# Patient Record
Sex: FEMALE | Race: WHITE | Employment: OTHER
[De-identification: names, ages, dates, MRNs, and addresses within clinical notes are randomized per-mention and may not be internally consistent; named-entity substitution may affect disease eponyms.]

---

## 2017-01-12 ENCOUNTER — OFFICE VISIT (OUTPATIENT)
Dept: FAMILY MEDICINE CLINIC | Facility: CLINIC | Age: 62
End: 2017-01-12

## 2017-01-12 VITALS
SYSTOLIC BLOOD PRESSURE: 121 MMHG | HEIGHT: 67 IN | BODY MASS INDEX: 22.29 KG/M2 | TEMPERATURE: 99.1 F | WEIGHT: 142 LBS | DIASTOLIC BLOOD PRESSURE: 73 MMHG | HEART RATE: 80 BPM | OXYGEN SATURATION: 98 %

## 2017-01-12 DIAGNOSIS — R07.89 RIGHT-SIDED CHEST WALL PAIN: Primary | ICD-10-CM

## 2017-01-12 DIAGNOSIS — Z23 NEED FOR 23-POLYVALENT PNEUMOCOCCAL POLYSACCHARIDE VACCINE: ICD-10-CM

## 2017-01-12 DIAGNOSIS — D75.89 MACROCYTOSIS: ICD-10-CM

## 2017-01-12 DIAGNOSIS — Z00.00 WELL ADULT EXAM: ICD-10-CM

## 2017-01-12 PROCEDURE — 99213 OFFICE O/P EST LOW 20 MIN: CPT | Performed by: FAMILY MEDICINE

## 2017-01-12 PROCEDURE — 90732 PPSV23 VACC 2 YRS+ SUBQ/IM: CPT | Performed by: FAMILY MEDICINE

## 2017-01-12 PROCEDURE — 90471 IMMUNIZATION ADMIN: CPT | Performed by: FAMILY MEDICINE

## 2017-01-12 ASSESSMENT — ENCOUNTER SYMPTOMS
SHORTNESS OF BREATH: 0
SORE THROAT: 0
VOMITING: 0
TROUBLE SWALLOWING: 0
ABDOMINAL PAIN: 0
WHEEZING: 0
COUGH: 1
DIARRHEA: 0

## 2017-01-13 LAB
CHOLESTEROL, TOTAL: 170 MG/DL
CHOLESTEROL/HDL RATIO: 3.2
HBA1C MFR BLD: 4.9 %
HDLC SERPL-MCNC: 53 MG/DL (ref 35–70)
LDL CHOLESTEROL CALCULATED: 100 MG/DL (ref 0–160)
TRIGL SERPL-MCNC: 84 MG/DL
VLDLC SERPL CALC-MCNC: 17 MG/DL

## 2017-01-17 DIAGNOSIS — Z00.00 WELL ADULT EXAM: ICD-10-CM

## 2017-06-23 ENCOUNTER — OFFICE VISIT (OUTPATIENT)
Dept: FAMILY MEDICINE CLINIC | Age: 62
End: 2017-06-23
Payer: COMMERCIAL

## 2017-06-23 VITALS
HEIGHT: 66 IN | DIASTOLIC BLOOD PRESSURE: 80 MMHG | OXYGEN SATURATION: 99 % | SYSTOLIC BLOOD PRESSURE: 132 MMHG | WEIGHT: 146.4 LBS | HEART RATE: 77 BPM | TEMPERATURE: 99 F | BODY MASS INDEX: 23.53 KG/M2

## 2017-06-23 DIAGNOSIS — R03.0 ELEVATED BLOOD PRESSURE (NOT HYPERTENSION): Primary | ICD-10-CM

## 2017-06-23 DIAGNOSIS — Z72.0 TOBACCO ABUSE: ICD-10-CM

## 2017-06-23 DIAGNOSIS — D75.89 MACROCYTOSIS WITHOUT ANEMIA: ICD-10-CM

## 2017-06-23 PROCEDURE — 99213 OFFICE O/P EST LOW 20 MIN: CPT | Performed by: INTERNAL MEDICINE

## 2017-06-23 RX ORDER — FOLIC ACID 1 MG/1
1 TABLET ORAL DAILY
COMMUNITY
End: 2017-06-23 | Stop reason: SDUPTHER

## 2017-06-23 RX ORDER — TRAMADOL HYDROCHLORIDE 200 MG/1
1 TABLET, EXTENDED RELEASE ORAL DAILY
Status: ON HOLD | COMMUNITY
Start: 2016-08-26 | End: 2020-10-14 | Stop reason: HOSPADM

## 2017-06-23 RX ORDER — FOLIC ACID 1 MG/1
1 TABLET ORAL DAILY
Qty: 30 TABLET | Refills: 5 | Status: SHIPPED | OUTPATIENT
Start: 2017-06-23 | End: 2018-02-16 | Stop reason: SDUPTHER

## 2017-06-23 ASSESSMENT — PATIENT HEALTH QUESTIONNAIRE - PHQ9
2. FEELING DOWN, DEPRESSED OR HOPELESS: 0
SUM OF ALL RESPONSES TO PHQ9 QUESTIONS 1 & 2: 0
1. LITTLE INTEREST OR PLEASURE IN DOING THINGS: 0
SUM OF ALL RESPONSES TO PHQ QUESTIONS 1-9: 0

## 2018-01-03 ENCOUNTER — OFFICE VISIT (OUTPATIENT)
Dept: FAMILY MEDICINE CLINIC | Age: 63
End: 2018-01-03
Payer: COMMERCIAL

## 2018-01-03 VITALS
TEMPERATURE: 98.4 F | BODY MASS INDEX: 24.4 KG/M2 | RESPIRATION RATE: 16 BRPM | SYSTOLIC BLOOD PRESSURE: 122 MMHG | DIASTOLIC BLOOD PRESSURE: 76 MMHG | OXYGEN SATURATION: 96 % | HEIGHT: 66 IN | WEIGHT: 151.8 LBS | HEART RATE: 81 BPM

## 2018-01-03 DIAGNOSIS — Z23 NEED FOR INFLUENZA VACCINATION: ICD-10-CM

## 2018-01-03 DIAGNOSIS — Z72.0 TOBACCO ABUSE: ICD-10-CM

## 2018-01-03 DIAGNOSIS — G89.29 CHRONIC BILATERAL LOW BACK PAIN WITHOUT SCIATICA: ICD-10-CM

## 2018-01-03 DIAGNOSIS — E53.8 FOLIC ACID DEFICIENCY: ICD-10-CM

## 2018-01-03 DIAGNOSIS — M54.50 CHRONIC BILATERAL LOW BACK PAIN WITHOUT SCIATICA: ICD-10-CM

## 2018-01-03 DIAGNOSIS — D75.89 MACROCYTOSIS: ICD-10-CM

## 2018-01-03 DIAGNOSIS — R03.0 ELEVATED BLOOD PRESSURE READING: Primary | ICD-10-CM

## 2018-01-03 DIAGNOSIS — Z13.220 SCREENING FOR HYPERLIPIDEMIA: ICD-10-CM

## 2018-01-03 PROCEDURE — 90688 IIV4 VACCINE SPLT 0.5 ML IM: CPT | Performed by: INTERNAL MEDICINE

## 2018-01-03 PROCEDURE — 99214 OFFICE O/P EST MOD 30 MIN: CPT | Performed by: INTERNAL MEDICINE

## 2018-01-03 PROCEDURE — 90471 IMMUNIZATION ADMIN: CPT | Performed by: INTERNAL MEDICINE

## 2018-01-03 NOTE — PROGRESS NOTES
Pt is here for a check up today. She states she feels perfectly fine this morning. No concerns at this time. Visit Information    Have you changed or started any medications since your last visit including any over-the-counter medicines, vitamins, or herbal medicines? no   Have you stopped taking any of your medications? Is so, why? -  no  Are you having any side effects from any of your medications? - no    Have you seen any other physician or provider since your last visit?  no   Have you had any other diagnostic tests since your last visit? Xray on rt hip   Have you been seen in the emergency room and/or had an admission in a hospital since we last saw you?  no   Have you had your routine dental cleaning in the past 6 months?  yes - June     Do you have an active [employer] account? If no, what is the barrier?   Yes    Patient Care Team:  Alfonso Thornton MD as PCP - General (Internal Medicine)    Medical History Review  Past Medical, Family, and Social History reviewed and does not contribute to the patient presenting condition    Health Maintenance   Topic Date Due    Flu vaccine (1) 09/01/2017    Breast cancer screen  06/23/2018 (Originally 4/17/2017)    Zostavax vaccine  06/23/2019 (Originally 12/30/2016)    DTaP/Tdap/Td vaccine (1 - Tdap) 06/23/2019 (Originally 12/30/1975)    Hepatitis C screen  06/23/2019 (Originally 1955)    HIV screen  06/23/2019 (Originally 12/30/1971)    Cervical cancer screen  04/01/2018    Lipid screen  01/13/2022    Colon cancer screen colonoscopy  06/26/2025    Pneumococcal med risk  Completed

## 2018-01-25 ENCOUNTER — HOSPITAL ENCOUNTER (OUTPATIENT)
Age: 63
Setting detail: SPECIMEN
Discharge: HOME | End: 2018-01-25
Payer: COMMERCIAL

## 2018-01-25 DIAGNOSIS — Z13.220 SCREENING FOR HYPERLIPIDEMIA: ICD-10-CM

## 2018-01-25 DIAGNOSIS — R03.0 ELEVATED BLOOD PRESSURE READING: ICD-10-CM

## 2018-01-25 DIAGNOSIS — D75.89 MACROCYTOSIS: ICD-10-CM

## 2018-01-25 DIAGNOSIS — E53.8 FOLIC ACID DEFICIENCY: ICD-10-CM

## 2018-01-25 LAB
ABSOLUTE EOS #: 0.18 K/UL (ref 0–0.44)
ABSOLUTE IMMATURE GRANULOCYTE: 0.03 K/UL (ref 0–0.3)
ABSOLUTE LYMPH #: 2.09 K/UL (ref 1.1–3.7)
ABSOLUTE MONO #: 0.6 K/UL (ref 0.1–1.2)
ALBUMIN SERPL-MCNC: 4 G/DL (ref 3.5–5.2)
ALBUMIN/GLOBULIN RATIO: 1.7 (ref 1–2.5)
ALP BLD-CCNC: 88 U/L (ref 35–104)
ALT SERPL-CCNC: 10 U/L (ref 5–33)
ANION GAP SERPL CALCULATED.3IONS-SCNC: 13 MMOL/L (ref 9–17)
AST SERPL-CCNC: 11 U/L
BASOPHILS # BLD: 1 % (ref 0–2)
BASOPHILS ABSOLUTE: 0.04 K/UL (ref 0–0.2)
BILIRUB SERPL-MCNC: 0.29 MG/DL (ref 0.3–1.2)
BUN BLDV-MCNC: 9 MG/DL (ref 8–23)
BUN/CREAT BLD: ABNORMAL (ref 9–20)
CALCIUM SERPL-MCNC: 9.1 MG/DL (ref 8.6–10.4)
CHLORIDE BLD-SCNC: 102 MMOL/L (ref 98–107)
CHOLESTEROL, FASTING: 158 MG/DL
CHOLESTEROL/HDL RATIO: 3
CO2: 26 MMOL/L (ref 20–31)
CREAT SERPL-MCNC: 0.46 MG/DL (ref 0.5–0.9)
DIFFERENTIAL TYPE: ABNORMAL
EOSINOPHILS RELATIVE PERCENT: 2 % (ref 1–4)
FOLATE: >20 NG/ML
GFR AFRICAN AMERICAN: >60 ML/MIN
GFR NON-AFRICAN AMERICAN: >60 ML/MIN
GFR SERPL CREATININE-BSD FRML MDRD: ABNORMAL ML/MIN/{1.73_M2}
GFR SERPL CREATININE-BSD FRML MDRD: ABNORMAL ML/MIN/{1.73_M2}
GLUCOSE BLD-MCNC: 88 MG/DL (ref 70–99)
HCT VFR BLD CALC: 38.2 % (ref 36.3–47.1)
HDLC SERPL-MCNC: 53 MG/DL
HEMOGLOBIN: 12.1 G/DL (ref 11.9–15.1)
IMMATURE GRANULOCYTES: 0 %
LDL CHOLESTEROL: 75 MG/DL (ref 0–130)
LYMPHOCYTES # BLD: 28 % (ref 24–43)
MCH RBC QN AUTO: 32.9 PG (ref 25.2–33.5)
MCHC RBC AUTO-ENTMCNC: 31.7 G/DL (ref 28.4–34.8)
MCV RBC AUTO: 103.8 FL (ref 82.6–102.9)
MONOCYTES # BLD: 8 % (ref 3–12)
NRBC AUTOMATED: 0 PER 100 WBC
PDW BLD-RTO: 12.4 % (ref 11.8–14.4)
PLATELET # BLD: 284 K/UL (ref 138–453)
PLATELET ESTIMATE: ABNORMAL
PMV BLD AUTO: 10.3 FL (ref 8.1–13.5)
POTASSIUM SERPL-SCNC: 4.5 MMOL/L (ref 3.7–5.3)
RBC # BLD: 3.68 M/UL (ref 3.95–5.11)
RBC # BLD: ABNORMAL 10*6/UL
SEG NEUTROPHILS: 61 % (ref 36–65)
SEGMENTED NEUTROPHILS ABSOLUTE COUNT: 4.52 K/UL (ref 1.5–8.1)
SODIUM BLD-SCNC: 141 MMOL/L (ref 135–144)
TOTAL PROTEIN: 6.3 G/DL (ref 6.4–8.3)
TRIGLYCERIDE, FASTING: 148 MG/DL
VITAMIN B-12: 401 PG/ML (ref 232–1245)
VLDLC SERPL CALC-MCNC: NORMAL MG/DL (ref 1–30)
WBC # BLD: 7.5 K/UL (ref 3.5–11.3)
WBC # BLD: ABNORMAL 10*3/UL

## 2018-02-16 RX ORDER — FOLIC ACID 1 MG/1
1 TABLET ORAL DAILY
Qty: 90 TABLET | Refills: 1 | Status: SHIPPED | OUTPATIENT
Start: 2018-02-16 | End: 2018-08-14 | Stop reason: SDUPTHER

## 2018-06-04 ENCOUNTER — OFFICE VISIT (OUTPATIENT)
Dept: FAMILY MEDICINE CLINIC | Age: 63
End: 2018-06-04
Payer: COMMERCIAL

## 2018-06-04 VITALS
HEART RATE: 75 BPM | TEMPERATURE: 98.4 F | HEIGHT: 66 IN | SYSTOLIC BLOOD PRESSURE: 124 MMHG | DIASTOLIC BLOOD PRESSURE: 72 MMHG | WEIGHT: 149 LBS | RESPIRATION RATE: 20 BRPM | BODY MASS INDEX: 23.95 KG/M2 | OXYGEN SATURATION: 99 %

## 2018-06-04 DIAGNOSIS — I49.9 IRREGULAR HEART RATE: Primary | ICD-10-CM

## 2018-06-04 DIAGNOSIS — M79.644 PAIN IN FINGER OF RIGHT HAND: ICD-10-CM

## 2018-06-04 PROCEDURE — 99214 OFFICE O/P EST MOD 30 MIN: CPT | Performed by: NURSE PRACTITIONER

## 2018-06-04 RX ORDER — ALENDRONATE SODIUM 70 MG/1
70 TABLET ORAL
COMMUNITY
Start: 2018-06-04

## 2018-06-04 ASSESSMENT — ENCOUNTER SYMPTOMS
SHORTNESS OF BREATH: 0
COUGH: 0

## 2018-06-12 ENCOUNTER — HOSPITAL ENCOUNTER (OUTPATIENT)
Dept: NON INVASIVE DIAGNOSTICS | Age: 63
Discharge: HOME | End: 2018-06-12
Payer: COMMERCIAL

## 2018-06-12 DIAGNOSIS — I49.9 IRREGULAR HEART RATE: ICD-10-CM

## 2018-06-12 PROCEDURE — 93226 XTRNL ECG REC<48 HR SCAN A/R: CPT

## 2018-06-12 PROCEDURE — 93225 XTRNL ECG REC<48 HRS REC: CPT

## 2018-06-13 LAB
ACQUISITION DURATION: NORMAL S
AVERAGE HEART RATE: 77 BPM
HOOKUP DATE: NORMAL
HOOKUP TIME: NORMAL
MAX HEART RATE TIME/DATE: NORMAL
MAX HEART RATE: 108 BPM
MIN HEART RATE TIME/DATE: NORMAL
MIN HEART RATE: 54 BPM
NUMBER OF QRS COMPLEXES: NORMAL
NUMBER OF SUPRAVENTRICULAR BEATS IN RUNS: 0
NUMBER OF SUPRAVENTRICULAR COUPLETS: 0
NUMBER OF SUPRAVENTRICULAR ECTOPICS: 44
NUMBER OF SUPRAVENTRICULAR ISOLATED BEATS: 44
NUMBER OF SUPRAVENTRICULAR RUNS: 0
NUMBER OF VENTRICULAR BEATS IN RUNS: 0
NUMBER OF VENTRICULAR BIGEMINAL CYCLES: 0
NUMBER OF VENTRICULAR COUPLETS: 0
NUMBER OF VENTRICULAR ECTOPICS: 7
NUMBER OF VENTRICULAR ISOLATED BEATS: 7
NUMBER OF VENTRICULAR RUNS: 0

## 2018-06-25 ENCOUNTER — TELEPHONE (OUTPATIENT)
Dept: FAMILY MEDICINE CLINIC | Age: 63
End: 2018-06-25

## 2018-06-26 ENCOUNTER — HOSPITAL ENCOUNTER (OUTPATIENT)
Age: 63
Setting detail: SPECIMEN
Discharge: HOME | End: 2018-06-26
Payer: COMMERCIAL

## 2018-06-26 ENCOUNTER — OFFICE VISIT (OUTPATIENT)
Dept: FAMILY MEDICINE CLINIC | Age: 63
End: 2018-06-26
Payer: COMMERCIAL

## 2018-06-26 VITALS
TEMPERATURE: 98.8 F | DIASTOLIC BLOOD PRESSURE: 82 MMHG | RESPIRATION RATE: 16 BRPM | SYSTOLIC BLOOD PRESSURE: 110 MMHG | BODY MASS INDEX: 24.13 KG/M2 | WEIGHT: 149.4 LBS | HEART RATE: 87 BPM | OXYGEN SATURATION: 98 %

## 2018-06-26 DIAGNOSIS — S69.91XD FINGER INJURY, RIGHT, SUBSEQUENT ENCOUNTER: ICD-10-CM

## 2018-06-26 DIAGNOSIS — L84 SKIN CALLUS: ICD-10-CM

## 2018-06-26 DIAGNOSIS — R00.2 INTERMITTENT PALPITATIONS: Primary | ICD-10-CM

## 2018-06-26 LAB
ALBUMIN SERPL-MCNC: 4.4 G/DL (ref 3.5–5.2)
ALBUMIN/GLOBULIN RATIO: 2 (ref 1–2.5)
ALP BLD-CCNC: 111 U/L (ref 35–104)
ALT SERPL-CCNC: 16 U/L (ref 5–33)
ANION GAP SERPL CALCULATED.3IONS-SCNC: 11 MMOL/L (ref 9–17)
AST SERPL-CCNC: 15 U/L
BILIRUB SERPL-MCNC: 0.4 MG/DL (ref 0.3–1.2)
BUN BLDV-MCNC: 11 MG/DL (ref 8–23)
BUN/CREAT BLD: ABNORMAL (ref 9–20)
CALCIUM SERPL-MCNC: 9.7 MG/DL (ref 8.6–10.4)
CHLORIDE BLD-SCNC: 106 MMOL/L (ref 98–107)
CO2: 24 MMOL/L (ref 20–31)
CREAT SERPL-MCNC: 0.45 MG/DL (ref 0.5–0.9)
GFR AFRICAN AMERICAN: >60 ML/MIN
GFR NON-AFRICAN AMERICAN: >60 ML/MIN
GFR SERPL CREATININE-BSD FRML MDRD: ABNORMAL ML/MIN/{1.73_M2}
GFR SERPL CREATININE-BSD FRML MDRD: ABNORMAL ML/MIN/{1.73_M2}
GLUCOSE BLD-MCNC: 91 MG/DL (ref 70–99)
HCT VFR BLD CALC: 39.4 % (ref 36.3–47.1)
HEMOGLOBIN: 12.9 G/DL (ref 11.9–15.1)
MCH RBC QN AUTO: 33.3 PG (ref 25.2–33.5)
MCHC RBC AUTO-ENTMCNC: 32.7 G/DL (ref 28.4–34.8)
MCV RBC AUTO: 101.8 FL (ref 82.6–102.9)
NRBC AUTOMATED: 0 PER 100 WBC
PDW BLD-RTO: 12.7 % (ref 11.8–14.4)
PLATELET # BLD: 268 K/UL (ref 138–453)
PMV BLD AUTO: 10.4 FL (ref 8.1–13.5)
POTASSIUM SERPL-SCNC: 4.6 MMOL/L (ref 3.7–5.3)
RBC # BLD: 3.87 M/UL (ref 3.95–5.11)
SODIUM BLD-SCNC: 141 MMOL/L (ref 135–144)
TOTAL PROTEIN: 6.6 G/DL (ref 6.4–8.3)
TSH SERPL DL<=0.05 MIU/L-ACNC: 1.6 MIU/L (ref 0.3–5)
WBC # BLD: 8.7 K/UL (ref 3.5–11.3)

## 2018-06-26 PROCEDURE — 99214 OFFICE O/P EST MOD 30 MIN: CPT | Performed by: INTERNAL MEDICINE

## 2018-06-26 RX ORDER — METHYLPREDNISOLONE 4 MG/1
TABLET ORAL
Qty: 1 KIT | Refills: 0 | Status: SHIPPED | OUTPATIENT
Start: 2018-06-26 | End: 2018-07-02

## 2018-06-26 ASSESSMENT — PATIENT HEALTH QUESTIONNAIRE - PHQ9
SUM OF ALL RESPONSES TO PHQ QUESTIONS 1-9: 0
SUM OF ALL RESPONSES TO PHQ9 QUESTIONS 1 & 2: 0
2. FEELING DOWN, DEPRESSED OR HOPELESS: 0
1. LITTLE INTEREST OR PLEASURE IN DOING THINGS: 0

## 2018-06-27 DIAGNOSIS — R00.2 INTERMITTENT PALPITATIONS: ICD-10-CM

## 2018-06-27 PROCEDURE — 93000 ELECTROCARDIOGRAM COMPLETE: CPT | Performed by: INTERNAL MEDICINE

## 2018-08-02 DIAGNOSIS — L84 SKIN CALLUS: ICD-10-CM

## 2018-08-02 DIAGNOSIS — S69.91XD FINGER INJURY, RIGHT, SUBSEQUENT ENCOUNTER: Primary | ICD-10-CM

## 2018-08-15 RX ORDER — FOLIC ACID 1 MG/1
TABLET ORAL
Qty: 90 TABLET | Refills: 1 | Status: SHIPPED | OUTPATIENT
Start: 2018-08-15 | End: 2019-01-30 | Stop reason: SDUPTHER

## 2018-08-28 ENCOUNTER — OFFICE VISIT (OUTPATIENT)
Dept: FAMILY MEDICINE CLINIC | Age: 63
End: 2018-08-28
Payer: COMMERCIAL

## 2018-08-28 VITALS
OXYGEN SATURATION: 99 % | TEMPERATURE: 98.1 F | RESPIRATION RATE: 16 BRPM | BODY MASS INDEX: 23.87 KG/M2 | WEIGHT: 147.8 LBS | SYSTOLIC BLOOD PRESSURE: 124 MMHG | DIASTOLIC BLOOD PRESSURE: 82 MMHG | HEART RATE: 70 BPM

## 2018-08-28 DIAGNOSIS — M19.041 PRIMARY OSTEOARTHRITIS OF BOTH HANDS: ICD-10-CM

## 2018-08-28 DIAGNOSIS — M79.641 RIGHT HAND PAIN: Primary | ICD-10-CM

## 2018-08-28 DIAGNOSIS — Z72.0 TOBACCO ABUSE: ICD-10-CM

## 2018-08-28 DIAGNOSIS — R00.2 INTERMITTENT PALPITATIONS: Primary | ICD-10-CM

## 2018-08-28 DIAGNOSIS — M19.042 PRIMARY OSTEOARTHRITIS OF BOTH HANDS: ICD-10-CM

## 2018-08-28 PROCEDURE — 99213 OFFICE O/P EST LOW 20 MIN: CPT | Performed by: INTERNAL MEDICINE

## 2018-08-28 NOTE — PROGRESS NOTES
patient is present for a follow up appointment. She was given lopressor. She had not had any side effects. She still feels pressure in her chest. No other concerns at this time. Pt sees Zabrinavero Stable tomorrow   Visit Information    Have you changed or started any medications since your last visit including any over-the-counter medicines, vitamins, or herbal medicines? no   Have you stopped taking any of your medications? Is so, why? -  no  Are you having any side effects from any of your medications? - no    Have you seen any other physician or provider since your last visit? Yes Kera   Have you had any other diagnostic tests since your last visit?  no   Have you been seen in the emergency room and/or had an admission in a hospital since we last saw you?  no   Have you had your routine dental cleaning in the past 6 months?  yes    Do you have an active [employer] account? If no, what is the barrier?   Yes    Patient Care Team:  Tiffany Landeros MD as PCP - General (Internal Medicine)  Luis Dueñas MD as Consulting Physician (Pain Management)  Candance Mess, MD as Consulting Physician (Internal Medicine)    Medical History Review  Past Medical, Family, and Social History reviewed and does not contribute to the patient presenting condition    Health Maintenance   Topic Date Due    Cervical cancer screen  04/01/2018    DTaP/Tdap/Td vaccine (1 - Tdap) 06/23/2019 (Originally 12/30/1975)    Hepatitis C screen  06/23/2019 (Originally 1955)    HIV screen  06/23/2019 (Originally 12/30/1971)    Breast cancer screen  06/26/2019 (Originally 4/17/2017)    Shingles Vaccine (1 of 2 - 2 Dose Series) 06/26/2019 (Originally 12/30/2006)    Flu vaccine (1) 09/01/2018    Lipid screen  01/25/2023    Colon cancer screen colonoscopy  06/26/2025    Pneumococcal med risk  Completed
CHOLHDLRATIO 3.2 01/13/2017    CHOLHDLRATIO 2.9 03/20/2015           Assessment/Plan:     1. Intermittent palpitations  - metoprolol tartrate (LOPRESSOR) 25 MG tablet; Take 0.5 tablets by mouth 2 times daily  Dispense: 60 tablet; Refill: 3  - call in two weeks with frequency of symptoms, will adjust based on that   - symptom diary advised     2. Primary osteoarthritis of both hands  See ortho as scheduled later this week     3.  Tobacco abuse   Precontemplative, not ready to quit, declines counseling                Electronically signed by Roxanne Marroquin MD on 8/28/2018 at 9:43 AM

## 2018-08-29 ENCOUNTER — OFFICE VISIT (OUTPATIENT)
Dept: ORTHOPEDIC SURGERY | Age: 63
End: 2018-08-29
Payer: COMMERCIAL

## 2018-08-29 VITALS — WEIGHT: 150 LBS | HEIGHT: 66 IN | BODY MASS INDEX: 24.11 KG/M2

## 2018-08-29 DIAGNOSIS — S63.694A SPRAIN OF OTHER SITE OF RIGHT RING FINGER, INITIAL ENCOUNTER: ICD-10-CM

## 2018-08-29 DIAGNOSIS — R22.31 FINGER MASS, RIGHT: Primary | ICD-10-CM

## 2018-08-29 PROCEDURE — 99243 OFF/OP CNSLTJ NEW/EST LOW 30: CPT | Performed by: ORTHOPAEDIC SURGERY

## 2018-08-29 ASSESSMENT — ENCOUNTER SYMPTOMS
CONSTIPATION: 0
NAUSEA: 0
COUGH: 0
DIARRHEA: 0

## 2018-08-29 NOTE — LETTER
Dr. Conrado Gil  [facility]  [address]  Dept: [phone]  Dept Fax: [phone]        8/29/18    Patient: Lashell Dawn  YOB: 1955    Dear Susan Art MD,    I had the pleasure of seeing one of your patients, Musa Nazario today in the office. Below are the relevant portions of my assessment and plan of care. IMPRESSION:     1. Finger mass, right    2. Sprain of other site of right ring finger, initial encounter      PLAN:     Discussed etiology and natural history of right thumb mass and right ring finger sprain. The treatment options may include oral anti-inflammatories, bracing, injections, advanced imaging, activity modification, physical therapy and/or surgical intervention. The patient would like to proceed with removal of mass from the right thumb. The patient will follow up two weeks post op. We discussed that the patient should call us with any concerns or questions. Thank you for allowing me to participate in the care of this patient. I will keep you updated on this patient's follow up and I look forward to serving you and your patients again in the future. Please don't hesitate to contact me at my mobile number [phone].         Jorden Richter

## 2018-08-29 NOTE — PROGRESS NOTES
Social History    Marital status:      Spouse name: N/A    Number of children: N/A    Years of education: N/A     Occupational History    Not on file. Social History Main Topics    Smoking status: Current Every Day Smoker     Packs/day: 0.50     Years: 30.00     Types: Cigarettes    Smokeless tobacco: Never Used    Alcohol use 3.6 oz/week     6 Cans of beer per week    Drug use: No    Sexual activity: Yes     Other Topics Concern    Not on file     Social History Narrative    No narrative on file       Family History:  No family history on file. REVIEW OF SYSTEMS:  Review of Systems   Constitutional: Negative for chills and fever. Respiratory: Negative for cough. Gastrointestinal: Negative for constipation, diarrhea and nausea. Musculoskeletal: Positive for arthralgias (right thumb, right ring finger). Negative for gait problem, joint swelling and myalgias. Neurological: Negative for dizziness, weakness and numbness. I have reviewed the CC, HPI, ROS, PMH, FHX, Social History. I agree with the documentation provided by other staff, residents, and/or medical students and have reviewed their documentation prior to providing my signature indicating agreement. PHYSICAL EXAM:  Ht 5' 6\" (1.676 m)   Wt 150 lb (68 kg)   BMI 24.21 kg/m²  Body mass index is 24.21 kg/m². Physical Exam  Gen: alert and oriented  Psych:  Appropriate affect; Appropriate knowledge base; Appropriate mood; No hallucinations; Head: normocephalic atraumatic   Chest: symmetric chest excursion  Pelvis: stable  Ortho Exam  Extremity:  Right ring finger mild diffuse swelling at PIPJ. No instability of the PIP is noted. Mild pain RCL and moderate pain UCL right ring PIPJ. Patient able to make a loose composite fist with right hand with mild decrease flexion at PIPJ right ring finger. External stigmata of DJD right  Hand is noted diffusely.     Right thumb mass volar distal size of pencil eraser Tonya Rodriguez Rn. The documentation accurately reflects work and decisions made by me. I have also reviewed documentation completed by clinical staff. Jewell Marshall DO, [facility]  8/29/2018 2:23 PM    This note is created with the assistance of a speech recognition program.  While intending to generate a document that actually reflects the content of the visit, the document can still have some errors including those of syntax and sound a like substitutions which may escape proof reading.  In such instances, actual meaning can be extrapolated by contextual diversion      No orders of the defined types were placed in this encounter.       Orders Placed This Encounter   Procedures    XR HAND RIGHT (MIN 3 VIEWS)     Standing Status:   Future     Number of Occurrences:   1     Standing Expiration Date:   8/29/2019     Order Specific Question:   Reason for exam:     Answer:   pain       Electronically signed by Myles Mckeon DO, Ephriam Miners on 8/29/2018 at 2:22 PM

## 2018-09-05 ENCOUNTER — HOSPITAL ENCOUNTER (OUTPATIENT)
Dept: PREADMISSION TESTING | Age: 63
Discharge: HOME | End: 2018-09-09
Payer: COMMERCIAL

## 2018-09-05 VITALS
RESPIRATION RATE: 16 BRPM | SYSTOLIC BLOOD PRESSURE: 137 MMHG | DIASTOLIC BLOOD PRESSURE: 78 MMHG | BODY MASS INDEX: 23.46 KG/M2 | OXYGEN SATURATION: 100 % | HEART RATE: 65 BPM | WEIGHT: 146 LBS | TEMPERATURE: 99.1 F | HEIGHT: 66 IN

## 2018-09-05 RX ORDER — SODIUM CHLORIDE, SODIUM LACTATE, POTASSIUM CHLORIDE, CALCIUM CHLORIDE 600; 310; 30; 20 MG/100ML; MG/100ML; MG/100ML; MG/100ML
1000 INJECTION, SOLUTION INTRAVENOUS CONTINUOUS
Status: CANCELLED | OUTPATIENT
Start: 2018-09-05

## 2018-09-05 RX ORDER — CALCIUM CARBONATE 500(1250)
1000 TABLET ORAL DAILY
COMMUNITY

## 2018-09-05 ASSESSMENT — PAIN DESCRIPTION - ORIENTATION: ORIENTATION: LOWER;MID

## 2018-09-05 ASSESSMENT — PAIN SCALES - GENERAL: PAINLEVEL_OUTOF10: 6

## 2018-09-05 ASSESSMENT — PAIN DESCRIPTION - LOCATION: LOCATION: BACK

## 2018-09-05 ASSESSMENT — PAIN DESCRIPTION - PAIN TYPE: TYPE: CHRONIC PAIN

## 2018-09-05 NOTE — PROGRESS NOTES
Anesthesia Focused Assessment    STOP-BANG Sleep Apnea Questionnaire    SNORE loudly (heard through closed doors)? No  TIRED, fatigued, sleepy during daytime? No  OBSERVED stopping breathing during sleep? No  High blood PRESSURE being treated? No    BMI over 35? No  AGE over 48? Yes  NECK circumference over 16\"? No  GENDER (male)? No             Total 1  High risk 5-8  Intermediate risk 3-4  Low risk 0-2    Obstructive Sleep Apnea: no  If YES, machine used: no     Type 1 DM:   no  T2DM:  no    Coronary Artery Disease:  no  Hypertension:  no    Active smoker:  3/4 PPD for 48 years. Drinks Alcohol:  reguarly    Dentition: upper partial denture    Defib / AICD / Pacemaker: no      Renal Failure/dialysis:  no    Patient was evaluated in PAT & anesthesia guidelines were applied. NPO guidelines, medication instructions and scheduled arrival time were reviewed with patient. Hx of anesthesia complications:  PONV-needs medication for this. Family hx of anesthesia complications:  no                                                                                                                     Anesthesia contacted:   no  Medical or cardiac clearance ordered: EKG unchanged, labs from June 2018 in chart. Office notes will be requested from PCP.     JEANNIE DE PAZ PA-C  9/5/18  10:49 AM

## 2018-09-11 ENCOUNTER — ANESTHESIA EVENT (OUTPATIENT)
Dept: OPERATING ROOM | Age: 63
End: 2018-09-11
Payer: COMMERCIAL

## 2018-09-11 ENCOUNTER — HOSPITAL ENCOUNTER (OUTPATIENT)
Age: 63
Setting detail: OUTPATIENT SURGERY
Discharge: HOME | End: 2018-09-11
Attending: ORTHOPAEDIC SURGERY | Admitting: ORTHOPAEDIC SURGERY
Payer: COMMERCIAL

## 2018-09-11 ENCOUNTER — ANESTHESIA (OUTPATIENT)
Dept: OPERATING ROOM | Age: 63
End: 2018-09-11
Payer: COMMERCIAL

## 2018-09-11 VITALS — SYSTOLIC BLOOD PRESSURE: 140 MMHG | DIASTOLIC BLOOD PRESSURE: 78 MMHG | OXYGEN SATURATION: 99 %

## 2018-09-11 VITALS
TEMPERATURE: 97.7 F | HEART RATE: 61 BPM | RESPIRATION RATE: 16 BRPM | OXYGEN SATURATION: 99 % | WEIGHT: 146 LBS | DIASTOLIC BLOOD PRESSURE: 88 MMHG | SYSTOLIC BLOOD PRESSURE: 171 MMHG | BODY MASS INDEX: 23.46 KG/M2 | HEIGHT: 66 IN

## 2018-09-11 DIAGNOSIS — G89.18 POST-OP PAIN: Primary | ICD-10-CM

## 2018-09-11 PROCEDURE — 3600000014 HC SURGERY LEVEL 4 ADDTL 15MIN: Performed by: ORTHOPAEDIC SURGERY

## 2018-09-11 PROCEDURE — 3700000000 HC ANESTHESIA ATTENDED CARE: Performed by: ORTHOPAEDIC SURGERY

## 2018-09-11 PROCEDURE — 26116 EXC HAND TUM DEEP < 1.5 CM: CPT | Performed by: ORTHOPAEDIC SURGERY

## 2018-09-11 PROCEDURE — 3700000001 HC ADD 15 MINUTES (ANESTHESIA): Performed by: ORTHOPAEDIC SURGERY

## 2018-09-11 PROCEDURE — 7100000011 HC PHASE II RECOVERY - ADDTL 15 MIN: Performed by: ORTHOPAEDIC SURGERY

## 2018-09-11 PROCEDURE — 88304 TISSUE EXAM BY PATHOLOGIST: CPT

## 2018-09-11 PROCEDURE — 2500000003 HC RX 250 WO HCPCS: Performed by: ORTHOPAEDIC SURGERY

## 2018-09-11 PROCEDURE — 6360000002 HC RX W HCPCS: Performed by: SPECIALIST

## 2018-09-11 PROCEDURE — 2500000003 HC RX 250 WO HCPCS: Performed by: SPECIALIST

## 2018-09-11 PROCEDURE — 2580000003 HC RX 258: Performed by: ORTHOPAEDIC SURGERY

## 2018-09-11 PROCEDURE — 6360000002 HC RX W HCPCS: Performed by: STUDENT IN AN ORGANIZED HEALTH CARE EDUCATION/TRAINING PROGRAM

## 2018-09-11 PROCEDURE — 2709999900 HC NON-CHARGEABLE SUPPLY: Performed by: ORTHOPAEDIC SURGERY

## 2018-09-11 PROCEDURE — 3600000004 HC SURGERY LEVEL 4 BASE: Performed by: ORTHOPAEDIC SURGERY

## 2018-09-11 PROCEDURE — 2580000003 HC RX 258: Performed by: ANESTHESIOLOGY

## 2018-09-11 PROCEDURE — 7100000010 HC PHASE II RECOVERY - FIRST 15 MIN: Performed by: ORTHOPAEDIC SURGERY

## 2018-09-11 RX ORDER — OXYCODONE HYDROCHLORIDE AND ACETAMINOPHEN 5; 325 MG/1; MG/1
1 TABLET ORAL EVERY 8 HOURS PRN
Qty: 15 TABLET | Refills: 0 | Status: SHIPPED | OUTPATIENT
Start: 2018-09-11 | End: 2019-02-13

## 2018-09-11 RX ORDER — MAGNESIUM HYDROXIDE 1200 MG/15ML
LIQUID ORAL CONTINUOUS PRN
Status: DISCONTINUED | OUTPATIENT
Start: 2018-09-11 | End: 2018-09-11 | Stop reason: HOSPADM

## 2018-09-11 RX ORDER — LIDOCAINE HYDROCHLORIDE 10 MG/ML
INJECTION, SOLUTION EPIDURAL; INFILTRATION; INTRACAUDAL; PERINEURAL PRN
Status: DISCONTINUED | OUTPATIENT
Start: 2018-09-11 | End: 2018-09-11 | Stop reason: SDUPTHER

## 2018-09-11 RX ORDER — SODIUM CHLORIDE 0.9 % (FLUSH) 0.9 %
10 SYRINGE (ML) INJECTION EVERY 12 HOURS SCHEDULED
Status: CANCELLED | OUTPATIENT
Start: 2018-09-11

## 2018-09-11 RX ORDER — BUPIVACAINE HYDROCHLORIDE 5 MG/ML
INJECTION, SOLUTION EPIDURAL; INTRACAUDAL PRN
Status: DISCONTINUED | OUTPATIENT
Start: 2018-09-11 | End: 2018-09-11 | Stop reason: HOSPADM

## 2018-09-11 RX ORDER — FENTANYL CITRATE 50 UG/ML
25 INJECTION, SOLUTION INTRAMUSCULAR; INTRAVENOUS EVERY 5 MIN PRN
Status: DISCONTINUED | OUTPATIENT
Start: 2018-09-11 | End: 2018-09-11 | Stop reason: HOSPADM

## 2018-09-11 RX ORDER — SODIUM CHLORIDE, SODIUM LACTATE, POTASSIUM CHLORIDE, CALCIUM CHLORIDE 600; 310; 30; 20 MG/100ML; MG/100ML; MG/100ML; MG/100ML
1000 INJECTION, SOLUTION INTRAVENOUS CONTINUOUS
Status: DISCONTINUED | OUTPATIENT
Start: 2018-09-11 | End: 2018-09-11 | Stop reason: HOSPADM

## 2018-09-11 RX ORDER — SODIUM CHLORIDE 0.9 % (FLUSH) 0.9 %
10 SYRINGE (ML) INJECTION PRN
Status: CANCELLED | OUTPATIENT
Start: 2018-09-11

## 2018-09-11 RX ORDER — ONDANSETRON 2 MG/ML
4 INJECTION INTRAMUSCULAR; INTRAVENOUS ONCE
Status: CANCELLED | OUTPATIENT
Start: 2018-09-11 | End: 2018-09-11

## 2018-09-11 RX ORDER — MIDAZOLAM HYDROCHLORIDE 1 MG/ML
2 INJECTION INTRAMUSCULAR; INTRAVENOUS
Status: CANCELLED | OUTPATIENT
Start: 2018-09-11 | End: 2018-09-11

## 2018-09-11 RX ORDER — PROPOFOL 10 MG/ML
INJECTION, EMULSION INTRAVENOUS PRN
Status: DISCONTINUED | OUTPATIENT
Start: 2018-09-11 | End: 2018-09-11 | Stop reason: SDUPTHER

## 2018-09-11 RX ORDER — FENTANYL CITRATE 50 UG/ML
INJECTION, SOLUTION INTRAMUSCULAR; INTRAVENOUS PRN
Status: DISCONTINUED | OUTPATIENT
Start: 2018-09-11 | End: 2018-09-11 | Stop reason: SDUPTHER

## 2018-09-11 RX ADMIN — FENTANYL CITRATE 100 MCG: 50 INJECTION INTRAMUSCULAR; INTRAVENOUS at 14:40

## 2018-09-11 RX ADMIN — SODIUM CHLORIDE, POTASSIUM CHLORIDE, SODIUM LACTATE AND CALCIUM CHLORIDE 1000 ML: 600; 310; 30; 20 INJECTION, SOLUTION INTRAVENOUS at 13:30

## 2018-09-11 RX ADMIN — PROPOFOL 100 MG: 10 INJECTION, EMULSION INTRAVENOUS at 14:50

## 2018-09-11 RX ADMIN — Medication 2 G: at 14:45

## 2018-09-11 RX ADMIN — LIDOCAINE HYDROCHLORIDE 40 MG: 10 INJECTION, SOLUTION EPIDURAL; INFILTRATION; INTRACAUDAL; PERINEURAL at 14:50

## 2018-09-11 ASSESSMENT — PULMONARY FUNCTION TESTS
PIF_VALUE: 0

## 2018-09-11 ASSESSMENT — PAIN SCALES - GENERAL
PAINLEVEL_OUTOF10: 0

## 2018-09-11 ASSESSMENT — PAIN DESCRIPTION - DESCRIPTORS: DESCRIPTORS: DULL;ACHING

## 2018-09-11 ASSESSMENT — PAIN - FUNCTIONAL ASSESSMENT: PAIN_FUNCTIONAL_ASSESSMENT: 0-10

## 2018-09-11 NOTE — ANESTHESIA PRE PROCEDURE
Department of Anesthesiology  Preprocedure Note       Name:  Quan Torrez   Age:  65 y.o.  :  1955                                          MRN:  [id number]         Date:  2018      Surgeon: Rodney Reynolds):  Valerie Roy DO    Procedure: Procedure(s):  EXCISION MASS THUMB, 3080 TABLE    Medications prior to admission:   Prior to Admission medications    Medication Sig Start Date End Date Taking? Authorizing Provider   Cholecalciferol (VITAMIN D3) 5000 units TABS Take 1 tablet by mouth daily   Yes Historical Provider, MD   calcium carbonate (OSCAL) 500 MG TABS tablet Take 1,000 mg by mouth daily   Yes Historical Provider, MD   metoprolol tartrate (LOPRESSOR) 25 MG tablet Take 0.5 tablets by mouth 2 times daily  Patient taking differently: Take 12.5 mg by mouth 2 times daily Uses for irregular heart beat 18  Yes Louisa Nicole MD   folic acid (FOLVITE) 1 MG tablet TAKE ONE TABLET BY MOUTH DAILY 8/15/18  Yes Louisa Nicole MD   traMADol Lugene Viel[facility]) 200 MG extended release tablet Take 1 tablet by mouth daily 16  Yes Berkley Garland MD   gabapentin (NEURONTIN) 300 MG capsule Take 300 mg by mouth 2 times daily. . 2/27/15  Yes Historical Provider, MD   oxyCODONE-acetaminophen (PERCOCET) 5-325 MG per tablet Take 1 tablet by mouth every 6 hours as needed. . 10/20/14  Yes Historical Provider, MD   alendronate (FOSAMAX) 70 MG tablet Take 70 mg by mouth every 7 days  18   Historical Provider, MD       Current medications:    Current Facility-Administered Medications   Medication Dose Route Frequency Provider Last Rate Last Dose    lactated ringers infusion 1,000 mL  1,000 mL Intravenous Continuous Lucas Lopez MD 50 mL/hr at 18 1330 1,000 mL at 18 1330    ceFAZolin (ANCEF) 2 g in dextrose 5 % 50 mL IVPB  2 g Intravenous On Call to Ludin Razo 38, DO           Allergies:     Allergies   Allergen Reactions    Celecoxib Other (See Comments)    Ibuprofen Other (See Comments)    Meloxicam Other (See Comments)    Naproxen Other (See Comments)    Voltaren  [Diclofenac Sodium] Other (See Comments)    Nsaids Other (See Comments)     Mood changes       Problem List:    Patient Active Problem List   Diagnosis Code    Neoplasm of uncertain behavior of skin D48.5    Other plastic surgery for unacceptable cosmetic appearance Z41.1    Chronic bilateral low back pain without sciatica M54.5, G89.29       Past Medical History:        Diagnosis Date    Arthritis     Chronic back pain     Irregular heart beat     LAFB (left anterior fascicular block)     Osteoporosis     PAC (premature atrial contraction) 06/2018    PACs and PVCs on holter monitor    PONV (postoperative nausea and vomiting)     Wears dentures     upper partial       Past Surgical History:        Procedure Laterality Date    ABDOMINOPLASTY  1997    BLEPHAROPLASTY  1997    BREAST ENHANCEMENT SURGERY  1999    breast augmentation    COLONOSCOPY      FINGER SURGERY      thumb lesion excised    FIXATION KYPHOPLASTY  2013    KNEE ARTHROSCOPY Left 2006    LAMINECTOMY  1994    LUMBAR LAMINECTOMY  2011    LUMBAR LAMINECTOMY  2014    SPINE SURGERY  2005    diskectomy and spinal fusion    TONSILLECTOMY         Social History:    Social History   Substance Use Topics    Smoking status: Current Every Day Smoker     Packs/day: 0.75     Years: 48.00     Types: Cigarettes    Smokeless tobacco: Never Used    Alcohol use Yes      Comment: 2-3 shot liquor daily                                Ready to quit: Not Answered  Counseling given: Not Answered      Vital Signs (Current):   Vitals:    09/11/18 1309   BP: (!) 151/72   Pulse: 70   Resp: 18   Temp: 97.5 °F (36.4 °C)   TempSrc: Temporal   SpO2: 97%   Weight: 146 lb (66.2 kg)   Height: 5' 6\" (1.676 m)                                              BP Readings from Last 3 Encounters:   09/11/18 (!) 151/72   09/05/18 137/78   08/28/18 124/82       NPO Status: Time of last liquid consumption: 2300                        Time of last solid consumption: 2100                        Date of last liquid consumption: 09/10/18                        Date of last solid food consumption: 09/10/18    BMI:   Wt Readings from Last 3 Encounters:   09/11/18 146 lb (66.2 kg)   09/05/18 146 lb (66.2 kg)   08/29/18 150 lb (68 kg)     Body mass index is 23.57 kg/m². CBC:   Lab Results   Component Value Date    WBC 8.7 06/26/2018    RBC 3.87 06/26/2018    HGB 12.9 06/26/2018    HCT 39.4 06/26/2018    .8 06/26/2018    RDW 12.7 06/26/2018     06/26/2018       CMP:   Lab Results   Component Value Date     06/26/2018    K 4.6 06/26/2018     06/26/2018    CO2 24 06/26/2018    BUN 11 06/26/2018    CREATININE 0.45 06/26/2018    GFRAA >60 06/26/2018    LABGLOM >60 06/26/2018    GLUCOSE 91 06/26/2018    PROT 6.6 06/26/2018    CALCIUM 9.7 06/26/2018    BILITOT 0.40 06/26/2018    ALKPHOS 111 06/26/2018    AST 15 06/26/2018    ALT 16 06/26/2018       POC Tests: No results for input(s): POCGLU, POCNA, POCK, POCCL, POCBUN, POCHEMO, POCHCT in the last 72 hours. Coags: No results found for: PROTIME, INR, APTT    HCG (If Applicable): No results found for: PREGTESTUR, PREGSERUM, HCG, HCGQUANT     ABGs: No results found for: PHART, PO2ART, BGC5RQD, ZOL3VFT, BEART, N5DOQEJT     Type & Screen (If Applicable):  No results found for: LABABO, [address]    Anesthesia Evaluation  Patient summary reviewed and Nursing notes reviewed   history of anesthetic complications: PONV. Airway: Mallampati: I  TM distance: >3 FB   Neck ROM: full  Mouth opening: > = 3 FB Dental: normal exam         Pulmonary: breath sounds clear to auscultation            Patient smoked on day of surgery.                  Cardiovascular:  Exercise tolerance: good (>4 METS),       (-) past MI, CAD and dysrhythmias    ECG reviewed  Rhythm: regular  Rate: normal                    Neuro/Psych:   (+) psychiatric history: GI/Hepatic/Renal: Neg GI/Hepatic/Renal ROS            Endo/Other: Negative Endo/Other ROS                    Abdominal:       Abdomen: soft. Vascular:                                        Anesthesia Plan      MAC     ASA 2       Induction: intravenous. MIPS: Postoperative opioids intended and Prophylactic antiemetics administered. Anesthetic plan and risks discussed with patient. Plan discussed with CRNA.     Attending anesthesiologist reviewed and agrees with 2300 Ronna Meza MD   9/11/2018

## 2018-09-12 NOTE — ANESTHESIA POSTPROCEDURE EVALUATION
Department of Anesthesiology  Postprocedure Note    Patient: Surendra Abad  MRN: [id number]  YOB: 1955  Date of evaluation: 9/12/2018  Time:  9:11 AM     Procedure Summary     Date:  09/11/18 Room / Location:  [facility] OR  / [facility] OR    Anesthesia Start:  4941 Anesthesia Stop:  6450    Procedure:  EXCISION MASS THUMB, 3080 TABLE (Right ) Diagnosis:  (MASS RIGHT THUMB)    Surgeon:  Ayaka Castaneda DO Responsible Provider:  Petty Blair MD    Anesthesia Type:  MAC ASA Status:  2          Anesthesia Type: MAC    Aki Phase I: Aki Score: 10    Aki Phase II: Aki Score: 10    Last vitals: Reviewed and per EMR flowsheets.        Anesthesia Post Evaluation    Patient location during evaluation: PACU  Patient participation: complete - patient participated  Level of consciousness: awake  Pain score: 2  Airway patency: patent  Nausea & Vomiting: no vomiting and no nausea  Complications: no  Cardiovascular status: blood pressure returned to baseline  Respiratory status: acceptable  Hydration status: euvolemic

## 2018-09-13 ENCOUNTER — TELEPHONE (OUTPATIENT)
Dept: FAMILY MEDICINE CLINIC | Age: 63
End: 2018-09-13

## 2018-09-13 LAB — DERMATOLOGY PATHOLOGY REPORT: NORMAL

## 2018-09-19 DIAGNOSIS — R00.2 INTERMITTENT PALPITATIONS: ICD-10-CM

## 2018-09-26 ENCOUNTER — OFFICE VISIT (OUTPATIENT)
Dept: ORTHOPEDIC SURGERY | Age: 63
End: 2018-09-26

## 2018-09-26 VITALS — WEIGHT: 150 LBS | HEIGHT: 66 IN | BODY MASS INDEX: 24.11 KG/M2

## 2018-09-26 DIAGNOSIS — R22.31 FINGER MASS, RIGHT: Primary | ICD-10-CM

## 2018-09-26 PROCEDURE — 99024 POSTOP FOLLOW-UP VISIT: CPT | Performed by: ORTHOPAEDIC SURGERY

## 2018-09-26 ASSESSMENT — ENCOUNTER SYMPTOMS
COUGH: 0
VOMITING: 0
APNEA: 0
ABDOMINAL PAIN: 0
CHEST TIGHTNESS: 0

## 2018-09-26 NOTE — PROGRESS NOTES
Exam  MS:  Incision healing well to right thumb with no signs and symptoms of infection noted. Triggering right ring finger that is non-tender at the A1 pully. Motor, sensory, vascular examination to right upper extremity is grossly intact without focal deficits. No gross instability to the right ring finger is appreciated. Triggering appears to be occurring at the proximal interphalangeal joint of the right ring finger. Neuro: alert. oriented  Eyes: Extra-ocular muscles intact  Mouth: Oral mucosa moist. No perioral lesions  Pulm: Respirations unlabored and regular. Skin: warm, well perfused  Psych:   Patient has good fund of knowledge and displays understanging of exam, diagnosis, and plan. Assessment:      1. Finger mass, right         Plan:     sutures removed from right thumb today without complication. Discussed with the patient that she should not soak her right thumb for a week or 2 until her thumb wounds have closed to help avoid infection. Also discussed with the patient that she should avoid bumping her thumb on anything. As for the triggering of her right ring finger the patient is not looking for treatment at this time. Discussed with the patient that she can follow up if she decides she wants treatment for her trigger finger but I would most likely give her a referral to a hand specialist. Patient voices understanding. The patient should follow up in the office as needed. Follow up:Return if symptoms worsen or fail to improve. No orders of the defined types were placed in this encounter. No orders of the defined types were placed in this encounter. Bernarda Morin LPN am scribing for and in the presence of Dr. Tequila Fish  9/27/2018 8:23 AM    I have reviewed and made changes accordingly to the work scribed by Jamar Mclaughlin LPN. The documentation accurately reflects work and decisions made by me.   I have also reviewed documentation completed by clinical staff.     Sumaya Rodriguez DO, [facility]  9/27/2018 10:35 AM     This note is created with the assistance of a speech recognition program.  While intending to generate a document that actually reflects the content of the visit, the document can still have some errors including those of syntax and sound a like substitutions which may escape proof reading.  In such instances, actual meaning can be extrapolated by contextual diversion        Electronically signed by Valerie Roy DO, FAOAO on 9/27/2018 at 10:34 AM

## 2018-10-30 ENCOUNTER — OFFICE VISIT (OUTPATIENT)
Dept: FAMILY MEDICINE CLINIC | Age: 63
End: 2018-10-30
Payer: COMMERCIAL

## 2018-10-30 VITALS
BODY MASS INDEX: 24.44 KG/M2 | OXYGEN SATURATION: 98 % | RESPIRATION RATE: 16 BRPM | WEIGHT: 151.4 LBS | SYSTOLIC BLOOD PRESSURE: 118 MMHG | DIASTOLIC BLOOD PRESSURE: 70 MMHG | HEART RATE: 70 BPM | TEMPERATURE: 99 F

## 2018-10-30 DIAGNOSIS — R00.2 HEART PALPITATIONS: Primary | ICD-10-CM

## 2018-10-30 DIAGNOSIS — L84 FOOT CALLUS: ICD-10-CM

## 2018-10-30 DIAGNOSIS — G89.29 CHRONIC BILATERAL LOW BACK PAIN WITHOUT SCIATICA: ICD-10-CM

## 2018-10-30 DIAGNOSIS — Z23 NEEDS FLU SHOT: ICD-10-CM

## 2018-10-30 DIAGNOSIS — M54.50 CHRONIC BILATERAL LOW BACK PAIN WITHOUT SCIATICA: ICD-10-CM

## 2018-10-30 DIAGNOSIS — M67.921 TENDINOPATHY OF RIGHT BICEPS TENDON: ICD-10-CM

## 2018-10-30 PROCEDURE — 99214 OFFICE O/P EST MOD 30 MIN: CPT | Performed by: INTERNAL MEDICINE

## 2018-10-30 PROCEDURE — 90686 IIV4 VACC NO PRSV 0.5 ML IM: CPT | Performed by: INTERNAL MEDICINE

## 2018-10-30 PROCEDURE — 90471 IMMUNIZATION ADMIN: CPT | Performed by: INTERNAL MEDICINE

## 2018-10-30 RX ORDER — METHYLPREDNISOLONE 4 MG/1
TABLET ORAL
Qty: 1 KIT | Refills: 0 | Status: SHIPPED | OUTPATIENT
Start: 2018-10-30 | End: 2019-01-30 | Stop reason: ALTCHOICE

## 2019-01-30 ENCOUNTER — OFFICE VISIT (OUTPATIENT)
Dept: FAMILY MEDICINE CLINIC | Age: 64
End: 2019-01-30
Payer: COMMERCIAL

## 2019-01-30 VITALS
BODY MASS INDEX: 25.47 KG/M2 | HEART RATE: 75 BPM | DIASTOLIC BLOOD PRESSURE: 80 MMHG | SYSTOLIC BLOOD PRESSURE: 136 MMHG | TEMPERATURE: 98.5 F | OXYGEN SATURATION: 98 % | WEIGHT: 157.8 LBS

## 2019-01-30 DIAGNOSIS — H81.10 BENIGN PAROXYSMAL POSITIONAL VERTIGO, UNSPECIFIED LATERALITY: Primary | ICD-10-CM

## 2019-01-30 DIAGNOSIS — M54.50 CHRONIC BILATERAL LOW BACK PAIN WITHOUT SCIATICA: ICD-10-CM

## 2019-01-30 DIAGNOSIS — G89.29 CHRONIC BILATERAL LOW BACK PAIN WITHOUT SCIATICA: ICD-10-CM

## 2019-01-30 PROCEDURE — 99213 OFFICE O/P EST LOW 20 MIN: CPT | Performed by: NURSE PRACTITIONER

## 2019-01-30 RX ORDER — FOLIC ACID 1 MG/1
TABLET ORAL
Qty: 90 TABLET | Refills: 1 | Status: SHIPPED | OUTPATIENT
Start: 2019-01-30 | End: 2019-08-30 | Stop reason: SDUPTHER

## 2019-01-30 RX ORDER — MECLIZINE HYDROCHLORIDE 25 MG/1
25 TABLET ORAL 3 TIMES DAILY PRN
Qty: 21 TABLET | Refills: 0 | Status: SHIPPED | OUTPATIENT
Start: 2019-01-30 | End: 2019-02-06

## 2019-01-30 ASSESSMENT — ENCOUNTER SYMPTOMS
COUGH: 0
VOMITING: 0
CHANGE IN BOWEL HABIT: 0
NAUSEA: 1

## 2019-02-13 ENCOUNTER — PROCEDURE VISIT (OUTPATIENT)
Dept: FAMILY MEDICINE CLINIC | Age: 64
End: 2019-02-13
Payer: COMMERCIAL

## 2019-02-13 VITALS
OXYGEN SATURATION: 98 % | TEMPERATURE: 98.8 F | DIASTOLIC BLOOD PRESSURE: 60 MMHG | HEART RATE: 84 BPM | WEIGHT: 155.8 LBS | BODY MASS INDEX: 25.15 KG/M2 | SYSTOLIC BLOOD PRESSURE: 112 MMHG

## 2019-02-13 DIAGNOSIS — B07.0 PLANTAR WART: Primary | ICD-10-CM

## 2019-02-13 PROCEDURE — 99212 OFFICE O/P EST SF 10 MIN: CPT | Performed by: NURSE PRACTITIONER

## 2019-02-13 ASSESSMENT — PATIENT HEALTH QUESTIONNAIRE - PHQ9
SUM OF ALL RESPONSES TO PHQ QUESTIONS 1-9: 0
SUM OF ALL RESPONSES TO PHQ QUESTIONS 1-9: 0
SUM OF ALL RESPONSES TO PHQ9 QUESTIONS 1 & 2: 0
1. LITTLE INTEREST OR PLEASURE IN DOING THINGS: 0
2. FEELING DOWN, DEPRESSED OR HOPELESS: 0

## 2019-03-13 DIAGNOSIS — R00.2 INTERMITTENT PALPITATIONS: ICD-10-CM

## 2019-05-30 ENCOUNTER — TELEPHONE (OUTPATIENT)
Dept: FAMILY MEDICINE CLINIC | Age: 64
End: 2019-05-30

## 2019-06-12 ENCOUNTER — OFFICE VISIT (OUTPATIENT)
Dept: FAMILY MEDICINE CLINIC | Age: 64
End: 2019-06-12
Payer: COMMERCIAL

## 2019-06-12 ENCOUNTER — TELEPHONE (OUTPATIENT)
Dept: FAMILY MEDICINE CLINIC | Age: 64
End: 2019-06-12

## 2019-06-12 VITALS
WEIGHT: 145 LBS | SYSTOLIC BLOOD PRESSURE: 122 MMHG | TEMPERATURE: 98.8 F | HEART RATE: 92 BPM | DIASTOLIC BLOOD PRESSURE: 68 MMHG | BODY MASS INDEX: 23.4 KG/M2 | OXYGEN SATURATION: 99 %

## 2019-06-12 DIAGNOSIS — M25.562 CHRONIC PAIN OF BOTH KNEES: Primary | ICD-10-CM

## 2019-06-12 DIAGNOSIS — M25.561 CHRONIC PAIN OF BOTH KNEES: Primary | ICD-10-CM

## 2019-06-12 DIAGNOSIS — G89.29 CHRONIC PAIN OF BOTH KNEES: Primary | ICD-10-CM

## 2019-06-12 DIAGNOSIS — R00.2 INTERMITTENT PALPITATIONS: ICD-10-CM

## 2019-06-12 PROCEDURE — 4004F PT TOBACCO SCREEN RCVD TLK: CPT | Performed by: NURSE PRACTITIONER

## 2019-06-12 PROCEDURE — G8427 DOCREV CUR MEDS BY ELIG CLIN: HCPCS | Performed by: NURSE PRACTITIONER

## 2019-06-12 PROCEDURE — 3017F COLORECTAL CA SCREEN DOC REV: CPT | Performed by: NURSE PRACTITIONER

## 2019-06-12 PROCEDURE — G8420 CALC BMI NORM PARAMETERS: HCPCS | Performed by: NURSE PRACTITIONER

## 2019-06-12 PROCEDURE — 99214 OFFICE O/P EST MOD 30 MIN: CPT | Performed by: NURSE PRACTITIONER

## 2019-06-12 RX ORDER — OXYCODONE HYDROCHLORIDE AND ACETAMINOPHEN 5; 325 MG/1; MG/1
1 TABLET ORAL EVERY 6 HOURS PRN
Status: ON HOLD | COMMUNITY
End: 2019-12-03 | Stop reason: HOSPADM

## 2019-06-12 RX ORDER — OMEPRAZOLE 40 MG/1
1 CAPSULE, DELAYED RELEASE ORAL DAILY
COMMUNITY
End: 2021-01-01 | Stop reason: SDUPTHER

## 2019-06-12 RX ORDER — METOPROLOL TARTRATE 37.5 MG/1
37.5 TABLET, FILM COATED ORAL 2 TIMES DAILY
Qty: 60 TABLET | Refills: 5 | Status: SHIPPED | OUTPATIENT
Start: 2019-06-12 | End: 2019-12-30 | Stop reason: SDUPTHER

## 2019-06-12 ASSESSMENT — ENCOUNTER SYMPTOMS
SHORTNESS OF BREATH: 0
COUGH: 0

## 2019-06-12 NOTE — PROGRESS NOTES
mouth every 7 days       traMADol (ULTRAM ER) 200 MG extended release tablet Take 1 tablet by mouth daily      gabapentin (NEURONTIN) 300 MG capsule Take 300 mg by mouth 2 times daily. .       No current facility-administered medications for this visit.       Past Medical History:   Diagnosis Date    Arthritis     Chronic back pain     Irregular heart beat     LAFB (left anterior fascicular block)     Osteoporosis     PAC (premature atrial contraction) 06/2018    PACs and PVCs on holter monitor    PONV (postoperative nausea and vomiting)     Wears dentures     upper partial      Past Surgical History:   Procedure Laterality Date    ABDOMINOPLASTY  1997    BLEPHAROPLASTY  1997    BREAST ENHANCEMENT SURGERY  1999    breast augmentation    COLONOSCOPY      FINGER SURGERY      thumb lesion excised    FIXATION KYPHOPLASTY  2013    KNEE ARTHROSCOPY Left 2006    LAMINECTOMY  1994    LUMBAR LAMINECTOMY  2011    LUMBAR LAMINECTOMY  2014    OTHER SURGICAL HISTORY Right 09/11/2018     EXCISION MASS THUMB (Right )    IN OFFICE/OUTPT VISIT,PROCEDURE ONLY Right 9/11/2018    EXCISION MASS THUMB, 3080 TABLE performed by Jo Hall DO at [address]  2005    diskectomy and spinal fusion    TONSILLECTOMY       Family History   Problem Relation Age of Onset    Coronary Art Dis Mother     Arthritis Mother     Coronary Art Dis Father     Heart Disease Father     Coronary Art Dis Sister      Social History     Tobacco Use    Smoking status: Current Every Day Smoker     Packs/day: 0.75     Years: 48.00     Pack years: 36.00     Types: Cigarettes    Smokeless tobacco: Never Used   Substance Use Topics    Alcohol use: Yes     Comment: 2-3 shot liquor daily      Allergies   Allergen Reactions    Celecoxib Other (See Comments)    Ibuprofen Other (See Comments)    Meloxicam Other (See Comments)    Naproxen Other (See Comments)    Voltaren  [Diclofenac Sodium] Other (See Comments)    Nsaids Other (See Comments)     Mood changes-patient states she is taking nsaids now       Subjective:      Review of Systems   Constitutional: Negative for chills and fever. Respiratory: Negative for cough and shortness of breath. Cardiovascular: Positive for palpitations. Negative for chest pain and leg swelling. Musculoskeletal: Positive for arthralgias. Other pertinent ROS in HPI  Objective:     /68 (Site: Left Upper Arm, Position: Sitting, Cuff Size: Medium Adult)   Pulse 92   Temp 98.8 °F (37.1 °C) (Oral)   Wt 145 lb (65.8 kg)   SpO2 99%   BMI 23.40 kg/m²    Physical Exam   Constitutional: She is oriented to person, place, and time. She appears well-developed and well-nourished. She is active. HENT:   Right Ear: External ear normal.   Left Ear: External ear normal.   Nose: Nose normal.   Eyes: EOM are normal.   Neck: Normal range of motion and full passive range of motion without pain. Cardiovascular: Normal rate, regular rhythm, S1 normal, S2 normal and normal heart sounds. Pulmonary/Chest: Effort normal and breath sounds normal. No respiratory distress. Musculoskeletal: Normal range of motion. She exhibits no deformity. Right knee: She exhibits normal range of motion, no swelling, no effusion and no ecchymosis. Tenderness found. Left knee: She exhibits normal range of motion, no swelling, no effusion and no ecchymosis. Tenderness found. Neurological: She is alert and oriented to person, place, and time. Skin: Skin is warm and dry. Psychiatric: She has a normal mood and affect. Assessment/PLAN     1. Intermittent palpitations  Increase dose  rto in 1 month for f/u. - metoprolol tartrate 37.5 MG TABS; Take 37.5 mg by mouth 2 times daily  Dispense: 60 tablet; Refill: 5    2. Chronic pain of both knees  Will call with name of doctor she wishes to see.        RTO if symptoms worsen or fail to improve  Pt agreeable with plan      Patient given educational materials - see patientinstructions. Discussed use, benefit, and side effects of prescribed medications. All patient questions answered. Pt voiced understanding. Reviewed health maintenance. Instructed to continue current medications, diet andexercise. 1.  [facility] received counseling on the following healthy behaviors: nutrition, exercise and medication adherence  2. Patient given educationalmaterials when available - see patient instructions when applicable  3. Discussed use, benefit, and side effects of prescribed medications. Barriersto medication compliance addressed. All patient questions answered. Pt voiced understanding. 4.  Reviewed prior labs and health maintenance  5. Continue current medications, diet and exercise.     CompletedRefills   Requested Prescriptions     Signed Prescriptions Disp Refills    metoprolol tartrate 37.5 MG TABS 60 tablet 5     Sig: Take 37.5 mg by mouth 2 times daily         Electronically signed by Donna Johansen CNP on 6/12/2019 at 9:07 AM

## 2019-06-12 NOTE — PROGRESS NOTES
Patient is present to discuss bp meds-lopressor  Patient states an hour before taking med she can feel her heart skipping beats    Patient would like referral to ortho for her right knee  Patient states she goes to pain management for injections and they recommended she see orthi    Pharmacy and medication reviewed with patient    Visit Information    Have you changed or started any medications since your last visit including any over-the-counter medicines, vitamins, or herbal medicines? no   Have you stopped taking any of your medications? Is so, why? -  no  Are you having any side effects from any of your medications? - no    Have you seen any other physician or provider since your last visit? yes - pain management, rheumatology    Have you had any other diagnostic tests since your last visit? yes - xrays, labs  Have you been seen in the emergency room and/or had an admission in a hospital since we last saw you?  no   Have you had your routine dental cleaning in the past 6 months?  yes -      Do you have an active MyChart account? If no, what is the barrier?   Yes    Patient Care Team:  NUPUR Pereira CNP as PCP - General (Family Nurse Practitioner)  NUPUR Pereira CNP as PCP [facility]  Tiffanie Dent MD as Consulting Physician (Pain Management)  Bruno Finley MD as Consulting Physician (Internal Medicine)    Medical History Review  Past Medical, Family, and Social History reviewed and does contribute to the patient presenting condition    Health Maintenance   Topic Date Due    Low dose CT lung screening  12/30/2011    Cervical cancer screen  04/01/2018    DTaP/Tdap/Td vaccine (1 - Tdap) 06/23/2019 (Originally [birth year]30/1974)    Hepatitis C screen  06/23/2019 (Originally 1955)    HIV screen  06/23/2019 (Originally [birth year]/30/1971)    Breast cancer screen  06/26/2019 (Originally 4/17/2017)    Shingles Vaccine (1 of 2) 06/26/2019 (Originally 12/30/2006)   Amy Saxena screen  01/25/2023    Colon cancer screen colonoscopy  06/26/2025    Flu vaccine  Completed    Pneumococcal 0-64 years Vaccine  Completed

## 2019-06-20 ENCOUNTER — HOSPITAL ENCOUNTER (OUTPATIENT)
Age: 64
Setting detail: SPECIMEN
Discharge: HOME | End: 2019-06-20
Payer: COMMERCIAL

## 2019-06-20 ENCOUNTER — OFFICE VISIT (OUTPATIENT)
Dept: ORTHOPEDIC SURGERY | Age: 64
End: 2019-06-20
Payer: COMMERCIAL

## 2019-06-20 VITALS
DIASTOLIC BLOOD PRESSURE: 79 MMHG | HEART RATE: 62 BPM | BODY MASS INDEX: 24.11 KG/M2 | SYSTOLIC BLOOD PRESSURE: 134 MMHG | WEIGHT: 150 LBS | HEIGHT: 66 IN

## 2019-06-20 DIAGNOSIS — M25.461 EFFUSION OF RIGHT KNEE: ICD-10-CM

## 2019-06-20 DIAGNOSIS — M17.0 PRIMARY OSTEOARTHRITIS OF BOTH KNEES: Primary | ICD-10-CM

## 2019-06-20 LAB
CULTURE: NORMAL
DIRECT EXAM: NORMAL
DIRECT EXAM: NORMAL
Lab: NORMAL
Lab: NORMAL
SPECIMEN DESCRIPTION: NORMAL
SPECIMEN DESCRIPTION: NORMAL

## 2019-06-20 PROCEDURE — G8420 CALC BMI NORM PARAMETERS: HCPCS | Performed by: ORTHOPAEDIC SURGERY

## 2019-06-20 PROCEDURE — 20611 DRAIN/INJ JOINT/BURSA W/US: CPT | Performed by: ORTHOPAEDIC SURGERY

## 2019-06-20 PROCEDURE — 3017F COLORECTAL CA SCREEN DOC REV: CPT | Performed by: ORTHOPAEDIC SURGERY

## 2019-06-20 PROCEDURE — G8427 DOCREV CUR MEDS BY ELIG CLIN: HCPCS | Performed by: ORTHOPAEDIC SURGERY

## 2019-06-20 PROCEDURE — 99203 OFFICE O/P NEW LOW 30 MIN: CPT | Performed by: ORTHOPAEDIC SURGERY

## 2019-06-20 PROCEDURE — 4004F PT TOBACCO SCREEN RCVD TLK: CPT | Performed by: ORTHOPAEDIC SURGERY

## 2019-06-20 PROCEDURE — 73564 X-RAY EXAM KNEE 4 OR MORE: CPT | Performed by: ORTHOPAEDIC SURGERY

## 2019-06-20 RX ORDER — LIDOCAINE HYDROCHLORIDE 10 MG/ML
4 INJECTION, SOLUTION INFILTRATION; PERINEURAL ONCE
Status: COMPLETED | OUTPATIENT
Start: 2019-06-20 | End: 2019-06-20

## 2019-06-20 RX ORDER — METHYLPREDNISOLONE ACETATE 40 MG/ML
40 INJECTION, SUSPENSION INTRA-ARTICULAR; INTRALESIONAL; INTRAMUSCULAR; SOFT TISSUE ONCE
Status: COMPLETED | OUTPATIENT
Start: 2019-06-20 | End: 2019-06-20

## 2019-06-20 RX ADMIN — LIDOCAINE HYDROCHLORIDE 4 ML: 10 INJECTION, SOLUTION INFILTRATION; PERINEURAL at 12:36

## 2019-06-20 RX ADMIN — METHYLPREDNISOLONE ACETATE 40 MG: 40 INJECTION, SUSPENSION INTRA-ARTICULAR; INTRALESIONAL; INTRAMUSCULAR; SOFT TISSUE at 12:38

## 2019-06-20 RX ADMIN — METHYLPREDNISOLONE ACETATE 40 MG: 40 INJECTION, SUSPENSION INTRA-ARTICULAR; INTRALESIONAL; INTRAMUSCULAR; SOFT TISSUE at 12:37

## 2019-06-20 RX ADMIN — LIDOCAINE HYDROCHLORIDE 4 ML: 10 INJECTION, SOLUTION INFILTRATION; PERINEURAL at 12:37

## 2019-06-20 ASSESSMENT — ENCOUNTER SYMPTOMS
ABDOMINAL PAIN: 0
COLOR CHANGE: 0
SHORTNESS OF BREATH: 0
VOMITING: 0
CHEST TIGHTNESS: 0
CONSTIPATION: 0
DIARRHEA: 0
ABDOMINAL DISTENTION: 0
NAUSEA: 0
COUGH: 0
APNEA: 0

## 2019-06-20 NOTE — PROGRESS NOTES
[facility]  [address] [address]  Dept: [phone]  Dept Fax: [phone]          Bilateral Knee - New Patient     Subjective:     Chief Complaint   Patient presents with    Knee Pain     B/Lm R>L       HPI:     Erik Belcher presents today for Bilateral knee pain. The pain has been present since October 2018. The patient does not recall any specific injury or trauma to either of the knees. She reports that she woke up one morning in October and was in excruciating pain. At the time the patient was on a camping trip, and reports that the rest of her trip was ruined due to her increasing knee pain. The patient has tried Naproxen with mild improvement. The patient is also on Tramadol and Percocet from pain management for her back, but reports little improvement with both of her knees. The pain is now described as [place] Deric when she stands and with any weight bearing activities. The patient reports that both her knees are achy all the time. There is  pain on weight bearing. The knee has swelled. There is not painful popping and clicking. The knee has not caught or locked up. The knee has given out. It is  stiff upon arising from sitting. It is painful to go up and down stairs and sit for a prolonged time. The patient has had a cortisone injection. The patient has had two cortisone injections in the right knee from pain management. One injection was in the knee joint, and the other was in the bursa sac. The bursa sac cortisone injection was in May of 2019, and it did not provide any type of relief. The patient has not tried a lubrication injection. The patient has tried physical therapy. The patient has had surgery in the right knee in 2006. The opposite knee is not okay. The right knee is worse than the left.  The patient has also had a previous bone density scan done in which she was put on calcium and vitamin D to help with her low bone density. Review of Systems   Constitutional: Positive for activity change. Negative for appetite change, fatigue and fever. Respiratory: Negative for apnea, cough, chest tightness and shortness of breath. Cardiovascular: Negative for chest pain, palpitations and leg swelling. Gastrointestinal: Negative for abdominal distention, abdominal pain, constipation, diarrhea, nausea and vomiting. Genitourinary: Negative for difficulty urinating, dysuria and hematuria. Musculoskeletal: Positive for arthralgias, gait problem and joint swelling. Negative for myalgias. Skin: Negative for color change and rash. Neurological: Negative for dizziness, weakness, numbness and headaches. Psychiatric/Behavioral: Positive for sleep disturbance.        Past Medical History:    Past Medical History:   Diagnosis Date    Arthritis     Chronic back pain     Irregular heart beat     LAFB (left anterior fascicular block)     Osteoporosis     PAC (premature atrial contraction) 06/2018    PACs and PVCs on holter monitor    PONV (postoperative nausea and vomiting)     Wears dentures     upper partial       Past Surgical History:    Past Surgical History:   Procedure Laterality Date    ABDOMINOPLASTY  1997    BLEPHAROPLASTY  1997    BREAST ENHANCEMENT SURGERY  1999    breast augmentation    COLONOSCOPY      FINGER SURGERY      thumb lesion excised    FIXATION KYPHOPLASTY  2013    KNEE ARTHROSCOPY Left 2006    LAMINECTOMY  1994    LUMBAR LAMINECTOMY  2011    LUMBAR LAMINECTOMY  2014    OTHER SURGICAL HISTORY Right 09/11/2018     EXCISION MASS THUMB (Right )    GA OFFICE/OUTPT VISIT,PROCEDURE ONLY Right 9/11/2018    EXCISION MASS THUMB, 3080 TABLE performed by Kori Phoenix DO at [facility]  2005    diskectomy and spinal fusion    TONSILLECTOMY         CurrentMedications:   Current Outpatient Medications   Medication Sig Dispense Refill    NAPROXEN PO Take by mouth Indications: patient takes 1-2 times a day      oxyCODONE-acetaminophen (PERCOCET) 5-325 MG per tablet Take 1 tablet by mouth.  OMEPRAZOLE PO Take by mouth daily      metoprolol tartrate 37.5 MG TABS Take 37.5 mg by mouth 2 times daily 60 tablet 5    folic acid (FOLVITE) 1 MG tablet TAKE ONE TABLET BY MOUTH DAILY 90 tablet 1    Cholecalciferol (VITAMIN D3) 5000 units TABS Take 1 tablet by mouth daily      calcium carbonate (OSCAL) 500 MG TABS tablet Take 1,000 mg by mouth daily      alendronate (FOSAMAX) 70 MG tablet Take 70 mg by mouth every 7 days       traMADol (ULTRAM ER) 200 MG extended release tablet Take 1 tablet by mouth daily      gabapentin (NEURONTIN) 300 MG capsule Take 300 mg by mouth 2 times daily. .       No current facility-administered medications for this visit. Allergies:    Celecoxib;  Ibuprofen; Meloxicam; Naproxen; Voltaren  [diclofenac sodium]; and Nsaids    Social History:   Social History     Socioeconomic History    Marital status:      Spouse name: None    Number of children: None    Years of education: None    Highest education level: None   Occupational History    None   Social Needs    Financial resource strain: None    Food insecurity:     Worry: None     Inability: None    Transportation needs:     Medical: None     Non-medical: None   Tobacco Use    Smoking status: Current Every Day Smoker     Packs/day: 0.75     Years: 48.00     Pack years: 36.00     Types: Cigarettes    Smokeless tobacco: Never Used   Substance and Sexual Activity    Alcohol use: Yes     Comment: 2-3 shot liquor daily    Drug use: No    Sexual activity: Yes   Lifestyle    Physical activity:     Days per week: None     Minutes per session: None    Stress: None   Relationships    Social connections:     Talks on phone: None     Gets together: None     Attends Christianity service: None     Active member of club or organization: None     Attends meetings of clubs or organizations: None     Relationship status: None    Intimate partner violence:     Fear of current or ex partner: None     Emotionally abused: None     Physically abused: None     Forced sexual activity: None   Other Topics Concern    None   Social History Narrative    None       Family History:  Family History   Problem Relation Age of Onset    Coronary Art Dis Mother     Arthritis Mother     Coronary Art Dis Father     Heart Disease Father     Coronary Art Dis Sister        I have reviewed the CC, HPI, ROS, PMH, FHX, Social History, and if not present in this note, I have reviewed in the patient's chart. I agree with the documentation provided by other staff and have reviewed their documentation prior to providing my signature indicating agreement. Vitals:   /79   Pulse 62   Ht 5' 6\" (1.676 m)   Wt 150 lb (68 kg)   BMI 24.21 kg/m²  Body mass index is 24.21 kg/m². Physical Examination:     Orthopedics:    GENERAL: Alert and oriented X3 in no acute distress. SKIN: Intact without lesions or ulcerations. NEURO: Musculoskeletal and axillary nerves intact to sensory and motor testing. VASC: Capillary refill is less than 3 seconds. Bilateral Knee     GEN:  Alert and oriented X 3, in no acute distress. GAIT:  The patient's gait was observed while entering the exam room and was noted to be antalgic. The extremity is in anatomic alignment. SKIN:  Intact without rashes, lesions, or ulcerations. No obvious deformity or swelling. NEURO:  The patient responds to light touch throughout bilateral LE. Patellar and Achilles reflexes are 2/4. VASC:  The bilateral LE is neurovascularly intact with  2/4 DP and  2/4 PT pulses. Brisk capillary refill. ROM: 0/135 degrees on the right knee 0/137 degrees on the left knee. Severe knee effusion on the right knee. Mild on the left knee. Baker's cyst present with both knees.    MUSC:   decreased quad tone  LIGAMENT:  Lachman's test is Negative with Good endpoint. Anterior drawer Negative. Posterior drawer Negative. There is  No varus instability at 0 degrees and No varus instability at 30 degrees. There is No valgus instability at 0 degrees and No valgus instability at 30 degrees. SPECIAL:  Pain with Kd's, but no clicking or clunking. PALP: There is Medial joint line pain    Assessment:     1. Primary osteoarthritis of both knees    2. Effusion of right knee        Procedures:    Procedure yes    Joint aspiration of the right knee. The patient was placed in the supine position on the exam table. The superior lateral portal was identified and marked. The skin was prepped with betadine in a sterile fashion. Utilizing ultrasound for precise placement and clean technique with sterile gloves a 5cc solution containing 5cc of 1.0% Lidocaine was injected. There was no resistance to the injection. Thereafter the skin was prepped with betadine in a sterile fashion once again and the joint was aspirated with a 60cc syringe. After aspirating the joint, 36 cc's of yellow tinged synovial fluid was removed from the knee and was sent to the lab to check for gout. The wound was then cleansed and a band-aid was placed over the superior lateral portal site. The patient tolerated the procedure without difficulty. Adverse reactions to the aspiration were discussed with the patient including signs of infection (increasing pain, redness, swelling) and the patient was instructed to call immediately if experiencing any of these symptoms. Regular Knee Injection    Location: Right Knee  Procedure: Corticosteroid injection into the knee. the patient was placed in the supine position on the exam table. The superior lateral portal was identified and marked. the skin was prepped with betadine in a sterile fashion.  Utilizing ultrasound for precise placement and clean technique with sterile gloves a 5cc solution containing 4cc of 1.0% Lidocaine with 1cc containing 40mg of Depo-medrol was injected. There was no resistance to the injection. The wound was cleansed and a band-aid was placed. the patient tolerated the procedure without difficulty. Adverse reactions to the injection were discussed with the patient including signs of infection (increasing pain, redness, swelling) and the patient was instructed to call immediately if experiencing any of these symptoms. Regular Knee Injection    Location: Left Knee  Procedure: Corticosteroid injection into the knee. the patient was placed in the supine position on the exam table. The superior lateral portal was identified and marked. the skin was prepped with betadine in a sterile fashion. Utilizing ultrasound for precise placement and clean technique with sterile gloves a 5cc solution containing 4cc of 1.0% Lidocaine with 1cc containing 40mg of Depo-medrol was injected. There was no resistance to the injection. The wound was cleansed and a band-aid was placed. the patient tolerated the procedure without difficulty. Adverse reactions to the injection were discussed with the patient including signs of infection (increasing pain, redness, swelling) and the patient was instructed to call immediately if experiencing any of these symptoms. Radiology:   X-rays taken today were reviewed with the patient. KNEE X-RAY    4 views of the bilateral knee including AP, bilateral tunnel, and lateral in the upright position, and skyline views reveal alignment with no fracture or dislocation. Kellgren grade 3 changes of osteoarthritis (joint space narrowing, osteophyte, subchondral sclerosis, bony deformity/cyst) of the medial compartment(s). Possible osseous loose bodies of the right knee. No bony erosion or periosteal reaction. No soft tissue masses. Osteopenia is present. Chondrocalcinosis, right is greater than the left. Vascular calcification. Impression: Moderate osteoarthritis of the bilateral knees.     Plan:   Treatment : I reviewed the X-ray with the patient. We discussed the etiologies and natural histories of primary osteoarthritis in both knees. We discussed the various treatment alternatives including anti-inflammatory medications, physical therapy, injections, further imaging studies and as a last result surgery. During today's visit, I explained that the fastest pain relief to get her through the summer would be to aspirate at least the right knee and then inject both knees with cortisone. I explained that she can use creams or wraps to make her knees feel better. However, I explained to the patient that the only way to fix arthritis is eventually a knee replacement, but if we inject today then we would be looking at scheduling a knee replacement in 3 months. I discussed with the patient to take anti-inflammatories to help with inflammation and pain with both of the knees. I also discussed with the patient to start formal physical therapy to keep the muscles strong and for insurance purposes. The patient has opted and elected for a right knee aspiration and a cortisone injection in both her knees, and to think about a knee replacement at the end of September. The injection site should never get red, hot, or swollen and if it does the patient will contact our office right away. The patient may experience a increase in soreness the first 24-48 hours due to a cortisone flair and can take anti-inflammatories for a short period of time to reduce that soreness. The patient should not submerge the injection site in water for a minimum of 24 hours to avoid infection. This means no lakes, pools, ponds, or hot tubs for 24 hours. If the patient is diabetic the injection may increase their blood sugar for up to one week. The patient can do this cortisone injection once every 3 months as needed. We are also sending her aspirated fluid off to the lab. A physical therapy prescription was given.  Patient should return to the clinic in 6 weeks to follow up with Bang Irving PA-C, ATC. The patient will call the office immediately with any problems. Orders Placed This Encounter   Medications    lidocaine 1 % injection 4 mL    methylPREDNISolone acetate (DEPO-MEDROL) injection 40 mg    lidocaine 1 % injection 4 mL    methylPREDNISolone acetate (DEPO-MEDROL) injection 40 mg       Orders Placed This Encounter   Procedures    Anaerobic and Aerobic Culture     MUST HOLD CULTURES FOR 14 DAYS (even if no growth)     Standing Status:   Future     Number of Occurrences:   1     Standing Expiration Date:   6/20/2020     Scheduling Instructions:      Right knee asp    Gram Stain     Standing Status:   Future     Number of Occurrences:   1     Standing Expiration Date:   6/20/2020     Order Specific Question:   Specify Specimen Type     Answer:   Fluid    Body Fluid Cell Count with Differential     Standing Status:   Future     Number of Occurrences:   1     Standing Expiration Date:   6/20/2020     Order Specific Question:   Specify Specimen Type     Answer:   Fluid    Body Fluid Crystal     Standing Status:   Future     Number of Occurrences:   1     Standing Expiration Date:   7/20/2019    External Referral To Physical Therapy     Referral Priority:   Routine     Referral Type:   Eval and Treat     Referral Reason:   Specialty Services Required     Requested Specialty:   Physical Therapy     Number of Visits Requested:   1    UT ARTHROCENTESIS ASPIR&/INJ MAJOR JT/BURSA W/US       Ignacio ELY MS, AT, ATC, am scribing for and in the presence of Alvarado Valencia D.O.. 6/23/2019  2:18 PM      Alvarado ELY DO, have personally seen this patient, reviewed the chart including history, and imaging if done. I personally  performed the physical exam and obtained any needed additional history. I placed orders, performed or supervised procedures and developed the treatment plan.     Electronically signed by Johanny Jc DO, on 6/23/2019 at 2:18 PM

## 2019-06-21 LAB
APPEARANCE FLUID: NORMAL
BASO FLUID: NORMAL %
COLOR FLUID: NORMAL
CRYSTALS, FLUID: NEGATIVE
EOSINOPHIL FLUID: NORMAL %
FLUID DIFF COMMENT: NORMAL
LYMPHOCYTES, BODY FLUID: 36 %
MONOCYTE, FLUID: NORMAL %
NEUTROPHIL, FLUID: 27 %
OTHER CELLS FLUID: NORMAL %
RBC FLUID: <3000 /MM3
SPECIMEN TYPE: NORMAL
SPECIMEN TYPE: NORMAL
WBC FLUID: 581 /MM3

## 2019-06-26 ENCOUNTER — TELEPHONE (OUTPATIENT)
Dept: FAMILY MEDICINE CLINIC | Age: 64
End: 2019-06-26

## 2019-06-26 DIAGNOSIS — Z01.818 PRE-OPERATIVE CLEARANCE: Primary | ICD-10-CM

## 2019-07-06 LAB
CULTURE: ABNORMAL
DIRECT EXAM: ABNORMAL
Lab: ABNORMAL
SPECIMEN DESCRIPTION: ABNORMAL

## 2019-07-19 ENCOUNTER — OFFICE VISIT (OUTPATIENT)
Dept: ORTHOPEDIC SURGERY | Age: 64
End: 2019-07-19
Payer: COMMERCIAL

## 2019-07-19 VITALS — WEIGHT: 150 LBS | HEIGHT: 67 IN | BODY MASS INDEX: 23.54 KG/M2

## 2019-07-19 DIAGNOSIS — M25.461 EFFUSION OF RIGHT KNEE: Primary | ICD-10-CM

## 2019-07-19 DIAGNOSIS — M17.0 PRIMARY OSTEOARTHRITIS OF BOTH KNEES: ICD-10-CM

## 2019-07-19 PROCEDURE — 3017F COLORECTAL CA SCREEN DOC REV: CPT | Performed by: FAMILY MEDICINE

## 2019-07-19 PROCEDURE — 99203 OFFICE O/P NEW LOW 30 MIN: CPT | Performed by: FAMILY MEDICINE

## 2019-07-19 PROCEDURE — G8420 CALC BMI NORM PARAMETERS: HCPCS | Performed by: FAMILY MEDICINE

## 2019-07-19 PROCEDURE — G8427 DOCREV CUR MEDS BY ELIG CLIN: HCPCS | Performed by: FAMILY MEDICINE

## 2019-07-19 PROCEDURE — 4004F PT TOBACCO SCREEN RCVD TLK: CPT | Performed by: FAMILY MEDICINE

## 2019-08-01 ENCOUNTER — OFFICE VISIT (OUTPATIENT)
Dept: ORTHOPEDIC SURGERY | Age: 64
End: 2019-08-01
Payer: COMMERCIAL

## 2019-08-01 VITALS
WEIGHT: 139.4 LBS | HEART RATE: 64 BPM | BODY MASS INDEX: 22.4 KG/M2 | HEIGHT: 66 IN | SYSTOLIC BLOOD PRESSURE: 126 MMHG | DIASTOLIC BLOOD PRESSURE: 74 MMHG

## 2019-08-01 DIAGNOSIS — M17.0 PRIMARY OSTEOARTHRITIS OF BOTH KNEES: Primary | ICD-10-CM

## 2019-08-01 PROCEDURE — 99213 OFFICE O/P EST LOW 20 MIN: CPT | Performed by: PHYSICIAN ASSISTANT

## 2019-08-02 ASSESSMENT — ENCOUNTER SYMPTOMS
DIARRHEA: 0
SHORTNESS OF BREATH: 0
RESPIRATORY NEGATIVE: 1
CHEST TIGHTNESS: 0
APNEA: 0
COUGH: 0
COLOR CHANGE: 0
ABDOMINAL PAIN: 0
NAUSEA: 0
CONSTIPATION: 0
VOMITING: 0
ABDOMINAL DISTENTION: 0

## 2019-08-02 NOTE — PROGRESS NOTES
dizziness, weakness, numbness and headaches. Psychiatric/Behavioral: Negative for sleep disturbance. Past Medical History:    Past Medical History:   Diagnosis Date    Arthritis     Chronic back pain     Irregular heart beat     LAFB (left anterior fascicular block)     Osteoporosis     PAC (premature atrial contraction) 06/2018    PACs and PVCs on holter monitor    PONV (postoperative nausea and vomiting)     Wears dentures     upper partial     Past Surgical History:    Past Surgical History:   Procedure Laterality Date    ABDOMINOPLASTY  1997    BLEPHAROPLASTY  1997    BREAST ENHANCEMENT SURGERY  1999    breast augmentation    COLONOSCOPY      FINGER SURGERY      thumb lesion excised    FIXATION KYPHOPLASTY  2013    KNEE ARTHROSCOPY Left 2006    LAMINECTOMY  1994    LUMBAR LAMINECTOMY  2011    LUMBAR LAMINECTOMY  2014    OTHER SURGICAL HISTORY Right 09/11/2018     EXCISION MASS THUMB (Right )    VT OFFICE/OUTPT VISIT,PROCEDURE ONLY Right 9/11/2018    EXCISION MASS THUMB, 3080 TABLE performed by Son Thao DO at [address]  2005    diskectomy and spinal fusion    TONSILLECTOMY       Current Medications:   Current Outpatient Medications   Medication Sig Dispense Refill    NAPROXEN PO Take by mouth Indications: patient takes 1-2 times a day      oxyCODONE-acetaminophen (PERCOCET) 5-325 MG per tablet Take 1 tablet by mouth.       OMEPRAZOLE PO Take by mouth daily      metoprolol tartrate 37.5 MG TABS Take 37.5 mg by mouth 2 times daily 60 tablet 5    folic acid (FOLVITE) 1 MG tablet TAKE ONE TABLET BY MOUTH DAILY 90 tablet 1    Cholecalciferol (VITAMIN D3) 5000 units TABS Take 1 tablet by mouth daily      calcium carbonate (OSCAL) 500 MG TABS tablet Take 1,000 mg by mouth daily      alendronate (FOSAMAX) 70 MG tablet Take 70 mg by mouth every 7 days       traMADol (ULTRAM ER) 200 MG extended release tablet Take 1 tablet by mouth daily      gabapentin (NEURONTIN) 300 MG capsule Take 300 mg by mouth 2 times daily. .       No current facility-administered medications for this visit. Allergies:    Celecoxib; Ibuprofen; Meloxicam; Naproxen; Voltaren  [diclofenac sodium]; and Nsaids    Social History:   Social History     Socioeconomic History    Marital status:      Spouse name: None    Number of children: None    Years of education: None    Highest education level: None   Occupational History    None   Social Needs    Financial resource strain: None    Food insecurity:     Worry: None     Inability: None    Transportation needs:     Medical: None     Non-medical: None   Tobacco Use    Smoking status: Current Every Day Smoker     Packs/day: 0.75     Years: 48.00     Pack years: 36.00     Types: Cigarettes    Smokeless tobacco: Never Used   Substance and Sexual Activity    Alcohol use: Yes     Comment: 2-3 shot liquor daily    Drug use: No    Sexual activity: Yes   Lifestyle    Physical activity:     Days per week: None     Minutes per session: None    Stress: None   Relationships    Social connections:     Talks on phone: None     Gets together: None     Attends [employer] service: None     Active member of club or organization: None     Attends meetings of clubs or organizations: None     Relationship status: None    Intimate partner violence:     Fear of current or ex partner: None     Emotionally abused: None     Physically abused: None     Forced sexual activity: None   Other Topics Concern    None   Social History Narrative    None       Family History:  Family History   Problem Relation Age of Onset    Coronary Art Dis Mother     Arthritis Mother     Coronary Art Dis Father     Heart Disease Father     Coronary Art Dis Sister        I have reviewed the CC, HPI, ROS, PMH, FHX, Social History, and if not present in this note, I have reviewed in the patient's chart.    I agree with the documentation provided by other staff and from her PCP, Vascular, Cardio, Rheumatology and Dentist so she will be at a reduced risk of developing complications. The patient states that she understands the plan of care and she has opted for and has elected to proceed with a total knee replacement surgery. The risks/benefits/alternatives and potential complications have been discussed with the patient. We also had a long discussion regarding the procedure itself and expectation of the recovery. All questions and concerns with addressed. Patient was instructed to call our office with any question or concerns prior to the procedure occurs. Patient should return to the clinic for a preop discussion to follow up with Andrews MCINTOSH for a pre-operative discussion. The patient will call the office immediately with any problems. No orders of the defined types were placed in this encounter. No orders of the defined types were placed in this encounter. Jose Angel Nguyễn PA-C, SHAYNA, am scribing for and in the presence of  Charly Licona ATC. 8/2/2019  12:54 PM      I, Gamal Nair PA-C, SHAYNA,  have personally seen this patient, reviewed the chart including history, and imaging if done. I personally  performed the physical exam and obtained any needed additional history. I placed orders, performed or supervised procedures and developed the treatment plan.     Electronically signed by Milton Rosas PA-C, on 8/2/2019 at 2:51 PM        Electronically signed by Milton Rosas PA-C, on 8/2/2019 at 12:54 PM

## 2019-08-22 ENCOUNTER — TELEPHONE (OUTPATIENT)
Dept: FAMILY MEDICINE CLINIC | Age: 64
End: 2019-08-22

## 2019-08-22 ENCOUNTER — OFFICE VISIT (OUTPATIENT)
Dept: FAMILY MEDICINE CLINIC | Age: 64
End: 2019-08-22
Payer: COMMERCIAL

## 2019-08-22 VITALS
SYSTOLIC BLOOD PRESSURE: 120 MMHG | OXYGEN SATURATION: 99 % | TEMPERATURE: 98.4 F | BODY MASS INDEX: 22.14 KG/M2 | WEIGHT: 137.2 LBS | HEART RATE: 60 BPM | DIASTOLIC BLOOD PRESSURE: 64 MMHG

## 2019-08-22 DIAGNOSIS — S91.302A OPEN WOUND OF LEFT FOOT, INITIAL ENCOUNTER: Primary | ICD-10-CM

## 2019-08-22 DIAGNOSIS — Z12.39 SCREENING FOR MALIGNANT NEOPLASM OF BREAST: ICD-10-CM

## 2019-08-22 PROCEDURE — 3017F COLORECTAL CA SCREEN DOC REV: CPT | Performed by: NURSE PRACTITIONER

## 2019-08-22 PROCEDURE — 99213 OFFICE O/P EST LOW 20 MIN: CPT | Performed by: NURSE PRACTITIONER

## 2019-08-22 PROCEDURE — G8427 DOCREV CUR MEDS BY ELIG CLIN: HCPCS | Performed by: NURSE PRACTITIONER

## 2019-08-22 PROCEDURE — G8420 CALC BMI NORM PARAMETERS: HCPCS | Performed by: NURSE PRACTITIONER

## 2019-08-22 PROCEDURE — 4004F PT TOBACCO SCREEN RCVD TLK: CPT | Performed by: NURSE PRACTITIONER

## 2019-08-22 ASSESSMENT — ENCOUNTER SYMPTOMS
SHORTNESS OF BREATH: 0
COUGH: 0

## 2019-08-22 NOTE — PROGRESS NOTES
PACs and PVCs on holter monitor    PONV (postoperative nausea and vomiting)     Wears dentures     upper partial      Past Surgical History:   Procedure Laterality Date    ABDOMINOPLASTY  1997    BLEPHAROPLASTY  1997    BREAST ENHANCEMENT SURGERY  1999    breast augmentation    COLONOSCOPY      FINGER SURGERY      thumb lesion excised    FIXATION KYPHOPLASTY  2013    KNEE ARTHROSCOPY Left 2006    LAMINECTOMY  1994    LUMBAR LAMINECTOMY  2011    LUMBAR LAMINECTOMY  2014    OTHER SURGICAL HISTORY Right 09/11/2018     EXCISION MASS THUMB (Right )    DE OFFICE/OUTPT VISIT,PROCEDURE ONLY Right 9/11/2018    EXCISION MASS THUMB, 3080 TABLE performed by Long Burgess DO at [address]  2005    diskectomy and spinal fusion    TONSILLECTOMY       Family History   Problem Relation Age of Onset    Coronary Art Dis Mother     Arthritis Mother     Coronary Art Dis Father     Heart Disease Father     Coronary Art Dis Sister      Social History     Tobacco Use    Smoking status: Current Every Day Smoker     Packs/day: 0.75     Years: 48.00     Pack years: 36.00     Types: Cigarettes    Smokeless tobacco: Never Used   Substance Use Topics    Alcohol use: Yes     Comment: 2-3 shot liquor daily      Allergies   Allergen Reactions    Celecoxib Other (See Comments)    Ibuprofen Other (See Comments)    Meloxicam Other (See Comments)    Naproxen Other (See Comments)    Voltaren  [Diclofenac Sodium] Other (See Comments)    Nsaids Other (See Comments)     Mood changes-patient states she is taking nsaids now       Subjective:      Review of Systems   Constitutional: Negative for chills and fever. Respiratory: Negative for cough and shortness of breath. Cardiovascular: Negative for chest pain, palpitations and leg swelling. Skin: Positive for wound.      Other pertinent ROS in HPI  Objective:     /64 (Site: Left Upper Arm, Position: Sitting, Cuff Size: Small Adult)   Pulse 60
Art Dis Father     Heart Disease Father     Coronary Art Dis Sister      Social History     Socioeconomic History    Marital status:      Spouse name: Not on file    Number of children: Not on file    Years of education: Not on file    Highest education level: Not on file   Occupational History    Not on file   Social Needs    Financial resource strain: Not on file    Food insecurity:     Worry: Not on file     Inability: Not on file    Transportation needs:     Medical: Not on file     Non-medical: Not on file   Tobacco Use    Smoking status: Current Every Day Smoker     Packs/day: 0.75     Years: 48.00     Pack years: 36.00     Types: Cigarettes    Smokeless tobacco: Never Used   Substance and Sexual Activity    Alcohol use: Yes     Comment: 2-3 shot liquor daily    Drug use: No    Sexual activity: Yes   Lifestyle    Physical activity:     Days per week: Not on file     Minutes per session: Not on file    Stress: Not on file   Relationships    Social connections:     Talks on phone: Not on file     Gets together: Not on file     Attends [employer] service: Not on file     Active member of club or organization: Not on file     Attends meetings of clubs or organizations: Not on file     Relationship status: Not on file    Intimate partner violence:     Fear of current or ex partner: Not on file     Emotionally abused: Not on file     Physically abused: Not on file     Forced sexual activity: Not on file   Other Topics Concern    Not on file   Social History Narrative    Not on file       Review of Systems  {ROS Comprehensive:32898::\"A comprehensive review of systems was negative except for what was noted in the HPI. \"}     Physical Exam   Constitutional: She is oriented to person, place, and time. She appears well-developed and well-nourished. No distress. HENT:   Head: Normocephalic and atraumatic.    Mouth/Throat: Uvula is midline, oropharynx is clear and moist and mucous membranes are

## 2019-08-30 RX ORDER — FOLIC ACID 1 MG/1
TABLET ORAL
Qty: 90 TABLET | Refills: 1 | Status: SHIPPED | OUTPATIENT
Start: 2019-08-30 | End: 2020-02-03

## 2019-09-04 ENCOUNTER — OFFICE VISIT (OUTPATIENT)
Dept: ORTHOPEDIC SURGERY | Age: 64
End: 2019-09-04
Payer: COMMERCIAL

## 2019-09-04 VITALS — HEIGHT: 66 IN | BODY MASS INDEX: 21.86 KG/M2 | WEIGHT: 136 LBS

## 2019-09-04 DIAGNOSIS — M17.0 PRIMARY OSTEOARTHRITIS OF BOTH KNEES: Primary | ICD-10-CM

## 2019-09-04 DIAGNOSIS — M25.461 EFFUSION OF RIGHT KNEE: ICD-10-CM

## 2019-09-04 PROCEDURE — 4004F PT TOBACCO SCREEN RCVD TLK: CPT | Performed by: ORTHOPAEDIC SURGERY

## 2019-09-04 PROCEDURE — G8427 DOCREV CUR MEDS BY ELIG CLIN: HCPCS | Performed by: ORTHOPAEDIC SURGERY

## 2019-09-04 PROCEDURE — 3017F COLORECTAL CA SCREEN DOC REV: CPT | Performed by: ORTHOPAEDIC SURGERY

## 2019-09-04 PROCEDURE — 99213 OFFICE O/P EST LOW 20 MIN: CPT | Performed by: ORTHOPAEDIC SURGERY

## 2019-09-04 PROCEDURE — G8420 CALC BMI NORM PARAMETERS: HCPCS | Performed by: ORTHOPAEDIC SURGERY

## 2019-09-04 ASSESSMENT — ENCOUNTER SYMPTOMS
BACK PAIN: 0
SHORTNESS OF BREATH: 0
WHEEZING: 0

## 2019-09-05 ENCOUNTER — OFFICE VISIT (OUTPATIENT)
Dept: PODIATRY | Age: 64
End: 2019-09-05
Payer: COMMERCIAL

## 2019-09-05 VITALS — RESPIRATION RATE: 16 BRPM | WEIGHT: 138 LBS | BODY MASS INDEX: 22.18 KG/M2 | HEIGHT: 66 IN

## 2019-09-05 DIAGNOSIS — M79.671 PAIN IN BOTH FEET: ICD-10-CM

## 2019-09-05 DIAGNOSIS — L97.521 ULCER OF LEFT FOOT, LIMITED TO BREAKDOWN OF SKIN (HCC): ICD-10-CM

## 2019-09-05 DIAGNOSIS — R26.2 TROUBLE WALKING: Primary | ICD-10-CM

## 2019-09-05 DIAGNOSIS — M79.672 PAIN IN BOTH FEET: ICD-10-CM

## 2019-09-05 PROCEDURE — 3017F COLORECTAL CA SCREEN DOC REV: CPT | Performed by: PODIATRIST

## 2019-09-05 PROCEDURE — G8427 DOCREV CUR MEDS BY ELIG CLIN: HCPCS | Performed by: PODIATRIST

## 2019-09-05 PROCEDURE — G8420 CALC BMI NORM PARAMETERS: HCPCS | Performed by: PODIATRIST

## 2019-09-05 PROCEDURE — 4004F PT TOBACCO SCREEN RCVD TLK: CPT | Performed by: PODIATRIST

## 2019-09-05 PROCEDURE — 99203 OFFICE O/P NEW LOW 30 MIN: CPT | Performed by: PODIATRIST

## 2019-09-05 ASSESSMENT — ENCOUNTER SYMPTOMS
BACK PAIN: 0
WHEEZING: 0
SHORTNESS OF BREATH: 0

## 2019-09-05 NOTE — PROGRESS NOTES
other staff and have reviewed their documentation prior to providing my signature indicating agreement. Objective :   Ht 5' 6\" (1.676 m)   Wt 136 lb (61.7 kg)   BMI 21.95 kg/m²  Body mass index is 21.95 kg/m². General: Irene Lindsey is a 59 y.o. female who is alert and oriented and sitting comfortably in our office. Ortho Exam  MS:  Evaluation of the Right knee reveals no significant outward deformity. There is no erythema, warmth, skin lesions, signs of infection. There is tenderness over the medial joint line. There is a mildknee effusion. Range of motion of the Right knee is  7-full. No instability of the knee is appreciated at 0 and 30° of flexion. There is a negative anterior drawer Lachman's test.  There is increased pain with varus Kd's testing. No calf tenderness is noted. There is a negative hip log roll and Stinchfield test.  Motor, sensory, vascular examination to the Right lower extremity is intact. Patient has full range of motion of the ankle. Neuro: alert and oriented to person and place. Eyes: Extra-ocular muscles intact  Mouth: Oral mucosa moist. No perioral lesions  Pulm: Respirations unlabored and regular. Symmetric chest excursion without outward deformity is noted. Skin: warm, well perfused  Psych:   Patient has good fund of knowledge and displays understanging of exam, diagnosis, and plan. Radiology:     No results found. Assessment:      1. Primary osteoarthritis of both knees    2. Effusion of right knee       Plan:      Discussed etiology and natural history of right knee arthritis. The treatment options may include oral anti-inflammatories, bracing, injections, advanced imaging, activity modification, physical therapy and/or surgical intervention. The patient would like to proceed with right knee replacement. The patient will follow up 2 weeks post op. We discussed that the patient should call us with any concerns or questions.         All details of the

## 2019-09-05 NOTE — PROGRESS NOTES
acid (FOLVITE) 1 MG tablet take 1 tablet by mouth once daily 8/30/19  Yes NUPUR Stanley CNP   NAPROXEN PO Take by mouth Indications: patient takes 1-2 times a day   Yes Historical Provider, MD   oxyCODONE-acetaminophen (PERCOCET) 5-325 MG per tablet Take 1 tablet by mouth. Yes Historical Provider, MD   OMEPRAZOLE PO Take by mouth daily   Yes Historical Provider, MD   metoprolol tartrate 37.5 MG TABS Take 37.5 mg by mouth 2 times daily 6/12/19  Yes NUPUR Stanley CNP   Cholecalciferol (VITAMIN D3) 5000 units TABS Take 1 tablet by mouth daily   Yes Historical Provider, MD   calcium carbonate (OSCAL) 500 MG TABS tablet Take 1,000 mg by mouth daily   Yes Historical Provider, MD   alendronate (FOSAMAX) 70 MG tablet Take 70 mg by mouth every 7 days  6/4/18  Yes Historical Provider, MD   traMADol Jesus Morales [facility]) 200 MG extended release tablet Take 1 tablet by mouth daily 8/26/16  Yes Isaías Ramos MD   gabapentin (NEURONTIN) 300 MG capsule Take 300 mg by mouth 2 times daily.  . 2/27/15  Yes Historical Provider, MD       Past Surgical History:   Procedure Laterality Date    ABDOMINOPLASTY  1997    BLEPHAROPLASTY  1997    BREAST ENHANCEMENT SURGERY  1999    breast augmentation    COLONOSCOPY      FINGER SURGERY      thumb lesion excised    FIXATION KYPHOPLASTY  2013    KNEE ARTHROSCOPY Left 2006    LAMINECTOMY  1994    LUMBAR LAMINECTOMY  2011    LUMBAR LAMINECTOMY  2014    OTHER SURGICAL HISTORY Right 09/11/2018     EXCISION MASS THUMB (Right )    [place] OFFICE/OUTPT VISIT,PROCEDURE ONLY Right 9/11/2018    EXCISION MASS THUMB, 3080 TABLE performed by Neris Becker DO at [address] [facility]  2005    diskectomy and spinal fusion    TONSILLECTOMY         Family History   Problem Relation Age of Onset    Coronary Art Dis Mother     Arthritis Mother     Coronary Art Dis Father     Heart Disease Father     Coronary Art Dis Sister        Social History     Tobacco Use    Smoking status: Current Every Day Smoker     Packs/day: 0.75     Years: 48.00     Pack years: 36.00     Types: Cigarettes    Smokeless tobacco: Never Used   Substance Use Topics    Alcohol use: Yes     Comment: 2-3 shot liquor daily       Review of Systems    Review of Systems:   History obtained from chart review and the patient  General ROS: negative for - chills, fatigue, fever, night sweats or weight gain  Constitutional: Negative for chills, diaphoresis, fatigue, fever and unexpected weight change. Musculoskeletal: Positive for arthralgias, gait problem and joint swelling. Neurological ROS: negative for - behavioral changes, confusion, headaches or seizures. Negative for weakness and numbness. Dermatological ROS: negative for - mole changes, rash  Cardiovascular: Negative for leg swelling. Gastrointestinal: Negative for constipation, diarrhea, nausea and vomiting. Lower Extremity Physical Examination:   Vitals:   Vitals:    09/05/19 0808   Resp: 16     General: AAO x 3 in NAD. Dermatologic Exam:  Superficial wound present on the dorsal aspect of the left foot at the mid arch. No erythema or edema. No depth or undermining. No fluctucance  Musculoskeletal:     1st MPJ ROM decreased, Bilateral.  Muscle strength 5/5, Bilateral.  Pain present upon palpation of left dorsal foot wound. Medial longitudinal arch, Bilateral WNL.   Ankle ROM WNL,Bilateral.    Dorsally contracted digits absent digits 1-5 Bilateral.     Vascular: DP and PT pulses palpable 2/4, Bilateral.  CFT <3 seconds, Bilateral.  Hair growth present to the level of the digits, Bilateral.  Edema absent, Bilateral.  Varicosities absent, Bilateral. Erythema absent, Bilateral    Neurological: Sensation intact to light touch to level of digits, Bilateral.  Protective sensation intact 10/10 sites via 5.07/10g [place] Monofilament, Bilateral.  negative Tinel's, Bilateral.  negative Valleix sign, Bilateral.      Integument:

## 2019-09-06 ENCOUNTER — HOSPITAL ENCOUNTER (OUTPATIENT)
Age: 64
Discharge: HOME | End: 2019-09-06
Payer: COMMERCIAL

## 2019-09-06 LAB
ABSOLUTE EOS #: 0.5 K/UL (ref 0–0.44)
ABSOLUTE IMMATURE GRANULOCYTE: 0.04 K/UL (ref 0–0.3)
ABSOLUTE LYMPH #: 2.71 K/UL (ref 1.1–3.7)
ABSOLUTE MONO #: 0.78 K/UL (ref 0.1–1.2)
ANION GAP SERPL CALCULATED.3IONS-SCNC: 12 MMOL/L (ref 9–17)
BASOPHILS # BLD: 0 % (ref 0–2)
BASOPHILS ABSOLUTE: 0.05 K/UL (ref 0–0.2)
BUN BLDV-MCNC: 12 MG/DL (ref 8–23)
BUN/CREAT BLD: ABNORMAL (ref 9–20)
CALCIUM SERPL-MCNC: 9.7 MG/DL (ref 8.6–10.4)
CHLORIDE BLD-SCNC: 103 MMOL/L (ref 98–107)
CO2: 25 MMOL/L (ref 20–31)
CREAT SERPL-MCNC: 0.47 MG/DL (ref 0.5–0.9)
DIFFERENTIAL TYPE: ABNORMAL
EOSINOPHILS RELATIVE PERCENT: 4 % (ref 1–4)
GFR AFRICAN AMERICAN: >60 ML/MIN
GFR NON-AFRICAN AMERICAN: >60 ML/MIN
GFR SERPL CREATININE-BSD FRML MDRD: ABNORMAL ML/MIN/{1.73_M2}
GFR SERPL CREATININE-BSD FRML MDRD: ABNORMAL ML/MIN/{1.73_M2}
GLUCOSE BLD-MCNC: 90 MG/DL (ref 70–99)
HCT VFR BLD CALC: 37.6 % (ref 36.3–47.1)
HEMOGLOBIN: 11.9 G/DL (ref 11.9–15.1)
IMMATURE GRANULOCYTES: 0 %
INR BLD: 1
LYMPHOCYTES # BLD: 23 % (ref 24–43)
MCH RBC QN AUTO: 32.2 PG (ref 25.2–33.5)
MCHC RBC AUTO-ENTMCNC: 31.6 G/DL (ref 28.4–34.8)
MCV RBC AUTO: 101.9 FL (ref 82.6–102.9)
MONOCYTES # BLD: 7 % (ref 3–12)
NRBC AUTOMATED: 0 PER 100 WBC
PDW BLD-RTO: 13.2 % (ref 11.8–14.4)
PLATELET # BLD: 340 K/UL (ref 138–453)
PLATELET ESTIMATE: ABNORMAL
PMV BLD AUTO: 9.8 FL (ref 8.1–13.5)
POTASSIUM SERPL-SCNC: 4.5 MMOL/L (ref 3.7–5.3)
PROTHROMBIN TIME: 10.4 SEC (ref 9.7–11.6)
RBC # BLD: 3.69 M/UL (ref 3.95–5.11)
RBC # BLD: ABNORMAL 10*6/UL
SEG NEUTROPHILS: 66 % (ref 36–65)
SEGMENTED NEUTROPHILS ABSOLUTE COUNT: 7.74 K/UL (ref 1.5–8.1)
SODIUM BLD-SCNC: 140 MMOL/L (ref 135–144)
WBC # BLD: 11.8 K/UL (ref 3.5–11.3)
WBC # BLD: ABNORMAL 10*3/UL

## 2019-09-06 PROCEDURE — 80048 BASIC METABOLIC PNL TOTAL CA: CPT

## 2019-09-06 PROCEDURE — 36415 COLL VENOUS BLD VENIPUNCTURE: CPT

## 2019-09-06 PROCEDURE — 85025 COMPLETE CBC W/AUTO DIFF WBC: CPT

## 2019-09-06 PROCEDURE — 85610 PROTHROMBIN TIME: CPT

## 2019-09-09 ENCOUNTER — TELEPHONE (OUTPATIENT)
Dept: ORTHOPEDIC SURGERY | Age: 64
End: 2019-09-09

## 2019-09-09 DIAGNOSIS — Z01.818 PRE-OP TESTING: Primary | ICD-10-CM

## 2019-09-09 DIAGNOSIS — M17.0 PRIMARY OSTEOARTHRITIS OF BOTH KNEES: ICD-10-CM

## 2019-09-11 ENCOUNTER — HOSPITAL ENCOUNTER (OUTPATIENT)
Dept: CARDIAC CATH/INVASIVE PROCEDURES | Age: 64
Setting detail: OBSERVATION
Discharge: HOME | End: 2019-09-11
Attending: INTERNAL MEDICINE | Admitting: INTERNAL MEDICINE
Payer: COMMERCIAL

## 2019-09-11 VITALS
HEIGHT: 66 IN | HEART RATE: 59 BPM | RESPIRATION RATE: 18 BRPM | BODY MASS INDEX: 22.27 KG/M2 | WEIGHT: 138.56 LBS | SYSTOLIC BLOOD PRESSURE: 122 MMHG | DIASTOLIC BLOOD PRESSURE: 61 MMHG | OXYGEN SATURATION: 97 % | TEMPERATURE: 97.4 F

## 2019-09-11 PROCEDURE — C1760 CLOSURE DEV, VASC: HCPCS

## 2019-09-11 PROCEDURE — 6360000002 HC RX W HCPCS

## 2019-09-11 PROCEDURE — 2580000003 HC RX 258: Performed by: INTERNAL MEDICINE

## 2019-09-11 PROCEDURE — 93458 L HRT ARTERY/VENTRICLE ANGIO: CPT | Performed by: INTERNAL MEDICINE

## 2019-09-11 PROCEDURE — 2709999900 HC NON-CHARGEABLE SUPPLY

## 2019-09-11 PROCEDURE — 6370000000 HC RX 637 (ALT 250 FOR IP): Performed by: INTERNAL MEDICINE

## 2019-09-11 PROCEDURE — G0378 HOSPITAL OBSERVATION PER HR: HCPCS

## 2019-09-11 PROCEDURE — C1894 INTRO/SHEATH, NON-LASER: HCPCS

## 2019-09-11 PROCEDURE — 2500000003 HC RX 250 WO HCPCS

## 2019-09-11 PROCEDURE — C1725 CATH, TRANSLUMIN NON-LASER: HCPCS

## 2019-09-11 PROCEDURE — 6360000004 HC RX CONTRAST MEDICATION

## 2019-09-11 RX ORDER — SODIUM CHLORIDE 9 MG/ML
INJECTION, SOLUTION INTRAVENOUS CONTINUOUS
Status: DISCONTINUED | OUTPATIENT
Start: 2019-09-11 | End: 2019-09-11 | Stop reason: HOSPADM

## 2019-09-11 RX ORDER — ONDANSETRON 2 MG/ML
4 INJECTION INTRAMUSCULAR; INTRAVENOUS EVERY 6 HOURS PRN
Status: DISCONTINUED | OUTPATIENT
Start: 2019-09-11 | End: 2019-09-11 | Stop reason: HOSPADM

## 2019-09-11 RX ORDER — ATORVASTATIN CALCIUM 40 MG/1
40 TABLET, FILM COATED ORAL NIGHTLY
Status: DISCONTINUED | OUTPATIENT
Start: 2019-09-11 | End: 2019-09-11 | Stop reason: HOSPADM

## 2019-09-11 RX ORDER — OXYCODONE HYDROCHLORIDE AND ACETAMINOPHEN 5; 325 MG/1; MG/1
1 TABLET ORAL EVERY 4 HOURS PRN
Status: DISCONTINUED | OUTPATIENT
Start: 2019-09-11 | End: 2019-09-11 | Stop reason: HOSPADM

## 2019-09-11 RX ORDER — ONDANSETRON 2 MG/ML
4 INJECTION INTRAMUSCULAR; INTRAVENOUS EVERY 6 HOURS PRN
Status: DISCONTINUED | OUTPATIENT
Start: 2019-09-11 | End: 2019-09-11

## 2019-09-11 RX ORDER — SODIUM CHLORIDE 0.9 % (FLUSH) 0.9 %
10 SYRINGE (ML) INJECTION EVERY 12 HOURS SCHEDULED
Status: DISCONTINUED | OUTPATIENT
Start: 2019-09-11 | End: 2019-09-11 | Stop reason: HOSPADM

## 2019-09-11 RX ORDER — ATORVASTATIN CALCIUM 40 MG/1
40 TABLET, FILM COATED ORAL NIGHTLY
Qty: 30 TABLET | Refills: 3 | Status: ON HOLD | OUTPATIENT
Start: 2019-09-11 | End: 2021-01-01 | Stop reason: HOSPADM

## 2019-09-11 RX ORDER — SODIUM CHLORIDE 0.9 % (FLUSH) 0.9 %
10 SYRINGE (ML) INJECTION PRN
Status: DISCONTINUED | OUTPATIENT
Start: 2019-09-11 | End: 2019-09-11 | Stop reason: HOSPADM

## 2019-09-11 RX ORDER — ACETAMINOPHEN 325 MG/1
650 TABLET ORAL EVERY 4 HOURS PRN
Status: DISCONTINUED | OUTPATIENT
Start: 2019-09-11 | End: 2019-09-11 | Stop reason: HOSPADM

## 2019-09-11 RX ORDER — ASPIRIN 81 MG/1
81 TABLET, CHEWABLE ORAL ONCE
Status: COMPLETED | OUTPATIENT
Start: 2019-09-11 | End: 2019-09-11

## 2019-09-11 RX ORDER — ONDANSETRON 4 MG/1
4 TABLET, ORALLY DISINTEGRATING ORAL EVERY 6 HOURS PRN
Status: DISCONTINUED | OUTPATIENT
Start: 2019-09-11 | End: 2019-09-11 | Stop reason: HOSPADM

## 2019-09-11 RX ADMIN — SODIUM CHLORIDE: 9 INJECTION, SOLUTION INTRAVENOUS at 07:45

## 2019-09-11 RX ADMIN — ASPIRIN 81 MG 81 MG: 81 TABLET ORAL at 06:32

## 2019-09-11 RX ADMIN — SODIUM CHLORIDE: 9 INJECTION, SOLUTION INTRAVENOUS at 06:25

## 2019-09-11 RX ADMIN — OXYCODONE AND ACETAMINOPHEN 1 TABLET: 5; 325 TABLET ORAL at 10:02

## 2019-09-11 ASSESSMENT — PAIN DESCRIPTION - FREQUENCY: FREQUENCY: CONTINUOUS

## 2019-09-11 ASSESSMENT — PAIN DESCRIPTION - PAIN TYPE: TYPE: CHRONIC PAIN

## 2019-09-11 ASSESSMENT — PAIN DESCRIPTION - ONSET: ONSET: PROGRESSIVE

## 2019-09-11 ASSESSMENT — PAIN DESCRIPTION - LOCATION: LOCATION: BACK

## 2019-09-11 ASSESSMENT — PAIN SCALES - GENERAL
PAINLEVEL_OUTOF10: 6

## 2019-09-11 ASSESSMENT — PAIN DESCRIPTION - DESCRIPTORS: DESCRIPTORS: DISCOMFORT

## 2019-09-11 ASSESSMENT — PAIN - FUNCTIONAL ASSESSMENT: PAIN_FUNCTIONAL_ASSESSMENT: 0-10

## 2019-09-11 NOTE — PROGRESS NOTES
Patient admitted to room 2032 for recovery from post cardiac catheterization. Right femoral puncture site with a Mynx closure device placed in Cath Lab and site with no drainage, swelling, redness, or hematoma present. Patient alert and oriented and denies any pain, including specifically any chest pain. Patient oriented to room and reviewed recovery instructions/restrictions (to right leg) with patient and she verbalized understanding. Call light within reach.

## 2019-09-12 ENCOUNTER — OFFICE VISIT (OUTPATIENT)
Dept: PODIATRY | Age: 64
End: 2019-09-12
Payer: COMMERCIAL

## 2019-09-12 VITALS — HEIGHT: 66 IN | WEIGHT: 138 LBS | BODY MASS INDEX: 22.18 KG/M2 | RESPIRATION RATE: 16 BRPM

## 2019-09-12 DIAGNOSIS — R26.2 TROUBLE WALKING: Primary | ICD-10-CM

## 2019-09-12 DIAGNOSIS — M79.672 LEFT FOOT PAIN: ICD-10-CM

## 2019-09-12 DIAGNOSIS — M79.671 PAIN IN BOTH FEET: ICD-10-CM

## 2019-09-12 DIAGNOSIS — M79.672 PAIN IN BOTH FEET: ICD-10-CM

## 2019-09-12 DIAGNOSIS — L97.522 ULCER OF LEFT FOOT, WITH FAT LAYER EXPOSED (HCC): ICD-10-CM

## 2019-09-12 DIAGNOSIS — I73.9 PVD (PERIPHERAL VASCULAR DISEASE) (HCC): ICD-10-CM

## 2019-09-12 PROCEDURE — 3017F COLORECTAL CA SCREEN DOC REV: CPT | Performed by: PODIATRIST

## 2019-09-12 PROCEDURE — 4004F PT TOBACCO SCREEN RCVD TLK: CPT | Performed by: PODIATRIST

## 2019-09-12 PROCEDURE — 99213 OFFICE O/P EST LOW 20 MIN: CPT | Performed by: PODIATRIST

## 2019-09-12 PROCEDURE — G8420 CALC BMI NORM PARAMETERS: HCPCS | Performed by: PODIATRIST

## 2019-09-12 PROCEDURE — 11042 DBRDMT SUBQ TIS 1ST 20SQCM/<: CPT | Performed by: PODIATRIST

## 2019-09-12 PROCEDURE — G8427 DOCREV CUR MEDS BY ELIG CLIN: HCPCS | Performed by: PODIATRIST

## 2019-09-13 ENCOUNTER — PROCEDURE VISIT (OUTPATIENT)
Dept: PODIATRY | Age: 64
End: 2019-09-13

## 2019-09-13 DIAGNOSIS — L97.521 ULCER OF LEFT FOOT, LIMITED TO BREAKDOWN OF SKIN (HCC): ICD-10-CM

## 2019-09-13 DIAGNOSIS — I73.9 PVD (PERIPHERAL VASCULAR DISEASE) (HCC): Primary | ICD-10-CM

## 2019-09-13 PROCEDURE — 99999 PR OFFICE/OUTPT VISIT,PROCEDURE ONLY: CPT | Performed by: PODIATRIST

## 2019-09-18 DIAGNOSIS — L97.521 ULCER OF LEFT FOOT, LIMITED TO BREAKDOWN OF SKIN (HCC): ICD-10-CM

## 2019-09-18 DIAGNOSIS — I73.9 PVD (PERIPHERAL VASCULAR DISEASE) (HCC): Primary | ICD-10-CM

## 2019-09-24 ENCOUNTER — OFFICE VISIT (OUTPATIENT)
Dept: FAMILY MEDICINE CLINIC | Age: 64
End: 2019-09-24
Payer: COMMERCIAL

## 2019-09-24 VITALS
OXYGEN SATURATION: 100 % | HEART RATE: 71 BPM | TEMPERATURE: 98.7 F | BODY MASS INDEX: 22.27 KG/M2 | DIASTOLIC BLOOD PRESSURE: 62 MMHG | WEIGHT: 138 LBS | SYSTOLIC BLOOD PRESSURE: 112 MMHG

## 2019-09-24 DIAGNOSIS — Z01.818 PRE-OP EVALUATION: Primary | ICD-10-CM

## 2019-09-24 PROCEDURE — 99396 PREV VISIT EST AGE 40-64: CPT | Performed by: NURSE PRACTITIONER

## 2019-09-24 NOTE — PROGRESS NOTES
Preoperative Consultation      Kwasi Adorno  YOB: 1955    Date of Service:  9/24/2019    Vitals:    09/24/19 0936   BP: 112/62   Site: Left Upper Arm   Position: Sitting   Cuff Size: Medium Adult   Pulse: 71   Temp: 98.7 °F (37.1 °C)   TempSrc: Oral   SpO2: 100%   Weight: 138 lb (62.6 kg)      Wt Readings from Last 2 Encounters:   09/24/19 138 lb (62.6 kg)   09/12/19 138 lb (62.6 kg)     BP Readings from Last 3 Encounters:   09/24/19 112/62   09/11/19 122/61   08/22/19 120/64        Chief Complaint   Patient presents with    Pre-op Exam     No Known Allergies  Outpatient Medications Marked as Taking for the 9/24/19 encounter (Office Visit) with NUPUR Fry CNP   Medication Sig Dispense Refill    aspirin 81 MG tablet Take 81 mg by mouth daily      atorvastatin (LIPITOR) 40 MG tablet Take 1 tablet by mouth nightly 30 tablet 3    Misc. Devices MISC Bedside Commode 1 Device 0    Misc. Devices MISC Rolling  Walker 1 Device 0    Misc. Devices MISC Shower Bench 1 Device 0    folic acid (FOLVITE) 1 MG tablet take 1 tablet by mouth once daily 90 tablet 1    NAPROXEN PO Take by mouth Indications: patient takes 1-2 times a day      oxyCODONE-acetaminophen (PERCOCET) 5-325 MG per tablet Take 1 tablet by mouth.  OMEPRAZOLE PO Take by mouth daily      metoprolol tartrate 37.5 MG TABS Take 37.5 mg by mouth 2 times daily 60 tablet 5    Cholecalciferol (VITAMIN D3) 5000 units TABS Take 1 tablet by mouth daily      calcium carbonate (OSCAL) 500 MG TABS tablet Take 1,000 mg by mouth daily      alendronate (FOSAMAX) 70 MG tablet Take 70 mg by mouth every 7 days       traMADol (ULTRAM ER) 200 MG extended release tablet Take 1 tablet by mouth daily      gabapentin (NEURONTIN) 300 MG capsule Take 300 mg by mouth 2 times daily.  .         This patient presents to the office today for a preoperative consultation at the request of surgeon, Dr. Aster Poon, who plans on performing right knee higher-dose, long-acting metoprolol in beta-blocker-naïve patients on the day of surgery, and in the absence of dose titration is associated with an overall increase in mortality. Beta-blockers should be started days to weeks prior to surgery and titrated to pulse < 70.  4. Deep vein thrombosis prophylaxis: regimen to be chosen by surgical team  5. No contraindications to planned surgery  Pending lab work.

## 2019-09-25 ENCOUNTER — HOSPITAL ENCOUNTER (OUTPATIENT)
Dept: PREADMISSION TESTING | Age: 64
Discharge: HOME | End: 2019-09-29
Payer: COMMERCIAL

## 2019-09-25 ENCOUNTER — HOSPITAL ENCOUNTER (OUTPATIENT)
Dept: GENERAL RADIOLOGY | Age: 64
Discharge: HOME | End: 2019-09-27
Payer: COMMERCIAL

## 2019-09-25 VITALS
WEIGHT: 137 LBS | RESPIRATION RATE: 16 BRPM | HEIGHT: 66 IN | OXYGEN SATURATION: 99 % | BODY MASS INDEX: 22.02 KG/M2 | DIASTOLIC BLOOD PRESSURE: 67 MMHG | TEMPERATURE: 98.8 F | HEART RATE: 55 BPM | SYSTOLIC BLOOD PRESSURE: 126 MMHG

## 2019-09-25 DIAGNOSIS — Z01.818 PRE-OP TESTING: ICD-10-CM

## 2019-09-25 LAB
ALBUMIN SERPL-MCNC: 4.2 G/DL (ref 3.5–5.2)
ALBUMIN/GLOBULIN RATIO: 1.6 (ref 1–2.5)
ALP BLD-CCNC: 96 U/L (ref 35–104)
ALT SERPL-CCNC: 18 U/L (ref 5–33)
ANION GAP SERPL CALCULATED.3IONS-SCNC: 13 MMOL/L (ref 9–17)
AST SERPL-CCNC: 17 U/L
BILIRUB SERPL-MCNC: 0.27 MG/DL (ref 0.3–1.2)
BILIRUBIN URINE: NEGATIVE
BUN BLDV-MCNC: 11 MG/DL (ref 8–23)
BUN/CREAT BLD: ABNORMAL (ref 9–20)
CALCIUM SERPL-MCNC: 9.9 MG/DL (ref 8.6–10.4)
CHLORIDE BLD-SCNC: 105 MMOL/L (ref 98–107)
CO2: 25 MMOL/L (ref 20–31)
COLOR: YELLOW
COMMENT UA: NORMAL
CREAT SERPL-MCNC: 0.49 MG/DL (ref 0.5–0.9)
ESTIMATED AVERAGE GLUCOSE: 103 MG/DL
GFR AFRICAN AMERICAN: >60 ML/MIN
GFR NON-AFRICAN AMERICAN: >60 ML/MIN
GFR SERPL CREATININE-BSD FRML MDRD: ABNORMAL ML/MIN/{1.73_M2}
GFR SERPL CREATININE-BSD FRML MDRD: ABNORMAL ML/MIN/{1.73_M2}
GLUCOSE BLD-MCNC: 95 MG/DL (ref 70–99)
GLUCOSE URINE: NEGATIVE
HBA1C MFR BLD: 5.2 % (ref 4–6)
HCT VFR BLD CALC: 37.8 % (ref 36.3–47.1)
HEMOGLOBIN: 12.3 G/DL (ref 11.9–15.1)
KETONES, URINE: NEGATIVE
LEUKOCYTE ESTERASE, URINE: NEGATIVE
MCH RBC QN AUTO: 33.2 PG (ref 25.2–33.5)
MCHC RBC AUTO-ENTMCNC: 32.5 G/DL (ref 28.4–34.8)
MCV RBC AUTO: 102.2 FL (ref 82.6–102.9)
MRSA, DNA, NASAL: NORMAL
NITRITE, URINE: NEGATIVE
NRBC AUTOMATED: 0 PER 100 WBC
PDW BLD-RTO: 13.1 % (ref 11.8–14.4)
PH UA: 6.5 (ref 5–8)
PLATELET # BLD: 347 K/UL (ref 138–453)
PMV BLD AUTO: 9.3 FL (ref 8.1–13.5)
POTASSIUM SERPL-SCNC: 4.4 MMOL/L (ref 3.7–5.3)
PROTEIN UA: NEGATIVE
RBC # BLD: 3.7 M/UL (ref 3.95–5.11)
SODIUM BLD-SCNC: 143 MMOL/L (ref 135–144)
SPECIFIC GRAVITY UA: 1.02 (ref 1–1.03)
SPECIMEN DESCRIPTION: NORMAL
TOTAL PROTEIN: 6.8 G/DL (ref 6.4–8.3)
TURBIDITY: CLEAR
URINE HGB: NEGATIVE
UROBILINOGEN, URINE: NORMAL
WBC # BLD: 12.2 K/UL (ref 3.5–11.3)

## 2019-09-25 PROCEDURE — 83036 HEMOGLOBIN GLYCOSYLATED A1C: CPT

## 2019-09-25 PROCEDURE — 36415 COLL VENOUS BLD VENIPUNCTURE: CPT

## 2019-09-25 PROCEDURE — 71046 X-RAY EXAM CHEST 2 VIEWS: CPT

## 2019-09-25 PROCEDURE — 81003 URINALYSIS AUTO W/O SCOPE: CPT

## 2019-09-25 PROCEDURE — 80053 COMPREHEN METABOLIC PANEL: CPT

## 2019-09-25 PROCEDURE — 85027 COMPLETE CBC AUTOMATED: CPT

## 2019-09-25 PROCEDURE — 93005 ELECTROCARDIOGRAM TRACING: CPT

## 2019-09-25 PROCEDURE — 87641 MR-STAPH DNA AMP PROBE: CPT

## 2019-09-25 RX ORDER — SODIUM CHLORIDE, SODIUM LACTATE, POTASSIUM CHLORIDE, CALCIUM CHLORIDE 600; 310; 30; 20 MG/100ML; MG/100ML; MG/100ML; MG/100ML
1000 INJECTION, SOLUTION INTRAVENOUS CONTINUOUS
Status: CANCELLED | OUTPATIENT
Start: 2019-09-25

## 2019-09-25 SDOH — HEALTH STABILITY: MENTAL HEALTH: HOW OFTEN DO YOU HAVE A DRINK CONTAINING ALCOHOL?: 2-4 TIMES A MONTH

## 2019-09-25 ASSESSMENT — PAIN DESCRIPTION - LOCATION: LOCATION: KNEE

## 2019-09-25 ASSESSMENT — PAIN DESCRIPTION - PROGRESSION: CLINICAL_PROGRESSION: GRADUALLY WORSENING

## 2019-09-25 ASSESSMENT — PAIN DESCRIPTION - DESCRIPTORS: DESCRIPTORS: ACHING;CONSTANT

## 2019-09-25 ASSESSMENT — PAIN SCALES - GENERAL: PAINLEVEL_OUTOF10: 7

## 2019-09-25 ASSESSMENT — PAIN DESCRIPTION - ORIENTATION: ORIENTATION: RIGHT

## 2019-09-25 ASSESSMENT — PAIN DESCRIPTION - PAIN TYPE: TYPE: CHRONIC PAIN

## 2019-09-25 ASSESSMENT — PAIN DESCRIPTION - FREQUENCY: FREQUENCY: CONTINUOUS

## 2019-09-25 ASSESSMENT — PAIN DESCRIPTION - ONSET: ONSET: ON-GOING

## 2019-09-25 NOTE — H&P
History and Physical    Pt Name: Alice Florence  MRN: [id number]  YOB: 1955  Date of evaluation: 9/25/2019    SUBJECTIVE:   History of Chief Complaint:    Patient complains of pain in the right knee. She has pain with ambulation, soreness. She says that she has had progressive symptoms since at least October of last year. She has failed conservative treatment, scheduled for knee total arthroplasty. Past Medical History    has a past medical history of Arthritis, CAD (coronary artery disease), Chronic back pain, Irregular heart beat, LAFB (left anterior fascicular block), Lumbar disc disease, Osteoporosis, PAC (premature atrial contraction), PONV (postoperative nausea and vomiting), and Wears dentures. Past Surgical History   has a past surgical history that includes Knee arthroscopy (Left, 2006); Abdominoplasty (1997); blepharoplasty (Bilateral, 1997); Breast enhancement surgery (Bilateral, 1999); laminectomy (1994); lumbar laminectomy (2011); lumbar laminectomy (2014); Fixation Kyphoplasty (2013); Finger surgery (Right); Tonsillectomy; Colonoscopy (2015); other surgical history (Right, 09/11/2018); pr office/outpt visit,procedure only (Right, 9/11/2018); Lumbar spine surgery (2005); and Cataract removal with implant (Bilateral, 2016). Medications  Prior to Admission medications    Medication Sig Start Date End Date Taking?  Authorizing Provider   aspirin 81 MG tablet Take 81 mg by mouth daily   Yes Historical Provider, MD   atorvastatin (LIPITOR) 40 MG tablet Take 1 tablet by mouth nightly  Patient taking differently: Take 40 mg by mouth every morning  9/11/19  Yes Halima Campbell MD   folic acid (FOLVITE) 1 MG tablet take 1 tablet by mouth once daily  Patient taking differently: Take 1 mg by mouth daily take 1 tablet by mouth once daily 8/30/19  Yes NUPUR Flores - CNP   NAPROXEN PO Take 250 mg by mouth daily as needed Indications: patient takes 1-2 times a day    Yes Historical Provider,

## 2019-09-26 LAB
EKG ATRIAL RATE: 56 BPM
EKG P AXIS: 77 DEGREES
EKG P-R INTERVAL: 148 MS
EKG Q-T INTERVAL: 424 MS
EKG QRS DURATION: 100 MS
EKG QTC CALCULATION (BAZETT): 409 MS
EKG R AXIS: -43 DEGREES
EKG T AXIS: 33 DEGREES
EKG VENTRICULAR RATE: 56 BPM

## 2019-09-27 NOTE — CARE COORDINATION
Pre-op assessment call  Spoke with patient over the phone, scheduled for right total Knee replacement on 10/8/19 at [facility]. Discussed pre and post op expectations. Patient lives in single family home two-story, bed is upstairs bath on first floor. Patient has a good social support network, will have support of  when she returns home.     Discharge plan:    home with [facility], patient provided choice regarding home PT options   DME needs: walker  o Current home DME: has cane NEEDS WALKER    Pre-Op Class Attendance   Patient has not attended class   Class date will attend on 9/30 at [address] [facility] [place] Patient does not need follow up with PCP  o Clearances needed no already received clearances       Electronically signed by: Dasia Oviedo RN, Ortho Coordinator on 9/27/2019 at 3:20 PM

## 2019-09-30 ENCOUNTER — INITIAL CONSULT (OUTPATIENT)
Dept: VASCULAR SURGERY | Age: 64
End: 2019-09-30
Payer: COMMERCIAL

## 2019-09-30 VITALS
TEMPERATURE: 97.8 F | SYSTOLIC BLOOD PRESSURE: 129 MMHG | OXYGEN SATURATION: 100 % | WEIGHT: 141 LBS | BODY MASS INDEX: 22.66 KG/M2 | HEART RATE: 60 BPM | DIASTOLIC BLOOD PRESSURE: 71 MMHG | HEIGHT: 66 IN

## 2019-09-30 DIAGNOSIS — L97.522 CHRONIC ULCER OF LEFT FOOT WITH FAT LAYER EXPOSED (HCC): ICD-10-CM

## 2019-09-30 DIAGNOSIS — I73.9 PAD (PERIPHERAL ARTERY DISEASE) (HCC): Primary | ICD-10-CM

## 2019-09-30 PROCEDURE — 99243 OFF/OP CNSLTJ NEW/EST LOW 30: CPT | Performed by: SURGERY

## 2019-09-30 ASSESSMENT — ENCOUNTER SYMPTOMS
COLOR CHANGE: 1
CHEST TIGHTNESS: 0
SHORTNESS OF BREATH: 0
ALLERGIC/IMMUNOLOGIC NEGATIVE: 1
ABDOMINAL PAIN: 0

## 2019-09-30 NOTE — PROGRESS NOTES
LUMBAR SPINE SURGERY  2005    diskectomy and spinal fusion    OTHER SURGICAL HISTORY Right 09/11/2018     EXCISION MASS THUMB (Right )    FL OFFICE/OUTPT VISIT,PROCEDURE ONLY Right 9/11/2018    EXCISION MASS THUMB, 3080 TABLE performed by Natalie Han DO at [place] [address]         Family History:     Family History   Problem Relation Age of Onset    Coronary Art Dis Mother     Arthritis Mother     Heart Surgery Mother         CABG    Coronary Art Dis Father     Heart Disease Father     Heart Surgery Father         CABG    Coronary Art Dis Sister     Heart Surgery Sister         CABG       Allergies:       Patient has no known allergies. Medications:      Current Outpatient Medications   Medication Sig Dispense Refill    aspirin 81 MG tablet Take 81 mg by mouth daily      atorvastatin (LIPITOR) 40 MG tablet Take 1 tablet by mouth nightly (Patient taking differently: Take 40 mg by mouth every morning ) 30 tablet 3    folic acid (FOLVITE) 1 MG tablet take 1 tablet by mouth once daily (Patient taking differently: Take 1 mg by mouth daily take 1 tablet by mouth once daily) 90 tablet 1    NAPROXEN PO Take 250 mg by mouth daily as needed Indications: patient takes 1-2 times a day       oxyCODONE-acetaminophen (PERCOCET) 5-325 MG per tablet Take 1 tablet by mouth every 6 hours as needed.  OMEPRAZOLE PO Take 1 tablet by mouth daily       metoprolol tartrate 37.5 MG TABS Take 37.5 mg by mouth 2 times daily 60 tablet 5    Cholecalciferol (VITAMIN D3) 5000 units TABS Take 5,000 Units by mouth daily       calcium carbonate (OSCAL) 500 MG TABS tablet Take 1,000 mg by mouth daily      alendronate (FOSAMAX) 70 MG tablet Take 70 mg by mouth every 7 days Indications: Mondays       traMADol (ULTRAM ER) 200 MG extended release tablet Take 1 tablet by mouth daily      gabapentin (NEURONTIN) 300 MG capsule Take 450 mg by mouth 2 times daily.         No current facility-administered medications for this visit. Social History:     Tobacco:    reports that she has been smoking cigarettes. She started smoking about 51 years ago. She has been smoking about 0.50 packs per day. She has never used smokeless tobacco.  Alcohol:      reports that she drinks alcohol. Drug Use:  reports that she has current or past drug history. Review of Systems:     Review of Systems   Constitutional: Negative for chills and fever. HENT: Negative. Eyes: Negative for visual disturbance. Respiratory: Negative for chest tightness and shortness of breath. Cardiovascular: Negative for chest pain and leg swelling. Gastrointestinal: Negative for abdominal pain. Endocrine: Negative. Genitourinary: Negative. Musculoskeletal: Positive for arthralgias. Skin: Positive for color change and wound. Allergic/Immunologic: Negative. Neurological: Negative for weakness and numbness. Hematological: Negative. Psychiatric/Behavioral: Negative. Physical Exam:     Vitals:  /71 (Site: Left Upper Arm, Position: Sitting, Cuff Size: Medium Adult)   Pulse 60   Temp 97.8 °F (36.6 °C) (Oral)   Ht 5' 6\" (1.676 m)   Wt 141 lb (64 kg)   SpO2 100%   BMI 22.76 kg/m²     Physical Exam   Constitutional: She is oriented to person, place, and time. She appears well-developed and well-nourished. Eyes: Conjunctivae and EOM are normal.   Neck: Carotid bruit is not present. Cardiovascular: Normal rate and regular rhythm. Pulses:       Radial pulses are 2+ on the right side, and 2+ on the left side. Femoral pulses are 2+ on the right side, and 2+ on the left side. Popliteal pulses are 1+ on the right side, and 0 on the left side. Dorsalis pedis pulses are Detected w/ doppler on the right side, and Detected w/ doppler on the left side. Posterior tibial pulses are Detected w/ doppler on the right side, and Detected w/ doppler on the left side.    Pulmonary/Chest: Effort

## 2019-10-03 ENCOUNTER — HOSPITAL ENCOUNTER (OUTPATIENT)
Dept: CARDIAC CATH/INVASIVE PROCEDURES | Age: 64
Discharge: HOME | End: 2019-10-03
Payer: COMMERCIAL

## 2019-10-03 VITALS
SYSTOLIC BLOOD PRESSURE: 146 MMHG | WEIGHT: 140 LBS | HEART RATE: 62 BPM | HEIGHT: 66 IN | OXYGEN SATURATION: 98 % | TEMPERATURE: 97.4 F | RESPIRATION RATE: 16 BRPM | DIASTOLIC BLOOD PRESSURE: 73 MMHG | BODY MASS INDEX: 22.5 KG/M2

## 2019-10-03 PROCEDURE — C1769 GUIDE WIRE: HCPCS

## 2019-10-03 PROCEDURE — 6360000002 HC RX W HCPCS

## 2019-10-03 PROCEDURE — 76937 US GUIDE VASCULAR ACCESS: CPT | Performed by: SURGERY

## 2019-10-03 PROCEDURE — 36247 INS CATH ABD/L-EXT ART 3RD: CPT | Performed by: SURGERY

## 2019-10-03 PROCEDURE — C1887 CATHETER, GUIDING: HCPCS

## 2019-10-03 PROCEDURE — 7100000010 HC PHASE II RECOVERY - FIRST 15 MIN

## 2019-10-03 PROCEDURE — C1894 INTRO/SHEATH, NON-LASER: HCPCS

## 2019-10-03 PROCEDURE — 2709999900 HC NON-CHARGEABLE SUPPLY

## 2019-10-03 PROCEDURE — 2500000003 HC RX 250 WO HCPCS

## 2019-10-03 PROCEDURE — 75625 CONTRAST EXAM ABDOMINL AORTA: CPT | Performed by: SURGERY

## 2019-10-03 PROCEDURE — 75710 ARTERY X-RAYS ARM/LEG: CPT | Performed by: SURGERY

## 2019-10-03 PROCEDURE — 7100000011 HC PHASE II RECOVERY - ADDTL 15 MIN

## 2019-10-03 PROCEDURE — 6360000004 HC RX CONTRAST MEDICATION

## 2019-10-03 PROCEDURE — C1760 CLOSURE DEV, VASC: HCPCS

## 2019-10-03 PROCEDURE — 75774 ARTERY X-RAY EACH VESSEL: CPT | Performed by: SURGERY

## 2019-10-03 RX ORDER — MECLIZINE HYDROCHLORIDE 25 MG/1
25 TABLET ORAL 3 TIMES DAILY PRN
COMMUNITY
End: 2021-01-01

## 2019-10-03 RX ORDER — SODIUM CHLORIDE 9 MG/ML
INJECTION, SOLUTION INTRAVENOUS CONTINUOUS
Status: DISCONTINUED | OUTPATIENT
Start: 2019-10-03 | End: 2019-10-04 | Stop reason: HOSPADM

## 2019-10-03 RX ADMIN — SODIUM CHLORIDE: 9 INJECTION, SOLUTION INTRAVENOUS at 14:26

## 2019-10-07 ENCOUNTER — OFFICE VISIT (OUTPATIENT)
Dept: ORTHOPEDIC SURGERY | Age: 64
End: 2019-10-07
Payer: COMMERCIAL

## 2019-10-07 VITALS — WEIGHT: 139.99 LBS | HEIGHT: 66 IN | BODY MASS INDEX: 22.5 KG/M2

## 2019-10-07 DIAGNOSIS — L97.529 ULCER OF LEFT FOOT, UNSPECIFIED ULCER STAGE (HCC): ICD-10-CM

## 2019-10-07 DIAGNOSIS — M17.0 PRIMARY OSTEOARTHRITIS OF BOTH KNEES: Primary | ICD-10-CM

## 2019-10-07 DIAGNOSIS — M25.461 EFFUSION OF RIGHT KNEE: ICD-10-CM

## 2019-10-07 PROCEDURE — 99213 OFFICE O/P EST LOW 20 MIN: CPT | Performed by: ORTHOPAEDIC SURGERY

## 2019-10-08 ENCOUNTER — OFFICE VISIT (OUTPATIENT)
Dept: PODIATRY | Age: 64
End: 2019-10-08
Payer: COMMERCIAL

## 2019-10-08 VITALS — HEIGHT: 66 IN | WEIGHT: 138 LBS | BODY MASS INDEX: 22.18 KG/M2 | RESPIRATION RATE: 16 BRPM

## 2019-10-08 DIAGNOSIS — I73.9 PVD (PERIPHERAL VASCULAR DISEASE) (HCC): Primary | ICD-10-CM

## 2019-10-08 DIAGNOSIS — L97.422 DIABETIC ULCER OF LEFT MIDFOOT ASSOCIATED WITH TYPE 2 DIABETES MELLITUS, WITH FAT LAYER EXPOSED (HCC): ICD-10-CM

## 2019-10-08 DIAGNOSIS — L97.521 ULCER OF LEFT FOOT, LIMITED TO BREAKDOWN OF SKIN (HCC): ICD-10-CM

## 2019-10-08 DIAGNOSIS — E11.621 DIABETIC ULCER OF LEFT MIDFOOT ASSOCIATED WITH TYPE 2 DIABETES MELLITUS, WITH FAT LAYER EXPOSED (HCC): ICD-10-CM

## 2019-10-08 DIAGNOSIS — M79.671 PAIN IN BOTH FEET: ICD-10-CM

## 2019-10-08 DIAGNOSIS — R26.2 TROUBLE WALKING: ICD-10-CM

## 2019-10-08 DIAGNOSIS — M79.672 PAIN IN BOTH FEET: ICD-10-CM

## 2019-10-08 PROCEDURE — 99213 OFFICE O/P EST LOW 20 MIN: CPT | Performed by: PODIATRIST

## 2019-10-08 ASSESSMENT — ENCOUNTER SYMPTOMS
COUGH: 0
APNEA: 0
ABDOMINAL PAIN: 0
VOMITING: 0
CHEST TIGHTNESS: 0

## 2019-10-10 ENCOUNTER — ANESTHESIA EVENT (OUTPATIENT)
Dept: OPERATING ROOM | Age: 64
End: 2019-10-10
Payer: COMMERCIAL

## 2019-10-11 ENCOUNTER — ANESTHESIA (OUTPATIENT)
Dept: OPERATING ROOM | Age: 64
End: 2019-10-11
Payer: COMMERCIAL

## 2019-10-11 ENCOUNTER — HOSPITAL ENCOUNTER (OUTPATIENT)
Age: 64
Setting detail: OUTPATIENT SURGERY
Discharge: HOME | End: 2019-10-11
Attending: PODIATRIST | Admitting: PODIATRIST
Payer: COMMERCIAL

## 2019-10-11 VITALS
DIASTOLIC BLOOD PRESSURE: 65 MMHG | RESPIRATION RATE: 11 BRPM | HEART RATE: 63 BPM | OXYGEN SATURATION: 98 % | WEIGHT: 140 LBS | HEIGHT: 66 IN | BODY MASS INDEX: 22.5 KG/M2 | SYSTOLIC BLOOD PRESSURE: 139 MMHG | TEMPERATURE: 97.7 F

## 2019-10-11 VITALS — SYSTOLIC BLOOD PRESSURE: 126 MMHG | OXYGEN SATURATION: 100 % | DIASTOLIC BLOOD PRESSURE: 65 MMHG

## 2019-10-11 DIAGNOSIS — L97.522 CHRONIC ULCER OF LEFT FOOT WITH FAT LAYER EXPOSED (HCC): Primary | ICD-10-CM

## 2019-10-11 PROCEDURE — 3700000000 HC ANESTHESIA ATTENDED CARE: Performed by: PODIATRIST

## 2019-10-11 PROCEDURE — 3600000012 HC SURGERY LEVEL 2 ADDTL 15MIN: Performed by: PODIATRIST

## 2019-10-11 PROCEDURE — 2580000003 HC RX 258: Performed by: ANESTHESIOLOGY

## 2019-10-11 PROCEDURE — 2500000003 HC RX 250 WO HCPCS: Performed by: NURSE ANESTHETIST, CERTIFIED REGISTERED

## 2019-10-11 PROCEDURE — 3600000002 HC SURGERY LEVEL 2 BASE: Performed by: PODIATRIST

## 2019-10-11 PROCEDURE — 2709999900 HC NON-CHARGEABLE SUPPLY: Performed by: PODIATRIST

## 2019-10-11 PROCEDURE — 2500000003 HC RX 250 WO HCPCS: Performed by: PODIATRIST

## 2019-10-11 PROCEDURE — 2580000003 HC RX 258: Performed by: NURSE ANESTHETIST, CERTIFIED REGISTERED

## 2019-10-11 PROCEDURE — 15275 SKIN SUB GRAFT FACE/NK/HF/G: CPT | Performed by: PODIATRIST

## 2019-10-11 PROCEDURE — 15004 WOUND PREP F/N/HF/G: CPT | Performed by: PODIATRIST

## 2019-10-11 PROCEDURE — 3700000001 HC ADD 15 MINUTES (ANESTHESIA): Performed by: PODIATRIST

## 2019-10-11 PROCEDURE — 6360000002 HC RX W HCPCS: Performed by: NURSE ANESTHETIST, CERTIFIED REGISTERED

## 2019-10-11 PROCEDURE — 7100000001 HC PACU RECOVERY - ADDTL 15 MIN: Performed by: PODIATRIST

## 2019-10-11 PROCEDURE — 7100000000 HC PACU RECOVERY - FIRST 15 MIN: Performed by: PODIATRIST

## 2019-10-11 DEVICE — GRAFT HUM TISS L12SQCM DEHYDR AMNION CHORION MEM MESH: Type: IMPLANTABLE DEVICE | Site: FOOT | Status: FUNCTIONAL

## 2019-10-11 RX ORDER — FENTANYL CITRATE 50 UG/ML
25 INJECTION, SOLUTION INTRAMUSCULAR; INTRAVENOUS EVERY 5 MIN PRN
Status: DISCONTINUED | OUTPATIENT
Start: 2019-10-11 | End: 2019-10-11 | Stop reason: HOSPADM

## 2019-10-11 RX ORDER — MIDAZOLAM HYDROCHLORIDE 1 MG/ML
INJECTION INTRAMUSCULAR; INTRAVENOUS PRN
Status: DISCONTINUED | OUTPATIENT
Start: 2019-10-11 | End: 2019-10-11 | Stop reason: SDUPTHER

## 2019-10-11 RX ORDER — SODIUM CHLORIDE 0.9 % (FLUSH) 0.9 %
10 SYRINGE (ML) INJECTION EVERY 12 HOURS SCHEDULED
Status: DISCONTINUED | OUTPATIENT
Start: 2019-10-11 | End: 2019-10-11 | Stop reason: HOSPADM

## 2019-10-11 RX ORDER — SODIUM CHLORIDE, SODIUM LACTATE, POTASSIUM CHLORIDE, CALCIUM CHLORIDE 600; 310; 30; 20 MG/100ML; MG/100ML; MG/100ML; MG/100ML
INJECTION, SOLUTION INTRAVENOUS CONTINUOUS PRN
Status: DISCONTINUED | OUTPATIENT
Start: 2019-10-11 | End: 2019-10-11 | Stop reason: SDUPTHER

## 2019-10-11 RX ORDER — SODIUM CHLORIDE 0.9 % (FLUSH) 0.9 %
10 SYRINGE (ML) INJECTION PRN
Status: DISCONTINUED | OUTPATIENT
Start: 2019-10-11 | End: 2019-10-11 | Stop reason: HOSPADM

## 2019-10-11 RX ORDER — HYDROMORPHONE HCL 110MG/55ML
0.5 PATIENT CONTROLLED ANALGESIA SYRINGE INTRAVENOUS EVERY 5 MIN PRN
Status: DISCONTINUED | OUTPATIENT
Start: 2019-10-11 | End: 2019-10-11 | Stop reason: HOSPADM

## 2019-10-11 RX ORDER — LIDOCAINE HYDROCHLORIDE 10 MG/ML
1 INJECTION, SOLUTION EPIDURAL; INFILTRATION; INTRACAUDAL; PERINEURAL
Status: DISCONTINUED | OUTPATIENT
Start: 2019-10-11 | End: 2019-10-11 | Stop reason: HOSPADM

## 2019-10-11 RX ORDER — OXYCODONE HYDROCHLORIDE AND ACETAMINOPHEN 5; 325 MG/1; MG/1
1 TABLET ORAL PRN
Status: DISCONTINUED | OUTPATIENT
Start: 2019-10-11 | End: 2019-10-11 | Stop reason: HOSPADM

## 2019-10-11 RX ORDER — PROMETHAZINE HYDROCHLORIDE 25 MG/ML
6.25 INJECTION, SOLUTION INTRAMUSCULAR; INTRAVENOUS
Status: DISCONTINUED | OUTPATIENT
Start: 2019-10-11 | End: 2019-10-11 | Stop reason: HOSPADM

## 2019-10-11 RX ORDER — HYDRALAZINE HYDROCHLORIDE 20 MG/ML
5 INJECTION INTRAMUSCULAR; INTRAVENOUS EVERY 10 MIN PRN
Status: DISCONTINUED | OUTPATIENT
Start: 2019-10-11 | End: 2019-10-11 | Stop reason: HOSPADM

## 2019-10-11 RX ORDER — OXYCODONE HYDROCHLORIDE AND ACETAMINOPHEN 5; 325 MG/1; MG/1
2 TABLET ORAL PRN
Status: DISCONTINUED | OUTPATIENT
Start: 2019-10-11 | End: 2019-10-11 | Stop reason: HOSPADM

## 2019-10-11 RX ORDER — LABETALOL HYDROCHLORIDE 5 MG/ML
5 INJECTION, SOLUTION INTRAVENOUS EVERY 10 MIN PRN
Status: DISCONTINUED | OUTPATIENT
Start: 2019-10-11 | End: 2019-10-11 | Stop reason: HOSPADM

## 2019-10-11 RX ORDER — LIDOCAINE HYDROCHLORIDE 10 MG/ML
INJECTION, SOLUTION EPIDURAL; INFILTRATION; INTRACAUDAL; PERINEURAL PRN
Status: DISCONTINUED | OUTPATIENT
Start: 2019-10-11 | End: 2019-10-11 | Stop reason: ALTCHOICE

## 2019-10-11 RX ORDER — SODIUM CHLORIDE, SODIUM LACTATE, POTASSIUM CHLORIDE, CALCIUM CHLORIDE 600; 310; 30; 20 MG/100ML; MG/100ML; MG/100ML; MG/100ML
INJECTION, SOLUTION INTRAVENOUS CONTINUOUS
Status: DISCONTINUED | OUTPATIENT
Start: 2019-10-12 | End: 2019-10-11 | Stop reason: HOSPADM

## 2019-10-11 RX ORDER — FENTANYL CITRATE 50 UG/ML
INJECTION, SOLUTION INTRAMUSCULAR; INTRAVENOUS PRN
Status: DISCONTINUED | OUTPATIENT
Start: 2019-10-11 | End: 2019-10-11 | Stop reason: SDUPTHER

## 2019-10-11 RX ORDER — PROPOFOL 10 MG/ML
INJECTION, EMULSION INTRAVENOUS CONTINUOUS PRN
Status: DISCONTINUED | OUTPATIENT
Start: 2019-10-11 | End: 2019-10-11 | Stop reason: SDUPTHER

## 2019-10-11 RX ORDER — SODIUM CHLORIDE 9 MG/ML
INJECTION, SOLUTION INTRAVENOUS CONTINUOUS
Status: DISCONTINUED | OUTPATIENT
Start: 2019-10-12 | End: 2019-10-11

## 2019-10-11 RX ORDER — ONDANSETRON 2 MG/ML
4 INJECTION INTRAMUSCULAR; INTRAVENOUS
Status: DISCONTINUED | OUTPATIENT
Start: 2019-10-11 | End: 2019-10-11 | Stop reason: HOSPADM

## 2019-10-11 RX ORDER — LIDOCAINE HYDROCHLORIDE 20 MG/ML
INJECTION, SOLUTION EPIDURAL; INFILTRATION; INTRACAUDAL; PERINEURAL PRN
Status: DISCONTINUED | OUTPATIENT
Start: 2019-10-11 | End: 2019-10-11 | Stop reason: SDUPTHER

## 2019-10-11 RX ORDER — BUPIVACAINE HYDROCHLORIDE 5 MG/ML
INJECTION, SOLUTION EPIDURAL; INTRACAUDAL PRN
Status: DISCONTINUED | OUTPATIENT
Start: 2019-10-11 | End: 2019-10-11 | Stop reason: ALTCHOICE

## 2019-10-11 RX ADMIN — MIDAZOLAM 2 MG: 1 INJECTION INTRAMUSCULAR; INTRAVENOUS at 09:50

## 2019-10-11 RX ADMIN — SODIUM CHLORIDE, POTASSIUM CHLORIDE, SODIUM LACTATE AND CALCIUM CHLORIDE: 600; 310; 30; 20 INJECTION, SOLUTION INTRAVENOUS at 08:00

## 2019-10-11 RX ADMIN — PROPOFOL 25 MCG/KG/MIN: 10 INJECTION, EMULSION INTRAVENOUS at 09:57

## 2019-10-11 RX ADMIN — LIDOCAINE HYDROCHLORIDE 40 MG: 20 INJECTION, SOLUTION EPIDURAL; INFILTRATION; INTRACAUDAL; PERINEURAL at 09:57

## 2019-10-11 RX ADMIN — SODIUM CHLORIDE, POTASSIUM CHLORIDE, SODIUM LACTATE AND CALCIUM CHLORIDE: 600; 310; 30; 20 INJECTION, SOLUTION INTRAVENOUS at 09:50

## 2019-10-11 RX ADMIN — Medication 50 MCG: at 09:57

## 2019-10-11 RX ADMIN — Medication 50 MCG: at 10:09

## 2019-10-11 ASSESSMENT — LIFESTYLE VARIABLES: SMOKING_STATUS: 1

## 2019-10-11 ASSESSMENT — PULMONARY FUNCTION TESTS
PIF_VALUE: 1
PIF_VALUE: 0
PIF_VALUE: 1
PIF_VALUE: 0
PIF_VALUE: 1
PIF_VALUE: 0
PIF_VALUE: 1
PIF_VALUE: 0
PIF_VALUE: 1

## 2019-10-11 ASSESSMENT — PAIN SCALES - GENERAL
PAINLEVEL_OUTOF10: 0

## 2019-10-11 ASSESSMENT — PAIN - FUNCTIONAL ASSESSMENT: PAIN_FUNCTIONAL_ASSESSMENT: 0-10

## 2019-10-11 ASSESSMENT — PAIN DESCRIPTION - DESCRIPTORS: DESCRIPTORS: ACHING;CONSTANT

## 2019-10-17 ENCOUNTER — OFFICE VISIT (OUTPATIENT)
Dept: PODIATRY | Age: 64
End: 2019-10-17
Payer: COMMERCIAL

## 2019-10-17 VITALS — BODY MASS INDEX: 22.18 KG/M2 | HEIGHT: 66 IN | WEIGHT: 138 LBS | RESPIRATION RATE: 16 BRPM

## 2019-10-17 DIAGNOSIS — L97.521 ULCER OF LEFT FOOT, LIMITED TO BREAKDOWN OF SKIN (HCC): ICD-10-CM

## 2019-10-17 DIAGNOSIS — I73.9 PVD (PERIPHERAL VASCULAR DISEASE) (HCC): Primary | ICD-10-CM

## 2019-10-17 DIAGNOSIS — L97.422 DIABETIC ULCER OF LEFT MIDFOOT ASSOCIATED WITH TYPE 2 DIABETES MELLITUS, WITH FAT LAYER EXPOSED (HCC): ICD-10-CM

## 2019-10-17 DIAGNOSIS — E11.621 DIABETIC ULCER OF LEFT MIDFOOT ASSOCIATED WITH TYPE 2 DIABETES MELLITUS, WITH FAT LAYER EXPOSED (HCC): ICD-10-CM

## 2019-10-17 PROCEDURE — 99024 POSTOP FOLLOW-UP VISIT: CPT | Performed by: PODIATRIST

## 2019-10-17 PROCEDURE — 15275 SKIN SUB GRAFT FACE/NK/HF/G: CPT | Performed by: PODIATRIST

## 2019-10-21 ENCOUNTER — OFFICE VISIT (OUTPATIENT)
Dept: PODIATRY | Age: 64
End: 2019-10-21

## 2019-10-21 DIAGNOSIS — L97.422 DIABETIC ULCER OF LEFT MIDFOOT ASSOCIATED WITH TYPE 2 DIABETES MELLITUS, WITH FAT LAYER EXPOSED (HCC): Primary | ICD-10-CM

## 2019-10-21 DIAGNOSIS — L97.522 ULCER OF LEFT FOOT, WITH FAT LAYER EXPOSED (HCC): ICD-10-CM

## 2019-10-21 DIAGNOSIS — E11.621 DIABETIC ULCER OF LEFT MIDFOOT ASSOCIATED WITH TYPE 2 DIABETES MELLITUS, WITH FAT LAYER EXPOSED (HCC): Primary | ICD-10-CM

## 2019-10-21 PROCEDURE — 99999 PR OFFICE/OUTPT VISIT,PROCEDURE ONLY: CPT | Performed by: PODIATRIST

## 2019-10-22 RX ORDER — SALICYLIC ACID 0.5 G/100ML
1 SWAB TOPICAL WEEKLY
Qty: 1 EACH | Refills: 0
Start: 2019-10-22 | End: 2019-12-30

## 2019-10-24 ENCOUNTER — OFFICE VISIT (OUTPATIENT)
Dept: PODIATRY | Age: 64
End: 2019-10-24
Payer: COMMERCIAL

## 2019-10-24 VITALS — RESPIRATION RATE: 16 BRPM | HEIGHT: 66 IN | BODY MASS INDEX: 22.18 KG/M2 | WEIGHT: 138 LBS

## 2019-10-24 DIAGNOSIS — L97.522 ULCER OF LEFT FOOT, WITH FAT LAYER EXPOSED (HCC): Primary | ICD-10-CM

## 2019-10-24 DIAGNOSIS — R26.2 TROUBLE WALKING: ICD-10-CM

## 2019-10-24 DIAGNOSIS — I73.9 PVD (PERIPHERAL VASCULAR DISEASE) (HCC): ICD-10-CM

## 2019-10-24 PROCEDURE — 99999 PR OFFICE/OUTPT VISIT,PROCEDURE ONLY: CPT | Performed by: PODIATRIST

## 2019-10-24 PROCEDURE — 15275 SKIN SUB GRAFT FACE/NK/HF/G: CPT | Performed by: PODIATRIST

## 2019-10-24 RX ORDER — SALICYLIC ACID 0.5 G/100ML
1 SWAB TOPICAL WEEKLY
Qty: 1 EACH | Refills: 0 | Status: CANCELLED
Start: 2019-10-24

## 2019-10-29 RX ORDER — SALICYLIC ACID 0.5 G/100ML
1 SWAB TOPICAL WEEKLY
Qty: 1 EACH | Refills: 0
Start: 2019-10-29 | End: 2019-11-07

## 2019-10-31 ENCOUNTER — HOSPITAL ENCOUNTER (OUTPATIENT)
Age: 64
Setting detail: SPECIMEN
Discharge: HOME | End: 2019-10-31
Payer: COMMERCIAL

## 2019-10-31 ENCOUNTER — OFFICE VISIT (OUTPATIENT)
Dept: PODIATRY | Age: 64
End: 2019-10-31
Payer: COMMERCIAL

## 2019-10-31 VITALS — BODY MASS INDEX: 22.18 KG/M2 | WEIGHT: 138 LBS | RESPIRATION RATE: 16 BRPM | HEIGHT: 66 IN

## 2019-10-31 DIAGNOSIS — I73.9 PVD (PERIPHERAL VASCULAR DISEASE) (HCC): ICD-10-CM

## 2019-10-31 DIAGNOSIS — M79.672 LEFT FOOT PAIN: ICD-10-CM

## 2019-10-31 DIAGNOSIS — L97.522 ULCER OF LEFT FOOT, WITH FAT LAYER EXPOSED (HCC): Primary | ICD-10-CM

## 2019-10-31 DIAGNOSIS — R26.2 TROUBLE WALKING: ICD-10-CM

## 2019-10-31 PROCEDURE — 15275 SKIN SUB GRAFT FACE/NK/HF/G: CPT | Performed by: PODIATRIST

## 2019-11-01 RX ORDER — SALICYLIC ACID 0.5 G/100ML
1 SWAB TOPICAL WEEKLY
Qty: 1 EACH | Refills: 0
Start: 2019-11-01 | End: 2019-11-14

## 2019-11-04 LAB
CULTURE: ABNORMAL
DIRECT EXAM: ABNORMAL
DIRECT EXAM: ABNORMAL
Lab: ABNORMAL
SPECIMEN DESCRIPTION: ABNORMAL

## 2019-11-04 RX ORDER — DOXYCYCLINE HYCLATE 100 MG
100 TABLET ORAL 2 TIMES DAILY
Qty: 56 TABLET | Refills: 0 | Status: SHIPPED | OUTPATIENT
Start: 2019-11-04 | End: 2019-12-04 | Stop reason: SDUPTHER

## 2019-11-07 ENCOUNTER — OFFICE VISIT (OUTPATIENT)
Dept: PODIATRY | Age: 64
End: 2019-11-07
Payer: COMMERCIAL

## 2019-11-07 VITALS — RESPIRATION RATE: 16 BRPM | WEIGHT: 138 LBS | BODY MASS INDEX: 22.18 KG/M2 | HEIGHT: 66 IN

## 2019-11-07 DIAGNOSIS — I73.9 PVD (PERIPHERAL VASCULAR DISEASE) (HCC): ICD-10-CM

## 2019-11-07 DIAGNOSIS — L97.522 ULCER OF LEFT FOOT, WITH FAT LAYER EXPOSED (HCC): Primary | ICD-10-CM

## 2019-11-07 DIAGNOSIS — R26.2 TROUBLE WALKING: ICD-10-CM

## 2019-11-07 PROCEDURE — 99213 OFFICE O/P EST LOW 20 MIN: CPT | Performed by: PODIATRIST

## 2019-11-11 ENCOUNTER — APPOINTMENT (OUTPATIENT)
Dept: GENERAL RADIOLOGY | Age: 64
End: 2019-11-11
Payer: COMMERCIAL

## 2019-11-11 ENCOUNTER — HOSPITAL ENCOUNTER (EMERGENCY)
Age: 64
Discharge: HOME | End: 2019-11-11
Attending: EMERGENCY MEDICINE
Payer: COMMERCIAL

## 2019-11-11 VITALS
SYSTOLIC BLOOD PRESSURE: 182 MMHG | BODY MASS INDEX: 22.18 KG/M2 | OXYGEN SATURATION: 100 % | HEART RATE: 64 BPM | DIASTOLIC BLOOD PRESSURE: 82 MMHG | RESPIRATION RATE: 18 BRPM | WEIGHT: 138 LBS | HEIGHT: 66 IN | TEMPERATURE: 98.4 F

## 2019-11-11 DIAGNOSIS — L97.522 SKIN ULCER OF LEFT FOOT WITH FAT LAYER EXPOSED (HCC): Primary | ICD-10-CM

## 2019-11-11 LAB
ABSOLUTE EOS #: 0.45 K/UL (ref 0–0.44)
ABSOLUTE IMMATURE GRANULOCYTE: <0.03 K/UL (ref 0–0.3)
ABSOLUTE LYMPH #: 2.25 K/UL (ref 1.1–3.7)
ABSOLUTE MONO #: 0.73 K/UL (ref 0.1–1.2)
ANION GAP SERPL CALCULATED.3IONS-SCNC: 11 MMOL/L (ref 9–17)
BASOPHILS # BLD: 1 % (ref 0–2)
BASOPHILS ABSOLUTE: 0.04 K/UL (ref 0–0.2)
BUN BLDV-MCNC: 11 MG/DL (ref 8–23)
BUN/CREAT BLD: ABNORMAL (ref 9–20)
C-REACTIVE PROTEIN: <0.3 MG/L (ref 0–5)
CALCIUM SERPL-MCNC: 10 MG/DL (ref 8.6–10.4)
CHLORIDE BLD-SCNC: 105 MMOL/L (ref 98–107)
CO2: 26 MMOL/L (ref 20–31)
CREAT SERPL-MCNC: 0.41 MG/DL (ref 0.5–0.9)
DIFFERENTIAL TYPE: ABNORMAL
EOSINOPHILS RELATIVE PERCENT: 5 % (ref 1–4)
GFR AFRICAN AMERICAN: >60 ML/MIN
GFR NON-AFRICAN AMERICAN: >60 ML/MIN
GFR SERPL CREATININE-BSD FRML MDRD: ABNORMAL ML/MIN/{1.73_M2}
GFR SERPL CREATININE-BSD FRML MDRD: ABNORMAL ML/MIN/{1.73_M2}
GLUCOSE BLD-MCNC: 83 MG/DL (ref 70–99)
HCT VFR BLD CALC: 33.6 % (ref 36.3–47.1)
HEMOGLOBIN: 11 G/DL (ref 11.9–15.1)
IMMATURE GRANULOCYTES: 0 %
LYMPHOCYTES # BLD: 27 % (ref 24–43)
MCH RBC QN AUTO: 33.4 PG (ref 25.2–33.5)
MCHC RBC AUTO-ENTMCNC: 32.7 G/DL (ref 28.4–34.8)
MCV RBC AUTO: 102.1 FL (ref 82.6–102.9)
MONOCYTES # BLD: 9 % (ref 3–12)
NRBC AUTOMATED: 0 PER 100 WBC
PDW BLD-RTO: 13.2 % (ref 11.8–14.4)
PLATELET # BLD: 342 K/UL (ref 138–453)
PLATELET ESTIMATE: ABNORMAL
PMV BLD AUTO: 9.6 FL (ref 8.1–13.5)
POTASSIUM SERPL-SCNC: 4.1 MMOL/L (ref 3.7–5.3)
RBC # BLD: 3.29 M/UL (ref 3.95–5.11)
RBC # BLD: ABNORMAL 10*6/UL
SEDIMENTATION RATE, ERYTHROCYTE: 5 MM (ref 0–20)
SEG NEUTROPHILS: 58 % (ref 36–65)
SEGMENTED NEUTROPHILS ABSOLUTE COUNT: 4.89 K/UL (ref 1.5–8.1)
SODIUM BLD-SCNC: 142 MMOL/L (ref 135–144)
WBC # BLD: 8.4 K/UL (ref 3.5–11.3)
WBC # BLD: ABNORMAL 10*3/UL

## 2019-11-11 PROCEDURE — 80048 BASIC METABOLIC PNL TOTAL CA: CPT

## 2019-11-11 PROCEDURE — 73620 X-RAY EXAM OF FOOT: CPT

## 2019-11-11 PROCEDURE — 99283 EMERGENCY DEPT VISIT LOW MDM: CPT

## 2019-11-11 PROCEDURE — 85651 RBC SED RATE NONAUTOMATED: CPT

## 2019-11-11 PROCEDURE — 85025 COMPLETE CBC W/AUTO DIFF WBC: CPT

## 2019-11-11 PROCEDURE — 86140 C-REACTIVE PROTEIN: CPT

## 2019-11-11 ASSESSMENT — ENCOUNTER SYMPTOMS
EYE PAIN: 0
DIARRHEA: 0
WHEEZING: 0
SORE THROAT: 0
SINUS PAIN: 0
SHORTNESS OF BREATH: 0
VOMITING: 0
CHEST TIGHTNESS: 0
COUGH: 0
ABDOMINAL DISTENTION: 0
NAUSEA: 0
ABDOMINAL PAIN: 0

## 2019-11-11 ASSESSMENT — PAIN DESCRIPTION - ORIENTATION: ORIENTATION: LEFT

## 2019-11-11 ASSESSMENT — PAIN SCALES - GENERAL: PAINLEVEL_OUTOF10: 7

## 2019-11-11 ASSESSMENT — PAIN DESCRIPTION - FREQUENCY: FREQUENCY: CONTINUOUS

## 2019-11-11 ASSESSMENT — PAIN DESCRIPTION - PROGRESSION: CLINICAL_PROGRESSION: NOT CHANGED

## 2019-11-11 ASSESSMENT — PAIN DESCRIPTION - PAIN TYPE: TYPE: ACUTE PAIN

## 2019-11-11 ASSESSMENT — PAIN DESCRIPTION - DESCRIPTORS: DESCRIPTORS: SHOOTING;SHARP

## 2019-11-11 ASSESSMENT — PAIN DESCRIPTION - LOCATION: LOCATION: FOOT

## 2019-11-14 ENCOUNTER — OFFICE VISIT (OUTPATIENT)
Dept: PODIATRY | Age: 64
End: 2019-11-14
Payer: COMMERCIAL

## 2019-11-14 VITALS — HEIGHT: 66 IN | RESPIRATION RATE: 18 BRPM | WEIGHT: 138 LBS | BODY MASS INDEX: 22.18 KG/M2

## 2019-11-14 DIAGNOSIS — I73.9 PVD (PERIPHERAL VASCULAR DISEASE) (HCC): ICD-10-CM

## 2019-11-14 DIAGNOSIS — R26.2 TROUBLE WALKING: ICD-10-CM

## 2019-11-14 DIAGNOSIS — L97.522 ULCER OF LEFT FOOT, WITH FAT LAYER EXPOSED (HCC): Primary | ICD-10-CM

## 2019-11-14 PROCEDURE — 11042 DBRDMT SUBQ TIS 1ST 20SQCM/<: CPT | Performed by: PODIATRIST

## 2019-11-21 ENCOUNTER — OFFICE VISIT (OUTPATIENT)
Dept: PODIATRY | Age: 64
End: 2019-11-21
Payer: COMMERCIAL

## 2019-11-21 VITALS — RESPIRATION RATE: 16 BRPM | HEIGHT: 66 IN | BODY MASS INDEX: 22.18 KG/M2 | WEIGHT: 138 LBS

## 2019-11-21 DIAGNOSIS — L97.522 ULCER OF LEFT FOOT, WITH FAT LAYER EXPOSED (HCC): Primary | ICD-10-CM

## 2019-11-21 DIAGNOSIS — I73.9 PVD (PERIPHERAL VASCULAR DISEASE) (HCC): ICD-10-CM

## 2019-11-21 DIAGNOSIS — R26.2 TROUBLE WALKING: ICD-10-CM

## 2019-11-21 PROCEDURE — 11042 DBRDMT SUBQ TIS 1ST 20SQCM/<: CPT | Performed by: PODIATRIST

## 2019-11-21 RX ORDER — SOFT LENS RINSE,STORE SOLUTION
360 SOLUTION, NON-ORAL MISCELLANEOUS 2 TIMES DAILY
Qty: 1 BOTTLE | Refills: 1 | Status: ON HOLD | OUTPATIENT
Start: 2019-11-21 | End: 2020-01-02 | Stop reason: ALTCHOICE

## 2019-11-22 ENCOUNTER — OFFICE VISIT (OUTPATIENT)
Dept: ORTHOPEDIC SURGERY | Age: 64
End: 2019-11-22
Payer: COMMERCIAL

## 2019-11-22 VITALS — BODY MASS INDEX: 22.18 KG/M2 | WEIGHT: 138.01 LBS | HEIGHT: 66 IN

## 2019-11-22 DIAGNOSIS — M17.0 PRIMARY OSTEOARTHRITIS OF BOTH KNEES: Primary | ICD-10-CM

## 2019-11-22 DIAGNOSIS — M25.461 EFFUSION OF RIGHT KNEE: ICD-10-CM

## 2019-11-22 PROCEDURE — 99213 OFFICE O/P EST LOW 20 MIN: CPT | Performed by: ORTHOPAEDIC SURGERY

## 2019-11-22 PROCEDURE — 20610 DRAIN/INJ JOINT/BURSA W/O US: CPT | Performed by: ORTHOPAEDIC SURGERY

## 2019-11-22 RX ORDER — METHYLPREDNISOLONE ACETATE 80 MG/ML
80 INJECTION, SUSPENSION INTRA-ARTICULAR; INTRALESIONAL; INTRAMUSCULAR; SOFT TISSUE ONCE
Status: COMPLETED | OUTPATIENT
Start: 2019-11-22 | End: 2019-11-22

## 2019-11-22 RX ORDER — BUPIVACAINE HYDROCHLORIDE 2.5 MG/ML
2 INJECTION, SOLUTION INFILTRATION; PERINEURAL ONCE
Status: COMPLETED | OUTPATIENT
Start: 2019-11-22 | End: 2019-11-22

## 2019-11-22 RX ADMIN — BUPIVACAINE HYDROCHLORIDE 5 MG: 2.5 INJECTION, SOLUTION INFILTRATION; PERINEURAL at 14:55

## 2019-11-22 RX ADMIN — METHYLPREDNISOLONE ACETATE 80 MG: 80 INJECTION, SUSPENSION INTRA-ARTICULAR; INTRALESIONAL; INTRAMUSCULAR; SOFT TISSUE at 14:55

## 2019-11-22 ASSESSMENT — ENCOUNTER SYMPTOMS
APNEA: 0
ABDOMINAL PAIN: 0
COUGH: 0
CHEST TIGHTNESS: 0
VOMITING: 0

## 2019-11-26 ENCOUNTER — TELEPHONE (OUTPATIENT)
Dept: FAMILY MEDICINE CLINIC | Age: 64
End: 2019-11-26

## 2019-11-26 NOTE — TELEPHONE ENCOUNTER
Pt would like to know if you can get her in to see provider sooner then what is scheduled. Pt has large ulcer on right foot that will not heal unless she stops smoking. Pt was advised if she does not stop smoking she will loose foot. Pt would like to start medication ASAP to help her stop smoking. Please call pt & advise. Next ov: 12/30/19    Thank you!

## 2019-12-02 ENCOUNTER — OFFICE VISIT (OUTPATIENT)
Dept: VASCULAR SURGERY | Age: 64
End: 2019-12-02
Payer: COMMERCIAL

## 2019-12-02 VITALS
HEART RATE: 65 BPM | TEMPERATURE: 98.2 F | OXYGEN SATURATION: 99 % | BODY MASS INDEX: 23.21 KG/M2 | WEIGHT: 144.4 LBS | HEIGHT: 66 IN | DIASTOLIC BLOOD PRESSURE: 73 MMHG | SYSTOLIC BLOOD PRESSURE: 132 MMHG

## 2019-12-02 DIAGNOSIS — L97.522 CHRONIC ULCER OF LEFT FOOT WITH FAT LAYER EXPOSED (HCC): Primary | ICD-10-CM

## 2019-12-02 PROCEDURE — 99213 OFFICE O/P EST LOW 20 MIN: CPT | Performed by: SURGERY

## 2019-12-02 RX ORDER — HEATING PAD
EACH MISCELLANEOUS
Refills: 0 | Status: ON HOLD | COMMUNITY
Start: 2019-11-21 | End: 2020-01-02 | Stop reason: ALTCHOICE

## 2019-12-02 ASSESSMENT — ENCOUNTER SYMPTOMS
CHEST TIGHTNESS: 0
GASTROINTESTINAL NEGATIVE: 1
COLOR CHANGE: 1
SHORTNESS OF BREATH: 0

## 2019-12-03 ENCOUNTER — ANESTHESIA (OUTPATIENT)
Dept: OPERATING ROOM | Age: 64
End: 2019-12-03
Payer: COMMERCIAL

## 2019-12-03 ENCOUNTER — ANESTHESIA EVENT (OUTPATIENT)
Dept: OPERATING ROOM | Age: 64
End: 2019-12-03
Payer: COMMERCIAL

## 2019-12-03 ENCOUNTER — HOSPITAL ENCOUNTER (OUTPATIENT)
Age: 64
Discharge: HOME | End: 2019-12-03
Attending: SURGERY | Admitting: SURGERY
Payer: COMMERCIAL

## 2019-12-03 VITALS
HEART RATE: 63 BPM | SYSTOLIC BLOOD PRESSURE: 139 MMHG | DIASTOLIC BLOOD PRESSURE: 66 MMHG | WEIGHT: 140 LBS | BODY MASS INDEX: 22.5 KG/M2 | HEIGHT: 66 IN | OXYGEN SATURATION: 98 % | TEMPERATURE: 96.8 F | RESPIRATION RATE: 18 BRPM

## 2019-12-03 VITALS — DIASTOLIC BLOOD PRESSURE: 63 MMHG | OXYGEN SATURATION: 100 % | SYSTOLIC BLOOD PRESSURE: 116 MMHG

## 2019-12-03 DIAGNOSIS — M79.609 POSTOPERATIVE PAIN OF EXTREMITY: Primary | ICD-10-CM

## 2019-12-03 DIAGNOSIS — M79.672 PAIN IN LEFT FOOT: ICD-10-CM

## 2019-12-03 DIAGNOSIS — G89.18 POSTOPERATIVE PAIN OF EXTREMITY: Primary | ICD-10-CM

## 2019-12-03 DIAGNOSIS — L97.522 CHRONIC ULCER OF LEFT FOOT WITH FAT LAYER EXPOSED (HCC): ICD-10-CM

## 2019-12-03 LAB — GLUCOSE BLD-MCNC: 82 MG/DL (ref 65–105)

## 2019-12-03 PROCEDURE — 6360000002 HC RX W HCPCS

## 2019-12-03 PROCEDURE — 2709999900 HC NON-CHARGEABLE SUPPLY: Performed by: SURGERY

## 2019-12-03 PROCEDURE — 3600000012 HC SURGERY LEVEL 2 ADDTL 15MIN: Performed by: SURGERY

## 2019-12-03 PROCEDURE — 15002 WOUND PREP TRK/ARM/LEG: CPT | Performed by: SURGERY

## 2019-12-03 PROCEDURE — 3700000000 HC ANESTHESIA ATTENDED CARE: Performed by: SURGERY

## 2019-12-03 PROCEDURE — 2500000003 HC RX 250 WO HCPCS: Performed by: SURGERY

## 2019-12-03 PROCEDURE — 6360000002 HC RX W HCPCS: Performed by: ANESTHESIOLOGY

## 2019-12-03 PROCEDURE — 15100 SPLT AGRFT T/A/L 1ST 100SQCM: CPT | Performed by: SURGERY

## 2019-12-03 PROCEDURE — 3600000002 HC SURGERY LEVEL 2 BASE: Performed by: SURGERY

## 2019-12-03 PROCEDURE — 2500000003 HC RX 250 WO HCPCS

## 2019-12-03 PROCEDURE — 94761 N-INVAS EAR/PLS OXIMETRY MLT: CPT

## 2019-12-03 PROCEDURE — 2580000003 HC RX 258: Performed by: SURGERY

## 2019-12-03 PROCEDURE — 3700000001 HC ADD 15 MINUTES (ANESTHESIA): Performed by: SURGERY

## 2019-12-03 PROCEDURE — 6360000002 HC RX W HCPCS: Performed by: NURSE ANESTHETIST, CERTIFIED REGISTERED

## 2019-12-03 PROCEDURE — 15040 HARVEST CULTURED SKIN GRAFT: CPT | Performed by: SURGERY

## 2019-12-03 PROCEDURE — 82947 ASSAY GLUCOSE BLOOD QUANT: CPT

## 2019-12-03 RX ORDER — FENTANYL CITRATE 50 UG/ML
25 INJECTION, SOLUTION INTRAMUSCULAR; INTRAVENOUS EVERY 5 MIN PRN
Status: DISCONTINUED | OUTPATIENT
Start: 2019-12-03 | End: 2019-12-03 | Stop reason: HOSPADM

## 2019-12-03 RX ORDER — FENTANYL CITRATE 50 UG/ML
50 INJECTION, SOLUTION INTRAMUSCULAR; INTRAVENOUS EVERY 5 MIN PRN
Status: DISCONTINUED | OUTPATIENT
Start: 2019-12-03 | End: 2019-12-03 | Stop reason: HOSPADM

## 2019-12-03 RX ORDER — SODIUM CHLORIDE 9 MG/ML
INJECTION, SOLUTION INTRAVENOUS CONTINUOUS
Status: DISCONTINUED | OUTPATIENT
Start: 2019-12-03 | End: 2019-12-03 | Stop reason: HOSPADM

## 2019-12-03 RX ORDER — OXYCODONE HYDROCHLORIDE AND ACETAMINOPHEN 5; 325 MG/1; MG/1
1 TABLET ORAL EVERY 6 HOURS PRN
Qty: 28 TABLET | Refills: 0 | Status: SHIPPED | OUTPATIENT
Start: 2019-12-03 | End: 2019-12-30

## 2019-12-03 RX ORDER — FENTANYL CITRATE 50 UG/ML
INJECTION, SOLUTION INTRAMUSCULAR; INTRAVENOUS PRN
Status: DISCONTINUED | OUTPATIENT
Start: 2019-12-03 | End: 2019-12-03 | Stop reason: SDUPTHER

## 2019-12-03 RX ORDER — MAGNESIUM HYDROXIDE 1200 MG/15ML
LIQUID ORAL CONTINUOUS PRN
Status: COMPLETED | OUTPATIENT
Start: 2019-12-03 | End: 2019-12-03

## 2019-12-03 RX ORDER — PHENYLEPHRINE HYDROCHLORIDE 10 MG/ML
INJECTION INTRAVENOUS PRN
Status: DISCONTINUED | OUTPATIENT
Start: 2019-12-03 | End: 2019-12-03 | Stop reason: SDUPTHER

## 2019-12-03 RX ORDER — LIDOCAINE HYDROCHLORIDE 10 MG/ML
INJECTION, SOLUTION EPIDURAL; INFILTRATION; INTRACAUDAL; PERINEURAL PRN
Status: DISCONTINUED | OUTPATIENT
Start: 2019-12-03 | End: 2019-12-03 | Stop reason: ALTCHOICE

## 2019-12-03 RX ORDER — PROPOFOL 10 MG/ML
INJECTION, EMULSION INTRAVENOUS CONTINUOUS PRN
Status: DISCONTINUED | OUTPATIENT
Start: 2019-12-03 | End: 2019-12-03 | Stop reason: SDUPTHER

## 2019-12-03 RX ORDER — FENTANYL CITRATE 50 UG/ML
INJECTION, SOLUTION INTRAMUSCULAR; INTRAVENOUS
Status: COMPLETED
Start: 2019-12-03 | End: 2019-12-03

## 2019-12-03 RX ORDER — CHLORHEXIDINE GLUCONATE 0.12 MG/ML
RINSE ORAL
Status: DISCONTINUED
Start: 2019-12-03 | End: 2019-12-03 | Stop reason: HOSPADM

## 2019-12-03 RX ORDER — PROPOFOL 10 MG/ML
INJECTION, EMULSION INTRAVENOUS PRN
Status: DISCONTINUED | OUTPATIENT
Start: 2019-12-03 | End: 2019-12-03 | Stop reason: SDUPTHER

## 2019-12-03 RX ORDER — CEFAZOLIN SODIUM 2 G/50ML
SOLUTION INTRAVENOUS PRN
Status: DISCONTINUED | OUTPATIENT
Start: 2019-12-03 | End: 2019-12-03 | Stop reason: SDUPTHER

## 2019-12-03 RX ADMIN — PROPOFOL 50 MG: 10 INJECTION, EMULSION INTRAVENOUS at 13:13

## 2019-12-03 RX ADMIN — FENTANYL CITRATE 25 MCG: 50 INJECTION, SOLUTION INTRAMUSCULAR; INTRAVENOUS at 13:40

## 2019-12-03 RX ADMIN — PROPOFOL 50 MG: 10 INJECTION, EMULSION INTRAVENOUS at 13:22

## 2019-12-03 RX ADMIN — PHENYLEPHRINE HYDROCHLORIDE 100 MCG: 10 INJECTION INTRAVENOUS at 14:00

## 2019-12-03 RX ADMIN — PHENYLEPHRINE HYDROCHLORIDE 100 MCG: 10 INJECTION INTRAVENOUS at 14:05

## 2019-12-03 RX ADMIN — FENTANYL CITRATE 25 MCG: 50 INJECTION, SOLUTION INTRAMUSCULAR; INTRAVENOUS at 13:49

## 2019-12-03 RX ADMIN — FENTANYL CITRATE 25 MCG: 50 INJECTION, SOLUTION INTRAMUSCULAR; INTRAVENOUS at 15:06

## 2019-12-03 RX ADMIN — FENTANYL CITRATE 50 MCG: 50 INJECTION, SOLUTION INTRAMUSCULAR; INTRAVENOUS at 14:43

## 2019-12-03 RX ADMIN — FENTANYL CITRATE 25 MCG: 50 INJECTION, SOLUTION INTRAMUSCULAR; INTRAVENOUS at 13:35

## 2019-12-03 RX ADMIN — SODIUM CHLORIDE: 9 INJECTION, SOLUTION INTRAVENOUS at 12:49

## 2019-12-03 RX ADMIN — CEFAZOLIN SODIUM 2 G: 2 SOLUTION INTRAVENOUS at 13:19

## 2019-12-03 RX ADMIN — PROPOFOL 200 MCG/KG/MIN: 10 INJECTION, EMULSION INTRAVENOUS at 13:13

## 2019-12-03 RX ADMIN — FENTANYL CITRATE 25 MCG: 50 INJECTION, SOLUTION INTRAMUSCULAR; INTRAVENOUS at 13:55

## 2019-12-03 ASSESSMENT — PAIN DESCRIPTION - PAIN TYPE
TYPE: ACUTE PAIN
TYPE: SURGICAL PAIN

## 2019-12-03 ASSESSMENT — PULMONARY FUNCTION TESTS
PIF_VALUE: 0

## 2019-12-03 ASSESSMENT — PAIN DESCRIPTION - LOCATION: LOCATION: FOOT

## 2019-12-03 ASSESSMENT — PAIN DESCRIPTION - DESCRIPTORS
DESCRIPTORS: THROBBING
DESCRIPTORS: THROBBING

## 2019-12-03 ASSESSMENT — PAIN SCALES - GENERAL
PAINLEVEL_OUTOF10: 9
PAINLEVEL_OUTOF10: 7

## 2019-12-03 ASSESSMENT — LIFESTYLE VARIABLES: SMOKING_STATUS: 1

## 2019-12-03 ASSESSMENT — PAIN DESCRIPTION - ORIENTATION: ORIENTATION: LEFT

## 2019-12-04 RX ORDER — DOXYCYCLINE HYCLATE 100 MG
TABLET ORAL
Qty: 56 TABLET | Refills: 0 | Status: SHIPPED | OUTPATIENT
Start: 2019-12-04 | End: 2019-12-30 | Stop reason: ALTCHOICE

## 2019-12-09 ENCOUNTER — OFFICE VISIT (OUTPATIENT)
Dept: VASCULAR SURGERY | Age: 64
End: 2019-12-09

## 2019-12-09 VITALS
RESPIRATION RATE: 16 BRPM | WEIGHT: 146 LBS | HEART RATE: 71 BPM | DIASTOLIC BLOOD PRESSURE: 77 MMHG | TEMPERATURE: 98 F | BODY MASS INDEX: 23.46 KG/M2 | SYSTOLIC BLOOD PRESSURE: 135 MMHG | OXYGEN SATURATION: 97 % | HEIGHT: 66 IN

## 2019-12-09 DIAGNOSIS — L97.522 CHRONIC ULCER OF LEFT FOOT WITH FAT LAYER EXPOSED (HCC): Primary | ICD-10-CM

## 2019-12-09 PROCEDURE — 99024 POSTOP FOLLOW-UP VISIT: CPT | Performed by: SURGERY

## 2019-12-13 ASSESSMENT — ENCOUNTER SYMPTOMS
CHEST TIGHTNESS: 0
COLOR CHANGE: 0
SHORTNESS OF BREATH: 0

## 2019-12-23 ENCOUNTER — OFFICE VISIT (OUTPATIENT)
Dept: VASCULAR SURGERY | Age: 64
End: 2019-12-23

## 2019-12-23 VITALS
OXYGEN SATURATION: 98 % | TEMPERATURE: 98.2 F | WEIGHT: 143 LBS | HEART RATE: 66 BPM | DIASTOLIC BLOOD PRESSURE: 78 MMHG | BODY MASS INDEX: 22.98 KG/M2 | HEIGHT: 66 IN | SYSTOLIC BLOOD PRESSURE: 135 MMHG

## 2019-12-23 DIAGNOSIS — L97.522 CHRONIC ULCER OF LEFT FOOT WITH FAT LAYER EXPOSED (HCC): Primary | ICD-10-CM

## 2019-12-23 PROCEDURE — 99024 POSTOP FOLLOW-UP VISIT: CPT | Performed by: SURGERY

## 2019-12-26 ASSESSMENT — ENCOUNTER SYMPTOMS
SHORTNESS OF BREATH: 0
ALLERGIC/IMMUNOLOGIC NEGATIVE: 1
CHEST TIGHTNESS: 0
ABDOMINAL PAIN: 0
COLOR CHANGE: 1

## 2019-12-30 ENCOUNTER — OFFICE VISIT (OUTPATIENT)
Dept: FAMILY MEDICINE CLINIC | Age: 64
End: 2019-12-30
Payer: COMMERCIAL

## 2019-12-30 ENCOUNTER — TELEPHONE (OUTPATIENT)
Dept: VASCULAR SURGERY | Age: 64
End: 2019-12-30

## 2019-12-30 VITALS
TEMPERATURE: 99.2 F | WEIGHT: 147 LBS | HEART RATE: 93 BPM | SYSTOLIC BLOOD PRESSURE: 130 MMHG | OXYGEN SATURATION: 98 % | DIASTOLIC BLOOD PRESSURE: 70 MMHG | BODY MASS INDEX: 23.73 KG/M2

## 2019-12-30 DIAGNOSIS — Z23 NEED FOR INFLUENZA VACCINATION: Primary | ICD-10-CM

## 2019-12-30 DIAGNOSIS — R00.2 INTERMITTENT PALPITATIONS: ICD-10-CM

## 2019-12-30 DIAGNOSIS — Z71.6 ENCOUNTER FOR SMOKING CESSATION COUNSELING: ICD-10-CM

## 2019-12-30 PROBLEM — I10 HYPERTENSION: Status: ACTIVE | Noted: 2019-12-30

## 2019-12-30 PROCEDURE — 90471 IMMUNIZATION ADMIN: CPT | Performed by: NURSE PRACTITIONER

## 2019-12-30 PROCEDURE — 99213 OFFICE O/P EST LOW 20 MIN: CPT | Performed by: NURSE PRACTITIONER

## 2019-12-30 PROCEDURE — 90688 IIV4 VACCINE SPLT 0.5 ML IM: CPT | Performed by: NURSE PRACTITIONER

## 2019-12-30 RX ORDER — METOPROLOL TARTRATE 37.5 MG/1
37.5 TABLET, FILM COATED ORAL 2 TIMES DAILY
Qty: 60 TABLET | Refills: 5 | Status: SHIPPED | OUTPATIENT
Start: 2019-12-30 | End: 2020-06-22 | Stop reason: SDUPTHER

## 2019-12-30 RX ORDER — VARENICLINE TARTRATE 1 MG/1
1 TABLET, FILM COATED ORAL 2 TIMES DAILY
Qty: 60 TABLET | Refills: 2 | Status: SHIPPED | OUTPATIENT
Start: 2019-12-30 | End: 2020-06-16 | Stop reason: ALTCHOICE

## 2019-12-30 RX ORDER — VARENICLINE TARTRATE 25 MG
KIT ORAL
Qty: 1 BOX | Refills: 0 | Status: SHIPPED | OUTPATIENT
Start: 2019-12-30 | End: 2020-06-16 | Stop reason: ALTCHOICE

## 2019-12-30 ASSESSMENT — ENCOUNTER SYMPTOMS
SHORTNESS OF BREATH: 0
COUGH: 0

## 2020-01-01 ENCOUNTER — HOSPITAL ENCOUNTER (OUTPATIENT)
Age: 65
Discharge: HOME | End: 2020-12-15
Payer: MEDICARE

## 2020-01-01 ENCOUNTER — HOSPITAL ENCOUNTER (OUTPATIENT)
Dept: MRI IMAGING | Age: 65
Discharge: HOME | End: 2020-12-28
Payer: MEDICARE

## 2020-01-01 ENCOUNTER — TELEPHONE (OUTPATIENT)
Dept: WOUND CARE | Age: 65
End: 2020-01-01

## 2020-01-01 ENCOUNTER — HOSPITAL ENCOUNTER (OUTPATIENT)
Dept: WOUND CARE | Age: 65
Discharge: HOME | End: 2020-12-29
Payer: MEDICARE

## 2020-01-01 ENCOUNTER — HOSPITAL ENCOUNTER (OUTPATIENT)
Age: 65
Discharge: HOME | End: 2020-12-29
Payer: MEDICARE

## 2020-01-01 ENCOUNTER — HOSPITAL ENCOUNTER (OUTPATIENT)
Dept: WOUND CARE | Age: 65
Discharge: HOME | End: 2020-12-15
Payer: MEDICARE

## 2020-01-01 VITALS
SYSTOLIC BLOOD PRESSURE: 135 MMHG | RESPIRATION RATE: 18 BRPM | BODY MASS INDEX: 27.32 KG/M2 | TEMPERATURE: 98.4 F | DIASTOLIC BLOOD PRESSURE: 75 MMHG | HEART RATE: 67 BPM | WEIGHT: 170 LBS | HEIGHT: 66 IN

## 2020-01-01 VITALS
TEMPERATURE: 97.4 F | WEIGHT: 170 LBS | SYSTOLIC BLOOD PRESSURE: 141 MMHG | BODY MASS INDEX: 27.32 KG/M2 | RESPIRATION RATE: 18 BRPM | HEIGHT: 66 IN | HEART RATE: 82 BPM | DIASTOLIC BLOOD PRESSURE: 91 MMHG

## 2020-01-01 LAB
ANTI-CENTROMERE: 163 U/ML
ANTICARDIOLIPIN IGA ANTIBODY: 1.3 APU
ANTICARDIOLIPIN IGG ANTIBODY: 2.3 GPU
CARDIOLIPIN AB IGM: 13.3 MPU

## 2020-01-01 PROCEDURE — 6360000004 HC RX CONTRAST MEDICATION: Performed by: PODIATRIST

## 2020-01-01 PROCEDURE — 86235 NUCLEAR ANTIGEN ANTIBODY: CPT

## 2020-01-01 PROCEDURE — 99213 OFFICE O/P EST LOW 20 MIN: CPT

## 2020-01-01 PROCEDURE — 86147 CARDIOLIPIN ANTIBODY EA IG: CPT

## 2020-01-01 PROCEDURE — A9576 INJ PROHANCE MULTIPACK: HCPCS | Performed by: PODIATRIST

## 2020-01-01 PROCEDURE — 99213 OFFICE O/P EST LOW 20 MIN: CPT | Performed by: PODIATRIST

## 2020-01-01 PROCEDURE — 73720 MRI LWR EXTREMITY W/O&W/DYE: CPT

## 2020-01-01 PROCEDURE — 36415 COLL VENOUS BLD VENIPUNCTURE: CPT

## 2020-01-01 RX ORDER — LIDOCAINE HYDROCHLORIDE 40 MG/ML
SOLUTION TOPICAL ONCE
Status: CANCELLED | OUTPATIENT
Start: 2020-01-01 | End: 2020-01-01

## 2020-01-01 RX ORDER — LIDOCAINE 50 MG/G
OINTMENT TOPICAL ONCE
Status: CANCELLED | OUTPATIENT
Start: 2020-01-01 | End: 2020-01-01

## 2020-01-01 RX ORDER — CIPROFLOXACIN 500 MG/1
500 TABLET, FILM COATED ORAL 2 TIMES DAILY
Qty: 28 TABLET | Refills: 0 | Status: SHIPPED | OUTPATIENT
Start: 2020-01-01 | End: 2021-01-01 | Stop reason: ALTCHOICE

## 2020-01-01 RX ORDER — BACITRACIN, NEOMYCIN, POLYMYXIN B 400; 3.5; 5 [USP'U]/G; MG/G; [USP'U]/G
OINTMENT TOPICAL ONCE
Status: CANCELLED | OUTPATIENT
Start: 2020-01-01 | End: 2020-01-01

## 2020-01-01 RX ORDER — LIDOCAINE HYDROCHLORIDE 20 MG/ML
JELLY TOPICAL ONCE
Status: CANCELLED | OUTPATIENT
Start: 2020-01-01 | End: 2020-01-01

## 2020-01-01 RX ORDER — CLOBETASOL PROPIONATE 0.5 MG/G
OINTMENT TOPICAL ONCE
Status: CANCELLED | OUTPATIENT
Start: 2020-01-01 | End: 2020-01-01

## 2020-01-01 RX ORDER — LIDOCAINE HYDROCHLORIDE 40 MG/ML
SOLUTION TOPICAL ONCE
Status: COMPLETED | OUTPATIENT
Start: 2020-01-01 | End: 2020-01-01

## 2020-01-01 RX ORDER — GENTAMICIN SULFATE 1 MG/G
OINTMENT TOPICAL ONCE
Status: CANCELLED | OUTPATIENT
Start: 2020-01-01 | End: 2020-01-01

## 2020-01-01 RX ORDER — M-VIT,TX,IRON,MINS/CALC/FOLIC 27MG-0.4MG
1 TABLET ORAL DAILY
COMMUNITY

## 2020-01-01 RX ORDER — BETAMETHASONE DIPROPIONATE 0.05 %
OINTMENT (GRAM) TOPICAL ONCE
Status: CANCELLED | OUTPATIENT
Start: 2020-01-01 | End: 2020-01-01

## 2020-01-01 RX ORDER — BACITRACIN ZINC AND POLYMYXIN B SULFATE 500; 1000 [USP'U]/G; [USP'U]/G
OINTMENT TOPICAL ONCE
Status: CANCELLED | OUTPATIENT
Start: 2020-01-01 | End: 2020-01-01

## 2020-01-01 RX ORDER — LIDOCAINE 40 MG/G
CREAM TOPICAL ONCE
Status: CANCELLED | OUTPATIENT
Start: 2020-01-01 | End: 2020-01-01

## 2020-01-01 RX ORDER — METHYLPREDNISOLONE 4 MG/1
TABLET ORAL
Qty: 1 KIT | Refills: 0 | Status: SHIPPED | OUTPATIENT
Start: 2020-01-01 | End: 2020-01-01

## 2020-01-01 RX ORDER — GINSENG 100 MG
CAPSULE ORAL ONCE
Status: CANCELLED | OUTPATIENT
Start: 2020-01-01 | End: 2020-01-01

## 2020-01-01 RX ORDER — METOPROLOL TARTRATE 37.5 MG/1
TABLET, FILM COATED ORAL
Qty: 60 TABLET | Refills: 5 | Status: SHIPPED | OUTPATIENT
Start: 2020-01-01 | End: 2021-01-01

## 2020-01-01 RX ADMIN — LIDOCAINE HYDROCHLORIDE: 40 SOLUTION TOPICAL at 10:01

## 2020-01-01 RX ADMIN — LIDOCAINE HYDROCHLORIDE 5 ML: 40 SOLUTION TOPICAL at 10:11

## 2020-01-01 RX ADMIN — GADOTERIDOL 15 ML: 279.3 INJECTION, SOLUTION INTRAVENOUS at 09:23

## 2020-01-01 ASSESSMENT — PAIN DESCRIPTION - ORIENTATION
ORIENTATION: LEFT
ORIENTATION: LEFT

## 2020-01-01 ASSESSMENT — PAIN DESCRIPTION - PAIN TYPE
TYPE: CHRONIC PAIN
TYPE: CHRONIC PAIN

## 2020-01-01 ASSESSMENT — PAIN DESCRIPTION - LOCATION
LOCATION: FOOT
LOCATION: FOOT

## 2020-01-01 ASSESSMENT — PAIN SCALES - GENERAL
PAINLEVEL_OUTOF10: 6
PAINLEVEL_OUTOF10: 6

## 2020-01-01 ASSESSMENT — ENCOUNTER SYMPTOMS
NAUSEA: 0
DIARRHEA: 0
VOMITING: 0
ROS SKIN COMMENTS: DORSAL LEFT FOOT

## 2020-01-01 ASSESSMENT — PAIN - FUNCTIONAL ASSESSMENT: PAIN_FUNCTIONAL_ASSESSMENT: ACTIVITIES ARE NOT PREVENTED

## 2020-01-01 ASSESSMENT — PAIN DESCRIPTION - DESCRIPTORS
DESCRIPTORS: BURNING
DESCRIPTORS: BURNING

## 2020-01-01 ASSESSMENT — PAIN DESCRIPTION - ONSET
ONSET: ON-GOING
ONSET: ON-GOING

## 2020-01-01 ASSESSMENT — PAIN DESCRIPTION - PROGRESSION
CLINICAL_PROGRESSION: NOT CHANGED
CLINICAL_PROGRESSION: NOT CHANGED

## 2020-01-01 ASSESSMENT — PAIN DESCRIPTION - FREQUENCY: FREQUENCY: CONTINUOUS

## 2020-01-02 ENCOUNTER — HOSPITAL ENCOUNTER (OUTPATIENT)
Age: 65
Setting detail: OUTPATIENT SURGERY
Discharge: HOME | End: 2020-01-02
Attending: SURGERY | Admitting: SURGERY
Payer: COMMERCIAL

## 2020-01-02 ENCOUNTER — ANESTHESIA (OUTPATIENT)
Dept: OPERATING ROOM | Age: 65
End: 2020-01-02
Payer: COMMERCIAL

## 2020-01-02 ENCOUNTER — ANESTHESIA EVENT (OUTPATIENT)
Dept: OPERATING ROOM | Age: 65
End: 2020-01-02
Payer: COMMERCIAL

## 2020-01-02 VITALS
DIASTOLIC BLOOD PRESSURE: 70 MMHG | SYSTOLIC BLOOD PRESSURE: 144 MMHG | HEART RATE: 70 BPM | OXYGEN SATURATION: 94 % | RESPIRATION RATE: 19 BRPM | TEMPERATURE: 97.8 F

## 2020-01-02 VITALS — DIASTOLIC BLOOD PRESSURE: 78 MMHG | SYSTOLIC BLOOD PRESSURE: 150 MMHG | TEMPERATURE: 93.9 F | OXYGEN SATURATION: 96 %

## 2020-01-02 LAB
EKG ATRIAL RATE: 68 BPM
EKG P AXIS: 55 DEGREES
EKG P-R INTERVAL: 182 MS
EKG Q-T INTERVAL: 406 MS
EKG QRS DURATION: 104 MS
EKG QTC CALCULATION (BAZETT): 431 MS
EKG R AXIS: -50 DEGREES
EKG T AXIS: 24 DEGREES
EKG VENTRICULAR RATE: 68 BPM
GFR NON-AFRICAN AMERICAN: >60 ML/MIN
GFR SERPL CREATININE-BSD FRML MDRD: >60 ML/MIN
GFR SERPL CREATININE-BSD FRML MDRD: ABNORMAL ML/MIN/{1.73_M2}
GLUCOSE BLD-MCNC: 92 MG/DL (ref 74–100)
POC CHLORIDE: 106 MMOL/L (ref 98–107)
POC CREATININE: 0.45 MG/DL (ref 0.51–1.19)
POC HEMATOCRIT: 35 % (ref 36–46)
POC HEMOGLOBIN: 11.8 G/DL (ref 12–16)
POC POTASSIUM: 4.1 MMOL/L (ref 3.5–4.5)
POC SODIUM: 141 MMOL/L (ref 138–146)

## 2020-01-02 PROCEDURE — 6370000000 HC RX 637 (ALT 250 FOR IP)

## 2020-01-02 PROCEDURE — 7100000000 HC PACU RECOVERY - FIRST 15 MIN

## 2020-01-02 PROCEDURE — 7100000010 HC PHASE II RECOVERY - FIRST 15 MIN: Performed by: SURGERY

## 2020-01-02 PROCEDURE — 11043 DBRDMT MUSC&/FSCA 1ST 20/<: CPT | Performed by: SURGERY

## 2020-01-02 PROCEDURE — 7100000000 HC PACU RECOVERY - FIRST 15 MIN: Performed by: SURGERY

## 2020-01-02 PROCEDURE — 3700000000 HC ANESTHESIA ATTENDED CARE: Performed by: SURGERY

## 2020-01-02 PROCEDURE — 6370000000 HC RX 637 (ALT 250 FOR IP): Performed by: ANESTHESIOLOGY

## 2020-01-02 PROCEDURE — 97607 NEG PRS WND THR NDME<=50SQCM: CPT | Performed by: SURGERY

## 2020-01-02 PROCEDURE — 6360000002 HC RX W HCPCS

## 2020-01-02 PROCEDURE — 84132 ASSAY OF SERUM POTASSIUM: CPT

## 2020-01-02 PROCEDURE — 3600000002 HC SURGERY LEVEL 2 BASE: Performed by: SURGERY

## 2020-01-02 PROCEDURE — 6360000002 HC RX W HCPCS: Performed by: NURSE ANESTHETIST, CERTIFIED REGISTERED

## 2020-01-02 PROCEDURE — 82435 ASSAY OF BLOOD CHLORIDE: CPT

## 2020-01-02 PROCEDURE — 7100000001 HC PACU RECOVERY - ADDTL 15 MIN: Performed by: SURGERY

## 2020-01-02 PROCEDURE — 84295 ASSAY OF SERUM SODIUM: CPT

## 2020-01-02 PROCEDURE — 2709999900 HC NON-CHARGEABLE SUPPLY: Performed by: SURGERY

## 2020-01-02 PROCEDURE — 2580000003 HC RX 258: Performed by: SURGERY

## 2020-01-02 PROCEDURE — 7100000011 HC PHASE II RECOVERY - ADDTL 15 MIN: Performed by: SURGERY

## 2020-01-02 PROCEDURE — 85014 HEMATOCRIT: CPT

## 2020-01-02 PROCEDURE — 93005 ELECTROCARDIOGRAM TRACING: CPT

## 2020-01-02 PROCEDURE — 6360000002 HC RX W HCPCS: Performed by: ANESTHESIOLOGY

## 2020-01-02 PROCEDURE — 82947 ASSAY GLUCOSE BLOOD QUANT: CPT

## 2020-01-02 PROCEDURE — 2500000003 HC RX 250 WO HCPCS: Performed by: NURSE ANESTHETIST, CERTIFIED REGISTERED

## 2020-01-02 PROCEDURE — 3700000001 HC ADD 15 MINUTES (ANESTHESIA): Performed by: SURGERY

## 2020-01-02 PROCEDURE — 3600000012 HC SURGERY LEVEL 2 ADDTL 15MIN: Performed by: SURGERY

## 2020-01-02 PROCEDURE — 7100000001 HC PACU RECOVERY - ADDTL 15 MIN

## 2020-01-02 PROCEDURE — 2500000003 HC RX 250 WO HCPCS: Performed by: SURGERY

## 2020-01-02 PROCEDURE — 82565 ASSAY OF CREATININE: CPT

## 2020-01-02 PROCEDURE — 11046 DBRDMT MUSC&/FSCA EA ADDL: CPT | Performed by: SURGERY

## 2020-01-02 RX ORDER — FENTANYL CITRATE 50 UG/ML
INJECTION, SOLUTION INTRAMUSCULAR; INTRAVENOUS PRN
Status: DISCONTINUED | OUTPATIENT
Start: 2020-01-02 | End: 2020-01-02 | Stop reason: SDUPTHER

## 2020-01-02 RX ORDER — SCOLOPAMINE TRANSDERMAL SYSTEM 1 MG/1
1 PATCH, EXTENDED RELEASE TRANSDERMAL
Status: DISCONTINUED | OUTPATIENT
Start: 2020-01-02 | End: 2020-01-02 | Stop reason: HOSPADM

## 2020-01-02 RX ORDER — CEFAZOLIN SODIUM 2 G/50ML
SOLUTION INTRAVENOUS PRN
Status: DISCONTINUED | OUTPATIENT
Start: 2020-01-02 | End: 2020-01-02 | Stop reason: SDUPTHER

## 2020-01-02 RX ORDER — FENTANYL CITRATE 50 UG/ML
25 INJECTION, SOLUTION INTRAMUSCULAR; INTRAVENOUS EVERY 5 MIN PRN
Status: DISCONTINUED | OUTPATIENT
Start: 2020-01-02 | End: 2020-01-02 | Stop reason: HOSPADM

## 2020-01-02 RX ORDER — MAGNESIUM HYDROXIDE 1200 MG/15ML
LIQUID ORAL CONTINUOUS PRN
Status: COMPLETED | OUTPATIENT
Start: 2020-01-02 | End: 2020-01-02

## 2020-01-02 RX ORDER — PROPOFOL 10 MG/ML
INJECTION, EMULSION INTRAVENOUS PRN
Status: DISCONTINUED | OUTPATIENT
Start: 2020-01-02 | End: 2020-01-02 | Stop reason: SDUPTHER

## 2020-01-02 RX ORDER — LIDOCAINE HYDROCHLORIDE 10 MG/ML
INJECTION, SOLUTION EPIDURAL; INFILTRATION; INTRACAUDAL; PERINEURAL PRN
Status: DISCONTINUED | OUTPATIENT
Start: 2020-01-02 | End: 2020-01-02 | Stop reason: SDUPTHER

## 2020-01-02 RX ORDER — ONDANSETRON 2 MG/ML
INJECTION INTRAMUSCULAR; INTRAVENOUS PRN
Status: DISCONTINUED | OUTPATIENT
Start: 2020-01-02 | End: 2020-01-02 | Stop reason: SDUPTHER

## 2020-01-02 RX ORDER — LIDOCAINE HYDROCHLORIDE 10 MG/ML
INJECTION, SOLUTION EPIDURAL; INFILTRATION; INTRACAUDAL; PERINEURAL PRN
Status: DISCONTINUED | OUTPATIENT
Start: 2020-01-02 | End: 2020-01-02 | Stop reason: ALTCHOICE

## 2020-01-02 RX ORDER — SODIUM CHLORIDE 0.9 % (FLUSH) 0.9 %
10 SYRINGE (ML) INJECTION EVERY 12 HOURS SCHEDULED
Status: DISCONTINUED | OUTPATIENT
Start: 2020-01-02 | End: 2020-01-02 | Stop reason: HOSPADM

## 2020-01-02 RX ORDER — SODIUM CHLORIDE 0.9 % (FLUSH) 0.9 %
10 SYRINGE (ML) INJECTION PRN
Status: DISCONTINUED | OUTPATIENT
Start: 2020-01-02 | End: 2020-01-02 | Stop reason: HOSPADM

## 2020-01-02 RX ORDER — OXYCODONE HYDROCHLORIDE AND ACETAMINOPHEN 5; 325 MG/1; MG/1
1 TABLET ORAL ONCE
Status: COMPLETED | OUTPATIENT
Start: 2020-01-02 | End: 2020-01-02

## 2020-01-02 RX ORDER — FENTANYL CITRATE 50 UG/ML
50 INJECTION, SOLUTION INTRAMUSCULAR; INTRAVENOUS EVERY 5 MIN PRN
Status: DISCONTINUED | OUTPATIENT
Start: 2020-01-02 | End: 2020-01-02 | Stop reason: HOSPADM

## 2020-01-02 RX ORDER — SODIUM CHLORIDE 9 MG/ML
INJECTION, SOLUTION INTRAVENOUS CONTINUOUS
Status: DISCONTINUED | OUTPATIENT
Start: 2020-01-02 | End: 2020-01-02 | Stop reason: HOSPADM

## 2020-01-02 RX ADMIN — FENTANYL CITRATE 25 MCG: 50 INJECTION, SOLUTION INTRAMUSCULAR; INTRAVENOUS at 11:09

## 2020-01-02 RX ADMIN — PHENYLEPHRINE HYDROCHLORIDE 100 MCG: 10 INJECTION INTRAVENOUS at 10:58

## 2020-01-02 RX ADMIN — FENTANYL CITRATE 50 MCG: 50 INJECTION, SOLUTION INTRAMUSCULAR; INTRAVENOUS at 10:25

## 2020-01-02 RX ADMIN — LIDOCAINE HYDROCHLORIDE 50 MG: 10 INJECTION, SOLUTION EPIDURAL; INFILTRATION; INTRACAUDAL at 10:28

## 2020-01-02 RX ADMIN — OXYCODONE HYDROCHLORIDE AND ACETAMINOPHEN 1 TABLET: 5; 325 TABLET ORAL at 12:42

## 2020-01-02 RX ADMIN — FENTANYL CITRATE 25 MCG: 50 INJECTION, SOLUTION INTRAMUSCULAR; INTRAVENOUS at 11:14

## 2020-01-02 RX ADMIN — SODIUM CHLORIDE: 900 INJECTION, SOLUTION INTRAVENOUS at 10:49

## 2020-01-02 RX ADMIN — FENTANYL CITRATE 50 MCG: 50 INJECTION INTRAMUSCULAR; INTRAVENOUS at 11:59

## 2020-01-02 RX ADMIN — CEFAZOLIN SODIUM 2 G: 2 SOLUTION INTRAVENOUS at 10:41

## 2020-01-02 RX ADMIN — ONDANSETRON 4 MG: 2 INJECTION, SOLUTION INTRAMUSCULAR; INTRAVENOUS at 11:05

## 2020-01-02 RX ADMIN — PROPOFOL 150 MG: 10 INJECTION, EMULSION INTRAVENOUS at 10:28

## 2020-01-02 RX ADMIN — SODIUM CHLORIDE: 900 INJECTION, SOLUTION INTRAVENOUS at 08:53

## 2020-01-02 RX ADMIN — FENTANYL CITRATE 50 MCG: 50 INJECTION INTRAMUSCULAR; INTRAVENOUS at 11:30

## 2020-01-02 ASSESSMENT — PULMONARY FUNCTION TESTS
PIF_VALUE: 16
PIF_VALUE: 22
PIF_VALUE: 19
PIF_VALUE: 16
PIF_VALUE: 0
PIF_VALUE: 0
PIF_VALUE: 17
PIF_VALUE: 16
PIF_VALUE: 16
PIF_VALUE: 15
PIF_VALUE: 28
PIF_VALUE: 13
PIF_VALUE: 16
PIF_VALUE: 20
PIF_VALUE: 0
PIF_VALUE: 13
PIF_VALUE: 0
PIF_VALUE: 16
PIF_VALUE: 31
PIF_VALUE: 16
PIF_VALUE: 15
PIF_VALUE: 0
PIF_VALUE: 13
PIF_VALUE: 15
PIF_VALUE: 0
PIF_VALUE: 16
PIF_VALUE: 1
PIF_VALUE: 16
PIF_VALUE: 0
PIF_VALUE: 13
PIF_VALUE: 0
PIF_VALUE: 15
PIF_VALUE: 15
PIF_VALUE: 17
PIF_VALUE: 22
PIF_VALUE: 5
PIF_VALUE: 0
PIF_VALUE: 16
PIF_VALUE: 16
PIF_VALUE: 0
PIF_VALUE: 0
PIF_VALUE: 13
PIF_VALUE: 16
PIF_VALUE: 16
PIF_VALUE: 12
PIF_VALUE: 16
PIF_VALUE: 17
PIF_VALUE: 16
PIF_VALUE: 16

## 2020-01-02 ASSESSMENT — PAIN SCALES - GENERAL
PAINLEVEL_OUTOF10: 9
PAINLEVEL_OUTOF10: 8
PAINLEVEL_OUTOF10: 7
PAINLEVEL_OUTOF10: 9

## 2020-01-02 ASSESSMENT — PAIN DESCRIPTION - PROGRESSION
CLINICAL_PROGRESSION: NOT CHANGED

## 2020-01-02 ASSESSMENT — LIFESTYLE VARIABLES: SMOKING_STATUS: 1

## 2020-01-02 NOTE — ANESTHESIA PRE PROCEDURE
Department of Anesthesiology  Preprocedure Note       Name:  Derek Chandler   Age:  62 y.o.  :  1955                                          MRN:  [id number]         Date:  2020      Surgeon: Helene Juan): Cherrie Rojas MD    Procedure: FOOT  DEBRIDEMENT (Left )    Medications prior to admission:   Prior to Admission medications    Medication Sig Start Date End Date Taking?  Authorizing Provider   Metoprolol Tartrate 37.5 MG TABS Take 37.5 mg by mouth 2 times daily 19   Shyrl Killings, APRN - CNP   varenicline (CHANTIX STARTING MONTH ) 0.5 MG X 11 & 1 MG X 42 tablet Take by mouth. 19   Shyrl Killings, APRN - CNP   varenicline (CHANTIX CONTINUING MONTH SALVATORE) 1 MG tablet Take 1 tablet by mouth 2 times daily 19   Shyrl Killings, APRN - CNP   meclizine (ANTIVERT) 25 MG tablet Take 25 mg by mouth 3 times daily as needed    Historical Provider, MD   aspirin 81 MG tablet Take 81 mg by mouth daily    Historical Provider, MD   atorvastatin (LIPITOR) 40 MG tablet Take 1 tablet by mouth nightly  Patient taking differently: Take 40 mg by mouth every morning  19   Richard Freeman MD   folic acid (FOLVITE) 1 MG tablet take 1 tablet by mouth once daily  Patient taking differently: Take 1 mg by mouth daily take 1 tablet by mouth once daily 19   Shyrl Killings, APRN - CNP   NAPROXEN PO Take 250 mg by mouth daily as needed Indications: patient takes 1-2 times a day     Historical Provider, MD   OMEPRAZOLE PO Take 1 tablet by mouth daily     Historical Provider, MD   Cholecalciferol (VITAMIN D3) 5000 units TABS Take 5,000 Units by mouth daily     Historical Provider, MD   calcium carbonate (OSCAL) 500 MG TABS tablet Take 1,000 mg by mouth daily    Historical Provider, MD   alendronate (FOSAMAX) 70 MG tablet Take 70 mg by mouth every 7 days Indications: 18   Historical Provider, MD   traMADol Makoti Rideau ER) 200 MG extended release tablet Take 1 tablet by mouth daily 8/26/16   Bisi Rich MD   gabapentin (NEURONTIN) 300 MG capsule Take 450 mg by mouth 2 times daily. 2/27/15   Historical Provider, MD       Current medications:    No current facility-administered medications for this visit. No current outpatient medications on file. Facility-Administered Medications Ordered in Other Visits   Medication Dose Route Frequency Provider Last Rate Last Dose    0.9 % sodium chloride infusion   Intravenous Continuous Melissa Story MD 75 mL/hr at 01/02/20 0853      sodium chloride flush 0.9 % injection 10 mL  10 mL Intravenous 2 times per day Melissa Story MD        sodium chloride flush 0.9 % injection 10 mL  10 mL Intravenous PRN Melissa Story MD        scopolamine (TRANSDERM-SCOP) transdermal patch 1 patch  1 patch Transdermal Q72H Jeimy Brito MD           Allergies:  No Known Allergies    Problem List:    Patient Active Problem List   Diagnosis Code    Neoplasm of uncertain behavior of skin D48.5    Other plastic surgery for unacceptable cosmetic appearance Z41.1    Chronic bilateral low back pain without sciatica M54.5, G89.29    Mass of finger of right hand R22.31    Chronic ulcer of left foot with fat layer exposed ([facility] [address] L97.522    Pain in left foot M79.672    Hypertension I10       Past Medical History:        Diagnosis Date    Arthritis     KENNETH España CNP, seen 9/24/2019    CAD (coronary artery disease) 2019    blockage on cath 9/2019, to have stenting after surgery (arthroplasty) Dr. Siri Hope Chronic back pain     Irregular heart beat     LAFB (left anterior fascicular block)     Dr. Chely Albrecht, cardiologist, seen 9/2019 prior to surgery on 10/8/2019    Lumbar disc disease     Osteoporosis     PAC (premature atrial contraction) 06/2018    PACs and PVCs on holter monitor,     PONV (postoperative nausea and vomiting)     Wears dentures     upper partial       Past Surgical History:        Procedure Laterality Date    ABDOMINOPLASTY  1997    BLEPHAROPLASTY Bilateral 1997    BREAST ENHANCEMENT SURGERY Bilateral 1999    breast augmentation    CATARACT REMOVAL WITH IMPLANT Bilateral 2016    COLONOSCOPY  2015    No Polyps    FINGER SURGERY Right     thumb lesion excised    FIXATION KYPHOPLASTY  2013    Back    FOOT DEBRIDEMENT Left 10/11/2019    SURGICAL PREP WOUND BED LEFT FOOT WITH APPLICATION OF EPIFIX LEFT FOOT 3X4 performed by Inder James DPM at [facility] Left 01/02/2020    FOOT SURGERY Left 10/11/2019    Surgical Prep Wound bed left foot with application of Epifix to top of left foot    KNEE ARTHROSCOPY Left 2006   [address]    LUMBAR LAMINECTOMY  2011    LUMBAR LAMINECTOMY  2014    LUMBAR SPINE SURGERY  2005    diskectomy and spinal fusion    OTHER SURGICAL HISTORY Right 09/11/2018     EXCISION MASS THUMB (Right )    OTHER SURGICAL HISTORY  10/03/2019    Left leg angiogram    [place] OFFICE/OUTPT VISIT,PROCEDURE ONLY Right 9/11/2018    EXCISION MASS THUMB, 3080 TABLE performed by Christian Rutledge DO at [facility] Left 12/3/2019    LEFT FOOT DEBRIDEMENT WITH SKIN GRAFT SPLIT THICKNESS; SKIN GRAFT TAKEN FROM RIGHT ANTERIOR THIGH performed by Pau Lopes MD at [place] [address] History:    Social History     Tobacco Use    Smoking status: Current Every Day Smoker     Packs/day: 0.25     Years: 50.00     Pack years: 12.50     Types: Cigarettes     Start date: 1968    Smokeless tobacco: Never Used   Substance Use Topics    Alcohol use: Yes     Frequency: 2-4 times a month     Comment: 2-3 shot liquor for weekends                                Ready to quit: Not Answered  Counseling given: Not Answered      Vital Signs (Current): There were no vitals filed for this visit.                                            BP Readings from Last 3 Encounters:   12/30/19 130/70   12/23/19 135/78   12/09/19 135/77

## 2020-01-02 NOTE — H&P
Division of Vascular Surgery        H&P Update    Patient's Office Note from 12/23/19 was reviewed. There are no changes today. Plan to proceed with left foot debridement and placement of wound vac. Electronically signed by Kwabena Lopez MD on 1/2/20 at 10:15 AM      [address] [place] [address],[address]: [phone]  C: [phone]  Email: [email or web address]    LLE angiogram, diagnostic (10/3/19)  Left foot debridement and skin grafting (12/3/19)     Chief Complaint:       Non healing wound     History of Present Illness:      Kelley Harvey is a 59 y.o. woman who presents for follow up after undergoing split thickness skin grafting to a chronic non-healing left foot wound. There has been increased drainage from her wound along with pain and discomfort. Denies any fevers, chills or other systemic signs of infection. Her skin donor site has healed up and there is a small scab over part of it. The skin graft site on her foot has completely broken down, before there was much more take, but seems as none of the skin graft is holding on to the wound.      Medical History:      Past Medical History        Past Medical History:   Diagnosis Date    Arthritis       F.P.  Jessenia Ashley CNP, seen 9/24/2019    CAD (coronary artery disease) 2019     blockage on cath 9/2019, to have stenting after surgery (arthroplasty) Dr. Dee Salvador Chronic back pain      Irregular heart beat      LAFB (left anterior fascicular block)       Dr. Abdon Aggarwal, cardiologist, seen 9/2019 prior to surgery on 10/8/2019    Lumbar disc disease      Osteoporosis      PAC (premature atrial contraction) 06/2018     PACs and PVCs on holter monitor,     PONV (postoperative nausea and vomiting)      Wears dentures       upper partial            Surgical History:      Past Surgical History         Past Surgical History:   Procedure Laterality Date    ABDOMINOPLASTY   1997    BLEPHAROPLASTY Bilateral 1997    BREAST ENHANCEMENT SURGERY Bilateral 1999     breast augmentation    CATARACT REMOVAL WITH IMPLANT Bilateral 2016    COLONOSCOPY   2015     No Polyps    FINGER SURGERY Right       thumb lesion excised    FIXATION KYPHOPLASTY   2013     Back    FOOT DEBRIDEMENT Left 10/11/2019     SURGICAL PREP WOUND BED LEFT FOOT WITH APPLICATION OF EPIFIX LEFT FOOT 3X4 performed by Opal Chavez DPM at [address] [place] [address] Left 10/11/2019     Surgical Prep Wound bed left foot with application of Epifix to top of left foot    KNEE ARTHROSCOPY Left 2006    LAMINECTOMY   1994    LUMBAR LAMINECTOMY   2011    LUMBAR LAMINECTOMY   2014    LUMBAR SPINE SURGERY   2005     diskectomy and spinal fusion    OTHER SURGICAL HISTORY Right 09/11/2018      EXCISION MASS THUMB (Right )    OTHER SURGICAL HISTORY   10/03/2019     Left leg angiogram    SD OFFICE/OUTPT VISIT,PROCEDURE ONLY Right 9/11/2018     EXCISION MASS THUMB, 3080 TABLE performed by Alondra Jensen DO at [facility] [place] [facility] Left 12/3/2019     LEFT FOOT DEBRIDEMENT WITH SKIN GRAFT SPLIT THICKNESS; SKIN GRAFT TAKEN FROM RIGHT ANTERIOR THIGH performed by Starr Rodriguez MD at [facility]                Family History:      Family History         Family History   Problem Relation Age of Onset    Coronary Art Dis Mother      Arthritis Mother      Heart Surgery Mother           CABG    Coronary Art Dis Father      Heart Disease Father      Heart Surgery Father           CABG    Coronary Art Dis Sister      Heart Surgery Sister           CABG            Allergies:       Patient has no known allergies.     Medications:      Current Facility-Administered Medications          Current Outpatient Medications   Medication Sig Dispense Refill    doxycycline hyclate (VIBRA-TABS) 100 MG tablet take 1 tablet by mouth twice a day 56 tablet 0    RA SALINE WOUND WASH 0.9 % SOLN apply 360 milliliters topically twice a day CAN ALSO USE TO CLEANSE 0.25 packs per day. She has never used smokeless tobacco.  Alcohol:      reports current alcohol use. Drug Use:  reports previous drug use.     Review of Systems:      Review of Systems   Constitutional: Negative for chills and fever. HENT: Negative. Eyes: Negative for visual disturbance. Respiratory: Negative for chest tightness and shortness of breath. Cardiovascular: Positive for leg swelling (left foot and ankle). Negative for chest pain. Gastrointestinal: Negative for abdominal pain. Endocrine: Negative. Genitourinary: Negative. Musculoskeletal: Positive for arthralgias (severe arthritis). Skin: Positive for color change and wound. Allergic/Immunologic: Negative. Neurological: Negative for weakness and numbness. Hematological: Negative. Psychiatric/Behavioral: Negative.          Physical Exam:      Vitals:  /78 (Site: Left Upper Arm, Position: Sitting, Cuff Size: Small Adult)   Pulse 66   Temp 98.2 °F (36.8 °C) (Oral)   Ht 5' 6\" (1.676 m)   Wt 143 lb (64.9 kg)   SpO2 98%   BMI 23.08 kg/m²      Physical Exam  Constitutional:       Appearance: She is well-developed and well-groomed. Cardiovascular:      Rate and Rhythm: Normal rate and regular rhythm. Pulses:           Dorsalis pedis pulses are 1+ on the left side. Posterior tibial pulses are 1+ on the left side. Pulmonary:      Effort: Pulmonary effort is normal. No respiratory distress. Musculoskeletal:      Left foot: Normal capillary refill. Tenderness and swelling present. Feet:      Left foot:      Skin integrity: Ulcer and skin breakdown present. Neurological:      Mental Status: She is alert and oriented to person, place, and time. GCS: GCS eye subscore is 4. GCS verbal subscore is 5. GCS motor subscore is 6. Sensory: Sensation is intact. Motor: Motor function is intact.      4 x 4 cm circumferential ulceration with fibrinous exudate and failed adherence of skin graft             Imaging/Labs:      None performed     Assessment and Plan:      Non-healing left foot wound, s/p skin grafting  ? Will need to undergo sharp excisional debridement to get it back down to good granulating tissue  ? Will replace with wound vac to help stimulate the granulation tissue as well as try to shrink the wound  ? Depending on how much of foot is involved will try to reach out to plastic surgery to see if there is a flap or just try another skin grafting as best options  ? Will try to arrange and get home wound vac setup before the surgery so it can be done as outpatient  ?  Risks and benefits discussed and she consented to proceed for sharp excisional debridement of left foot and placement of wound vac

## 2020-01-02 NOTE — PROGRESS NOTES
Dr Aneesh Luis at bedside. Order for one Percocet to be given before discharge.  See [facility]

## 2020-01-02 NOTE — PROGRESS NOTES
Patient returned to room. Post anesthesia recovery initiated. Patient  Awake and alert. Cardiac monitor shows NSR without  Ectopy. Phase 1 recovery initiated. Dressing to left foot is dry  And intact. No hematoma or drainage noted. Patient complains of pain to foot. Patient medicated as ordered.

## 2020-01-03 NOTE — OP NOTE
[address]                  [facility] [address]                                OPERATIVE REPORT    PATIENT NAME: Olivia Hernandes                       :        1955  MED REC NO:   [id number]                             ROOM:  ACCOUNT NO:   [de-identified]                           ADMIT DATE: 2020  PROVIDER:     Rachel Rizvi MD    DATE OF PROCEDURE:  2020    PREOPERATIVE DIAGNOSIS:  Chronic non-healing left foot ulcer. POSTOPERATIVE DIAGNOSIS:  Chronic non-healing left foot ulcer. PROCEDURES:  1. Left foot sharp excisional debridement of skin, fat, and fascia,  measuring 5 x 5 cm. 2.  Placement of wound VAC measuring 5 x 5 cm. ANESTHESIA:  General.    SURGEON:  Rachel Rizvi MD    ESTIMATED BLOOD LOSS:  7 mL. COMPLICATIONS:  None. FINDINGS:  Healthy granulating tissue after removal of fibrinous exudate  and necrotic skin graft. Total measurement of the wound is 5 x 5 cm  circumferential.  Please see brief operative note for images of pre- and  post-debridement. INDICATION FOR PROCEDURE:  The patient is a 81-year-old woman who  developed chronic left foot wound after trauma, and she has failed local  wound care with grafting by Podiatry as well as split-thickness skin  grafting by myself. Comes in today for debridement and placement of  wound VAC in order to get it granulating again. Risks and benefits of the  procedure were explained. She consented to proceed. DESCRIPTION OF PROCEDURE:  The patient was brought to the operating  room. After appropriate general anesthesia was given, the left foot was  prepped and draped in a standard sterile fashion. Time-out was  performed and agreed upon. Antibiotics were given during this period. I began by sharply debriding the fibrinous exudate and failed skin graft  using a combination of curettes and a scalpel.   Wound measured 5 x 5 cm  circumferential.  Thick

## 2020-01-06 ENCOUNTER — OFFICE VISIT (OUTPATIENT)
Dept: VASCULAR SURGERY | Age: 65
End: 2020-01-06

## 2020-01-06 VITALS
HEIGHT: 66 IN | WEIGHT: 145 LBS | OXYGEN SATURATION: 99 % | SYSTOLIC BLOOD PRESSURE: 140 MMHG | BODY MASS INDEX: 23.3 KG/M2 | DIASTOLIC BLOOD PRESSURE: 77 MMHG | HEART RATE: 67 BPM | TEMPERATURE: 98.8 F

## 2020-01-06 PROCEDURE — 99024 POSTOP FOLLOW-UP VISIT: CPT | Performed by: SURGERY

## 2020-01-06 RX ORDER — OXYCODONE HYDROCHLORIDE AND ACETAMINOPHEN 5; 325 MG/1; MG/1
1 TABLET ORAL EVERY 6 HOURS PRN
Status: ON HOLD | COMMUNITY
End: 2020-10-14 | Stop reason: HOSPADM

## 2020-01-10 NOTE — PROGRESS NOTES
Mrs. Tom Dennis comes in for postoperative follow up with debridement of her left foot wound after failed skin grafting. She had a wound vac placed and is coming into the office fir her first dressing change. Wound is granulating well and has decreased in size slightly. Swelling in her foot is gone down as well. Continue wound vac changes 3 times a week. Visiting nurse set up thru the office to come out this week. She will follow up in 2 weeks to evaluate her progress.     Preop      Office visit

## 2020-01-15 ENCOUNTER — TELEPHONE (OUTPATIENT)
Dept: VASCULAR SURGERY | Age: 65
End: 2020-01-15

## 2020-01-15 NOTE — TELEPHONE ENCOUNTER
Spoke to Frieda Ibarra and gave her Dr. Edelmira Nageotte response. She stated that she chose to do a wet to dry barrier already and left the patient. She said she will reapply it tomorrow how Dr. Albert Jaimes requested.

## 2020-01-15 NOTE — TELEPHONE ENCOUNTER
MD Jacque Floyd, [place] Cc: [facility] Clinical Staff             No I am ok with black foam over everything

## 2020-01-20 ENCOUNTER — OFFICE VISIT (OUTPATIENT)
Dept: VASCULAR SURGERY | Age: 65
End: 2020-01-20
Payer: COMMERCIAL

## 2020-01-20 ENCOUNTER — TELEPHONE (OUTPATIENT)
Dept: BURN CARE | Age: 65
End: 2020-01-20

## 2020-01-20 VITALS
OXYGEN SATURATION: 93 % | HEART RATE: 54 BPM | SYSTOLIC BLOOD PRESSURE: 123 MMHG | DIASTOLIC BLOOD PRESSURE: 74 MMHG | HEIGHT: 66 IN | BODY MASS INDEX: 24.11 KG/M2 | WEIGHT: 150 LBS

## 2020-01-20 PROCEDURE — 99213 OFFICE O/P EST LOW 20 MIN: CPT | Performed by: SURGERY

## 2020-01-20 PROCEDURE — G8482 FLU IMMUNIZE ORDER/ADMIN: HCPCS | Performed by: SURGERY

## 2020-01-20 PROCEDURE — 3017F COLORECTAL CA SCREEN DOC REV: CPT | Performed by: SURGERY

## 2020-01-20 PROCEDURE — 4004F PT TOBACCO SCREEN RCVD TLK: CPT | Performed by: SURGERY

## 2020-01-20 PROCEDURE — G8427 DOCREV CUR MEDS BY ELIG CLIN: HCPCS | Performed by: SURGERY

## 2020-01-20 PROCEDURE — G8420 CALC BMI NORM PARAMETERS: HCPCS | Performed by: SURGERY

## 2020-01-20 ASSESSMENT — ENCOUNTER SYMPTOMS
COLOR CHANGE: 1
SHORTNESS OF BREATH: 0

## 2020-01-20 NOTE — PROGRESS NOTES
Division of Vascular Surgery        Follow Up    Left foot wound    History of Present Illness:      Ladarius Mcnulty is a 62 y.o. woman presents for follow up after undergoing debridement and wound vac placement to her chronic left foot wound. She had undergone epifix grafting by podiatry which failed and then debridement and skin grafting by me which also failed. Diagnostic angiogram revealed small vessels but no significant stenosis. Her wound has been slowly decreasing in size and has granulated quite well except a small area where there was some exposed tendon. She has noticed weakness in her 3rd toe and unable to move it very well. She continues to have some discomfort and pain over her wound but it is tolerable. Her main issue is her knee and she has been waiting for the wound to heal so she can get her knee replaced. Review of Systems:     Review of Systems   Constitutional: Negative for chills and fever. Respiratory: Negative for shortness of breath. Cardiovascular: Negative for chest pain and leg swelling. Musculoskeletal: Positive for arthralgias. Skin: Positive for color change and wound. Neurological: Negative for weakness and numbness. Physical Exam:     Vitals:  /74 (Site: Right Upper Arm, Position: Sitting, Cuff Size: Medium Adult)   Pulse 54   Ht 5' 6\" (1.676 m)   Wt 150 lb (68 kg)   SpO2 93%   BMI 24.21 kg/m²     Physical Exam  Constitutional:       Appearance: She is well-developed and well-groomed. Cardiovascular:      Rate and Rhythm: Normal rate and regular rhythm. Pulses:           Posterior tibial pulses are 1+ on the left side. Pulmonary:      Effort: Pulmonary effort is normal. No respiratory distress. Musculoskeletal:      Left foot: Normal capillary refill. Tenderness and swelling present. Feet:      Left foot:      Skin integrity: Ulcer present.       Comments: Decreased movement of 3rd toe  Good granulating tissue over wound bed  Skin:

## 2020-01-20 NOTE — TELEPHONE ENCOUNTER
Left voice mail for patient concerning appointment tomorrow in our burn clinic, burn clinic does not see wound care, may refer to [address], [facility] here or wound care clinic. Left voice mail at Dr Jay Zepeda office as well.

## 2020-02-03 RX ORDER — FOLIC ACID 1 MG/1
TABLET ORAL
Qty: 90 TABLET | Refills: 1 | Status: SHIPPED | OUTPATIENT
Start: 2020-02-03 | End: 2020-07-28

## 2020-02-03 NOTE — TELEPHONE ENCOUNTER
Last visit: 12/30/19  Last Med refill: 8/30/19  Does patient have enough medication for 72 hours: Yes    Next Visit Date:  Future Appointments   Date Time Provider Keshawn De Dios   2/11/2020  9:30 AM [facility] PAT RM 2 [facility] PAT [facility]   3/9/2020  1:15 PM BUTCH Faulkner MD [facility] None   3/30/2020  8:15 AM NUPUR Ohara - CNP [facility] [address] Maintenance   Topic Date Due    Hepatitis C screen  1955    Diabetic retinal exam  12/30/1966    HIV screen  12/30/1971    Diabetic microalbuminuria test  12/30/1974    Breast cancer screen  04/17/2017    Cervical cancer screen  04/01/2018    Lipid screen  01/25/2019    DTaP/Tdap/Td vaccine (1 - Tdap) 08/22/2020 (Originally 12/30/1967)    Shingles Vaccine (1 of 2) 08/22/2020 (Originally 12/30/2006)    A1C test (Diabetic or Prediabetic)  09/25/2020    Diabetic foot exam  11/11/2020    Colon cancer screen colonoscopy  06/26/2025    Flu vaccine  Completed    Pneumococcal 0-64 years Vaccine  Completed       Hemoglobin A1C (%)   Date Value   09/25/2019 5.2   01/13/2017 4.9             ( goal A1C is < 7)   No results found for: LABMICR  LDL Cholesterol (mg/dL)   Date Value   01/25/2018 75     LDL Calculated (mg/dL)   Date Value   01/13/2017 100   03/20/2015 104       (goal LDL is <100)   AST (U/L)   Date Value   09/25/2019 17     ALT (U/L)   Date Value   09/25/2019 18     BUN (mg/dL)   Date Value   11/11/2019 11     BP Readings from Last 3 Encounters:   01/20/20 123/74   01/06/20 (!) 140/77   01/02/20 (!) 144/70          (goal 120/80)    All Future Testing planned in [facility]  Lab Frequency Next Occurrence   DELPHINE DIGITAL SCREEN W CAD BILATERAL Once 01/11/2020               Patient Active Problem List:     Neoplasm of uncertain behavior of skin     Other plastic surgery for unacceptable cosmetic appearance     Chronic bilateral low back pain without sciatica     Mass of finger of right hand     Chronic ulcer of left foot with fat layer exposed ([address]     Pain in left foot     Hypertension     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 ([address]

## 2020-02-11 ENCOUNTER — HOSPITAL ENCOUNTER (OUTPATIENT)
Dept: PREADMISSION TESTING | Age: 65
Discharge: HOME | End: 2020-02-15
Payer: COMMERCIAL

## 2020-02-11 VITALS
OXYGEN SATURATION: 96 % | SYSTOLIC BLOOD PRESSURE: 178 MMHG | RESPIRATION RATE: 20 BRPM | HEART RATE: 61 BPM | HEIGHT: 66 IN | DIASTOLIC BLOOD PRESSURE: 65 MMHG | WEIGHT: 153.88 LBS | TEMPERATURE: 97.9 F | BODY MASS INDEX: 24.73 KG/M2

## 2020-02-11 LAB
ANION GAP SERPL CALCULATED.3IONS-SCNC: 13 MMOL/L (ref 9–17)
BUN BLDV-MCNC: 17 MG/DL (ref 8–23)
CHLORIDE BLD-SCNC: 103 MMOL/L (ref 98–107)
CO2: 24 MMOL/L (ref 20–31)
CREAT SERPL-MCNC: 0.39 MG/DL (ref 0.5–0.9)
GFR AFRICAN AMERICAN: >60 ML/MIN
GFR NON-AFRICAN AMERICAN: >60 ML/MIN
GFR SERPL CREATININE-BSD FRML MDRD: ABNORMAL ML/MIN/{1.73_M2}
GFR SERPL CREATININE-BSD FRML MDRD: ABNORMAL ML/MIN/{1.73_M2}
GLUCOSE BLD-MCNC: 93 MG/DL (ref 70–99)
HCT VFR BLD CALC: 35.1 % (ref 36.3–47.1)
HEMOGLOBIN: 11.3 G/DL (ref 11.9–15.1)
POTASSIUM SERPL-SCNC: 3.9 MMOL/L (ref 3.7–5.3)
SODIUM BLD-SCNC: 140 MMOL/L (ref 135–144)

## 2020-02-11 PROCEDURE — 85014 HEMATOCRIT: CPT

## 2020-02-11 PROCEDURE — 36415 COLL VENOUS BLD VENIPUNCTURE: CPT

## 2020-02-11 PROCEDURE — 85018 HEMOGLOBIN: CPT

## 2020-02-11 PROCEDURE — 80051 ELECTROLYTE PANEL: CPT

## 2020-02-11 PROCEDURE — 82565 ASSAY OF CREATININE: CPT

## 2020-02-11 PROCEDURE — 82947 ASSAY GLUCOSE BLOOD QUANT: CPT

## 2020-02-11 PROCEDURE — 84520 ASSAY OF UREA NITROGEN: CPT

## 2020-02-11 RX ORDER — SODIUM CHLORIDE, SODIUM LACTATE, POTASSIUM CHLORIDE, CALCIUM CHLORIDE 600; 310; 30; 20 MG/100ML; MG/100ML; MG/100ML; MG/100ML
1000 INJECTION, SOLUTION INTRAVENOUS CONTINUOUS
Status: CANCELLED | OUTPATIENT
Start: 2020-02-11

## 2020-02-11 ASSESSMENT — PAIN SCALES - GENERAL: PAINLEVEL_OUTOF10: 6

## 2020-02-11 ASSESSMENT — PAIN DESCRIPTION - LOCATION: LOCATION: FOOT

## 2020-02-11 ASSESSMENT — PAIN DESCRIPTION - ONSET: ONSET: AWAKENED FROM SLEEP

## 2020-02-11 ASSESSMENT — PAIN DESCRIPTION - FREQUENCY: FREQUENCY: CONTINUOUS

## 2020-02-11 NOTE — H&P (VIEW-ONLY)
Meds:.  Allergies  has No Known Allergies. Family History    family history includes Arthritis in her mother; Coronary Art Dis in her father, mother, and sister; Heart Disease in her father; Heart Surgery in her father, mother, and sister.     Family Status   Relation Name Status    Mother age 80    Jeanie Vance Father age 80    Jeanie Vance Sister X2 Alive   Jeanie Vance Sister age 62    Jeanie Vance MGM      MGF      [address]      PGF      Sister X2 Alive         Social History  Social History     Socioeconomic History    Marital status:      Spouse name: Alecia Peterson Number of children: 2    Years of education: Not on file    Highest education level: Not on file   Occupational History    Occupation: Retired RN   Social Needs    Financial resource strain: Not on file    Food insecurity:     Worry: Not on file     Inability: Not on file   rapt.fm needs:     Medical: Not on file     Non-medical: Not on file   Tobacco Use    Smoking status: Current Every Day Smoker     Packs/day: 0.25     Years: 50.00     Pack years: 12.50     Types: Cigarettes     Start date:     Smokeless tobacco: Never Used   Substance and Sexual Activity    Alcohol use: Yes     Frequency: 2-4 times a month     Comment: 2-3 shot liquor for weekends    Drug use: Not Currently    Sexual activity: Yes     Partners: Male   Lifestyle    Physical activity:     Days per week: Not on file     Minutes per session: Not on file    Stress: Not on file   Relationships    Social connections:     Talks on phone: Not on file     Gets together: Not on file     Attends Nondenominational service: Not on file     Active member of club or organization: Not on file     Attends meetings of clubs or organizations: Not on file     Relationship status: Not on file    Intimate partner violence:     Fear of current or ex partner: Not on file     Emotionally abused: Not on file     Physically abused: Not on file     Forced sexual activity: Not on file   Other Topics Concern    Not on file   Social History Narrative    Not on file             Hobbies:   Garden , boat , walk     OBJECTIVE:   VITALS:  height is 5' 6\" (1.676 m) and weight is 153 lb 14.1 oz (69.8 kg). Her temporal temperature is 97.9 °F (36.6 °C). Her blood pressure is 178/65 (abnormal) and her pulse is 61. Her respiration is 20 and oxygen saturation is 96%. CONSTITUTIONAL: Alert and oriented times 3, no acute distress and cooperative to examination. friendly and pleasant     SKIN: rash No    HEENT: Head is normocephalic, atraumatic. EOMI, PERRLA    Oral air way :slightly narrow Yes    NECK: neck supple, no lymphadenopathy noted, trachea midline and straight       2+ carotid, no bruit    LUNGS: Chest expands equally bilaterally upon respiration, no accessory muscle used. Ausculation reveals no adventitious breath sounds. CARDIOVASCULAR: \"Heart sounds are normal.  Regular rate and rhythm without murmur,    ABDOMEN: Bowel sounds are present in all four quadrants      GENATALIA:Deferred. NEUROLOGIC: \"CN II-XII are grossly intact. EXTREMITIES: Pitting edema:  No,   Dressing on left foot   Varicose veins: No     Dorsal pedal/posterior tibial pulses palpable: Yes         Strength:  Normal       Patient Active Problem List   Diagnosis    Neoplasm of uncertain behavior of skin    Other plastic surgery for unacceptable cosmetic appearance    Chronic bilateral low back pain without sciatica    Mass of finger of right hand    Chronic ulcer of left foot with fat layer exposed ([address]    Pain in left foot    Hypertension    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 ([address]               IMPRESSIONS:   1.  chronic ulcer left foot   2. does not have any pertinent problems on file.     Maxx TYLER  Electronically signed 2/11/2020 at 9:59 AM       Scheduled for:debridement , STSG with wound vac placement left foot

## 2020-02-11 NOTE — H&P
History and Physical    Pt Name: Nelly Merritt  MRN: [id number]  YOB: 1955  Date of evaluation: 2/11/2020  Primary Care Physician: NUPUR Coburn CNP  Patient evaluated at the request of  Dr. Mando Parker    Reason for evaluation:   chronic ulcer left foot   SUBJECTIVE:   History of Chief Complaint:   According to  Chart note in Jan/2020    Aretha Todd is a 62 y.o. female ,      Nelly Merritt is a 62 y.o. woman presents for follow up after undergoing debridement and wound vac placement to her chronic left foot wound. She had undergone epifix grafting by podiatry which failed and then debridement and skin grafting by me which also failed. Diagnostic angiogram revealed small vessels but no significant stenosis. Her wound has been slowly decreasing in size and has granulated quite well except a small area where there was some exposed tendon. She has noticed weakness in her 3rd toe and unable to move it very well. She continues to have some discomfort and pain over her wound but it is tolerable. Her main issue is her knee and she has been waiting for the wound to heal so she can get her knee replaced. She reports she has quit smoking after 40 yrs . Past Medical History      has a past medical history of Arthritis, CAD (coronary artery disease), Chronic back pain, Irregular heart beat, LAFB (left anterior fascicular block), Lumbar disc disease, Osteoporosis, PAC (premature atrial contraction), PONV (postoperative nausea and vomiting), and Wears dentures. Past Surgical History   has a past surgical history that includes Knee arthroscopy (Left, 2006); Abdominoplasty (1997); blepharoplasty (Bilateral, 1997); Breast enhancement surgery (Bilateral, 1999); laminectomy (1994); lumbar laminectomy (2011); lumbar laminectomy (2014); Fixation Kyphoplasty (2013); Finger surgery (Right);  Tonsillectomy; Colonoscopy (2015); other surgical history (Right, 09/11/2018); pr office/outpt Meds:.  Allergies  has No Known Allergies. Family History    family history includes Arthritis in her mother; Coronary Art Dis in her father, mother, and sister; Heart Disease in her father; Heart Surgery in her father, mother, and sister.     Family Status   Relation Name Status    Mother age 80    Sherren Kehr Father age 80    Sherren Kehr Sister X2 Alive   Sherren Kehr Sister age 62    Sherren Kehr MGM      MGF      [address]      PGF      Sister X2 Alive         Social History  Social History     Socioeconomic History    Marital status:      Spouse name: Isa Mckeon Number of children: 2    Years of education: Not on file    Highest education level: Not on file   Occupational History    Occupation: Retired RN   Social Needs    Financial resource strain: Not on file    Food insecurity:     Worry: Not on file     Inability: Not on file   allyve needs:     Medical: Not on file     Non-medical: Not on file   Tobacco Use    Smoking status: Current Every Day Smoker     Packs/day: 0.25     Years: 50.00     Pack years: 12.50     Types: Cigarettes     Start date:     Smokeless tobacco: Never Used   Substance and Sexual Activity    Alcohol use: Yes     Frequency: 2-4 times a month     Comment: 2-3 shot liquor for weekends    Drug use: Not Currently    Sexual activity: Yes     Partners: Male   Lifestyle    Physical activity:     Days per week: Not on file     Minutes per session: Not on file    Stress: Not on file   Relationships    Social connections:     Talks on phone: Not on file     Gets together: Not on file     Attends Rastafari service: Not on file     Active member of club or organization: Not on file     Attends meetings of clubs or organizations: Not on file     Relationship status: Not on file    Intimate partner violence:     Fear of current or ex partner: Not on file     Emotionally abused: Not on file     Physically abused: Not on file     Forced sexual

## 2020-02-25 ENCOUNTER — ANESTHESIA (OUTPATIENT)
Dept: OPERATING ROOM | Age: 65
End: 2020-02-25
Payer: COMMERCIAL

## 2020-02-25 ENCOUNTER — ANESTHESIA EVENT (OUTPATIENT)
Dept: OPERATING ROOM | Age: 65
End: 2020-02-25
Payer: COMMERCIAL

## 2020-02-25 ENCOUNTER — HOSPITAL ENCOUNTER (OUTPATIENT)
Age: 65
Setting detail: OUTPATIENT SURGERY
Discharge: HOME | End: 2020-02-25
Attending: PLASTIC SURGERY | Admitting: PLASTIC SURGERY
Payer: COMMERCIAL

## 2020-02-25 VITALS
BODY MASS INDEX: 24.59 KG/M2 | SYSTOLIC BLOOD PRESSURE: 161 MMHG | WEIGHT: 153 LBS | HEART RATE: 68 BPM | RESPIRATION RATE: 18 BRPM | TEMPERATURE: 97.5 F | OXYGEN SATURATION: 98 % | HEIGHT: 66 IN | DIASTOLIC BLOOD PRESSURE: 61 MMHG

## 2020-02-25 VITALS — OXYGEN SATURATION: 98 % | DIASTOLIC BLOOD PRESSURE: 76 MMHG | SYSTOLIC BLOOD PRESSURE: 121 MMHG | TEMPERATURE: 96.7 F

## 2020-02-25 PROCEDURE — 6360000002 HC RX W HCPCS: Performed by: PLASTIC SURGERY

## 2020-02-25 PROCEDURE — 2500000003 HC RX 250 WO HCPCS: Performed by: NURSE ANESTHETIST, CERTIFIED REGISTERED

## 2020-02-25 PROCEDURE — 7100000010 HC PHASE II RECOVERY - FIRST 15 MIN: Performed by: PLASTIC SURGERY

## 2020-02-25 PROCEDURE — 6360000002 HC RX W HCPCS: Performed by: NURSE ANESTHETIST, CERTIFIED REGISTERED

## 2020-02-25 PROCEDURE — 7100000000 HC PACU RECOVERY - FIRST 15 MIN: Performed by: PLASTIC SURGERY

## 2020-02-25 PROCEDURE — 6360000002 HC RX W HCPCS: Performed by: ANESTHESIOLOGY

## 2020-02-25 PROCEDURE — 7100000001 HC PACU RECOVERY - ADDTL 15 MIN: Performed by: PLASTIC SURGERY

## 2020-02-25 PROCEDURE — 3700000001 HC ADD 15 MINUTES (ANESTHESIA): Performed by: PLASTIC SURGERY

## 2020-02-25 PROCEDURE — 6370000000 HC RX 637 (ALT 250 FOR IP): Performed by: ANESTHESIOLOGY

## 2020-02-25 PROCEDURE — 3600000004 HC SURGERY LEVEL 4 BASE: Performed by: PLASTIC SURGERY

## 2020-02-25 PROCEDURE — 3600000014 HC SURGERY LEVEL 4 ADDTL 15MIN: Performed by: PLASTIC SURGERY

## 2020-02-25 PROCEDURE — 2709999900 HC NON-CHARGEABLE SUPPLY: Performed by: PLASTIC SURGERY

## 2020-02-25 PROCEDURE — 2580000003 HC RX 258: Performed by: ANESTHESIOLOGY

## 2020-02-25 PROCEDURE — 3700000000 HC ANESTHESIA ATTENDED CARE: Performed by: PLASTIC SURGERY

## 2020-02-25 PROCEDURE — 2580000003 HC RX 258: Performed by: PLASTIC SURGERY

## 2020-02-25 PROCEDURE — 7100000011 HC PHASE II RECOVERY - ADDTL 15 MIN: Performed by: PLASTIC SURGERY

## 2020-02-25 RX ORDER — LIDOCAINE HYDROCHLORIDE 10 MG/ML
INJECTION, SOLUTION EPIDURAL; INFILTRATION; INTRACAUDAL; PERINEURAL PRN
Status: DISCONTINUED | OUTPATIENT
Start: 2020-02-25 | End: 2020-02-25 | Stop reason: SDUPTHER

## 2020-02-25 RX ORDER — FENTANYL CITRATE 50 UG/ML
INJECTION, SOLUTION INTRAMUSCULAR; INTRAVENOUS PRN
Status: DISCONTINUED | OUTPATIENT
Start: 2020-02-25 | End: 2020-02-25 | Stop reason: SDUPTHER

## 2020-02-25 RX ORDER — OXYCODONE HYDROCHLORIDE AND ACETAMINOPHEN 5; 325 MG/1; MG/1
2 TABLET ORAL ONCE
Status: COMPLETED | OUTPATIENT
Start: 2020-02-25 | End: 2020-02-25

## 2020-02-25 RX ORDER — DEXAMETHASONE SODIUM PHOSPHATE 10 MG/ML
INJECTION INTRAMUSCULAR; INTRAVENOUS PRN
Status: DISCONTINUED | OUTPATIENT
Start: 2020-02-25 | End: 2020-02-25 | Stop reason: SDUPTHER

## 2020-02-25 RX ORDER — SCOLOPAMINE TRANSDERMAL SYSTEM 1 MG/1
1 PATCH, EXTENDED RELEASE TRANSDERMAL
Status: DISCONTINUED | OUTPATIENT
Start: 2020-02-25 | End: 2020-02-25 | Stop reason: HOSPADM

## 2020-02-25 RX ORDER — FENTANYL CITRATE 50 UG/ML
50 INJECTION, SOLUTION INTRAMUSCULAR; INTRAVENOUS EVERY 5 MIN PRN
Status: COMPLETED | OUTPATIENT
Start: 2020-02-25 | End: 2020-02-25

## 2020-02-25 RX ORDER — MAGNESIUM HYDROXIDE 1200 MG/15ML
LIQUID ORAL CONTINUOUS PRN
Status: COMPLETED | OUTPATIENT
Start: 2020-02-25 | End: 2020-02-25

## 2020-02-25 RX ORDER — EPHEDRINE SULFATE/0.9% NACL/PF 50 MG/5 ML
SYRINGE (ML) INTRAVENOUS PRN
Status: DISCONTINUED | OUTPATIENT
Start: 2020-02-25 | End: 2020-02-25 | Stop reason: SDUPTHER

## 2020-02-25 RX ORDER — GLYCOPYRROLATE 1 MG/5 ML
SYRINGE (ML) INTRAVENOUS PRN
Status: DISCONTINUED | OUTPATIENT
Start: 2020-02-25 | End: 2020-02-25 | Stop reason: SDUPTHER

## 2020-02-25 RX ORDER — ONDANSETRON 2 MG/ML
INJECTION INTRAMUSCULAR; INTRAVENOUS PRN
Status: DISCONTINUED | OUTPATIENT
Start: 2020-02-25 | End: 2020-02-25 | Stop reason: SDUPTHER

## 2020-02-25 RX ORDER — PROPOFOL 10 MG/ML
INJECTION, EMULSION INTRAVENOUS PRN
Status: DISCONTINUED | OUTPATIENT
Start: 2020-02-25 | End: 2020-02-25 | Stop reason: SDUPTHER

## 2020-02-25 RX ORDER — FENTANYL CITRATE 50 UG/ML
25 INJECTION, SOLUTION INTRAMUSCULAR; INTRAVENOUS EVERY 5 MIN PRN
Status: COMPLETED | OUTPATIENT
Start: 2020-02-25 | End: 2020-02-25

## 2020-02-25 RX ORDER — SODIUM CHLORIDE, SODIUM LACTATE, POTASSIUM CHLORIDE, CALCIUM CHLORIDE 600; 310; 30; 20 MG/100ML; MG/100ML; MG/100ML; MG/100ML
1000 INJECTION, SOLUTION INTRAVENOUS CONTINUOUS
Status: DISCONTINUED | OUTPATIENT
Start: 2020-02-25 | End: 2020-02-25 | Stop reason: HOSPADM

## 2020-02-25 RX ORDER — CEPHALEXIN 500 MG/1
500 CAPSULE ORAL 3 TIMES DAILY
Qty: 21 CAPSULE | Refills: 0 | Status: SHIPPED | OUTPATIENT
Start: 2020-02-25 | End: 2020-03-03

## 2020-02-25 RX ORDER — PHENYLEPHRINE HCL IN 0.9% NACL 0.5 MG/5ML
SYRINGE (ML) INTRAVENOUS PRN
Status: DISCONTINUED | OUTPATIENT
Start: 2020-02-25 | End: 2020-02-25 | Stop reason: SDUPTHER

## 2020-02-25 RX ADMIN — Medication 5 MG: at 12:14

## 2020-02-25 RX ADMIN — DEXAMETHASONE SODIUM PHOSPHATE 4 MG: 10 INJECTION, SOLUTION INTRAMUSCULAR; INTRAVENOUS at 12:15

## 2020-02-25 RX ADMIN — FENTANYL CITRATE 25 MCG: 50 INJECTION, SOLUTION INTRAMUSCULAR; INTRAVENOUS at 13:52

## 2020-02-25 RX ADMIN — FENTANYL CITRATE 25 MCG: 50 INJECTION, SOLUTION INTRAMUSCULAR; INTRAVENOUS at 13:57

## 2020-02-25 RX ADMIN — ONDANSETRON 4 MG: 2 INJECTION INTRAMUSCULAR; INTRAVENOUS at 12:45

## 2020-02-25 RX ADMIN — FENTANYL CITRATE 50 MCG: 50 INJECTION, SOLUTION INTRAMUSCULAR; INTRAVENOUS at 13:22

## 2020-02-25 RX ADMIN — LIDOCAINE HYDROCHLORIDE 50 MG: 10 INJECTION, SOLUTION EPIDURAL; INFILTRATION; INTRACAUDAL; PERINEURAL at 12:04

## 2020-02-25 RX ADMIN — FENTANYL CITRATE 25 MCG: 50 INJECTION INTRAMUSCULAR; INTRAVENOUS at 13:07

## 2020-02-25 RX ADMIN — FENTANYL CITRATE 100 MCG: 50 INJECTION INTRAMUSCULAR; INTRAVENOUS at 12:04

## 2020-02-25 RX ADMIN — FENTANYL CITRATE 50 MCG: 50 INJECTION, SOLUTION INTRAMUSCULAR; INTRAVENOUS at 13:36

## 2020-02-25 RX ADMIN — CEFAZOLIN 2 G: 10 INJECTION, POWDER, FOR SOLUTION INTRAVENOUS at 12:15

## 2020-02-25 RX ADMIN — FENTANYL CITRATE 50 MCG: 50 INJECTION, SOLUTION INTRAMUSCULAR; INTRAVENOUS at 13:47

## 2020-02-25 RX ADMIN — Medication 100 MCG: at 12:34

## 2020-02-25 RX ADMIN — OXYCODONE HYDROCHLORIDE AND ACETAMINOPHEN 2 TABLET: 5; 325 TABLET ORAL at 14:24

## 2020-02-25 RX ADMIN — PROPOFOL 140 MG: 10 INJECTION, EMULSION INTRAVENOUS at 12:04

## 2020-02-25 RX ADMIN — FENTANYL CITRATE 25 MCG: 50 INJECTION INTRAMUSCULAR; INTRAVENOUS at 13:13

## 2020-02-25 RX ADMIN — Medication 100 MCG: at 12:22

## 2020-02-25 RX ADMIN — Medication 0.2 MG: at 12:13

## 2020-02-25 RX ADMIN — FENTANYL CITRATE 50 MCG: 50 INJECTION, SOLUTION INTRAMUSCULAR; INTRAVENOUS at 13:29

## 2020-02-25 RX ADMIN — FENTANYL CITRATE 25 MCG: 50 INJECTION, SOLUTION INTRAMUSCULAR; INTRAVENOUS at 14:08

## 2020-02-25 RX ADMIN — SODIUM CHLORIDE, POTASSIUM CHLORIDE, SODIUM LACTATE AND CALCIUM CHLORIDE 1000 ML: 600; 310; 30; 20 INJECTION, SOLUTION INTRAVENOUS at 10:31

## 2020-02-25 RX ADMIN — Medication 5 MG: at 12:47

## 2020-02-25 RX ADMIN — Medication 5 MG: at 12:18

## 2020-02-25 RX ADMIN — FENTANYL CITRATE 25 MCG: 50 INJECTION, SOLUTION INTRAMUSCULAR; INTRAVENOUS at 14:03

## 2020-02-25 RX ADMIN — FENTANYL CITRATE 25 MCG: 50 INJECTION INTRAMUSCULAR; INTRAVENOUS at 13:15

## 2020-02-25 RX ADMIN — FENTANYL CITRATE 25 MCG: 50 INJECTION INTRAMUSCULAR; INTRAVENOUS at 13:03

## 2020-02-25 ASSESSMENT — PAIN DESCRIPTION - PAIN TYPE: TYPE: SURGICAL PAIN

## 2020-02-25 ASSESSMENT — PULMONARY FUNCTION TESTS
PIF_VALUE: 3
PIF_VALUE: 2
PIF_VALUE: 1
PIF_VALUE: 2
PIF_VALUE: 2
PIF_VALUE: 4
PIF_VALUE: 2
PIF_VALUE: 1
PIF_VALUE: 2
PIF_VALUE: 3
PIF_VALUE: 2
PIF_VALUE: 2
PIF_VALUE: 0
PIF_VALUE: 2
PIF_VALUE: 1
PIF_VALUE: 2
PIF_VALUE: 4
PIF_VALUE: 2
PIF_VALUE: 1
PIF_VALUE: 1
PIF_VALUE: 4
PIF_VALUE: 2
PIF_VALUE: 4
PIF_VALUE: 2
PIF_VALUE: 2
PIF_VALUE: 1
PIF_VALUE: 2
PIF_VALUE: 2

## 2020-02-25 ASSESSMENT — PAIN DESCRIPTION - DESCRIPTORS
DESCRIPTORS: BURNING

## 2020-02-25 ASSESSMENT — PAIN SCALES - GENERAL
PAINLEVEL_OUTOF10: 7
PAINLEVEL_OUTOF10: 6
PAINLEVEL_OUTOF10: 7
PAINLEVEL_OUTOF10: 6
PAINLEVEL_OUTOF10: 7
PAINLEVEL_OUTOF10: 6
PAINLEVEL_OUTOF10: 7
PAINLEVEL_OUTOF10: 8
PAINLEVEL_OUTOF10: 7
PAINLEVEL_OUTOF10: 8
PAINLEVEL_OUTOF10: 6
PAINLEVEL_OUTOF10: 7
PAINLEVEL_OUTOF10: 8
PAINLEVEL_OUTOF10: 8
PAINLEVEL_OUTOF10: 7
PAINLEVEL_OUTOF10: 7

## 2020-02-25 ASSESSMENT — PAIN DESCRIPTION - LOCATION
LOCATION: FOOT

## 2020-02-25 ASSESSMENT — PAIN - FUNCTIONAL ASSESSMENT: PAIN_FUNCTIONAL_ASSESSMENT: 0-10

## 2020-02-25 ASSESSMENT — PAIN DESCRIPTION - ORIENTATION
ORIENTATION: LEFT

## 2020-02-25 ASSESSMENT — LIFESTYLE VARIABLES: SMOKING_STATUS: 1

## 2020-02-25 NOTE — ANESTHESIA PRE PROCEDURE
Topics    Alcohol use: Yes     Frequency: 2-4 times a month     Comment: 2-3 shot liquor for weekends                                Counseling given: Not Answered      Vital Signs (Current): There were no vitals filed for this visit. BP Readings from Last 3 Encounters:   02/25/20 121/62   02/11/20 (!) 178/65   01/20/20 123/74       NPO Status:                                                                                 BMI:   Wt Readings from Last 3 Encounters:   02/25/20 153 lb (69.4 kg)   02/11/20 153 lb 14.1 oz (69.8 kg)   01/29/20 145 lb (65.8 kg)     There is no height or weight on file to calculate BMI.    CBC:   Lab Results   Component Value Date    WBC 8.4 11/11/2019    RBC 3.29 11/11/2019    HGB 11.3 02/11/2020    HCT 35.1 02/11/2020    .1 11/11/2019    RDW 13.2 11/11/2019     11/11/2019       CMP:   Lab Results   Component Value Date     02/11/2020    K 3.9 02/11/2020     02/11/2020    CO2 24 02/11/2020    BUN 17 02/11/2020    CREATININE 0.39 02/11/2020    GFRAA >60 02/11/2020    LABGLOM >60 02/11/2020    GLUCOSE 93 02/11/2020    PROT 6.8 09/25/2019    CALCIUM 10.0 11/11/2019    BILITOT 0.27 09/25/2019    ALKPHOS 96 09/25/2019    AST 17 09/25/2019    ALT 18 09/25/2019       POC Tests:   No results for input(s): POCGLU, POCNA, POCK, POCCL, POCBUN, POCHEMO, POCHCT in the last 72 hours. Coags:   Lab Results   Component Value Date    PROTIME 10.4 09/06/2019    INR 1.0 09/06/2019       HCG (If Applicable): No results found for: PREGTESTUR, PREGSERUM, HCG, HCGQUANT     ABGs: No results found for: PHART, PO2ART, MZA4AKP, FJM2HCB, BEART, H5AADDVE     Type & Screen (If Applicable):  No results found for: LABABO, [address]    Anesthesia Evaluation  Patient summary reviewed and Nursing notes reviewed   history of anesthetic complications: PONV.   Airway: Mallampati: II        Dental:    (+) edentulous      Pulmonary:normal exam  breath sounds clear to auscultation  (+) current smoker          Patient smoked on day of surgery. Cardiovascular:    (+) hypertension:, CAD: no interval change, hyperlipidemia      ECG reviewed  Rhythm: regular  Rate: normal           Beta Blocker:  Dose within 24 Hrs         Neuro/Psych:   Negative Neuro/Psych ROS              GI/Hepatic/Renal: Neg GI/Hepatic/Renal ROS            Endo/Other: Negative Endo/Other ROS                    Abdominal:           Vascular:                                   EKG 1/2/2020  Normal sinus rhythm  Incomplete right bundle branch block  Left anterior fascicular block  Abnormal ECG  When compared with ECG of 25-SEP-2019 11:35,  No significant change was found    Cath 9/11/2019   Angiographic Findings      Cardiac Arteries and Lesion Findings     LMCA: Normal 0% stenosis.     LAD: Mild irregularities 10-20%. 60% bifurcation stenosis at the first  diagonal branch involving the ostium of the first diagonal branch.     LCx: Ostial 40% stenosis.     RCA: MId vessel 80% stenosis.      Coronary Tree      Dominance: Right       Anesthesia Plan      general     ASA 3     (Patient with known 2-vessel CAD for which elective revascularization was recommended. Patient has not yet undergone revascularization due to knee pain and desire to undergo knee TKA prior to cardiac intervention. MAC vs GA LMA. Scopolamine patch in pre-op. Will administer 2 IV agents during case.)  Induction: intravenous. MIPS: Postoperative opioids intended and Prophylactic antiemetics administered. Anesthetic plan and risks discussed with patient.       Plan discussed with CRNA and surgical team.                  Kiera Alfaro MD   2/25/2020

## 2020-02-25 NOTE — PROGRESS NOTES
Notified Dr. Malik Lopes pt received 300 mcg of fentanyl, with no improvement in pain level, vitals stable. Orders to give 2 percocet.

## 2020-02-25 NOTE — ANESTHESIA POSTPROCEDURE EVALUATION
Department of Anesthesiology  Postprocedure Note    Patient: Ashley Pantoja  MRN: [id number]  [place]: 1955  Date of evaluation: 2/25/2020  Time:  1:52 PM     Procedure Summary     Date:  02/25/20 Room / Location:  [facility] [address] [place] [address]    Anesthesia Start:  9542 Anesthesia Stop:  7210    Procedure:  DEBRIDEMENT, SPLIT THICKNESS SKIN GRAFT WITH WOUND VAC PLACEMENT FOOT (Left ) Diagnosis:  (LEFT FOOT WOUND)    Surgeon:  Imani Mishra MD Responsible Provider:  Mariluz Benitez MD    Anesthesia Type:  general ASA Status:  3          Anesthesia Type: general    Aki Phase I: Aki Score: 10    Aki Phase II:      Last vitals: Reviewed and per EMR flowsheets.        Anesthesia Post Evaluation    Patient location during evaluation: PACU  Patient participation: complete - patient participated  Level of consciousness: awake and alert  Pain score: 2  Airway patency: patent  Nausea & Vomiting: no nausea and no vomiting  Complications: no  Cardiovascular status: hemodynamically stable  Respiratory status: acceptable  Hydration status: euvolemic

## 2020-02-25 NOTE — INTERVAL H&P NOTE
Pt Name: Clau Hong  MRN: [id number]  YOB: 1955  Date of evaluation: 2/25/2020    I have reviewed the patient's history and physical examination completed in pre-admission testing on 2/11/20    No changes to history or on examination today, unless noted below. None. Cardiac clearance from Dr. Violette Sandhu on file from 2/14/20.     MARNI PATTON APRN-CNP  2/25/20  10:13 AM

## 2020-02-25 NOTE — BRIEF OP NOTE
Brief Postoperative Note  ______________________________________________________________    Patient: Mariaa Antony  YOB: 1955  MRN: [id number]  Date of Procedure: 2/25/2020    Pre-Op Diagnosis: LEFT FOOT WOUND    Post-Op Diagnosis: Same       Procedure(s):  SHARP EXCISIONAL DEBRIDEMENT, SPLIT THICKNESS SKIN GRAFT WITH WOUND VAC PLACEMENT FOOT    Anesthesia: General    Surgeon(s): Megha Hope MD    Assistant: Arnold Sanchez PGY1     Estimated Blood Loss (mL): 6cc    Complications: None    Specimens:   * No specimens in log *    Implants:  * No implants in log *      Drains:   Negative Pressure Wound Therapy Foot Anterior; Left (Active)       Findings: 6x6cm STSG taken from left thigh to left foot wound. Wound vac applied to left foot. Mepilex to left thigh graft site.      Shaw Lutz DO  Date: 2/25/2020  Time: 1:11 PM

## 2020-03-16 PROBLEM — S91.302A WOUND, OPEN, FOOT WITH COMPLICATION, LEFT, INITIAL ENCOUNTER: Status: ACTIVE | Noted: 2020-03-16

## 2020-03-17 ENCOUNTER — TELEPHONE (OUTPATIENT)
Dept: VASCULAR SURGERY | Age: 65
End: 2020-03-17

## 2020-03-17 NOTE — TELEPHONE ENCOUNTER
Received Rx from [facility] for wound vac for Dr. Nathalie Trammell to sign. Placed on his desk for signature.

## 2020-03-19 NOTE — TELEPHONE ENCOUNTER
Spoke to Dr. Sonia Smith who wanted me to check with the patient on if she is still using the wound vac. I spoke to the patient who said that she is not still using it and already mailed it back. Dr. Cody Gonzalez did a graft the beginning of the month and is planning on doing another one but cannot right now because of the COVID-19 precautions. She said that her wound is not improving at all. I spoke to Dr. Sonia Smith, who said he would prefer to not reorder the wound vac because she is following with plastic surgery and if they didn't reorder it, he wants to hold off and let them decide. I spoke to Juliocesar Schultz at [facility] and explained everything. She verbalized understanding.

## 2020-05-04 RX ORDER — FOLIC ACID 1 MG/1
TABLET ORAL
Qty: 90 TABLET | Refills: 1 | OUTPATIENT
Start: 2020-05-04

## 2020-06-12 ENCOUNTER — OFFICE VISIT (OUTPATIENT)
Dept: ORTHOPEDIC SURGERY | Age: 65
End: 2020-06-12
Payer: COMMERCIAL

## 2020-06-12 VITALS — WEIGHT: 160.05 LBS | BODY MASS INDEX: 25.72 KG/M2 | HEIGHT: 66 IN

## 2020-06-12 PROCEDURE — 99213 OFFICE O/P EST LOW 20 MIN: CPT | Performed by: PHYSICIAN ASSISTANT

## 2020-06-12 PROCEDURE — 20610 DRAIN/INJ JOINT/BURSA W/O US: CPT | Performed by: PHYSICIAN ASSISTANT

## 2020-06-12 RX ORDER — BUPIVACAINE HYDROCHLORIDE 2.5 MG/ML
2 INJECTION, SOLUTION INFILTRATION; PERINEURAL ONCE
Status: DISCONTINUED | OUTPATIENT
Start: 2020-06-12 | End: 2020-10-14 | Stop reason: HOSPADM

## 2020-06-12 RX ORDER — METHYLPREDNISOLONE ACETATE 80 MG/ML
80 INJECTION, SUSPENSION INTRA-ARTICULAR; INTRALESIONAL; INTRAMUSCULAR; SOFT TISSUE ONCE
Status: DISCONTINUED | OUTPATIENT
Start: 2020-06-12 | End: 2020-10-14 | Stop reason: HOSPADM

## 2020-06-14 ASSESSMENT — ENCOUNTER SYMPTOMS
COUGH: 0
VOMITING: 0
SHORTNESS OF BREATH: 0
COLOR CHANGE: 0

## 2020-06-16 ENCOUNTER — HOSPITAL ENCOUNTER (OUTPATIENT)
Dept: PREADMISSION TESTING | Age: 65
Discharge: HOME | End: 2020-06-20
Payer: COMMERCIAL

## 2020-06-16 VITALS
OXYGEN SATURATION: 100 % | RESPIRATION RATE: 20 BRPM | HEART RATE: 68 BPM | DIASTOLIC BLOOD PRESSURE: 77 MMHG | SYSTOLIC BLOOD PRESSURE: 176 MMHG | BODY MASS INDEX: 26.52 KG/M2 | HEIGHT: 66 IN | WEIGHT: 165 LBS | TEMPERATURE: 97.7 F

## 2020-06-16 LAB
BUN BLDV-MCNC: 13 MG/DL (ref 8–23)
CREAT SERPL-MCNC: 0.47 MG/DL (ref 0.5–0.9)
GFR AFRICAN AMERICAN: >60 ML/MIN
GFR NON-AFRICAN AMERICAN: >60 ML/MIN
GFR SERPL CREATININE-BSD FRML MDRD: ABNORMAL ML/MIN/{1.73_M2}
GFR SERPL CREATININE-BSD FRML MDRD: ABNORMAL ML/MIN/{1.73_M2}
GLUCOSE BLD-MCNC: 101 MG/DL (ref 70–99)
HCT VFR BLD CALC: 35.8 % (ref 36.3–47.1)
HEMOGLOBIN: 11.7 G/DL (ref 11.9–15.1)

## 2020-06-16 PROCEDURE — 85018 HEMOGLOBIN: CPT

## 2020-06-16 PROCEDURE — 93005 ELECTROCARDIOGRAM TRACING: CPT | Performed by: PLASTIC SURGERY

## 2020-06-16 PROCEDURE — 36415 COLL VENOUS BLD VENIPUNCTURE: CPT

## 2020-06-16 PROCEDURE — 82565 ASSAY OF CREATININE: CPT

## 2020-06-16 PROCEDURE — 85014 HEMATOCRIT: CPT

## 2020-06-16 PROCEDURE — 82947 ASSAY GLUCOSE BLOOD QUANT: CPT

## 2020-06-16 PROCEDURE — 84520 ASSAY OF UREA NITROGEN: CPT

## 2020-06-16 RX ORDER — SODIUM CHLORIDE, SODIUM LACTATE, POTASSIUM CHLORIDE, CALCIUM CHLORIDE 600; 310; 30; 20 MG/100ML; MG/100ML; MG/100ML; MG/100ML
1000 INJECTION, SOLUTION INTRAVENOUS CONTINUOUS
Status: CANCELLED | OUTPATIENT
Start: 2020-06-16

## 2020-06-16 ASSESSMENT — PAIN SCALES - GENERAL: PAINLEVEL_OUTOF10: 6

## 2020-06-16 ASSESSMENT — PAIN DESCRIPTION - FREQUENCY: FREQUENCY: CONTINUOUS

## 2020-06-16 ASSESSMENT — PAIN - FUNCTIONAL ASSESSMENT: PAIN_FUNCTIONAL_ASSESSMENT: PREVENTS OR INTERFERES SOME ACTIVE ACTIVITIES AND ADLS

## 2020-06-16 ASSESSMENT — PAIN DESCRIPTION - ONSET: ONSET: AWAKENED FROM SLEEP

## 2020-06-16 ASSESSMENT — PAIN DESCRIPTION - LOCATION: LOCATION: FOOT

## 2020-06-16 ASSESSMENT — PAIN DESCRIPTION - DESCRIPTORS: DESCRIPTORS: SHARP;CONSTANT

## 2020-06-16 ASSESSMENT — PAIN DESCRIPTION - ORIENTATION: ORIENTATION: LEFT

## 2020-06-16 ASSESSMENT — PAIN DESCRIPTION - PROGRESSION: CLINICAL_PROGRESSION: GRADUALLY WORSENING

## 2020-06-16 NOTE — H&P
(Left, 02/25/2020); and Skin graft (Left, 2/25/2020). Medications   Scheduled Meds:   bupivacaine  2 mL Intra-articular Once    methylPREDNISolone acetate  80 mg Intra-articular Once     Current Outpatient Rx   Medication Sig Dispense Refill    folic acid (FOLVITE) 1 MG tablet take 1 tablet by mouth once daily 90 tablet 1    Handicap Placard MISC by Does not apply route 1 each 0    oxyCODONE-acetaminophen (PERCOCET) 5-325 MG per tablet Take 1 tablet by mouth every 6 hours as needed for Pain.  Metoprolol Tartrate 37.5 MG TABS Take 37.5 mg by mouth 2 times daily 60 tablet 5    varenicline (CHANTIX STARTING MONTH SALVATORE) 0.5 MG X 11 & 1 MG X 42 tablet Take by mouth. 1 box 0    varenicline (CHANTIX CONTINUING MONTH SALVATORE) 1 MG tablet Take 1 tablet by mouth 2 times daily 60 tablet 2    meclizine (ANTIVERT) 25 MG tablet Take 25 mg by mouth 3 times daily as needed      aspirin 81 MG tablet Take 81 mg by mouth daily      atorvastatin (LIPITOR) 40 MG tablet Take 1 tablet by mouth nightly (Patient taking differently: Take 40 mg by mouth every morning ) 30 tablet 3    NAPROXEN PO Take 250 mg by mouth daily as needed Indications: patient takes 1-2 times a day       OMEPRAZOLE PO Take 1 tablet by mouth daily       Cholecalciferol (VITAMIN D3) 5000 units TABS Take 5,000 Units by mouth daily       calcium carbonate (OSCAL) 500 MG TABS tablet Take 1,000 mg by mouth daily      alendronate (FOSAMAX) 70 MG tablet Take 70 mg by mouth every 7 days Indications: Mondays       traMADol (ULTRAM ER) 200 MG extended release tablet Take 1 tablet by mouth daily      gabapentin (NEURONTIN) 300 MG capsule Take 300 mg by mouth 2 times daily. Continuous Infusions:  PRN Meds:. Allergies  has No Known Allergies. Family History    family history includes Arthritis in her mother; Coronary Art Dis in her father, mother, and sister; Heart Disease in her father; Heart Surgery in her father, mother, and sister.     Family taken for this visit. CONSTITUTIONAL: Alert and oriented times 3, no acute distress and cooperative to examination. friendly and pleasant     SKIN: rash No, sun tan     HEENT: Head is normocephalic, atraumatic. EOMI, PERRLA    Oral air way :slightly narrow Yes    NECK: neck supple, no lymphadenopathy noted, trachea midline and straight       2+ carotid, no bruit    LUNGS: Chest expands equally bilaterally upon respiration, no accessory muscle used. Ausculation reveals no adventitious breath sounds. CARDIOVASCULAR: \"Heart sounds are normal.  Regular rate and rhythm without murmur,    ABDOMEN: Bowel sounds are present in all four quadrants      GENATALIA:Deferred. NEUROLOGIC: \"CN II-XII are grossly intact. EXTREMITIES: Pitting edema:  No,    Left foot ace wrapped   Varicose veins: No     Dorsal pedal/posterior tibial pulses palpable: Yes         Strength:  Not tested      Patient Active Problem List   Diagnosis    Neoplasm of uncertain behavior of skin    Other plastic surgery for unacceptable cosmetic appearance    Chronic bilateral low back pain without sciatica    Mass of finger of right hand    Chronic ulcer of left foot with fat layer exposed ([address]    Pain in left foot    Hypertension    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 ([address]    Wound, open, foot with complication, left, initial encounter               IMPRESSIONS:   1. Chronic  Wound  of left  foot   2. does not have any pertinent problems on file.     Wei Seals [facility]  Electronically signed 6/16/2020 at 2:02 PM       Scheduled for:  JOSIE Cano)

## 2020-06-16 NOTE — ANESTHESIA PRE-OP
Anesthesia Focused Assessment    STOP-BANG Sleep Apnea Questionnaire    Obstructive Sleep Apnea:                                                                 No     Machine used:                                                                                  No            SNORE loudly (heard through closed doors)? No      TIRED, fatigued, sleepy during daytime? No      OBSERVED stopping breathing during sleep? No      High blood PRESSURE being treated? Yes      BMI over 35? No  AGE over 48? Yes  NECK circumference over 16\"? No  GENDER (male)? No                High risk 5-8  Intermediate risk 3-4  Low risk 0-2    Total 3  High risk 5-8  Intermediate risk 3-4  Low risk 0-2      Type 1 DM:                                                                                        No    TYPE 2:                                                                                             No    If yes, insulin dependent? No    Coronary Artery Disease : Yes        Active smoker                                                                                   Yes, cutting down     Drinks Alcohol                                                                                   No    Dentition: Dentures                                                                             no           Partial                                                                                              partial    Any loose or missing teeth                                                                 No    Defib / AICD / Pacemaker:                                                               No    Renal Failure: No    If Yes, On dialysis?       Hx of anesthesia complications with Patient                               N+V     Hx of anesthesia complications with family

## 2020-06-16 NOTE — H&P (VIEW-ONLY)
History and Physical    Pt Name: Antonette Gaston  MRN: [id number]  YOB: 1955  Date of evaluation: 6/16/2020  Primary Care Physician: NUPUR Langston CNP  Patient evaluated at the request of  Dr. Adrienne Kim    Reason for evaluation:  Chronic wound  of left  foot   SUBJECTIVE:   History of Chief Complaint:    According to plastic surgery note in Jun/2020    Camden Merlos is a 62 y.o. female , Pt comes in today following Multiple previous debridements and grafting to a chronic wound on the dorsum of the left foot. She has been followed by wound care with Dr. Damaris Shah and now appears to be ready for a another debridement and skin grafting. The plan is to do the graft and then proceed with a hyperbaric treatment the next day. We will get this coordinated with Dr. Damaris Shah. Past Medical History      has a past medical history of Arthritis, CAD (coronary artery disease), Chronic back pain, Irregular heart beat, LAFB (left anterior fascicular block), Lumbar disc disease, Osteoporosis, PAC (premature atrial contraction), PONV (postoperative nausea and vomiting), Wears dentures, Wears eyeglasses, and Wellness examination. Past Surgical History   has a past surgical history that includes Knee arthroscopy (Left, 2006); Abdominoplasty (1997); blepharoplasty (Bilateral, 1997); laminectomy (1994); lumbar laminectomy (2011); lumbar laminectomy (2014); Fixation Kyphoplasty (2013); Finger surgery (Right); Tonsillectomy; Colonoscopy (2015); other surgical history (Right, 09/11/2018); pr office/outpt visit,procedure only (Right, 9/11/2018); Lumbar spine surgery (2005); Cataract removal with implant (Bilateral, 2016); other surgical history (10/03/2019); Foot surgery (Left, 10/11/2019); Foot Debridement (Left, 10/11/2019); Skin graft (Left, 12/3/2019); Foot Debridement (Left, 01/02/2020); Leg Surgery (Left, 1/2/2020); eye surgery; Breast enhancement surgery (Bilateral, 1999);  Foot Debridement (Left, 02/25/2020); and Skin graft (Left, 2/25/2020). Medications   Scheduled Meds:   bupivacaine  2 mL Intra-articular Once    methylPREDNISolone acetate  80 mg Intra-articular Once     Current Outpatient Rx   Medication Sig Dispense Refill    folic acid (FOLVITE) 1 MG tablet take 1 tablet by mouth once daily 90 tablet 1    Handicap Placard MISC by Does not apply route 1 each 0    oxyCODONE-acetaminophen (PERCOCET) 5-325 MG per tablet Take 1 tablet by mouth every 6 hours as needed for Pain.  Metoprolol Tartrate 37.5 MG TABS Take 37.5 mg by mouth 2 times daily 60 tablet 5    varenicline (CHANTIX STARTING MONTH SALVATORE) 0.5 MG X 11 & 1 MG X 42 tablet Take by mouth. 1 box 0    varenicline (CHANTIX CONTINUING MONTH SALVATORE) 1 MG tablet Take 1 tablet by mouth 2 times daily 60 tablet 2    meclizine (ANTIVERT) 25 MG tablet Take 25 mg by mouth 3 times daily as needed      aspirin 81 MG tablet Take 81 mg by mouth daily      atorvastatin (LIPITOR) 40 MG tablet Take 1 tablet by mouth nightly (Patient taking differently: Take 40 mg by mouth every morning ) 30 tablet 3    NAPROXEN PO Take 250 mg by mouth daily as needed Indications: patient takes 1-2 times a day       OMEPRAZOLE PO Take 1 tablet by mouth daily       Cholecalciferol (VITAMIN D3) 5000 units TABS Take 5,000 Units by mouth daily       calcium carbonate (OSCAL) 500 MG TABS tablet Take 1,000 mg by mouth daily      alendronate (FOSAMAX) 70 MG tablet Take 70 mg by mouth every 7 days Indications: Mondays       traMADol (ULTRAM ER) 200 MG extended release tablet Take 1 tablet by mouth daily      gabapentin (NEURONTIN) 300 MG capsule Take 300 mg by mouth 2 times daily. Continuous Infusions:  PRN Meds:. Allergies  has No Known Allergies. Family History    family history includes Arthritis in her mother; Coronary Art Dis in her father, mother, and sister; Heart Disease in her father; Heart Surgery in her father, mother, and sister.     Family Status   Relation Name Status    Mother age 80    [employer] Father age 80    [employer] Sister X2 Alive   [employer] Sister age 62    [employer] MGM      MGF      [address]      PGF      Sister X2 Alive         Social History  Social History     Socioeconomic History    Marital status:      Spouse name: Rosalinda Gonzalez Number of children: 2    Years of education: Not on file    Highest education level: Not on file   Occupational History    Occupation: Retired RN   Social Needs    Financial resource strain: Not on file    Food insecurity     Worry: Not on file     Inability: Not on file   [employer] needs     Medical: Not on file     Non-medical: Not on file   Tobacco Use    Smoking status: Former Smoker     Packs/day: 0.25     Years: 50.00     Pack years: 12.50     Types: Cigarettes     Start date:      Last attempt to quit: 2020     Years since quittin.3    Smokeless tobacco: Never Used   Substance and Sexual Activity    Alcohol use: Yes     Frequency: 2-4 times a month     Comment: 2-3 shot liquor for weekends    Drug use: Not Currently    Sexual activity: Yes     Partners: Male   Lifestyle    Physical activity     Days per week: Not on file     Minutes per session: Not on file    Stress: Not on file   Relationships    Social connections     Talks on phone: Not on file     Gets together: Not on file     Attends [employer] service: Not on file     Active member of club or organization: Not on file     Attends meetings of clubs or organizations: Not on file     Relationship status: Not on file    Intimate partner violence     Fear of current or ex partner: Not on file     Emotionally abused: Not on file     Physically abused: Not on file     Forced sexual activity: Not on file   Other Topics Concern    Not on file   Social History Narrative    Not on file            Occupation: retired    Hobbies:  [place] , boat , walk     OBJECTIVE:   VITALS:  vitals were not taken for this visit. CONSTITUTIONAL: Alert and oriented times 3, no acute distress and cooperative to examination. friendly and pleasant     SKIN: rash No, sun tan     HEENT: Head is normocephalic, atraumatic. EOMI, PERRLA    Oral air way :slightly narrow Yes    NECK: neck supple, no lymphadenopathy noted, trachea midline and straight       2+ carotid, no bruit    LUNGS: Chest expands equally bilaterally upon respiration, no accessory muscle used. Ausculation reveals no adventitious breath sounds. CARDIOVASCULAR: \"Heart sounds are normal.  Regular rate and rhythm without murmur,    ABDOMEN: Bowel sounds are present in all four quadrants      GENATALIA:Deferred. NEUROLOGIC: \"CN II-XII are grossly intact. EXTREMITIES: Pitting edema:  No,    Left foot ace wrapped   Varicose veins: No     Dorsal pedal/posterior tibial pulses palpable: Yes         Strength:  Not tested      Patient Active Problem List   Diagnosis    Neoplasm of uncertain behavior of skin    Other plastic surgery for unacceptable cosmetic appearance    Chronic bilateral low back pain without sciatica    Mass of finger of right hand    Chronic ulcer of left foot with fat layer exposed ([address]    Pain in left foot    Hypertension    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 ([address]    Wound, open, foot with complication, left, initial encounter               IMPRESSIONS:   1. Chronic  Wound  of left  foot   2. does not have any pertinent problems on file.     Flor [facility]  Electronically signed 6/16/2020 at 2:02 PM       Scheduled for:  [address] Philip Rendon  [facility])

## 2020-06-17 LAB
EKG ATRIAL RATE: 72 BPM
EKG P AXIS: 76 DEGREES
EKG P-R INTERVAL: 162 MS
EKG Q-T INTERVAL: 408 MS
EKG QRS DURATION: 102 MS
EKG QTC CALCULATION (BAZETT): 446 MS
EKG R AXIS: -53 DEGREES
EKG T AXIS: 49 DEGREES
EKG VENTRICULAR RATE: 72 BPM

## 2020-06-17 PROCEDURE — 93010 ELECTROCARDIOGRAM REPORT: CPT | Performed by: INTERNAL MEDICINE

## 2020-06-22 ENCOUNTER — OFFICE VISIT (OUTPATIENT)
Dept: FAMILY MEDICINE CLINIC | Age: 65
End: 2020-06-22
Payer: COMMERCIAL

## 2020-06-22 VITALS
HEART RATE: 65 BPM | OXYGEN SATURATION: 98 % | TEMPERATURE: 98.1 F | DIASTOLIC BLOOD PRESSURE: 68 MMHG | WEIGHT: 165 LBS | SYSTOLIC BLOOD PRESSURE: 132 MMHG | BODY MASS INDEX: 26.63 KG/M2

## 2020-06-22 PROCEDURE — 3017F COLORECTAL CA SCREEN DOC REV: CPT | Performed by: NURSE PRACTITIONER

## 2020-06-22 PROCEDURE — G8427 DOCREV CUR MEDS BY ELIG CLIN: HCPCS | Performed by: NURSE PRACTITIONER

## 2020-06-22 PROCEDURE — 99213 OFFICE O/P EST LOW 20 MIN: CPT | Performed by: NURSE PRACTITIONER

## 2020-06-22 PROCEDURE — G8419 CALC BMI OUT NRM PARAM NOF/U: HCPCS | Performed by: NURSE PRACTITIONER

## 2020-06-22 PROCEDURE — 1036F TOBACCO NON-USER: CPT | Performed by: NURSE PRACTITIONER

## 2020-06-22 RX ORDER — CLINDAMYCIN HYDROCHLORIDE 300 MG/1
CAPSULE ORAL
COMMUNITY
Start: 2020-06-20 | End: 2020-09-30

## 2020-06-22 RX ORDER — METOPROLOL TARTRATE 37.5 MG/1
37.5 TABLET, FILM COATED ORAL 2 TIMES DAILY
Qty: 60 TABLET | Refills: 5 | Status: SHIPPED | OUTPATIENT
Start: 2020-06-22 | End: 2020-01-01

## 2020-06-22 RX ORDER — CIPROFLOXACIN 500 MG/1
TABLET, FILM COATED ORAL
Status: ON HOLD | COMMUNITY
Start: 2020-06-16 | End: 2020-06-30

## 2020-06-22 RX ORDER — ASCORBIC ACID 500 MG
500 TABLET ORAL 2 TIMES DAILY
COMMUNITY

## 2020-06-22 ASSESSMENT — PATIENT HEALTH QUESTIONNAIRE - PHQ9
2. FEELING DOWN, DEPRESSED OR HOPELESS: 0
1. LITTLE INTEREST OR PLEASURE IN DOING THINGS: 0
SUM OF ALL RESPONSES TO PHQ9 QUESTIONS 1 & 2: 0
SUM OF ALL RESPONSES TO PHQ QUESTIONS 1-9: 0
SUM OF ALL RESPONSES TO PHQ QUESTIONS 1-9: 0

## 2020-06-22 ASSESSMENT — ENCOUNTER SYMPTOMS
COUGH: 0
SHORTNESS OF BREATH: 0

## 2020-06-22 NOTE — PROGRESS NOTES
 meclizine (ANTIVERT) 25 MG tablet Take 25 mg by mouth 3 times daily as needed      aspirin 81 MG tablet Take 81 mg by mouth daily Stopping 10-14 days prior to surgery as instructed by surgeon      atorvastatin (LIPITOR) 40 MG tablet Take 1 tablet by mouth nightly (Patient taking differently: Take 40 mg by mouth every morning ) 30 tablet 3    NAPROXEN PO Take 250 mg by mouth daily as needed Indications: patient takes 1-2 times a day Stopping 10 days prior to surgery as instructed by surgeon      omeprazole (PRILOSEC) 40 MG delayed release capsule Take 1 tablet by mouth daily       Cholecalciferol (VITAMIN D3) 5000 units TABS Take 5,000 Units by mouth daily       calcium carbonate (OSCAL) 500 MG TABS tablet Take 1,000 mg by mouth daily      alendronate (FOSAMAX) 70 MG tablet Take 70 mg by mouth every 7 days Indications: Mondays       traMADol (ULTRAM ER) 200 MG extended release tablet Take 1 tablet by mouth daily      gabapentin (NEURONTIN) 300 MG capsule Take 300 mg by mouth 2 times daily.         Current Facility-Administered Medications   Medication Dose Route Frequency Provider Last Rate Last Dose    bupivacaine (MARCAINE) 0.25 % injection 5 mg  2 mL Intra-articular Once Maya Cho PA-C        methylPREDNISolone acetate (DEPO-MEDROL) injection 80 mg  80 mg Intra-articular Once Maya Cho PA-C         Past Medical History:   Diagnosis Date    Arthritis     knees hips hands shoulders and back    CAD (coronary artery disease) 2019    blockage on cath 9/2019, to have stenting after surgery (arthroplasty) Dr. Maury Coppola Chronic back pain     Hyperlipidemia     per Dr. Fallon Jackson on rx    Hypertension     Nedra eKy manages    Irregular heart beat     LAFB (left anterior fascicular block)     Dr. Fallon Jackson, cardiologist, seen 9/2019 prior to surgery on 10/8/2019    Lumbar disc disease     Osteoporosis     PAC (premature atrial contraction) 06/2018    PACs and PVCs on holter monitor,     MARCOS (postoperative nausea and vomiting)     requests scop patch    Wears dentures     upper partial    Wears eyeglasses     readers    Wears partial dentures     upper partial    Wears reading eyeglasses     Wellness examination     MIKE Mercer, STEPHANIE PCP, seen 2/10/2020    Wellness examination     Yulia Lu Has appt. next week.  Last seen 2019      Past Surgical History:   Procedure Laterality Date    ABDOMEN SURGERY      tummy tuck 1997    ABDOMINOPLASTY  1997    BLEPHAROPLASTY Bilateral 1997    BREAST ENHANCEMENT SURGERY Bilateral 1999    breast augmentation   [facility]      heart cath Dr. Rosalio Fletcher 2019    CATARACT REMOVAL WITH IMPLANT Bilateral 2016    COLONOSCOPY  2015    No Polyps    EYE SURGERY      cataract removal with IOLS    FINGER SURGERY Right     thumb lesion excised    FIXATION KYPHOPLASTY  2013    Back    FOOT DEBRIDEMENT Left 10/11/2019    SURGICAL PREP WOUND BED LEFT FOOT WITH APPLICATION OF EPIFIX LEFT FOOT 3X4 performed by Latoya Culp DPM at [address] [place] [address] Left 01/02/2020    FOOT DEBRIDEMENT Left 02/25/2020    DEBRIDEMENT, SPLIT THICKNESS SKIN GRAFT WITH WOUND VAC PLACEMENT FOOT (Left )    FOOT SURGERY Left 10/11/2019    Surgical Prep Wound bed left foot with application of Epifix to top of left foot    FRACTURE SURGERY      kyphoplasty for lumbar fx 2013 Dr. Pippa Sigala ARTHROSCOPY Left 2006   Josse Mather Left 1/2/2020    FOOT  DEBRIDEMENT WITH WOUND VAC PLACEMENT performed by Jose Mullen MD at [place] [address]  2014   [facility] [address]  2005    diskectomy and spinal fusion    OTHER SURGICAL HISTORY Right 09/11/2018     EXCISION MASS THUMB (Right )    OTHER SURGICAL HISTORY  10/03/2019    Left leg angiogram    RI OFFICE/OUTPT VISIT,PROCEDURE ONLY Right 9/11/2018    EXCISION MASS THUMB, 30[address] TABLE performed by Neil Rogers DO at [place] [address]

## 2020-06-27 ENCOUNTER — HOSPITAL ENCOUNTER (OUTPATIENT)
Dept: PREADMISSION TESTING | Age: 65
Setting detail: SPECIMEN
Discharge: HOME | End: 2020-07-01
Payer: COMMERCIAL

## 2020-06-27 PROCEDURE — U0004 COV-19 TEST NON-CDC HGH THRU: HCPCS

## 2020-06-29 LAB
SARS-COV-2, PCR: NOT DETECTED
SARS-COV-2, RAPID: NORMAL
SARS-COV-2: NORMAL
SOURCE: NORMAL

## 2020-06-30 ENCOUNTER — HOSPITAL ENCOUNTER (OUTPATIENT)
Age: 65
Setting detail: OUTPATIENT SURGERY
Discharge: HOME | End: 2020-06-30
Attending: PLASTIC SURGERY | Admitting: PLASTIC SURGERY
Payer: COMMERCIAL

## 2020-06-30 ENCOUNTER — TELEPHONE (OUTPATIENT)
Dept: PRIMARY CARE CLINIC | Age: 65
End: 2020-06-30

## 2020-06-30 ENCOUNTER — ANESTHESIA EVENT (OUTPATIENT)
Dept: OPERATING ROOM | Age: 65
End: 2020-06-30
Payer: COMMERCIAL

## 2020-06-30 ENCOUNTER — ANESTHESIA (OUTPATIENT)
Dept: OPERATING ROOM | Age: 65
End: 2020-06-30
Payer: COMMERCIAL

## 2020-06-30 VITALS
HEART RATE: 71 BPM | HEIGHT: 66 IN | TEMPERATURE: 97 F | SYSTOLIC BLOOD PRESSURE: 169 MMHG | RESPIRATION RATE: 13 BRPM | OXYGEN SATURATION: 100 % | DIASTOLIC BLOOD PRESSURE: 80 MMHG | BODY MASS INDEX: 26.52 KG/M2 | WEIGHT: 165 LBS

## 2020-06-30 VITALS — SYSTOLIC BLOOD PRESSURE: 163 MMHG | OXYGEN SATURATION: 100 % | TEMPERATURE: 96.5 F | DIASTOLIC BLOOD PRESSURE: 72 MMHG

## 2020-06-30 PROCEDURE — 7100000000 HC PACU RECOVERY - FIRST 15 MIN: Performed by: PLASTIC SURGERY

## 2020-06-30 PROCEDURE — 6360000002 HC RX W HCPCS: Performed by: ANESTHESIOLOGY

## 2020-06-30 PROCEDURE — 7100000011 HC PHASE II RECOVERY - ADDTL 15 MIN: Performed by: PLASTIC SURGERY

## 2020-06-30 PROCEDURE — 6360000002 HC RX W HCPCS: Performed by: SPECIALIST

## 2020-06-30 PROCEDURE — 3700000000 HC ANESTHESIA ATTENDED CARE: Performed by: PLASTIC SURGERY

## 2020-06-30 PROCEDURE — 6360000002 HC RX W HCPCS: Performed by: NURSE ANESTHETIST, CERTIFIED REGISTERED

## 2020-06-30 PROCEDURE — 2500000003 HC RX 250 WO HCPCS: Performed by: PLASTIC SURGERY

## 2020-06-30 PROCEDURE — 6360000002 HC RX W HCPCS: Performed by: PLASTIC SURGERY

## 2020-06-30 PROCEDURE — 6360000002 HC RX W HCPCS

## 2020-06-30 PROCEDURE — 2580000003 HC RX 258: Performed by: ANESTHESIOLOGY

## 2020-06-30 PROCEDURE — 7100000010 HC PHASE II RECOVERY - FIRST 15 MIN: Performed by: PLASTIC SURGERY

## 2020-06-30 PROCEDURE — 2500000003 HC RX 250 WO HCPCS: Performed by: SPECIALIST

## 2020-06-30 PROCEDURE — 3700000001 HC ADD 15 MINUTES (ANESTHESIA): Performed by: PLASTIC SURGERY

## 2020-06-30 PROCEDURE — 3600000014 HC SURGERY LEVEL 4 ADDTL 15MIN: Performed by: PLASTIC SURGERY

## 2020-06-30 PROCEDURE — 2720000010 HC SURG SUPPLY STERILE: Performed by: PLASTIC SURGERY

## 2020-06-30 PROCEDURE — 3600000004 HC SURGERY LEVEL 4 BASE: Performed by: PLASTIC SURGERY

## 2020-06-30 PROCEDURE — 2709999900 HC NON-CHARGEABLE SUPPLY: Performed by: PLASTIC SURGERY

## 2020-06-30 PROCEDURE — 7100000001 HC PACU RECOVERY - ADDTL 15 MIN: Performed by: PLASTIC SURGERY

## 2020-06-30 RX ORDER — FENTANYL CITRATE 50 UG/ML
25 INJECTION, SOLUTION INTRAMUSCULAR; INTRAVENOUS ONCE
Status: DISCONTINUED | OUTPATIENT
Start: 2020-06-30 | End: 2020-06-30 | Stop reason: HOSPADM

## 2020-06-30 RX ORDER — PROPOFOL 10 MG/ML
INJECTION, EMULSION INTRAVENOUS PRN
Status: DISCONTINUED | OUTPATIENT
Start: 2020-06-30 | End: 2020-06-30 | Stop reason: SDUPTHER

## 2020-06-30 RX ORDER — ONDANSETRON 2 MG/ML
INJECTION INTRAMUSCULAR; INTRAVENOUS PRN
Status: DISCONTINUED | OUTPATIENT
Start: 2020-06-30 | End: 2020-06-30 | Stop reason: SDUPTHER

## 2020-06-30 RX ORDER — FENTANYL CITRATE 50 UG/ML
50 INJECTION, SOLUTION INTRAMUSCULAR; INTRAVENOUS EVERY 5 MIN PRN
Status: COMPLETED | OUTPATIENT
Start: 2020-06-30 | End: 2020-06-30

## 2020-06-30 RX ORDER — FENTANYL CITRATE 50 UG/ML
25 INJECTION, SOLUTION INTRAMUSCULAR; INTRAVENOUS ONCE
Status: CANCELLED | OUTPATIENT
Start: 2020-06-30 | End: 2020-06-30

## 2020-06-30 RX ORDER — SODIUM CHLORIDE 0.9 % (FLUSH) 0.9 %
10 SYRINGE (ML) INJECTION PRN
Status: DISCONTINUED | OUTPATIENT
Start: 2020-06-30 | End: 2020-06-30 | Stop reason: HOSPADM

## 2020-06-30 RX ORDER — FENTANYL CITRATE 50 UG/ML
INJECTION, SOLUTION INTRAMUSCULAR; INTRAVENOUS PRN
Status: DISCONTINUED | OUTPATIENT
Start: 2020-06-30 | End: 2020-06-30 | Stop reason: SDUPTHER

## 2020-06-30 RX ORDER — SODIUM CHLORIDE, SODIUM LACTATE, POTASSIUM CHLORIDE, CALCIUM CHLORIDE 600; 310; 30; 20 MG/100ML; MG/100ML; MG/100ML; MG/100ML
INJECTION, SOLUTION INTRAVENOUS CONTINUOUS
Status: CANCELLED | OUTPATIENT
Start: 2020-06-30

## 2020-06-30 RX ORDER — DIPHENHYDRAMINE HYDROCHLORIDE 50 MG/ML
INJECTION INTRAMUSCULAR; INTRAVENOUS PRN
Status: DISCONTINUED | OUTPATIENT
Start: 2020-06-30 | End: 2020-06-30 | Stop reason: SDUPTHER

## 2020-06-30 RX ORDER — CEFAZOLIN SODIUM 2 G/50ML
SOLUTION INTRAVENOUS PRN
Status: DISCONTINUED | OUTPATIENT
Start: 2020-06-30 | End: 2020-06-30 | Stop reason: SDUPTHER

## 2020-06-30 RX ORDER — FENTANYL CITRATE 50 UG/ML
50 INJECTION, SOLUTION INTRAMUSCULAR; INTRAVENOUS EVERY 5 MIN PRN
Status: DISCONTINUED | OUTPATIENT
Start: 2020-06-30 | End: 2020-06-30 | Stop reason: HOSPADM

## 2020-06-30 RX ORDER — SODIUM CHLORIDE, SODIUM LACTATE, POTASSIUM CHLORIDE, CALCIUM CHLORIDE 600; 310; 30; 20 MG/100ML; MG/100ML; MG/100ML; MG/100ML
1000 INJECTION, SOLUTION INTRAVENOUS CONTINUOUS
Status: DISCONTINUED | OUTPATIENT
Start: 2020-06-30 | End: 2020-06-30 | Stop reason: HOSPADM

## 2020-06-30 RX ORDER — SODIUM CHLORIDE, SODIUM LACTATE, POTASSIUM CHLORIDE, CALCIUM CHLORIDE 600; 310; 30; 20 MG/100ML; MG/100ML; MG/100ML; MG/100ML
INJECTION, SOLUTION INTRAVENOUS CONTINUOUS
Status: DISCONTINUED | OUTPATIENT
Start: 2020-06-30 | End: 2020-06-30 | Stop reason: HOSPADM

## 2020-06-30 RX ORDER — SODIUM CHLORIDE 0.9 % (FLUSH) 0.9 %
10 SYRINGE (ML) INJECTION EVERY 12 HOURS SCHEDULED
Status: DISCONTINUED | OUTPATIENT
Start: 2020-06-30 | End: 2020-06-30 | Stop reason: HOSPADM

## 2020-06-30 RX ORDER — AMOXICILLIN AND CLAVULANATE POTASSIUM 875; 125 MG/1; MG/1
1 TABLET, FILM COATED ORAL 2 TIMES DAILY
Qty: 14 TABLET | Refills: 0 | Status: SHIPPED | OUTPATIENT
Start: 2020-06-30 | End: 2020-07-07

## 2020-06-30 RX ORDER — SODIUM CHLORIDE 0.9 % (FLUSH) 0.9 %
10 SYRINGE (ML) INJECTION EVERY 12 HOURS SCHEDULED
Status: CANCELLED | OUTPATIENT
Start: 2020-06-30

## 2020-06-30 RX ORDER — SODIUM CHLORIDE 0.9 % (FLUSH) 0.9 %
10 SYRINGE (ML) INJECTION PRN
Status: CANCELLED | OUTPATIENT
Start: 2020-06-30

## 2020-06-30 RX ORDER — DEXAMETHASONE SODIUM PHOSPHATE 10 MG/ML
INJECTION INTRAMUSCULAR; INTRAVENOUS PRN
Status: DISCONTINUED | OUTPATIENT
Start: 2020-06-30 | End: 2020-06-30 | Stop reason: SDUPTHER

## 2020-06-30 RX ORDER — LIDOCAINE HYDROCHLORIDE 10 MG/ML
INJECTION, SOLUTION EPIDURAL; INFILTRATION; INTRACAUDAL; PERINEURAL PRN
Status: DISCONTINUED | OUTPATIENT
Start: 2020-06-30 | End: 2020-06-30 | Stop reason: SDUPTHER

## 2020-06-30 RX ORDER — ONDANSETRON 2 MG/ML
4 INJECTION INTRAMUSCULAR; INTRAVENOUS ONCE
Status: CANCELLED | OUTPATIENT
Start: 2020-06-30 | End: 2020-06-30

## 2020-06-30 RX ORDER — MIDAZOLAM HYDROCHLORIDE 1 MG/ML
2 INJECTION INTRAMUSCULAR; INTRAVENOUS
Status: CANCELLED | OUTPATIENT
Start: 2020-06-30 | End: 2020-06-30

## 2020-06-30 RX ORDER — EPHEDRINE SULFATE/0.9% NACL/PF 50 MG/5 ML
SYRINGE (ML) INTRAVENOUS PRN
Status: DISCONTINUED | OUTPATIENT
Start: 2020-06-30 | End: 2020-06-30 | Stop reason: SDUPTHER

## 2020-06-30 RX ORDER — BUPIVACAINE HYDROCHLORIDE 2.5 MG/ML
INJECTION, SOLUTION INFILTRATION; PERINEURAL PRN
Status: DISCONTINUED | OUTPATIENT
Start: 2020-06-30 | End: 2020-06-30 | Stop reason: ALTCHOICE

## 2020-06-30 RX ORDER — ONDANSETRON 2 MG/ML
4 INJECTION INTRAMUSCULAR; INTRAVENOUS ONCE
Status: DISCONTINUED | OUTPATIENT
Start: 2020-06-30 | End: 2020-06-30 | Stop reason: HOSPADM

## 2020-06-30 RX ORDER — MIDAZOLAM HYDROCHLORIDE 1 MG/ML
2 INJECTION INTRAMUSCULAR; INTRAVENOUS
Status: DISCONTINUED | OUTPATIENT
Start: 2020-06-30 | End: 2020-06-30 | Stop reason: HOSPADM

## 2020-06-30 RX ORDER — EPINEPHRINE 1 MG/ML(1)
AMPUL (ML) INJECTION PRN
Status: DISCONTINUED | OUTPATIENT
Start: 2020-06-30 | End: 2020-06-30 | Stop reason: ALTCHOICE

## 2020-06-30 RX ADMIN — PROPOFOL 20 MG: 10 INJECTION, EMULSION INTRAVENOUS at 16:31

## 2020-06-30 RX ADMIN — SODIUM CHLORIDE, POTASSIUM CHLORIDE, SODIUM LACTATE AND CALCIUM CHLORIDE: 600; 310; 30; 20 INJECTION, SOLUTION INTRAVENOUS at 13:42

## 2020-06-30 RX ADMIN — LIDOCAINE HYDROCHLORIDE 50 MG: 10 INJECTION, SOLUTION EPIDURAL; INFILTRATION; INTRACAUDAL; PERINEURAL at 15:54

## 2020-06-30 RX ADMIN — SODIUM CHLORIDE, POTASSIUM CHLORIDE, SODIUM LACTATE AND CALCIUM CHLORIDE 1000 ML: 600; 310; 30; 20 INJECTION, SOLUTION INTRAVENOUS at 13:42

## 2020-06-30 RX ADMIN — HYDROMORPHONE HYDROCHLORIDE 0.5 MG: 1 INJECTION, SOLUTION INTRAMUSCULAR; INTRAVENOUS; SUBCUTANEOUS at 14:52

## 2020-06-30 RX ADMIN — HYDROMORPHONE HYDROCHLORIDE 0.5 MG: 1 INJECTION, SOLUTION INTRAMUSCULAR; INTRAVENOUS; SUBCUTANEOUS at 17:30

## 2020-06-30 RX ADMIN — HYDROMORPHONE HYDROCHLORIDE 0.5 MG: 1 INJECTION, SOLUTION INTRAMUSCULAR; INTRAVENOUS; SUBCUTANEOUS at 17:25

## 2020-06-30 RX ADMIN — FENTANYL CITRATE 50 MCG: 50 INJECTION, SOLUTION INTRAMUSCULAR; INTRAVENOUS at 16:54

## 2020-06-30 RX ADMIN — Medication 5 MG: at 16:38

## 2020-06-30 RX ADMIN — DIPHENHYDRAMINE HYDROCHLORIDE 12.5 MG: 50 INJECTION, SOLUTION INTRAMUSCULAR; INTRAVENOUS at 16:23

## 2020-06-30 RX ADMIN — FENTANYL CITRATE 50 MCG: 50 INJECTION, SOLUTION INTRAMUSCULAR; INTRAVENOUS at 17:10

## 2020-06-30 RX ADMIN — SODIUM CHLORIDE, POTASSIUM CHLORIDE, SODIUM LACTATE AND CALCIUM CHLORIDE: 600; 310; 30; 20 INJECTION, SOLUTION INTRAVENOUS at 16:37

## 2020-06-30 RX ADMIN — PHENYLEPHRINE HYDROCHLORIDE 100 MCG: 10 INJECTION INTRAVENOUS at 16:05

## 2020-06-30 RX ADMIN — ONDANSETRON 4 MG: 2 INJECTION, SOLUTION INTRAMUSCULAR; INTRAVENOUS at 16:38

## 2020-06-30 RX ADMIN — FENTANYL CITRATE 25 MCG: 50 INJECTION INTRAMUSCULAR; INTRAVENOUS at 16:44

## 2020-06-30 RX ADMIN — FENTANYL CITRATE 25 MCG: 50 INJECTION INTRAMUSCULAR; INTRAVENOUS at 16:16

## 2020-06-30 RX ADMIN — Medication 10 MG: at 16:01

## 2020-06-30 RX ADMIN — DEXAMETHASONE SODIUM PHOSPHATE 10 MG: 10 INJECTION INTRAMUSCULAR; INTRAVENOUS at 16:15

## 2020-06-30 RX ADMIN — Medication 5 MG: at 16:24

## 2020-06-30 RX ADMIN — FENTANYL CITRATE 50 MCG: 50 INJECTION INTRAMUSCULAR; INTRAVENOUS at 15:54

## 2020-06-30 RX ADMIN — Medication 10 MG: at 16:05

## 2020-06-30 RX ADMIN — CEFAZOLIN SODIUM 2 G: 2 SOLUTION INTRAVENOUS at 16:03

## 2020-06-30 RX ADMIN — FENTANYL CITRATE 50 MCG: 50 INJECTION, SOLUTION INTRAMUSCULAR; INTRAVENOUS at 17:00

## 2020-06-30 RX ADMIN — FENTANYL CITRATE 50 MCG: 50 INJECTION, SOLUTION INTRAMUSCULAR; INTRAVENOUS at 17:05

## 2020-06-30 RX ADMIN — PROPOFOL 150 MG: 10 INJECTION, EMULSION INTRAVENOUS at 15:54

## 2020-06-30 RX ADMIN — Medication 0.5 MG: at 14:52

## 2020-06-30 ASSESSMENT — PULMONARY FUNCTION TESTS
PIF_VALUE: 6
PIF_VALUE: 20
PIF_VALUE: 10
PIF_VALUE: 6
PIF_VALUE: 8
PIF_VALUE: 1
PIF_VALUE: 16
PIF_VALUE: 9
PIF_VALUE: 10
PIF_VALUE: 7
PIF_VALUE: 9
PIF_VALUE: 8
PIF_VALUE: 9
PIF_VALUE: 8
PIF_VALUE: 8
PIF_VALUE: 3
PIF_VALUE: 8
PIF_VALUE: 9
PIF_VALUE: 2
PIF_VALUE: 1
PIF_VALUE: 11
PIF_VALUE: 10
PIF_VALUE: 22
PIF_VALUE: 10
PIF_VALUE: 1
PIF_VALUE: 8
PIF_VALUE: 9
PIF_VALUE: 10
PIF_VALUE: 8
PIF_VALUE: 7
PIF_VALUE: 10
PIF_VALUE: 1
PIF_VALUE: 7
PIF_VALUE: 7
PIF_VALUE: 6
PIF_VALUE: 7
PIF_VALUE: 8
PIF_VALUE: 10
PIF_VALUE: 7
PIF_VALUE: 3
PIF_VALUE: 1
PIF_VALUE: 1
PIF_VALUE: 9
PIF_VALUE: 6
PIF_VALUE: 7
PIF_VALUE: 6
PIF_VALUE: 9
PIF_VALUE: 10

## 2020-06-30 ASSESSMENT — PAIN SCALES - GENERAL
PAINLEVEL_OUTOF10: 7
PAINLEVEL_OUTOF10: 8
PAINLEVEL_OUTOF10: 8
PAINLEVEL_OUTOF10: 7
PAINLEVEL_OUTOF10: 6
PAINLEVEL_OUTOF10: 8
PAINLEVEL_OUTOF10: 9
PAINLEVEL_OUTOF10: 8

## 2020-06-30 ASSESSMENT — PAIN DESCRIPTION - LOCATION: LOCATION: FOOT

## 2020-06-30 ASSESSMENT — PAIN DESCRIPTION - ORIENTATION: ORIENTATION: LEFT

## 2020-06-30 ASSESSMENT — PAIN DESCRIPTION - PAIN TYPE: TYPE: SURGICAL PAIN

## 2020-06-30 ASSESSMENT — LIFESTYLE VARIABLES: SMOKING_STATUS: 1

## 2020-06-30 ASSESSMENT — PAIN - FUNCTIONAL ASSESSMENT: PAIN_FUNCTIONAL_ASSESSMENT: 0-10

## 2020-06-30 NOTE — ANESTHESIA PRE PROCEDURE
Problem List   Diagnosis Code    Neoplasm of uncertain behavior of skin D48.5    Other plastic surgery for unacceptable cosmetic appearance Z41.1    Chronic bilateral low back pain without sciatica M54.5, G89.29    Mass of finger of right hand R22.31    Chronic ulcer of left foot with fat layer exposed ([address] L97.522    Pain in left foot M79.672    Hypertension I10    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) L97.523    Wound, open, foot with complication, left, initial encounter I14.436N       Past Medical History:        Diagnosis Date    Arthritis     knees hips hands shoulders and back    CAD (coronary artery disease) 2019    blockage on cath 9/2019, to have stenting after surgery (arthroplasty) Dr. Zack Minaya Chronic back pain     Hyperlipidemia     per Dr. Jeanine Garcia on rx    Hypertension     Usama Escobar manages    Irregular heart beat     LAFB (left anterior fascicular block)     Dr. Jeanine Garcia, cardiologist, seen 9/2019 prior to surgery on 10/8/2019    Lumbar disc disease     Osteoporosis     PAC (premature atrial contraction) 06/2018    PACs and PVCs on holter monitor,     PONV (postoperative nausea and vomiting)     requests scop patch    Wears dentures     upper partial    Wears eyeglasses     readers    Wears partial dentures     upper partial    Wears reading eyeglasses     Wellness examination     MIKE Atkins, STEPHANIE PCP, seen 2/10/2020    Wellness examination     Usama Escobar Has appt. next week.  Last seen 2019       Past Surgical History:        Procedure Laterality Date    ABDOMEN SURGERY      tummy tuck 1997    ABDOMINOPLASTY  1997    BLEPHAROPLASTY Bilateral 1997    BREAST ENHANCEMENT SURGERY Bilateral 1999    [facility]   [facility] Dr. Jeanine Garcia [facility]    CATARACT REMOVAL WITH IMPLANT Bilateral 2016    COLONOSCOPY  2015    No Polyps    EYE SURGERY      cataract removal with IOLS    FINGER SURGERY Right     thumb lesion excised change, hyperlipidemia      ECG reviewed  Rhythm: regular  Rate: normal           Beta Blocker:  Dose within 24 Hrs         Neuro/Psych:   Negative Neuro/Psych ROS              GI/Hepatic/Renal: Neg GI/Hepatic/Renal ROS            Endo/Other: Negative Endo/Other ROS                    Abdominal:           Vascular:                                   EKG 1/2/2020  Normal sinus rhythm  Incomplete right bundle branch block  Left anterior fascicular block  Abnormal ECG  When compared with ECG of 25-SEP-2019 11:35,  No significant change was found    Cath 9/11/2019   Angiographic Findings      Cardiac Arteries and Lesion Findings     LMCA: Normal 0% stenosis.     LAD: Mild irregularities 10-20%. 60% bifurcation stenosis at the first  diagonal branch involving the ostium of the first diagonal branch.     LCx: Ostial 40% stenosis.     RCA: MId vessel 80% stenosis.      Coronary Tree      Dominance: Right       Anesthesia Plan      general     ASA 3     (Patient with known 2-vessel CAD for which elective revascularization was recommended. Patient has not yet undergone revascularization due to knee pain and desire to undergo knee TKA prior to cardiac intervention. MAC vs GA LMA. Scopolamine patch in pre-op. Will administer 2 IV agents during case.)  Induction: intravenous. MIPS: Postoperative opioids intended and Prophylactic antiemetics administered. Anesthetic plan and risks discussed with patient.       Plan discussed with CRNA and surgical team.                  Mayra Juan MD   6/30/2020

## 2020-06-30 NOTE — OP NOTE
Operative Note      Patient: Isa Velazquez  YOB: 1955  MRN: [id number]    Date of Procedure: 6/30/2020    Pre-Op Diagnosis: NON HEALING CHRONIC WOUND LEFT FOOT    Post-Op Diagnosis: Same       Procedure(s):  DEBRIDEMENT, SKIN GRAFT SPLIT THICKNESS FOOT  (Marion Loredo)  Total sharp excisional debridement 10 x 8 cm with 10 x 8 cm split-thickness skin graft. Surgeon(s): Sharmila Duggan MD    Assistant:  * No surgical staff found *    Anesthesia: General    Estimated Blood Loss (mL): Minimal    Complications: None    Specimens:   * No specimens in log *    Implants:  * No implants in log *      Drains:   Negative Pressure Wound Therapy Foot Anterior; Left (Active)       Findings: Good granulation tissue. No exposed tendon. No foul odor. No evidence of infection. Indications:  Patient presents with a chronic wound on the dorsum of her left foot. She has undergone multiple previous debridements and skin grafting as well as attempted conservative measures for healing. She has failed to heal her wound. She now presents for another debridement and split-thickness skin grafting. Patient understands the risks involved including but not limited to the risk of infection, bleeding, scar formation, failure of graft take, need for reoperation and wished to proceed. Procedure: The patient was brought into the operating room and placed under general anesthesia. An LMA was inserted without difficulty. Her left thigh donor site and left foot wound were prepped and draped in sterile fashion. A split-thickness skin graft was harvested from the left thigh using a dermatome at 11 one thousandths of and inch. The skin graft was meshed in a 1-1 and half fashion. The wound on the left foot was debrided sharply using the Nexus system in order to reduce biofilm and hopefully increase the chance of graft take. The wound was copiously irrigated with Aricept. There was good granulation tissue.   No exposed tendon. The skin graft was placed on the wound in a meshed but not expanded fashion and stapled into place after it was cut to fit. Total sharp excisional debridement size measuring 10 x 8 cm. Total split-thickness skin graft site measuring 10 x 8 cm. Gentamicin with Adaptic as well as a bulky dressing with Kerlix and an Ace wrap were placed for compressive dressing overlying the skin graft. The donor site was dressed with a Mepilex sheet and wrapped with Kerlix. Patient tolerated the procedure well and was taken to postop recovery in stable condition.     Electronically signed by Kaylie Shay MD on 6/30/2020 at 4:42 PM

## 2020-06-30 NOTE — INTERVAL H&P NOTE
Pt Name: Sakina Hernandez  MRN: [id number]  YOB: 1955  Date of evaluation: 6/30/2020    I have reviewed the patient's history and physical examination completed in pre-admission testing on 6/16/20. No changes to history or on examination today, unless noted below. Negative covid-19 screening on 6/27/20. PCP progress note in epic from 6/22/20. No other changes.      MARNI PATTON APRN-CNP  6/30/20  1:33 PM

## 2020-06-30 NOTE — BRIEF OP NOTE
Brief Postoperative Note      Patient: Chava Putnam  YOB: 1955  MRN: [id number]    Date of Procedure: 6/30/2020    Pre-Op Diagnosis: NON HEALING CHRONIC WOUND LEFT FOOT    Post-Op Diagnosis: Same       Procedure(s):  DEBRIDEMENT, SKIN GRAFT SPLIT THICKNESS FOOT  (Jack Escamilla)    Surgeon(s): Juno Gardner MD    Assistant:  * No surgical staff found *    Anesthesia: General    Estimated Blood Loss (mL): Minimal    Complications: None    Specimens:   * No specimens in log *    Implants:  * No implants in log *      Drains:   Negative Pressure Wound Therapy Foot Anterior; Left (Active)       Findings: Good granulation tissue. No exposed tendon. No foul odor. No evidence of infection.     Electronically signed by Juno Gardner MD on 6/30/2020 at 4:42 PM

## 2020-07-14 ENCOUNTER — TELEPHONE (OUTPATIENT)
Dept: ORTHOPEDIC SURGERY | Age: 65
End: 2020-07-14

## 2020-07-28 RX ORDER — FOLIC ACID 1 MG/1
TABLET ORAL
Qty: 90 TABLET | Refills: 1 | Status: SHIPPED | OUTPATIENT
Start: 2020-07-28 | End: 2021-01-01

## 2020-07-28 NOTE — TELEPHONE ENCOUNTER
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)     Wound, open, foot with complication, left, initial encounter

## 2020-08-07 ENCOUNTER — OFFICE VISIT (OUTPATIENT)
Dept: ORTHOPEDIC SURGERY | Age: 65
End: 2020-08-07
Payer: COMMERCIAL

## 2020-08-07 VITALS — HEIGHT: 66 IN | BODY MASS INDEX: 24.91 KG/M2 | WEIGHT: 155 LBS

## 2020-08-07 PROCEDURE — 3017F COLORECTAL CA SCREEN DOC REV: CPT | Performed by: PHYSICIAN ASSISTANT

## 2020-08-07 PROCEDURE — 99212 OFFICE O/P EST SF 10 MIN: CPT | Performed by: PHYSICIAN ASSISTANT

## 2020-08-07 PROCEDURE — G8427 DOCREV CUR MEDS BY ELIG CLIN: HCPCS | Performed by: PHYSICIAN ASSISTANT

## 2020-08-07 PROCEDURE — 1036F TOBACCO NON-USER: CPT | Performed by: PHYSICIAN ASSISTANT

## 2020-08-07 PROCEDURE — G8419 CALC BMI OUT NRM PARAM NOF/U: HCPCS | Performed by: PHYSICIAN ASSISTANT

## 2020-08-07 NOTE — LETTER
Maya Cho PA-C, ATC  [facility]  [facility]  [address] [facility] [address]  Dept: [phone]  Dept Fax: [phone]  8/7/2020    Patient: Arian Sutherland  YOB: 1955    Dear NUPUR Lo CNP,    I had the pleasure of seeing one of your patients, Dave Velásquez today in the office. Below are the relevant portions of my assessment and plan of care. IMPRESSION:  1. Primary osteoarthritis of right knee      PLAN:  I personally reviewed x-ray images from 6/12/2020 of the right knee with the patient in office today, revealing significant right knee arthritis. Patient is still a good candidate for right total knee arthroplasty with Dr. Abena Ghotra, . At this time patient needs to have her left foot wound completely healed prior to undergoing right knee surgery. Due to the ineffectiveness of the corticosteroid injection in the right knee on 6/12/2020, I would like to submit a prior authorization for Visco supplemental injections for the patient's right knee. The patient is also failed previous treatments of Tylenol, physical therapy, home exercise program and other NSAID medications. Overall I believe she would benefit from 3 part series. Our office staff will begin the prior authorization process. We will call Cole Nicole when they receive their approval for the gel injections. Patient will follow-up in our office for Right knee Visco supplemental injection. Patient was instructed to call the office with any questions or concerns prior to her next visit. Thank you for allowing me to participate in the care of this patient. I will keep you updated on this patient's follow up and I look forward to serving you and your patients again in the future.           Maya Cho PA-C

## 2020-08-07 NOTE — PROGRESS NOTES
[facility]  [facility]  [address] [facility] [address]  Dept: [phone]  Dept Fax: [phone]        Ambulatory Follow Up      Subjective:   Balaji Guerrero is a 62y.o. year old female who presents to our office today for routine followup regarding her   1. Primary osteoarthritis of right knee        Chief Complaint   Patient presents with    Knee Pain     right        HPI Balaji Guerrero  is a 62 y.o. female who presents today in follow for chronic right knee pain. The patient was last seen on 6/12/2020 and underwent treatment in the form of right knee corticosteroid injection, home exercise program for bilateral lower extremity strengthening. The patient notes 0% improvement with the previous treatment. Patient states that since her last visit on 6/12/2020 she has not noticed any improvement in her right knee symptoms. The patient states that previously she has tried Tylenol, ibuprofen, and Aleve to decrease her right knee pain, none of which has improved her symptoms. She mentions that she has an allergy to Celebrex, Ibuprofen, Meloxicam and other NSAIDs because it causes GI discomfort. Patient states she has been doing her home exercise program daily, but her right knee pain has been staying the same or increasing which is made it difficult for her to complete the exercises. Patient states that in the past she has been in formal physical therapy for her right knee discomfort, during that time she states that it minimally helped her right knee discomfort. Patient recently had a skin graft for the dorsum of the left foot, and is still being managed by plastic surgery and wound care to heal the wound on the dorsum of the left foot. Patient states that she would overall like to have her right knee replaced, but knows that she needs to have the left foot wound completely healed prior to any surgery.   The patient is still interested in proceeding with Visco supplemental injections for her right knee during this time of healing. Patient arrived today with using a walker with an Ace wrap and surgical Shoe covering over the left foot. Review of Systems   Constitutional: Negative for activity change and fever. HENT: Negative for sneezing. Respiratory: Negative for cough and shortness of breath. Cardiovascular: Negative for chest pain. Gastrointestinal: Negative for vomiting. Musculoskeletal: Positive for arthralgias (right knee), back pain (chronic) and joint swelling (right knee). Negative for myalgias. Skin: Positive for wound (dorsum left foot). Negative for color change. Neurological: Negative for weakness and numbness. Psychiatric/Behavioral: Negative for sleep disturbance. Objective :   Ht 5' 6\" (1.676 m)   Wt 155 lb (70.3 kg)   BMI 25.02 kg/m²  Body mass index is 25.02 kg/m². General: Tamica Huerta is a 62 y.o. female who is alert and oriented and sitting comfortably in our office. Ortho Exam  MS: Patient ambulates with a walker and an antalgic gait noted to the right lower extremity. Patient also has wound dressing, Ace wrap and surgical shoe covering on the left foot today in office. Evaluation of the right knee reveals no significant outward deformity. There is no erythema, warmth, skin lesions, signs of infection. There is tenderness over the medial and lateral joint line. There is a mild knee effusion noted today. Range of motion of the right knee is  with significant discomfort noted in extension. No instability of the knee is appreciated at 0 and 30 degrees of flexion. There is a negative anterior drawer and Lachman's test on the right. There is increased pain with valgus and varus Kd's testing. No calf tenderness is noted on the right. There is a negative hip logroll and Stinchfield test on the right.   Motor, sensory, vascular examination of the right lower extremity is grossly intact without focal deficits. The patient has full range of motion of the right ankle without discomfort noted. Neuro: alert and oriented to person and place. Eyes: Extra-ocular muscles intact  Mouth: Oral mucosa moist. No perioral lesions  Pulm: Respirations unlabored and regular. Symmetric chest excursion without outward deformity is noted. Skin: warm, well perfused  Psych:   Patient has good fund of knowledge and displays understanging of exam, diagnosis, and plan. Radiology:   XR KNEE RIGHT - 6/12/2020  History: Right knee pain         Findings: Standing AP, lateral, merchant, tunnel view x-rays of the right    knee done in the office today shows significant deformity of the proximal    medial tibia with sclerosis and depression of the articular surface    possibly consistent with osteonecrosis.  Significant medial joint space    narrowing with para-articular osteophytosis, joint line sclerosis,    subchondral cystic changes is appreciated.  No further evidence of    fracture, subluxation, dislocation, radiopaque foreign body, radiopaque    tumors noted.         Impression: Right knee severe degenerative changes with deformity proximal    medial tibia as described above. Assessment:      1. Primary osteoarthritis of right knee       Plan:        I personally reviewed x-ray images from 6/12/2020 of the right knee with the patient in office today, revealing significant right knee arthritis. Patient is still a good candidate for right total knee arthroplasty with Dr. Chely Hall DO. At this time patient needs to have her left foot wound completely healed prior to undergoing right knee surgery. Due to the ineffectiveness of the corticosteroid injection in the right knee on 6/12/2020, I would like to submit a prior authorization for Visco supplemental injections for the patient's right knee.   The patient is also failed previous treatments of Tylenol, physical therapy, home exercise program and other NSAID medications. Overall I believe she would benefit from 3 part series. Our office staff will begin the prior authorization process. We will call Caron Ferrari when they receive their approval for the gel injections. Patient will follow-up in our office for Right knee Visco supplemental injection. Patient was instructed to call the office with any questions or concerns prior to her next visit. Follow up:Return for Right knee Visco supplemental injection, pending approval.      No orders of the defined types were placed in this encounter. No orders of the defined types were placed in this encounter.     This note is created with the assistance of a speech recognition program.  While intending to generate a document that actually reflects the content of the visit, the document can still have some errors including those of syntax and sound a like substitutions which may escape proof reading.  In such instances, actual meaning can be extrapolated by contextual diversion      Electronically signed by Lorenzo Whalen PA-C on 8/8/2020 at 10:40 AM

## 2020-08-08 ASSESSMENT — ENCOUNTER SYMPTOMS
COLOR CHANGE: 0
SHORTNESS OF BREATH: 0
COUGH: 0
BACK PAIN: 1
VOMITING: 0

## 2020-08-24 ENCOUNTER — OFFICE VISIT (OUTPATIENT)
Dept: ORTHOPEDIC SURGERY | Age: 65
End: 2020-08-24
Payer: COMMERCIAL

## 2020-08-24 VITALS — WEIGHT: 160 LBS | BODY MASS INDEX: 25.82 KG/M2

## 2020-08-24 PROCEDURE — 20610 DRAIN/INJ JOINT/BURSA W/O US: CPT | Performed by: PHYSICIAN ASSISTANT

## 2020-08-24 ASSESSMENT — ENCOUNTER SYMPTOMS
SHORTNESS OF BREATH: 0
VOMITING: 0
COLOR CHANGE: 0
COUGH: 0

## 2020-08-24 NOTE — PROGRESS NOTES
[facility]  [place] [facility]  [address] [facility] [address]  Dept: [phone]  Dept Fax: [phone]        Ambulatory Follow Up      Subjective:   Antonette Gaston is a 62y.o. year old female who presents to our office today for routine followup regarding her   1. Primary osteoarthritis of right knee        Chief Complaint   Patient presents with    Knee Pain     right- synvisc one injection       HPI Antonette Gaston  is a 62 y.o. female who presents today in follow for chronic right knee pain. The patient was last seen on 8/7/2020 and underwent treatment in the form of continuing her home exercise program for bilateral lower extremity strengthening. The patient notes 0% improvement with the previous treatment. Patient states that since her last visit visit  she has not noticed any improvement in her right knee symptoms. The patient states that previously she has tried Tylenol, ibuprofen, and Aleve to decrease her right knee pain, none of which has improved her symptoms. She mentions that she has an allergy to Celebrex, Ibuprofen, Meloxicam and other NSAIDs because it causes GI discomfort. Patient states she has been doing her home exercise program daily, but her right knee pain has been staying the same or increasing which is made it difficult for her to complete the exercises. Patient states that in the past she has been in formal physical therapy for her right knee discomfort, during that time she states that it minimally helped her right knee discomfort. The patient admits to 9/10 right knee pain today.        Patient recently had a skin graft for the dorsum of the left foot, and is still being managed by Dr. Adrienne Kim with Plastic surgery and Dr. Damaris Shah with Wound care to heal the wound on the dorsum of the left foot.   Patient states that she would overall like to have her right knee replaced, but knows that she needs to have the left foot wound completely healed prior tibia with sclerosis and depression of the articular surface    possibly consistent with osteonecrosis.  Significant medial joint space    narrowing with para-articular osteophytosis, joint line sclerosis,    subchondral cystic changes is appreciated.  No further evidence of    fracture, subluxation, dislocation, radiopaque foreign body, radiopaque    tumors noted.         Impression: Right knee severe degenerative changes with deformity proximal    medial tibia as described above. Procedure: After informed consent was obtained verbally, the Right knee was sterilely prepped with Betadine and locally anesthetized with ethyl chloride spray at the lateral joint line just lateral to the patellar tendon. The knee was then injected through this injection site with a  Synvisc One prefilled syringe injection. The patient tolerated the procedure well without postinjection complications. A sterile dressing was applied. Assessment:      1. Primary osteoarthritis of right knee       Plan:     Patient was here today for previously scheduled right knee Visco supplemental injection. The right knee was injected with a SynviscOne prefilled syringe today in office. Patient tolerated the procedure without complication. At this time the patient is still a candidate for a right total knee arthroplasty with Dr. Iman Vu, DO - but we need to wait for clearance in regards to her left foot wound prior to proceeding with scheduling the right knee surgery. The patient was instructed to call our office when she is cleared to proceed. The patient will follow-up in office as needed for right knee discomfort. The patient was instructed to call the office with any questions or concerns. Follow up:Return if symptoms worsen or fail to improve. No orders of the defined types were placed in this encounter. No orders of the defined types were placed in this encounter.       This note is created with the assistance of a speech recognition program.  While intending to generate a document that actually reflects the content of the visit, the document can still have some errors including those of syntax and sound a like substitutions which may escape proof reading.  In such instances, actual meaning can be extrapolated by contextual diversion      Electronically signed by Sachin Merlos PA-C on 8/24/2020 at 2:34 PM

## 2020-09-25 ENCOUNTER — OFFICE VISIT (OUTPATIENT)
Dept: ORTHOPEDIC SURGERY | Age: 65
End: 2020-09-25

## 2020-09-25 VITALS — BODY MASS INDEX: 25.23 KG/M2 | WEIGHT: 157 LBS | HEIGHT: 66 IN

## 2020-09-25 PROCEDURE — PREOPEXAM PRE-OP EXAM: Performed by: ORTHOPAEDIC SURGERY

## 2020-09-25 ASSESSMENT — ENCOUNTER SYMPTOMS
DIARRHEA: 0
NAUSEA: 0
COUGH: 0
CONSTIPATION: 0

## 2020-09-25 NOTE — PROGRESS NOTES
[facility]  [address]  Dept: [phone]  Dept Fax: [phone]        Ambulatory Follow Up      Subjective:   Oliverio Sal is a 62y.o. year old female who presents to our office today for routine followup regarding her   1. Primary osteoarthritis of right knee    2. Pre-op testing    . Chief Complaint   Patient presents with    Knee Pain     RT / DISCUSS POSSIBLE SURGERY       HPI-Patient is here today for right knee arthritis follow up. Patient was scheduled for a right total knee arthroplasty in November 2019 but had to postpone due to other medical issues. Patient has failed conservative treatment with corticosteroid injection and visco-supplmentation. She would like to get re-scheduled for her right total knee replacement as she is cleared from her recent foot surgery and by cardiology. Patient recently had a skin graft for the dorsum of the left foot, and is still being managed by Dr. Kenneth Frazier with Plastic surgery and Dr. Arnol Kemp with Wound care to heal the wound on the dorsum of the left foot. Review of Systems   Constitutional: Negative for chills and fever. Respiratory: Negative for cough. Gastrointestinal: Negative for constipation, diarrhea and nausea. Musculoskeletal: Positive for arthralgias (right knee). Negative for gait problem, joint swelling and myalgias. Neurological: Negative for dizziness, weakness and numbness. I have reviewed the CC, HPI, ROS, PMH, FHX, Social History, and if not present in this note, I have reviewed in the patient's chart. I agree with the documentation provided by other staff and have reviewed their documentation prior to providing my signature indicating agreement. Objective :   Ht 5' 6\" (1.676 m)   Wt 157 lb (71.2 kg)   BMI 25.34 kg/m²  Body mass index is 25.34 kg/m².   General: Oliverio Sal is a 62 y.o. female who is alert and oriented and sitting comfortably in our office. Ortho Exam  MS:  Evaluation of the Right knee reveals no significant outward deformity. There is no erythema, warmth, skin lesions, signs of infection. There is tenderness over the medial joint line. There is a mildknee effusion. Range of motion of the Right knee is  10-full. No instability of the knee is appreciated at 0 and 30° of flexion. There is a negative anterior drawer Lachman's test.  There is increased pain with varus Kd's testing. No calf tenderness is noted. There is a negative hip log roll and Stinchfield test.  Motor, sensory, vascular examination to the Right lower extremity is intact. Patient has full range of motion of the ankle. Neuro: alert and oriented to person and place. Eyes: Extra-ocular muscles intact  Mouth: Oral mucosa moist. No perioral lesions  Pulm: Respirations unlabored and regular. Symmetric chest excursion without outward deformity is noted. Skin: warm, well perfused  Psych:   Patient has good fund of knowledge and displays understanging of exam, diagnosis, and plan. Radiology:     No results found. Assessment:      1. Primary osteoarthritis of right knee    2. Pre-op testing       Plan:      Discussed etiology and natural history of right knee arthritis. The treatment options may include oral anti-inflammatories, bracing, injections, advanced imaging, activity modification, physical therapy and/or surgical intervention. The patient would like to proceed with right total knee replacement. The patient will follow up 2 weeks post op. We discussed that the patient should call us with any concerns or questions. All details of the procedure, as well as risks, benefits and alternatives, including the option of non operative versus operative treatment were discussed.   The patient understands that risks of the surgery include but are not limited to: bleeding, malunion/nonunion, loss of fixation, loss of reduction, hardware failure, angular or rotational deformity, length discrepancy, limp, transfusion, skin blistering or breakdown, progressive post traumatic degenerative joint disease, possible need for further surgery, bone grafting, infection, nerve injury, paralysis, numbness, blood vessel injury, excessive scaring, wound complication or breakdown, failure of symptoms to improve or actual deterioration in condition, significant acute and/or chronic pain, possible need for amputation, permanent loss of motion, and permanent loss of function. As well as the general complications of anesthesia, which include but are not limited to: myocardial infarction and/or heart attack, stroke, multi organ system failure or even possible death, prolonged hospital stay, blood clots, pulmonary embolism, abnormal reaction to medication, visual and neurological disturbances, constipation, ischemic bowel, bowel obstruction, bowel perforation, ileus and mental status changes. No guarantees were made. Follow up:Return for Two weeks post op. No orders of the defined types were placed in this encounter.         Orders Placed This Encounter   Procedures    MRSA DNA Probe, Nasal     Bilateral Nares     Standing Status:   Future     Standing Expiration Date:   9/26/2021    XR CHEST (2 VW)     Standing Status:   Future     Standing Expiration Date:   10/15/2021     Order Specific Question:   Reason for exam:     Answer:   pre-op    Hemoglobin A1C     Standing Status:   Future     Standing Expiration Date:   9/25/2021    Comprehensive Metabolic Panel     Standing Status:   Future     Standing Expiration Date:   1/23/2021    CBC     Standing Status:   Future     Standing Expiration Date:   9/26/2021    Urinalysis     Standing Status:   Future     Standing Expiration Date:   9/26/2021    EKG 12 Lead     Standing Status:   Future     Standing Expiration Date:   9/26/2021     Order Specific Question:   Reason for Exam?     Answer:   Pre-op     Gianluca ELY RN am scribing for and in the presence of Dr. Renetta Coughlin  9/27/2020 2:30 PM      I have reviewed and made changes accordingly to the work scribed by Raisa Sylvester RN. The documentation accurately reflects work and decisions made by me. I have also reviewed documentation completed by clinical staff.     Renetta Coughlin DO, [facility]  9/27/2020 2:30 PM    This note is created with the assistance of a speech recognition program.  While intending to generate a document that actually reflects the content of the visit, the document can still have some errors including those of syntax and sound a like substitutions which may escape proof reading.  In such instances, actual meaning can be extrapolated by contextual diversion      Electronically signed by Bogdan Abreu on 9/27/2020 at 2:30 PM

## 2020-09-29 ASSESSMENT — PROMIS GLOBAL HEALTH SCALE
HOW IS THE PROMIS V1.1 BEING ADMINISTERED?: 2
IN GENERAL, PLEASE RATE HOW WELL YOU CARRY OUT YOUR USUAL SOCIAL ACTIVITIES (INCLUDES ACTIVITIES AT HOME, AT WORK, AND IN YOUR COMMUNITY, AND RESPONSIBILITIES AS A PARENT, CHILD, SPOUSE, EMPLOYEE, FRIEND, ETC) [ON A SCALE OF 1 (POOR) TO 5 (EXCELLENT)]?: 3
WHO IS THE PERSON COMPLETING THE PROMIS V1.1 SURVEY?: 0
IN GENERAL, HOW WOULD YOU RATE YOUR PHYSICAL HEALTH [ON A SCALE OF 1 (POOR) TO 5 (EXCELLENT)]?: 4
IN GENERAL, HOW WOULD YOU RATE YOUR SATISFACTION WITH YOUR SOCIAL ACTIVITIES AND RELATIONSHIPS [ON A SCALE OF 1 (POOR) TO 5 (EXCELLENT)]?: 3
IN THE PAST 7 DAYS, HOW WOULD YOU RATE YOUR PAIN ON AVERAGE [ON A SCALE FROM 0 (NO PAIN) TO 10 (WORST IMAGINABLE PAIN)]?: 9
IN GENERAL, WOULD YOU SAY YOUR HEALTH IS...[ON A SCALE OF 1 (POOR) TO 5 (EXCELLENT)]: 4
IN GENERAL, HOW WOULD YOU RATE YOUR MENTAL HEALTH, INCLUDING YOUR MOOD AND YOUR ABILITY TO THINK [ON A SCALE OF 1 (POOR) TO 5 (EXCELLENT)]?: 4
IN THE PAST 7 DAYS, HOW WOULD YOU RATE YOUR PAIN ON AVERAGE [ON A SCALE FROM 0 (NO PAIN) TO 10 (WORST IMAGINABLE PAIN)]?: 7
IN THE PAST 7 DAYS, HOW OFTEN HAVE YOU BEEN BOTHERED BY EMOTIONAL PROBLEMS, SUCH AS FEELING ANXIOUS, DEPRESSED, OR IRRITABLE [ON A SCALE FROM 1 (NEVER) TO 5 (ALWAYS)]?: 2
IN THE PAST 7 DAYS, HOW WOULD YOU RATE YOUR FATIGUE ON AVERAGE [ON A SCALE FROM 1 (NONE) TO 5 (VERY SEVERE)]?: 3
TO WHAT EXTENT ARE YOU ABLE TO CARRY OUT YOUR EVERYDAY PHYSICAL ACTIVITIES SUCH AS WALKING, CLIMBING STAIRS, CARRYING GROCERIES, OR MOVING A CHAIR [ON A SCALE OF 1 (NOT AT ALL) TO 5 (COMPLETELY)]?: 2
IN GENERAL, WOULD YOU SAY YOUR QUALITY OF LIFE IS...[ON A SCALE OF 1 (POOR) TO 5 (EXCELLENT)]: 4

## 2020-09-29 ASSESSMENT — KOOS JR
STRAIGHTENING KNEE FULLY: 4
TWISING OR PIVOTING ON KNEE: 4
HOW SEVERE IS YOUR KNEE STIFFNESS AFTER FIRST WAKING IN MORNING: 4
GOING UP OR DOWN STAIRS: 4
RISING FROM SITTING: 3
BENDING TO THE FLOOR TO PICK UP OBJECT: 1
STANDING UPRIGHT: 3

## 2020-09-30 ENCOUNTER — HOSPITAL ENCOUNTER (OUTPATIENT)
Dept: GENERAL RADIOLOGY | Age: 65
Discharge: HOME | End: 2020-10-02
Payer: COMMERCIAL

## 2020-09-30 ENCOUNTER — HOSPITAL ENCOUNTER (OUTPATIENT)
Dept: PREADMISSION TESTING | Age: 65
Discharge: HOME | End: 2020-10-04
Payer: COMMERCIAL

## 2020-09-30 VITALS
SYSTOLIC BLOOD PRESSURE: 165 MMHG | DIASTOLIC BLOOD PRESSURE: 84 MMHG | BODY MASS INDEX: 24.76 KG/M2 | HEIGHT: 66 IN | WEIGHT: 154.08 LBS | OXYGEN SATURATION: 98 % | RESPIRATION RATE: 20 BRPM | TEMPERATURE: 96.3 F | HEART RATE: 70 BPM

## 2020-09-30 LAB
ALBUMIN SERPL-MCNC: 4 G/DL (ref 3.5–5.2)
ALBUMIN/GLOBULIN RATIO: 1.4 (ref 1–2.5)
ALP BLD-CCNC: 93 U/L (ref 35–104)
ALT SERPL-CCNC: 11 U/L (ref 5–33)
ANION GAP SERPL CALCULATED.3IONS-SCNC: 12 MMOL/L (ref 9–17)
AST SERPL-CCNC: 11 U/L
BILIRUB SERPL-MCNC: 0.21 MG/DL (ref 0.3–1.2)
BILIRUBIN URINE: NEGATIVE
BUN BLDV-MCNC: 15 MG/DL (ref 8–23)
BUN/CREAT BLD: ABNORMAL (ref 9–20)
CALCIUM SERPL-MCNC: 9.8 MG/DL (ref 8.6–10.4)
CHLORIDE BLD-SCNC: 105 MMOL/L (ref 98–107)
CO2: 22 MMOL/L (ref 20–31)
COLOR: ABNORMAL
COMMENT UA: ABNORMAL
CREAT SERPL-MCNC: 0.62 MG/DL (ref 0.5–0.9)
ESTIMATED AVERAGE GLUCOSE: 103 MG/DL
GFR AFRICAN AMERICAN: >60 ML/MIN
GFR NON-AFRICAN AMERICAN: >60 ML/MIN
GFR SERPL CREATININE-BSD FRML MDRD: ABNORMAL ML/MIN/{1.73_M2}
GFR SERPL CREATININE-BSD FRML MDRD: ABNORMAL ML/MIN/{1.73_M2}
GLUCOSE BLD-MCNC: 98 MG/DL (ref 70–99)
GLUCOSE URINE: NEGATIVE
HBA1C MFR BLD: 5.2 % (ref 4–6)
HCT VFR BLD CALC: 37.5 % (ref 36.3–47.1)
HEMOGLOBIN: 12.2 G/DL (ref 11.9–15.1)
KETONES, URINE: ABNORMAL
LEUKOCYTE ESTERASE, URINE: NEGATIVE
MCH RBC QN AUTO: 32.6 PG (ref 25.2–33.5)
MCHC RBC AUTO-ENTMCNC: 32.5 G/DL (ref 28.4–34.8)
MCV RBC AUTO: 100.3 FL (ref 82.6–102.9)
NITRITE, URINE: NEGATIVE
NRBC AUTOMATED: 0 PER 100 WBC
PDW BLD-RTO: 12.5 % (ref 11.8–14.4)
PH UA: 5.5 (ref 5–8)
PLATELET # BLD: 303 K/UL (ref 138–453)
PMV BLD AUTO: 9.9 FL (ref 8.1–13.5)
POTASSIUM SERPL-SCNC: 3.9 MMOL/L (ref 3.7–5.3)
PROTEIN UA: NEGATIVE
RBC # BLD: 3.74 M/UL (ref 3.95–5.11)
SODIUM BLD-SCNC: 139 MMOL/L (ref 135–144)
SPECIFIC GRAVITY UA: 1.03 (ref 1–1.03)
TOTAL PROTEIN: 6.8 G/DL (ref 6.4–8.3)
TURBIDITY: CLEAR
URINE HGB: NEGATIVE
UROBILINOGEN, URINE: NORMAL
WBC # BLD: 13.8 K/UL (ref 3.5–11.3)

## 2020-09-30 PROCEDURE — 71046 X-RAY EXAM CHEST 2 VIEWS: CPT

## 2020-09-30 PROCEDURE — 87641 MR-STAPH DNA AMP PROBE: CPT

## 2020-09-30 PROCEDURE — 80053 COMPREHEN METABOLIC PANEL: CPT

## 2020-09-30 PROCEDURE — 81003 URINALYSIS AUTO W/O SCOPE: CPT

## 2020-09-30 PROCEDURE — 83036 HEMOGLOBIN GLYCOSYLATED A1C: CPT

## 2020-09-30 PROCEDURE — 85027 COMPLETE CBC AUTOMATED: CPT

## 2020-09-30 PROCEDURE — 93005 ELECTROCARDIOGRAM TRACING: CPT

## 2020-09-30 RX ORDER — SODIUM CHLORIDE, SODIUM LACTATE, POTASSIUM CHLORIDE, CALCIUM CHLORIDE 600; 310; 30; 20 MG/100ML; MG/100ML; MG/100ML; MG/100ML
1000 INJECTION, SOLUTION INTRAVENOUS CONTINUOUS
Status: CANCELLED | OUTPATIENT
Start: 2020-09-30

## 2020-09-30 ASSESSMENT — PAIN DESCRIPTION - ONSET: ONSET: AWAKENED FROM SLEEP

## 2020-09-30 ASSESSMENT — PAIN SCALES - GENERAL: PAINLEVEL_OUTOF10: 8

## 2020-09-30 ASSESSMENT — PAIN DESCRIPTION - PAIN TYPE: TYPE: CHRONIC PAIN

## 2020-09-30 ASSESSMENT — PAIN DESCRIPTION - LOCATION: LOCATION: KNEE

## 2020-09-30 ASSESSMENT — PAIN DESCRIPTION - PROGRESSION: CLINICAL_PROGRESSION: GRADUALLY WORSENING

## 2020-09-30 ASSESSMENT — PAIN DESCRIPTION - FREQUENCY: FREQUENCY: CONTINUOUS

## 2020-09-30 ASSESSMENT — PAIN DESCRIPTION - DESCRIPTORS: DESCRIPTORS: THROBBING;ACHING;SHARP

## 2020-09-30 ASSESSMENT — PAIN - FUNCTIONAL ASSESSMENT: PAIN_FUNCTIONAL_ASSESSMENT: PREVENTS OR INTERFERES SOME ACTIVE ACTIVITIES AND ADLS

## 2020-09-30 ASSESSMENT — PAIN DESCRIPTION - ORIENTATION: ORIENTATION: RIGHT

## 2020-09-30 NOTE — H&P
History and Physical    Pt Name: Luiza Ray  MRN: [id number]  YOB: 1955  Date of evaluation: 9/30/2020    SUBJECTIVE:   History of Chief Complaint:    Patient complains of right knee pain. She says that she has had pain and issues with painful ambulation. She has had steroid injections without significant relief of pain. She has been scheduled for right knee total arthroplasty. Past Medical History    has a past medical history of Arthritis, CAD (coronary artery disease), Chronic back pain, GERD (gastroesophageal reflux disease), Hyperlipidemia, Hypertension, Incomplete RBBB, Irregular heart beat, LAFB (left anterior fascicular block), Leg wound, left, Lumbar disc disease, Osteoporosis, PAC (premature atrial contraction), PONV (postoperative nausea and vomiting), Wears dentures, Wears eyeglasses, Wears partial dentures, and Wellness examination. Past Surgical History   has a past surgical history that includes Knee arthroscopy (Left, 2006); Abdominoplasty (1997); blepharoplasty (Bilateral, 1997); laminectomy (1994); lumbar laminectomy (2011); lumbar laminectomy (2014); Fixation Kyphoplasty (2013); Finger surgery (Right); Colonoscopy (2015); pr office/outpt visit,procedure only (Right, 9/11/2018); Lumbar spine surgery (2005); Cataract removal with implant (Bilateral, 2016); other surgical history (10/03/2019); Foot Debridement (Left, 10/11/2019); Skin graft (Left, 12/3/2019); Leg Surgery (Left, 1/2/2020); Breast enhancement surgery (Bilateral, 1999); Skin graft (Left, 2/25/2020); fracture surgery; Tonsillectomy; Skin graft (Left, 6/30/2020); Cosmetic surgery; and Cardiac catheterization (2019). Medications  Prior to Admission medications    Medication Sig Start Date End Date Taking?  Authorizing Provider   folic acid (FOLVITE) 1 MG tablet take 1 tablet by mouth once daily  Patient taking differently: take 1 tablet by mouth once daily Xavier Perez 7/28/20  Yes NUPUR Greer - CNP (NEURONTIN) 300 MG capsule Take 300 mg by mouth 2 times daily. Per Dr. Evelyn Chaudhari 2/27/15  Yes Historical Provider, MD   Handicap Placard [address] [facility] by Does not apply route 20   NUPUR Valdez CNP     Allergies  has No Known Allergies. Family History  family history includes Arthritis in her mother; Coronary Art Dis in her father, mother, and sister; Heart Disease in her father; Heart Surgery in her father, mother, and sister. Social History   reports that she quit smoking about 8 months ago. Her smoking use included cigarettes. She started smoking about 52 years ago. She has a 12.50 pack-year smoking history. She has never used smokeless tobacco.   ppd for 50 years. Quit 2020. reports current alcohol use. Weekly. reports no history of drug use. Marital Status   Occupation retired from [address] [place] [address] Rd:   VITALS:  height is 5' 6\" (1.676 m) and weight is 154 lb 1.3 oz (69.9 kg). Her temporal temperature is 96.3 °F (35.7 °C). Her blood pressure is 165/84 (abnormal) and her pulse is 70. Her respiration is 20 and oxygen saturation is 98%. CONSTITUTIONAL:alert & oriented x 3, no acute distress. Very pleasant. Ambulates with walker. SKIN:  Warm and dry. Bandaged wound left lower extremity. HEAD:  Normocephalic, atraumatic. EYES: PERRL. EOMs intact. EARS:  Hearing grossly WNL. NOSE:  Nares patent. No rhinorrhea. MOUTH/THROAT:  benign  NECK:supple, no lymphadenopathy  LUNGS: Clear to auscultation bilaterally, no wheezes. CARDIOVASCULAR: Heart sounds are normal.  Regular rate and rhythm without murmur. ABDOMEN: soft, non tender, non distended. EXTREMITIES: no edema bilateral lower extremities. Testing:   EK20  Labs pending: drawn 2020   Chest XRay:  20    IMPRESSIONS:   1.  Right knee DJD/OA  2.  has a past medical history of Arthritis, CAD (coronary artery disease) (2019), Chronic back pain, GERD (gastroesophageal reflux disease), Hyperlipidemia, Hypertension, Incomplete RBBB, Irregular heart beat, LAFB (left anterior fascicular block), Leg wound, left, Lumbar disc disease, Osteoporosis, PAC (premature atrial contraction) (06/2018), PONV (postoperative nausea and vomiting), Wears dentures, Wears eyeglasses, Wears partial dentures, and Wellness examination. PLANS:   1.  Right knee total arthroplasty    JEANNIE DE PAZ PA-C  Electronically signed 9/30/2020 at 12:30 PM

## 2020-09-30 NOTE — H&P (VIEW-ONLY)
History and Physical    Pt Name: Jayme Martínez  MRN: [id number]  YOB: 1955  Date of evaluation: 9/30/2020    SUBJECTIVE:   History of Chief Complaint:    Patient complains of right knee pain. She says that she has had pain and issues with painful ambulation. She has had steroid injections without significant relief of pain. She has been scheduled for right knee total arthroplasty. Past Medical History    has a past medical history of Arthritis, CAD (coronary artery disease), Chronic back pain, GERD (gastroesophageal reflux disease), Hyperlipidemia, Hypertension, Incomplete RBBB, Irregular heart beat, LAFB (left anterior fascicular block), Leg wound, left, Lumbar disc disease, Osteoporosis, PAC (premature atrial contraction), PONV (postoperative nausea and vomiting), Wears dentures, Wears eyeglasses, Wears partial dentures, and Wellness examination. Past Surgical History   has a past surgical history that includes Knee arthroscopy (Left, 2006); Abdominoplasty (1997); blepharoplasty (Bilateral, 1997); laminectomy (1994); lumbar laminectomy (2011); lumbar laminectomy (2014); Fixation Kyphoplasty (2013); Finger surgery (Right); Colonoscopy (2015); pr office/outpt visit,procedure only (Right, 9/11/2018); Lumbar spine surgery (2005); Cataract removal with implant (Bilateral, 2016); other surgical history (10/03/2019); Foot Debridement (Left, 10/11/2019); Skin graft (Left, 12/3/2019); Leg Surgery (Left, 1/2/2020); Breast enhancement surgery (Bilateral, 1999); Skin graft (Left, 2/25/2020); fracture surgery; Tonsillectomy; Skin graft (Left, 6/30/2020); Cosmetic surgery; and Cardiac catheterization (2019). Medications  Prior to Admission medications    Medication Sig Start Date End Date Taking?  Authorizing Provider   folic acid (FOLVITE) 1 MG tablet take 1 tablet by mouth once daily  Patient taking differently: take 1 tablet by mouth once daily Mina Valdez 7/28/20  Yes NUPUR Charles - CNP vitamin C (ASCORBIC ACID) 500 MG tablet Take 500 mg by mouth 2 times daily   Yes Historical Provider, MD   Metoprolol Tartrate 37.5 MG TABS Take 37.5 mg by mouth 2 times daily  Patient taking differently: Take 37.5 mg by mouth 2 times daily Per Dawn Ocampo for palpitations 6/22/20  Yes NUPUR Freeman - PACO   diclofenac sodium (VOLTAREN) 1 % GEL Apply topically as needed Dr. David Meadows   Yes Historical Provider, MD   oxyCODONE-acetaminophen (PERCOCET) 5-325 MG per tablet Take 1 tablet by mouth every 6 hours as needed for Pain. Per Dr. David Meadows at pain management   Yes Historical Provider, MD   meclizine (ANTIVERT) 25 MG tablet Take 25 mg by mouth 3 times daily as needed Per Dawn Ocampo CNP   Yes Historical Provider, MD   aspirin 81 MG tablet Take 81 mg by mouth daily Stopping 10-14 days prior to surgery as instructed by surgeon and ok per Dr. Merle Markham (per pt.)   Yes Historical Provider, MD   atorvastatin (LIPITOR) 40 MG tablet Take 1 tablet by mouth nightly  Patient taking differently: Take 40 mg by mouth every morning Per Dr. Merle Markham 9/11/19  Yes Anjana Bravo MD   NAPROXEN PO Take 250 mg by mouth daily as needed Indications: patient takes 1-2 times a day Stopping 10 days prior to surgery as instructed by surgeon   Yes Historical Provider, MD   omeprazole (PRILOSEC) 40 MG delayed release capsule Take 1 tablet by mouth daily Per Dawn Ocampo   Yes Historical Provider, MD   Cholecalciferol (VITAMIN D3) 5000 units TABS Take 5,000 Units by mouth daily    Yes Historical Provider, MD   calcium carbonate (OSCAL) 500 MG TABS tablet Take 1,000 mg by mouth daily Per [facility] rheumatology   Yes Historical Provider, MD   alendronate (FOSAMAX) 70 MG tablet Take 70 mg by mouth every 7 days Indications: Mondays Dr. Severino Larsen 6/4/18  Yes Historical Provider, MD   traMADol Lyndia [facility]) 200 MG extended release tablet Take 1 tablet by mouth daily.  Dr. David Meadows pain management 8/26/16  Yes Katherine Mckeon MD   gabapentin Hyperlipidemia, Hypertension, Incomplete RBBB, Irregular heart beat, LAFB (left anterior fascicular block), Leg wound, left, Lumbar disc disease, Osteoporosis, PAC (premature atrial contraction) (06/2018), PONV (postoperative nausea and vomiting), Wears dentures, Wears eyeglasses, Wears partial dentures, and Wellness examination. PLANS:   1.  Right knee total arthroplasty    JEANNIE DE PAZ PA-C  Electronically signed 9/30/2020 at 12:30 PM

## 2020-10-01 LAB
EKG ATRIAL RATE: 62 BPM
EKG P AXIS: -15 DEGREES
EKG P-R INTERVAL: 180 MS
EKG Q-T INTERVAL: 430 MS
EKG QRS DURATION: 116 MS
EKG QTC CALCULATION (BAZETT): 436 MS
EKG R AXIS: -48 DEGREES
EKG T AXIS: 51 DEGREES
EKG VENTRICULAR RATE: 62 BPM
MRSA, DNA, NASAL: NORMAL
SPECIMEN DESCRIPTION: NORMAL

## 2020-10-01 PROCEDURE — 93010 ELECTROCARDIOGRAM REPORT: CPT | Performed by: INTERNAL MEDICINE

## 2020-10-02 ENCOUNTER — TELEPHONE (OUTPATIENT)
Dept: FAMILY MEDICINE CLINIC | Age: 65
End: 2020-10-02

## 2020-10-02 NOTE — TELEPHONE ENCOUNTER
Total knee scheduled for 10/13-[facility] asking for clearance-H&P, blood, ua, xray and ekg in chart.   Please advise

## 2020-10-09 ENCOUNTER — HOSPITAL ENCOUNTER (OUTPATIENT)
Dept: PREADMISSION TESTING | Age: 65
Setting detail: SPECIMEN
Discharge: HOME | End: 2020-10-13
Payer: COMMERCIAL

## 2020-10-09 PROCEDURE — U0003 INFECTIOUS AGENT DETECTION BY NUCLEIC ACID (DNA OR RNA); SEVERE ACUTE RESPIRATORY SYNDROME CORONAVIRUS 2 (SARS-COV-2) (CORONAVIRUS DISEASE [COVID-19]), AMPLIFIED PROBE TECHNIQUE, MAKING USE OF HIGH THROUGHPUT TECHNOLOGIES AS DESCRIBED BY CMS-2020-01-R: HCPCS

## 2020-10-11 LAB — SARS-COV-2, NAA: NOT DETECTED

## 2020-10-13 ENCOUNTER — ANESTHESIA EVENT (OUTPATIENT)
Dept: OPERATING ROOM | Age: 65
End: 2020-10-13
Payer: COMMERCIAL

## 2020-10-13 ENCOUNTER — APPOINTMENT (OUTPATIENT)
Dept: GENERAL RADIOLOGY | Age: 65
End: 2020-10-13
Attending: ORTHOPAEDIC SURGERY
Payer: COMMERCIAL

## 2020-10-13 ENCOUNTER — ANESTHESIA (OUTPATIENT)
Dept: OPERATING ROOM | Age: 65
End: 2020-10-13
Payer: COMMERCIAL

## 2020-10-13 ENCOUNTER — HOSPITAL ENCOUNTER (OUTPATIENT)
Age: 65
Setting detail: OBSERVATION
Discharge: HOME | End: 2020-10-15
Attending: ORTHOPAEDIC SURGERY | Admitting: ORTHOPAEDIC SURGERY
Payer: COMMERCIAL

## 2020-10-13 VITALS — SYSTOLIC BLOOD PRESSURE: 131 MMHG | TEMPERATURE: 97.6 F | OXYGEN SATURATION: 97 % | DIASTOLIC BLOOD PRESSURE: 77 MMHG

## 2020-10-13 PROBLEM — Z96.651 STATUS POST TOTAL KNEE REPLACEMENT USING CEMENT, RIGHT: Status: ACTIVE | Noted: 2020-10-13

## 2020-10-13 PROCEDURE — C1713 ANCHOR/SCREW BN/BN,TIS/BN: HCPCS | Performed by: ORTHOPAEDIC SURGERY

## 2020-10-13 PROCEDURE — 1200000000 HC SEMI PRIVATE

## 2020-10-13 PROCEDURE — 6370000000 HC RX 637 (ALT 250 FOR IP): Performed by: STUDENT IN AN ORGANIZED HEALTH CARE EDUCATION/TRAINING PROGRAM

## 2020-10-13 PROCEDURE — 64448 NJX AA&/STRD FEM NRV NFS IMG: CPT | Performed by: ANESTHESIOLOGY

## 2020-10-13 PROCEDURE — 6360000002 HC RX W HCPCS: Performed by: NURSE ANESTHETIST, CERTIFIED REGISTERED

## 2020-10-13 PROCEDURE — C1776 JOINT DEVICE (IMPLANTABLE): HCPCS | Performed by: ORTHOPAEDIC SURGERY

## 2020-10-13 PROCEDURE — 2580000003 HC RX 258: Performed by: STUDENT IN AN ORGANIZED HEALTH CARE EDUCATION/TRAINING PROGRAM

## 2020-10-13 PROCEDURE — 2580000003 HC RX 258: Performed by: ORTHOPAEDIC SURGERY

## 2020-10-13 PROCEDURE — 2709999900 HC NON-CHARGEABLE SUPPLY: Performed by: ORTHOPAEDIC SURGERY

## 2020-10-13 PROCEDURE — 3600000004 HC SURGERY LEVEL 4 BASE: Performed by: ORTHOPAEDIC SURGERY

## 2020-10-13 PROCEDURE — 7100000001 HC PACU RECOVERY - ADDTL 15 MIN: Performed by: ORTHOPAEDIC SURGERY

## 2020-10-13 PROCEDURE — 6360000002 HC RX W HCPCS: Performed by: ANESTHESIOLOGY

## 2020-10-13 PROCEDURE — 3700000001 HC ADD 15 MINUTES (ANESTHESIA): Performed by: ORTHOPAEDIC SURGERY

## 2020-10-13 PROCEDURE — 51798 US URINE CAPACITY MEASURE: CPT

## 2020-10-13 PROCEDURE — 7100000000 HC PACU RECOVERY - FIRST 15 MIN: Performed by: ORTHOPAEDIC SURGERY

## 2020-10-13 PROCEDURE — 3600000014 HC SURGERY LEVEL 4 ADDTL 15MIN: Performed by: ORTHOPAEDIC SURGERY

## 2020-10-13 PROCEDURE — 3700000000 HC ANESTHESIA ATTENDED CARE: Performed by: ORTHOPAEDIC SURGERY

## 2020-10-13 PROCEDURE — 6360000002 HC RX W HCPCS: Performed by: STUDENT IN AN ORGANIZED HEALTH CARE EDUCATION/TRAINING PROGRAM

## 2020-10-13 PROCEDURE — 2720000010 HC SURG SUPPLY STERILE: Performed by: ORTHOPAEDIC SURGERY

## 2020-10-13 PROCEDURE — G0378 HOSPITAL OBSERVATION PER HR: HCPCS

## 2020-10-13 PROCEDURE — 2500000003 HC RX 250 WO HCPCS: Performed by: NURSE ANESTHETIST, CERTIFIED REGISTERED

## 2020-10-13 PROCEDURE — 97530 THERAPEUTIC ACTIVITIES: CPT

## 2020-10-13 PROCEDURE — 97162 PT EVAL MOD COMPLEX 30 MIN: CPT

## 2020-10-13 PROCEDURE — 2500000003 HC RX 250 WO HCPCS: Performed by: ANESTHESIOLOGY

## 2020-10-13 PROCEDURE — 2580000003 HC RX 258: Performed by: ANESTHESIOLOGY

## 2020-10-13 PROCEDURE — 97166 OT EVAL MOD COMPLEX 45 MIN: CPT

## 2020-10-13 PROCEDURE — 73562 X-RAY EXAM OF KNEE 3: CPT

## 2020-10-13 PROCEDURE — 97535 SELF CARE MNGMENT TRAINING: CPT

## 2020-10-13 PROCEDURE — 27447 TOTAL KNEE ARTHROPLASTY: CPT | Performed by: ORTHOPAEDIC SURGERY

## 2020-10-13 DEVICE — CEMENT BONE 40GM HI VISC PALACOS R: Type: IMPLANTABLE DEVICE | Site: KNEE | Status: FUNCTIONAL

## 2020-10-13 DEVICE — COMPONENT TOT KNEE CAPPED PRIMARY K2MICROPORT] MICROPORT ORTHOPEDICS]: Type: IMPLANTABLE DEVICE | Status: FUNCTIONAL

## 2020-10-13 DEVICE — EVOLUTION®MP FEM CS/CR NON-POR SIZE 5 PRIMARY RIGHT
Type: IMPLANTABLE DEVICE | Status: FUNCTIONAL
Brand: EVOLUTION

## 2020-10-13 DEVICE — EVOLUTION® MP TIB KEELED NONPOR SIZE 6 STANDARD RIGHT
Type: IMPLANTABLE DEVICE | Status: FUNCTIONAL
Brand: EVOLUTION

## 2020-10-13 DEVICE — EVOLUTION® MP™ CS INSERT SIZE 6 STANDARD 10MM RIGHT
Type: IMPLANTABLE DEVICE | Status: FUNCTIONAL
Brand: EVOLUTION

## 2020-10-13 DEVICE — IMPLANTABLE DEVICE
Type: IMPLANTABLE DEVICE | Site: KNEE | Status: FUNCTIONAL
Brand: ADVANCE®

## 2020-10-13 RX ORDER — MECLIZINE HCL 12.5 MG/1
25 TABLET ORAL 3 TIMES DAILY PRN
Status: DISCONTINUED | OUTPATIENT
Start: 2020-10-13 | End: 2020-10-15 | Stop reason: HOSPADM

## 2020-10-13 RX ORDER — SODIUM CHLORIDE 9 MG/ML
INJECTION, SOLUTION INTRAVENOUS CONTINUOUS
Status: DISCONTINUED | OUTPATIENT
Start: 2020-10-13 | End: 2020-10-14

## 2020-10-13 RX ORDER — ASPIRIN 81 MG/1
81 TABLET ORAL 2 TIMES DAILY
Status: DISCONTINUED | OUTPATIENT
Start: 2020-10-14 | End: 2020-10-15 | Stop reason: HOSPADM

## 2020-10-13 RX ORDER — DEXAMETHASONE SODIUM PHOSPHATE 4 MG/ML
10 INJECTION, SOLUTION INTRA-ARTICULAR; INTRALESIONAL; INTRAMUSCULAR; INTRAVENOUS; SOFT TISSUE ONCE
Status: COMPLETED | OUTPATIENT
Start: 2020-10-13 | End: 2020-10-13

## 2020-10-13 RX ORDER — ONDANSETRON 2 MG/ML
INJECTION INTRAMUSCULAR; INTRAVENOUS PRN
Status: DISCONTINUED | OUTPATIENT
Start: 2020-10-13 | End: 2020-10-13 | Stop reason: SDUPTHER

## 2020-10-13 RX ORDER — ATORVASTATIN CALCIUM 40 MG/1
40 TABLET, FILM COATED ORAL EVERY MORNING
Status: DISCONTINUED | OUTPATIENT
Start: 2020-10-14 | End: 2020-10-15 | Stop reason: HOSPADM

## 2020-10-13 RX ORDER — FENTANYL CITRATE 50 UG/ML
50 INJECTION, SOLUTION INTRAMUSCULAR; INTRAVENOUS EVERY 5 MIN PRN
Status: COMPLETED | OUTPATIENT
Start: 2020-10-13 | End: 2020-10-13

## 2020-10-13 RX ORDER — PANTOPRAZOLE SODIUM 40 MG/1
40 TABLET, DELAYED RELEASE ORAL DAILY
Status: DISCONTINUED | OUTPATIENT
Start: 2020-10-13 | End: 2020-10-15 | Stop reason: HOSPADM

## 2020-10-13 RX ORDER — OXYCODONE HYDROCHLORIDE 5 MG/1
5 TABLET ORAL EVERY 4 HOURS PRN
Status: DISCONTINUED | OUTPATIENT
Start: 2020-10-13 | End: 2020-10-15 | Stop reason: HOSPADM

## 2020-10-13 RX ORDER — FENTANYL CITRATE 50 UG/ML
50 INJECTION, SOLUTION INTRAMUSCULAR; INTRAVENOUS ONCE
Status: COMPLETED | OUTPATIENT
Start: 2020-10-13 | End: 2020-10-13

## 2020-10-13 RX ORDER — ALENDRONATE SODIUM 70 MG/1
70 TABLET ORAL
Status: DISCONTINUED | OUTPATIENT
Start: 2020-10-13 | End: 2020-10-13 | Stop reason: CLARIF

## 2020-10-13 RX ORDER — MAGNESIUM HYDROXIDE 1200 MG/15ML
LIQUID ORAL CONTINUOUS PRN
Status: COMPLETED | OUTPATIENT
Start: 2020-10-13 | End: 2020-10-13

## 2020-10-13 RX ORDER — DEXAMETHASONE SODIUM PHOSPHATE 4 MG/ML
INJECTION, SOLUTION INTRA-ARTICULAR; INTRALESIONAL; INTRAMUSCULAR; INTRAVENOUS; SOFT TISSUE PRN
Status: DISCONTINUED | OUTPATIENT
Start: 2020-10-13 | End: 2020-10-13 | Stop reason: SDUPTHER

## 2020-10-13 RX ORDER — SODIUM CHLORIDE 0.9 % (FLUSH) 0.9 %
10 SYRINGE (ML) INJECTION EVERY 12 HOURS SCHEDULED
Status: CANCELLED | OUTPATIENT
Start: 2020-10-13

## 2020-10-13 RX ORDER — ACETAMINOPHEN 325 MG/1
650 TABLET ORAL EVERY 6 HOURS
Status: DISCONTINUED | OUTPATIENT
Start: 2020-10-13 | End: 2020-10-15 | Stop reason: HOSPADM

## 2020-10-13 RX ORDER — ACETAMINOPHEN 500 MG
1000 TABLET ORAL ONCE
Status: COMPLETED | OUTPATIENT
Start: 2020-10-13 | End: 2020-10-13

## 2020-10-13 RX ORDER — SODIUM CHLORIDE 0.9 % (FLUSH) 0.9 %
10 SYRINGE (ML) INJECTION EVERY 12 HOURS SCHEDULED
Status: DISCONTINUED | OUTPATIENT
Start: 2020-10-13 | End: 2020-10-15 | Stop reason: HOSPADM

## 2020-10-13 RX ORDER — PROPOFOL 10 MG/ML
INJECTION, EMULSION INTRAVENOUS PRN
Status: DISCONTINUED | OUTPATIENT
Start: 2020-10-13 | End: 2020-10-13 | Stop reason: SDUPTHER

## 2020-10-13 RX ORDER — SODIUM CHLORIDE 0.9 % (FLUSH) 0.9 %
10 SYRINGE (ML) INJECTION PRN
Status: CANCELLED | OUTPATIENT
Start: 2020-10-13

## 2020-10-13 RX ORDER — MIDAZOLAM HYDROCHLORIDE 1 MG/ML
1 INJECTION INTRAMUSCULAR; INTRAVENOUS ONCE
Status: COMPLETED | OUTPATIENT
Start: 2020-10-13 | End: 2020-10-13

## 2020-10-13 RX ORDER — SENNA AND DOCUSATE SODIUM 50; 8.6 MG/1; MG/1
1 TABLET, FILM COATED ORAL 2 TIMES DAILY
Status: DISCONTINUED | OUTPATIENT
Start: 2020-10-13 | End: 2020-10-15 | Stop reason: HOSPADM

## 2020-10-13 RX ORDER — GLYCOPYRROLATE 1 MG/5 ML
SYRINGE (ML) INTRAVENOUS PRN
Status: DISCONTINUED | OUTPATIENT
Start: 2020-10-13 | End: 2020-10-13 | Stop reason: SDUPTHER

## 2020-10-13 RX ORDER — TRANEXAMIC ACID 10 MG/ML
INJECTION, SOLUTION INTRAVENOUS PRN
Status: DISCONTINUED | OUTPATIENT
Start: 2020-10-13 | End: 2020-10-13 | Stop reason: SDUPTHER

## 2020-10-13 RX ORDER — MIDAZOLAM HYDROCHLORIDE 1 MG/ML
1 INJECTION INTRAMUSCULAR; INTRAVENOUS EVERY 10 MIN PRN
Status: DISCONTINUED | OUTPATIENT
Start: 2020-10-13 | End: 2020-10-13 | Stop reason: HOSPADM

## 2020-10-13 RX ORDER — LIDOCAINE HYDROCHLORIDE 10 MG/ML
1 INJECTION, SOLUTION EPIDURAL; INFILTRATION; INTRACAUDAL; PERINEURAL
Status: CANCELLED | OUTPATIENT
Start: 2020-10-13 | End: 2020-10-13

## 2020-10-13 RX ORDER — GABAPENTIN 800 MG/1
800 TABLET ORAL ONCE
Status: COMPLETED | OUTPATIENT
Start: 2020-10-13 | End: 2020-10-13

## 2020-10-13 RX ORDER — SODIUM CHLORIDE, SODIUM LACTATE, POTASSIUM CHLORIDE, CALCIUM CHLORIDE 600; 310; 30; 20 MG/100ML; MG/100ML; MG/100ML; MG/100ML
1000 INJECTION, SOLUTION INTRAVENOUS CONTINUOUS
Status: DISCONTINUED | OUTPATIENT
Start: 2020-10-13 | End: 2020-10-13

## 2020-10-13 RX ORDER — GABAPENTIN 300 MG/1
300 CAPSULE ORAL 2 TIMES DAILY
Status: DISCONTINUED | OUTPATIENT
Start: 2020-10-13 | End: 2020-10-15 | Stop reason: HOSPADM

## 2020-10-13 RX ORDER — MAGNESIUM HYDROXIDE 1200 MG/15ML
LIQUID ORAL PRN
Status: DISCONTINUED | OUTPATIENT
Start: 2020-10-13 | End: 2020-10-13 | Stop reason: ALTCHOICE

## 2020-10-13 RX ORDER — SODIUM CHLORIDE, SODIUM LACTATE, POTASSIUM CHLORIDE, CALCIUM CHLORIDE 600; 310; 30; 20 MG/100ML; MG/100ML; MG/100ML; MG/100ML
INJECTION, SOLUTION INTRAVENOUS CONTINUOUS
Status: CANCELLED | OUTPATIENT
Start: 2020-10-13

## 2020-10-13 RX ORDER — MEPERIDINE HYDROCHLORIDE 50 MG/ML
12.5 INJECTION INTRAMUSCULAR; INTRAVENOUS; SUBCUTANEOUS EVERY 5 MIN PRN
Status: DISCONTINUED | OUTPATIENT
Start: 2020-10-13 | End: 2020-10-13 | Stop reason: HOSPADM

## 2020-10-13 RX ORDER — TRAMADOL HYDROCHLORIDE 200 MG/1
200 TABLET, EXTENDED RELEASE ORAL DAILY
Status: DISCONTINUED | OUTPATIENT
Start: 2020-10-13 | End: 2020-10-13

## 2020-10-13 RX ORDER — BUPIVACAINE HYDROCHLORIDE 2.5 MG/ML
2 INJECTION, SOLUTION INFILTRATION; PERINEURAL ONCE
Status: DISCONTINUED | OUTPATIENT
Start: 2020-10-13 | End: 2020-10-15 | Stop reason: HOSPADM

## 2020-10-13 RX ORDER — LIDOCAINE HYDROCHLORIDE 10 MG/ML
INJECTION, SOLUTION EPIDURAL; INFILTRATION; INTRACAUDAL; PERINEURAL PRN
Status: DISCONTINUED | OUTPATIENT
Start: 2020-10-13 | End: 2020-10-13 | Stop reason: SDUPTHER

## 2020-10-13 RX ORDER — CALCIUM CARBONATE 500(1250)
1000 TABLET ORAL DAILY
Status: DISCONTINUED | OUTPATIENT
Start: 2020-10-13 | End: 2020-10-15 | Stop reason: HOSPADM

## 2020-10-13 RX ORDER — ROCURONIUM BROMIDE 10 MG/ML
INJECTION, SOLUTION INTRAVENOUS PRN
Status: DISCONTINUED | OUTPATIENT
Start: 2020-10-13 | End: 2020-10-13 | Stop reason: SDUPTHER

## 2020-10-13 RX ORDER — OXYCODONE HYDROCHLORIDE 5 MG/1
10 TABLET ORAL EVERY 4 HOURS PRN
Status: DISCONTINUED | OUTPATIENT
Start: 2020-10-13 | End: 2020-10-15 | Stop reason: HOSPADM

## 2020-10-13 RX ORDER — FENTANYL CITRATE 50 UG/ML
25 INJECTION, SOLUTION INTRAMUSCULAR; INTRAVENOUS EVERY 5 MIN PRN
Status: DISCONTINUED | OUTPATIENT
Start: 2020-10-13 | End: 2020-10-13 | Stop reason: HOSPADM

## 2020-10-13 RX ORDER — ASCORBIC ACID 500 MG
500 TABLET ORAL 2 TIMES DAILY
Status: DISCONTINUED | OUTPATIENT
Start: 2020-10-13 | End: 2020-10-15 | Stop reason: HOSPADM

## 2020-10-13 RX ORDER — SODIUM CHLORIDE 0.9 % (FLUSH) 0.9 %
10 SYRINGE (ML) INJECTION PRN
Status: DISCONTINUED | OUTPATIENT
Start: 2020-10-13 | End: 2020-10-15 | Stop reason: HOSPADM

## 2020-10-13 RX ADMIN — MIDAZOLAM HYDROCHLORIDE 2 MG: 1 INJECTION, SOLUTION INTRAMUSCULAR; INTRAVENOUS at 07:42

## 2020-10-13 RX ADMIN — PROPOFOL 150 MG: 10 INJECTION, EMULSION INTRAVENOUS at 08:59

## 2020-10-13 RX ADMIN — TRANEXAMIC ACID 1 G: 10 INJECTION, SOLUTION INTRAVENOUS at 10:46

## 2020-10-13 RX ADMIN — FENTANYL CITRATE 50 MCG: 50 INJECTION, SOLUTION INTRAMUSCULAR; INTRAVENOUS at 07:43

## 2020-10-13 RX ADMIN — CEFAZOLIN 2 G: 10 INJECTION, POWDER, FOR SOLUTION INTRAVENOUS at 09:05

## 2020-10-13 RX ADMIN — PHENYLEPHRINE HYDROCHLORIDE 100 MCG: 10 INJECTION INTRAVENOUS at 10:08

## 2020-10-13 RX ADMIN — Medication 0.2 MG: at 09:09

## 2020-10-13 RX ADMIN — OXYCODONE HYDROCHLORIDE AND ACETAMINOPHEN 500 MG: 500 TABLET ORAL at 22:06

## 2020-10-13 RX ADMIN — ACETAMINOPHEN 1000 MG: 500 TABLET ORAL at 07:45

## 2020-10-13 RX ADMIN — CALCIUM 1000 MG: 500 TABLET ORAL at 16:46

## 2020-10-13 RX ADMIN — PHENYLEPHRINE HYDROCHLORIDE 100 MCG: 10 INJECTION INTRAVENOUS at 09:17

## 2020-10-13 RX ADMIN — PHENYLEPHRINE HYDROCHLORIDE 100 MCG: 10 INJECTION INTRAVENOUS at 10:15

## 2020-10-13 RX ADMIN — OXYCODONE HYDROCHLORIDE 10 MG: 5 TABLET ORAL at 22:06

## 2020-10-13 RX ADMIN — ACETAMINOPHEN 650 MG: 325 TABLET ORAL at 22:06

## 2020-10-13 RX ADMIN — PHENYLEPHRINE HYDROCHLORIDE 100 MCG: 10 INJECTION INTRAVENOUS at 10:46

## 2020-10-13 RX ADMIN — PHENYLEPHRINE HYDROCHLORIDE 100 MCG: 10 INJECTION INTRAVENOUS at 10:52

## 2020-10-13 RX ADMIN — PHENYLEPHRINE HYDROCHLORIDE 100 MCG: 10 INJECTION INTRAVENOUS at 09:26

## 2020-10-13 RX ADMIN — SODIUM CHLORIDE: 9 INJECTION, SOLUTION INTRAVENOUS at 16:47

## 2020-10-13 RX ADMIN — PHENYLEPHRINE HYDROCHLORIDE 100 MCG: 10 INJECTION INTRAVENOUS at 10:26

## 2020-10-13 RX ADMIN — GABAPENTIN 300 MG: 300 CAPSULE ORAL at 22:05

## 2020-10-13 RX ADMIN — FENTANYL CITRATE 50 MCG: 50 INJECTION, SOLUTION INTRAMUSCULAR; INTRAVENOUS at 14:28

## 2020-10-13 RX ADMIN — ONDANSETRON 4 MG: 2 INJECTION, SOLUTION INTRAMUSCULAR; INTRAVENOUS at 11:07

## 2020-10-13 RX ADMIN — FENTANYL CITRATE 50 MCG: 50 INJECTION, SOLUTION INTRAMUSCULAR; INTRAVENOUS at 11:50

## 2020-10-13 RX ADMIN — PHENYLEPHRINE HYDROCHLORIDE 100 MCG: 10 INJECTION INTRAVENOUS at 09:52

## 2020-10-13 RX ADMIN — ROCURONIUM BROMIDE 50 MG: 10 INJECTION INTRAVENOUS at 08:59

## 2020-10-13 RX ADMIN — OXYCODONE HYDROCHLORIDE 10 MG: 5 TABLET ORAL at 16:46

## 2020-10-13 RX ADMIN — PANTOPRAZOLE SODIUM 40 MG: 40 TABLET, DELAYED RELEASE ORAL at 16:46

## 2020-10-13 RX ADMIN — DEXAMETHASONE SODIUM PHOSPHATE 4 MG: 4 INJECTION, SOLUTION INTRAMUSCULAR; INTRAVENOUS at 09:12

## 2020-10-13 RX ADMIN — PHENYLEPHRINE HYDROCHLORIDE 100 MCG: 10 INJECTION INTRAVENOUS at 09:34

## 2020-10-13 RX ADMIN — SODIUM CHLORIDE, POTASSIUM CHLORIDE, SODIUM LACTATE AND CALCIUM CHLORIDE 1000 ML: 600; 310; 30; 20 INJECTION, SOLUTION INTRAVENOUS at 07:42

## 2020-10-13 RX ADMIN — OXYCODONE HYDROCHLORIDE 10 MG: 5 TABLET ORAL at 11:57

## 2020-10-13 RX ADMIN — PHENYLEPHRINE HYDROCHLORIDE 100 MCG: 10 INJECTION INTRAVENOUS at 09:09

## 2020-10-13 RX ADMIN — DEXAMETHASONE SODIUM PHOSPHATE 10 MG: 4 INJECTION, SOLUTION INTRAMUSCULAR; INTRAVENOUS at 07:44

## 2020-10-13 RX ADMIN — Medication 0.4 MG: at 11:07

## 2020-10-13 RX ADMIN — NEOSTIGMINE METHYLSULFATE 3 MG: 1 INJECTION, SOLUTION INTRAMUSCULAR; INTRAVENOUS; SUBCUTANEOUS at 11:07

## 2020-10-13 RX ADMIN — TRANEXAMIC ACID 1 G: 10 INJECTION, SOLUTION INTRAVENOUS at 09:05

## 2020-10-13 RX ADMIN — LIDOCAINE HYDROCHLORIDE 50 MG: 10 INJECTION, SOLUTION EPIDURAL; INFILTRATION; INTRACAUDAL; PERINEURAL at 08:59

## 2020-10-13 RX ADMIN — GABAPENTIN 800 MG: 800 TABLET, FILM COATED ORAL at 07:45

## 2020-10-13 RX ADMIN — Medication 30 ML: at 07:51

## 2020-10-13 RX ADMIN — DEXTROSE MONOHYDRATE 2 G: 50 INJECTION, SOLUTION INTRAVENOUS at 16:46

## 2020-10-13 RX ADMIN — FENTANYL CITRATE 50 MCG: 50 INJECTION, SOLUTION INTRAMUSCULAR; INTRAVENOUS at 14:38

## 2020-10-13 RX ADMIN — PHENYLEPHRINE HYDROCHLORIDE 100 MCG: 10 INJECTION INTRAVENOUS at 09:44

## 2020-10-13 RX ADMIN — ACETAMINOPHEN 650 MG: 325 TABLET ORAL at 16:46

## 2020-10-13 RX ADMIN — FENTANYL CITRATE 50 MCG: 50 INJECTION, SOLUTION INTRAMUSCULAR; INTRAVENOUS at 11:45

## 2020-10-13 RX ADMIN — SODIUM CHLORIDE, PRESERVATIVE FREE 10 ML: 5 INJECTION INTRAVENOUS at 22:07

## 2020-10-13 RX ADMIN — METOPROLOL TARTRATE 37.5 MG: 25 TABLET ORAL at 22:06

## 2020-10-13 ASSESSMENT — PULMONARY FUNCTION TESTS
PIF_VALUE: 1
PIF_VALUE: 17
PIF_VALUE: 16
PIF_VALUE: 17
PIF_VALUE: 16
PIF_VALUE: 20
PIF_VALUE: 17
PIF_VALUE: 16
PIF_VALUE: 14
PIF_VALUE: 17
PIF_VALUE: 14
PIF_VALUE: 17
PIF_VALUE: 16
PIF_VALUE: 17
PIF_VALUE: 1
PIF_VALUE: 17
PIF_VALUE: 16
PIF_VALUE: 18
PIF_VALUE: 17
PIF_VALUE: 14
PIF_VALUE: 17
PIF_VALUE: 3
PIF_VALUE: 17
PIF_VALUE: 17
PIF_VALUE: 18
PIF_VALUE: 17
PIF_VALUE: 4
PIF_VALUE: 1
PIF_VALUE: 17
PIF_VALUE: 16
PIF_VALUE: 17
PIF_VALUE: 16
PIF_VALUE: 17
PIF_VALUE: 14
PIF_VALUE: 17
PIF_VALUE: 17
PIF_VALUE: 20
PIF_VALUE: 17
PIF_VALUE: 14
PIF_VALUE: 1
PIF_VALUE: 14
PIF_VALUE: 1
PIF_VALUE: 17
PIF_VALUE: 16
PIF_VALUE: 1
PIF_VALUE: 17
PIF_VALUE: 16
PIF_VALUE: 17
PIF_VALUE: 18
PIF_VALUE: 17
PIF_VALUE: 14
PIF_VALUE: 17
PIF_VALUE: 16
PIF_VALUE: 17
PIF_VALUE: 16
PIF_VALUE: 17
PIF_VALUE: 17
PIF_VALUE: 16
PIF_VALUE: 17
PIF_VALUE: 16
PIF_VALUE: 17
PIF_VALUE: 17
PIF_VALUE: 18
PIF_VALUE: 17
PIF_VALUE: 1
PIF_VALUE: 18
PIF_VALUE: 17
PIF_VALUE: 1
PIF_VALUE: 17
PIF_VALUE: 17
PIF_VALUE: 16
PIF_VALUE: 1
PIF_VALUE: 16
PIF_VALUE: 16
PIF_VALUE: 1
PIF_VALUE: 1
PIF_VALUE: 18
PIF_VALUE: 17
PIF_VALUE: 18
PIF_VALUE: 14
PIF_VALUE: 17
PIF_VALUE: 17
PIF_VALUE: 1
PIF_VALUE: 17
PIF_VALUE: 1
PIF_VALUE: 16
PIF_VALUE: 17
PIF_VALUE: 16
PIF_VALUE: 17
PIF_VALUE: 18
PIF_VALUE: 16
PIF_VALUE: 17
PIF_VALUE: 16
PIF_VALUE: 17
PIF_VALUE: 14
PIF_VALUE: 17
PIF_VALUE: 1
PIF_VALUE: 17
PIF_VALUE: 17
PIF_VALUE: 18
PIF_VALUE: 15
PIF_VALUE: 17
PIF_VALUE: 18
PIF_VALUE: 20
PIF_VALUE: 17
PIF_VALUE: 18
PIF_VALUE: 16
PIF_VALUE: 17
PIF_VALUE: 18
PIF_VALUE: 14
PIF_VALUE: 1
PIF_VALUE: 17

## 2020-10-13 ASSESSMENT — PAIN DESCRIPTION - PAIN TYPE
TYPE: ACUTE PAIN;SURGICAL PAIN
TYPE: SURGICAL PAIN
TYPE: ACUTE PAIN;CHRONIC PAIN;SURGICAL PAIN

## 2020-10-13 ASSESSMENT — PAIN SCALES - GENERAL
PAINLEVEL_OUTOF10: 6
PAINLEVEL_OUTOF10: 7
PAINLEVEL_OUTOF10: 4
PAINLEVEL_OUTOF10: 7
PAINLEVEL_OUTOF10: 7
PAINLEVEL_OUTOF10: 2
PAINLEVEL_OUTOF10: 4
PAINLEVEL_OUTOF10: 4
PAINLEVEL_OUTOF10: 7
PAINLEVEL_OUTOF10: 7
PAINLEVEL_OUTOF10: 4
PAINLEVEL_OUTOF10: 7
PAINLEVEL_OUTOF10: 2

## 2020-10-13 ASSESSMENT — PAIN DESCRIPTION - ORIENTATION
ORIENTATION: RIGHT
ORIENTATION: POSTERIOR
ORIENTATION: RIGHT
ORIENTATION: RIGHT

## 2020-10-13 ASSESSMENT — PAIN DESCRIPTION - LOCATION
LOCATION: BACK;KNEE
LOCATION: KNEE
LOCATION: KNEE;BACK

## 2020-10-13 ASSESSMENT — PAIN - FUNCTIONAL ASSESSMENT
PAIN_FUNCTIONAL_ASSESSMENT: 0-10
PAIN_FUNCTIONAL_ASSESSMENT: 0-10

## 2020-10-13 ASSESSMENT — PAIN DESCRIPTION - DESCRIPTORS: DESCRIPTORS: ACHING

## 2020-10-13 NOTE — ANESTHESIA PROCEDURE NOTES
Peripheral Block    Patient location during procedure: pre-op  Start time: 10/13/2020 7:43 AM  End time: 10/13/2020 7:51 AM  Staffing  Anesthesiologist: Aleta Allen MD  Performed: anesthesiologist   Preanesthetic Checklist  Completed: patient identified, site marked, surgical consent, pre-op evaluation, timeout performed, IV checked, risks and benefits discussed, monitors and equipment checked, anesthesia consent given, oxygen available and patient being monitored  Peripheral Block  Patient position: supine  Prep: ChloraPrep  Patient monitoring: cardiac monitor, continuous pulse ox, frequent blood pressure checks and IV access  Block type: Femoral  Laterality: right  Injection technique: catheter  Procedures: ultrasound guided  Local infiltration: lidocaine  Infiltration strength: 1 %  Dose: 3 mL  Approach to block: Low Femoral.  Provider prep: mask and sterile gloves  Local infiltration: lidocaine  Needle  Needle type: short-bevel   Needle gauge: 18 G  Needle length: 10 cm  Needle localization: ultrasound guidance  Needle insertion depth: 2.5 cm  Catheter type: open end  Catheter size: 20 G  Catheter at skin depth: 8 cm  Test dose: negative  Assessment  Injection assessment: negative aspiration for heme, no paresthesia on injection and local visualized surrounding nerve on ultrasound  Paresthesia pain: none  Slow fractionated injection: yes  Hemodynamics: stable  Additional Notes  U/S [id number].  (1) Under ultrasound guidance, a 18 gauge needle was inserted and placed in close proximity to the femoral nerve.  (2) Ultrasound was also used to visualize the spread of the anesthetic in close proximity to the nerve being blocked. (3) The nerve appeared anatomically normal, and (4 there were no apparent abnormal pathological findings on the image that were readily visible and related to the nerve being blocked. (5) A permanent ultrasound image was saved in the patient's record.  Total of .125% marcaine injected

## 2020-10-13 NOTE — PROGRESS NOTES
Physical Therapy    Facility/Department: [address] ORTHO/MED SURG  Initial Assessment    NAME: Dmitriy Cruz  : 1955  MRN: [id number]    Date of Service: 10/13/2020    Discharge Recommendations:  Patient would benefit from continued therapy after discharge   Assessment   Body structures, Functions, Activity limitations: Decreased functional mobility ; Decreased endurance;Decreased sensation;Decreased balance  Assessment: The pt ambulated 25 ft with a RW x CGA with WBAT R LE. She did experience some mild buckling of her R LE with mobilization. She could benefit from a continuation of PT following her DC  Prognosis: Good  Decision Making: Medium Complexity  PT Education: Goals;PT Role;Plan of Care  REQUIRES PT FOLLOW UP: Yes  Activity Tolerance  Activity Tolerance: Patient limited by fatigue   Patient Diagnosis(es): The encounter diagnosis was Primary osteoarthritis of right knee. has a past medical history of Arthritis, CAD (coronary artery disease), Chronic back pain, GERD (gastroesophageal reflux disease), Hyperlipidemia, Hypertension, Incomplete RBBB, Irregular heart beat, LAFB (left anterior fascicular block), Leg wound, left, Lumbar disc disease, Osteoporosis, PAC (premature atrial contraction), PONV (postoperative nausea and vomiting), Wears dentures, Wears eyeglasses, Wears partial dentures, and Wellness examination. has a past surgical history that includes Knee arthroscopy (Left, ); Abdominoplasty (); blepharoplasty (Bilateral, ); laminectomy (); lumbar laminectomy (); lumbar laminectomy (); Fixation Kyphoplasty (); Finger surgery (Right); Colonoscopy (); pr office/outpt visit,procedure only (Right, 2018); Lumbar spine surgery (); Cataract removal with implant (Bilateral, ); other surgical history (10/03/2019); Foot Debridement (Left, 10/11/2019); Skin graft (Left, 12/3/2019); Leg Surgery (Left, 2020); Breast enhancement surgery (Bilateral, );  Skin Responsibility: Primary  Shopping Responsibility: Primary  Ambulation Assistance: Independent  Transfer Assistance: Independent  Active : Yes  Mode of Transportation: SUV, Truck  Occupation: Retired  Type of occupation: Computer work  Leisure & Hobbies: embroidery, gardening  Additional Comments: reports  is home 24 hours a day and able to provide assistance PRN  Cognition      Objective     AROM RLE (degrees)  RLE General AROM: 5-115 degrees R knee flx  AROM LLE (degrees)  LLE AROM : WNL  AROM RUE (degrees)  RUE AROM : WNL  AROM LUE (degrees)  LUE AROM : WNL  Strength RLE  Comment: N/T  Strength LLE  Strength LLE: WNL  Strength RUE  Strength RUE: WNL  Strength LUE  Strength LUE: WNL     Sensation  Overall Sensation Status: Impaired  Bed mobility  Supine to Sit: Contact guard assistance  Sit to Supine: Contact guard assistance  Scooting: Contact guard assistance  Transfers  Sit to Stand: Contact guard assistance  Stand to sit: Contact guard assistance  Ambulation  Ambulation?: Yes  Ambulation 1  Surface: level tile  Device: Rolling Walker  Assistance: Contact guard assistance  Distance: amb 25 ft with a RW x CGA     Balance  Posture: Good  Sitting - Static: Good  Sitting - Dynamic: Good  Standing - Static: Fair  Standing - Dynamic: Fair  Exercises  Hamstring Sets: x 10 R LE  Quad Sets: x 10 R LE  Heelslides: x 10 R LE  Knee Long Arc Quad: x 10 R LE  Ankle Pumps: x 10 R LE     Plan   Plan  Times per week: BID  Current Treatment Recommendations: Strengthening, Gait Training, Stair training, Functional Mobility Training, Endurance Training, Safety Education & Training, ROM  Safety Devices  Type of devices: Left in bed, Call light within reach, Nurse notified    G-Code     OutComes Score     AM-PAC Score  AM-PAC Inpatient Mobility Raw Score : 17 (10/13/20 1554)  AM-PAC Inpatient T-Scale Score : 42.13 (10/13/20 1554)  Mobility Inpatient CMS 0-100% Score: 50.57 (10/13/20 1554)  Mobility Inpatient CMS G-Code Modifier : CK (10/13/20 8657)        Goals  Short term goals  Time Frame for Short term goals: 10 visits  Short term goal 1: transfers with SBA  Short term goal 2: amb 200 ft with a RW x SBA with WBAT R LE  Short term goal 3: ascend/descend 4 steps with SBA  Short term goal 4: total knee exercises x SBA  Patient Goals   Patient goals :  To get upstairs in her home     Therapy Time   Individual Concurrent Group Co-treatment   Time In 1515         Time Out 1540         Minutes 25            1 of [address] [place]

## 2020-10-13 NOTE — INTERVAL H&P NOTE
Pt Name: Maria C Becerril  MRN: [id number]  YOB: 1955  Date of evaluation: 10/13/2020    I have reviewed the patient's history and physical examination completed in pre-admission testing on 9/30/20. No changes to history or on examination today, unless noted below. Medical and cardiac clearances on file. Negative covid-19 screening on 10/9/20.      MARNI ESPITIA-CNP  10/13/20  7:31 AM

## 2020-10-13 NOTE — ANESTHESIA PRE PROCEDURE
Department of Anesthesiology  Preprocedure Note       Name:  Julienne Garcia   Age:  62 y.o.  :  1955                                          MRN:  [id number]         Date:  10/13/2020      Surgeon: Melonie Kilgore):  Long Burgess DO    Procedure: Procedure(s):  KNEE TOTAL ARTHROPLASTY- MICROPORT, ADUICTOR IPAC BLOCK, SPI VS GENERAL, 3080 TABLE, SUPINE, ADVANCED    Medications prior to admission:   Prior to Admission medications    Medication Sig Start Date End Date Taking? Authorizing Provider   folic acid (FOLVITE) 1 MG tablet take 1 tablet by mouth once daily  Patient taking differently: take 1 tablet by mouth once daily Steve Rose 20  Yes NUPUR Orellana CNP   vitamin C (ASCORBIC ACID) 500 MG tablet Take 500 mg by mouth 2 times daily   Yes Historical Provider, MD   Metoprolol Tartrate 37.5 MG TABS Take 37.5 mg by mouth 2 times daily  Patient taking differently: Take 37.5 mg by mouth 2 times daily Per Andria Jha for palpitations 20  Yes NUPUR Orellana CNP   diclofenac sodium (VOLTAREN) 1 % GEL Apply topically as needed Dr. David Faulkner   Yes Historical Provider, MD   oxyCODONE-acetaminophen (PERCOCET) 5-325 MG per tablet Take 1 tablet by mouth every 6 hours as needed for Pain.  Per Dr. David Faulkner at pain management   Yes Historical Provider, MD   atorvastatin (LIPITOR) 40 MG tablet Take 1 tablet by mouth nightly  Patient taking differently: Take 40 mg by mouth every morning Per Dr. Cesario Forrester 19  Yes Linda Vaca MD   omeprazole (PRILOSEC) 40 MG delayed release capsule Take 1 tablet by mouth daily Per Andria Jha   Yes Historical Provider, MD   Cholecalciferol (VITAMIN D3) 5000 units TABS Take 5,000 Units by mouth daily    Yes Historical Provider, MD   calcium carbonate (OSCAL) 500 MG TABS tablet Take 1,000 mg by mouth daily Per Vianey Lynch rheumatology   Yes Historical Provider, MD   alendronate (FOSAMAX) 70 MG tablet Take 70 mg by mouth every 7 days appearance Z41.1    Chronic bilateral low back pain without sciatica M54.5, G89.29    Mass of finger of right hand R22.31    Chronic ulcer of left foot with fat layer exposed ([address] L97.522    Pain in left foot M79.672    Hypertension I10    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) L97.523    Wound, open, foot with complication, left, initial encounter G36.217A       Past Medical History:        Diagnosis Date    Arthritis     knees hips hands shoulders and back    CAD (coronary artery disease) 2019    blockage on cath 9/2019, to have stenting after surgery (arthroplasty) Dr. Wilfrid Guo Chronic back pain     GERD (gastroesophageal reflux disease)     on rx    Hyperlipidemia     per Dr. René Nicole on rx    Hypertension     Madalyn Waqas manages    Incomplete RBBB     Irregular heart beat     LAFB (left anterior fascicular block)     Dr. René Nicole, cardiologist, seen 9/2019 prior to surgery on 10/8/2019    Leg wound, left     following with wound care, Dr. Sukh Mejia Lumbar disc disease     Osteoporosis     PAC (premature atrial contraction) 06/2018    PACs and PVCs on holter monitor,     PONV (postoperative nausea and vomiting)     requests scop patch    Wears dentures     upper partial    Wears eyeglasses     readers    Wears partial dentures     upper and lower    Wellness examination     MIKE Mckeon NP PCP, seen 2/10/2020       Past Surgical History:        Procedure Laterality Date    ABDOMINOPLASTY  1997    BLEPHAROPLASTY Bilateral 1997    BREAST ENHANCEMENT SURGERY Bilateral 1999    breast augmentation   Ramy Carvalho  2019    Dr. René Nicole, blockage but no stents yet    CATARACT REMOVAL WITH IMPLANT Bilateral 2016    COLONOSCOPY  2015    No Polyps    COSMETIC SURGERY      eyelid lift    FINGER SURGERY Right     thumb lesion excised    FIXATION KYPHOPLASTY  2013    Back    FOOT DEBRIDEMENT Left 10/11/2019    SURGICAL PREP WOUND BED LEFT FOOT WITH APPLICATION OF EPIFIX LEFT FOOT 3X4 performed by Edward Ames DPM at [facility] 58      kyphoplasty for lumbar fx  Dr. Albert Evans Left    Alexandru Jay Left 2020    FOOT  [address] performed by Demian Siddiqui MD at [place] [address]  2014   [facility] 892      diskectomy and spinal fusion    OTHER SURGICAL HISTORY  10/03/2019    Left leg angiogram    MN OFFICE/OUTPT VISIT,PROCEDURE ONLY Right 2018    EXCISION MASS THUMB, 3080 TABLE performed by Khris Cantrell DO at [facility] Left 12/3/2019    LEFT FOOT DEBRIDEMENT WITH SKIN GRAFT SPLIT THICKNESS; SKIN GRAFT TAKEN FROM RIGHT ANTERIOR THIGH performed by Demian Siddiqui MD at [facility] Left 2020    DEBRIDEMENT, SPLIT THICKNESS SKIN GRAFT WITH WOUND VAC PLACEMENT FOOT performed by Matt Howard MD at [facility] Left 2020    DEBRIDEMENT, SKIN GRAFT SPLIT THICKNESS FOOT  ([facility] [address], [address] [facility]) performed by Matt Howard MD at [address] [place] [address]      as a child       Social History:    Social History     Tobacco Use    Smoking status: Former Smoker     Packs/day: 0.25     Years: 50.00     Pack years: 12.50     Types: Cigarettes     Start date:      Last attempt to quit: 2020     Years since quittin.7    Smokeless tobacco: Never Used   Substance Use Topics    Alcohol use: Yes     Frequency: 2-4 times a month     Comment: 2-3 shot liquor for weekends                                Counseling given: Not Answered      Vital Signs (Current):   Vitals:    10/13/20 0701 10/13/20 0711   Resp:  18   TempSrc:  Temporal   Weight: 163 lb 9.3 oz (74.2 kg)    Height: 5' 6\" (1.676 m)                                               BP Readings from Last 3 Encounters:   20 (!) 165/84   20 (!) 149/83   20 (!) 157/75 NPO Status: Time of last liquid consumption: 2200                        Time of last solid consumption: 2200                        Date of last liquid consumption: 10/12/20                        Date of last solid food consumption: 10/12/20    BMI:   Wt Readings from Last 3 Encounters:   10/13/20 163 lb 9.3 oz (74.2 kg)   09/30/20 154 lb 1.3 oz (69.9 kg)   09/25/20 157 lb (71.2 kg)     Body mass index is 26.4 kg/m². CBC:   Lab Results   Component Value Date    WBC 13.8 09/30/2020    RBC 3.74 09/30/2020    HGB 12.2 09/30/2020    HCT 37.5 09/30/2020    .3 09/30/2020    RDW 12.5 09/30/2020     09/30/2020       CMP:   Lab Results   Component Value Date     09/30/2020    K 3.9 09/30/2020     09/30/2020    CO2 22 09/30/2020    BUN 15 09/30/2020    CREATININE 0.62 09/30/2020    GFRAA >60 09/30/2020    LABGLOM >60 09/30/2020    GLUCOSE 98 09/30/2020    PROT 6.8 09/30/2020    CALCIUM 9.8 09/30/2020    BILITOT 0.21 09/30/2020    ALKPHOS 93 09/30/2020    AST 11 09/30/2020    ALT 11 09/30/2020       POC Tests: No results for input(s): POCGLU, POCNA, POCK, POCCL, POCBUN, POCHEMO, POCHCT in the last 72 hours. Coags:   Lab Results   Component Value Date    PROTIME 10.4 09/06/2019    INR 1.0 09/06/2019       HCG (If Applicable): No results found for: PREGTESTUR, PREGSERUM, HCG, HCGQUANT     ABGs: No results found for: PHART, PO2ART, ONR6KEW, ENW6RGT, BEART, Y9HVNWBB     Type & Screen (If Applicable):  No results found for: LABABO, LABRH    Drug/Infectious Status (If Applicable):  No results found for: HIV, HEPCAB    COVID-19 Screening (If Applicable):   Lab Results   Component Value Date    COVID19 Not Detected 10/09/2020    COVID19 Not Detected 06/27/2020         Anesthesia Evaluation     history of anesthetic complications: PONV.   Airway: Mallampati: II  TM distance: >3 FB   Neck ROM: full  Mouth opening: > = 3 FB Dental:          Pulmonary:Negative Pulmonary ROS and normal exam Cardiovascular:    (+) hypertension: no interval change, CAD: no interval change, dysrhythmias: atrial fibrillation,       ECG reviewed  Rhythm: irregular  Rate: abnormal  Echocardiogram reviewed                  Neuro/Psych:   Negative Neuro/Psych ROS              GI/Hepatic/Renal:   (+) GERD: no interval change,           Endo/Other: Negative Endo/Other ROS                    Abdominal:           Vascular: negative vascular ROS. Anesthesia Plan      spinal and regional     ASA 3       Induction: intravenous. Anesthetic plan and risks discussed with patient. Use of blood products discussed with patient whom consented to blood products. Plan discussed with CRNA.                   Yaneth Simental MD   10/13/2020

## 2020-10-13 NOTE — PROGRESS NOTES
PROTOCOLS  NURSING IMPLEMENTED    TOTAL JOINT DVT/PE  VENOUS THROMBOEMBOLISM PROPHYLAXIS  (Nursing Automatically Implement)    Luiza Ray  [id number]  [unfilled]  10/13/20    YES DVT RISK FACTOR SCORE YES MAJOR BLEEDING RISK FACTORS SCORE     [x] 48years old or greater (1) 1  [] Hx. Easy Bleeding (1)      [] Heart failure (2)   [] NSAID Use in Last 5 Days (2)      [] Varicose veins - Hx. (1)   [] Gastrointestinal or Genitourinary bleeding in Last 14 Days (2)      [] Myocardial Infarction - Hx. (1)         [] Cancer - Hx. (2)         [x] Atrial fibrillation - Hx. (1) 1        [] Ischemic Stroke - Hx. (1)         [] Diabetes Mellitus - Hx. (1)         [] Previous DVT/PE - Hx.  (2)         [] Hormone Replacement Therapy (1)         [] Obesity (1)         [] Paralysis (1)         [] Pregnancy (1)         [] Smoking (1)                   [] Thromophilia (1)   []   Mild to Moderate Bleeding (2)      [] Total Hip Arthroplasty (1)   [] Active Bleeding (4)      [] Family history of PE or DVT? (4) (Consider the following labs to test for presence of inhibitor deficiency state:) Factor V Leiden, Prothrombin Gene Mutation, Protein S Deficiency, Protien C Deficiency, Antithrombin Deficiency   [] Malignant Hypertension (2)        [] Thrombocytopenia 20k to 100k (2)        [] Thrombocytopenia less than 20k (4)        [] Bleeding Diathesis (4)        [] \"Bloody Stick\" Epidural or Spinal (2)     TOTAL DVT SCORE 2  TOTAL BLEEDING SCORE      [x] CLASS A   Standard Risk DVT (0-3) 2   [] CLASS X Standard Risk Bleeding (0-4)      [] CLASS B Elevated Risk DVT (greater than 3)    [] CLASS Y High Risk Bleeding (greater than 4)     FINAL MATRIX (e.g. AY)       *If allergic to ASA use Warfarin  *BY patient consider no treatment  **Consider venous filter with high risk PE  **If on Coumadin pre-op, then restart night of surgery      [x]  DVT Prophylaxis: Class AX, AY    Ecotrin 81 mg by mouth BID starting day of surgery for 6 weeks for all total

## 2020-10-13 NOTE — PROGRESS NOTES
[phone] Dr Magali Dang to the bedside, time out performed, Pt monitored, 02, Right Femoral Catheter nerve block completed using Bupivacaine, 0.125% 30 ml  pt tolerated procedure well,  Site CDI, (see charting), vss  Versed Given: 2 mg  Fentanyl  50 mcg, pt denies co pain or discomfort, spouse to the bedside,

## 2020-10-13 NOTE — PROGRESS NOTES
Occupational Therapy   Occupational Therapy Initial Assessment  Date: 10/13/2020   Patient Name: Lenard Jewell  MRN: [id number]     : 1955    Date of Service: 10/13/2020    Discharge Recommendations:  Patient would benefit from continued therapy after discharge  OT Equipment Recommendations  Equipment Needed: Yes  Mobility Devices: ADL Assistive Devices  ADL Assistive Devices: Shower Chair with back    Assessment   Performance deficits / Impairments: Decreased functional mobility ; Decreased endurance;Decreased ADL status; Decreased high-level IADLs;Decreased balance;Decreased sensation  Assessment: Patient demonstrates decreased sensation in the R LE post TKA affecting performance and safety with ADLs and functional transfers/mobility tasks. Pt attempted to complete donning of the L LE sock, required Min A to complete task (threaded over toes prior to activity attempt) and completed bed mobility at [facility]. Pt required Mod A x2 to complete initial sit to stand d/t decreased sensation in R LE, although demo good follow through of education and safety with adaptive gait technique to compensate for deficits. Pt able to complete toileting at Supervision, needing Min A for transfers with use of grab bar and completed hand hygiene standing sinkside at [facility] with bilateral hand release off RW. OT services are warranted to address functional deficits impacting safety and performance in ADL tasks through use of skilled OT interventions for safe return to PLOF.   Prognosis: Good  Decision Making: Medium Complexity  Patient Education: OT POC, hand placement for transfers, navigation of RW within bathroom, maintaining TKA precautions, use of grab bars for transfers off toilet, continuation of therapy services - good return  REQUIRES OT FOLLOW UP: Yes  Activity Tolerance  Activity Tolerance: Patient Tolerated treatment well;Patient limited by fatigue;Treatment limited secondary to medical complications (free text)  Activity Tolerance: decreased sensation in the R LE  Safety Devices  Safety Devices in place: Yes  Type of devices: All fall risk precautions in place; Left in bed;Bed alarm in place;Nurse notified;Call light within reach;Gait belt;Patient at risk for falls  Restraints  Initially in place: No           Patient Diagnosis(es): The encounter diagnosis was Primary osteoarthritis of right knee. has a past medical history of Arthritis, CAD (coronary artery disease), Chronic back pain, GERD (gastroesophageal reflux disease), Hyperlipidemia, Hypertension, Incomplete RBBB, Irregular heart beat, LAFB (left anterior fascicular block), Leg wound, left, Lumbar disc disease, Osteoporosis, PAC (premature atrial contraction), PONV (postoperative nausea and vomiting), Wears dentures, Wears eyeglasses, Wears partial dentures, and Wellness examination. has a past surgical history that includes Knee arthroscopy (Left, 2006); Abdominoplasty (1997); blepharoplasty (Bilateral, 1997); laminectomy (1994); lumbar laminectomy (2011); lumbar laminectomy (2014); Fixation Kyphoplasty (2013); Finger surgery (Right); Colonoscopy (2015); pr office/outpt visit,procedure only (Right, 9/11/2018); Lumbar spine surgery (2005); Cataract removal with implant (Bilateral, 2016); other surgical history (10/03/2019); Foot Debridement (Left, 10/11/2019); Skin graft (Left, 12/3/2019); Leg Surgery (Left, 1/2/2020); Breast enhancement surgery (Bilateral, 1999); Skin graft (Left, 2/25/2020); fracture surgery; Tonsillectomy; Skin graft (Left, 6/30/2020); Cosmetic surgery; Cardiac catheterization (2019); and Total knee arthroplasty (Right, 10/13/2020).        Restrictions  Restrictions/Precautions  Restrictions/Precautions: Weight Bearing  Required Braces or Orthoses?: No  Lower Extremity Weight Bearing Restrictions  Right Lower Extremity Weight Bearing: Weight Bearing As Tolerated  Position Activity Restriction  Other position/activity restrictions: R TKA    Subjective General  Patient assessed for rehabilitation services?: Yes  Family / Caregiver Present: Yes()  General Comment  Comments: RN ok'd patient for OT/PT evaluation. Pt pleasant and extremely motivated to participate in therapy. Patient Currently in Pain: Yes  Pain Assessment  Pain Assessment: 0-10  Pain Level: 4  Pain Type: Acute pain;Surgical pain  Pain Location: Knee  Pain Orientation: Right  Non-Pharmaceutical Pain Intervention(s): Elevation; Ambulation/Increased Activity;Repositioned  Response to Pain Intervention: Patient Satisfied    Social/Functional History  Social/Functional History  Lives With: Spouse  Type of Home: House  Home Layout: Two level, Bed/Bath upstairs(bedroom upstairs; reports sleeping on lazyboy most recently d/t pain)  Home Access: Stairs to enter without rails  Entrance Stairs - Number of Steps: 2  Bathroom Shower/Tub: Tub/Shower unit  Bathroom Toilet: Standard  Bathroom Equipment: Grab bars in shower, Grab bars around toilet  Bathroom Accessibility: Accessible  Home Equipment: Rolling walker, Crutches(was furniture walking within the home and using RW within the home)  Receives Help From: Family  ADL Assistance: [address] [place] [address] Avenue: Independent  Homemaking Responsibilities: Yes  Meal Prep Responsibility: Primary  Laundry Responsibility: Primary  Cleaning Responsibility: Primary  Shopping Responsibility: Primary  Ambulation Assistance: Independent  Transfer Assistance: Independent  Active : Yes  Mode of Transportation: SUV, Truck  Occupation: Retired  Type of occupation: Computer work  Leisure & Hobbies: embroidery, gardening  Additional Comments: reports  is home 24 hours a day and able to provide assistance PRN       Objective   Vision: Impaired  Vision Exceptions: Wears glasses for reading  Hearing: Within functional limits          Balance  Sitting Balance: Stand by assistance  Standing Balance: Minimal assistance  Standing Balance  Time: 2-3 min  Activity: sinkside, EOB prior to mobilization  Comment: using RW  Functional Mobility  Functional - Mobility Device: Rolling Walker  Activity: To/from bathroom  Assist Level: Minimal assistance(x2)  Functional Mobility Comments: decreased sensation in the R quad affecting R LE stability; PT educated patient on compensatory functional mobility with good return  Toilet Transfers  Toilet - Technique: Ambulating  Equipment Used: Grab bars(std toilet)  Toilet Transfer: Minimal assistance  Toilet Transfers Comments: first attempt at Mod A, improved with better positioning of B LE's to increase DARREN  ADL  Feeding: Independent  Grooming: Contact guard assistance;Setup(OT facilitated hand hygiene post-toileting standing sinkside; able to complete with bilateral hand release from the RW)  UE Bathing: Supervision;Setup  LE Bathing: Moderate assistance; Increased time to complete;Setup  UE Dressing: Supervision;Setup  LE Dressing: Increased time to complete;Setup; Moderate assistance(OT facilitated patient completing don of the L LE, increased time and difficulty d/t decreased ROM to reach distal extremities sitting up in bed)  Toileting: Minimal assistance; Increased time to complete;Setup(OT facilitated toileting, requiring Min A for transfer on/off toilet using grab bars; pt completed pericare in a seated position at Supervision)        Bed mobility  Supine to Sit: Contact guard assistance  Sit to Supine: Contact guard assistance(using compensatory strategy of L LE to hook R LE)  Scooting: Contact guard assistance  Transfers  Sit to stand:  Moderate assistance;2 Person assistance  Stand to sit: Minimal assistance  Transfer Comments: verbal cues for hand placement to increase safety     Cognition  Overall Cognitive Status: WFL        Sensation  Overall Sensation Status: Impaired        LUE AROM : WFL  Left Hand AROM: WFL  RUE AROM : WFL  Right Hand AROM: WFL  LUE Strength  Gross LUE Strength: WFL  RUE Strength  Gross RUE Strength: WFL       Plan   Plan  Times per week: 5-7x/wk (Joint)  Current Treatment Recommendations: Strengthening, Patient/Caregiver Education & Training, Functional Mobility Training, Endurance Training, Safety Education & Training, Self-Care / ADL, Balance Training, Equipment Evaluation, Education, & procurement      AM-PAC Score        AM-PAC Inpatient Daily Activity Raw Score: 17 (10/13/20 1619)  AM-PAC Inpatient ADL T-Scale Score : 37.26 (10/13/20 1619)  ADL Inpatient CMS 0-100% Score: 50.11 (10/13/20 1619)  ADL Inpatient CMS G-Code Modifier : CK (10/13/20 1619)    Goals  Short term goals  Time Frame for Short term goals: Patient will, by discharge  Short term goal 1: demo UB ADLs independently  Short term goal 2: demo LB ADLs at [facility] using AE PRN  Short term goal 3: demo functional transfers/mobility using LRD at [facility] to engage in ADLs safely  Short term goal 4: demo 8+ min of dynamic standing balance using LRD at [facility] to engage in ADLs safely       Therapy Time   Individual Concurrent Group Co-treatment   Time In 1518         Time Out 1544         Minutes 26         Timed Code Treatment Minutes: [address], OTR/L

## 2020-10-13 NOTE — PLAN OF CARE
Problem: Discharge Planning:  Goal: Discharged to appropriate level of care  Description: Discharged to appropriate level of care  Outcome: Ongoing     Problem: Mobility - Impaired:  Goal: Mobility will improve  Description: Mobility will improve  Outcome: Ongoing     Problem: Infection - Surgical Site:  Goal: Will show no infection signs and symptoms  Description: Will show no infection signs and symptoms  Outcome: Ongoing     Problem: Pain - Acute:  Goal: Pain level will decrease  Description: Pain level will decrease  Outcome: Ongoing     Problem: Pain:  Goal: Pain level will decrease  Description: Pain level will decrease  Outcome: Ongoing  Goal: Control of acute pain  Description: Control of acute pain  Outcome: Ongoing  Goal: Control of chronic pain  Description: Control of chronic pain  Outcome: Ongoing     Problem: Skin Integrity:  Goal: Will show no infection signs and symptoms  Description: Will show no infection signs and symptoms  Outcome: Ongoing  Goal: Absence of new skin breakdown  Description: Absence of new skin breakdown  Outcome: Ongoing     Problem: Falls - Risk of:  Goal: Will remain free from falls  Description: Will remain free from falls  Outcome: Ongoing  Goal: Absence of physical injury  Description: Absence of physical injury  Outcome: Ongoing     Problem: Musculor/Skeletal Functional Status  Goal: Highest potential functional level  Outcome: Ongoing

## 2020-10-13 NOTE — CARE COORDINATION
Case Management Initial Discharge Plan  Julienne Garcia,             Met with:patient to discuss discharge plans. Information verified: address, contacts, phone number, , insurance Yes    Emergency Contact/Next of Kin name & number: Chrissy [phone]    PCP: NUPUR Rivers CNP  Date of last visit: 2 mths    Insurance Provider: [facility] [place]    Discharge Planning    Living Arrangements:  Spouse/Significant Other   Support Systems:  Spouse/Significant Other    Home has 2 stories  2stairs to climb to get into front door, 14stairs to climb to reach second floor  Location of bedroom/bathroom in home bedroom up bath down    Patient able to perform ADL's:Independent    Current Services (outpatient & in home) scheduled Outpt therapy Thursday at [address] in [place] [address] Place  DME equipment: RW cane shower chair  DME provider:     Receiving oral anticoagulation therapy? Yes asa 81    If indicated:   Physician managing anticoagulation treatment:   Where does patient obtain lab work for ATC treatment? Potential Assistance Needed:  N/A    Patient agreeable to home care: No  [place] of choice provided:  n/a    Prior SNF/Rehab Placement and Facility: none  Agreeable to SNF/Rehab: No  [place] of choice provided: n/a     Evaluation: no    Expected Discharge date:  10/16/20    Patient expects to be discharged to:  home  Follow Up Appointment: Best Day/ Time: Tuesday AM    Transportation provider: spouse  Transportation arrangements needed for discharge: No    Readmission Risk              Risk of Unplanned Readmission:        6             Does patient have a readmission risk score greater than 14?: No  If yes, follow-up appointment must be made within 7 days of discharge.      Goals of Care: resume adls      Discharge Plan: Home w/spouse has RW cane and shower chair outpt therapy already setup to start Thursday at [address]          Electronically signed by Lisset Estrada RN on 10/13/20 at 4:57 PM EDT

## 2020-10-13 NOTE — ANESTHESIA POSTPROCEDURE EVALUATION
Department of Anesthesiology  Postprocedure Note    Patient: Enoc Francisco  MRN: [id number]  [place]: 1955  Date of evaluation: 10/13/2020  Time:  12:13 PM     Procedure Summary     Date:  10/13/20 Room / Location:  [address]    Anesthesia Start:  3851 Anesthesia Stop:  6370    Procedure:  KNEE TOTAL ARTHROPLASTY- MICROPORT, \ ADVANCED (Right ) Diagnosis:  (RIGHT KNEE ARTHRITIS)    Surgeon:  Walter Vang DO Responsible Provider:  Berry Orantes MD    Anesthesia Type:  spinal, regional ASA Status:  3          Anesthesia Type: spinal, regional    Aki Phase I: Aki Score: 9    Aki Phase II:      Last vitals: Reviewed and per EMR flowsheets.        Anesthesia Post Evaluation    Patient location during evaluation: PACU  Patient participation: complete - patient participated  Level of consciousness: awake and alert  Pain score: 4  Airway patency: patent  Nausea & Vomiting: no vomiting and no nausea  Complications: no  Cardiovascular status: hemodynamically stable  Respiratory status: acceptable  Hydration status: stable

## 2020-10-14 LAB
ABSOLUTE EOS #: <0.03 K/UL (ref 0–0.44)
ABSOLUTE IMMATURE GRANULOCYTE: 0.08 K/UL (ref 0–0.3)
ABSOLUTE LYMPH #: 1.93 K/UL (ref 1.1–3.7)
ABSOLUTE MONO #: 1.04 K/UL (ref 0.1–1.2)
ANION GAP SERPL CALCULATED.3IONS-SCNC: 7 MMOL/L (ref 9–17)
BASOPHILS # BLD: 0 % (ref 0–2)
BASOPHILS ABSOLUTE: <0.03 K/UL (ref 0–0.2)
BUN BLDV-MCNC: 13 MG/DL (ref 8–23)
BUN/CREAT BLD: ABNORMAL (ref 9–20)
CALCIUM SERPL-MCNC: 8.8 MG/DL (ref 8.6–10.4)
CHLORIDE BLD-SCNC: 109 MMOL/L (ref 98–107)
CO2: 23 MMOL/L (ref 20–31)
CREAT SERPL-MCNC: 0.49 MG/DL (ref 0.5–0.9)
DIFFERENTIAL TYPE: ABNORMAL
EOSINOPHILS RELATIVE PERCENT: 0 % (ref 1–4)
GFR AFRICAN AMERICAN: >60 ML/MIN
GFR NON-AFRICAN AMERICAN: >60 ML/MIN
GFR SERPL CREATININE-BSD FRML MDRD: ABNORMAL ML/MIN/{1.73_M2}
GFR SERPL CREATININE-BSD FRML MDRD: ABNORMAL ML/MIN/{1.73_M2}
GLUCOSE BLD-MCNC: 121 MG/DL (ref 70–99)
HCT VFR BLD CALC: 26.8 % (ref 36.3–47.1)
HEMOGLOBIN: 8.6 G/DL (ref 11.9–15.1)
IMMATURE GRANULOCYTES: 1 %
LYMPHOCYTES # BLD: 14 % (ref 24–43)
MCH RBC QN AUTO: 33.3 PG (ref 25.2–33.5)
MCHC RBC AUTO-ENTMCNC: 32.1 G/DL (ref 28.4–34.8)
MCV RBC AUTO: 103.9 FL (ref 82.6–102.9)
MONOCYTES # BLD: 7 % (ref 3–12)
NRBC AUTOMATED: 0 PER 100 WBC
PDW BLD-RTO: 13.1 % (ref 11.8–14.4)
PLATELET # BLD: 285 K/UL (ref 138–453)
PLATELET ESTIMATE: ABNORMAL
PMV BLD AUTO: 9.9 FL (ref 8.1–13.5)
POTASSIUM SERPL-SCNC: 4.1 MMOL/L (ref 3.7–5.3)
RBC # BLD: 2.58 M/UL (ref 3.95–5.11)
RBC # BLD: ABNORMAL 10*6/UL
SEG NEUTROPHILS: 78 % (ref 36–65)
SEGMENTED NEUTROPHILS ABSOLUTE COUNT: 11.03 K/UL (ref 1.5–8.1)
SODIUM BLD-SCNC: 139 MMOL/L (ref 135–144)
WBC # BLD: 14.1 K/UL (ref 3.5–11.3)
WBC # BLD: ABNORMAL 10*3/UL

## 2020-10-14 PROCEDURE — 36415 COLL VENOUS BLD VENIPUNCTURE: CPT

## 2020-10-14 PROCEDURE — G0008 ADMIN INFLUENZA VIRUS VAC: HCPCS | Performed by: ORTHOPAEDIC SURGERY

## 2020-10-14 PROCEDURE — 6360000002 HC RX W HCPCS: Performed by: STUDENT IN AN ORGANIZED HEALTH CARE EDUCATION/TRAINING PROGRAM

## 2020-10-14 PROCEDURE — 6370000000 HC RX 637 (ALT 250 FOR IP): Performed by: STUDENT IN AN ORGANIZED HEALTH CARE EDUCATION/TRAINING PROGRAM

## 2020-10-14 PROCEDURE — 2580000003 HC RX 258: Performed by: STUDENT IN AN ORGANIZED HEALTH CARE EDUCATION/TRAINING PROGRAM

## 2020-10-14 PROCEDURE — 80048 BASIC METABOLIC PNL TOTAL CA: CPT

## 2020-10-14 PROCEDURE — G0378 HOSPITAL OBSERVATION PER HR: HCPCS

## 2020-10-14 PROCEDURE — 85025 COMPLETE CBC W/AUTO DIFF WBC: CPT

## 2020-10-14 PROCEDURE — 97116 GAIT TRAINING THERAPY: CPT

## 2020-10-14 PROCEDURE — 97535 SELF CARE MNGMENT TRAINING: CPT

## 2020-10-14 PROCEDURE — 99252 IP/OBS CONSLTJ NEW/EST SF 35: CPT | Performed by: PHYSICIAN ASSISTANT

## 2020-10-14 PROCEDURE — 97110 THERAPEUTIC EXERCISES: CPT

## 2020-10-14 PROCEDURE — 90686 IIV4 VACC NO PRSV 0.5 ML IM: CPT | Performed by: ORTHOPAEDIC SURGERY

## 2020-10-14 PROCEDURE — 6360000002 HC RX W HCPCS: Performed by: ORTHOPAEDIC SURGERY

## 2020-10-14 RX ORDER — OXYCODONE HYDROCHLORIDE AND ACETAMINOPHEN 5; 325 MG/1; MG/1
1-2 TABLET ORAL EVERY 6 HOURS PRN
Qty: 56 TABLET | Refills: 0 | Status: SHIPPED | OUTPATIENT
Start: 2020-10-14 | End: 2020-10-21

## 2020-10-14 RX ORDER — ASPIRIN 81 MG/1
81 TABLET ORAL 2 TIMES DAILY
Qty: 84 TABLET | Refills: 0 | Status: SHIPPED | OUTPATIENT
Start: 2020-10-14 | End: 2021-01-01

## 2020-10-14 RX ORDER — DOCUSATE SODIUM 100 MG/1
100 CAPSULE, LIQUID FILLED ORAL 2 TIMES DAILY PRN
Qty: 60 CAPSULE | Refills: 0 | Status: SHIPPED | OUTPATIENT
Start: 2020-10-14 | End: 2021-01-01

## 2020-10-14 RX ADMIN — OXYCODONE HYDROCHLORIDE 10 MG: 5 TABLET ORAL at 02:14

## 2020-10-14 RX ADMIN — OXYCODONE HYDROCHLORIDE 10 MG: 5 TABLET ORAL at 12:11

## 2020-10-14 RX ADMIN — OXYCODONE HYDROCHLORIDE 10 MG: 5 TABLET ORAL at 06:00

## 2020-10-14 RX ADMIN — OXYCODONE HYDROCHLORIDE AND ACETAMINOPHEN 500 MG: 500 TABLET ORAL at 08:32

## 2020-10-14 RX ADMIN — STANDARDIZED SENNA CONCENTRATE AND DOCUSATE SODIUM 1 TABLET: 8.6; 5 TABLET ORAL at 08:31

## 2020-10-14 RX ADMIN — OXYCODONE HYDROCHLORIDE 10 MG: 5 TABLET ORAL at 22:30

## 2020-10-14 RX ADMIN — GABAPENTIN 300 MG: 300 CAPSULE ORAL at 20:36

## 2020-10-14 RX ADMIN — OXYCODONE HYDROCHLORIDE 10 MG: 5 TABLET ORAL at 18:15

## 2020-10-14 RX ADMIN — Medication 81 MG: at 08:31

## 2020-10-14 RX ADMIN — GABAPENTIN 300 MG: 300 CAPSULE ORAL at 08:31

## 2020-10-14 RX ADMIN — INFLUENZA A VIRUS A/VICTORIA/2454/2019 IVR-207 (H1N1) ANTIGEN (PROPIOLACTONE INACTIVATED), INFLUENZA A VIRUS A/HONG KONG/2671/2019 IVR-208 (H3N2) ANTIGEN (PROPIOLACTONE INACTIVATED), INFLUENZA B VIRUS B/VICTORIA/705/2018 BVR-11 ANTIGEN (PROPIOLACTONE INACTIVATED), INFLUENZA B VIRUS B/PHUKET/3073/2013 BVR-1B ANTIGEN (PROPIOLACTONE INACTIVATED) 0.5 ML: 15; 15; 15; 15 INJECTION, SUSPENSION INTRAMUSCULAR at 08:52

## 2020-10-14 RX ADMIN — Medication 81 MG: at 20:36

## 2020-10-14 RX ADMIN — STANDARDIZED SENNA CONCENTRATE AND DOCUSATE SODIUM 1 TABLET: 8.6; 5 TABLET ORAL at 20:38

## 2020-10-14 RX ADMIN — DESMOPRESSIN ACETATE 40 MG: 0.2 TABLET ORAL at 08:31

## 2020-10-14 RX ADMIN — METOPROLOL TARTRATE 37.5 MG: 25 TABLET ORAL at 20:36

## 2020-10-14 RX ADMIN — OXYCODONE HYDROCHLORIDE AND ACETAMINOPHEN 500 MG: 500 TABLET ORAL at 20:36

## 2020-10-14 RX ADMIN — DEXTROSE MONOHYDRATE 2 G: 50 INJECTION, SOLUTION INTRAVENOUS at 00:13

## 2020-10-14 RX ADMIN — SODIUM CHLORIDE: 9 INJECTION, SOLUTION INTRAVENOUS at 04:45

## 2020-10-14 RX ADMIN — ACETAMINOPHEN 650 MG: 325 TABLET ORAL at 06:05

## 2020-10-14 RX ADMIN — ACETAMINOPHEN 650 MG: 325 TABLET ORAL at 18:16

## 2020-10-14 RX ADMIN — PANTOPRAZOLE SODIUM 40 MG: 40 TABLET, DELAYED RELEASE ORAL at 08:31

## 2020-10-14 RX ADMIN — METOPROLOL TARTRATE 37.5 MG: 25 TABLET ORAL at 08:31

## 2020-10-14 ASSESSMENT — PAIN SCALES - GENERAL
PAINLEVEL_OUTOF10: 6
PAINLEVEL_OUTOF10: 3
PAINLEVEL_OUTOF10: 7
PAINLEVEL_OUTOF10: 4
PAINLEVEL_OUTOF10: 5
PAINLEVEL_OUTOF10: 9
PAINLEVEL_OUTOF10: 5
PAINLEVEL_OUTOF10: 9
PAINLEVEL_OUTOF10: 2
PAINLEVEL_OUTOF10: 3
PAINLEVEL_OUTOF10: 8
PAINLEVEL_OUTOF10: 5
PAINLEVEL_OUTOF10: 7
PAINLEVEL_OUTOF10: 6
PAINLEVEL_OUTOF10: 3
PAINLEVEL_OUTOF10: 7
PAINLEVEL_OUTOF10: 5
PAINLEVEL_OUTOF10: 3
PAINLEVEL_OUTOF10: 5
PAINLEVEL_OUTOF10: 5

## 2020-10-14 ASSESSMENT — PAIN DESCRIPTION - PROGRESSION

## 2020-10-14 ASSESSMENT — PAIN DESCRIPTION - LOCATION
LOCATION: KNEE
LOCATION: GENERALIZED

## 2020-10-14 ASSESSMENT — PAIN DESCRIPTION - PAIN TYPE
TYPE: SURGICAL PAIN
TYPE: SURGICAL PAIN
TYPE: ACUTE PAIN
TYPE: ACUTE PAIN
TYPE: SURGICAL PAIN
TYPE: ACUTE PAIN
TYPE: ACUTE PAIN

## 2020-10-14 ASSESSMENT — PAIN DESCRIPTION - ORIENTATION
ORIENTATION: RIGHT

## 2020-10-14 ASSESSMENT — PAIN DESCRIPTION - DESCRIPTORS: DESCRIPTORS: ACHING;DISCOMFORT;SORE

## 2020-10-14 ASSESSMENT — PAIN DESCRIPTION - FREQUENCY: FREQUENCY: CONTINUOUS

## 2020-10-14 NOTE — PROGRESS NOTES
Occupational Therapy  Facility/Department: [address] ORTHO/MED SURG  Daily Treatment Note  NAME: Renetta Ortega  : 1955  MRN: [id number]    Date of Service: 10/14/2020    Discharge Recommendations:  Patient would benefit from continued therapy after discharge       Assessment   Performance deficits / Impairments: Decreased functional mobility ; Decreased endurance;Decreased ADL status; Decreased high-level IADLs;Decreased balance;Decreased sensation  Prognosis: Good  Patient Education: OT POC, transfer/walker safety, importance of participation in therapy, knee precautions, femoral block, LB dressing techniques - pt verbalized understanding. REQUIRES OT FOLLOW UP: Yes  Activity Tolerance  Activity Tolerance: Patient Tolerated treatment well  Safety Devices  Safety Devices in place: Yes  Type of devices: Call light within reach; Chair alarm in place;Gait belt;Patient at risk for falls; Left in chair;Nurse notified         Patient Diagnosis(es): The primary encounter diagnosis was Status post total knee replacement using cement, right. A diagnosis of Primary osteoarthritis of right knee was also pertinent to this visit. has a past medical history of Arthritis, CAD (coronary artery disease), Chronic back pain, GERD (gastroesophageal reflux disease), Hyperlipidemia, Hypertension, Incomplete RBBB, Irregular heart beat, LAFB (left anterior fascicular block), Leg wound, left, Lumbar disc disease, Osteoporosis, PAC (premature atrial contraction), PONV (postoperative nausea and vomiting), Wears dentures, Wears eyeglasses, Wears partial dentures, and Wellness examination. has a past surgical history that includes Knee arthroscopy (Left, ); Abdominoplasty (); blepharoplasty (Bilateral, ); laminectomy (); lumbar laminectomy (); lumbar laminectomy (); Fixation Kyphoplasty (); Finger surgery (Right); Colonoscopy (); pr office/outpt visit,procedure only (Right, 2018);  Lumbar spine surgery (2005); Cataract removal with implant (Bilateral, 2016); other surgical history (10/03/2019); Foot Debridement (Left, 10/11/2019); Skin graft (Left, 12/3/2019); Leg Surgery (Left, 1/2/2020); Breast enhancement surgery (Bilateral, 1999); Skin graft (Left, 2/25/2020); fracture surgery; Tonsillectomy; Skin graft (Left, 6/30/2020); Cosmetic surgery; Cardiac catheterization (2019); Total knee arthroplasty (Right, 10/13/2020); and Total knee arthroplasty (Right, 10/13/2020). Restrictions  Restrictions/Precautions  Restrictions/Precautions: Weight Bearing, Femoral Block  Required Braces or Orthoses?: No  Lower Extremity Weight Bearing Restrictions  Right Lower Extremity Weight Bearing: Weight Bearing As Tolerated  Position Activity Restriction  Sternal Precautions: R TKA  Other position/activity restrictions: R TKA  Subjective   General  Patient assessed for rehabilitation services?: Yes  Family / Caregiver Present: No  General Comment  Pain Assessment  Pain Assessment: 0-10  Pain Level: 2  Pain Type: Surgical pain  Pain Location: Knee  Pain Orientation: Right  Pain Descriptors: Aching;Discomfort; Sore  Pain Frequency: Continuous  Non-Pharmaceutical Pain Intervention(s): Repositioned;Rest;Therapeutic presence;Pain ball relief  Vital Signs  Patient Currently in Pain: Yes   Orientation  Orientation  Overall Orientation Status: Within Functional Limits  Objective    ADL  Feeding: Setup; Independent  Grooming: Setup;Supervision(Oral care standing at sink.)  UE Bathing: Setup;Supervision(Mod A for back standing at sink.)  LE Bathing: Setup;Supervision(Seated in chair.)  UE Dressing: Contact guard assistance(To manage gown.)  LE Dressing: Contact guard assistance(To kamilla sock over L foot seated in chair.)  Toileting: Setup;Contact guard assistance(Pericare/bottom care standing at sink.)  Additional Comments: Pt completed ADL care standing at sink, surgical soap used appropriately.   Balance  Sitting Balance: Supervision(Seated in chair.)  Standing Balance: Contact guard assistance  Standing Balance  Time: 20 minutes. Activity: Functional mobility to/from bathroom using RW, toilet transfer, SADL care standing at sink. Functional Mobility  Functional - Mobility Device: Rolling Walker  Activity: To/from bathroom  Assist Level: Contact guard assistance  Functional Mobility Comments: No LOB/buckling noted during functional activities. Toilet Transfers  Toilet - Technique: Ambulating  Equipment Used: Grab bars  Toilet Transfer: Stand by assistance  Transfers  Sit to stand: Contact guard assistance  Stand to sit: Stand by assistance  Cognition  Overall Cognitive Status: [facility]  Plan   Plan  Times per week: 5-7x/wk (Joint)  Current Treatment Recommendations: Strengthening, Patient/Caregiver Education & Training, Functional Mobility Training, Endurance Training, Safety Education & Training, Self-Care / ADL, Balance Training, Equipment Evaluation, Education, & procurement  Goals  Short term goals  Time Frame for Short term goals: Patient will, by discharge  Short term goal 1: demo UB ADLs independently  Short term goal 2: demo LB ADLs at [facility] using AE PRN  Short term goal 3: demo functional transfers/mobility using LRD at [facility] to engage in ADLs safely  Short term goal 4: demo 8+ min of dynamic standing balance using LRD at [facility] to engage in ADLs safely       Therapy Time   Individual Concurrent Group Co-treatment   Time In 1116         Time Out 1205         Minutes 49         Timed Code Treatment Minutes: 49 Minutes     Pt seated in chair upon therapist arrival. Pleasant and agreeable to therapy. See above for LOF for all tasks. Pt retired to seated in chair at end of session with chair alarm activated and call light within reach.     BELKIS Jones/MARISSA

## 2020-10-14 NOTE — OP NOTE
[address] [facility]                                OPERATIVE REPORT    PATIENT NAME: Julienne Luke                       :        1955  MED REC NO:   [id number]                             ROOM:       7551  ACCOUNT NO:   [de-identified]                           ADMIT DATE: 10/13/2020  PROVIDER:     Grisel Weinstein    DATE OF PROCEDURE:  10/13/2020    PREOPERATIVE DIAGNOSIS:  Right knee severe degenerative joint disease. POSTOPERATIVE DIAGNOSIS:  Right knee severe degenerative joint disease. PROCEDURE:  Right total knee arthroplasty. SURGEON:  Grisel Weinstein DO    ASSISTANT:  Eduard rOtiz DO    ESTIMATED BLOOD LOSS:  200 mL. ANESTHESIA:  General and regional.    COMPLICATIONS:  None. DISPOSITION:  To PACU in stable condition. SPECIMENS:  None. NARRATIVE SUMMARY:  The patient is a 61-year-old female who presented to  [facility] Surgical Department for right total knee  arthroplasty. The patient has had progressively worsening right knee  pain, which has failed to improve despite conservative therapy to  include activity modification. Her symptoms are greatly affecting her  usual activities of daily living, and as a result, she has indicated she  would like surgical intervention at this time. She was identified preoperatively, where consent was obtained, signed,  placed on the chart. The procedure was described, her questions were  answered. The risks of the procedure were described to include, but not  limited to, the risk of anesthesia; infection; bleeding; need for  further surgery in the future; numbness, tingling, weakness, or pain to  the right lower extremity; scar; stiffness; failure of the procedure to  improve her symptoms; fracture; DVT; and death. She notes understanding  of these risks and states she wishes to proceed.     She was administered IV antibiotics perioperatively. She was then taken  to the operative suite, where she was placed supine upon the operative  table. General anesthesia was affected after a regional block had been  affected to the right lower extremity in the preoperative holding area  by the anesthesia team.  1 gm of IV tranexamic acid was administered  just prior to incision, and a second 1-gm IV tranexamic acid was  administered just after closure of incision. Well-padded tourniquet was  placed high upon the right thigh. The right lower extremity was prepped  and draped in sterile fashion after an appropriate surgical time-out. An incision was made starting three fingerbreadths proximal to the  superior pole of the patella and extending just medial to the tibial  tuberosity. Sharp incision was carried through the skin and  subcutaneous tissue. Full-thickness skin flaps were raised over the  extensor mechanism, and a medial parapatellar arthrotomy was made to the  knee. The patella was placed in the lateral gutter. Suprapatellar  synovitis was incised, as was the infrapatellar fat pad. Subperiosteal  dissection was made over the proximal medial tibia to the posteromedial  corner, and the lateral medial menisci were excised as well as the  anterior and posterior cruciate ligaments. The patella was slightly  everted and was measured. It was noted to measure thin enough that it  was concerning for resurfacing, and so the marginal osteophytes from the  patella were removed, and a lateral patellar facetectomy was affected. Once that was completed, attention was drawn to preparation of the  femur. Drill hole entry was made to the distal femoral canal.  The  5-degree valgus alignment puja was placed. The anterior cutting jig was  placed, and the anterior cut was made. Then, the distal cutting jig was  placed, and the distal cut was made.   Then, the appropriately sized  4-in-1 cutting block was placed, and the remaining cuts were made.    Attention was then drawn to the tibia. Intramedullary alignment puja was  placed within the tibia after drill hole entry to the proximal tibial  canal, and the intramedullary cutting jig was placed, and the  appropriate tibial cut was then affected, protecting the collateral  ligaments and the popliteal structures throughout the cutting. Any remaining meniscus was then excised, and soft tissue balancing was  affected in the usual way. Once the flexion and extension and medial and lateral gaps were noted be  symmetric, the trial femoral and tibial components were placed and  impacted until fully seated. The knee was put through range of motion  and was found to be stable throughout the arc of range of motion. Trials were removed. All bony surfaces was thoroughly irrigated and  suctioned and dried. Two bags of cement were mixed on the back table. The implants were coated along their backside, and cement was  pressurized at the bony cuts. The implants were placed and impacted  until fully seated. Any marginal cement was removed from the periphery  of the implants, and a trial polyethylene liner was placed, and the knee  was put in full extension until the cement fully polymerized. Once the cement had fully hardened, the trial polyethylene was removed. The implantable polyethylene was placed and impacted until fully seated  in the capture mechanism. The knee was put through range of motion and  was found to be stable throughout the arc of range of motion, and the  patella was noted to track appropriately. The knee was thoroughly irrigated and suctioned with pulse lavage  irrigation, and then 1 L of the Irrisept irrigant solution was placed,  kept in place for 90 seconds, and then suctioned. The medial  parapatellar arthrotomy was closed using absorbable suture, as was the  subcutaneous tissue. A running subcuticular Stratafix suture was  utilized for subcuticular closure.   Debbieond was used cutaneously. Sterile dressing was applied. Anesthesia was reversed. The patient was  taken to the postoperative recovery room in stable condition. There  were no immediate postoperative complications, and the procedure was  tolerated well. IMPLANTS:  MicroPort total knee arthroplasty system, Advance; size 5  right cemented femoral component; size 6 right cemented tibial  component; 10-mm medial pivot polyethylene liner.         Fritz Dubon    D: 10/14/2020 7:35:40       T: 10/14/2020 7:43:21     [email or web address]  Job#: [id number]     Doc#: [id number]    CC:

## 2020-10-14 NOTE — PROGRESS NOTES
Physical Therapy  Facility/Department: [address] ORTHO/MED SURG  Daily Treatment Note  NAME: Cole Montague  : 1955  MRN: [id number]    Date of Service: 10/14/2020    Discharge Recommendations:  Patient would benefit from continued therapy after discharge   PT Equipment Recommendations  Equipment Needed: Yes  Mobility Devices: Cindra Yoruba: Rolling    Assessment   Body structures, Functions, Activity limitations: Decreased functional mobility ; Decreased endurance;Decreased sensation;Decreased balance  Assessment: The pt ambulated 50 ft with a RW x CGA with WBAT R LE. No buckling RLE with step to gait pattern. She could benefit from a continuation of PT following her DC  Prognosis: Good  Decision Making: Medium Complexity  PT Education: Goals;PT Role;Plan of Care;Gait Training;Precautions; Home Exercise Program  Activity Tolerance  Activity Tolerance: Patient Tolerated treatment well; Other  Activity Tolerance: fear of falling     Patient Diagnosis(es): The primary encounter diagnosis was Status post total knee replacement using cement, right. A diagnosis of Primary osteoarthritis of right knee was also pertinent to this visit. has a past medical history of Arthritis, CAD (coronary artery disease), Chronic back pain, GERD (gastroesophageal reflux disease), Hyperlipidemia, Hypertension, Incomplete RBBB, Irregular heart beat, LAFB (left anterior fascicular block), Leg wound, left, Lumbar disc disease, Osteoporosis, PAC (premature atrial contraction), PONV (postoperative nausea and vomiting), Wears dentures, Wears eyeglasses, Wears partial dentures, and Wellness examination. has a past surgical history that includes Knee arthroscopy (Left, ); Abdominoplasty (); blepharoplasty (Bilateral, ); laminectomy (); lumbar laminectomy (); lumbar laminectomy (); Fixation Kyphoplasty (); Finger surgery (Right); Colonoscopy (); pr office/outpt visit,procedure only (Right, 2018);  Lumbar spine surgery (2005); Cataract removal with implant (Bilateral, 2016); other surgical history (10/03/2019); Foot Debridement (Left, 10/11/2019); Skin graft (Left, 12/3/2019); Leg Surgery (Left, 1/2/2020); Breast enhancement surgery (Bilateral, 1999); Skin graft (Left, 2/25/2020); fracture surgery; Tonsillectomy; Skin graft (Left, 6/30/2020); Cosmetic surgery; Cardiac catheterization (2019); Total knee arthroplasty (Right, 10/13/2020); and Total knee arthroplasty (Right, 10/13/2020). Restrictions  Restrictions/Precautions  Restrictions/Precautions: Weight Bearing  Required Braces or Orthoses?: No  Lower Extremity Weight Bearing Restrictions  Right Lower Extremity Weight Bearing: Weight Bearing As Tolerated  Position Activity Restriction  Sternal Precautions: R TKA  Other position/activity restrictions: R TKA  Subjective   General  Response To Previous Treatment: Patient with no complaints from previous session.   Family / Caregiver Present: No  Subjective  Subjective: RN and pt agreed to PT, pt awake on commode upon arrival  Pain Screening  Patient Currently in Pain: Yes  Vital Signs  Patient Currently in Pain: Yes       Orientation  Orientation  Overall Orientation Status: Within Normal Limits       Objective   Bed mobility  Supine to Sit: Stand by assistance  Sit to Supine: (Left pt up in chair)  Scooting: Stand by assistance  Transfers  Sit to Stand: Contact guard assistance  Stand to sit: Contact guard assistance  Ambulation  Ambulation?: Yes  Ambulation 1  Surface: level tile  Device: Rolling Walker  Assistance: Contact guard assistance  Quality of Gait: Step to gait  Gait Deviations: Slow Sharon;Decreased step length;Decreased step height;Decreased head and trunk rotation  Distance: 50ft  Comments: Frequent brief standing rest breaks, pt reluctant to amb d/t having fall over night  Stairs/Curb  Stairs?: No     Balance  Posture: Good  Sitting - Static: Good  Sitting - Dynamic: Good  Standing - Static: Fair;-  Standing - Dynamic: Fair;-  Exercises  Seated LE exercise program: Long Arc Quads, hip abduction/adduction, heel/toe raises, and marches. Reps: x 10 RLE, MANUEL LAQ                Goals  Short term goals  Time Frame for Short term goals: 10 visits  Short term goal 1: transfers with SBA  Short term goal 2: amb 200 ft with a RW x SBA with WBAT R LE  Short term goal 3: ascend/descend 4 steps with SBA  Short term goal 4: total knee exercises x SBA  Patient Goals   Patient goals :  To get upstairs in her home    Plan    Plan  Times per week: BID  Current Treatment Recommendations: Strengthening, Gait Training, Stair training, Functional Mobility Training, Endurance Training, Safety Education & Training, ROM  Safety Devices  Type of devices: Call light within reach, Nurse notified, Left in chair, Chair alarm in place     Therapy Time   Individual Concurrent Group Co-treatment   Time In 0957         Time Out 1025         Minutes 28          Time coded minutes [facility]

## 2020-10-14 NOTE — PLAN OF CARE
Nutrition Problem #1: Increased nutrient needs  Intervention: Food and/or Nutrient Delivery: Continue Current Diet, Start Oral Nutrition Supplement  Nutritional Goals: Pt to meet % of est'd daily needs via PO

## 2020-10-14 NOTE — PROGRESS NOTES
Patient IV out by mistake of patient, patient request to not have another IV placed, message left with Dr. Rodriguez Nolasco answering service to notify.

## 2020-10-14 NOTE — PROGRESS NOTES
Orthopedic Progress Note    Patient:  Doni Haddad  YOB: 1955     62 y.o. female    Subjective:  Patient seen and examined  Patients right knee buckled when she was going to bathroom last night   Wishes to go home today. No other complaints or concerns  Pain controlled. Nerve block still effective. Denies fever, HA, CP, SOB, N/V, dysuria  -BM/+flatus/+urination  PT: walked 25 ft. Vitals reviewed, afebrile    Objective:   Vitals:    10/14/20 0015   BP: (!) 153/58   Pulse: (!) 8   Resp:    Temp: 98.6 °F (37 °C)   SpO2:      General: NAD, cooperative   Musculoskeletal:  right lower extremity: Optifoam on. clean, dry and intact. Peripheral nerve block in place. Tender to palpation along incision and medial aspect of knee. Nontender to range of motion. Compartments soft. EHL/FHL/TA/GS complex motor intact. Deep and Superficial Peroneal/Saphenous/Sural sensation diminished secondary to nerve block but intact. Dorsalis pedis/posterior tibial pulses 2+ with BCR. No results for input(s): WBC, HGB, HCT, PLT, INR, PTT, NA, K, BUN, CREATININE, GLUCOSE in the last 72 hours. Invalid input(s): PT   Meds: ASA   See rec for complete list    Impression 62 y.o. female s/p right total knee arthroplasty on 11/13, POD#1    Plan  - Complete post-op Antibiotics   - WB status: weight bear as tolerated right lower extremity  - Pain control PO/IV Medication. Attempt to Wean IV medications. - Optifoam Dressing clean, dry and intact. Ok to reinforce per nursing.  Please notify ortho if dressings saturate   - DVT ppx: ASA 81mg BID  - Consults: IM for med management  - Ice for 20 minutes an hour and keep elevated to reduce swelling and throbbing pain  - PT/OT to see and eval  - Dispo: Planning DC to home pending progress with PT   - F/u Dr. Gloria Murray in 10-14 days.  - Please page ortho with any questions    Bulmaro Goodman MD  Orthopaedic Surgery Resident, PGY-1  [address] [facility], [place]    PGY-3 Addendum    Patient seen and examined. Agree with above. Patient doing well POD#1 from R TKA. Patient had episode of right knee buckling last night and when going to bathroom. Patient had to be helped up. She did not hit her knee or her head. She denies pain today. She is tolerating PO and voiding appropriately. Exam as above. Plan for PT today. If knee continues to buckle pain pump will need turned down. IM on for medical assistance. Appreciate recs. Will plan for discharge to home pending progress with PT and pain control. Follow up with  in 10-14 days from surgery.        Doris Luis DO PGY-3  Orthopedic Surgery Resident  [facility]

## 2020-10-14 NOTE — PROGRESS NOTES
Physician Progress Note      PATIENT:               Rosita Resendiz  CSN #:                  [id number]  :                       1955  ADMIT DATE:       10/13/2020 6:40 AM  DISCH DATE:  RESPONDING  PROVIDER #:        Nathaniel Ashby          QUERY TEXT:    Pt admitted with R knee OA s/p R TKA. Pt noted to have decreased Hgb. If   possible, please document in the progress notes and discharge summary if you   are evaluating and/or treating any of the following: The medical record reflects the following:  Risk Factors: s/p R TKA  Clinical Indicators: pre-op Hgb 12.2, Hgb post-op 10/14 8.6; per brief op note   EBL 300cc  Treatment: IVF @ 100/hr, labs, monitoring    Call if any questions. Thank you, Jocelyne De La Fuente, [address] [facility]  Options provided:  -- Postoperative acute blood loss anemia  -- Acute blood loss anemia  -- Dilutional anemia  -- Other - I will add my own diagnosis  -- Disagree - Not applicable / Not valid  -- Disagree - Clinically unable to determine / Unknown  -- Refer to Clinical Documentation Reviewer    PROVIDER RESPONSE TEXT:    Combination acute blood loss anemia and dilution anemia    Query created by: Ele Martinez on 10/14/2020 7:38 AM      Electronically signed by:   Nathaniel Ashby 10/14/2020 7:52 AM

## 2020-10-14 NOTE — PROGRESS NOTES
Comprehensive Nutrition Assessment    Type and Reason for Visit:  Initial, Positive Nutrition Screen(Wounds)    Nutrition Recommendations/Plan:   -Continue general diet   -Suggest ensure enlive supplements BID   -Will monitor po intake, weights and wound healing     Nutrition Assessment:   Pt s/p right total knee arthroplasty. Writer attempted to visit pt multiple times - pt was either w/ therapy or MD. Pt has been consuming % of her meals since admission. Pt noted w/ 25 lb wt gain over 1 vidal per EMR. Pt noted w/ multiple wounds - will add nutritional supplements to aide in wound healing progression. Malnutrition Assessment:  Malnutrition Status:  Insufficient data    Context:  Acute Illness     Findings of the 6 clinical characteristics of malnutrition:  Energy Intake:  No significant decrease in energy intake  Weight Loss:  No significant weight loss     Body Fat Loss:  Unable to assess     Muscle Mass Loss:  Unable to assess    Fluid Accumulation:  1 - Mild Extremities, Generalized   Strength:  Not Performed    Estimated Daily Nutrient Needs:  Energy (kcal):  1.2-1.3 ~> 3400-2461 kcals/d; Weight Used for Energy Requirements:  Admission     Protein (g):  1.3-1.5 gm/kg ~> 77-89 gms/d; Weight Used for Protein Requirements:  Ideal          Nutrition Related Findings:  labs/meds reviewed      Wounds:  Multiple, Open Wounds       Current Nutrition Therapies:    DIET GENERAL; Anthropometric Measures:  · Height: 5' 6\" (167.6 cm)  · Current Body Weight: 163 lb (73.9 kg)   · Admission Body Weight: 163 lb (73.9 kg)   · Ideal Body Weight: 130 lbs; % Ideal Body Weight 125.4 %   · BMI: 26.3  · BMI Categories: Overweight (BMI 25.0-29. 9)       Nutrition Diagnosis:   · Increased nutrient needs related to (wound healing) as evidenced by wounds(Need for ONS)      Nutrition Interventions:   Food and/or Nutrient Delivery:  Continue Current Diet, Start Oral Nutrition Supplement  Nutrition Education/Counseling: Education not indicated   Coordination of Nutrition Care:  Continued Inpatient Monitoring    Goals Set   Pt to meet % of est'd daily needs via PO       Nutrition Monitoring and Evaluation:   Food/Nutrient Intake Outcomes:  Food and Nutrient Intake, Supplement Intake  Physical Signs/Symptoms Outcomes:  Biochemical Data, Nutrition Focused Physical Findings, Weight, Skin, GI Status, Fluid Status or Edema     Discharge Planning:     Too soon to determine     Electronically signed by Frieda Mullen RD, LD on 10/14/20 at 12:55 PM EDT    Contact: [phone]

## 2020-10-14 NOTE — PROGRESS NOTES
Department of Anesthesiology   Acute Pain Progress Note    Patient's Name: Julienne Garcia  Surgeon: No name on file.    Date: 10/14/2020    61 yo F S/P R TKA 10/13    - Femoral catheter in place, bupivacaine infusion at 6 mL/hr  - Receiving PRN oxycodone  - Had some buckling with PT this morning  Pt Awake, Alert    Last Pain Score: (Charted in Doc Flowsheet)  Pain Level: 3    Vital Signs (Current)   Vitals:    10/14/20 0605   BP: (!) 132/50   Pulse: 79   Resp: 16   Temp: 97.5 °F (36.4 °C)   SpO2:      Vital Signs Statistics (for past 48 hrs)     Temp  Av.4 °F (36.3 °C)  Min: 96.8 °F (36 °C)   Min taken time: 10/13/20 0928  Max: 99.7 °F (37.6 °C)   Max taken time: 10/13/20 2205  Pulse  Av.2  Min: 8   Min taken time: 10/14/20 0015  Max: 78   Max taken time: 10/14/20 0605  Resp  Avg: 10  Min: 0   Min taken time: 10/13/20 1111  Max: 22   Max taken time: 10/13/20 0901  BP  Min: 77/53   Min taken time: 10/13/20 0909  Max: 153/58   Max taken time: 10/14/20 0015  MAP (mmHg)  Av.3  Min: 54   Min taken time: 10/13/20 0909  Max: 101   Max taken time: 10/13/20 0853  SpO2  Av.2 %  Min: 95 %   Min taken time: 10/13/20 1145  Max: 100 %   Max taken time: 10/13/20 1105    BP Readings from Last 3 Encounters:   10/14/20 (!) 132/50   10/13/20 131/77   20 (!) 165/84       No Known Allergies  Patient Active Problem List   Diagnosis    Neoplasm of uncertain behavior of skin    Other plastic surgery for unacceptable cosmetic appearance    Chronic bilateral low back pain without sciatica    Mass of finger of right hand    Chronic ulcer of left foot with fat layer exposed ([address]    Pain in left foot    Hypertension    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)    Wound, open, foot with complication, left, initial encounter    Status post total knee replacement using cement, right       Current Medications  tranexamic acid (CYKLOKAPRON) 1,000 mg in dextrose 5 % 100 mL IVPB, Once  tranexamic acid (CYKLOKAPRON) 1,000 mg in dextrose 5 % 100 mL IVPB, Once  bupivacaine 0.125% (MARCAINE) in sodium chloride 0.9% infusion 500 mL, Continuous  gabapentin (NEURONTIN) capsule 300 mg, BID  calcium elemental (OSCAL) tablet 1,000 mg, Daily  bupivacaine (MARCAINE) 0.25 % injection 5 mg, Once  vitamin C (ASCORBIC ACID) tablet 500 mg, BID  atorvastatin (LIPITOR) tablet 40 mg, QAM  meclizine (ANTIVERT) tablet 25 mg, TID PRN  metoprolol tartrate (LOPRESSOR) tablet 37.5 mg, BID  pantoprazole (PROTONIX) tablet 40 mg, Daily  0.9 % sodium chloride infusion, Continuous  sodium chloride flush 0.9 % injection 10 mL, 2 times per day  sodium chloride flush 0.9 % injection 10 mL, PRN  acetaminophen (TYLENOL) tablet 650 mg, Q6H  sennosides-docusate sodium (SENOKOT-S) 8.6-50 MG tablet 1 tablet, BID  magnesium hydroxide (MILK OF MAGNESIA) 400 MG/5ML suspension 30 mL, Daily PRN  oxyCODONE (ROXICODONE) immediate release tablet 5 mg, Q4H PRN    Or  oxyCODONE (ROXICODONE) immediate release tablet 10 mg, Q4H PRN  aspirin EC tablet 81 mg, BID        PCA Flow Sheet                       Labs  CBC:   Lab Results   Component Value Date    WBC 14.1 10/14/2020    RBC 2.58 10/14/2020    HGB 8.6 10/14/2020    HCT 26.8 10/14/2020    .9 10/14/2020    RDW 13.1 10/14/2020     10/14/2020     CMP:    Lab Results   Component Value Date     10/14/2020    K 4.1 10/14/2020     10/14/2020    CO2 23 10/14/2020    BUN 13 10/14/2020    CREATININE 0.49 10/14/2020    GFRAA >60 10/14/2020    LABGLOM >60 10/14/2020    GLUCOSE 121 10/14/2020    PROT 6.8 09/30/2020    CALCIUM 8.8 10/14/2020    BILITOT 0.21 09/30/2020    ALKPHOS 93 09/30/2020    AST 11 09/30/2020    ALT 11 09/30/2020     BMP:    Lab Results   Component Value Date     10/14/2020    K 4.1 10/14/2020     10/14/2020    CO2 23 10/14/2020    BUN 13 10/14/2020    CREATININE 0.49 10/14/2020    CALCIUM 8.8 10/14/2020    GFRAA >60 10/14/2020    LABGLOM >60 10/14/2020    GLUCOSE 121 10/14/2020     COAGS:    Lab Results   Component Value Date    PROTIME 10.4 09/06/2019    INR 1.0 09/06/2019       REVIEW OF SYSTEMS:    CONSTITUTIONAL:  negative  EYES: negative  HEENT:  negative  RESPIRATORY:  negative  CARDIOVASCULAR:  negative  MUSCULOSKELETAL:  Per HPI  NEUROLOGICAL:  negative    PHYSICAL EXAM:    CONSTITUTIONAL:  awake, alert, cooperative, no apparent distress, and appears stated age  LUNGS:  No increased work of breathing, good air exchange, clear to auscultation bilaterally, no crackles or wheezing  CARDIOVASCULAR:  Normal apical impulse, regular rate and rhythm, normal S1 and S2, no S3 or S4, and no murmur noted  ABDOMEN:  soft, non-distended, non-tender, no masses palpated, MUSCULOSKELETAL: dressing in place, c/d/i  NEUROLOGIC: RLE Motor is 4 out of 5       A/P: Juliennejuan carlos Garcia is a 62y.o. year-old s/p R TKA now POD#1 with good pain control. Had some RLE buckling with PT    Treatment Plan:   - decrease femoral catheter infusion to 4 ml/hour  -Thank you for opportunity to participate in this patient's care.     Electronically signed by Laura Barnes MD on 10/14/2020 at 10:21 AM

## 2020-10-14 NOTE — PROGRESS NOTES
Writer arrive in pt room around  0030to give antibiotic pt had to use the restroom writer disconnect tubing, pt go to stand up and knee buckle writer help lower pt to floor security called to help assist pt to commode vitals stable,  nurse supervisor and ortho was notified

## 2020-10-14 NOTE — CONSULTS
[facility]  Office: [address], DO, Kelsey Monique, DO, Camacho Juárez, DO, Carlos Manuel Captain Duncan, DO, Hernando Cox MD, Allison Anderson MD, Deyanira Lee MD, Rebecca Marquez MD, Emily Luna MD, Jann Mercer MD, Emily Williamson MD, Lan Bruno MD, Yunior Power MD, Asif Wade DO, Karen Camara MD, Efren Nelson MD, Christophe Issa DO, Jes Real MD,  Koko Cha DO, Tea Shipley MD, Fidelia Mccord MD, Daniel Adams, [facility], [facility], CNP, Brandon Pina, CNP, Nitish Kiser, [facility], Ariel López, CNP, Nyla Mott, CNP, Trinh Tobias, CNP, Erlinda Lockett, CNP, Carlos Eduardo Pugh, CNP, Myles Ansari PA-C, Mona Polo [facility], Brooke Izaguirre, CNP, Nahid Nails, CNP, Leslie Sear, CNP, Asya Sharma, CNP, Kevyn Mercado, [facility]   [address] [facility] / HISTORY AND PHYSICAL EXAMINATION            Date:   10/14/2020  Patient name:  Barbara Anand  Date of admission:  10/13/2020  6:40 AM  MRN:   [id number]  Account:  [de-identified]  YOB: 1955  PCP:    NUPUR Ibanez CNP  Room:   99/2877-24  Code Status:    Full Code    Physician Requesting Consult: Nery Batista DO    Reason for Consult:  Blood pressure management    Chief Complaint:     S/p right TKA on 10/13    History Obtained From:     patient, electronic medical record    History of Present Illness:     62year-old female with history of hypertension, HLD, CAD, . Admitted to the hospital following right TKA yesterday by orthopedic surgery. Procedure went well. I have been consult to help aid in blood pressure management.      Past Medical History:     Past Medical History:   Diagnosis Date    Arthritis     knees hips hands shoulders and back    CAD (coronary artery disease) 2019    blockage on cath 9/2019, to have stenting after surgery (arthroplasty) Dr. Jacob Abad Chronic back pain     GERD (gastroesophageal reflux disease)     on rx    Hyperlipidemia     per Dr. Telma Huerta on rx    Hypertension     [facility] manages    Incomplete RBBB     Irregular heart beat     LAFB (left anterior fascicular block)     Dr. Telma Huerta, cardiologist, seen 9/2019 prior to surgery on 10/8/2019    Leg wound, left     following with wound care, Dr. Halie Elias Lumbar disc disease     Osteoporosis     PAC (premature atrial contraction) 06/2018    PACs and PVCs on holter monitor,     PONV (postoperative nausea and vomiting)     requests scop patch    Wears dentures     upper partial    Wears eyeglasses     readers    Wears partial dentures     upper and lower    Wellness examination     MIKE Seaman NP PCP, seen 2/10/2020        Past Surgical History:     Past Surgical History:   Procedure Laterality Date    ABDOMINOPLASTY  1997    BLEPHAROPLASTY Bilateral 1997    BREAST ENHANCEMENT SURGERY Bilateral 1999    breast augmentation   Tj Estes  2019    Dr. Telma Huerta, blockage but no stents yet    CATARACT REMOVAL WITH IMPLANT Bilateral 2016    COLONOSCOPY  2015    No Polyps    COSMETIC SURGERY      eyelid lift    FINGER SURGERY Right     thumb lesion excised    FIXATION KYPHOPLASTY  2013    Back    FOOT DEBRIDEMENT Left 10/11/2019    SURGICAL PREP WOUND BED LEFT FOOT WITH APPLICATION OF EPIFIX LEFT FOOT 3X4 performed by Regis Sandhu DPM at [facility]      kyphoplasty for lumbar fx 2013 Dr. Mendel Espinoza [facility]ROSCOPY Left [facility]    FOOT  DEBRIDEMENT WITH WOUND VAC PLACEMENT performed by Cinthya Caballero MD at [place] [address]  2014   [facility] [place] [address]  2005    diskectomy and spinal fusion    OTHER SURGICAL HISTORY  10/03/2019    Left leg angiogram    DC OFFICE/OUTPT VISIT,PROCEDURE ONLY Right 9/11/2018    EXCISION MASS THUMB, 3080 TABLE performed by Alva Peña DO at [facility] OR    SKIN GRAFT Left 12/3/2019    LEFT FOOT DEBRIDEMENT WITH SKIN GRAFT SPLIT THICKNESS; SKIN GRAFT TAKEN FROM RIGHT ANTERIOR THIGH performed by Marc Otero MD at [place] [id number] Left 2/25/2020    DEBRIDEMENT, SPLIT THICKNESS SKIN GRAFT WITH WOUND VAC PLACEMENT FOOT performed by Jada Crawford MD at [place] [address] Left 6/30/2020    DEBRIDEMENT, SKIN GRAFT SPLIT THICKNESS FOOT  (Last Carlos) performed by Jada Crawford MD at [address] [place] [address]      as a child    TOTAL KNEE ARTHROPLASTY Right 10/13/2020    KNEE TOTAL ARTHROPLASTY        Medications Prior to Admission:     Prior to Admission medications    Medication Sig Start Date End Date Taking? Authorizing Provider   aspirin EC 81 MG EC tablet Take 1 tablet by mouth 2 times daily 10/14/20 11/25/20 Yes Stanley David MD   oxyCODONE-acetaminophen (PERCOCET) 5-325 MG per tablet Take 1-2 tablets by mouth every 6 hours as needed for Pain for up to 7 days.  10/14/20 10/21/20 Yes Stanley David MD   docusate sodium (COLACE) 100 MG capsule Take 1 capsule by mouth 2 times daily as needed for Constipation 10/14/20  Yes Stanley David MD   folic acid (FOLVITE) 1 MG tablet take 1 tablet by mouth once daily  Patient taking differently: take 1 tablet by mouth once daily Mina Valdez 7/28/20  Yes NUPUR Charles CNP   vitamin C (ASCORBIC ACID) 500 MG tablet Take 500 mg by mouth 2 times daily   Yes Historical Provider, MD   Metoprolol Tartrate 37.5 MG TABS Take 37.5 mg by mouth 2 times daily  Patient taking differently: Take 37.5 mg by mouth 2 times daily Per Mina Valdez for palpitations 6/22/20  Yes NUPUR Charles CNP   diclofenac sodium (VOLTAREN) 1 % GEL Apply topically as needed Dr. Nathan Blanton   Yes Historical Provider, MD   atorvastatin (LIPITOR) 40 MG tablet Take 1 tablet by mouth nightly  Patient taking differently: Take 40 mg by mouth every morning Per Dr. Radha Moreau 9/11/19 Yes Yue Starr MD   omeprazole (PRILOSEC) 40 MG delayed release capsule Take 1 tablet by mouth daily Per Jorje Larger   Yes Historical Provider, MD   Cholecalciferol (VITAMIN D3) 5000 units TABS Take 5,000 Units by mouth daily    Yes Historical Provider, MD   calcium carbonate (OSCAL) 500 MG TABS tablet Take 1,000 mg by mouth daily Per [place] [facility]   Yes Historical Provider, MD   alendronate (FOSAMAX) 70 MG tablet Take 70 mg by mouth every 7 days Indications: Mondays Dr. Bisi Leslie 6/4/18  Yes Historical Provider, MD   gabapentin (NEURONTIN) 300 MG capsule Take 300 mg by mouth 2 times daily. Per Dr. Valeriano Comer 2/27/15  Yes Historical Provider, MD   Handicamanjit Edgar [address] [facility] by Does not apply route 1/13/20   NUPUR Shabazz - CNP   meclizine (ANTIVERT) 25 MG tablet Take 25 mg by mouth 3 times daily as needed Per Jorje Camacho CNP    Historical Provider, MD   NAPROXEN PO Take 250 mg by mouth daily as needed Indications: patient takes 1-2 times a day Stopping 10 days prior to surgery as instructed by surgeon    Historical Provider, MD        Allergies:     Patient has no known allergies. Social History:     Tobacco:    reports that she quit smoking about 8 months ago. Her smoking use included cigarettes. She started smoking about 52 years ago. She has a 12.50 pack-year smoking history. She has never used smokeless tobacco.  Alcohol:      reports current alcohol use. Drug Use:  reports no history of drug use. Family History:     Family History   Problem Relation Age of Onset    Coronary Art Dis Mother     Arthritis Mother     Heart Surgery Mother         CABG    Coronary Art Dis Father     Heart Disease Father     Heart Surgery Father         CABG    Coronary Art Dis Sister     Heart Surgery Sister         CABG       Review of Systems:     Positive and Negative as described in HPI.     CONSTITUTIONAL:  negative for fevers, chills, sweats, fatigue, weight loss  HEENT:  negative for vision, hearing changes, runny nose, throat pain  RESPIRATORY:  negative for shortness of breath, cough, congestion, wheezing. CARDIOVASCULAR:  negative for chest pain, palpitations. GASTROINTESTINAL:  negative for nausea, vomiting, diarrhea, constipation, change in bowel habits, abdominal pain   GENITOURINARY:  negative for difficulty of urination, burning with urination, frequency   INTEGUMENT:  negative for rash, skin lesions, easy bruising   HEMATOLOGIC/LYMPHATIC:  negative for swelling/edema   ALLERGIC/IMMUNOLOGIC:  negative for urticaria , itching  ENDOCRINE:  negative increase in drinking, increase in urination, hot or cold intolerance  MUSCULOSKELETAL:  negative joint pains, muscle aches, swelling of joints  NEUROLOGICAL:  negative for headaches, dizziness, lightheadedness, numbness, pain, tingling extremities  BEHAVIOR/PSYCH:  negative for depression, anxiety    Physical Exam:     BP (!) 132/50   Pulse 79   Temp 97.5 °F (36.4 °C) (Oral)   Resp 16   Ht 5' 6\" (1.676 m)   Wt 163 lb 9.3 oz (74.2 kg)   SpO2 99%   BMI 26.40 kg/m²   Temp (24hrs), Av.4 °F (36.3 °C), Min:96.8 °F (36 °C), Max:99.7 °F (37.6 °C)    No results for input(s): POCGLU in the last 72 hours. Intake/Output Summary (Last 24 hours) at 10/14/2020 0743  Last data filed at 10/13/2020 2221  Gross per 24 hour   Intake 3013 ml   Output 650 ml   Net 2363 ml       General Appearance:  alert, well appearing, and in no acute distress  Mental status: oriented to person, place, and time with normal affect  Head:  normocephalic, atraumatic.   Eye: no icterus, redness, pupils equal and reactive, extraocular eye movements intact, conjunctiva clear  Ear: normal external ear, no discharge, hearing intact  Nose:  no drainage noted  Mouth: mucous membranes moist  Neck: supple, no carotid bruits, thyroid not palpable  Lungs: Bilateral equal air entry, clear to ausculation, no wheezing, rales or rhonchi, normal effort  Cardiovascular: normal rate, regular rhythm, no murmur, gallop, rub.   Abdomen: Soft, nontender, nondistended, normal bowel sounds, no hepatomegaly or splenomegaly  Neurologic: There are no new focal motor or sensory deficits, normal muscle tone and bulk, no abnormal sensation, normal speech, cranial nerves II through XII grossly intact  Skin: No gross lesions, rashes, bruising or bleeding on exposed skin area  Extremities:  peripheral pulses palpable, no pedal edema or calf pain with palpation  Psych: normal affect    Investigations:      Laboratory Testing:  Recent Results (from the past 24 hour(s))   CBC auto differential    Collection Time: 10/14/20  6:52 AM   Result Value Ref Range    WBC 14.1 (H) 3.5 - 11.3 k/uL    RBC 2.58 (L) 3.95 - 5.11 m/uL    Hemoglobin 8.6 (L) 11.9 - 15.1 g/dL    Hematocrit 26.8 (L) 36.3 - 47.1 %    .9 (H) 82.6 - 102.9 fL    MCH 33.3 25.2 - 33.5 pg    MCHC 32.1 28.4 - 34.8 g/dL    RDW 13.1 11.8 - 14.4 %    Platelets 351 856 - 595 k/uL    MPV 9.9 8.1 - 13.5 fL    NRBC Automated 0.0 0.0 per 100 WBC    Differential Type NOT REPORTED     Seg Neutrophils 78 (H) 36 - 65 %    Lymphocytes 14 (L) 24 - 43 %    Monocytes 7 3 - 12 %    Eosinophils % 0 (L) 1 - 4 %    Basophils 0 0 - 2 %    Immature Granulocytes 1 (H) 0 %    Segs Absolute 11.03 (H) 1.50 - 8.10 k/uL    Absolute Lymph # 1.93 1.10 - 3.70 k/uL    Absolute Mono # 1.04 0.10 - 1.20 k/uL    Absolute Eos # <0.03 0.00 - 0.44 k/uL    Basophils Absolute <0.03 0.00 - 0.20 k/uL    Absolute Immature Granulocyte 0.08 0.00 - 0.30 k/uL    WBC Morphology NOT REPORTED     RBC Morphology MACROCYTOSIS PRESENT     Platelet Estimate NOT REPORTED    Basic Metabolic Panel    Collection Time: 10/14/20  6:52 AM   Result Value Ref Range    Glucose 121 (H) 70 - 99 mg/dL    BUN 13 8 - 23 mg/dL    CREATININE 0.49 (L) 0.50 - 0.90 mg/dL    Bun/Cre Ratio NOT REPORTED 9 - 20    Calcium 8.8 8.6 - 10.4 mg/dL    Sodium 139 135 - 144 mmol/L    Potassium 4.1 3.7 - 5.3 mmol/L    Chloride 109 (H) 98 - 107 mmol/L    CO2 23 20 - 31 mmol/L    Anion Gap 7 (L) 9 - 17 mmol/L    GFR Non-African American >60 >60 mL/min    GFR African American >60 >60 mL/min    GFR Comment          GFR Staging NOT REPORTED        Imaging/Diagonstics:  Xr Knee Right (3 Views)    Result Date: 10/13/2020  Interval placement of a satisfactorily aligned right total knee prosthesis       Assessment :      Hospital Problems           Last Modified POA    Status post total knee replacement using cement, right 10/13/2020 Yes          Plan:     #POD #1 following right TKA  - management per ortho    #Hypertension  - well controlled  - continue metoprolol    #CAD/HLD  - continue atorvastatin, aspirin  -Plans to follow-up with cardiologist for previous scheduled cardiac cath    #GERD  - continue omeprazole    #Leukocytosis  - post-operative, continue to monitor    #Anemia  - likely post-operative, continue to monitor    #Stable for discharge from medicine perspective, thank you for consult.  We will continue to follow    Consultations:   Yulia Lewis WORK  IP CONSULT TO INTERNAL MEDICINE    Flor Spivey  10/14/2020  7:43 AM    Copy sent to Dr. Ricardo King APRN - CNP

## 2020-10-14 NOTE — PROGRESS NOTES
Physical Therapy  Facility/Department: [address] ORTHO/MED SURG  Daily Treatment Note  NAME: Barbara Anand  : 1955  MRN: [id number]    Date of Service: 10/14/2020    Discharge Recommendations:  Patient would benefit from continued therapy after discharge   PT Equipment Recommendations  Equipment Needed: Yes  Mobility Devices: Radha January: Rolling    Assessment   Body structures, Functions, Activity limitations: Decreased functional mobility ; Decreased endurance;Decreased sensation;Decreased balance  Assessment: The pt ambulated 120 ft with RW CGA with WBAT R LE. No buckling RLE with step to gait pattern, very slow cautious pace, pt requiring modA to for 1 - 6in step up. pt has 3 steps at home to enter with no HR's. She could benefit from a continuation of PT following her DC  Prognosis: Good  Decision Making: Medium Complexity  PT Education: Goals;PT Role;Plan of Care;Gait Training;Precautions; Home Exercise Program  Activity Tolerance  Activity Tolerance: Patient Tolerated treatment well; Other  Activity Tolerance: fear of falling     Patient Diagnosis(es): The primary encounter diagnosis was Status post total knee replacement using cement, right. A diagnosis of Primary osteoarthritis of right knee was also pertinent to this visit. has a past medical history of Arthritis, CAD (coronary artery disease), Chronic back pain, GERD (gastroesophageal reflux disease), Hyperlipidemia, Hypertension, Incomplete RBBB, Irregular heart beat, LAFB (left anterior fascicular block), Leg wound, left, Lumbar disc disease, Osteoporosis, PAC (premature atrial contraction), PONV (postoperative nausea and vomiting), Wears dentures, Wears eyeglasses, Wears partial dentures, and Wellness examination. has a past surgical history that includes Knee arthroscopy (Left, ); Abdominoplasty (); blepharoplasty (Bilateral, ); laminectomy (); lumbar laminectomy (); lumbar laminectomy ();  Fixation Kyphoplasty (); Finger surgery (Right); Colonoscopy (2015); pr office/outpt visit,procedure only (Right, 9/11/2018); Lumbar spine surgery (2005); Cataract removal with implant (Bilateral, 2016); other surgical history (10/03/2019); Foot Debridement (Left, 10/11/2019); Skin graft (Left, 12/3/2019); Leg Surgery (Left, 1/2/2020); Breast enhancement surgery (Bilateral, 1999); Skin graft (Left, 2/25/2020); fracture surgery; Tonsillectomy; Skin graft (Left, 6/30/2020); Cosmetic surgery; Cardiac catheterization (2019); Total knee arthroplasty (Right, 10/13/2020); and Total knee arthroplasty (Right, 10/13/2020). Restrictions  Restrictions/Precautions  Restrictions/Precautions: Weight Bearing, Femoral Block  Required Braces or Orthoses?: No  Lower Extremity Weight Bearing Restrictions  Right Lower Extremity Weight Bearing: Weight Bearing As Tolerated  Position Activity Restriction  Sternal Precautions: R TKA  Other position/activity restrictions: R TKA  Subjective   General  Response To Previous Treatment: Patient with no complaints from previous session.   Family / Caregiver Present: No  Subjective  Subjective: RN and pt agreed to PT, pt awake in chair upon arrival  Pain Screening  Patient Currently in Pain: Yes  Vital Signs  Patient Currently in Pain: Yes       Orientation  Orientation  Overall Orientation Status: Within Normal Limits       Objective   Bed mobility  Supine to Sit: Stand by assistance  Sit to Supine: Stand by assistance  Scooting: Stand by assistance  Transfers  Sit to Stand: Contact guard assistance(from chair and from low toilet)  Stand to sit: Contact guard assistance  Ambulation  Ambulation?: Yes  Ambulation 1  Surface: level tile  Device: Rolling Walker  Assistance: Contact guard assistance  Quality of Gait: Step to gait, very cautious  Gait Deviations: Slow Sharon;Decreased step length;Decreased step height;Decreased head and trunk rotation  Distance: ~120ft, 12ft x 2   Comments: Brief standing rest breaks  Stairs/Curb  Stairs?: No(1 6in step up with single HR on left and HHA on Right, pt requiring modA, very fearful of falling, cues for proper technique, no buckling, pt declined to attempt again d/t fear, pt has no HR's on 3 steps to get into her home)     Balance  Posture: Good  Sitting - Static: Good  Sitting - Dynamic: Good  Standing - Static: Fair;-  Standing - Dynamic: Fair;-  Exercises  Knee Long Arc Quad: RLE x 10, pt able to complete ~80% full ROM w/o assist  Ankle Pumps: RLE x 10  RLE seated heelslides x 10,  R knee ROM 0-100                   Goals  Short term goals  Time Frame for Short term goals: 10 visits  Short term goal 1: transfers with SBA  Short term goal 2: amb 200 ft with a RW x SBA with WBAT R LE  Short term goal 3: ascend/descend 4 steps with SBA  Short term goal 4: total knee exercises x SBA  Patient Goals   Patient goals :  To get upstairs in her home    Plan    Plan  Times per week: BID  Current Treatment Recommendations: Strengthening, Gait Training, Stair training, Functional Mobility Training, Endurance Training, Safety Education & Training, ROM  Safety Devices  Type of devices: Nurse notified, Bed alarm in place, Left in bed, All fall risk precautions in place, Call light within reach     Therapy Time   Individual Concurrent Group Co-treatment   Time In 4281         Time Out 1422         Minutes 44          Time coded minutes [address], PTA

## 2020-10-14 NOTE — PLAN OF CARE
Problem: Discharge Planning:  Goal: Discharged to appropriate level of care  Description: Discharged to appropriate level of care  10/13/2020 2319 by Elen King RN  Outcome: Ongoing  10/13/2020 1634 by Catie Vallejo RN  Outcome: Ongoing     Problem: Mobility - Impaired:  Goal: Mobility will improve  Description: Mobility will improve  10/13/2020 2319 by Elen King RN  Outcome: Ongoing  10/13/2020 1634 by Catie Vallejo RN  Outcome: Ongoing     Problem: Infection - Surgical Site:  Goal: Will show no infection signs and symptoms  Description: Will show no infection signs and symptoms  10/13/2020 2319 by Elen King RN  Outcome: Ongoing  10/13/2020 1634 by Catie Vallejo RN  Outcome: Ongoing     Problem: Pain - Acute:  Goal: Pain level will decrease  Description: Pain level will decrease  10/13/2020 2319 by Elen King RN  Outcome: Ongoing  10/13/2020 1634 by Catie Vallejo RN  Outcome: Ongoing     Problem: Pain:  Goal: Pain level will decrease  Description: Pain level will decrease  10/13/2020 2319 by Elen King RN  Outcome: Ongoing  10/13/2020 1634 by Catie aVllejo RN  Outcome: Ongoing  Goal: Control of acute pain  Description: Control of acute pain  10/13/2020 2319 by Elen King RN  Outcome: Ongoing  10/13/2020 1634 by Catie Vallejo RN  Outcome: Ongoing  Goal: Control of chronic pain  Description: Control of chronic pain  10/13/2020 2319 by Elen King RN  Outcome: Ongoing  10/13/2020 1634 by Catie Vallejo RN  Outcome: Ongoing     Problem: Skin Integrity:  Goal: Will show no infection signs and symptoms  Description: Will show no infection signs and symptoms  10/13/2020 2319 by Elen King RN  Outcome: Ongoing  10/13/2020 1634 by Catie Vallejo RN  Outcome: Ongoing  Goal: Absence of new skin breakdown  Description: Absence of new skin breakdown  10/13/2020 2319 by Elen King RN  Outcome: Ongoing  10/13/2020 1634 by Catie Vallejo RN  Outcome: Ongoing     Problem: Falls - Risk of:  Goal: Will remain free from falls  Description: Will remain free from falls  10/13/2020 2319 by Manohar Dejesus RN  Outcome: Ongoing  10/13/2020 1634 by Myesha Lizarraga RN  Outcome: Ongoing  Goal: Absence of physical injury  Description: Absence of physical injury  10/13/2020 2319 by Manohar Dejesus RN  Outcome: Ongoing  10/13/2020 1634 by Myesha Lizarraga RN  Outcome: Ongoing     Problem: Musculor/Skeletal Functional Status  Goal: Highest potential functional level  10/13/2020 2319 by Manohar Dejesus RN  Outcome: Ongoing  10/13/2020 1634 by Myesha Lizarraga RN  Outcome: Ongoing

## 2020-10-15 VITALS
RESPIRATION RATE: 15 BRPM | TEMPERATURE: 98.3 F | SYSTOLIC BLOOD PRESSURE: 119 MMHG | WEIGHT: 163.58 LBS | HEIGHT: 66 IN | DIASTOLIC BLOOD PRESSURE: 58 MMHG | BODY MASS INDEX: 26.29 KG/M2 | HEART RATE: 88 BPM | OXYGEN SATURATION: 98 %

## 2020-10-15 PROBLEM — D64.9 ANEMIA: Status: ACTIVE | Noted: 2020-10-15

## 2020-10-15 PROBLEM — K21.9 GASTROESOPHAGEAL REFLUX DISEASE WITHOUT ESOPHAGITIS: Status: ACTIVE | Noted: 2020-10-15

## 2020-10-15 LAB
ABSOLUTE EOS #: 0.07 K/UL (ref 0–0.44)
ABSOLUTE IMMATURE GRANULOCYTE: 0.03 K/UL (ref 0–0.3)
ABSOLUTE LYMPH #: 2.3 K/UL (ref 1.1–3.7)
ABSOLUTE MONO #: 0.98 K/UL (ref 0.1–1.2)
BASOPHILS # BLD: 0 % (ref 0–2)
BASOPHILS ABSOLUTE: 0.03 K/UL (ref 0–0.2)
DIFFERENTIAL TYPE: ABNORMAL
EOSINOPHILS RELATIVE PERCENT: 1 % (ref 1–4)
HCT VFR BLD CALC: 28.8 % (ref 36.3–47.1)
HEMOGLOBIN: 8.6 G/DL (ref 11.9–15.1)
IMMATURE GRANULOCYTES: 0 %
LYMPHOCYTES # BLD: 24 % (ref 24–43)
MCH RBC QN AUTO: 32.6 PG (ref 25.2–33.5)
MCHC RBC AUTO-ENTMCNC: 29.9 G/DL (ref 28.4–34.8)
MCV RBC AUTO: 109.1 FL (ref 82.6–102.9)
MONOCYTES # BLD: 10 % (ref 3–12)
NRBC AUTOMATED: 0 PER 100 WBC
PDW BLD-RTO: 13.2 % (ref 11.8–14.4)
PLATELET # BLD: 278 K/UL (ref 138–453)
PLATELET ESTIMATE: ABNORMAL
PMV BLD AUTO: 10.2 FL (ref 8.1–13.5)
RBC # BLD: 2.64 M/UL (ref 3.95–5.11)
RBC # BLD: ABNORMAL 10*6/UL
SEG NEUTROPHILS: 65 % (ref 36–65)
SEGMENTED NEUTROPHILS ABSOLUTE COUNT: 6.35 K/UL (ref 1.5–8.1)
WBC # BLD: 9.8 K/UL (ref 3.5–11.3)
WBC # BLD: ABNORMAL 10*3/UL

## 2020-10-15 PROCEDURE — 97535 SELF CARE MNGMENT TRAINING: CPT

## 2020-10-15 PROCEDURE — 97530 THERAPEUTIC ACTIVITIES: CPT

## 2020-10-15 PROCEDURE — 99212 OFFICE O/P EST SF 10 MIN: CPT | Performed by: PHYSICIAN ASSISTANT

## 2020-10-15 PROCEDURE — 85025 COMPLETE CBC W/AUTO DIFF WBC: CPT

## 2020-10-15 PROCEDURE — G0378 HOSPITAL OBSERVATION PER HR: HCPCS

## 2020-10-15 PROCEDURE — 36415 COLL VENOUS BLD VENIPUNCTURE: CPT

## 2020-10-15 PROCEDURE — 97116 GAIT TRAINING THERAPY: CPT

## 2020-10-15 PROCEDURE — 6370000000 HC RX 637 (ALT 250 FOR IP): Performed by: STUDENT IN AN ORGANIZED HEALTH CARE EDUCATION/TRAINING PROGRAM

## 2020-10-15 PROCEDURE — 2580000003 HC RX 258: Performed by: STUDENT IN AN ORGANIZED HEALTH CARE EDUCATION/TRAINING PROGRAM

## 2020-10-15 RX ADMIN — Medication 81 MG: at 08:46

## 2020-10-15 RX ADMIN — PANTOPRAZOLE SODIUM 40 MG: 40 TABLET, DELAYED RELEASE ORAL at 08:46

## 2020-10-15 RX ADMIN — ACETAMINOPHEN 650 MG: 325 TABLET ORAL at 12:06

## 2020-10-15 RX ADMIN — CALCIUM 1000 MG: 500 TABLET ORAL at 08:47

## 2020-10-15 RX ADMIN — OXYCODONE HYDROCHLORIDE 10 MG: 5 TABLET ORAL at 04:36

## 2020-10-15 RX ADMIN — ACETAMINOPHEN 650 MG: 325 TABLET ORAL at 01:00

## 2020-10-15 RX ADMIN — GABAPENTIN 300 MG: 300 CAPSULE ORAL at 08:46

## 2020-10-15 RX ADMIN — OXYCODONE HYDROCHLORIDE 10 MG: 5 TABLET ORAL at 08:45

## 2020-10-15 RX ADMIN — OXYCODONE HYDROCHLORIDE 10 MG: 5 TABLET ORAL at 12:27

## 2020-10-15 RX ADMIN — STANDARDIZED SENNA CONCENTRATE AND DOCUSATE SODIUM 1 TABLET: 8.6; 5 TABLET ORAL at 08:47

## 2020-10-15 RX ADMIN — OXYCODONE HYDROCHLORIDE AND ACETAMINOPHEN 500 MG: 500 TABLET ORAL at 08:47

## 2020-10-15 RX ADMIN — OXYCODONE HYDROCHLORIDE 10 MG: 5 TABLET ORAL at 15:53

## 2020-10-15 RX ADMIN — SODIUM CHLORIDE, PRESERVATIVE FREE 10 ML: 5 INJECTION INTRAVENOUS at 09:00

## 2020-10-15 RX ADMIN — DESMOPRESSIN ACETATE 40 MG: 0.2 TABLET ORAL at 08:46

## 2020-10-15 RX ADMIN — METOPROLOL TARTRATE 37.5 MG: 25 TABLET ORAL at 08:48

## 2020-10-15 RX ADMIN — ACETAMINOPHEN 650 MG: 325 TABLET ORAL at 06:20

## 2020-10-15 ASSESSMENT — PAIN DESCRIPTION - ORIENTATION: ORIENTATION: RIGHT

## 2020-10-15 ASSESSMENT — PAIN - FUNCTIONAL ASSESSMENT
PAIN_FUNCTIONAL_ASSESSMENT: ACTIVITIES ARE NOT PREVENTED
PAIN_FUNCTIONAL_ASSESSMENT: ACTIVITIES ARE NOT PREVENTED

## 2020-10-15 ASSESSMENT — PAIN DESCRIPTION - LOCATION
LOCATION: BACK;KNEE
LOCATION: BACK;KNEE
LOCATION: KNEE

## 2020-10-15 ASSESSMENT — PAIN DESCRIPTION - DESCRIPTORS
DESCRIPTORS: SORE;CONSTANT
DESCRIPTORS: ACHING;DISCOMFORT;SORE
DESCRIPTORS: SORE;CONSTANT

## 2020-10-15 ASSESSMENT — PAIN SCALES - GENERAL
PAINLEVEL_OUTOF10: 4
PAINLEVEL_OUTOF10: 8
PAINLEVEL_OUTOF10: 6
PAINLEVEL_OUTOF10: 7
PAINLEVEL_OUTOF10: 4
PAINLEVEL_OUTOF10: 7
PAINLEVEL_OUTOF10: 4
PAINLEVEL_OUTOF10: 7
PAINLEVEL_OUTOF10: 4
PAINLEVEL_OUTOF10: 4
PAINLEVEL_OUTOF10: 7

## 2020-10-15 ASSESSMENT — PAIN DESCRIPTION - PAIN TYPE: TYPE: SURGICAL PAIN

## 2020-10-15 ASSESSMENT — PAIN DESCRIPTION - FREQUENCY
FREQUENCY: CONTINUOUS

## 2020-10-15 NOTE — FLOWSHEET NOTE
Assessment: Patient was reclining in her chair when  visited. Family was not present at the time. Patient was in good spirit and seemed to have confidence in the medical staff. When asked how she was feeling, patient responded; \"better. \" Patient was raised [place]. Intervention:  provided presence, offered support and prayed with patient. Outcome: Patient expressed appreciation for the spiritual support she received. Follow up visits recommended for more prayers and support. 10/15/20 1222   Encounter Summary   Services provided to: Patient   Support System Family members   Place of [address] [place] [address] Visiting   (10/15/2020)   Complexity of Encounter Moderate   Length of Encounter 15 minutes   Spiritual Assessment Completed Yes   Routine   Type Initial   Assessment Calm; Approachable; Hopeful   Intervention Active listening;Nurtured hope;Prayer;Empowerment   Outcome Expressed gratitude   Spiritual/Yazidism   Type Spiritual support

## 2020-10-15 NOTE — PROGRESS NOTES
Occupational Therapy  Facility/Department: [address] ORTHO/MED SURG  Daily Treatment Note  NAME: Miley Martin  : 1955  MRN: [id number]    Date of Service: 10/15/2020    Discharge Recommendations:  Patient would benefit from continued therapy after discharge       Assessment   Performance deficits / Impairments: Decreased functional mobility ; Decreased endurance;Decreased ADL status; Decreased high-level IADLs;Decreased balance;Decreased sensation  Prognosis: Good  Patient Education: OT POC, transfer/walker safety, importance of participation in therapy, knee precautions, femoral block, LB dressing techniques - pt verbalized understanding. REQUIRES OT FOLLOW UP: Yes  Activity Tolerance  Activity Tolerance: Patient Tolerated treatment well  Safety Devices  Safety Devices in place: Yes  Type of devices: Call light within reach; Chair alarm in place;Gait belt;Patient at risk for falls; Left in chair;Nurse notified         Patient Diagnosis(es): The primary encounter diagnosis was Status post total knee replacement using cement, right. A diagnosis of Primary osteoarthritis of right knee was also pertinent to this visit. has a past medical history of Arthritis, CAD (coronary artery disease), Chronic back pain, GERD (gastroesophageal reflux disease), Hyperlipidemia, Hypertension, Incomplete RBBB, Irregular heart beat, LAFB (left anterior fascicular block), Leg wound, left, Lumbar disc disease, Osteoporosis, PAC (premature atrial contraction), PONV (postoperative nausea and vomiting), Wears dentures, Wears eyeglasses, Wears partial dentures, and Wellness examination. has a past surgical history that includes Knee arthroscopy (Left, ); Abdominoplasty (); blepharoplasty (Bilateral, ); laminectomy (); lumbar laminectomy (); lumbar laminectomy (); Fixation Kyphoplasty (); Finger surgery (Right); Colonoscopy (); pr office/outpt visit,procedure only (Right, 2018);  Lumbar spine surgery (2005); Cataract removal with implant (Bilateral, 2016); other surgical history (10/03/2019); Foot Debridement (Left, 10/11/2019); Skin graft (Left, 12/3/2019); Leg Surgery (Left, 1/2/2020); Breast enhancement surgery (Bilateral, 1999); Skin graft (Left, 2/25/2020); fracture surgery; Tonsillectomy; Skin graft (Left, 6/30/2020); Cosmetic surgery; Cardiac catheterization (2019); Total knee arthroplasty (Right, 10/13/2020); and Total knee arthroplasty (Right, 10/13/2020). Restrictions  Restrictions/Precautions  Restrictions/Precautions: Weight Bearing, Femoral Block  Required Braces or Orthoses?: No  Lower Extremity Weight Bearing Restrictions  Right Lower Extremity Weight Bearing: Weight Bearing As Tolerated  Position Activity Restriction  Sternal Precautions: R TKA  Other position/activity restrictions: R TKA  Subjective   General  Patient assessed for rehabilitation services?: Yes  Family / Caregiver Present: No  General Comment  Pain Assessment  Pain Assessment: 0-10  Pain Level: 4  Pain Type: Surgical pain  Pain Location: Knee  Pain Orientation: Right  Pain Descriptors: Aching;Discomfort; Sore  Pain Frequency: Continuous  Non-Pharmaceutical Pain Intervention(s): Repositioned;Rest;Pain ball relief; Therapeutic presence  Vital Signs  Patient Currently in Pain: Yes   Orientation  Orientation  Overall Orientation Status: Within Functional Limits  Objective    ADL  Feeding: Setup; Independent  Grooming: Setup;Supervision(Oral care standing at sink.)  UE Bathing: Setup;Supervision(Min A for back standing at sink.)  LE Bathing: Setup;Supervision  UE Dressing: Setup;Modified independent (Seated in chair to kamilla personal clothing.)  LE Dressing: Setup;Verbal cueing;Contact guard assistance  Toileting: Setup;Stand by assistance(Pericare/bottom care standing at sink.)  Additional Comments: Pt completed ADL care standing at sink, surgical soap used appropriately. Pt transferred to seated in chair to kamilla personal clothing.  Pt

## 2020-10-15 NOTE — PROGRESS NOTES
Physical Therapy  Facility/Department: [address] ORTHO/MED SURG  Daily Treatment Note  NAME: Katina Elder  : 1955  MRN: [id number]    Date of Service: 10/15/2020    Discharge Recommendations:  Patient would benefit from continued therapy after discharge   PT Equipment Recommendations  Equipment Needed: Yes  Walker: Rolling    Assessment   Body structures, Functions, Activity limitations: Decreased functional mobility ; Decreased endurance;Decreased sensation;Decreased balance  Assessment: The pt ambulated 200 ft with RW SBA with WBAT R LE. No buckling RLE, very slow cautious pace, She could benefit from a continuation of PT following her DC  Prognosis: Good  Decision Making: Medium Complexity  PT Education: Goals;PT Role;Plan of Care;Gait Training;Precautions  Activity Tolerance  Activity Tolerance: Patient Tolerated treatment well; Other  Activity Tolerance: fear of falling     Patient Diagnosis(es): The primary encounter diagnosis was Status post total knee replacement using cement, right. A diagnosis of Primary osteoarthritis of right knee was also pertinent to this visit. has a past medical history of Arthritis, CAD (coronary artery disease), Chronic back pain, GERD (gastroesophageal reflux disease), Hyperlipidemia, Hypertension, Incomplete RBBB, Irregular heart beat, LAFB (left anterior fascicular block), Leg wound, left, Lumbar disc disease, Osteoporosis, PAC (premature atrial contraction), PONV (postoperative nausea and vomiting), Wears dentures, Wears eyeglasses, Wears partial dentures, and Wellness examination. has a past surgical history that includes Knee arthroscopy (Left, ); Abdominoplasty (); blepharoplasty (Bilateral, ); laminectomy (); lumbar laminectomy (); lumbar laminectomy (); Fixation Kyphoplasty (); Finger surgery (Right); Colonoscopy (); pr office/outpt visit,procedure only (Right, 2018); Lumbar spine surgery ();  Cataract removal with implant (Bilateral, 2016); other surgical history (10/03/2019); Foot Debridement (Left, 10/11/2019); Skin graft (Left, 12/3/2019); Leg Surgery (Left, 1/2/2020); Breast enhancement surgery (Bilateral, 1999); Skin graft (Left, 2/25/2020); fracture surgery; Tonsillectomy; Skin graft (Left, 6/30/2020); Cosmetic surgery; Cardiac catheterization (2019); Total knee arthroplasty (Right, 10/13/2020); and Total knee arthroplasty (Right, 10/13/2020). Restrictions  Restrictions/Precautions  Restrictions/Precautions: Weight Bearing, Femoral Block  Required Braces or Orthoses?: No  Lower Extremity Weight Bearing Restrictions  Right Lower Extremity Weight Bearing: Weight Bearing As Tolerated  Position Activity Restriction  Sternal Precautions: R TKA  Other position/activity restrictions: R TKA  Subjective   General  Response To Previous Treatment: Patient with no complaints from previous session.   Family / Caregiver Present: No  Subjective  Subjective: RN and pt agreed to PT, pt awake sitting up in chair upon arrival and c/o pain 4/10  Pain Screening  Patient Currently in Pain: Yes  Vital Signs  Patient Currently in Pain: Yes       Orientation  Orientation  Overall Orientation Status: Within Normal Limits       Objective      Transfers  Sit to Stand: Stand by assistance  Stand to sit: Stand by assistance  Ambulation  Ambulation?: Yes  Ambulation 1  Surface: level tile  Device: Rolling Walker  Assistance: Stand by assistance  Quality of Gait: Slow cautious pace, Step through gait, reliance on BUE's  Gait Deviations: Slow Sharon;Decreased step length;Decreased step height;Decreased head and trunk rotation  Distance: 10ft, 200ft  Comments: !0mins to complete 200ft ambulation  Stairs/Curb  Stairs?: No     Balance  Posture: Good  Sitting - Static: Good  Sitting - Dynamic: Good  Standing - Static: Fair;-  Standing - Dynamic: Fair;-  Comments: Standing in restroom to kamilla undergarment and for hand hygiene CGA  Exercises  Hip Flexion: Standing marches w/RW x 10  Knee Long Arc Quad: RLE x 12  Comments: R Knee ROM 0-110              Goals  Short term goals  Time Frame for Short term goals: 10 visits  Short term goal 1: transfers with SBA  Short term goal 2: amb 200 ft with a RW x SBA with WBAT R LE  Short term goal 3: ascend/descend 4 steps with SBA  Short term goal 4: total knee exercises x SBA  Patient Goals   Patient goals :  To get upstairs in her home    Plan    Plan  Times per week: BID  Current Treatment Recommendations: Strengthening, Gait Training, Stair training, Functional Mobility Training, Endurance Training, Safety Education & Training, ROM  Safety Devices  Type of devices: Nurse notified, All fall risk precautions in place, Call light within reach, Left in chair, Chair alarm in place     Therapy Time   Individual Concurrent Group Co-treatment   Time In 1433         Time Out 1456         Minutes 23          Time coded minutes [address], PTA

## 2020-10-15 NOTE — PROGRESS NOTES
Orthopedic Progress Note    Patient:  Augustus Alberto  YOB: 1955     62 y.o. female    Subjective:  Patient seen and examined at bedside  No complaints or concerns   No issue overnight per nursing. Pain controlled  Denies fever, HA, CP, SOB, N/V, dysuria  -BM/+flatus/+urination  PT: walked 170 ft    Objective:   Vitals:    10/14/20 1953   BP: 127/69   Pulse: 99   Resp: 16   Temp: 98.6 °F (37 °C)   SpO2:      General: NAD, cooperative     Musculoskeletal:  Right lower extremity: Optifoam on. clean, dry and intact. Compartments soft and compressible. EHL/FHL/TA/GS complex motor intact. Sural, saphenous, superificial/deep peroneal, and plantar nerve distribution intact although mild dysthesias to saphenous distribution secondary to nerve block. Dorsalis pedis/posterior tibial pulses 2+ with BCR. Recent Labs     10/14/20  0652   WBC 14.1*   HGB 8.6*   HCT 26.8*         K 4.1   BUN 13   CREATININE 0.49*   GLUCOSE 121*      Meds: ASA, Ancef   See rec for complete list    Impression 62 y.o. female s/p right total knee arthroplasty on 11/13, POD#2    Plan  - WB status: weight bear as tolerated right lower extremity  - Pain control PO medication   - Optifoam Dressing clean, dry and intact. Ok to reinforce per nursing. Please notify ortho if dressings saturate   - DVT ppx: ASA 81mg BID  - Consults: IM for med management  - Ice for 20 minutes an hour and keep elevated to reduce swelling and throbbing pain  - PT/OT to see and eval  - Dispo: Planning DC to home   - F/u Dr. Dav Arzola in 10-14 days.  - Please page ortho with any questions     Andressa Henriquez MD  Orthopaedic Surgery Resident, PGY-1  [place] [address], [facility]      PGY-3 Addendum  Patient seen and examined. Agree with above. Patient doing well POD#2 from R TKA. Tolerating PO and voiding appropriately. +Flatus. Patient ambulated 170ft with PT yesterday and states she is ready to go home. Exam as above. Will plan for DC home today following PT.  Follow up with  in 10-14 days      Stanford Gardner DO PGY-3  Orthopedic Surgery Resident  [facility], [facility]

## 2020-10-15 NOTE — PROGRESS NOTES
[facility]  Office: [address], DO, Arelis Reyno, DO, [address] Lenz, DO, Shaye Sabula Blood, DO, Brooke Boyle MD, Rylie Meeks MD, Memo Teixeira MD, Carlos Glover MD, Omero Snell MD, Min Love MD, Grant Cardona MD, Shaan Sal MD, Mbonu Cassandria Favre, MD, Karen Moore DO, Jimmy Tracey MD, Shira Benavides MD, Ulices Meraz, DO, Jody Gould MD,  Saintclair Lank, DO, Tammie Shah MD, Stephanie Abraham MD, Daryle Springer, [facility], [facility] Trevino, CNP, Michael Torre, CNP, Kirit Jenikns, CNS, Sulma Carroll, CNP, Keegan Kemp, CNP, Hernando Yarbrough, CNP, Kg Sarah, CNP, Vishal Lima, CNP, Denzel Coronado PA-C, Philipp Park DNP, Sujatha Hernandez, CNP, Mitul Truong, CNP, Lou Garsia, CNP, Giselle Dumont, CNP, Flor Lopez, CNP         [facility]   [address] [facility]    Progress Note    10/15/2020    10:08 AM    Name:   Maxime Dickey  MRN:     [id number]     Acct:      [de-identified]   Room:   [address] [place] [address] Day:  1  Admit Date:  10/13/2020  6:40 AM    PCP:   NUPUR Villavicencio CNP  Code Status:  Full Code    Subjective:     C/C: POD #2 s/p right knee TKA    Interval History Status: improved. Pt seen and evaluated. No new complaints. Blood pressure well controlled. Hemoglobin stable. Plan for discharge today. Review of Systems:     Constitutional:  negative for chills, fevers, sweats  Respiratory:  negative for cough, dyspnea on exertion, shortness of breath, wheezing  Cardiovascular:  negative for chest pain, chest pressure/discomfort, lower extremity edema, palpitations  Gastrointestinal:  negative for abdominal pain, constipation, diarrhea, nausea, vomiting  Neurological:  negative for dizziness, headache    Medications:      Allergies:  No Known Allergies    Current Meds:   Scheduled Meds:    tranexamic acid (CYCLOKAPRON) IVPB  1,000 mg Intravenous Once    tranexamic acid (CYCLOKAPRON) IVPB  1,000 mg Intravenous Once    gabapentin  300 mg Oral BID    calcium elemental  1,000 mg Oral Daily    bupivacaine  2 mL Intra-articular Once    vitamin C  500 mg Oral BID    atorvastatin  40 mg Oral QAM    metoprolol tartrate  37.5 mg Oral BID    pantoprazole  40 mg Oral Daily    sodium chloride flush  10 mL Intravenous 2 times per day    acetaminophen  650 mg Oral Q6H    sennosides-docusate sodium  1 tablet Oral BID    aspirin  81 mg Oral BID     Continuous Infusions:    bupivacaine 0.125% 4 mL/hr at 10/14/20 1309     PRN Meds: meclizine, sodium chloride flush, magnesium hydroxide, oxyCODONE **OR** oxyCODONE    Data:     Past Medical History:   has a past medical history of Arthritis, CAD (coronary artery disease), Chronic back pain, GERD (gastroesophageal reflux disease), Hyperlipidemia, Hypertension, Incomplete RBBB, Irregular heart beat, LAFB (left anterior fascicular block), Leg wound, left, Lumbar disc disease, Osteoporosis, PAC (premature atrial contraction), PONV (postoperative nausea and vomiting), Wears dentures, Wears eyeglasses, Wears partial dentures, and Wellness examination. Social History:   reports that she quit smoking about 8 months ago. Her smoking use included cigarettes. She started smoking about 52 years ago. She has a 12.50 pack-year smoking history. She has never used smokeless tobacco. She reports current alcohol use. She reports that she does not use drugs.      Family History:   Family History   Problem Relation Age of Onset    Coronary Art Dis Mother     Arthritis Mother     Heart Surgery Mother         CABG    Coronary Art Dis Father     Heart Disease Father     Heart Surgery Father         CABG    Coronary Art Dis Sister     Heart Surgery Sister         CABG       Vitals:  BP (!) 120/55   Pulse 90   Temp 99.7 °F (37.6 °C) (Oral)   Resp 16   Ht 5' 6\" (1.676 m)   Wt 163 lb 9.3 oz (74.2 kg)   SpO2 94%   BMI 26.40 kg/m²   Temp (24hrs), Av.9 °F (37.2 °C), Min:98.3 °F (36.8 °C), Max:99.7 °F (37.6 °C)    No results for input(s): POCGLU in the last 72 hours. I/O (24Hr): Intake/Output Summary (Last 24 hours) at 10/15/2020 1008  Last data filed at 10/15/2020 0400  Gross per 24 hour   Intake --   Output 2050 ml   Net -2050 ml       Labs:  Hematology:  Recent Labs     10/14/20  0652 10/15/20  0509   WBC 14.1* 9.8   RBC 2.58* 2.64*   HGB 8.6* 8.6*   HCT 26.8* 28.8*   .9* 109.1*   MCH 33.3 32.6   MCHC 32.1 29.9   RDW 13.1 13.2    278   MPV 9.9 10.2     Chemistry:  Recent Labs     10/14/20  0652      K 4.1   *   CO2 23   GLUCOSE 121*   BUN 13   CREATININE 0.49*   ANIONGAP 7*   LABGLOM >60   GFRAA >60   CALCIUM 8.8   No results for input(s): PROT, LABALBU, LABA1C, O2GKABD, S9RTGRL, FT4, TSH, AST, ALT, LDH, GGT, ALKPHOS, LABGGT, BILITOT, BILIDIR, AMMONIA, AMYLASE, LIPASE, LACTATE, CHOL, HDL, LDLCHOLESTEROL, CHOLHDLRATIO, TRIG, VLDL, TAK76MT, PHENYTOIN, PHENYF, URICACID, POCGLU in the last 72 hours.   ABG:No results found for: POCPH, PHART, PH, POCPCO2, GOV2YBK, PCO2, POCPO2, PO2ART, PO2, POCHCO3, HCO0HMF, HCO3, NBEA, PBEA, BEART, BE, THGBART, THB, CEP5JLM, WYOL0BXI, C8DKPAEW, O2SAT, FIO2  Lab Results   Component Value Date/Time    SPECIAL NOT REPORTED 10/31/2019 11:37 AM     Lab Results   Component Value Date/Time    CULTURE (A) 10/31/2019 11:37 AM     METHICILLIN RESISTANT STAPHYLOCOCCUS AUREUS HEAVY GROWTH    CULTURE ENTEROCOCCUS FAECALIS HEAVY GROWTH (A) 10/31/2019 11:37 AM    CULTURE NO ANAEROBIC ORGANISMS ISOLATED AT 5 DAYS (A) 10/31/2019 11:37 AM       Radiology:  Kailyn Mahoney Knee Right (3 Views)    Result Date: 10/13/2020  Interval placement of a satisfactorily aligned right total knee prosthesis       Physical Examination:        General appearance:  alert, cooperative and no distress  Mental Status:  oriented to person, place and time and normal affect  Lungs:  clear to auscultation bilaterally, normal effort  Heart:  regular rate and rhythm, no murmur  Abdomen:  soft, nontender, nondistended, normal bowel sounds, no masses, hepatomegaly, splenomegaly  Extremities:  Mild edema right knee and surrounding area. No redness, tenderness in the calves  Skin:  no gross lesions, rashes, induration    Assessment:        Hospital Problems           Last Modified POA    Status post total knee replacement using cement, right 10/13/2020 Yes    Primary osteoarthritis of right knee 10/14/2020 Yes          Plan:        #POD #1 following right TKA  - management per ortho     #Hypertension  - well controlled  - continue metoprolol     #CAD/HLD  - continue atorvastatin, aspirin  -Plans to follow-up with cardiologist for previous scheduled cardiac cath     #GERD  - continue omeprazole     #Leukocytosis  - resolved.     #Anemia  - likely post-operative, continue to monitor     #Stable for discharge from medicine perspective, thank you for consult.  We will continue to follow    Yanique Hutchinson PA-C  10/15/2020  10:08 AM

## 2020-10-15 NOTE — PROGRESS NOTES
Physical Therapy  Facility/Department: [address] ORTHO/MED SURG  Daily Treatment Note  NAME: Lenard Jewell  : 1955  MRN: [id number]    Date of Service: 10/15/2020    Discharge Recommendations:  Patient would benefit from continued therapy after discharge   PT Equipment Recommendations  Equipment Needed: Yes  Walker: Rolling    Assessment   Body structures, Functions, Activity limitations: Decreased functional mobility ; Decreased endurance;Decreased sensation;Decreased balance  Assessment: The pt ambulated 110 ft with RW CGA with WBAT R LE. No buckling RLE, very slow cautious pace, pt requiring modA w/ R HHA and CGA w/BHR. Alex Hilt She could benefit from a continuation of PT following her DC  Prognosis: Good  Decision Making: Medium Complexity  PT Education: Goals;PT Role;Plan of Care;Gait Training;Precautions; Home Exercise Program  Activity Tolerance  Activity Tolerance: Patient Tolerated treatment well; Other  Activity Tolerance: fear of falling     Patient Diagnosis(es): The primary encounter diagnosis was Status post total knee replacement using cement, right. A diagnosis of Primary osteoarthritis of right knee was also pertinent to this visit. has a past medical history of Arthritis, CAD (coronary artery disease), Chronic back pain, GERD (gastroesophageal reflux disease), Hyperlipidemia, Hypertension, Incomplete RBBB, Irregular heart beat, LAFB (left anterior fascicular block), Leg wound, left, Lumbar disc disease, Osteoporosis, PAC (premature atrial contraction), PONV (postoperative nausea and vomiting), Wears dentures, Wears eyeglasses, Wears partial dentures, and Wellness examination. has a past surgical history that includes Knee arthroscopy (Left, ); Abdominoplasty (); blepharoplasty (Bilateral, ); laminectomy (); lumbar laminectomy (); lumbar laminectomy (); Fixation Kyphoplasty (); Finger surgery (Right);  Colonoscopy (); pr office/outpt visit,procedure only (Right, 9/11/2018); Lumbar spine surgery (2005); Cataract removal with implant (Bilateral, 2016); other surgical history (10/03/2019); Foot Debridement (Left, 10/11/2019); Skin graft (Left, 12/3/2019); Leg Surgery (Left, 1/2/2020); Breast enhancement surgery (Bilateral, 1999); Skin graft (Left, 2/25/2020); fracture surgery; Tonsillectomy; Skin graft (Left, 6/30/2020); Cosmetic surgery; Cardiac catheterization (2019); Total knee arthroplasty (Right, 10/13/2020); and Total knee arthroplasty (Right, 10/13/2020). Restrictions  Restrictions/Precautions  Restrictions/Precautions: Weight Bearing, Femoral Block  Required Braces or Orthoses?: No  Lower Extremity Weight Bearing Restrictions  Right Lower Extremity Weight Bearing: Weight Bearing As Tolerated  Position Activity Restriction  Sternal Precautions: R TKA  Other position/activity restrictions: R TKA  Subjective   General  Response To Previous Treatment: Patient with no complaints from previous session.   Family / Caregiver Present: No  Subjective  Subjective: RN and pt agreed to PT, pt awake sitting EOB upon arrival  Pain Screening  Patient Currently in Pain: Yes  Vital Signs  Patient Currently in Pain: Yes       Orientation  Orientation  Overall Orientation Status: Within Normal Limits       Objective      Transfers  Sit to Stand: Stand by assistance  Stand to sit: Stand by assistance  Ambulation  Ambulation?: Yes  Ambulation 1  Surface: level tile  Device: Rolling Walker  Assistance: Contact guard assistance  Quality of Gait: Step to gait progressing to small step through gait, slow cautious pace  Gait Deviations: Slow Sharon;Decreased step length;Decreased step height;Decreased head and trunk rotation  Distance: 110ft, 10ft x 2  Comments: one brief standing rest break  Stairs/Curb  Stairs?: Yes  Stairs  # Steps : 5  Comment: Pt ascended/descended 3 (4in) and 2 (6in) steps with BHR CGA,  then again with R HHA to simulate home environment modA with frequent cues for technique, No buckling or LOB, pt fearful of falling  (3 steps to enter home w/o HR)        Exercises  Knee Long Arc Quad: RLE x 12  Ankle Pumps: BLE x 12  Comments: R Knee ROM 3-100                   Goals  Short term goals  Time Frame for Short term goals: 10 visits  Short term goal 1: transfers with SBA  Short term goal 2: amb 200 ft with a RW x SBA with WBAT R LE  Short term goal 3: ascend/descend 4 steps with SBA  Short term goal 4: total knee exercises x SBA  Patient Goals   Patient goals :  To get upstairs in her home    Plan    Plan  Times per week: BID  Current Treatment Recommendations: Strengthening, Gait Training, Stair training, Functional Mobility Training, Endurance Training, Safety Education & Training, ROM  Safety Devices  Type of devices: Nurse notified, All fall risk precautions in place, Call light within reach, Left in chair, Chair alarm in place     Therapy Time   Individual Concurrent Group Co-treatment   Time In 0901         Time Out 0950         Minutes 49          Time coded minutes [address], PTA

## 2020-10-15 NOTE — PLAN OF CARE
Problem: Falls - Risk of:  Goal: Will remain free from falls  Description: Will remain free from falls  Outcome: Met This Shift  Goal: Absence of physical injury  Description: Absence of physical injury  Outcome: Met This Shift     Problem: Discharge Planning:  Goal: Discharged to appropriate level of care  Description: Discharged to appropriate level of care  Outcome: Ongoing     Problem: Mobility - Impaired:  Goal: Mobility will improve  Description: Mobility will improve  Outcome: Ongoing     Problem: Infection - Surgical Site:  Goal: Will show no infection signs and symptoms  Description: Will show no infection signs and symptoms  Outcome: Ongoing     Problem: Pain - Acute:  Goal: Pain level will decrease  Description: Pain level will decrease  Outcome: Ongoing     Problem: Pain:  Goal: Pain level will decrease  Description: Pain level will decrease  Outcome: Ongoing  Goal: Control of acute pain  Description: Control of acute pain  Outcome: Ongoing  Goal: Control of chronic pain  Description: Control of chronic pain  Outcome: Ongoing     Problem: Skin Integrity:  Goal: Will show no infection signs and symptoms  Description: Will show no infection signs and symptoms  Outcome: Ongoing  Goal: Absence of new skin breakdown  Description: Absence of new skin breakdown  Outcome: Ongoing     Problem: Musculor/Skeletal Functional Status  Goal: Highest potential functional level  Outcome: Ongoing     Problem: Nutrition  Goal: Optimal nutrition therapy  Description: Nutrition Problem #1: Increased nutrient needs  Intervention: Food and/or Nutrient Delivery: Continue Current Diet, Start Oral Nutrition Supplement  Nutritional Goals: Pt to meet % of est'd daily needs via PO     Outcome: Ongoing

## 2020-10-15 NOTE — CARE COORDINATION
Transitional Planning  Pt would like to change outpt therapy to home care for 1-2 weeks until she is moving better. Provided her with provider list for freedom of choice she has chosen [facility] .[facility]ed [facility] no answer left MATTHEW Kovacs served Dr Jerzy Ewing asked for  home car order and roger to be completed and signed. Updated [place] per pt request cancelling her therapy appt today spoke with Ciera Cooper. [facility] will flu with pt next week. 12:13  cALLED [facility] AS I HAD NOT HEARD FROM THEM.     Spoke with Natalio Mcgee at [facility] she will call me back if they can accept await call back

## 2020-10-15 NOTE — DISCHARGE INSTR - COC
Continuity of Care Form    Patient Name: Burgess Pelayo   :  1955  MRN:  [id number]    [facility] date:  10/13/2020  Discharge date:  10-    Code Status Order: Full Code   Advance Directives:   Advance Care Flowsheet Documentation       Date/Time Healthcare Directive Type of Healthcare Directive Copy in [address] Agent's Name Healthcare Agent's Phone Number    10/13/20 [phone]  Yes, patient has an advance directive for healthcare treatment --  Other (Comment) @ home -- -- --            Admitting Physician:  Varun Kaminski DO  PCP: NUPUR Diaz - CNP    Discharging Nurse: [facility] Unit/Room#: 4346/3775-08  Discharging Unit Phone Number: [phone]    Emergency Contact:   Extended Emergency Contact Information  Primary Emergency Contact: Marlon Osman  Address: [address] Phone: [phone]  Relation: Spouse    Past Surgical History:  Past Surgical History:   Procedure Laterality Date    ABDOMINOPLASTY      BLEPHAROPLASTY Bilateral     BREAST ENHANCEMENT SURGERY Bilateral     breast augmentation   [address]  2019    Dr. Mendel Mori, blockage but no stents yet    CATARACT REMOVAL WITH IMPLANT Bilateral     COLONOSCOPY  2015    No Polyps    COSMETIC SURGERY      eyelid lift    FINGER SURGERY Right     thumb lesion excised    FIXATION KYPHOPLASTY  2013    Back    FOOT DEBRIDEMENT Left 10/11/2019    SURGICAL PREP WOUND BED LEFT FOOT WITH APPLICATION OF EPIFIX LEFT FOOT 3X4 performed by Viviane Andrade DPM at [facility]      kyphoplasty for lumbar fx 2013 Dr. Domingo Schwartz ARTHROSCOPY Left 2006   Swapnil Carrion Left 2020    FOOT  [address] performed by Sinan Medina MD at [facility]  2014   [facility] 2005    diskectomy and spinal fusion    OTHER SURGICAL HISTORY  10/03/2019    Left leg angiogram    DC OFFICE/OUTPT VISIT,PROCEDURE ONLY Right 9/11/2018    EXCISION MASS THUMB, 3080 TABLE performed by Carlton Arzate DO at [facility] Left 12/3/2019    LEFT FOOT DEBRIDEMENT WITH SKIN GRAFT SPLIT THICKNESS; SKIN GRAFT TAKEN FROM RIGHT ANTERIOR THIGH performed by Cuate Yates MD at [facility] Left 2/25/2020    DEBRIDEMENT, SPLIT THICKNESS SKIN GRAFT WITH WOUND VAC PLACEMENT FOOT performed by Angela Martinez MD at [facility] Left 6/30/2020    DEBRIDEMENT, SKIN GRAFT SPLIT THICKNESS FOOT  ([facility], [address] [facility]) performed by Angela Martinez MD at [address] [place] [address]      as a child   [address] Right 10/13/2020    KNEE TOTAL ARTHROPLASTY    TOTAL KNEE ARTHROPLASTY Right 10/13/2020    KNEE TOTAL ARTHROPLASTY- MICROPORT, \ ADVANCED performed by Carlton Arzate DO at [facility] History:   Immunization History   Administered Date(s) Administered    Influenza, Quadv, IM, (6 mo and older Fluzone, Flulaval, Fluarix and 3 yrs and older Afluria) 01/03/2018, 12/30/2019    Influenza, Hassel Scarce, IM, PF (6 mo and older Fluzone, Flulaval, Fluarix, and 3 yrs and older Afluria) 10/30/2018, 10/14/2020    Pneumococcal Polysaccharide ([id number]) 01/12/2017       Active Problems:  Patient Active Problem List   Diagnosis Code    Neoplasm of uncertain behavior of skin D48.5    Other plastic surgery for unacceptable cosmetic appearance Z41.1    Chronic bilateral low back pain without sciatica M54.5, G89.29    Mass of finger of right hand R22.31    Chronic ulcer of left foot with fat layer exposed ([facility] [address] L97.522    Pain in left foot M79.672    Hypertension I10    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 ([facility]) L97.523    Wound, open, foot with complication, left, initial encounter S91.302A    Status post total knee replacement using cement, right Z96.651    Primary osteoarthritis of right knee M17.11    Gastroesophageal reflux disease without esophagitis K21.9    Anemia D64.9       Isolation/Infection:   Isolation            Contact          Patient Infection Status       Infection Onset Added Last Indicated Last Indicated By Review Planned Expiration Resolved Resolved By    MRSA 10/31/19 11/03/19 10/31/19 Anaerobic and Aerobic Culture        Resolved    COVID-19 Rule Out 10/09/20 10/09/20 10/09/20 Covid-19 Ambulatory (Ordered)   10/11/20 Rule-Out Test Resulted    COVID-19 Rule Out 06/27/20 06/27/20 06/27/20 COVID-19 (Ordered)   06/29/20 Rule-Out Test Resulted            Nurse Assessment:  Last Vital Signs: BP (!) 120/55   Pulse 90   Temp 99.7 °F (37.6 °C) (Oral)   Resp 16   Ht 5' 6\" (1.676 m)   Wt 163 lb 9.3 oz (74.2 kg)   SpO2 94%   BMI 26.40 kg/m²     Last documented pain score (0-10 scale): Pain Level: 7  Last Weight:   Wt Readings from Last 1 Encounters:   10/13/20 163 lb 9.3 oz (74.2 kg)     Mental Status:  oriented and alert    IV Access:  - None    Nursing Mobility/ADLs:  Walking   Assisted  Transfer  Assisted  Bathing  Assisted  Dressing  Assisted  Toileting  Assisted  Feeding  Independent  Med Admin  Independent  Med Delivery   whole    Wound Care Documentation and Therapy:  Wound 12/03/19 Foot Anterior; Left (Active)   Wound Etiology Other 10/13/20 1843   Dressing Status Clean;Dry; Intact 10/15/20 0802   Dressing/Treatment Dry dressing 10/15/20 0700   Drainage Amount None 10/15/20 0700   Odor None 10/14/20 1648   Number of days: 316        Elimination:  Continence:   · Bowel: Yes  · Bladder: Yes  Urinary Catheter: None   Colostomy/Ileostomy/Ileal Conduit: No       Date of Last BM: ***    Intake/Output Summary (Last 24 hours) at 10/15/2020 1019  Last data filed at 10/15/2020 0400  Gross per 24 hour   Intake --   Output 2050 ml   Net -2050 ml     I/O last 3 completed shifts:   In: 240 [P.O.:240]  Out: 2050 [Urine:2050]    Safety Concerns:     History of Falls (last 30 days) and At Risk for Falls    Impairments/Disabilities:      None    Nutrition Therapy:  Current Nutrition Therapy:   - Oral Diet:  General    Routes of Feeding: Oral  Liquids: No Restrictions  Daily Fluid Restriction: no  Last Modified Barium Swallow with Video (Video Swallowing Test): not done    Treatments at the Time of Hospital Discharge:   Respiratory Treatments: IS  Oxygen Therapy:  is not on home oxygen therapy. Ventilator:    - No ventilator support    Rehab Therapies: Physical Therapy and Occupational Therapy  Weight Bearing Status/Restrictions: No weight bearing restirctions  Other Medical Equipment (for information only, NOT a DME order):  walker  Other Treatments:     Patient's personal belongings (please select all that are sent with patient):  None    RN SIGNATURE:  Electronically signed by She Saini RN on 10/15/20 at 1:23 PM EDT    CASE MANAGEMENT/SOCIAL WORK SECTION    Inpatient Status Date: ***    Readmission Risk Assessment Score:  Readmission Risk              Risk of Unplanned Readmission:        7           Discharging to Facility/ Agency   Name:   [facility]            [address] [facility] [address] [address]       Phone: [phone]       Fax: [phone]        ·   · Address:  · Phone:  · Fax:    Dialysis Facility (if applicable)   · Name:  · Address:  · Dialysis Schedule:  · Phone:  · Fax:    / signature: Electronically signed by Sudha Shearer RN on 10/15/20 at 10:19 AM EDT    PHYSICIAN SECTION    Prognosis: Good    Condition at Discharge: Stable    Rehab Potential (if transferring to Rehab): Good    Recommended Labs or Other Treatments After Discharge: Home care    Physician Certification: I certify the above information and transfer of Kareen Sy  is necessary for the continuing treatment of the diagnosis listed and that she requires Home Care for less 30 days.      Update Admission H&P:

## 2020-10-15 NOTE — PROGRESS NOTES
Department of Anesthesiology   Acute Pain Progress Note    Patient's Name: Jovan Thompson  Surgeon: No name on file.    Date: 10/15/2020    61 yo F S/P R TKA 10/13    - Femoral catheter in place, bupivacaine infusion at 4 mL/hr  - Receiving PRN oxycodone  - plan to d/c today  - tegaderm placed to reinforce catheter  Pt Awake, Alert    Last Pain Score: (Charted in Doc Flowsheet)  Pain Level: 7    Vital Signs (Current)   Vitals:    10/15/20 0845   BP: (!) 120/55   Pulse: 90   Resp:    Temp:    SpO2:      Vital Signs Statistics (for past 48 hrs)     Temp  Av.4 °F (36.3 °C)  Min: 96.8 °F (36 °C)   Min taken time: 10/13/20 2613  Max: 99.7 °F (37.6 °C)   Max taken time: 10/15/20 0729  Pulse  Av.9  Min: 8   Min taken time: 10/14/20 0015  Max: 80   Max taken time: 10/14/20 1953  Resp  Avg: 10.6  Min: 0   Min taken time: 10/13/20 1111  Max: 25   Max taken time: 10/13/20 0901  BP  Min: 77/53   Min taken time: 10/13/20 0909  Max: 153/58   Max taken time: 10/14/20 0015  MAP (mmHg)  Av.8  Min: 54   Min taken time: 10/13/20 0909  Max: 94   Max taken time: 10/13/20 1108  SpO2  Av.1 %  Min: 94 %   Min taken time: 10/15/20 0729  Max: 100 %   Max taken time: 10/13/20 1105    BP Readings from Last 3 Encounters:   10/15/20 (!) 120/55   10/13/20 131/77   20 (!) 165/84       No Known Allergies  Patient Active Problem List   Diagnosis    Neoplasm of uncertain behavior of skin    Other plastic surgery for unacceptable cosmetic appearance    Chronic bilateral low back pain without sciatica    Mass of finger of right hand    Chronic ulcer of left foot with fat layer exposed ([address]    Pain in left foot    Hypertension    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)    Wound, open, foot with complication, left, initial encounter    Status post total knee replacement using cement, right    Primary osteoarthritis of right knee       Current Medications  bupivacaine 0.125% (MARCAINE) in sodium chloride 0.9% infusion 500 mL, Continuous  tranexamic acid (CYKLOKAPRON) 1,000 mg in dextrose 5 % 100 mL IVPB, Once  tranexamic acid (CYKLOKAPRON) 1,000 mg in dextrose 5 % 100 mL IVPB, Once  gabapentin (NEURONTIN) capsule 300 mg, BID  calcium elemental (OSCAL) tablet 1,000 mg, Daily  bupivacaine (MARCAINE) 0.25 % injection 5 mg, Once  vitamin C (ASCORBIC ACID) tablet 500 mg, BID  atorvastatin (LIPITOR) tablet 40 mg, QAM  meclizine (ANTIVERT) tablet 25 mg, TID PRN  metoprolol tartrate (LOPRESSOR) tablet 37.5 mg, BID  pantoprazole (PROTONIX) tablet 40 mg, Daily  sodium chloride flush 0.9 % injection 10 mL, 2 times per day  sodium chloride flush 0.9 % injection 10 mL, PRN  acetaminophen (TYLENOL) tablet 650 mg, Q6H  sennosides-docusate sodium (SENOKOT-S) 8.6-50 MG tablet 1 tablet, BID  magnesium hydroxide (MILK OF MAGNESIA) 400 MG/5ML suspension 30 mL, Daily PRN  oxyCODONE (ROXICODONE) immediate release tablet 5 mg, Q4H PRN    Or  oxyCODONE (ROXICODONE) immediate release tablet 10 mg, Q4H PRN  aspirin EC tablet 81 mg, BID        PCA Flow Sheet                       Labs  CBC:   Lab Results   Component Value Date    WBC 9.8 10/15/2020    RBC 2.64 10/15/2020    HGB 8.6 10/15/2020    HCT 28.8 10/15/2020    .1 10/15/2020    RDW 13.2 10/15/2020     10/15/2020     CMP:    Lab Results   Component Value Date     10/14/2020    K 4.1 10/14/2020     10/14/2020    CO2 23 10/14/2020    BUN 13 10/14/2020    CREATININE 0.49 10/14/2020    GFRAA >60 10/14/2020    LABGLOM >60 10/14/2020    GLUCOSE 121 10/14/2020    PROT 6.8 09/30/2020    CALCIUM 8.8 10/14/2020    BILITOT 0.21 09/30/2020    ALKPHOS 93 09/30/2020    AST 11 09/30/2020    ALT 11 09/30/2020     BMP:    Lab Results   Component Value Date     10/14/2020    K 4.1 10/14/2020     10/14/2020    CO2 23 10/14/2020    BUN 13 10/14/2020    CREATININE 0.49 10/14/2020    CALCIUM 8.8 10/14/2020    GFRAA >60 10/14/2020    LABGLOM >60 10/14/2020    GLUCOSE 121

## 2020-10-15 NOTE — PROGRESS NOTES
CLINICAL PHARMACY NOTE: MEDS TO [address] Select Patient?: No  Total # of Prescriptions Filled: 0   The following medications were delivered to the patient:  ·   Total # of Interventions Completed: 0  Time Spent (min): 0    Additional Documentation: I spoke with the pt and she wants to get her pain medication at her local pharmacy. So I talked to jer the rn and let her know to give the pt the original rx upon discharge.  2:13pm 10.15.20

## 2020-10-26 ENCOUNTER — OFFICE VISIT (OUTPATIENT)
Dept: ORTHOPEDIC SURGERY | Age: 65
End: 2020-10-26

## 2020-10-26 PROCEDURE — 99024 POSTOP FOLLOW-UP VISIT: CPT | Performed by: PHYSICIAN ASSISTANT

## 2020-10-26 ASSESSMENT — ENCOUNTER SYMPTOMS
VOMITING: 0
SHORTNESS OF BREATH: 0
COUGH: 0
COLOR CHANGE: 0

## 2020-10-26 NOTE — PROGRESS NOTES
Negative for sneezing. Respiratory: Negative for cough and shortness of breath. Cardiovascular: Negative for chest pain. Gastrointestinal: Negative for vomiting. Musculoskeletal: Positive for arthralgias (Right knee) and joint swelling (Mild right knee). Negative for myalgias. Skin: Negative for color change. Neurological: Negative for weakness and numbness. Psychiatric/Behavioral: Negative for sleep disturbance. Objective :   General: Vesna Miles is a 62 y.o. female who is alert and oriented and sitting comfortably in our office. Ortho Exam  MS: Patient ambulates with a walker and a mild limp noted to the right lower extremity. Mild extension lag noted to the right knee with ambulation. Evaluation of the right knee reveals intact surgical dressing on the anterior aspect of the right knee. After removal of the dressing, surgical incision is healing well without erythema, ecchymosis, new skin lesions or gross signs of infection present. Skin glue is intact with mild eschar noted along the surgical incision superficially. No drainage is noted from the surgical incision today. AROM right knee: 5-100. Calf is supple and nontender to palpation on the right. Patient has full range of motion of the right ankle and right hip without discomfort noted. Sensation is intact to light touch to the right lower extremity without focal deficits present. DP pulse 2+, PT pulse 2+, BCR right foot. Neuro: alert. oriented  Eyes: Extra-ocular muscles intact  Mouth: Oral mucosa moist. No perioral lesions  Pulm: Respirations unlabored and regular. Skin: warm, well perfused  Psych:   Patient has good fund of knowledge and displays understanging of exam, diagnosis, and plan.     Radiology:    Xr Knee Right (3 Views)    Result Date: 10/26/2020  History:  Right  total knee arthroplasty Findings: AP, lateral, merchant view x-rays of the Right  knee done in the office standing today shows Right total knee arthroplasty in good position without  complications. No loosening of components is appreciated. No evidence of fracture, subluxation, dislocation, radiopaque foreign body, radiopaque tumors noted. Alignment is near-anatomic. Impression: Status post Right total knee arthroplasty as described above. Xr Knee Right (3 Views)    Result Date: 10/13/2020  EXAMINATION: THREE XRAY VIEWS OF THE RIGHT KNEE 10/13/2020 12:37 pm COMPARISON: Preoperative right knee series June 12, 2020 HISTORY: ORDERING SYSTEM PROVIDED HISTORY: post op in pacu. AP, lateral, merchant TECHNOLOGIST PROVIDED HISTORY: post op in pacu. AP, lateral, merchant FINDINGS: There has been interval placement of a satisfactorily aligned right total knee prosthesis with posterior patellar resurfacing, without hardware complication. There are expected postsurgical changes of the adjacent soft tissues     Interval placement of a satisfactorily aligned right total knee prosthesis          Assessment:      1. Primary osteoarthritis of right knee    2. S/P total knee arthroplasty, right         Plan:      The patient is currently 2 weeks postoperative after undergoing a right total knee arthroplasty with Dr. Ruth Schaefer DO on 10/13/2020. I personally reviewed the x-ray images from today of the patient's right knee and compared them to post-operative pictures  from 10/13/2020. The right knee hardware is in place with proper alignment and no signs of loosening. At this time I would like the patient to continue home physical therapy and transition to outpatient physical therapy physical therapy working on range of motion, lower extremity strengthing and gait training over the next 4 weeks. The patient was instructed not to submerge her right lower extremity extremity in any bodies of water until the surgical incisions are completely healed in approximately 4 weeks.   The patient can shower but was instructed to keep the surgical incisions clean and dry otherwise. The patient may continue taking prescribed pain medication as prescribed by pain management. The patient will follow-up in 4 weeks or sooner if needed. The patient was instructed to call the office with any questions or concerns. Follow up: Return in about 4 weeks (around 11/23/2020) for post-operative follow up. No orders of the defined types were placed in this encounter.       Orders Placed This Encounter   Procedures    External Referral To Physical Therapy     Referral Priority:   Routine     Referral Type:   Eval and Treat     Referral Reason:   Specialty Services Required     Requested Specialty:   Physical Therapy     Number of Visits Requested:   1       This note is created with the assistance of a speech recognition program.  While intending to generate a document that actually reflects the content of the visit, the document can still have some errors including those of syntax and sound a like substitutions which may escape proof reading.  In such instances, actual meaning can be extrapolated by contextual diversion      Electronically signed by Denita Monson PA-C on 10/26/2020 at 11:51 AM

## 2020-11-11 ENCOUNTER — HOSPITAL ENCOUNTER (OUTPATIENT)
Age: 65
Setting detail: SPECIMEN
Discharge: HOME | End: 2020-11-11
Payer: MEDICARE

## 2020-11-11 LAB
ALT SERPL-CCNC: 11 U/L (ref 5–33)
AST SERPL-CCNC: 14 U/L
CHOLESTEROL/HDL RATIO: 2.5
CHOLESTEROL: 104 MG/DL
HDLC SERPL-MCNC: 41 MG/DL
LDL CHOLESTEROL: 45 MG/DL (ref 0–130)
TRIGL SERPL-MCNC: 92 MG/DL
VLDLC SERPL CALC-MCNC: NORMAL MG/DL (ref 1–30)

## 2020-11-23 ENCOUNTER — OFFICE VISIT (OUTPATIENT)
Dept: ORTHOPEDIC SURGERY | Age: 65
End: 2020-11-23

## 2020-11-23 VITALS — HEIGHT: 66 IN | WEIGHT: 167 LBS | BODY MASS INDEX: 26.84 KG/M2

## 2020-11-23 PROCEDURE — 99024 POSTOP FOLLOW-UP VISIT: CPT | Performed by: PHYSICIAN ASSISTANT

## 2020-11-23 RX ORDER — TRAMADOL HYDROCHLORIDE 200 MG/1
TABLET, EXTENDED RELEASE ORAL
COMMUNITY
Start: 2020-10-26

## 2020-11-23 RX ORDER — OXYCODONE HYDROCHLORIDE AND ACETAMINOPHEN 5; 325 MG/1; MG/1
TABLET ORAL
COMMUNITY
Start: 2020-10-26

## 2020-11-23 ASSESSMENT — ENCOUNTER SYMPTOMS
VOMITING: 0
COUGH: 0
COLOR CHANGE: 0
SHORTNESS OF BREATH: 0

## 2020-11-23 NOTE — PROGRESS NOTES
[facility]  [facility]  [phone]  Dept: [phone]  Dept Fax: [phone]        Postoperative follow-up note    Subjective:   Lenard Jewell is a 62y.o. year old female who presents to our office today for postoperative followup regarding her   1. Primary osteoarthritis of right knee    2. S/P total knee arthroplasty, right    3. Wound, open, foot with complication, left, initial encounter    4. 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 ([address]    5. Chronic ulcer of left foot with fat layer exposed [facility])        Chief Complaint   Patient presents with    Post-Op Check     DOS 10/13/2020 RT TKA       Date of Surgery: 10/13/2020    Lenard Jewell  is a 62y.o. year old female who presents to our office today for postoperative follow up after having undergone a right total knee arthroplasty with Dr. Yaya Salvador DO on 10/13/2020. The patient denies fevers, chills, nausea, vomiting, diarrhea. The patient has started outpatient  physical therapy but had to stop approximately 2 weeks ago due to the fact that someone in the physical therapy office tested positive for Covid 19 and she was instructed to quarantine for 2 weeks. The patient states that she has been doing her home exercise program and has noticed an improvement in her range of motion. Patient is currently 6 weeks postoperative. The patient states that she will take intermittent OTC medications for her right knee discomfort. The patient is very happy with her progress in regards to the right knee and is thankful that she had the procedure done. She denies numbness and tingling in the right lower extremity. Patient's biggest complaint is her continued ulcer on the dorsum of the left foot.   The patient is currently following up with Dr. Shannan Rodriguez ([address] [place] [address]) and Dr. Ayanna Moeller ([facility]) and states that she has noticed regression in her left foot wound healing and would like to be referred to another specialist for wound care and plastic surgery. The patient states that she is having difficulty with communication between Dr. Guillermo Lopez office and her requests and would like a fresh set of eyes on her case. The patient continues to use a walker for ambulation only because of the discomfort within her left foot. Review of Systems   Constitutional: Negative for activity change and fever. HENT: Negative for sneezing. Respiratory: Negative for cough and shortness of breath. Cardiovascular: Negative for chest pain. Gastrointestinal: Negative for vomiting. Musculoskeletal: Positive for arthralgias (right knee, left foot). Negative for joint swelling and myalgias. Skin: Positive for wound (chronic ulcer left foot). Negative for color change. Neurological: Negative for weakness and numbness. Psychiatric/Behavioral: Negative for sleep disturbance. Objective :   General: Molly Lei is a 62 y.o. female who is alert and oriented and sitting comfortably in our office. Ortho Exam  MS:  The patient ambulates with a walker and a mild limp noted to the right lower extremity. Evaluation of the right knee reveals a well-healed incision along the anterior aspect of the right knee. There is no erythema, ecchymosis, incision drainage, new skin lesions or signs of infection present on the right knee. AROM right knee: 0-100 . There is no appreciable lower extremity edema noted on the surgical leg. There is no tenderness palpation noted to the right calf. Negative Homans' sign. Sensation is intact to light touch to the right lower extremity without focal deficits present. DP pulse 2+, PT pulse 2+, BCR right foot. Evaluation of the left foot reveals a 5.5cm x 3.5cm chronic ulcer on the dorsum of the left foot (Picture below). Antibiotic ointment is appreciated on the surface of the wound with erythematous borders.  There is no obvious induration or ascending streaking appreciated on the dorsum of the left foot. Patient has full ROM of the left ankle. The patient is able to move all 5 toes but is lacking extension of the 3rd toe on the left. Sensation is grossly intact to light touch to the left foot without focal deficits appreciated in office today. The skin surrounding the wound is noted to be pink and war, with brisk capillary refill. DP pulse 2+, PT pulse 2+ on the left. Document Information     Wound    Left foot    11/23/2020 08:48    Attached To: Office Visit on 11/23/20 with Maya Cho PA-C    Source Information     Maya Cho PA-C  [facility]        Neuro: alert. oriented  Eyes: Extra-ocular muscles intact  Mouth: Oral mucosa moist. No perioral lesions  Pulm: Respirations unlabored and regular. Skin: warm, well perfused  Psych:   Patient has good fund of knowledge and displays understanging of exam, diagnosis, and plan. Radiology:  Xr Knee Right (3 Views)    Result Date: 10/26/2020  History:  Right  total knee arthroplasty Findings: AP, lateral, merchant view x-rays of the Right  knee done in the office standing today shows Right total knee arthroplasty in good position without  complications. No loosening of components is appreciated. No evidence of fracture, subluxation, dislocation, radiopaque foreign body, radiopaque tumors noted. Alignment is near-anatomic. Impression: Status post Right total knee arthroplasty as described above. Assessment:      1. Primary osteoarthritis of right knee    2. S/P total knee arthroplasty, right    3. Wound, open, foot with complication, left, initial encounter    4. 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 ([address]    5. Chronic ulcer of left foot with fat layer exposed ([address]         Plan:      The patient is currently 6 weeks postoperative after undergoing a right TKA with Dr. Yenni Soria DO on 10/13/2020.   At this time the patient should restart outpatient physical therapy working on an range of motion, strengthening, gait training and functional tasks in regards to the right knee. The patient was instructed to follow-up in 6 weeks for routine postoperative follow-up after right total knee arthroplasty. The patient may continue to take over-the-counter anti-inflammatories as needed for her right knee discomfort the patient was instructed to call our office with any questions or concerns prior to her next appointment. Per the patient's request she was given a referral to Dr. Valeriano Saini ([address] [place] [address] Surgery and Wound care) for further evaluation of her chronic left foot ulcer. The patient was instructed to bring all medical records regarding her left foot to the initial appointment. The patient voiced her understanding. The patient will follow-up in 6 weeks for her right knee, or sooner if needed. The patient was instructed to call the office with any questions or concerns. Follow up: Return in about 6 weeks (around 1/4/2021) for post-operative follow up. No orders of the defined types were placed in this encounter. Orders Placed This Encounter   Procedures   Lucita Bergman MD, Plastic Surgery, [place]     Referral Priority:   Routine     Referral Type:   Eval and Treat     Referral Reason:   Specialty Services Required     Referred to Provider:   Gabi Kemp MD     Requested Specialty:   Plastic Surgery     Number of Visits Requested:   1       This note is created with the assistance of a speech recognition program.  While intending to generate a document that actually reflects the content of the visit, the document can still have some errors including those of syntax and sound a like substitutions which may escape proof reading. In such instances, actual meaning can be extrapolated by contextual diversion.        Electronically signed by Hemant Wilson PA-C on 11/23/2020 at 8:34 PM

## 2020-12-04 ENCOUNTER — HOSPITAL ENCOUNTER (OUTPATIENT)
Dept: WOUND CARE | Age: 65
Discharge: HOME | End: 2020-12-04
Payer: MEDICARE

## 2020-12-04 VITALS
BODY MASS INDEX: 27.32 KG/M2 | RESPIRATION RATE: 18 BRPM | HEIGHT: 66 IN | HEART RATE: 75 BPM | TEMPERATURE: 98.7 F | WEIGHT: 170 LBS | DIASTOLIC BLOOD PRESSURE: 70 MMHG | SYSTOLIC BLOOD PRESSURE: 143 MMHG

## 2020-12-04 PROBLEM — C44.709: Status: ACTIVE | Noted: 2020-12-04

## 2020-12-04 PROCEDURE — 87205 SMEAR GRAM STAIN: CPT

## 2020-12-04 PROCEDURE — 99204 OFFICE O/P NEW MOD 45 MIN: CPT | Performed by: PODIATRIST

## 2020-12-04 PROCEDURE — 11105 PUNCH BX SKIN EA SEP/ADDL: CPT | Performed by: PODIATRIST

## 2020-12-04 PROCEDURE — 87070 CULTURE OTHR SPECIMN AEROBIC: CPT

## 2020-12-04 PROCEDURE — 88305 TISSUE EXAM BY PATHOLOGIST: CPT

## 2020-12-04 PROCEDURE — 11104 PUNCH BX SKIN SINGLE LESION: CPT | Performed by: PODIATRIST

## 2020-12-04 PROCEDURE — 11104 PUNCH BX SKIN SINGLE LESION: CPT

## 2020-12-04 PROCEDURE — 2500000003 HC RX 250 WO HCPCS: Performed by: PODIATRIST

## 2020-12-04 PROCEDURE — 87176 TISSUE HOMOGENIZATION CULTR: CPT

## 2020-12-04 PROCEDURE — 99214 OFFICE O/P EST MOD 30 MIN: CPT

## 2020-12-04 PROCEDURE — 11105 PUNCH BX SKIN EA SEP/ADDL: CPT

## 2020-12-04 PROCEDURE — 87075 CULTR BACTERIA EXCEPT BLOOD: CPT

## 2020-12-04 PROCEDURE — 87184 SC STD DISK METHOD PER PLATE: CPT

## 2020-12-04 PROCEDURE — 87186 SC STD MICRODIL/AGAR DIL: CPT

## 2020-12-04 PROCEDURE — 87077 CULTURE AEROBIC IDENTIFY: CPT

## 2020-12-04 RX ORDER — GENTAMICIN SULFATE 1 MG/G
OINTMENT TOPICAL ONCE
Status: CANCELLED | OUTPATIENT
Start: 2020-12-04

## 2020-12-04 RX ORDER — LIDOCAINE HYDROCHLORIDE 20 MG/ML
JELLY TOPICAL ONCE
Status: CANCELLED | OUTPATIENT
Start: 2020-12-04

## 2020-12-04 RX ORDER — LIDOCAINE HYDROCHLORIDE 40 MG/ML
SOLUTION TOPICAL ONCE
Status: CANCELLED | OUTPATIENT
Start: 2020-12-04

## 2020-12-04 RX ORDER — BACITRACIN, NEOMYCIN, POLYMYXIN B 400; 3.5; 5 [USP'U]/G; MG/G; [USP'U]/G
OINTMENT TOPICAL ONCE
Status: CANCELLED | OUTPATIENT
Start: 2020-12-04

## 2020-12-04 RX ORDER — BETAMETHASONE DIPROPIONATE 0.05 %
OINTMENT (GRAM) TOPICAL ONCE
Status: CANCELLED | OUTPATIENT
Start: 2020-12-04

## 2020-12-04 RX ORDER — LIDOCAINE 40 MG/G
CREAM TOPICAL ONCE
Status: CANCELLED | OUTPATIENT
Start: 2020-12-04

## 2020-12-04 RX ORDER — GINSENG 100 MG
CAPSULE ORAL ONCE
Status: CANCELLED | OUTPATIENT
Start: 2020-12-04

## 2020-12-04 RX ORDER — LIDOCAINE HYDROCHLORIDE AND EPINEPHRINE BITARTRATE 20; .01 MG/ML; MG/ML
1 INJECTION, SOLUTION SUBCUTANEOUS ONCE
Status: COMPLETED | OUTPATIENT
Start: 2020-12-04 | End: 2020-12-04

## 2020-12-04 RX ORDER — LIDOCAINE HYDROCHLORIDE 10 MG/ML
20 INJECTION, SOLUTION INFILTRATION; PERINEURAL ONCE
Status: COMPLETED | OUTPATIENT
Start: 2020-12-04 | End: 2020-12-04

## 2020-12-04 RX ORDER — CLOBETASOL PROPIONATE 0.5 MG/G
OINTMENT TOPICAL ONCE
Status: CANCELLED | OUTPATIENT
Start: 2020-12-04

## 2020-12-04 RX ORDER — BACITRACIN ZINC AND POLYMYXIN B SULFATE 500; 1000 [USP'U]/G; [USP'U]/G
OINTMENT TOPICAL ONCE
Status: CANCELLED | OUTPATIENT
Start: 2020-12-04

## 2020-12-04 RX ORDER — CIPROFLOXACIN 500 MG/1
500 TABLET, FILM COATED ORAL 2 TIMES DAILY
COMMUNITY
Start: 2020-11-24 | End: 2020-12-08

## 2020-12-04 RX ORDER — LIDOCAINE 50 MG/G
OINTMENT TOPICAL ONCE
Status: CANCELLED | OUTPATIENT
Start: 2020-12-04

## 2020-12-04 RX ADMIN — LIDOCAINE HYDROCHLORIDE,EPINEPHRINE BITARTRATE 5 ML: 20; .01 INJECTION, SOLUTION INFILTRATION; PERINEURAL at 12:15

## 2020-12-04 RX ADMIN — LIDOCAINE HYDROCHLORIDE 2.5 ML: 10 INJECTION, SOLUTION INFILTRATION; PERINEURAL at 12:13

## 2020-12-04 ASSESSMENT — PAIN DESCRIPTION - ONSET: ONSET: ON-GOING

## 2020-12-04 ASSESSMENT — PAIN DESCRIPTION - LOCATION: LOCATION: FOOT

## 2020-12-04 ASSESSMENT — PAIN SCALES - GENERAL
PAINLEVEL_OUTOF10: 4
PAINLEVEL_OUTOF10: 4

## 2020-12-04 ASSESSMENT — PAIN - FUNCTIONAL ASSESSMENT: PAIN_FUNCTIONAL_ASSESSMENT: ACTIVITIES ARE NOT PREVENTED

## 2020-12-04 ASSESSMENT — PAIN DESCRIPTION - ORIENTATION: ORIENTATION: LEFT

## 2020-12-04 ASSESSMENT — PAIN DESCRIPTION - PROGRESSION: CLINICAL_PROGRESSION: NOT CHANGED

## 2020-12-04 ASSESSMENT — PAIN DESCRIPTION - DESCRIPTORS: DESCRIPTORS: BURNING

## 2020-12-04 ASSESSMENT — PAIN DESCRIPTION - PAIN TYPE: TYPE: CHRONIC PAIN

## 2020-12-04 ASSESSMENT — PAIN DESCRIPTION - FREQUENCY: FREQUENCY: CONTINUOUS

## 2020-12-04 NOTE — PROGRESS NOTES
[address] Patient History and Physical      Salome Kaufman  AGE: 62 y.o. GENDER: female  : 1955  TODAY'S DATE:  2020    Chief Complaint:   Chief Complaint   Patient presents with    Wound Check     left foot         History of the Present Illness       Salome Kaufman is a 62 y.o. female who presents today for evaluation and treatment for a non-healing/non-surgical wound which is located on the foot    History of Wound: has been present for over 1 year. Has had multiple debridements, skin grafts, skin graft substitutes. Has had vascular studies. Cultures. Antibiotics. Entire chart reviewed, has not had biopsy. Wound Type:non-healing/non-surgical  Wound Location:left foot  Modifying factors:smoking    Pain Level: 4   Pain Assessment: 0-10     Abx :Yes   Cultures :wereobtained  Pain : 4/10    PAST MEDICAL HISTORY        Diagnosis Date    Arthritis     knees hips hands shoulders and back    CAD (coronary artery disease)     blockage on cath 2019, to have stenting after surgery (arthroplasty) Dr. Jovon Knott Chronic back pain     GERD (gastroesophageal reflux disease)     on rx    Hyperlipidemia     per Dr. Randall Ball on rx    Hypertension     Fay Escalante manages    Incomplete RBBB     Irregular heart beat     LAFB (left anterior fascicular block)     Dr. Randall Ball, cardiologist, seen 2019 prior to surgery on 10/8/2019    Leg wound, left     following with wound care, Dr. Madison Haywood Lumbar disc disease     Osteoporosis     PAC (premature atrial contraction) 2018    PACs and PVCs on holter monitor,     PONV (postoperative nausea and vomiting)     requests scop patch    Wears dentures     upper partial    Wears eyeglasses     readers    Wears partial dentures     upper and lower    Wellness examination     MIKE Kraft, STEPHANIE PCP, seen 2/10/2020       MEDICATIONS    Current Outpatient Medications on File Prior to Encounter   Medication Sig Dispense Refill    oxyCODONE-acetaminophen (PERCOCET) 5-325 MG per tablet take 1 tablet by mouth four times a day if needed      traMADol (ULTRAM ER) 200 MG extended release tablet take 1 tablet by mouth once daily      aspirin EC 81 MG EC tablet Take 1 tablet by mouth 2 times daily 84 tablet 0    docusate sodium (COLACE) 100 MG capsule Take 1 capsule by mouth 2 times daily as needed for Constipation 60 capsule 0    folic acid (FOLVITE) 1 MG tablet take 1 tablet by mouth once daily (Patient taking differently: take 1 tablet by mouth once daily Steve Rose) 90 tablet 1    vitamin C (ASCORBIC ACID) 500 MG tablet Take 500 mg by mouth 2 times daily      Metoprolol Tartrate 37.5 MG TABS Take 37.5 mg by mouth 2 times daily (Patient taking differently: Take 37.5 mg by mouth 2 times daily Per Radha Crump for palpitations) 60 tablet 5    diclofenac sodium (VOLTAREN) 1 % GEL Apply topically as needed Dr. Story Pollen by Does not apply route 1 each 0    meclizine (ANTIVERT) 25 MG tablet Take 25 mg by mouth 3 times daily as needed Per Radha Crump CNP      atorvastatin (LIPITOR) 40 MG tablet Take 1 tablet by mouth nightly (Patient taking differently: Take 40 mg by mouth every morning Per Dr. Eve Loredo) 30 tablet 3    NAPROXEN PO Take 250 mg by mouth daily as needed Indications: patient takes 1-2 times a day Stopping 10 days prior to surgery as instructed by surgeon      omeprazole (PRILOSEC) 40 MG delayed release capsule Take 1 tablet by mouth daily Per Radha Crump      Cholecalciferol (VITAMIN D3) 5000 units TABS Take 5,000 Units by mouth daily       calcium carbonate (OSCAL) 500 MG TABS tablet Take 1,000 mg by mouth daily Per [facility] [place] [facility]      alendronate (FOSAMAX) 70 MG tablet Take 70 mg by mouth every 7 days Indications: Mondays Dr. Rita Beltrán      gabapentin (NEURONTIN) 300 MG capsule Take 300 mg by mouth 2 times daily.  Per Dr. Consuelo Muller ciprofloxacin (CIPRO) 500 MG tablet Take 500 mg by mouth 2 times daily       No current facility-administered medications on file prior to encounter.         ALLERGIES    No Known Allergies    PAST SURGICAL HISTORY    Past Surgical History:   Procedure Laterality Date    ABDOMINOPLASTY  1997    BLEPHAROPLASTY Bilateral 1997    BREAST ENHANCEMENT SURGERY Bilateral 1999    breast augmentation   [place] [id number]    Dr. Rock Hutchins, blockage but no stents yet    CATARACT REMOVAL WITH IMPLANT Bilateral 2016    COLONOSCOPY  2015    No Polyps    COSMETIC SURGERY      eyelid lift    FINGER SURGERY Right     thumb lesion excised    FIXATION KYPHOPLASTY  2013    Back    FOOT DEBRIDEMENT Left 10/11/2019    SURGICAL PREP WOUND BED LEFT FOOT WITH APPLICATION OF EPIFIX LEFT FOOT 3X4 performed by Kim Charles DPM at [place] [address]      kyphoplasty for lumbar fx 2013 Dr. Yuriy Stovall ARTHROSCOPY Left 2006   [place] Left 1/2/2020    FOOT  DEBRIDEMENT WITH WOUND VAC PLACEMENT performed by Rakan Vázquez MD at [place] [address]  2005    diskectomy and spinal fusion    OTHER SURGICAL HISTORY  10/03/2019    Left leg angiogram    ID OFFICE/OUTPT VISIT,PROCEDURE ONLY Right 9/11/2018    EXCISION MASS THUMB, 3080 TABLE performed by Wyatt Haro DO at [facility] Left 12/3/2019    LEFT FOOT DEBRIDEMENT WITH SKIN GRAFT SPLIT THICKNESS; SKIN GRAFT TAKEN FROM RIGHT ANTERIOR THIGH performed by Rakan Vázquez MD at [facility] Left 2/25/2020    DEBRIDEMENT, SPLIT THICKNESS SKIN GRAFT WITH WOUND VAC PLACEMENT FOOT performed by Tanmay Chacko MD at [facility] Left 6/30/2020    DEBRIDEMENT, SKIN GRAFT SPLIT THICKNESS FOOT  (Mendel Khanna) performed by Tanmay Chacko MD at [address] [place] [address]      as a child    TOTAL KNEE ARTHROPLASTY Right 10/13/2020    KNEE TOTAL ARTHROPLASTY    TOTAL KNEE ARTHROPLASTY Right 10/13/2020    KNEE TOTAL ARTHROPLASTY- MICROPORT, \ ADVANCED performed by Livier Genao DO at [facility]    family history includes Arthritis in her mother; Coronary Art Dis in her father, mother, and sister; Heart Disease in her father; Heart Surgery in her father, mother, and sister. SOCIAL HISTORY    Social History     Tobacco Use    Smoking status: Former Smoker     Packs/day: 0.25     Years: 50.00     Pack years: 12.50     Types: Cigarettes     Start date:      Last attempt to quit: 2020     Years since quittin.8    Smokeless tobacco: Never Used   Substance Use Topics    Alcohol use: Yes     Frequency: 2-4 times a month     Comment: 2-3 shot liquor for weekends    Drug use: Never       REVIEW OF SYSTEMS    Pertinent items are noted in HPI. Objective:      BP (!) 143/70   Pulse 75   Temp 98.7 °F (37.1 °C) (Tympanic)   Resp 18   Ht 5' 6\" (1.676 m)   Wt 170 lb (77.1 kg)   BMI 27.44 kg/m²   General Appearance: alert and oriented to person, place and time, well-developed and well-nourished, in no acute distress    Wound:         Wound 20 Foot Left;Dorsal wound #1 (Active)   Wound Image   20 1012   Dressing Status New drainage noted; Old drainage noted 20 1012   Wound Cleansed Cleansed with saline 20 1012   Wound Length (cm) 5.6 cm 20 1012   Wound Width (cm) 5.5 cm 20 1012   Wound Depth (cm) 0.4 cm 20 1012   Wound Surface Area (cm^2) 30.8 cm^2 20 1012   Wound Volume (cm^3) 12.32 cm^3 20 1012   Post-Procedure Length (cm) 5.6 cm 20 1012   Post-Procedure Width (cm) 5.5 cm 20 1012   Post-Procedure Depth (cm) 0.4 cm 20 1012   Post-Procedure Surface Area (cm^2) 30.8 cm^2 20 1012   Post-Procedure Volume (cm^3) 12.32 cm^3 20 1012   Wound Assessment Exposed structure tendon;Slough;Fibrinous 20 1012   Drainage Amount Moderate 20 1012   Drainage Description Serosanguinous; Yellow 12/04/20 1012   Odor None 12/04/20 1012   Jessica-wound Assessment Blanchable erythema 12/04/20 1012   Margins Defined edges 12/04/20 1012   Wound Thickness Description not for Pressure Injury Full thickness 12/04/20 1012   Number of days: 0           Vascular:  DP/PT pulses palpable 2/4, Bilateral.    CFT <3 seconds to digits 1-5, Bilateral .   Hair growth absent to level of digits, Bilateral.  Edema present, Left. Erythema absent, Left. Neurological:  Sensation present to light touch to level of digits, Bilateral.    Musculoskeletal:  No extensors to the 2nd and 3rd toes left foot. Assessment:           1. 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)    2. Pain in left foot    3. Neoplasm of uncertain behavior of skin    4. Primary malignant neoplasm of skin of left foot        Patient Active Problem List   Diagnosis    Neoplasm of uncertain behavior of skin    Other plastic surgery for unacceptable cosmetic appearance    Chronic bilateral low back pain without sciatica    Mass of finger of right hand    Chronic ulcer of left foot with fat layer exposed ([address]    Pain in left foot    Hypertension    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 ([address]    Wound, open, foot with complication, left, initial encounter    Status post total knee replacement using cement, right    Primary osteoarthritis of right knee    Gastroesophageal reflux disease without esophagitis    Anemia    Primary malignant neoplasm of skin of left foot         Plan:   Pt was evaluated and examined. Patient was given personalized discharge instructions. Diagnosis was discussed with the pt and all of their questions were answered in detail. Proper foot hygiene and care was discussed with the pt. Patient to check feet daily and contact the office with any questions/problems/concerns. Other comorbidity noted and will be managed by PCP.     Procedure formalin, Hemostasis achieved with direct pressure. The entire procedure was performed with a total estimated blood loss of less than 5 cc's. Patient tolerated procedure well       Plan for wound  Dress per physician order  Treatment: continue daily dressings  Await biopsy results. 1. Discussed appropriate home care of this wound. 2. Dressings: No orders of the defined types were placed in this encounter. 3. Follow up: 1 week. 4. Detailed home instructions and education material given to patient prior to discharge. Discharge Instructions         [address] [place] and [address] [facility]      Visit  Discharge Instructions / Physician Orders  DATE:12/4/20     Home Care:NONE     SUPPLIES ORDERED THRU:     Wound Location:  Left foot     Cleanse with: Liquid antibacterial soap and water, rinse well      Dressing Orders:  Primary dressing                       Secondary dressing                           secure with           x 30days     Frequency:       Additional Orders: Increase protein to diet (meat, cheese, eggs, fish, peanut butter, nuts and beans)  Multivitamin daily    MY CHART []     Smart Device  []     HYPERBARIC TREATMENT-                TREATMENT #     Your next appointment with the [address] [place] [address] is in 1 week- Tuesday                                                                                                   ROOM TYPE   [] CHAIR     [] BED   [] EITHER        TIME [] 45 MIN     [] 60 MIN     (Please note your next appointment above and if you are unable to keep, kindly give a 24 hour notice. Thank you.)     If you experience any of the following, please call the [address] [place] [address] during business hours:  [phone]     * Increase in Pain  * Temperature over 101  * Increase in drainage from your wound  * Drainage with a foul odor  * Bleeding  * Increase in swelling  * Need for compression bandage changes due to slippage, breakthrough drainage.      If you need medical attention outside of the business hours of the [address] [place] [address] Road please contact your PCP or go to the nearest emergency room. The information contained in the After Visit Summary has been reviewed with me, the patient and/or responsible adult, by my health care provider(s). I had the opportunity to ask questions regarding this information.  I have elected to receive;      []After Visit Summary  [x]Comprehensive Discharge Instruction      Patient signature______________________________________Date:________                Electronically signed by Gina Elizondo DPM on 12/4/2020 at 11:08 AM

## 2020-12-07 LAB
CULTURE: ABNORMAL
CULTURE: ABNORMAL
DIRECT EXAM: ABNORMAL
DIRECT EXAM: ABNORMAL
Lab: ABNORMAL
SPECIMEN DESCRIPTION: ABNORMAL
SURGICAL PATHOLOGY REPORT: NORMAL

## 2020-12-08 ENCOUNTER — HOSPITAL ENCOUNTER (OUTPATIENT)
Dept: WOUND CARE | Age: 65
Discharge: HOME | End: 2020-12-08
Payer: MEDICARE

## 2020-12-08 ENCOUNTER — HOSPITAL ENCOUNTER (OUTPATIENT)
Age: 65
Discharge: HOME | End: 2020-12-08
Payer: MEDICARE

## 2020-12-08 VITALS
BODY MASS INDEX: 27.32 KG/M2 | HEIGHT: 66 IN | RESPIRATION RATE: 18 BRPM | DIASTOLIC BLOOD PRESSURE: 88 MMHG | TEMPERATURE: 98 F | SYSTOLIC BLOOD PRESSURE: 152 MMHG | HEART RATE: 86 BPM | WEIGHT: 170 LBS

## 2020-12-08 LAB
ABSOLUTE EOS #: 0.3 K/UL (ref 0–0.4)
ABSOLUTE IMMATURE GRANULOCYTE: ABNORMAL K/UL (ref 0–0.3)
ABSOLUTE LYMPH #: 1.8 K/UL (ref 1–4.8)
ABSOLUTE MONO #: 0.7 K/UL (ref 0.1–1.3)
ALBUMIN SERPL-MCNC: 4.3 G/DL (ref 3.5–5.2)
ALBUMIN/GLOBULIN RATIO: ABNORMAL (ref 1–2.5)
ALP BLD-CCNC: 129 U/L (ref 35–104)
ALT SERPL-CCNC: 12 U/L (ref 5–33)
ANION GAP SERPL CALCULATED.3IONS-SCNC: 10 MMOL/L (ref 9–17)
AST SERPL-CCNC: 14 U/L
BASOPHILS # BLD: 0 % (ref 0–2)
BASOPHILS ABSOLUTE: 0 K/UL (ref 0–0.2)
BILIRUB SERPL-MCNC: 0.18 MG/DL (ref 0.3–1.2)
BUN BLDV-MCNC: 11 MG/DL (ref 8–23)
BUN/CREAT BLD: ABNORMAL (ref 9–20)
C-REACTIVE PROTEIN: 1.5 MG/L (ref 0–5)
CALCIUM SERPL-MCNC: 10.4 MG/DL (ref 8.6–10.4)
CHLORIDE BLD-SCNC: 106 MMOL/L (ref 98–107)
CO2: 28 MMOL/L (ref 20–31)
CREAT SERPL-MCNC: 0.62 MG/DL (ref 0.5–0.9)
DIFFERENTIAL TYPE: ABNORMAL
EOSINOPHILS RELATIVE PERCENT: 4 % (ref 0–4)
GFR AFRICAN AMERICAN: >60 ML/MIN
GFR NON-AFRICAN AMERICAN: >60 ML/MIN
GFR SERPL CREATININE-BSD FRML MDRD: ABNORMAL ML/MIN/{1.73_M2}
GFR SERPL CREATININE-BSD FRML MDRD: ABNORMAL ML/MIN/{1.73_M2}
GLUCOSE BLD-MCNC: 97 MG/DL (ref 70–99)
HCT VFR BLD CALC: 33.2 % (ref 36–46)
HEMOGLOBIN: 11.1 G/DL (ref 12–16)
IMMATURE GRANULOCYTES: ABNORMAL %
LYMPHOCYTES # BLD: 22 % (ref 24–44)
MCH RBC QN AUTO: 32.7 PG (ref 26–34)
MCHC RBC AUTO-ENTMCNC: 33.5 G/DL (ref 31–37)
MCV RBC AUTO: 97.8 FL (ref 80–100)
MONOCYTES # BLD: 9 % (ref 1–7)
NRBC AUTOMATED: ABNORMAL PER 100 WBC
PDW BLD-RTO: 14.2 % (ref 11.5–14.9)
PLATELET # BLD: 263 K/UL (ref 150–450)
PLATELET ESTIMATE: ABNORMAL
PMV BLD AUTO: 8.1 FL (ref 6–12)
POTASSIUM SERPL-SCNC: 4.7 MMOL/L (ref 3.7–5.3)
RBC # BLD: 3.4 M/UL (ref 4–5.2)
RBC # BLD: ABNORMAL 10*6/UL
RHEUMATOID FACTOR: <10 IU/ML
SEDIMENTATION RATE, ERYTHROCYTE: 4 MM (ref 0–30)
SEG NEUTROPHILS: 65 % (ref 36–66)
SEGMENTED NEUTROPHILS ABSOLUTE COUNT: 5.1 K/UL (ref 1.3–9.1)
SODIUM BLD-SCNC: 144 MMOL/L (ref 135–144)
TOTAL PROTEIN: 6.8 G/DL (ref 6.4–8.3)
WBC # BLD: 7.8 K/UL (ref 3.5–11)
WBC # BLD: ABNORMAL 10*3/UL

## 2020-12-08 PROCEDURE — 86431 RHEUMATOID FACTOR QUANT: CPT

## 2020-12-08 PROCEDURE — 86038 ANTINUCLEAR ANTIBODIES: CPT

## 2020-12-08 PROCEDURE — 80053 COMPREHEN METABOLIC PANEL: CPT

## 2020-12-08 PROCEDURE — 85025 COMPLETE CBC W/AUTO DIFF WBC: CPT

## 2020-12-08 PROCEDURE — 86235 NUCLEAR ANTIGEN ANTIBODY: CPT

## 2020-12-08 PROCEDURE — 36415 COLL VENOUS BLD VENIPUNCTURE: CPT

## 2020-12-08 PROCEDURE — 86225 DNA ANTIBODY NATIVE: CPT

## 2020-12-08 PROCEDURE — 85652 RBC SED RATE AUTOMATED: CPT

## 2020-12-08 PROCEDURE — 86140 C-REACTIVE PROTEIN: CPT

## 2020-12-08 PROCEDURE — 99214 OFFICE O/P EST MOD 30 MIN: CPT | Performed by: PODIATRIST

## 2020-12-08 PROCEDURE — 99213 OFFICE O/P EST LOW 20 MIN: CPT

## 2020-12-08 PROCEDURE — 83516 IMMUNOASSAY NONANTIBODY: CPT

## 2020-12-08 RX ORDER — LIDOCAINE HYDROCHLORIDE 40 MG/ML
SOLUTION TOPICAL ONCE
Status: COMPLETED | OUTPATIENT
Start: 2020-12-08 | End: 2020-12-08

## 2020-12-08 RX ORDER — LIDOCAINE HYDROCHLORIDE 40 MG/ML
SOLUTION TOPICAL ONCE
Status: CANCELLED | OUTPATIENT
Start: 2020-12-08

## 2020-12-08 RX ORDER — GENTAMICIN SULFATE 1 MG/G
OINTMENT TOPICAL ONCE
Status: CANCELLED | OUTPATIENT
Start: 2020-12-08

## 2020-12-08 RX ORDER — LIDOCAINE 50 MG/G
OINTMENT TOPICAL ONCE
Status: CANCELLED | OUTPATIENT
Start: 2020-12-08

## 2020-12-08 RX ORDER — LIDOCAINE HYDROCHLORIDE 20 MG/ML
JELLY TOPICAL ONCE
Status: CANCELLED | OUTPATIENT
Start: 2020-12-08

## 2020-12-08 RX ORDER — GINSENG 100 MG
CAPSULE ORAL ONCE
Status: CANCELLED | OUTPATIENT
Start: 2020-12-08

## 2020-12-08 RX ORDER — CLOBETASOL PROPIONATE 0.5 MG/G
OINTMENT TOPICAL ONCE
Status: CANCELLED | OUTPATIENT
Start: 2020-12-08

## 2020-12-08 RX ORDER — BACITRACIN, NEOMYCIN, POLYMYXIN B 400; 3.5; 5 [USP'U]/G; MG/G; [USP'U]/G
OINTMENT TOPICAL ONCE
Status: CANCELLED | OUTPATIENT
Start: 2020-12-08

## 2020-12-08 RX ORDER — LIDOCAINE 40 MG/G
CREAM TOPICAL ONCE
Status: CANCELLED | OUTPATIENT
Start: 2020-12-08

## 2020-12-08 RX ORDER — BACITRACIN ZINC AND POLYMYXIN B SULFATE 500; 1000 [USP'U]/G; [USP'U]/G
OINTMENT TOPICAL ONCE
Status: CANCELLED | OUTPATIENT
Start: 2020-12-08

## 2020-12-08 RX ORDER — BETAMETHASONE DIPROPIONATE 0.05 %
OINTMENT (GRAM) TOPICAL ONCE
Status: CANCELLED | OUTPATIENT
Start: 2020-12-08

## 2020-12-08 RX ADMIN — LIDOCAINE HYDROCHLORIDE 5 ML: 40 SOLUTION TOPICAL at 10:31

## 2020-12-08 ASSESSMENT — PAIN DESCRIPTION - PROGRESSION: CLINICAL_PROGRESSION: NOT CHANGED

## 2020-12-08 ASSESSMENT — PAIN DESCRIPTION - DESCRIPTORS: DESCRIPTORS: BURNING

## 2020-12-08 ASSESSMENT — PAIN DESCRIPTION - FREQUENCY: FREQUENCY: CONTINUOUS

## 2020-12-08 ASSESSMENT — PAIN DESCRIPTION - ONSET: ONSET: ON-GOING

## 2020-12-08 ASSESSMENT — PAIN DESCRIPTION - LOCATION: LOCATION: FOOT

## 2020-12-08 ASSESSMENT — PAIN - FUNCTIONAL ASSESSMENT: PAIN_FUNCTIONAL_ASSESSMENT: ACTIVITIES ARE NOT PREVENTED

## 2020-12-08 ASSESSMENT — PAIN SCALES - GENERAL: PAINLEVEL_OUTOF10: 2

## 2020-12-08 ASSESSMENT — PAIN DESCRIPTION - PAIN TYPE: TYPE: CHRONIC PAIN

## 2020-12-08 ASSESSMENT — PAIN DESCRIPTION - ORIENTATION: ORIENTATION: LEFT

## 2020-12-08 NOTE — PROGRESS NOTES
[address]  Return Patient History and Physical      Ruben Resendez  AGE: 62 y.o. GENDER: female  : 1955  TODAY'S DATE:  2020    Chief Complaint:   Chief Complaint   Patient presents with    Wound Check         History of the Present Illness       Ruben Resendez is a 62 y.o. female who presents today for evaluation and treatment for a non-healing/non-surgical wound which is located on the left foot. History of Wound: has been present for over 1 year. Has had multiple debridements, skin grafts, skin graft substitutes. Has had vascular studies. Cultures. Antibiotics. Entire chart reviewed, has not had biopsy. Wound Type:non-healing/non-surgical  Wound Location:left foot  Modifying factors:smoking    Pain Level: 2   Pain Assessment: 0-10     Abx :Yes   Cultures :wereobtained  Pain : 4/10    PAST MEDICAL HISTORY        Diagnosis Date    Arthritis     knees hips hands shoulders and back    CAD (coronary artery disease)     blockage on cath 2019, to have stenting after surgery (arthroplasty) Dr. Saul Panda Chronic back pain     GERD (gastroesophageal reflux disease)     on rx    Hyperlipidemia     per Dr. Siri Wong on rx    Hypertension     Vamshi Booty manages    Incomplete RBBB     Irregular heart beat     LAFB (left anterior fascicular block)     Dr. Siri Wong, cardiologist, seen 2019 prior to surgery on 10/8/2019    Leg wound, left     following with wound care, Dr. Zenia Giles Lumbar disc disease     Osteoporosis     PAC (premature atrial contraction) 2018    PACs and PVCs on holter monitor,     PONV (postoperative nausea and vomiting)     requests scop patch    Wears dentures     upper partial    Wears eyeglasses     readers    Wears partial dentures     upper and lower    Wellness examination     MIKE Hernández NP PCP, seen 2/10/2020       MEDICATIONS    Current Outpatient Medications on File Prior to Encounter   Medication Sig Dispense Refill    ciprofloxacin (CIPRO) 500 MG tablet Take 500 mg by mouth 2 times daily      oxyCODONE-acetaminophen (PERCOCET) 5-325 MG per tablet take 1 tablet by mouth four times a day if needed      traMADol (ULTRAM ER) 200 MG extended release tablet take 1 tablet by mouth once daily      aspirin EC 81 MG EC tablet Take 1 tablet by mouth 2 times daily 84 tablet 0    docusate sodium (COLACE) 100 MG capsule Take 1 capsule by mouth 2 times daily as needed for Constipation 60 capsule 0    folic acid (FOLVITE) 1 MG tablet take 1 tablet by mouth once daily (Patient taking differently: take 1 tablet by mouth once daily Steve Rose) 90 tablet 1    vitamin C (ASCORBIC ACID) 500 MG tablet Take 500 mg by mouth 2 times daily      Metoprolol Tartrate 37.5 MG TABS Take 37.5 mg by mouth 2 times daily (Patient taking differently: Take 37.5 mg by mouth 2 times daily Per Campos Galindo for palpitations) 60 tablet 5    diclofenac sodium (VOLTAREN) 1 % GEL Apply topically as needed Dr. Michell Bowen by Does not apply route 1 each 0    meclizine (ANTIVERT) 25 MG tablet Take 25 mg by mouth 3 times daily as needed Per Campos Galindo CNP      atorvastatin (LIPITOR) 40 MG tablet Take 1 tablet by mouth nightly (Patient taking differently: Take 40 mg by mouth every morning Per Dr. Christiano Ayon) 30 tablet 3    NAPROXEN PO Take 250 mg by mouth daily as needed Indications: patient takes 1-2 times a day Stopping 10 days prior to surgery as instructed by surgeon      omeprazole (PRILOSEC) 40 MG delayed release capsule Take 1 tablet by mouth daily Per Campos Galindo      Cholecalciferol (VITAMIN D3) 5000 units TABS Take 5,000 Units by mouth daily       calcium carbonate (OSCAL) 500 MG TABS tablet Take 1,000 mg by mouth daily Per Kirsty Mancini rheumatology      alendronate (FOSAMAX) 70 MG tablet Take 70 mg by mouth every 7 days Indications: Mondays Dr. Nguyen Number      gabapentin (NEURONTIN) 300 MG capsule Take 300 mg by mouth 2 times daily. Per Dr. Kaitlynn Kiser       No current facility-administered medications on file prior to encounter.         ALLERGIES    No Known Allergies    PAST SURGICAL HISTORY    Past Surgical History:   Procedure Laterality Date    ABDOMINOPLASTY  1997    BLEPHAROPLASTY Bilateral 1997    BREAST ENHANCEMENT SURGERY Bilateral 1999    breast augmentation   [address]  2019    Dr. Kassandra Ríos, blockage but no stents yet    CATARACT REMOVAL WITH IMPLANT Bilateral 2016    COLONOSCOPY  2015    No Polyps    COSMETIC SURGERY      eyelid lift    FINGER SURGERY Right     thumb lesion excised    FIXATION KYPHOPLASTY  2013    Back    FOOT DEBRIDEMENT Left 10/11/2019    SURGICAL PREP WOUND BED LEFT FOOT WITH APPLICATION OF EPIFIX LEFT FOOT 3X4 performed by Lalit Nuñez DPM at [facility] 59      kyphoplasty for lumbar fx 2013 Dr. Tavia Hardy ARTHROSCOPY Left 2006   [facility] Left 1/2/2020    FOOT  DEBRIDEMENT WITH WOUND VAC PLACEMENT performed by Elisabeth Steward MD at [address]  2005    diskectomy and spinal fusion    OTHER SURGICAL HISTORY  10/03/2019    Left leg angiogram    [place] OFFICE/OUTPT VISIT,PROCEDURE ONLY Right 9/11/2018    EXCISION MASS THUMB, 3080 TABLE performed by Vickie Kirkland DO at [address] [place] [address] Left 12/3/2019    LEFT FOOT DEBRIDEMENT WITH SKIN GRAFT SPLIT THICKNESS; SKIN GRAFT TAKEN FROM RIGHT ANTERIOR THIGH performed by Elisabeth Steward MD at [address] [place] [address] Left 2/25/2020    DEBRIDEMENT, SPLIT THICKNESS SKIN GRAFT WITH WOUND VAC PLACEMENT FOOT performed by Christopher Cao MD at [address] [place] [address] Left 6/30/2020    DEBRIDEMENT, SKIN GRAFT SPLIT THICKNESS FOOT  (Zina Bowers) performed by Christopher Cao MD at [address] [place] [address]      as a child    TOTAL KNEE ARTHROPLASTY Right 10/13/2020    KNEE TOTAL ARTHROPLASTY    TOTAL KNEE ARTHROPLASTY Right 10/13/2020    KNEE TOTAL ARTHROPLASTY- MICROPORT, \ ADVANCED performed by Bronson Cui DO at [facility] 65    family history includes Arthritis in her mother; Coronary Art Dis in her father, mother, and sister; Heart Disease in her father; Heart Surgery in her father, mother, and sister. SOCIAL HISTORY    Social History     Tobacco Use    Smoking status: Former Smoker     Packs/day: 0.25     Years: 50.00     Pack years: 12.50     Types: Cigarettes     Start date:      Last attempt to quit: 2020     Years since quittin.8    Smokeless tobacco: Never Used   Substance Use Topics    Alcohol use: Yes     Frequency: 2-4 times a month     Comment: 2-3 shot liquor for weekends    Drug use: Never       REVIEW OF SYSTEMS    Pertinent items are noted in HPI. Objective:      BP (!) 152/88   Pulse 86   Temp 98 °F (36.7 °C) (Tympanic)   Resp 18   Ht 5' 6\" (1.676 m)   Wt 170 lb (77.1 kg)   BMI 27.44 kg/m²   General Appearance: alert and oriented to person, place and time, well-developed and well-nourished, in no acute distress    Wound 20 Foot Left;Dorsal wound #1 (Active)   Wound Image   20 1012   Wound Etiology Other 20 1026   Dressing Status New dressing applied;Clean; Intact 20 1026   Wound Cleansed Irrigated with saline 20 1026   Wound Length (cm) 5.2 cm 20 1026   Wound Width (cm) 6 cm 20 1026   Wound Depth (cm) 0.4 cm 20 1026   Wound Surface Area (cm^2) 31.2 cm^2 20 1026   Change in Wound Size % (l*w) -1.3 20 1026   Wound Volume (cm^3) 12.48 cm^3 20 1026   Wound Healing % -1 20 1026   Post-Procedure Length (cm) 5.2 cm 20 1026   Post-Procedure Width (cm) 6 cm 20 1026   Post-Procedure Depth (cm) 0.4 cm 20 1026   Post-Procedure Surface Area (cm^2) 31.2 cm^2 20 1026   Post-Procedure Volume (cm^3) 12.48 cm^3 20 1026   Wound Assessment Exposed structure tendon;Pink/red;Slough 12/08/20 1026   Drainage Amount Moderate 12/08/20 1026   Drainage Description Yellow 12/08/20 1026   Odor None 12/08/20 1026   Jessica-wound Assessment Blanchable erythema 12/08/20 1026   Margins Defined edges 12/08/20 1026   Wound Thickness Description not for Pressure Injury Full thickness 12/04/20 1012   Number of days: 4       Vascular:  DP/PT pulses palpable 2/4, Bilateral.    CFT <3 seconds to digits 1-5, Bilateral .   Hair growth absent to level of digits, Bilateral.  Edema present, Left. Erythema absent, Left. Neurological:  Sensation present to light touch to level of digits, Bilateral.    Musculoskeletal:  No extensors to the 2nd and 3rd toes left foot. Surgical Pathology Left Foot Skin Biopsies 12/4/20    SPECIMEN \"A\":  SKIN, LEFT FOOT, BIOPSY:          EPIDERMAL ULCERATION WITH ASSOCIATED ACUTE INFLAMMATION     NEGATIVE FOR MALIGNANCY     SPECIMEN \"B\":  SKIN, LEFT FOOT, 12 O'CLOCK, BIOPSY:          EPIDERMAL ULCERATION WITH EXTENSIVE NECROSIS WITH ASSOCIATED   ACUTE INFLAMMATION     SCANT FRAGMENT OF DETACHED SQUAMOUS EPITHELIUM WITH ATYPIA (SEE   COMMENT)     SPECIMEN \"C\":  SKIN, LEFT FOOT, 6 O'CLOCK, BIOPSY:          ULCERATED SKIN WITH MARKED ACUTE INFLAMMATION     NEGATIVE FOR MALIGNANCY   Assessment:       1. Primary malignant neoplasm of skin of left foot    2. 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)    3. Pain in left foot    4. Neoplasm of uncertain behavior of skin    5.  Chronic ulcer of left foot with fat layer exposed [facility])        Patient Active Problem List   Diagnosis    Neoplasm of uncertain behavior of skin    Other plastic surgery for unacceptable cosmetic appearance    Chronic bilateral low back pain without sciatica    Mass of finger of right hand    Chronic ulcer of left foot with fat layer exposed ([address]    Pain in left foot    Hypertension    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 ([address]    Wound, open, foot with complication, left, initial encounter    Status post total knee replacement using cement, right    Primary osteoarthritis of right knee    Gastroesophageal reflux disease without esophagitis    Anemia    Primary malignant neoplasm of skin of left foot         Plan:   Pt was evaluated and examined. Patient was given personalized discharge instructions. Diagnosis was discussed with the pt and all of their questions were answered in detail. Proper foot hygiene and care was discussed with the pt. Patient to check feet daily and contact the office with any questions/problems/concerns. Other comorbidity noted and will be managed by PCP. Discussed results of skin biopsies with patient. Discussed plan to perform formal debridement of wound in OR and obtain additional skin biopsies. Patient amenable to this plan. Tentatively planned for next week. Plan for wound  Dress per physician order  Treatment: continue daily dressings of adaptic over wound, ABD Pad and Kerlix. 1. Discussed appropriate home care of this wound. 2. Dressings:   Orders Placed This Encounter   Procedures    Supply: Wound Cleanser     ORDER SUPPLY IS VERBALIZED BY PROVIDER DURING TIME OF TREATMENT. DOCUMENTATION OF FINAL ORDER CAN BE FOUND IN PATIENT ENCOUNTER FLOWSHEET RECORD.      (Betadine//Povidone Iodine/Cleansed with saline/Irrigated with saline/Wound cleanser/Soap and water/Vashe/Not cleansed/Other (Comment))     Standing Status:   Standing     Number of Occurrences:   1      3. Follow up: 1 week. 4. Detailed home instructions and education material given to patient prior to discharge.      Discharge Instructions         [address] [facility]                                 Visit  Discharge Instructions / Physician Orders  DATE:12/8/20     Home Care:NONE     SUPPLIES ORDERED THRU:     Wound Location:  Left foot     Cleanse with: Liquid antibacterial soap and water, rinse well RN on 12/8/2020 at 10:41 AM            Electronically signed by Mikki Loja DPM on 12/8/2020 at 10:49 AM     I performed a history and physical examination of the patient and discussed management with the resident. I reviewed the residents note and agree with the documented findings and plan of care. Any areas of disagreement are noted on the chart. I was personally present for the key portions of any procedures. I have documented in the chart those procedures where I was not present during the key portions. I have reviewed the Podiatry Resident progress note. I agree with the chief complaint, past medical history, past surgical history, allergies, medications, social and family history as documented unless otherwise noted below. Documentation of the HPI, Physical Exam and Medical Decision Making performed by medical students or scribes is based on my personal performance of the HPI, PE and MDM. I have personally evaluated this patient and have completed at least one if not all key elements of the E/M (history, physical exam, and MDM). Additional findings are as noted.      Víctor Jiang DPM on 12/9/2020 at 3:19 PM  Board Certified, American Board of Podiatric Surgery  Fellow, [employer] [facility] and [employer] [facility] [place]

## 2020-12-09 LAB
ANA REFERENCE RANGE:: ABNORMAL
ANCA MYELOPEROXIDASE: 22 AU/ML
ANCA PROTEINASE 3: 6 AU/ML
ANTI DNA DOUBLE STRANDED: 37 IU/ML
ANTI JO-1 IGG: 21 U/ML
ANTI RNP AB: 65 U/ML
ANTI SSA: 80 U/ML
ANTI SSB: 46 U/ML
ANTI-CENTROMERE: 189 U/ML
ANTI-NUCLEAR ANTIBODY (ANA): POSITIVE
ANTI-SCLERODERMA: 15 U/ML
ANTI-SMITH: 21 U/ML
HISTONE ANTIBODY: 29 U/ML

## 2020-12-09 NOTE — PROGRESS NOTES
Call from [facility] asking for nurse to set up appointment with Rheumatologist. Call was placed to Dr. Selena Laird [facility]) for appointment possible Scleroderma. Spoke with office staff they stated that patient already had an appointment in January. Was given to voice mail of nurse to see if she could squeeze the patient in. Call was then placed to client. Mrs. Jamey Packer stated she was seeing Dr. Selena Laird for potential Rheumatoid which she was told she did not have. She will call them tomorrow to see if she can get in. Client asked about upcoming surgery, explained that [facility] put the surgery on hold until she is seen by Rheumatology.

## 2020-12-15 NOTE — PROGRESS NOTES
[address]  Return Patient History and Physical      Naaman Buerger  AGE: 62 y.o. GENDER: female  : 1955  TODAY'S DATE:  12/15/2020    Chief Complaint:   Chief Complaint   Patient presents with    Wound Check         History of the Present Illness       Naaman Buerger is a 62 y.o. female who presents today for evaluation and treatment for a non-healing/non-surgical wound which is located on the left foot. History of Wound: has been present for over 1 year. Has had multiple debridements, skin grafts, skin graft substitutes. Has had vascular studies. Biopsy was negative for malignancies. Patient had a positive AMADOR anti-centromere lab and was referred to rheumatology, but told it was a false positive result. Culture came back positive for light pseudomonal growth.      Wound Type:non-healing/non-surgical  Wound Location:left foot  Modifying factors:smoking    Pain Level: 6   Pain Assessment: 0-10     Abx :Yes   Cultures :wereobtained  Pain : 4/10    PAST MEDICAL HISTORY        Diagnosis Date    Arthritis     knees hips hands shoulders and back    CAD (coronary artery disease)     blockage on cath 2019, to have stenting after surgery (arthroplasty) Dr. Frances Pennington Chronic back pain     GERD (gastroesophageal reflux disease)     on rx    Hyperlipidemia     per Dr. Shelia Ferrara on rx    Hypertension     Ryann Bench manages    Incomplete RBBB     Irregular heart beat     LAFB (left anterior fascicular block)     Dr. Shelia Ferrara, cardiologist, seen 2019 prior to surgery on 10/8/2019    Leg wound, left     following with wound care, Dr. Sheldon Coronado Lumbar disc disease     Osteoporosis     PAC (premature atrial contraction) 2018    PACs and PVCs on holter monitor,     PONV (postoperative nausea and vomiting)     requests scop patch    Wears dentures     upper partial    Wears eyeglasses     readers    Wears partial dentures     upper and lower    Wellness examination Rosalba Daley NP PCP, seen 2/10/2020       MEDICATIONS    Current Outpatient Medications on File Prior to Encounter   Medication Sig Dispense Refill    Multiple Vitamins-Minerals (THERAPEUTIC MULTIVITAMIN-MINERALS) tablet Take 1 tablet by mouth daily      lidocaine 4 % GEL jelly Apply 1 Dose topically as needed for Pain 90 mL 3    oxyCODONE-acetaminophen (PERCOCET) 5-325 MG per tablet take 1 tablet by mouth four times a day if needed      traMADol (ULTRAM ER) 200 MG extended release tablet take 1 tablet by mouth once daily      aspirin EC 81 MG EC tablet Take 1 tablet by mouth 2 times daily 84 tablet 0    docusate sodium (COLACE) 100 MG capsule Take 1 capsule by mouth 2 times daily as needed for Constipation 60 capsule 0    folic acid (FOLVITE) 1 MG tablet take 1 tablet by mouth once daily (Patient taking differently: take 1 tablet by mouth once daily Steve Rose) 90 tablet 1    vitamin C (ASCORBIC ACID) 500 MG tablet Take 500 mg by mouth 2 times daily      Metoprolol Tartrate 37.5 MG TABS Take 37.5 mg by mouth 2 times daily (Patient taking differently: Take 37.5 mg by mouth 2 times daily Per Laila Rued for palpitations) 60 tablet 5    diclofenac sodium (VOLTAREN) 1 % GEL Apply topically as needed Dr. Jabier Olmedo by Does not apply route 1 each 0    meclizine (ANTIVERT) 25 MG tablet Take 25 mg by mouth 3 times daily as needed Per Laila Rued CNP      atorvastatin (LIPITOR) 40 MG tablet Take 1 tablet by mouth nightly (Patient taking differently: Take 40 mg by mouth every morning Per Dr. Lexie Stearns) 30 tablet 3    NAPROXEN PO Take 250 mg by mouth daily as needed Indications: patient takes 1-2 times a day Stopping 10 days prior to surgery as instructed by surgeon      omeprazole (PRILOSEC) 40 MG delayed release capsule Take 1 tablet by mouth daily Per Laila Rued      Cholecalciferol (VITAMIN D3) 5000 units TABS Take 5,000 Units by mouth daily  calcium carbonate (OSCAL) 500 MG TABS tablet Take 1,000 mg by mouth daily Per Marah Bess rheumatology      alendronate (FOSAMAX) 70 MG tablet Take 70 mg by mouth every 7 days Indications: Mondays Dr. Paige Rodrigez      gabapentin (NEURONTIN) 300 MG capsule Take 300 mg by mouth 2 times daily. Per Dr. Umang Poole       No current facility-administered medications on file prior to encounter.         ALLERGIES    No Known Allergies    PAST SURGICAL HISTORY    Past Surgical History:   Procedure Laterality Date    ABDOMINOPLASTY  1997    BLEPHAROPLASTY Bilateral 1997    BREAST ENHANCEMENT SURGERY Bilateral 1999    breast augmentation    CARDIAC CATHETERIZATION  2019    Dr. Germaine Shah, blockage but no stents yet    CATARACT REMOVAL WITH IMPLANT Bilateral 2016    COLONOSCOPY  2015    No Polyps    COSMETIC SURGERY      eyelid lift    FINGER SURGERY Right     thumb lesion excised    FIXATION KYPHOPLASTY  2013    Back    FOOT DEBRIDEMENT Left 10/11/2019    SURGICAL PREP WOUND BED LEFT FOOT WITH APPLICATION OF EPIFIX LEFT FOOT 3X4 performed by Андрей Lemon DPM at [place] [address]      kyphoplasty for lumbar fx 2013 Dr. Amna Nice ARTHROSCOPY Left 2006   [facility] Left 1/2/2020    FOOT  DEBRIDEMENT WITH WOUND VAC PLACEMENT performed by Megha Adams MD at [place] [address]  2014   [place] [address]  2005    diskectomy and spinal fusion    OTHER SURGICAL HISTORY  10/03/2019    Left leg angiogram    [place] OFFICE/OUTPT VISIT,PROCEDURE ONLY Right 9/11/2018    EXCISION MASS THUMB, 3080 TABLE performed by Livier Genao DO at [facility] Left 12/3/2019    LEFT FOOT DEBRIDEMENT WITH SKIN GRAFT SPLIT THICKNESS; SKIN GRAFT TAKEN FROM RIGHT ANTERIOR THIGH performed by Megha Adams MD at [facility] Left 2/25/2020  Neoplasm of uncertain behavior of skin    Other plastic surgery for unacceptable cosmetic appearance    Chronic bilateral low back pain without sciatica    Mass of finger of right hand    Chronic ulcer of left foot with fat layer exposed ([address]    Pain in left foot    Hypertension    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)    Wound, open, foot with complication, left, initial encounter    Status post total knee replacement using cement, right    Primary osteoarthritis of right knee    Gastroesophageal reflux disease without esophagitis    Anemia    Primary malignant neoplasm of skin of left foot         Plan:   Pt was evaluated and examined. Patient was given personalized discharge instructions. Diagnosis was discussed with the pt and all of their questions were answered in detail. Proper foot hygiene and care was discussed with the pt. Patient to check feet daily and contact the office with any questions/problems/concerns. Other comorbidity noted and will be managed by PCP. · Due to chronicity of the wound, will order MRI with contrast to evaluate for possible underlying cause of wound  · New Rx for Cipro. Will switch dressings to acetic acid WTD dressings. · Rx for Medrol dose pack for possible pyoderma gangrenosa   · Ordered an anti-phospholipid antibody lab   · Referral to dermatology    Plan for wound  Dress per physician order  Treatment: continue daily dressings of adaptic over wound, ABD Pad and Kerlix. 1. Discussed appropriate home care of this wound. 2. Dressings:   Orders Placed This Encounter   Procedures    Supply: Wound Cleanser     ORDER SUPPLY IS VERBALIZED BY PROVIDER DURING TIME OF TREATMENT.   DOCUMENTATION OF FINAL ORDER CAN BE FOUND IN PATIENT ENCOUNTER FLOWSHEET RECORD.      (Betadine//Povidone Iodine/Cleansed with saline/Irrigated with saline/Wound cleanser/Soap and water/Vashe/Not cleansed/Other (Comment))     Standing Status:   Standing Number of Occurrences:   1      3. Follow up: 2 weeks. 4. Detailed home instructions and education material given to patient prior to discharge. Electronically signed by Jesu Rosa DPM on 12/15/2020 at 10:51 AM       I performed a history and physical examination of the patient and discussed management with the resident. I reviewed the residents note and agree with the documented findings and plan of care. Any areas of disagreement are noted on the chart. I was personally present for the key portions of any procedures. I have documented in the chart those procedures where I was not present during the key portions. I have reviewed the Podiatry Resident progress note. I agree with the chief complaint, past medical history, past surgical history, allergies, medications, social and family history as documented unless otherwise noted below. Documentation of the HPI, Physical Exam and Medical Decision Making performed by medical students or scribes is based on my personal performance of the HPI, PE and MDM. I have personally evaluated this patient and have completed at least one if not all key elements of the E/M (history, physical exam, and MDM). Additional findings are as noted.      He Oliveira DPM on 12/15/2020 at 2:21 PM  Board Certified, American Board of Podiatric Surgery  Fellow, [facility] of Foot and [employer]

## 2020-12-29 PROBLEM — I73.9 PAD (PERIPHERAL ARTERY DISEASE) (HCC): Status: ACTIVE | Noted: 2020-01-01

## 2020-12-29 NOTE — PROGRESS NOTES
 Arthritis     knees hips hands shoulders and back    CAD (coronary artery disease) 2019    blockage on cath 9/2019, to have stenting after surgery (arthroplasty) Dr. Rene Valdivia Chronic back pain     GERD (gastroesophageal reflux disease)     on rx    Hyperlipidemia     per Dr. Brenda Reynoso on rx    Hypertension     Milton Duran manages    Incomplete RBBB     Irregular heart beat     LAFB (left anterior fascicular block)     Dr. Brenda Reynoso, cardiologist, seen 9/2019 prior to surgery on 10/8/2019    Leg wound, left     following with wound care, Dr. Stephanie Maki Lumbar disc disease     Osteoporosis     PAC (premature atrial contraction) 06/2018    PACs and PVCs on holter monitor,     PONV (postoperative nausea and vomiting)     requests scop patch    Wears dentures     upper partial    Wears eyeglasses     readers    Wears partial dentures     upper and lower    Wellness examination     MIKE Parker Monday, NP PCP, seen 2/10/2020       PAST SURGICAL HISTORY    Past Surgical History:   Procedure Laterality Date    ABDOMINOPLASTY  1997    BLEPHAROPLASTY Bilateral 1997    BREAST ENHANCEMENT SURGERY Bilateral 1999    breast augmentation    CARDIAC CATHETERIZATION  2019    Dr. Brenda Reynoso, blockage but no stents yet    CATARACT REMOVAL WITH IMPLANT Bilateral 2016    COLONOSCOPY  2015    No Polyps    COSMETIC SURGERY      eyelid lift    FINGER SURGERY Right     thumb lesion excised    FIXATION KYPHOPLASTY  2013    Back    FOOT DEBRIDEMENT Left 10/11/2019    SURGICAL PREP WOUND BED LEFT FOOT WITH APPLICATION OF EPIFIX LEFT FOOT 3X4 performed by Valery Muñoz DPM at [facility] 58      kyphoplasty for lumbar fx 2013 Dr. Eddy Flranjan ARTHROSCOPY Left 2006   [facility] Left 1/2/2020    FOOT  DEBRIDEMENT WITH WOUND VAC PLACEMENT performed by Yana Lopes MD at [place] [address]  2014   [facility] 892  2005 diskectomy and spinal fusion    OTHER SURGICAL HISTORY  10/03/2019    Left leg angiogram    [place] OFFICE/OUTPT VISIT,PROCEDURE ONLY Right 2018    EXCISION MASS THUMB, 3080 TABLE performed by Hardy Mansfield DO at [facility] Left 12/3/2019    LEFT FOOT DEBRIDEMENT WITH SKIN GRAFT SPLIT THICKNESS; SKIN GRAFT TAKEN FROM RIGHT ANTERIOR THIGH performed by Satinder Blank MD at [facility] Left 2020    DEBRIDEMENT, SPLIT THICKNESS SKIN GRAFT WITH WOUND VAC PLACEMENT FOOT performed by Virgilio Monique MD at [facility] Left 2020    DEBRIDEMENT, SKIN GRAFT SPLIT THICKNESS FOOT  ([facility], [address] [facility]) performed by Virgilio Monique MD at [address] [place] [address]      as a child    TOTAL KNEE ARTHROPLASTY Right 10/13/2020    KNEE TOTAL ARTHROPLASTY    TOTAL KNEE ARTHROPLASTY Right 10/13/2020    KNEE TOTAL ARTHROPLASTY- MICROPORT, \ ADVANCED performed by Hardy Mansfield DO at [place] [address] History   Problem Relation Age of Onset    Coronary Art Dis Mother     Arthritis Mother     Heart Surgery Mother         CABG    Coronary Art Dis Father     Heart Disease Father     Heart Surgery Father         CABG    Coronary Art Dis Sister     Heart Surgery Sister         CABG       SOCIAL HISTORY    Social History     Tobacco Use    Smoking status: Former Smoker     Packs/day: 0.25     Years: 50.00     Pack years: 12.50     Types: Cigarettes     Start date:      Quit date: 2020     Years since quittin.9    Smokeless tobacco: Never Used   Substance Use Topics    Alcohol use: Yes     Frequency: 2-4 times a month     Comment: 2-3 shot liquor for weekends    Drug use: Never       ALLERGIES    No Known Allergies    MEDICATIONS    Current Outpatient Medications on File Prior to Encounter   Medication Sig Dispense Refill    Metoprolol Tartrate 37.5 MG TABS take 1 tablet by mouth twice a day 60 tablet 5    Multiple Vitamins-Minerals (THERAPEUTIC MULTIVITAMIN-MINERALS) tablet Take 1 tablet by mouth daily      ciprofloxacin (CIPRO) 500 MG tablet Take 1 tablet by mouth 2 times daily 28 tablet 0    lidocaine 4 % GEL jelly Apply 1 Dose topically as needed for Pain 90 mL 3    oxyCODONE-acetaminophen (PERCOCET) 5-325 MG per tablet take 1 tablet by mouth four times a day if needed      traMADol (ULTRAM ER) 200 MG extended release tablet take 1 tablet by mouth once daily      aspirin EC 81 MG EC tablet Take 1 tablet by mouth 2 times daily 84 tablet 0    docusate sodium (COLACE) 100 MG capsule Take 1 capsule by mouth 2 times daily as needed for Constipation 60 capsule 0    folic acid (FOLVITE) 1 MG tablet take 1 tablet by mouth once daily (Patient taking differently: take 1 tablet by mouth once daily Steve Rose) 90 tablet 1    vitamin C (ASCORBIC ACID) 500 MG tablet Take 500 mg by mouth 2 times daily      diclofenac sodium (VOLTAREN) 1 % GEL Apply topically as needed Dr. Thelma Up by Does not apply route 1 each 0    meclizine (ANTIVERT) 25 MG tablet Take 25 mg by mouth 3 times daily as needed Per Vamshi Locke CNP      atorvastatin (LIPITOR) 40 MG tablet Take 1 tablet by mouth nightly (Patient taking differently: Take 40 mg by mouth every morning Per Dr. Siri Wong) 30 tablet 3    NAPROXEN PO Take 250 mg by mouth daily as needed Indications: patient takes 1-2 times a day Stopping 10 days prior to surgery as instructed by surgeon      omeprazole (PRILOSEC) 40 MG delayed release capsule Take 1 tablet by mouth daily Per Vamshi Locke      Cholecalciferol (VITAMIN D3) 5000 units TABS Take 5,000 Units by mouth daily       calcium carbonate (OSCAL) 500 MG TABS tablet Take 1,000 mg by mouth daily Per Euchris Gresham rheumatology      alendronate (FOSAMAX) 70 MG tablet Take 70 mg by mouth every 7 days Indications: Mondays Dr. Alfonso Cardenas      gabapentin (NEURONTIN) 300 MG capsule Take 300 mg by mouth 2 times daily. Per Dr. Philipp Heck       No current facility-administered medications on file prior to encounter. REVIEW OF SYSTEMS    Review of Systems   Constitutional: Negative for chills and fever. Cardiovascular: Negative for leg swelling. Negative for intermittent claudication   Gastrointestinal: Negative for diarrhea, nausea and vomiting. Skin: Positive for wound. Dorsal left foot   Neurological: Negative for weakness and numbness. Objective:      /75   Pulse 67   Temp 98.4 °F (36.9 °C) (Tympanic)   Resp 18   Ht 5' 6\" (1.676 m)   Wt 170 lb (77.1 kg)   BMI 27.44 kg/m²     Wt Readings from Last 3 Encounters:   12/29/20 170 lb (77.1 kg)   12/15/20 170 lb (77.1 kg)   12/08/20 170 lb (77.1 kg)       Physical Exam:  General:  Alert and oriented x3. In no acute distress. Lower Extremity Physical Exam:    Vascular: DP pulses are not palpable, Bilateral. PT pulses are not palpable, Bilateral. CFT <4 seconds to all digits, Bilateral.  No edema, Bilateral.  Hair growth is absent to the level of the digits, Bilateral.  Skin temp is warm to cool from the tibial tuberosity to the digits, Bilateral.     Neuro: Saph/sural/SP/DP/plantar sensation intact to light touch. Musculoskeletal: EHL/FHL/GS/TA gross motor intact. Gross deformity is absent. Dermatologic: Open wound present to dorsal left foot wound as documented in detail below. Wound base is fibro-necrotic extending to the level of muscle with exposed extensor tendons. Negative probe to bone. There is no erythema. There is no purulent drainage. There is no fluctuance or crepitus. Interdigital maceration absent, Bilateral.     MRI Left Foot W WO   Impression   1. Large dorsal ulcer in the midfoot centered at the metatarsals.  Suspected   involvement of the extensor digitorum tendons of the 2nd, 3rd and 4th digits   at the level of the ulceration. 2. No tenosynovitis is otherwise evident.  No soft tissue abscess.    3. No changes of acute osteomyelitis. Surgical Pathology  Final Diagnosis   SPECIMEN \"A\":  SKIN, LEFT FOOT, BIOPSY:        EPIDERMAL ULCERATION WITH ASSOCIATED ACUTE INFLAMMATION   NEGATIVE FOR MALIGNANCY   SPECIMEN \"B\":  SKIN, LEFT FOOT, 12 O'CLOCK, BIOPSY:        EPIDERMAL ULCERATION WITH EXTENSIVE NECROSIS WITH ASSOCIATED   ACUTE INFLAMMATION   SCANT FRAGMENT OF DETACHED SQUAMOUS EPITHELIUM WITH ATYPIA (SEE   COMMENT)   SPECIMEN \"C\":  SKIN, LEFT FOOT, 6 O'CLOCK, BIOPSY:        ULCERATED SKIN WITH MARKED ACUTE INFLAMMATION   NEGATIVE FOR MALIGNANCY   Diagnosis Comment   In Part B, a scant fragment of detached squamous epithelium with   cytologic atypia is noted.  This is of uncertain clinical   significance.  Please correlate with appropriate clinical findings. Within Parts A, B and C, an invasive malignancy is not identified.      Left Lower Extremity Angiogram 10/03/2019  Small vessel disease, no flow limiting stenosis in left lower extremity. Assessment:      Active Hospital Problems    Diagnosis Date Noted    PAD (peripheral artery disease) [facility] [address] [I73.9] 12/29/2020    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 ([facility] [address] [L97.523]      Suspect significant microvascular disease as the cause of delayed healing  Rheumatologic disease not ruled out    Plan:     Treatment Note please see attached Discharge Instructions    Maintain appointment with [facility] plastic surgery this coming Thursday. Previous MRI reviewed, negative for abscess or osteomyelitis    Previous vascular testing and notes reviewed. Would consider TCPO2 to aid in determining healing potential.    Patient will require debridement prior to further wound healing attempts; however, patient has appointment with plastic surgeon this Thursday. Will await this referral prior to any further intervention. Educated on signs and symptoms of infection.  Instructed to call clinic immediately or go to ER if signs and symptoms of infection and/or responsible adult, by my health care provider(s). I had the opportunity to ask questions regarding this information. I have elected to receive;      []? ? ? After Visit Summary  [x]? ?? Comprehensive Discharge Instruction        Patient signature______________________________________Date:________  Electronically signed by Gustabo Smiley DPM on 12/29/2020 at 10:11 AM  Electronically signed by Gianna Zurita RN on 12/29/2020 at 11:01 AM          Electronically signed by Gustabo Smiley DPM on 12/29/2020 at 12:05 PM

## 2021-01-01 ENCOUNTER — CARE COORDINATION (OUTPATIENT)
Dept: CASE MANAGEMENT | Age: 66
End: 2021-01-01

## 2021-01-01 ENCOUNTER — TELEPHONE (OUTPATIENT)
Dept: WOUND CARE | Age: 66
End: 2021-01-01

## 2021-01-01 ENCOUNTER — HOSPITAL ENCOUNTER (OUTPATIENT)
Dept: WOUND CARE | Age: 66
Discharge: HOME | End: 2021-10-19
Payer: MEDICARE

## 2021-01-01 ENCOUNTER — HOSPITAL ENCOUNTER (OUTPATIENT)
Dept: WOUND CARE | Age: 66
Discharge: HOME | End: 2021-02-19
Payer: MEDICARE

## 2021-01-01 ENCOUNTER — HOSPITAL ENCOUNTER (INPATIENT)
Age: 66
LOS: 3 days | Discharge: HOME | DRG: 593 | End: 2021-06-04
Attending: PODIATRIST | Admitting: PODIATRIST
Payer: MEDICARE

## 2021-01-01 ENCOUNTER — TELEPHONE (OUTPATIENT)
Dept: DERMATOLOGY | Age: 66
End: 2021-01-01

## 2021-01-01 ENCOUNTER — TELEPHONE (OUTPATIENT)
Dept: ONCOLOGY | Age: 66
End: 2021-01-01

## 2021-01-01 ENCOUNTER — HOSPITAL ENCOUNTER (OUTPATIENT)
Dept: WOUND CARE | Age: 66
Discharge: HOME | End: 2021-04-06
Payer: MEDICARE

## 2021-01-01 ENCOUNTER — OFFICE VISIT (OUTPATIENT)
Dept: ONCOLOGY | Age: 66
End: 2021-01-01
Payer: MEDICARE

## 2021-01-01 ENCOUNTER — APPOINTMENT (OUTPATIENT)
Dept: INTERVENTIONAL RADIOLOGY/VASCULAR | Age: 66
DRG: 593 | End: 2021-01-01
Attending: PODIATRIST
Payer: MEDICARE

## 2021-01-01 ENCOUNTER — HOSPITAL ENCOUNTER (OUTPATIENT)
Age: 66
Setting detail: SPECIMEN
Discharge: HOME | End: 2021-07-02
Payer: MEDICARE

## 2021-01-01 ENCOUNTER — HOSPITAL ENCOUNTER (OUTPATIENT)
Dept: VASCULAR LAB | Age: 66
Discharge: HOME | End: 2021-01-15
Payer: MEDICARE

## 2021-01-01 ENCOUNTER — APPOINTMENT (OUTPATIENT)
Dept: GENERAL RADIOLOGY | Age: 66
DRG: 177 | End: 2021-01-01
Payer: MEDICARE

## 2021-01-01 ENCOUNTER — NURSE TRIAGE (OUTPATIENT)
Dept: OTHER | Facility: CLINIC | Age: 66
End: 2021-01-01

## 2021-01-01 ENCOUNTER — HOSPITAL ENCOUNTER (OUTPATIENT)
Dept: WOUND CARE | Age: 66
Discharge: HOME | End: 2021-10-05
Payer: MEDICARE

## 2021-01-01 ENCOUNTER — HOSPITAL ENCOUNTER (OUTPATIENT)
Age: 66
Setting detail: SPECIMEN
Discharge: HOME | End: 2021-05-27
Payer: MEDICARE

## 2021-01-01 ENCOUNTER — HOSPITAL ENCOUNTER (OUTPATIENT)
Facility: CLINIC | Age: 66
Discharge: HOME | End: 2021-10-24
Payer: MEDICARE

## 2021-01-01 ENCOUNTER — HOSPITAL ENCOUNTER (OUTPATIENT)
Dept: GENERAL RADIOLOGY | Facility: CLINIC | Age: 66
Discharge: HOME | End: 2021-10-24
Payer: MEDICARE

## 2021-01-01 ENCOUNTER — HOSPITAL ENCOUNTER (OUTPATIENT)
Age: 66
Setting detail: SPECIMEN
Discharge: HOME | End: 2021-07-09
Payer: MEDICARE

## 2021-01-01 ENCOUNTER — TELEPHONE (OUTPATIENT)
Dept: FAMILY MEDICINE CLINIC | Age: 66
End: 2021-01-01

## 2021-01-01 ENCOUNTER — OFFICE VISIT (OUTPATIENT)
Dept: ORTHOPEDIC SURGERY | Age: 66
End: 2021-01-01

## 2021-01-01 ENCOUNTER — HOSPITAL ENCOUNTER (OUTPATIENT)
Dept: WOUND CARE | Age: 66
Discharge: HOME | End: 2021-05-10
Payer: MEDICARE

## 2021-01-01 ENCOUNTER — HOSPITAL ENCOUNTER (OUTPATIENT)
Dept: WOUND CARE | Age: 66
Discharge: HOME | End: 2021-08-17
Payer: MEDICARE

## 2021-01-01 ENCOUNTER — HOSPITAL ENCOUNTER (OUTPATIENT)
Dept: WOUND CARE | Age: 66
Discharge: HOME | End: 2021-11-23
Payer: MEDICARE

## 2021-01-01 ENCOUNTER — HOSPITAL ENCOUNTER (OUTPATIENT)
Dept: WOUND CARE | Age: 66
Discharge: HOME | End: 2021-03-02
Payer: MEDICARE

## 2021-01-01 ENCOUNTER — OFFICE VISIT (OUTPATIENT)
Dept: DERMATOLOGY | Age: 66
End: 2021-01-01
Payer: MEDICARE

## 2021-01-01 ENCOUNTER — HOSPITAL ENCOUNTER (OUTPATIENT)
Age: 66
Setting detail: SPECIMEN
Discharge: HOME | End: 2021-08-10
Payer: MEDICARE

## 2021-01-01 ENCOUNTER — HOSPITAL ENCOUNTER (OUTPATIENT)
Age: 66
Setting detail: SPECIMEN
Discharge: HOME | End: 2021-07-29
Payer: MEDICARE

## 2021-01-01 ENCOUNTER — HOSPITAL ENCOUNTER (OUTPATIENT)
Age: 66
Setting detail: SPECIMEN
Discharge: HOME | End: 2021-06-22
Payer: MEDICARE

## 2021-01-01 ENCOUNTER — HOSPITAL ENCOUNTER (OUTPATIENT)
Dept: WOUND CARE | Age: 66
Discharge: HOME | End: 2021-01-12
Payer: MEDICARE

## 2021-01-01 ENCOUNTER — HOSPITAL ENCOUNTER (INPATIENT)
Age: 66
LOS: 6 days | DRG: 177 | End: 2021-12-16
Attending: EMERGENCY MEDICINE | Admitting: INTERNAL MEDICINE
Payer: MEDICARE

## 2021-01-01 ENCOUNTER — OFFICE VISIT (OUTPATIENT)
Dept: FAMILY MEDICINE CLINIC | Age: 66
End: 2021-01-01
Payer: MEDICARE

## 2021-01-01 ENCOUNTER — HOSPITAL ENCOUNTER (OUTPATIENT)
Dept: WOUND CARE | Age: 66
Discharge: HOME | End: 2021-01-26
Payer: MEDICARE

## 2021-01-01 ENCOUNTER — HOSPITAL ENCOUNTER (OUTPATIENT)
Dept: WOUND CARE | Age: 66
Discharge: HOME | End: 2021-02-26
Payer: MEDICARE

## 2021-01-01 ENCOUNTER — HOSPITAL ENCOUNTER (OUTPATIENT)
Dept: WOUND CARE | Age: 66
Discharge: HOME | End: 2021-03-30
Payer: MEDICARE

## 2021-01-01 ENCOUNTER — HOSPITAL ENCOUNTER (OUTPATIENT)
Dept: WOUND CARE | Age: 66
Discharge: HOME | End: 2021-07-06
Payer: MEDICARE

## 2021-01-01 ENCOUNTER — VIRTUAL VISIT (OUTPATIENT)
Dept: DERMATOLOGY | Age: 66
End: 2021-01-01

## 2021-01-01 ENCOUNTER — HOSPITAL ENCOUNTER (OUTPATIENT)
Dept: WOUND CARE | Age: 66
Discharge: HOME | End: 2021-08-03
Payer: MEDICARE

## 2021-01-01 ENCOUNTER — HOSPITAL ENCOUNTER (OUTPATIENT)
Dept: WOUND CARE | Age: 66
Discharge: HOME | End: 2021-07-16
Payer: MEDICARE

## 2021-01-01 ENCOUNTER — HOSPITAL ENCOUNTER (OUTPATIENT)
Facility: MEDICAL CENTER | Age: 66
End: 2021-01-01
Payer: MEDICARE

## 2021-01-01 ENCOUNTER — HOSPITAL ENCOUNTER (OUTPATIENT)
Dept: WOUND CARE | Age: 66
Discharge: HOME | DRG: 593 | End: 2021-06-01
Payer: MEDICARE

## 2021-01-01 ENCOUNTER — HOSPITAL ENCOUNTER (OUTPATIENT)
Dept: WOUND CARE | Age: 66
Discharge: HOME | End: 2021-01-19
Payer: MEDICARE

## 2021-01-01 ENCOUNTER — HOSPITAL ENCOUNTER (OUTPATIENT)
Dept: WOUND CARE | Age: 66
Discharge: HOME | End: 2021-06-14
Payer: MEDICARE

## 2021-01-01 ENCOUNTER — HOSPITAL ENCOUNTER (OUTPATIENT)
Dept: WOUND CARE | Age: 66
Discharge: HOME | End: 2021-04-26
Payer: MEDICARE

## 2021-01-01 ENCOUNTER — HOSPITAL ENCOUNTER (OUTPATIENT)
Dept: WOUND CARE | Age: 66
Discharge: HOME | End: 2021-03-16
Payer: MEDICARE

## 2021-01-01 ENCOUNTER — HOSPITAL ENCOUNTER (OUTPATIENT)
Age: 66
Setting detail: SPECIMEN
Discharge: HOME | End: 2021-06-25
Payer: MEDICARE

## 2021-01-01 ENCOUNTER — HOSPITAL ENCOUNTER (OUTPATIENT)
Dept: WOUND CARE | Age: 66
Discharge: HOME | End: 2021-03-08
Payer: MEDICARE

## 2021-01-01 ENCOUNTER — HOSPITAL ENCOUNTER (OUTPATIENT)
Dept: WOUND CARE | Age: 66
Discharge: HOME | End: 2021-02-22

## 2021-01-01 ENCOUNTER — HOSPITAL ENCOUNTER (OUTPATIENT)
Age: 66
Setting detail: SPECIMEN
Discharge: HOME | End: 2021-09-14
Payer: MEDICARE

## 2021-01-01 ENCOUNTER — HOSPITAL ENCOUNTER (OUTPATIENT)
Dept: WOUND CARE | Age: 66
Discharge: HOME | End: 2021-06-29
Payer: MEDICARE

## 2021-01-01 ENCOUNTER — HOSPITAL ENCOUNTER (OUTPATIENT)
Dept: WOUND CARE | Age: 66
Discharge: HOME | End: 2021-03-25
Payer: MEDICARE

## 2021-01-01 ENCOUNTER — HOSPITAL ENCOUNTER (OUTPATIENT)
Dept: WOUND CARE | Age: 66
Discharge: HOME | End: 2021-02-09
Payer: MEDICARE

## 2021-01-01 ENCOUNTER — HOSPITAL ENCOUNTER (OUTPATIENT)
Dept: WOUND CARE | Age: 66
Discharge: HOME | End: 2021-06-21
Payer: MEDICARE

## 2021-01-01 ENCOUNTER — HOSPITAL ENCOUNTER (OUTPATIENT)
Age: 66
Setting detail: SPECIMEN
Discharge: HOME | End: 2021-06-15
Payer: MEDICARE

## 2021-01-01 ENCOUNTER — HOSPITAL ENCOUNTER (OUTPATIENT)
Dept: MRI IMAGING | Age: 66
Discharge: HOME | End: 2021-09-05
Payer: MEDICARE

## 2021-01-01 ENCOUNTER — HOSPITAL ENCOUNTER (OUTPATIENT)
Age: 66
Setting detail: SPECIMEN
Discharge: HOME | End: 2021-07-13
Payer: MEDICARE

## 2021-01-01 ENCOUNTER — HOSPITAL ENCOUNTER (OUTPATIENT)
Age: 66
Setting detail: SPECIMEN
Discharge: HOME | End: 2021-07-27
Payer: MEDICARE

## 2021-01-01 ENCOUNTER — HOSPITAL ENCOUNTER (OUTPATIENT)
Dept: WOUND CARE | Age: 66
Discharge: HOME | End: 2021-05-04
Payer: MEDICARE

## 2021-01-01 ENCOUNTER — HOSPITAL ENCOUNTER (OUTPATIENT)
Facility: MEDICAL CENTER | Age: 66
End: 2021-01-01

## 2021-01-01 ENCOUNTER — HOSPITAL ENCOUNTER (OUTPATIENT)
Dept: WOUND CARE | Age: 66
Discharge: HOME | End: 2021-04-20
Payer: MEDICARE

## 2021-01-01 ENCOUNTER — HOSPITAL ENCOUNTER (OUTPATIENT)
Dept: WOUND CARE | Age: 66
Discharge: HOME | End: 2021-04-13
Payer: MEDICARE

## 2021-01-01 ENCOUNTER — HOSPITAL ENCOUNTER (OUTPATIENT)
Age: 66
Setting detail: SPECIMEN
Discharge: HOME | End: 2021-04-28
Payer: MEDICARE

## 2021-01-01 ENCOUNTER — HOSPITAL ENCOUNTER (OUTPATIENT)
Dept: WOUND CARE | Age: 66
Discharge: HOME | End: 2021-09-21
Payer: MEDICARE

## 2021-01-01 ENCOUNTER — HOSPITAL ENCOUNTER (OUTPATIENT)
Dept: WOUND CARE | Age: 66
Discharge: HOME | End: 2021-02-02
Payer: MEDICARE

## 2021-01-01 ENCOUNTER — HOSPITAL ENCOUNTER (OUTPATIENT)
Age: 66
Setting detail: SPECIMEN
Discharge: HOME | End: 2021-11-29

## 2021-01-01 ENCOUNTER — HOSPITAL ENCOUNTER (OUTPATIENT)
Dept: WOUND CARE | Age: 66
Discharge: HOME | End: 2021-02-16

## 2021-01-01 ENCOUNTER — APPOINTMENT (OUTPATIENT)
Dept: CT IMAGING | Age: 66
DRG: 177 | End: 2021-01-01
Payer: MEDICARE

## 2021-01-01 ENCOUNTER — HOSPITAL ENCOUNTER (OUTPATIENT)
Age: 66
Setting detail: SPECIMEN
Discharge: HOME | End: 2021-08-18
Payer: MEDICARE

## 2021-01-01 ENCOUNTER — HOSPITAL ENCOUNTER (OUTPATIENT)
Age: 66
Setting detail: SPECIMEN
Discharge: HOME | End: 2021-06-08
Payer: MEDICARE

## 2021-01-01 ENCOUNTER — HOSPITAL ENCOUNTER (OUTPATIENT)
Age: 66
Setting detail: SPECIMEN
Discharge: HOME | End: 2021-01-21
Payer: MEDICARE

## 2021-01-01 ENCOUNTER — HOSPITAL ENCOUNTER (OUTPATIENT)
Age: 66
Setting detail: SPECIMEN
Discharge: HOME | End: 2021-04-29
Payer: MEDICARE

## 2021-01-01 ENCOUNTER — INITIAL CONSULT (OUTPATIENT)
Dept: ONCOLOGY | Age: 66
End: 2021-01-01
Payer: MEDICARE

## 2021-01-01 ENCOUNTER — HOSPITAL ENCOUNTER (OUTPATIENT)
Dept: WOUND CARE | Age: 66
Discharge: HOME | End: 2021-09-01
Payer: MEDICARE

## 2021-01-01 VITALS
HEART RATE: 62 BPM | TEMPERATURE: 97.9 F | DIASTOLIC BLOOD PRESSURE: 84 MMHG | BODY MASS INDEX: 27.64 KG/M2 | TEMPERATURE: 98.5 F | RESPIRATION RATE: 16 BRPM | HEIGHT: 66 IN | SYSTOLIC BLOOD PRESSURE: 152 MMHG | HEART RATE: 67 BPM | WEIGHT: 170 LBS | BODY MASS INDEX: 27.32 KG/M2 | SYSTOLIC BLOOD PRESSURE: 179 MMHG | DIASTOLIC BLOOD PRESSURE: 85 MMHG | HEIGHT: 66 IN | WEIGHT: 172 LBS | RESPIRATION RATE: 16 BRPM

## 2021-01-01 VITALS
HEART RATE: 59 BPM | RESPIRATION RATE: 18 BRPM | SYSTOLIC BLOOD PRESSURE: 188 MMHG | DIASTOLIC BLOOD PRESSURE: 83 MMHG | TEMPERATURE: 98 F

## 2021-01-01 VITALS
SYSTOLIC BLOOD PRESSURE: 138 MMHG | BODY MASS INDEX: 26.52 KG/M2 | TEMPERATURE: 97.2 F | WEIGHT: 165 LBS | DIASTOLIC BLOOD PRESSURE: 67 MMHG | OXYGEN SATURATION: 98 % | HEART RATE: 61 BPM | HEIGHT: 66 IN

## 2021-01-01 VITALS
WEIGHT: 170 LBS | TEMPERATURE: 97.5 F | SYSTOLIC BLOOD PRESSURE: 175 MMHG | HEART RATE: 70 BPM | BODY MASS INDEX: 27.32 KG/M2 | RESPIRATION RATE: 18 BRPM | DIASTOLIC BLOOD PRESSURE: 84 MMHG | HEIGHT: 66 IN

## 2021-01-01 VITALS
DIASTOLIC BLOOD PRESSURE: 80 MMHG | SYSTOLIC BLOOD PRESSURE: 175 MMHG | HEART RATE: 72 BPM | BODY MASS INDEX: 30.86 KG/M2 | RESPIRATION RATE: 18 BRPM | TEMPERATURE: 98.1 F | WEIGHT: 192 LBS | HEIGHT: 66 IN

## 2021-01-01 VITALS
WEIGHT: 174 LBS | HEIGHT: 66 IN | TEMPERATURE: 98 F | HEART RATE: 69 BPM | SYSTOLIC BLOOD PRESSURE: 136 MMHG | DIASTOLIC BLOOD PRESSURE: 73 MMHG | RESPIRATION RATE: 18 BRPM | BODY MASS INDEX: 27.97 KG/M2

## 2021-01-01 VITALS
TEMPERATURE: 98.1 F | WEIGHT: 192.24 LBS | DIASTOLIC BLOOD PRESSURE: 73 MMHG | HEIGHT: 66 IN | SYSTOLIC BLOOD PRESSURE: 133 MMHG | BODY MASS INDEX: 30.9 KG/M2 | OXYGEN SATURATION: 100 % | HEART RATE: 63 BPM | RESPIRATION RATE: 16 BRPM

## 2021-01-01 VITALS
BODY MASS INDEX: 27.32 KG/M2 | SYSTOLIC BLOOD PRESSURE: 130 MMHG | HEIGHT: 66 IN | TEMPERATURE: 98.6 F | RESPIRATION RATE: 18 BRPM | WEIGHT: 170 LBS | HEART RATE: 65 BPM | DIASTOLIC BLOOD PRESSURE: 76 MMHG

## 2021-01-01 VITALS
DIASTOLIC BLOOD PRESSURE: 75 MMHG | TEMPERATURE: 98.8 F | SYSTOLIC BLOOD PRESSURE: 152 MMHG | HEART RATE: 74 BPM | RESPIRATION RATE: 18 BRPM

## 2021-01-01 VITALS
TEMPERATURE: 98.2 F | WEIGHT: 170 LBS | DIASTOLIC BLOOD PRESSURE: 87 MMHG | BODY MASS INDEX: 27.32 KG/M2 | OXYGEN SATURATION: 98 % | HEIGHT: 66 IN | SYSTOLIC BLOOD PRESSURE: 138 MMHG

## 2021-01-01 VITALS
BODY MASS INDEX: 27.97 KG/M2 | HEIGHT: 66 IN | DIASTOLIC BLOOD PRESSURE: 88 MMHG | SYSTOLIC BLOOD PRESSURE: 159 MMHG | RESPIRATION RATE: 18 BRPM | TEMPERATURE: 97.1 F | WEIGHT: 174 LBS | HEART RATE: 70 BPM

## 2021-01-01 VITALS
TEMPERATURE: 97.8 F | BODY MASS INDEX: 27.97 KG/M2 | DIASTOLIC BLOOD PRESSURE: 74 MMHG | RESPIRATION RATE: 18 BRPM | HEART RATE: 69 BPM | WEIGHT: 174 LBS | HEIGHT: 66 IN | SYSTOLIC BLOOD PRESSURE: 123 MMHG

## 2021-01-01 VITALS
SYSTOLIC BLOOD PRESSURE: 176 MMHG | TEMPERATURE: 97.3 F | DIASTOLIC BLOOD PRESSURE: 92 MMHG | HEART RATE: 73 BPM | OXYGEN SATURATION: 96 % | BODY MASS INDEX: 27.32 KG/M2 | WEIGHT: 170 LBS | HEIGHT: 66 IN

## 2021-01-01 VITALS — WEIGHT: 165 LBS | BODY MASS INDEX: 26.52 KG/M2 | HEIGHT: 66 IN

## 2021-01-01 VITALS
DIASTOLIC BLOOD PRESSURE: 60 MMHG | OXYGEN SATURATION: 99 % | SYSTOLIC BLOOD PRESSURE: 130 MMHG | BODY MASS INDEX: 26.63 KG/M2 | HEART RATE: 67 BPM | WEIGHT: 165 LBS

## 2021-01-01 VITALS
TEMPERATURE: 97.4 F | HEART RATE: 68 BPM | SYSTOLIC BLOOD PRESSURE: 132 MMHG | OXYGEN SATURATION: 98 % | WEIGHT: 174 LBS | BODY MASS INDEX: 28.08 KG/M2 | DIASTOLIC BLOOD PRESSURE: 80 MMHG

## 2021-01-01 VITALS
SYSTOLIC BLOOD PRESSURE: 133 MMHG | DIASTOLIC BLOOD PRESSURE: 79 MMHG | HEART RATE: 82 BPM | OXYGEN SATURATION: 94 % | TEMPERATURE: 97.1 F

## 2021-01-01 VITALS
HEIGHT: 66 IN | WEIGHT: 172 LBS | TEMPERATURE: 97.9 F | DIASTOLIC BLOOD PRESSURE: 85 MMHG | SYSTOLIC BLOOD PRESSURE: 154 MMHG | SYSTOLIC BLOOD PRESSURE: 147 MMHG | WEIGHT: 169 LBS | BODY MASS INDEX: 27.97 KG/M2 | BODY MASS INDEX: 27.16 KG/M2 | WEIGHT: 174 LBS | HEART RATE: 60 BPM | TEMPERATURE: 97.8 F | RESPIRATION RATE: 18 BRPM | HEART RATE: 64 BPM | TEMPERATURE: 97.6 F | HEIGHT: 66 IN | DIASTOLIC BLOOD PRESSURE: 80 MMHG | SYSTOLIC BLOOD PRESSURE: 187 MMHG | RESPIRATION RATE: 18 BRPM | DIASTOLIC BLOOD PRESSURE: 75 MMHG | HEART RATE: 71 BPM | BODY MASS INDEX: 27.64 KG/M2 | HEIGHT: 66 IN | RESPIRATION RATE: 18 BRPM

## 2021-01-01 VITALS
WEIGHT: 165 LBS | DIASTOLIC BLOOD PRESSURE: 84 MMHG | SYSTOLIC BLOOD PRESSURE: 184 MMHG | TEMPERATURE: 98.7 F | BODY MASS INDEX: 26.52 KG/M2 | RESPIRATION RATE: 18 BRPM | HEART RATE: 62 BPM | HEIGHT: 66 IN

## 2021-01-01 VITALS
DIASTOLIC BLOOD PRESSURE: 70 MMHG | HEART RATE: 67 BPM | SYSTOLIC BLOOD PRESSURE: 134 MMHG | BODY MASS INDEX: 27.12 KG/M2 | OXYGEN SATURATION: 99 % | WEIGHT: 168 LBS

## 2021-01-01 VITALS
WEIGHT: 169.3 LBS | HEART RATE: 62 BPM | HEIGHT: 66 IN | TEMPERATURE: 96.7 F | BODY MASS INDEX: 27.21 KG/M2 | DIASTOLIC BLOOD PRESSURE: 81 MMHG | SYSTOLIC BLOOD PRESSURE: 151 MMHG

## 2021-01-01 VITALS
OXYGEN SATURATION: 94 % | HEIGHT: 66 IN | HEART RATE: 65 BPM | TEMPERATURE: 98.1 F | WEIGHT: 170 LBS | SYSTOLIC BLOOD PRESSURE: 170 MMHG | BODY MASS INDEX: 27.32 KG/M2 | DIASTOLIC BLOOD PRESSURE: 80 MMHG

## 2021-01-01 VITALS
TEMPERATURE: 96.9 F | WEIGHT: 169.6 LBS | DIASTOLIC BLOOD PRESSURE: 86 MMHG | SYSTOLIC BLOOD PRESSURE: 132 MMHG | RESPIRATION RATE: 16 BRPM | BODY MASS INDEX: 27.37 KG/M2 | HEART RATE: 80 BPM

## 2021-01-01 VITALS
DIASTOLIC BLOOD PRESSURE: 84 MMHG | OXYGEN SATURATION: 95 % | BODY MASS INDEX: 27.93 KG/M2 | SYSTOLIC BLOOD PRESSURE: 138 MMHG | HEART RATE: 69 BPM | HEIGHT: 66 IN | WEIGHT: 173.8 LBS | TEMPERATURE: 97.1 F

## 2021-01-01 VITALS
TEMPERATURE: 98.2 F | WEIGHT: 174 LBS | SYSTOLIC BLOOD PRESSURE: 138 MMHG | HEIGHT: 66 IN | DIASTOLIC BLOOD PRESSURE: 86 MMHG | BODY MASS INDEX: 27.97 KG/M2 | RESPIRATION RATE: 18 BRPM | HEART RATE: 63 BPM

## 2021-01-01 VITALS
SYSTOLIC BLOOD PRESSURE: 124 MMHG | WEIGHT: 174 LBS | HEIGHT: 66 IN | BODY MASS INDEX: 27.97 KG/M2 | RESPIRATION RATE: 16 BRPM | HEART RATE: 69 BPM | DIASTOLIC BLOOD PRESSURE: 71 MMHG | TEMPERATURE: 98.3 F

## 2021-01-01 VITALS
RESPIRATION RATE: 16 BRPM | HEIGHT: 66 IN | HEART RATE: 66 BPM | TEMPERATURE: 98.5 F | BODY MASS INDEX: 27.32 KG/M2 | SYSTOLIC BLOOD PRESSURE: 178 MMHG | WEIGHT: 170 LBS | DIASTOLIC BLOOD PRESSURE: 80 MMHG

## 2021-01-01 VITALS
HEIGHT: 66 IN | WEIGHT: 163.1 LBS | BODY MASS INDEX: 26.21 KG/M2 | SYSTOLIC BLOOD PRESSURE: 72 MMHG | HEART RATE: 90 BPM | TEMPERATURE: 97.3 F | OXYGEN SATURATION: 86 % | DIASTOLIC BLOOD PRESSURE: 39 MMHG | RESPIRATION RATE: 21 BRPM

## 2021-01-01 VITALS
RESPIRATION RATE: 18 BRPM | HEIGHT: 66 IN | HEART RATE: 67 BPM | BODY MASS INDEX: 27.97 KG/M2 | TEMPERATURE: 98.5 F | WEIGHT: 174 LBS | SYSTOLIC BLOOD PRESSURE: 153 MMHG | DIASTOLIC BLOOD PRESSURE: 91 MMHG

## 2021-01-01 VITALS
DIASTOLIC BLOOD PRESSURE: 83 MMHG | SYSTOLIC BLOOD PRESSURE: 152 MMHG | HEART RATE: 79 BPM | TEMPERATURE: 99.1 F | RESPIRATION RATE: 18 BRPM

## 2021-01-01 VITALS
RESPIRATION RATE: 18 BRPM | SYSTOLIC BLOOD PRESSURE: 157 MMHG | HEART RATE: 64 BPM | DIASTOLIC BLOOD PRESSURE: 80 MMHG | SYSTOLIC BLOOD PRESSURE: 153 MMHG | RESPIRATION RATE: 18 BRPM | HEART RATE: 76 BPM | HEIGHT: 66 IN | DIASTOLIC BLOOD PRESSURE: 82 MMHG | BODY MASS INDEX: 27.16 KG/M2 | TEMPERATURE: 97.8 F | TEMPERATURE: 97.6 F | WEIGHT: 169 LBS

## 2021-01-01 VITALS
HEIGHT: 66 IN | DIASTOLIC BLOOD PRESSURE: 88 MMHG | RESPIRATION RATE: 16 BRPM | TEMPERATURE: 97.3 F | BODY MASS INDEX: 27.32 KG/M2 | WEIGHT: 170 LBS | HEART RATE: 74 BPM | SYSTOLIC BLOOD PRESSURE: 181 MMHG

## 2021-01-01 VITALS
WEIGHT: 174 LBS | SYSTOLIC BLOOD PRESSURE: 164 MMHG | TEMPERATURE: 98.7 F | BODY MASS INDEX: 27.97 KG/M2 | HEART RATE: 73 BPM | HEIGHT: 66 IN | RESPIRATION RATE: 18 BRPM | DIASTOLIC BLOOD PRESSURE: 81 MMHG

## 2021-01-01 VITALS
DIASTOLIC BLOOD PRESSURE: 84 MMHG | TEMPERATURE: 98.9 F | HEART RATE: 77 BPM | BODY MASS INDEX: 27.44 KG/M2 | SYSTOLIC BLOOD PRESSURE: 166 MMHG | RESPIRATION RATE: 18 BRPM | WEIGHT: 170 LBS

## 2021-01-01 VITALS
TEMPERATURE: 97.2 F | DIASTOLIC BLOOD PRESSURE: 91 MMHG | SYSTOLIC BLOOD PRESSURE: 157 MMHG | HEART RATE: 68 BPM | RESPIRATION RATE: 18 BRPM

## 2021-01-01 VITALS
TEMPERATURE: 97.2 F | OXYGEN SATURATION: 98 % | BODY MASS INDEX: 27.64 KG/M2 | HEIGHT: 66 IN | WEIGHT: 172 LBS | HEART RATE: 103 BPM | SYSTOLIC BLOOD PRESSURE: 127 MMHG | DIASTOLIC BLOOD PRESSURE: 83 MMHG

## 2021-01-01 VITALS
DIASTOLIC BLOOD PRESSURE: 79 MMHG | TEMPERATURE: 97.6 F | RESPIRATION RATE: 18 BRPM | HEART RATE: 73 BPM | SYSTOLIC BLOOD PRESSURE: 161 MMHG

## 2021-01-01 VITALS
HEART RATE: 68 BPM | WEIGHT: 169 LBS | BODY MASS INDEX: 27.28 KG/M2 | RESPIRATION RATE: 18 BRPM | DIASTOLIC BLOOD PRESSURE: 89 MMHG | TEMPERATURE: 98.5 F | SYSTOLIC BLOOD PRESSURE: 179 MMHG

## 2021-01-01 VITALS — TEMPERATURE: 98.4 F | HEART RATE: 69 BPM | RESPIRATION RATE: 18 BRPM

## 2021-01-01 VITALS
BODY MASS INDEX: 27.44 KG/M2 | RESPIRATION RATE: 18 BRPM | DIASTOLIC BLOOD PRESSURE: 86 MMHG | TEMPERATURE: 97.2 F | HEART RATE: 78 BPM | SYSTOLIC BLOOD PRESSURE: 139 MMHG | WEIGHT: 170 LBS

## 2021-01-01 DIAGNOSIS — L97.523 CHRONIC ULCER OF LEFT FOOT WITH NECROSIS OF MUSCLE (HCC): Primary | ICD-10-CM

## 2021-01-01 DIAGNOSIS — Z79.899 HIGH RISK MEDICATION USE: ICD-10-CM

## 2021-01-01 DIAGNOSIS — M79.672 PAIN IN LEFT FOOT: ICD-10-CM

## 2021-01-01 DIAGNOSIS — Z01.89 ENCOUNTER FOR OTHER SPECIFIED SPECIAL EXAMINATIONS: ICD-10-CM

## 2021-01-01 DIAGNOSIS — L03.116 CELLULITIS OF LEFT FOOT: Primary | ICD-10-CM

## 2021-01-01 DIAGNOSIS — L88 PYODERMA GANGRENOSUM: ICD-10-CM

## 2021-01-01 DIAGNOSIS — L97.523 CHRONIC ULCER OF LEFT FOOT WITH NECROSIS OF MUSCLE (HCC): ICD-10-CM

## 2021-01-01 DIAGNOSIS — D48.5 NEOPLASM OF UNCERTAIN BEHAVIOR OF SKIN: ICD-10-CM

## 2021-01-01 DIAGNOSIS — D53.9 MACROCYTIC ANEMIA: ICD-10-CM

## 2021-01-01 DIAGNOSIS — Z96.651 S/P TOTAL KNEE ARTHROPLASTY, RIGHT: ICD-10-CM

## 2021-01-01 DIAGNOSIS — D64.9 ANEMIA, UNSPECIFIED TYPE: ICD-10-CM

## 2021-01-01 DIAGNOSIS — R73.03 PRE-DIABETES: Primary | ICD-10-CM

## 2021-01-01 DIAGNOSIS — L88 PYODERMA GANGRENOSUM: Primary | ICD-10-CM

## 2021-01-01 DIAGNOSIS — Z79.899 HIGH RISK MEDICATION USE: Primary | ICD-10-CM

## 2021-01-01 DIAGNOSIS — Z00.00 ROUTINE GENERAL MEDICAL EXAMINATION AT A HEALTH CARE FACILITY: Primary | ICD-10-CM

## 2021-01-01 DIAGNOSIS — C44.709: Primary | ICD-10-CM

## 2021-01-01 DIAGNOSIS — M54.6 THORACIC SPINE PAIN: ICD-10-CM

## 2021-01-01 DIAGNOSIS — D48.5 NEOPLASM OF UNCERTAIN BEHAVIOR OF SKIN: Primary | ICD-10-CM

## 2021-01-01 DIAGNOSIS — R09.02 HYPOXEMIA: ICD-10-CM

## 2021-01-01 DIAGNOSIS — U07.1 COVID-19: Primary | ICD-10-CM

## 2021-01-01 DIAGNOSIS — M17.11 PRIMARY OSTEOARTHRITIS OF RIGHT KNEE: Primary | ICD-10-CM

## 2021-01-01 DIAGNOSIS — C44.709: ICD-10-CM

## 2021-01-01 DIAGNOSIS — R00.2 INTERMITTENT PALPITATIONS: ICD-10-CM

## 2021-01-01 DIAGNOSIS — D53.9 MACROCYTIC ANEMIA: Primary | ICD-10-CM

## 2021-01-01 DIAGNOSIS — M25.532 LEFT WRIST PAIN: ICD-10-CM

## 2021-01-01 DIAGNOSIS — I10 ESSENTIAL HYPERTENSION: ICD-10-CM

## 2021-01-01 DIAGNOSIS — M25.532 LEFT WRIST PAIN: Primary | ICD-10-CM

## 2021-01-01 DIAGNOSIS — F10.10 ALCOHOL ABUSE: ICD-10-CM

## 2021-01-01 DIAGNOSIS — L97.523 ULCERATED, FOOT, LEFT, WITH NECROSIS OF MUSCLE (HCC): Primary | ICD-10-CM

## 2021-01-01 DIAGNOSIS — I73.9 PAD (PERIPHERAL ARTERY DISEASE) (HCC): ICD-10-CM

## 2021-01-01 DIAGNOSIS — L97.522 CHRONIC ULCER OF LEFT FOOT WITH FAT LAYER EXPOSED (HCC): ICD-10-CM

## 2021-01-01 LAB
-: NORMAL
ABSOLUTE EOS #: 0.06 K/UL (ref 0–0.44)
ABSOLUTE EOS #: 0.15 K/UL (ref 0–0.44)
ABSOLUTE EOS #: 0.17 K/UL (ref 0–0.44)
ABSOLUTE EOS #: 0.18 K/UL (ref 0–0.44)
ABSOLUTE EOS #: 0.19 K/UL (ref 0–0.44)
ABSOLUTE EOS #: 0.2 K/UL (ref 0–0.44)
ABSOLUTE EOS #: 0.22 K/UL (ref 0–0.44)
ABSOLUTE EOS #: 0.26 K/UL (ref 0–0.44)
ABSOLUTE EOS #: 0.26 K/UL (ref 0–0.44)
ABSOLUTE EOS #: 0.27 K/UL (ref 0–0.44)
ABSOLUTE EOS #: 0.27 K/UL (ref 0–0.44)
ABSOLUTE EOS #: 0.31 K/UL (ref 0–0.44)
ABSOLUTE EOS #: <0.03 K/UL (ref 0–0.44)
ABSOLUTE EOS #: <0.03 K/UL (ref 0–0.44)
ABSOLUTE IMMATURE GRANULOCYTE: 0.03 K/UL (ref 0–0.3)
ABSOLUTE IMMATURE GRANULOCYTE: 0.03 K/UL (ref 0–0.3)
ABSOLUTE IMMATURE GRANULOCYTE: 0.04 K/UL (ref 0–0.3)
ABSOLUTE IMMATURE GRANULOCYTE: 0.05 K/UL (ref 0–0.3)
ABSOLUTE IMMATURE GRANULOCYTE: 0.06 K/UL (ref 0–0.3)
ABSOLUTE IMMATURE GRANULOCYTE: 0.08 K/UL (ref 0–0.3)
ABSOLUTE IMMATURE GRANULOCYTE: 0.1 K/UL (ref 0–0.3)
ABSOLUTE IMMATURE GRANULOCYTE: 0.11 K/UL (ref 0–0.3)
ABSOLUTE IMMATURE GRANULOCYTE: 0.11 K/UL (ref 0–0.3)
ABSOLUTE IMMATURE GRANULOCYTE: <0.03 K/UL (ref 0–0.3)
ABSOLUTE LYMPH #: 1.11 K/UL (ref 1.1–3.7)
ABSOLUTE LYMPH #: 1.25 K/UL (ref 1.1–3.7)
ABSOLUTE LYMPH #: 1.74 K/UL (ref 1.1–3.7)
ABSOLUTE LYMPH #: 1.9 K/UL (ref 1.1–3.7)
ABSOLUTE LYMPH #: 1.91 K/UL (ref 1.1–3.7)
ABSOLUTE LYMPH #: 1.98 K/UL (ref 1.1–3.7)
ABSOLUTE LYMPH #: 1.99 K/UL (ref 1.1–3.7)
ABSOLUTE LYMPH #: 2.05 K/UL (ref 1.1–3.7)
ABSOLUTE LYMPH #: 2.14 K/UL (ref 1.1–3.7)
ABSOLUTE LYMPH #: 2.21 K/UL (ref 1.1–3.7)
ABSOLUTE LYMPH #: 2.49 K/UL (ref 1.1–3.7)
ABSOLUTE LYMPH #: 2.76 K/UL (ref 1.1–3.7)
ABSOLUTE LYMPH #: 3.05 K/UL (ref 1.1–3.7)
ABSOLUTE LYMPH #: 4.78 K/UL (ref 1.1–3.7)
ABSOLUTE MONO #: 0.13 K/UL (ref 0.1–1.2)
ABSOLUTE MONO #: 0.3 K/UL (ref 0.1–1.2)
ABSOLUTE MONO #: 0.73 K/UL (ref 0.1–1.2)
ABSOLUTE MONO #: 0.88 K/UL (ref 0.1–1.2)
ABSOLUTE MONO #: 0.89 K/UL (ref 0.1–1.2)
ABSOLUTE MONO #: 0.92 K/UL (ref 0.1–1.2)
ABSOLUTE MONO #: 0.97 K/UL (ref 0.1–1.2)
ABSOLUTE MONO #: 0.99 K/UL (ref 0.1–1.2)
ABSOLUTE MONO #: 1.02 K/UL (ref 0.1–1.2)
ABSOLUTE MONO #: 1.05 K/UL (ref 0.1–1.2)
ABSOLUTE MONO #: 1.06 K/UL (ref 0.1–1.2)
ABSOLUTE MONO #: 1.1 K/UL (ref 0.1–1.2)
ABSOLUTE MONO #: 1.21 K/UL (ref 0.1–1.2)
ABSOLUTE MONO #: 1.37 K/UL (ref 0.1–1.2)
ABSOLUTE RETIC #: 0.05 M/UL (ref 0.03–0.08)
ALBUMIN (CALCULATED): 4.3 G/DL (ref 3.2–5.2)
ALBUMIN PERCENT: 69 % (ref 45–65)
ALBUMIN SERPL-MCNC: 2.6 G/DL (ref 3.5–5.2)
ALBUMIN SERPL-MCNC: 2.8 G/DL (ref 3.5–5.2)
ALBUMIN SERPL-MCNC: 2.8 G/DL (ref 3.5–5.2)
ALBUMIN SERPL-MCNC: 2.9 G/DL (ref 3.5–5.2)
ALBUMIN SERPL-MCNC: 3.1 G/DL (ref 3.5–5.2)
ALBUMIN SERPL-MCNC: 3.4 G/DL (ref 3.5–5.2)
ALBUMIN SERPL-MCNC: 3.8 G/DL (ref 3.5–5.2)
ALBUMIN SERPL-MCNC: 3.9 G/DL (ref 3.5–5.2)
ALBUMIN SERPL-MCNC: 4 G/DL (ref 3.5–5.2)
ALBUMIN SERPL-MCNC: 4 G/DL (ref 3.5–5.2)
ALBUMIN SERPL-MCNC: 4.1 G/DL (ref 3.5–5.2)
ALBUMIN SERPL-MCNC: 4.2 G/DL (ref 3.5–5.2)
ALBUMIN SERPL-MCNC: 4.2 G/DL (ref 3.5–5.2)
ALBUMIN SERPL-MCNC: 4.3 G/DL (ref 3.5–5.2)
ALBUMIN/GLOBULIN RATIO: 1.1 (ref 1–2.5)
ALBUMIN/GLOBULIN RATIO: 1.1 (ref 1–2.5)
ALBUMIN/GLOBULIN RATIO: 1.3 (ref 1–2.5)
ALBUMIN/GLOBULIN RATIO: 1.5 (ref 1–2.5)
ALBUMIN/GLOBULIN RATIO: 1.5 (ref 1–2.5)
ALBUMIN/GLOBULIN RATIO: 1.6 (ref 1–2.5)
ALBUMIN/GLOBULIN RATIO: 1.7 (ref 1–2.5)
ALBUMIN/GLOBULIN RATIO: 1.8 (ref 1–2.5)
ALBUMIN/GLOBULIN RATIO: 2 (ref 1–2.5)
ALLEN TEST: POSITIVE
ALP BLD-CCNC: 101 U/L (ref 35–104)
ALP BLD-CCNC: 113 U/L (ref 35–104)
ALP BLD-CCNC: 115 U/L (ref 35–104)
ALP BLD-CCNC: 116 U/L (ref 35–104)
ALP BLD-CCNC: 119 U/L (ref 35–104)
ALP BLD-CCNC: 124 U/L (ref 35–104)
ALP BLD-CCNC: 126 U/L (ref 35–104)
ALP BLD-CCNC: 127 U/L (ref 35–104)
ALP BLD-CCNC: 132 U/L (ref 35–104)
ALP BLD-CCNC: 97 U/L (ref 35–104)
ALP BLD-CCNC: 99 U/L (ref 35–104)
ALPHA 1 PERCENT: 3 % (ref 3–6)
ALPHA 2 PERCENT: 13 % (ref 6–13)
ALPHA-1-GLOBULIN: 0.2 G/DL (ref 0.1–0.4)
ALPHA-2-GLOBULIN: 0.8 G/DL (ref 0.5–0.9)
ALT SERPL-CCNC: 10 U/L (ref 5–33)
ALT SERPL-CCNC: 10 U/L (ref 5–33)
ALT SERPL-CCNC: 120 U/L (ref 5–33)
ALT SERPL-CCNC: 13 U/L (ref 5–33)
ALT SERPL-CCNC: 16 U/L (ref 5–33)
ALT SERPL-CCNC: 16 U/L (ref 5–33)
ALT SERPL-CCNC: 186 U/L (ref 5–33)
ALT SERPL-CCNC: 19 U/L (ref 5–33)
ALT SERPL-CCNC: 255 U/L (ref 5–33)
ALT SERPL-CCNC: 8 U/L (ref 5–33)
ALT SERPL-CCNC: 9 U/L (ref 5–33)
AMORPHOUS: NORMAL
ANCA MYELOPEROXIDASE: <0.3 AU/ML (ref 0–3.5)
ANCA PROTEINASE 3: <0.7 AU/ML (ref 0–2)
ANION GAP SERPL CALCULATED.3IONS-SCNC: 11 MMOL/L (ref 9–17)
ANION GAP SERPL CALCULATED.3IONS-SCNC: 11 MMOL/L (ref 9–17)
ANION GAP SERPL CALCULATED.3IONS-SCNC: 12 MMOL/L (ref 9–17)
ANION GAP SERPL CALCULATED.3IONS-SCNC: 12 MMOL/L (ref 9–17)
ANION GAP SERPL CALCULATED.3IONS-SCNC: 13 MMOL/L (ref 9–17)
ANION GAP SERPL CALCULATED.3IONS-SCNC: 14 MMOL/L (ref 9–17)
ANION GAP SERPL CALCULATED.3IONS-SCNC: 15 MMOL/L (ref 9–17)
ANION GAP SERPL CALCULATED.3IONS-SCNC: 16 MMOL/L (ref 9–17)
ANION GAP SERPL CALCULATED.3IONS-SCNC: 19 MMOL/L (ref 9–17)
ANION GAP SERPL CALCULATED.3IONS-SCNC: 7 MMOL/L (ref 9–17)
ANION GAP SERPL CALCULATED.3IONS-SCNC: 7 MMOL/L (ref 9–17)
ANION GAP SERPL CALCULATED.3IONS-SCNC: 8 MMOL/L (ref 9–17)
ANTI DNA DOUBLE STRANDED: <0.5 IU/ML
ANTI SSA: 0.4 U/ML
ANTI SSB: <0.3 U/ML
ANTI-NUCLEAR ANTIBODY (ANA): POSITIVE
ANTI-RNP70: <0.3 U/ML
ANTI-SCLERODERMA: 0.6 U/ML
ANTI-SMITH: 1.3 U/ML
AST SERPL-CCNC: 12 U/L
AST SERPL-CCNC: 12 U/L
AST SERPL-CCNC: 13 U/L
AST SERPL-CCNC: 13 U/L
AST SERPL-CCNC: 132 U/L
AST SERPL-CCNC: 15 U/L
AST SERPL-CCNC: 17 U/L
AST SERPL-CCNC: 20 U/L
AST SERPL-CCNC: 20 U/L
AST SERPL-CCNC: 360 U/L
AST SERPL-CCNC: 821 U/L
BACTERIA: NORMAL
BASOPHILS # BLD: 0 % (ref 0–2)
BASOPHILS # BLD: 1 % (ref 0–2)
BASOPHILS # BLD: 1 % (ref 0–2)
BASOPHILS ABSOLUTE: 0.03 K/UL (ref 0–0.2)
BASOPHILS ABSOLUTE: 0.04 K/UL (ref 0–0.2)
BASOPHILS ABSOLUTE: 0.04 K/UL (ref 0–0.2)
BASOPHILS ABSOLUTE: 0.05 K/UL (ref 0–0.2)
BASOPHILS ABSOLUTE: 0.06 K/UL (ref 0–0.2)
BASOPHILS ABSOLUTE: 0.07 K/UL (ref 0–0.2)
BASOPHILS ABSOLUTE: <0.03 K/UL (ref 0–0.2)
BETA GLOBULIN: 0.5 G/DL (ref 0.5–1.1)
BETA PERCENT: 8 % (ref 11–19)
BILIRUB SERPL-MCNC: 0.16 MG/DL (ref 0.3–1.2)
BILIRUB SERPL-MCNC: 0.18 MG/DL (ref 0.3–1.2)
BILIRUB SERPL-MCNC: 0.23 MG/DL (ref 0.3–1.2)
BILIRUB SERPL-MCNC: 0.3 MG/DL (ref 0.3–1.2)
BILIRUB SERPL-MCNC: 0.34 MG/DL (ref 0.3–1.2)
BILIRUB SERPL-MCNC: 0.34 MG/DL (ref 0.3–1.2)
BILIRUB SERPL-MCNC: 0.35 MG/DL (ref 0.3–1.2)
BILIRUB SERPL-MCNC: 0.48 MG/DL (ref 0.3–1.2)
BILIRUB SERPL-MCNC: 0.53 MG/DL (ref 0.3–1.2)
BILIRUB SERPL-MCNC: 0.56 MG/DL (ref 0.3–1.2)
BILIRUB SERPL-MCNC: 0.65 MG/DL (ref 0.3–1.2)
BILIRUBIN URINE: NEGATIVE
BUN BLDV-MCNC: 10 MG/DL (ref 8–23)
BUN BLDV-MCNC: 11 MG/DL (ref 8–23)
BUN BLDV-MCNC: 12 MG/DL (ref 8–23)
BUN BLDV-MCNC: 12 MG/DL (ref 8–23)
BUN BLDV-MCNC: 14 MG/DL (ref 8–23)
BUN BLDV-MCNC: 14 MG/DL (ref 8–23)
BUN BLDV-MCNC: 15 MG/DL (ref 8–23)
BUN BLDV-MCNC: 15 MG/DL (ref 8–23)
BUN BLDV-MCNC: 17 MG/DL (ref 8–23)
BUN BLDV-MCNC: 17 MG/DL (ref 8–23)
BUN BLDV-MCNC: 18 MG/DL (ref 8–23)
BUN BLDV-MCNC: 21 MG/DL (ref 8–23)
BUN BLDV-MCNC: 22 MG/DL (ref 8–23)
BUN BLDV-MCNC: 23 MG/DL (ref 8–23)
BUN BLDV-MCNC: 26 MG/DL (ref 8–23)
BUN BLDV-MCNC: 9 MG/DL (ref 8–23)
BUN/CREAT BLD: ABNORMAL (ref 9–20)
C-REACTIVE PROTEIN: 15.3 MG/L (ref 0–5)
C-REACTIVE PROTEIN: 3.1 MG/L (ref 0–5)
C-REACTIVE PROTEIN: 5.9 MG/L (ref 0–5)
C-REACTIVE PROTEIN: 53.8 MG/L (ref 0–5)
C-REACTIVE PROTEIN: 54.3 MG/L (ref 0–5)
C-REACTIVE PROTEIN: <3 MG/L (ref 0–5)
CALCIUM SERPL-MCNC: 10 MG/DL (ref 8.6–10.4)
CALCIUM SERPL-MCNC: 10.5 MG/DL (ref 8.6–10.4)
CALCIUM SERPL-MCNC: 10.8 MG/DL (ref 8.6–10.4)
CALCIUM SERPL-MCNC: 6.6 MG/DL (ref 8.6–10.4)
CALCIUM SERPL-MCNC: 8 MG/DL (ref 8.6–10.4)
CALCIUM SERPL-MCNC: 8.4 MG/DL (ref 8.6–10.4)
CALCIUM SERPL-MCNC: 8.8 MG/DL (ref 8.6–10.4)
CALCIUM SERPL-MCNC: 8.8 MG/DL (ref 8.6–10.4)
CALCIUM SERPL-MCNC: 9 MG/DL (ref 8.6–10.4)
CALCIUM SERPL-MCNC: 9.4 MG/DL (ref 8.6–10.4)
CALCIUM SERPL-MCNC: 9.6 MG/DL (ref 8.6–10.4)
CALCIUM SERPL-MCNC: 9.7 MG/DL (ref 8.6–10.4)
CALCIUM SERPL-MCNC: 9.7 MG/DL (ref 8.6–10.4)
CALCIUM SERPL-MCNC: 9.8 MG/DL (ref 8.6–10.4)
CALCIUM SERPL-MCNC: 9.9 MG/DL (ref 8.6–10.4)
CALCIUM SERPL-MCNC: 9.9 MG/DL (ref 8.6–10.4)
CASTS UA: NORMAL /LPF (ref 0–8)
CHLORIDE BLD-SCNC: 100 MMOL/L (ref 98–107)
CHLORIDE BLD-SCNC: 101 MMOL/L (ref 98–107)
CHLORIDE BLD-SCNC: 103 MMOL/L (ref 98–107)
CHLORIDE BLD-SCNC: 104 MMOL/L (ref 98–107)
CHLORIDE BLD-SCNC: 105 MMOL/L (ref 98–107)
CHLORIDE BLD-SCNC: 105 MMOL/L (ref 98–107)
CHLORIDE BLD-SCNC: 106 MMOL/L (ref 98–107)
CHLORIDE BLD-SCNC: 106 MMOL/L (ref 98–107)
CHLORIDE BLD-SCNC: 108 MMOL/L (ref 98–107)
CHLORIDE BLD-SCNC: 109 MMOL/L (ref 98–107)
CHLORIDE BLD-SCNC: 110 MMOL/L (ref 98–107)
CHLORIDE BLD-SCNC: 112 MMOL/L (ref 98–107)
CHLORIDE BLD-SCNC: 115 MMOL/L (ref 98–107)
CHLORIDE BLD-SCNC: 99 MMOL/L (ref 98–107)
CHOLESTEROL/HDL RATIO: 1.9
CHOLESTEROL/HDL RATIO: 2.8
CHOLESTEROL/HDL RATIO: 3.2
CHOLESTEROL: 123 MG/DL
CHOLESTEROL: 133 MG/DL
CHOLESTEROL: 139 MG/DL
CO2: 16 MMOL/L (ref 20–31)
CO2: 17 MMOL/L (ref 20–31)
CO2: 18 MMOL/L (ref 20–31)
CO2: 20 MMOL/L (ref 20–31)
CO2: 21 MMOL/L (ref 20–31)
CO2: 22 MMOL/L (ref 20–31)
CO2: 22 MMOL/L (ref 20–31)
CO2: 23 MMOL/L (ref 20–31)
CO2: 24 MMOL/L (ref 20–31)
CO2: 26 MMOL/L (ref 20–31)
CO2: 27 MMOL/L (ref 20–31)
CO2: 28 MMOL/L (ref 20–31)
COLOR: YELLOW
COMMENT UA: ABNORMAL
COMMENT UA: NORMAL
CREAT SERPL-MCNC: 0.33 MG/DL (ref 0.5–0.9)
CREAT SERPL-MCNC: 0.38 MG/DL (ref 0.5–0.9)
CREAT SERPL-MCNC: 0.49 MG/DL (ref 0.5–0.9)
CREAT SERPL-MCNC: 0.5 MG/DL (ref 0.5–0.9)
CREAT SERPL-MCNC: 0.54 MG/DL (ref 0.5–0.9)
CREAT SERPL-MCNC: 0.54 MG/DL (ref 0.5–0.9)
CREAT SERPL-MCNC: 0.58 MG/DL (ref 0.5–0.9)
CREAT SERPL-MCNC: 0.62 MG/DL (ref 0.5–0.9)
CREAT SERPL-MCNC: 0.63 MG/DL (ref 0.5–0.9)
CREAT SERPL-MCNC: 0.63 MG/DL (ref 0.5–0.9)
CREAT SERPL-MCNC: 0.69 MG/DL (ref 0.5–0.9)
CREAT SERPL-MCNC: 0.71 MG/DL (ref 0.5–0.9)
CREAT SERPL-MCNC: 0.73 MG/DL (ref 0.5–0.9)
CREAT SERPL-MCNC: 0.77 MG/DL (ref 0.5–0.9)
CREAT SERPL-MCNC: 0.81 MG/DL (ref 0.5–0.9)
CREAT SERPL-MCNC: 0.82 MG/DL (ref 0.5–0.9)
CREAT SERPL-MCNC: 0.84 MG/DL (ref 0.5–0.9)
CREAT SERPL-MCNC: 0.84 MG/DL (ref 0.5–0.9)
CREAT SERPL-MCNC: 0.86 MG/DL (ref 0.5–0.9)
CREATININE URINE POCT: 100
CRYSTALS, UA: NORMAL /HPF
CULTURE: ABNORMAL
CULTURE: NORMAL
D-DIMER QUANTITATIVE: 0.95 MG/L FEU
DERMATOLOGY PATHOLOGY REPORT: NORMAL
DERMATOLOGY PATHOLOGY REPORT: NORMAL
DIFFERENTIAL TYPE: ABNORMAL
DIRECT EXAM: ABNORMAL
DIRECT EXAM: NORMAL
EKG ATRIAL RATE: 90 BPM
EKG P AXIS: 63 DEGREES
EKG P-R INTERVAL: 154 MS
EKG Q-T INTERVAL: 376 MS
EKG QRS DURATION: 108 MS
EKG QTC CALCULATION (BAZETT): 459 MS
EKG R AXIS: -56 DEGREES
EKG T AXIS: 81 DEGREES
EKG VENTRICULAR RATE: 90 BPM
ENA ANTIBODIES SCREEN: 6.8 U/ML
EOSINOPHILS RELATIVE PERCENT: 0 % (ref 1–4)
EOSINOPHILS RELATIVE PERCENT: 1 % (ref 1–4)
EOSINOPHILS RELATIVE PERCENT: 1 % (ref 1–4)
EOSINOPHILS RELATIVE PERCENT: 2 % (ref 1–4)
EOSINOPHILS RELATIVE PERCENT: 3 % (ref 1–4)
EPITHELIAL CELLS UA: NORMAL /HPF (ref 0–5)
ERYTHROPOIETIN: 13 MU/ML (ref 4–27)
FERRITIN: 3548 UG/L (ref 13–150)
FERRITIN: 4301 UG/L (ref 13–150)
FERRITIN: 5091 UG/L (ref 13–150)
FERRITIN: 66 UG/L (ref 13–150)
FIBRINOGEN: 603 MG/DL (ref 140–420)
FIO2: 100
FOLATE: 17 NG/ML
G-6-PD, QUANT: 15.1 U/G HB (ref 9.9–16.6)
GAMMA GLOBULIN %: 7 % (ref 9–20)
GAMMA GLOBULIN: 0.4 G/DL (ref 0.5–1.5)
GFR AFRICAN AMERICAN: >60 ML/MIN
GFR NON-AFRICAN AMERICAN: >60 ML/MIN
GFR SERPL CREATININE-BSD FRML MDRD: ABNORMAL ML/MIN/{1.73_M2}
GFR SERPL CREATININE-BSD FRML MDRD: NORMAL ML/MIN/{1.73_M2}
GLUCOSE BLD-MCNC: 106 MG/DL (ref 70–99)
GLUCOSE BLD-MCNC: 110 MG/DL (ref 70–99)
GLUCOSE BLD-MCNC: 119 MG/DL (ref 70–99)
GLUCOSE BLD-MCNC: 130 MG/DL (ref 70–99)
GLUCOSE BLD-MCNC: 141 MG/DL (ref 70–99)
GLUCOSE BLD-MCNC: 147 MG/DL (ref 70–99)
GLUCOSE BLD-MCNC: 158 MG/DL (ref 65–105)
GLUCOSE BLD-MCNC: 171 MG/DL (ref 65–105)
GLUCOSE BLD-MCNC: 194 MG/DL (ref 65–105)
GLUCOSE BLD-MCNC: 198 MG/DL (ref 65–105)
GLUCOSE BLD-MCNC: 398 MG/DL (ref 70–99)
GLUCOSE BLD-MCNC: 73 MG/DL (ref 70–99)
GLUCOSE BLD-MCNC: 74 MG/DL (ref 70–99)
GLUCOSE BLD-MCNC: 83 MG/DL (ref 70–99)
GLUCOSE BLD-MCNC: 84 MG/DL (ref 70–99)
GLUCOSE BLD-MCNC: 87 MG/DL (ref 70–99)
GLUCOSE BLD-MCNC: 89 MG/DL (ref 70–99)
GLUCOSE BLD-MCNC: 90 MG/DL (ref 70–99)
GLUCOSE BLD-MCNC: 96 MG/DL (ref 70–99)
GLUCOSE BLD-MCNC: 98 MG/DL (ref 70–99)
GLUCOSE URINE: NEGATIVE
HAPTOGLOBIN: 224 MG/DL (ref 30–200)
HAV IGM SER IA-ACNC: NONREACTIVE
HCT VFR BLD CALC: 31.2 % (ref 36.3–47.1)
HCT VFR BLD CALC: 32.4 % (ref 36.3–47.1)
HCT VFR BLD CALC: 32.8 % (ref 36.3–47.1)
HCT VFR BLD CALC: 32.9 % (ref 36.3–47.1)
HCT VFR BLD CALC: 33.3 % (ref 36.3–47.1)
HCT VFR BLD CALC: 33.6 % (ref 36.3–47.1)
HCT VFR BLD CALC: 33.7 % (ref 36.3–47.1)
HCT VFR BLD CALC: 34 % (ref 36.3–47.1)
HCT VFR BLD CALC: 34 % (ref 36–46)
HCT VFR BLD CALC: 34.1 % (ref 36.3–47.1)
HCT VFR BLD CALC: 34.2 % (ref 36.3–47.1)
HCT VFR BLD CALC: 34.6 % (ref 36.3–47.1)
HCT VFR BLD CALC: 34.9 % (ref 36.3–47.1)
HCT VFR BLD CALC: 35 % (ref 36.3–47.1)
HCT VFR BLD CALC: 35 % (ref 36.3–47.1)
HCT VFR BLD CALC: 36 % (ref 36.3–47.1)
HCT VFR BLD CALC: 36.4 % (ref 36.3–47.1)
HCT VFR BLD CALC: 36.7 % (ref 36.3–47.1)
HCT VFR BLD CALC: 37 % (ref 36.3–47.1)
HCT VFR BLD CALC: 37 % (ref 36.3–47.1)
HDLC SERPL-MCNC: 38 MG/DL
HDLC SERPL-MCNC: 50 MG/DL
HDLC SERPL-MCNC: 69 MG/DL
HEMOGLOBIN: 10.1 G/DL (ref 11.9–15.1)
HEMOGLOBIN: 10.5 G/DL (ref 11.9–15.1)
HEMOGLOBIN: 10.6 G/DL (ref 11.9–15.1)
HEMOGLOBIN: 10.7 G/DL (ref 11.9–15.1)
HEMOGLOBIN: 10.8 G/DL (ref 11.9–15.1)
HEMOGLOBIN: 10.8 G/DL (ref 11.9–15.1)
HEMOGLOBIN: 10.9 G/DL (ref 11.9–15.1)
HEMOGLOBIN: 11 G/DL (ref 11.9–15.1)
HEMOGLOBIN: 11 G/DL (ref 12–16)
HEMOGLOBIN: 11.2 G/DL (ref 11.9–15.1)
HEMOGLOBIN: 11.3 G/DL (ref 11.9–15.1)
HEMOGLOBIN: 11.5 G/DL (ref 11.9–15.1)
HEMOGLOBIN: 11.8 G/DL (ref 11.9–15.1)
HEMOGLOBIN: 12 G/DL (ref 11.9–15.1)
HEMOGLOBIN: 12.4 G/DL (ref 11.9–15.1)
HEPATITIS B CORE IGM ANTIBODY: NONREACTIVE
HEPATITIS B SURFACE ANTIGEN: NONREACTIVE
HEPATITIS C ANTIBODY: NONREACTIVE
HIV AG/AB: NONREACTIVE
IMMATURE GRANULOCYTES: 0 %
IMMATURE GRANULOCYTES: 1 %
IMMATURE GRANULOCYTES: 2 %
IMMATURE RETIC FRACT: 15.4 % (ref 2.7–18.3)
INR BLD: 1
INTERVENTION: NORMAL
IRON SATURATION: 32 % (ref 20–55)
IRON: 88 UG/DL (ref 37–145)
KETONES, URINE: NEGATIVE
LACTATE DEHYDROGENASE: 1260 U/L (ref 135–214)
LACTATE DEHYDROGENASE: 957 U/L (ref 135–214)
LACTIC ACID, SEPSIS WHOLE BLOOD: 1.3 MMOL/L (ref 0.5–1.9)
LACTIC ACID, SEPSIS WHOLE BLOOD: 1.4 MMOL/L (ref 0.5–1.9)
LACTIC ACID, SEPSIS WHOLE BLOOD: 2.2 MMOL/L (ref 0.5–1.9)
LACTIC ACID, SEPSIS: ABNORMAL MMOL/L (ref 0.5–1.9)
LACTIC ACID, SEPSIS: NORMAL MMOL/L (ref 0.5–1.9)
LACTIC ACID, SEPSIS: NORMAL MMOL/L (ref 0.5–1.9)
LDL CHOLESTEROL: 49 MG/DL (ref 0–130)
LDL CHOLESTEROL: 58 MG/DL (ref 0–130)
LDL CHOLESTEROL: 66 MG/DL (ref 0–130)
LEUKOCYTE ESTERASE, URINE: ABNORMAL
LEUKOCYTE ESTERASE, URINE: ABNORMAL
LEUKOCYTE ESTERASE, URINE: NEGATIVE
LEUKOCYTE ESTERASE, URINE: NEGATIVE
LYMPHOCYTES # BLD: 11 % (ref 24–43)
LYMPHOCYTES # BLD: 16 % (ref 24–43)
LYMPHOCYTES # BLD: 18 % (ref 24–43)
LYMPHOCYTES # BLD: 19 % (ref 24–43)
LYMPHOCYTES # BLD: 20 % (ref 24–43)
LYMPHOCYTES # BLD: 22 % (ref 24–43)
LYMPHOCYTES # BLD: 23 % (ref 24–43)
LYMPHOCYTES # BLD: 23 % (ref 24–43)
LYMPHOCYTES # BLD: 24 % (ref 24–43)
LYMPHOCYTES # BLD: 25 % (ref 24–43)
LYMPHOCYTES # BLD: 26 % (ref 24–43)
LYMPHOCYTES # BLD: 26 % (ref 24–43)
LYMPHOCYTES # BLD: 28 % (ref 24–43)
LYMPHOCYTES # BLD: 29 % (ref 24–43)
Lab: ABNORMAL
Lab: NORMAL
MAGNESIUM: 1.5 MG/DL (ref 1.6–2.6)
MAGNESIUM: 1.7 MG/DL (ref 1.6–2.6)
MAGNESIUM: 1.7 MG/DL (ref 1.6–2.6)
MAGNESIUM: 1.8 MG/DL (ref 1.6–2.6)
MAGNESIUM: 1.9 MG/DL (ref 1.6–2.6)
MAGNESIUM: 1.9 MG/DL (ref 1.6–2.6)
MAGNESIUM: 2 MG/DL (ref 1.6–2.6)
MAGNESIUM: 2.1 MG/DL (ref 1.6–2.6)
MAGNESIUM: 2.3 MG/DL (ref 1.6–2.6)
MAGNESIUM: 2.4 MG/DL (ref 1.6–2.6)
MCH RBC QN AUTO: 30.2 PG (ref 25.2–33.5)
MCH RBC QN AUTO: 32.3 PG (ref 25.2–33.5)
MCH RBC QN AUTO: 32.7 PG (ref 25.2–33.5)
MCH RBC QN AUTO: 32.8 PG (ref 25.2–33.5)
MCH RBC QN AUTO: 33.2 PG (ref 25.2–33.5)
MCH RBC QN AUTO: 33.4 PG (ref 25.2–33.5)
MCH RBC QN AUTO: 33.5 PG (ref 25.2–33.5)
MCH RBC QN AUTO: 33.8 PG (ref 25.2–33.5)
MCH RBC QN AUTO: 33.9 PG (ref 25.2–33.5)
MCH RBC QN AUTO: 34 PG (ref 25.2–33.5)
MCH RBC QN AUTO: 34.4 PG (ref 25.2–33.5)
MCH RBC QN AUTO: 34.4 PG (ref 26–34)
MCH RBC QN AUTO: 34.7 PG (ref 25.2–33.5)
MCH RBC QN AUTO: 34.7 PG (ref 25.2–33.5)
MCH RBC QN AUTO: 35 PG (ref 25.2–33.5)
MCH RBC QN AUTO: 35.1 PG (ref 25.2–33.5)
MCH RBC QN AUTO: 35.5 PG (ref 25.2–33.5)
MCH RBC QN AUTO: 35.6 PG (ref 25.2–33.5)
MCHC RBC AUTO-ENTMCNC: 31.1 G/DL (ref 28.4–34.8)
MCHC RBC AUTO-ENTMCNC: 31.4 G/DL (ref 28.4–34.8)
MCHC RBC AUTO-ENTMCNC: 31.5 G/DL (ref 28.4–34.8)
MCHC RBC AUTO-ENTMCNC: 31.6 G/DL (ref 28.4–34.8)
MCHC RBC AUTO-ENTMCNC: 31.8 G/DL (ref 28.4–34.8)
MCHC RBC AUTO-ENTMCNC: 31.9 G/DL (ref 28.4–34.8)
MCHC RBC AUTO-ENTMCNC: 32 G/DL (ref 28.4–34.8)
MCHC RBC AUTO-ENTMCNC: 32.1 G/DL (ref 28.4–34.8)
MCHC RBC AUTO-ENTMCNC: 32.3 G/DL (ref 28.4–34.8)
MCHC RBC AUTO-ENTMCNC: 32.3 G/DL (ref 28.4–34.8)
MCHC RBC AUTO-ENTMCNC: 32.4 G/DL (ref 28.4–34.8)
MCHC RBC AUTO-ENTMCNC: 32.4 G/DL (ref 31–37)
MCHC RBC AUTO-ENTMCNC: 32.7 G/DL (ref 28.4–34.8)
MCHC RBC AUTO-ENTMCNC: 33.5 G/DL (ref 28.4–34.8)
MCHC RBC AUTO-ENTMCNC: 33.5 G/DL (ref 28.4–34.8)
MCHC RBC AUTO-ENTMCNC: 33.8 G/DL (ref 28.4–34.8)
MCV RBC AUTO: 101.2 FL (ref 82.6–102.9)
MCV RBC AUTO: 101.2 FL (ref 82.6–102.9)
MCV RBC AUTO: 102.9 FL (ref 82.6–102.9)
MCV RBC AUTO: 103.6 FL (ref 82.6–102.9)
MCV RBC AUTO: 103.9 FL (ref 82.6–102.9)
MCV RBC AUTO: 104 FL (ref 82.6–102.9)
MCV RBC AUTO: 104.2 FL (ref 82.6–102.9)
MCV RBC AUTO: 104.3 FL (ref 82.6–102.9)
MCV RBC AUTO: 104.8 FL (ref 82.6–102.9)
MCV RBC AUTO: 105.2 FL (ref 82.6–102.9)
MCV RBC AUTO: 105.4 FL (ref 82.6–102.9)
MCV RBC AUTO: 106.1 FL (ref 80–100)
MCV RBC AUTO: 106.1 FL (ref 82.6–102.9)
MCV RBC AUTO: 107.1 FL (ref 82.6–102.9)
MCV RBC AUTO: 107.2 FL (ref 82.6–102.9)
MCV RBC AUTO: 108.1 FL (ref 82.6–102.9)
MCV RBC AUTO: 108.2 FL (ref 82.6–102.9)
MCV RBC AUTO: 108.7 FL (ref 82.6–102.9)
MCV RBC AUTO: 108.7 FL (ref 82.6–102.9)
MCV RBC AUTO: 97.1 FL (ref 82.6–102.9)
MICROALBUMIN/CREAT 24H UR: 80 MG/G{CREAT}
MICROALBUMIN/CREAT UR-RTO: ABNORMAL
MODE: ABNORMAL
MONOCYTES # BLD: 10 % (ref 3–12)
MONOCYTES # BLD: 10 % (ref 3–12)
MONOCYTES # BLD: 11 % (ref 3–12)
MONOCYTES # BLD: 11 % (ref 3–12)
MONOCYTES # BLD: 13 % (ref 3–12)
MONOCYTES # BLD: 13 % (ref 3–12)
MONOCYTES # BLD: 3 % (ref 3–12)
MONOCYTES # BLD: 3 % (ref 3–12)
MONOCYTES # BLD: 7 % (ref 3–12)
MONOCYTES # BLD: 8 % (ref 3–12)
MONOCYTES # BLD: 9 % (ref 3–12)
MRSA, DNA, NASAL: NORMAL
MUCUS: NORMAL
MYCOPLASMA PNEUMONIAE IGM: 0.29
NEGATIVE BASE EXCESS, ART: 1 (ref 0–2)
NITRITE, URINE: NEGATIVE
NRBC AUTOMATED: 0 PER 100 WBC
NRBC AUTOMATED: 0.7 PER 100 WBC
NRBC AUTOMATED: 0.9 PER 100 WBC
NRBC AUTOMATED: 1.2 PER 100 WBC
NRBC AUTOMATED: 1.6 PER 100 WBC
NRBC AUTOMATED: 1.9 PER 100 WBC
NRBC AUTOMATED: 2.3 PER 100 WBC
NRBC AUTOMATED: ABNORMAL PER 100 WBC
O2 DEVICE/FLOW/%: ABNORMAL
OTHER OBSERVATIONS UA: NORMAL
PATHOLOGIST: ABNORMAL
PATHOLOGIST: NORMAL
PATHOLOGIST: NORMAL
PATIENT TEMP: ABNORMAL
PDW BLD-RTO: 13.2 % (ref 11.8–14.4)
PDW BLD-RTO: 13.2 % (ref 11.8–14.4)
PDW BLD-RTO: 13.3 % (ref 11.8–14.4)
PDW BLD-RTO: 13.6 % (ref 11.8–14.4)
PDW BLD-RTO: 13.7 % (ref 11.8–14.4)
PDW BLD-RTO: 13.8 % (ref 11.8–14.4)
PDW BLD-RTO: 13.9 % (ref 11.8–14.4)
PDW BLD-RTO: 14 % (ref 11.8–14.4)
PDW BLD-RTO: 14.2 % (ref 11.8–14.4)
PDW BLD-RTO: 14.3 % (ref 11.8–14.4)
PDW BLD-RTO: 14.4 % (ref 11.8–14.4)
PDW BLD-RTO: 14.4 % (ref 11.8–14.4)
PDW BLD-RTO: 14.5 % (ref 11.8–14.4)
PDW BLD-RTO: 15.6 % (ref 11.5–14.9)
PDW BLD-RTO: 16.1 % (ref 11.8–14.4)
PH UA: 5.5 (ref 5–8)
PH UA: 6.5 (ref 5–8)
PH UA: 6.5 (ref 5–8)
PH UA: 7 (ref 5–8)
PHOSPHORUS: 1.5 MG/DL (ref 2.6–4.5)
PHOSPHORUS: 1.6 MG/DL (ref 2.6–4.5)
PHOSPHORUS: 2.2 MG/DL (ref 2.6–4.5)
PLATELET # BLD: 226 K/UL (ref 138–453)
PLATELET # BLD: 226 K/UL (ref 150–450)
PLATELET # BLD: 253 K/UL (ref 138–453)
PLATELET # BLD: 269 K/UL (ref 138–453)
PLATELET # BLD: 287 K/UL (ref 138–453)
PLATELET # BLD: 287 K/UL (ref 138–453)
PLATELET # BLD: 288 K/UL (ref 138–453)
PLATELET # BLD: 291 K/UL (ref 138–453)
PLATELET # BLD: 300 K/UL (ref 138–453)
PLATELET # BLD: 310 K/UL (ref 138–453)
PLATELET # BLD: 313 K/UL (ref 138–453)
PLATELET # BLD: 320 K/UL (ref 138–453)
PLATELET # BLD: 342 K/UL (ref 138–453)
PLATELET # BLD: 342 K/UL (ref 138–453)
PLATELET # BLD: 356 K/UL (ref 138–453)
PLATELET # BLD: 358 K/UL (ref 138–453)
PLATELET # BLD: 367 K/UL (ref 138–453)
PLATELET # BLD: 410 K/UL (ref 138–453)
PLATELET # BLD: 412 K/UL (ref 138–453)
PLATELET # BLD: ABNORMAL K/UL (ref 138–453)
PLATELET ESTIMATE: ABNORMAL
PLATELET, FLUORESCENCE: 151 K/UL (ref 138–453)
PLATELET, IMMATURE FRACTION: 6.1 % (ref 1.1–10.3)
PMV BLD AUTO: 10 FL (ref 8.1–13.5)
PMV BLD AUTO: 10.1 FL (ref 8.1–13.5)
PMV BLD AUTO: 10.2 FL (ref 8.1–13.5)
PMV BLD AUTO: 10.3 FL (ref 8.1–13.5)
PMV BLD AUTO: 10.4 FL (ref 8.1–13.5)
PMV BLD AUTO: 10.5 FL (ref 8.1–13.5)
PMV BLD AUTO: 10.8 FL (ref 8.1–13.5)
PMV BLD AUTO: 10.8 FL (ref 8.1–13.5)
PMV BLD AUTO: 7.5 FL (ref 6–12)
PMV BLD AUTO: 9.9 FL (ref 8.1–13.5)
PMV BLD AUTO: ABNORMAL FL (ref 8.1–13.5)
POC HCO3: 22.2 MMOL/L (ref 21–28)
POC O2 SATURATION: 100 % (ref 94–98)
POC PCO2 TEMP: ABNORMAL MM HG
POC PCO2: 32.3 MM HG (ref 35–48)
POC PH TEMP: ABNORMAL
POC PH: 7.45 (ref 7.35–7.45)
POC PO2 TEMP: ABNORMAL MM HG
POC PO2: 178.7 MM HG (ref 83–108)
POSITIVE BASE EXCESS, ART: ABNORMAL (ref 0–3)
POTASSIUM SERPL-SCNC: 3 MMOL/L (ref 3.7–5.3)
POTASSIUM SERPL-SCNC: 3.6 MMOL/L (ref 3.7–5.3)
POTASSIUM SERPL-SCNC: 3.6 MMOL/L (ref 3.7–5.3)
POTASSIUM SERPL-SCNC: 3.7 MMOL/L (ref 3.7–5.3)
POTASSIUM SERPL-SCNC: 4.2 MMOL/L (ref 3.7–5.3)
POTASSIUM SERPL-SCNC: 4.3 MMOL/L (ref 3.7–5.3)
POTASSIUM SERPL-SCNC: 4.5 MMOL/L (ref 3.7–5.3)
POTASSIUM SERPL-SCNC: 4.6 MMOL/L (ref 3.7–5.3)
POTASSIUM SERPL-SCNC: 4.6 MMOL/L (ref 3.7–5.3)
POTASSIUM SERPL-SCNC: 4.8 MMOL/L (ref 3.7–5.3)
POTASSIUM SERPL-SCNC: 4.8 MMOL/L (ref 3.7–5.3)
POTASSIUM SERPL-SCNC: 5.1 MMOL/L (ref 3.7–5.3)
POTASSIUM SERPL-SCNC: 5.1 MMOL/L (ref 3.7–5.3)
POTASSIUM SERPL-SCNC: 5.5 MMOL/L (ref 3.7–5.3)
POTASSIUM SERPL-SCNC: 6.1 MMOL/L (ref 3.7–5.3)
PROCALCITONIN: >100 NG/ML
PROTEIN ELECTROPHORESIS, SERUM: ABNORMAL
PROTEIN UA: ABNORMAL
PROTEIN UA: NEGATIVE
PROTHROMBIN TIME: 10.6 SEC (ref 9–12)
QUANTI TB GOLD PLUS: NEGATIVE
QUANTI TB GOLD PLUS: NEGATIVE
QUANTI TB1 MINUS NIL: 0.02 IU/ML (ref 0–0.34)
QUANTI TB1 MINUS NIL: 0.03 IU/ML (ref 0–0.34)
QUANTI TB2 MINUS NIL: 0.02 IU/ML (ref 0–0.34)
QUANTI TB2 MINUS NIL: 0.03 IU/ML (ref 0–0.34)
QUANTIFERON MITOGEN: 8.18 IU/ML
QUANTIFERON MITOGEN: 9.46 IU/ML
QUANTIFERON NIL: 0.02 IU/ML
QUANTIFERON NIL: 0.04 IU/ML
RBC # BLD: 2.91 M/UL (ref 3.95–5.11)
RBC # BLD: 2.98 M/UL (ref 3.95–5.11)
RBC # BLD: 3.09 M/UL (ref 3.95–5.11)
RBC # BLD: 3.1 M/UL (ref 3.95–5.11)
RBC # BLD: 3.16 M/UL (ref 3.95–5.11)
RBC # BLD: 3.2 M/UL (ref 3.95–5.11)
RBC # BLD: 3.2 M/UL (ref 4–5.2)
RBC # BLD: 3.24 M/UL (ref 3.95–5.11)
RBC # BLD: 3.26 M/UL (ref 3.95–5.11)
RBC # BLD: 3.28 M/UL (ref 3.95–5.11)
RBC # BLD: 3.3 M/UL (ref 3.95–5.11)
RBC # BLD: 3.33 M/UL (ref 3.95–5.11)
RBC # BLD: 3.34 M/UL (ref 3.95–5.11)
RBC # BLD: 3.35 M/UL (ref 3.95–5.11)
RBC # BLD: 3.37 M/UL (ref 3.95–5.11)
RBC # BLD: 3.4 M/UL (ref 3.95–5.11)
RBC # BLD: 3.42 M/UL (ref 3.95–5.11)
RBC # BLD: 3.49 M/UL (ref 3.95–5.11)
RBC # BLD: 3.5 M/UL (ref 3.95–5.11)
RBC # BLD: 3.81 M/UL (ref 3.95–5.11)
RBC # BLD: ABNORMAL 10*6/UL
RBC UA: NORMAL /HPF (ref 0–4)
REASON FOR REJECTION: NORMAL
RENAL EPITHELIAL, UA: NORMAL /HPF
RETIC %: 1.4 % (ref 0.5–1.9)
RETIC HEMOGLOBIN: 37.9 PG (ref 28.2–35.7)
RHEUMATOID FACTOR: <10 IU/ML
SAMPLE SITE: ABNORMAL
SARS-COV-2, RAPID: DETECTED
SEG NEUTROPHILS: 56 % (ref 36–65)
SEG NEUTROPHILS: 57 % (ref 36–65)
SEG NEUTROPHILS: 61 % (ref 36–65)
SEG NEUTROPHILS: 62 % (ref 36–65)
SEG NEUTROPHILS: 65 % (ref 36–65)
SEG NEUTROPHILS: 66 % (ref 36–65)
SEG NEUTROPHILS: 67 % (ref 36–65)
SEG NEUTROPHILS: 67 % (ref 36–65)
SEG NEUTROPHILS: 68 % (ref 36–65)
SEG NEUTROPHILS: 70 % (ref 36–65)
SEG NEUTROPHILS: 70 % (ref 36–65)
SEG NEUTROPHILS: 73 % (ref 36–65)
SEG NEUTROPHILS: 74 % (ref 36–65)
SEG NEUTROPHILS: 77 % (ref 36–65)
SEGMENTED NEUTROPHILS ABSOLUTE COUNT: 12.14 K/UL (ref 1.5–8.1)
SEGMENTED NEUTROPHILS ABSOLUTE COUNT: 3.55 K/UL (ref 1.5–8.1)
SEGMENTED NEUTROPHILS ABSOLUTE COUNT: 4.48 K/UL (ref 1.5–8.1)
SEGMENTED NEUTROPHILS ABSOLUTE COUNT: 4.8 K/UL (ref 1.5–8.1)
SEGMENTED NEUTROPHILS ABSOLUTE COUNT: 4.88 K/UL (ref 1.5–8.1)
SEGMENTED NEUTROPHILS ABSOLUTE COUNT: 5.59 K/UL (ref 1.5–8.1)
SEGMENTED NEUTROPHILS ABSOLUTE COUNT: 6.17 K/UL (ref 1.5–8.1)
SEGMENTED NEUTROPHILS ABSOLUTE COUNT: 6.32 K/UL (ref 1.5–8.1)
SEGMENTED NEUTROPHILS ABSOLUTE COUNT: 6.55 K/UL (ref 1.5–8.1)
SEGMENTED NEUTROPHILS ABSOLUTE COUNT: 6.85 K/UL (ref 1.5–8.1)
SEGMENTED NEUTROPHILS ABSOLUTE COUNT: 7.2 K/UL (ref 1.5–8.1)
SEGMENTED NEUTROPHILS ABSOLUTE COUNT: 7.7 K/UL (ref 1.5–8.1)
SEGMENTED NEUTROPHILS ABSOLUTE COUNT: 9.11 K/UL (ref 1.5–8.1)
SEGMENTED NEUTROPHILS ABSOLUTE COUNT: 9.12 K/UL (ref 1.5–8.1)
SERUM IFX INTERP: NORMAL
SERUM IFX INTERP: NORMAL
SODIUM BLD-SCNC: 129 MMOL/L (ref 135–144)
SODIUM BLD-SCNC: 134 MMOL/L (ref 135–144)
SODIUM BLD-SCNC: 136 MMOL/L (ref 135–144)
SODIUM BLD-SCNC: 137 MMOL/L (ref 135–144)
SODIUM BLD-SCNC: 138 MMOL/L (ref 135–144)
SODIUM BLD-SCNC: 139 MMOL/L (ref 135–144)
SODIUM BLD-SCNC: 139 MMOL/L (ref 135–144)
SODIUM BLD-SCNC: 140 MMOL/L (ref 135–144)
SODIUM BLD-SCNC: 141 MMOL/L (ref 135–144)
SODIUM BLD-SCNC: 144 MMOL/L (ref 135–144)
SODIUM BLD-SCNC: 145 MMOL/L (ref 135–144)
SODIUM BLD-SCNC: 146 MMOL/L (ref 135–144)
SODIUM BLD-SCNC: 147 MMOL/L (ref 135–144)
SODIUM BLD-SCNC: 147 MMOL/L (ref 135–144)
SPECIFIC GRAVITY UA: 1.01 (ref 1–1.03)
SPECIFIC GRAVITY UA: 1.02 (ref 1–1.03)
SPECIMEN DESCRIPTION: ABNORMAL
SPECIMEN DESCRIPTION: NORMAL
SURGICAL PATHOLOGY REPORT: NORMAL
TCO2 (CALC), ART: ABNORMAL MMOL/L (ref 22–29)
TOTAL IRON BINDING CAPACITY: 273 UG/DL (ref 250–450)
TOTAL PROT. SUM,%: 100 % (ref 98–102)
TOTAL PROT. SUM: 6.2 G/DL (ref 6.3–8.2)
TOTAL PROTEIN: 5.3 G/DL (ref 6.4–8.3)
TOTAL PROTEIN: 5.5 G/DL (ref 6.4–8.3)
TOTAL PROTEIN: 5.9 G/DL (ref 6.4–8.3)
TOTAL PROTEIN: 6.1 G/DL (ref 6.4–8.3)
TOTAL PROTEIN: 6.3 G/DL (ref 6.4–8.3)
TOTAL PROTEIN: 6.3 G/DL (ref 6.4–8.3)
TOTAL PROTEIN: 6.4 G/DL (ref 6.4–8.3)
TOTAL PROTEIN: 6.4 G/DL (ref 6.4–8.3)
TOTAL PROTEIN: 6.6 G/DL (ref 6.4–8.3)
TOTAL PROTEIN: 6.6 G/DL (ref 6.4–8.3)
TOTAL PROTEIN: 6.8 G/DL (ref 6.4–8.3)
TOTAL PROTEIN: 7 G/DL (ref 6.4–8.3)
TRICHOMONAS: NORMAL
TRIGL SERPL-MCNC: 116 MG/DL
TRIGL SERPL-MCNC: 135 MG/DL
TRIGL SERPL-MCNC: 74 MG/DL
TROPONIN INTERP: ABNORMAL
TROPONIN INTERP: ABNORMAL
TROPONIN T: ABNORMAL NG/ML
TROPONIN T: ABNORMAL NG/ML
TROPONIN, HIGH SENSITIVITY: 17 NG/L (ref 0–14)
TROPONIN, HIGH SENSITIVITY: 18 NG/L (ref 0–14)
TSH SERPL DL<=0.05 MIU/L-ACNC: 1.68 MIU/L (ref 0.3–5)
TURBIDITY: CLEAR
UNSATURATED IRON BINDING CAPACITY: 185 UG/DL (ref 112–347)
URIC ACID: 4.9 MG/DL (ref 2.4–5.7)
URIC ACID: 6.9 MG/DL (ref 2.4–5.7)
URINE HGB: NEGATIVE
UROBILINOGEN, URINE: NORMAL
VITAMIN B-12: >2000 PG/ML (ref 232–1245)
VITAMIN B-12: >2000 PG/ML (ref 232–1245)
VITAMIN D 25-HYDROXY: 44.6 NG/ML (ref 30–100)
VLDLC SERPL CALC-MCNC: ABNORMAL MG/DL (ref 1–30)
VLDLC SERPL CALC-MCNC: NORMAL MG/DL (ref 1–30)
VLDLC SERPL CALC-MCNC: NORMAL MG/DL (ref 1–30)
WBC # BLD: 10.6 K/UL (ref 3.5–11.3)
WBC # BLD: 10.8 K/UL (ref 3.5–11.3)
WBC # BLD: 11 K/UL (ref 3.5–11.3)
WBC # BLD: 11.3 K/UL (ref 3.5–11.3)
WBC # BLD: 12.6 K/UL (ref 3.5–11.3)
WBC # BLD: 13.7 K/UL (ref 3.5–11.3)
WBC # BLD: 18.4 K/UL (ref 3.5–11.3)
WBC # BLD: 18.5 K/UL (ref 3.5–11.3)
WBC # BLD: 23.4 K/UL (ref 3.5–11.3)
WBC # BLD: 5.1 K/UL (ref 3.5–11.3)
WBC # BLD: 6.9 K/UL (ref 3.5–11)
WBC # BLD: 7.9 K/UL (ref 3.5–11.3)
WBC # BLD: 7.9 K/UL (ref 3.5–11.3)
WBC # BLD: 8.6 K/UL (ref 3.5–11.3)
WBC # BLD: 8.7 K/UL (ref 3.5–11.3)
WBC # BLD: 9 K/UL (ref 3.5–11.3)
WBC # BLD: 9 K/UL (ref 3.5–11.3)
WBC # BLD: 9.5 K/UL (ref 3.5–11.3)
WBC # BLD: 9.5 K/UL (ref 3.5–11.3)
WBC # BLD: 9.8 K/UL (ref 3.5–11.3)
WBC # BLD: ABNORMAL 10*3/UL
WBC UA: NORMAL /HPF (ref 0–5)
YEAST: NORMAL
ZZ NTE CLEAN UP: ORDERED TEST: NORMAL
ZZ NTE WITH NAME CLEAN UP: SPECIMEN SOURCE: NORMAL

## 2021-01-01 PROCEDURE — 80069 RENAL FUNCTION PANEL: CPT

## 2021-01-01 PROCEDURE — 2580000003 HC RX 258: Performed by: INTERNAL MEDICINE

## 2021-01-01 PROCEDURE — 2700000000 HC OXYGEN THERAPY PER DAY

## 2021-01-01 PROCEDURE — 6370000000 HC RX 637 (ALT 250 FOR IP): Performed by: INTERNAL MEDICINE

## 2021-01-01 PROCEDURE — 6360000002 HC RX W HCPCS: Performed by: INTERNAL MEDICINE

## 2021-01-01 PROCEDURE — 87075 CULTR BACTERIA EXCEPT BLOOD: CPT

## 2021-01-01 PROCEDURE — 99214 OFFICE O/P EST MOD 30 MIN: CPT | Performed by: INTERNAL MEDICINE

## 2021-01-01 PROCEDURE — 97535 SELF CARE MNGMENT TRAINING: CPT

## 2021-01-01 PROCEDURE — 83615 LACTATE (LD) (LDH) ENZYME: CPT

## 2021-01-01 PROCEDURE — 71045 X-RAY EXAM CHEST 1 VIEW: CPT

## 2021-01-01 PROCEDURE — 88342 IMHCHEM/IMCYTCHM 1ST ANTB: CPT

## 2021-01-01 PROCEDURE — 99213 OFFICE O/P EST LOW 20 MIN: CPT

## 2021-01-01 PROCEDURE — 86140 C-REACTIVE PROTEIN: CPT

## 2021-01-01 PROCEDURE — 99214 OFFICE O/P EST MOD 30 MIN: CPT | Performed by: DERMATOLOGY

## 2021-01-01 PROCEDURE — 84145 PROCALCITONIN (PCT): CPT

## 2021-01-01 PROCEDURE — 82947 ASSAY GLUCOSE BLOOD QUANT: CPT

## 2021-01-01 PROCEDURE — 87077 CULTURE AEROBIC IDENTIFY: CPT

## 2021-01-01 PROCEDURE — 6360000002 HC RX W HCPCS: Performed by: STUDENT IN AN ORGANIZED HEALTH CARE EDUCATION/TRAINING PROGRAM

## 2021-01-01 PROCEDURE — 2500000003 HC RX 250 WO HCPCS: Performed by: INTERNAL MEDICINE

## 2021-01-01 PROCEDURE — 99214 OFFICE O/P EST MOD 30 MIN: CPT | Performed by: PODIATRIST

## 2021-01-01 PROCEDURE — 99497 ADVNCD CARE PLAN 30 MIN: CPT | Performed by: STUDENT IN AN ORGANIZED HEALTH CARE EDUCATION/TRAINING PROGRAM

## 2021-01-01 PROCEDURE — 11104 PUNCH BX SKIN SINGLE LESION: CPT | Performed by: PODIATRIST

## 2021-01-01 PROCEDURE — 99233 SBSQ HOSP IP/OBS HIGH 50: CPT | Performed by: STUDENT IN AN ORGANIZED HEALTH CARE EDUCATION/TRAINING PROGRAM

## 2021-01-01 PROCEDURE — 85027 COMPLETE CBC AUTOMATED: CPT

## 2021-01-01 PROCEDURE — 83605 ASSAY OF LACTIC ACID: CPT

## 2021-01-01 PROCEDURE — 2000000000 HC ICU R&B

## 2021-01-01 PROCEDURE — 93010 ELECTROCARDIOGRAM REPORT: CPT | Performed by: INTERNAL MEDICINE

## 2021-01-01 PROCEDURE — 97530 THERAPEUTIC ACTIVITIES: CPT

## 2021-01-01 PROCEDURE — 99233 SBSQ HOSP IP/OBS HIGH 50: CPT | Performed by: INTERNAL MEDICINE

## 2021-01-01 PROCEDURE — 87635 SARS-COV-2 COVID-19 AMP PRB: CPT

## 2021-01-01 PROCEDURE — 88305 TISSUE EXAM BY PATHOLOGIST: CPT

## 2021-01-01 PROCEDURE — 97161 PT EVAL LOW COMPLEX 20 MIN: CPT

## 2021-01-01 PROCEDURE — 85384 FIBRINOGEN ACTIVITY: CPT

## 2021-01-01 PROCEDURE — 11105 PUNCH BX SKIN EA SEP/ADDL: CPT | Performed by: DERMATOLOGY

## 2021-01-01 PROCEDURE — 73110 X-RAY EXAM OF WRIST: CPT

## 2021-01-01 PROCEDURE — 2500000003 HC RX 250 WO HCPCS: Performed by: NURSE PRACTITIONER

## 2021-01-01 PROCEDURE — 87176 TISSUE HOMOGENIZATION CULTR: CPT

## 2021-01-01 PROCEDURE — 99213 OFFICE O/P EST LOW 20 MIN: CPT | Performed by: INTERNAL MEDICINE

## 2021-01-01 PROCEDURE — 99232 SBSQ HOSP IP/OBS MODERATE 35: CPT | Performed by: INTERNAL MEDICINE

## 2021-01-01 PROCEDURE — 99213 OFFICE O/P EST LOW 20 MIN: CPT | Performed by: PODIATRIST

## 2021-01-01 PROCEDURE — 93922 UPR/L XTREMITY ART 2 LEVELS: CPT

## 2021-01-01 PROCEDURE — 99204 OFFICE O/P NEW MOD 45 MIN: CPT | Performed by: DERMATOLOGY

## 2021-01-01 PROCEDURE — 87186 SC STD MICRODIL/AGAR DIL: CPT

## 2021-01-01 PROCEDURE — 94640 AIRWAY INHALATION TREATMENT: CPT

## 2021-01-01 PROCEDURE — 97597 DBRDMT OPN WND 1ST 20 CM/<: CPT | Performed by: NURSE PRACTITIONER

## 2021-01-01 PROCEDURE — 6370000000 HC RX 637 (ALT 250 FOR IP): Performed by: STUDENT IN AN ORGANIZED HEALTH CARE EDUCATION/TRAINING PROGRAM

## 2021-01-01 PROCEDURE — 11043 DBRDMT MUSC&/FSCA 1ST 20/<: CPT

## 2021-01-01 PROCEDURE — 81003 URINALYSIS AUTO W/O SCOPE: CPT

## 2021-01-01 PROCEDURE — 82728 ASSAY OF FERRITIN: CPT

## 2021-01-01 PROCEDURE — 97597 DBRDMT OPN WND 1ST 20 CM/<: CPT | Performed by: PODIATRIST

## 2021-01-01 PROCEDURE — 93005 ELECTROCARDIOGRAM TRACING: CPT | Performed by: STUDENT IN AN ORGANIZED HEALTH CARE EDUCATION/TRAINING PROGRAM

## 2021-01-01 PROCEDURE — 36415 COLL VENOUS BLD VENIPUNCTURE: CPT

## 2021-01-01 PROCEDURE — 85055 RETICULATED PLATELET ASSAY: CPT

## 2021-01-01 PROCEDURE — 94660 CPAP INITIATION&MGMT: CPT

## 2021-01-01 PROCEDURE — 80048 BASIC METABOLIC PNL TOTAL CA: CPT

## 2021-01-01 PROCEDURE — 36600 WITHDRAWAL OF ARTERIAL BLOOD: CPT

## 2021-01-01 PROCEDURE — 87040 BLOOD CULTURE FOR BACTERIA: CPT

## 2021-01-01 PROCEDURE — 97116 GAIT TRAINING THERAPY: CPT

## 2021-01-01 PROCEDURE — 99205 OFFICE O/P NEW HI 60 MIN: CPT | Performed by: INTERNAL MEDICINE

## 2021-01-01 PROCEDURE — 29580 STRAPPING UNNA BOOT: CPT

## 2021-01-01 PROCEDURE — 99222 1ST HOSP IP/OBS MODERATE 55: CPT | Performed by: INTERNAL MEDICINE

## 2021-01-01 PROCEDURE — 97165 OT EVAL LOW COMPLEX 30 MIN: CPT

## 2021-01-01 PROCEDURE — 6370000000 HC RX 637 (ALT 250 FOR IP): Performed by: NURSE PRACTITIONER

## 2021-01-01 PROCEDURE — 99223 1ST HOSP IP/OBS HIGH 75: CPT | Performed by: INTERNAL MEDICINE

## 2021-01-01 PROCEDURE — 99213 OFFICE O/P EST LOW 20 MIN: CPT | Performed by: NURSE PRACTITIONER

## 2021-01-01 PROCEDURE — 80061 LIPID PANEL: CPT

## 2021-01-01 PROCEDURE — 99291 CRITICAL CARE FIRST HOUR: CPT | Performed by: INTERNAL MEDICINE

## 2021-01-01 PROCEDURE — 11104 PUNCH BX SKIN SINGLE LESION: CPT | Performed by: DERMATOLOGY

## 2021-01-01 PROCEDURE — 72146 MRI CHEST SPINE W/O DYE: CPT

## 2021-01-01 PROCEDURE — 87641 MR-STAPH DNA AMP PROBE: CPT

## 2021-01-01 PROCEDURE — 97597 DBRDMT OPN WND 1ST 20 CM/<: CPT

## 2021-01-01 PROCEDURE — 82803 BLOOD GASES ANY COMBINATION: CPT

## 2021-01-01 PROCEDURE — 99221 1ST HOSP IP/OBS SF/LOW 40: CPT | Performed by: DERMATOLOGY

## 2021-01-01 PROCEDURE — 99211 OFF/OP EST MAY X REQ PHY/QHP: CPT | Performed by: INTERNAL MEDICINE

## 2021-01-01 PROCEDURE — 2580000003 HC RX 258: Performed by: STUDENT IN AN ORGANIZED HEALTH CARE EDUCATION/TRAINING PROGRAM

## 2021-01-01 PROCEDURE — 1200000000 HC SEMI PRIVATE

## 2021-01-01 PROCEDURE — 83735 ASSAY OF MAGNESIUM: CPT

## 2021-01-01 PROCEDURE — 85025 COMPLETE CBC W/AUTO DIFF WBC: CPT

## 2021-01-01 PROCEDURE — 71250 CT THORAX DX C-: CPT

## 2021-01-01 PROCEDURE — 82044 UR ALBUMIN SEMIQUANTITATIVE: CPT | Performed by: NURSE PRACTITIONER

## 2021-01-01 PROCEDURE — 2060000000 HC ICU INTERMEDIATE R&B

## 2021-01-01 PROCEDURE — 6360000002 HC RX W HCPCS: Performed by: DERMATOLOGY

## 2021-01-01 PROCEDURE — 99282 EMERGENCY DEPT VISIT SF MDM: CPT

## 2021-01-01 PROCEDURE — 87205 SMEAR GRAM STAIN: CPT

## 2021-01-01 PROCEDURE — 96375 TX/PRO/DX INJ NEW DRUG ADDON: CPT

## 2021-01-01 PROCEDURE — 94761 N-INVAS EAR/PLS OXIMETRY MLT: CPT

## 2021-01-01 PROCEDURE — 86738 MYCOPLASMA ANTIBODY: CPT

## 2021-01-01 PROCEDURE — 99024 POSTOP FOLLOW-UP VISIT: CPT | Performed by: ORTHOPAEDIC SURGERY

## 2021-01-01 PROCEDURE — 99213 OFFICE O/P EST LOW 20 MIN: CPT | Performed by: PLASTIC SURGERY

## 2021-01-01 PROCEDURE — 2580000003 HC RX 258: Performed by: RADIOLOGY

## 2021-01-01 PROCEDURE — 99212 OFFICE O/P EST SF 10 MIN: CPT

## 2021-01-01 PROCEDURE — 2709999900 IR FLUORO GUIDED CVA DEVICE PLMT/REPLACE/REMOVAL

## 2021-01-01 PROCEDURE — 99222 1ST HOSP IP/OBS MODERATE 55: CPT | Performed by: FAMILY MEDICINE

## 2021-01-01 PROCEDURE — 93923 UPR/LXTR ART STDY 3+ LVLS: CPT

## 2021-01-01 PROCEDURE — 87070 CULTURE OTHR SPECIMN AEROBIC: CPT

## 2021-01-01 PROCEDURE — 51798 US URINE CAPACITY MEASURE: CPT

## 2021-01-01 PROCEDURE — C9113 INJ PANTOPRAZOLE SODIUM, VIA: HCPCS | Performed by: INTERNAL MEDICINE

## 2021-01-01 PROCEDURE — 8E0ZXY6 ISOLATION: ICD-10-PCS | Performed by: INTERNAL MEDICINE

## 2021-01-01 PROCEDURE — 99213 OFFICE O/P EST LOW 20 MIN: CPT | Performed by: DERMATOLOGY

## 2021-01-01 PROCEDURE — 11043 DBRDMT MUSC&/FSCA 1ST 20/<: CPT | Performed by: PODIATRIST

## 2021-01-01 PROCEDURE — 99999 PR OFFICE/OUTPT VISIT,PROCEDURE ONLY: CPT | Performed by: DERMATOLOGY

## 2021-01-01 PROCEDURE — APPSS30 APP SPLIT SHARED TIME 16-30 MINUTES: Performed by: NURSE PRACTITIONER

## 2021-01-01 PROCEDURE — 80053 COMPREHEN METABOLIC PANEL: CPT

## 2021-01-01 PROCEDURE — XW033H5 INTRODUCTION OF TOCILIZUMAB INTO PERIPHERAL VEIN, PERCUTANEOUS APPROACH, NEW TECHNOLOGY GROUP 5: ICD-10-PCS | Performed by: INTERNAL MEDICINE

## 2021-01-01 PROCEDURE — 2500000003 HC RX 250 WO HCPCS: Performed by: STUDENT IN AN ORGANIZED HEALTH CARE EDUCATION/TRAINING PROGRAM

## 2021-01-01 PROCEDURE — G0438 PPPS, INITIAL VISIT: HCPCS | Performed by: NURSE PRACTITIONER

## 2021-01-01 PROCEDURE — 84484 ASSAY OF TROPONIN QUANT: CPT

## 2021-01-01 PROCEDURE — 36573 INSJ PICC RS&I 5 YR+: CPT | Performed by: RADIOLOGY

## 2021-01-01 PROCEDURE — 1111F DSCHRG MED/CURRENT MED MERGE: CPT | Performed by: NURSE PRACTITIONER

## 2021-01-01 PROCEDURE — 74018 RADEX ABDOMEN 1 VIEW: CPT

## 2021-01-01 PROCEDURE — 85379 FIBRIN DEGRADATION QUANT: CPT

## 2021-01-01 PROCEDURE — APPSS45 APP SPLIT SHARED TIME 31-45 MINUTES: Performed by: NURSE PRACTITIONER

## 2021-01-01 PROCEDURE — 96374 THER/PROPH/DIAG INJ IV PUSH: CPT

## 2021-01-01 PROCEDURE — 99202 OFFICE O/P NEW SF 15 MIN: CPT

## 2021-01-01 PROCEDURE — 84550 ASSAY OF BLOOD/URIC ACID: CPT

## 2021-01-01 PROCEDURE — 82306 VITAMIN D 25 HYDROXY: CPT

## 2021-01-01 PROCEDURE — 02HV33Z INSERTION OF INFUSION DEVICE INTO SUPERIOR VENA CAVA, PERCUTANEOUS APPROACH: ICD-10-PCS | Performed by: STUDENT IN AN ORGANIZED HEALTH CARE EDUCATION/TRAINING PROGRAM

## 2021-01-01 PROCEDURE — 11104 PUNCH BX SKIN SINGLE LESION: CPT

## 2021-01-01 RX ORDER — LIDOCAINE HYDROCHLORIDE 20 MG/ML
JELLY TOPICAL ONCE
Status: CANCELLED | OUTPATIENT
Start: 2021-01-01 | End: 2021-01-01

## 2021-01-01 RX ORDER — BACITRACIN, NEOMYCIN, POLYMYXIN B 400; 3.5; 5 [USP'U]/G; MG/G; [USP'U]/G
OINTMENT TOPICAL ONCE
Status: CANCELLED | OUTPATIENT
Start: 2021-01-01 | End: 2021-01-01

## 2021-01-01 RX ORDER — METHYLPREDNISOLONE SODIUM SUCCINATE 40 MG/ML
40 INJECTION, POWDER, LYOPHILIZED, FOR SOLUTION INTRAMUSCULAR; INTRAVENOUS EVERY 12 HOURS
Status: DISCONTINUED | OUTPATIENT
Start: 2021-01-01 | End: 2021-01-01

## 2021-01-01 RX ORDER — ONDANSETRON 4 MG/1
4 TABLET, ORALLY DISINTEGRATING ORAL EVERY 8 HOURS PRN
Status: DISCONTINUED | OUTPATIENT
Start: 2021-01-01 | End: 2021-12-16 | Stop reason: HOSPADM

## 2021-01-01 RX ORDER — LIDOCAINE 40 MG/G
CREAM TOPICAL ONCE
Status: CANCELLED | OUTPATIENT
Start: 2021-01-01 | End: 2021-01-01

## 2021-01-01 RX ORDER — LIDOCAINE 50 MG/G
OINTMENT TOPICAL ONCE
Status: CANCELLED | OUTPATIENT
Start: 2021-01-01 | End: 2021-01-01

## 2021-01-01 RX ORDER — GENTAMICIN SULFATE 1 MG/G
OINTMENT TOPICAL ONCE
Status: CANCELLED | OUTPATIENT
Start: 2021-01-01 | End: 2021-01-01

## 2021-01-01 RX ORDER — INFLIXIMAB 100 MG/10ML
5 INJECTION, POWDER, LYOPHILIZED, FOR SOLUTION INTRAVENOUS SEE ADMIN INSTRUCTIONS
COMMUNITY

## 2021-01-01 RX ORDER — FOLIC ACID 1 MG/1
TABLET ORAL
Qty: 30 TABLET | Refills: 5 | Status: SHIPPED | OUTPATIENT
Start: 2021-01-01 | End: 2021-01-01 | Stop reason: SDUPTHER

## 2021-01-01 RX ORDER — DEXAMETHASONE 4 MG/1
10 TABLET ORAL DAILY
Status: DISCONTINUED | OUTPATIENT
Start: 2021-01-01 | End: 2021-01-01

## 2021-01-01 RX ORDER — BACITRACIN ZINC AND POLYMYXIN B SULFATE 500; 1000 [USP'U]/G; [USP'U]/G
OINTMENT TOPICAL ONCE
Status: CANCELLED | OUTPATIENT
Start: 2021-01-01 | End: 2021-01-01

## 2021-01-01 RX ORDER — SODIUM CHLORIDE 0.9 % (FLUSH) 0.9 %
5-40 SYRINGE (ML) INJECTION PRN
Status: DISCONTINUED | OUTPATIENT
Start: 2021-01-01 | End: 2021-12-16 | Stop reason: HOSPADM

## 2021-01-01 RX ORDER — BISACODYL 10 MG
10 SUPPOSITORY, RECTAL RECTAL DAILY PRN
Status: DISCONTINUED | OUTPATIENT
Start: 2021-01-01 | End: 2021-12-16 | Stop reason: HOSPADM

## 2021-01-01 RX ORDER — BETAMETHASONE DIPROPIONATE 0.05 %
OINTMENT (GRAM) TOPICAL ONCE
Status: CANCELLED | OUTPATIENT
Start: 2021-01-01 | End: 2021-01-01

## 2021-01-01 RX ORDER — LORAZEPAM 1 MG/1
1 TABLET ORAL
Status: DISCONTINUED | OUTPATIENT
Start: 2021-01-01 | End: 2021-01-01

## 2021-01-01 RX ORDER — DOXYCYCLINE HYCLATE 100 MG/1
CAPSULE ORAL
COMMUNITY
Start: 2021-01-01

## 2021-01-01 RX ORDER — GINSENG 100 MG
CAPSULE ORAL ONCE
Status: CANCELLED | OUTPATIENT
Start: 2021-01-01 | End: 2021-01-01

## 2021-01-01 RX ORDER — CLOBETASOL PROPIONATE 0.5 MG/G
OINTMENT TOPICAL ONCE
Status: CANCELLED | OUTPATIENT
Start: 2021-01-01 | End: 2021-01-01

## 2021-01-01 RX ORDER — ASCORBIC ACID 500 MG
500 TABLET ORAL 2 TIMES DAILY
Status: DISCONTINUED | OUTPATIENT
Start: 2021-01-01 | End: 2021-01-01

## 2021-01-01 RX ORDER — LIDOCAINE HYDROCHLORIDE 40 MG/ML
SOLUTION TOPICAL ONCE
Status: COMPLETED | OUTPATIENT
Start: 2021-01-01 | End: 2021-01-01

## 2021-01-01 RX ORDER — OXYCODONE HYDROCHLORIDE AND ACETAMINOPHEN 5; 325 MG/1; MG/1
1 TABLET ORAL EVERY 4 HOURS PRN
Status: DISCONTINUED | OUTPATIENT
Start: 2021-01-01 | End: 2021-01-01

## 2021-01-01 RX ORDER — TRAMADOL HYDROCHLORIDE 50 MG/1
50 TABLET ORAL 4 TIMES DAILY
Status: DISCONTINUED | OUTPATIENT
Start: 2021-01-01 | End: 2021-01-01

## 2021-01-01 RX ORDER — METHYLPREDNISOLONE SODIUM SUCCINATE 40 MG/ML
30 INJECTION, POWDER, LYOPHILIZED, FOR SOLUTION INTRAMUSCULAR; INTRAVENOUS EVERY 8 HOURS
Status: DISCONTINUED | OUTPATIENT
Start: 2021-01-01 | End: 2021-01-01

## 2021-01-01 RX ORDER — LIDOCAINE HYDROCHLORIDE 40 MG/ML
SOLUTION TOPICAL ONCE
Status: CANCELLED | OUTPATIENT
Start: 2021-01-01 | End: 2021-01-01

## 2021-01-01 RX ORDER — GABAPENTIN 300 MG/1
300 CAPSULE ORAL 3 TIMES DAILY
Status: DISCONTINUED | OUTPATIENT
Start: 2021-01-01 | End: 2021-01-01

## 2021-01-01 RX ORDER — MECLIZINE HYDROCHLORIDE 25 MG/1
TABLET ORAL
Qty: 20 TABLET | Refills: 1 | OUTPATIENT
Start: 2021-01-01

## 2021-01-01 RX ORDER — CLOBETASOL PROPIONATE 0.5 MG/G
OINTMENT TOPICAL 2 TIMES DAILY
Status: DISCONTINUED | OUTPATIENT
Start: 2021-01-01 | End: 2021-01-01 | Stop reason: HOSPADM

## 2021-01-01 RX ORDER — LIDOCAINE HYDROCHLORIDE AND EPINEPHRINE 10; 10 MG/ML; UG/ML
0.5 INJECTION, SOLUTION INFILTRATION; PERINEURAL ONCE
Status: DISCONTINUED | OUTPATIENT
Start: 2021-01-01 | End: 2021-01-01 | Stop reason: HOSPADM

## 2021-01-01 RX ORDER — ATORVASTATIN CALCIUM 40 MG/1
40 TABLET, FILM COATED ORAL NIGHTLY
Status: DISCONTINUED | OUTPATIENT
Start: 2021-01-01 | End: 2021-01-01

## 2021-01-01 RX ORDER — 0.9 % SODIUM CHLORIDE 0.9 %
1000 INTRAVENOUS SOLUTION INTRAVENOUS ONCE
Status: COMPLETED | OUTPATIENT
Start: 2021-01-01 | End: 2021-01-01

## 2021-01-01 RX ORDER — LORAZEPAM 2 MG/1
4 TABLET ORAL
Status: DISCONTINUED | OUTPATIENT
Start: 2021-01-01 | End: 2021-01-01

## 2021-01-01 RX ORDER — LIDOCAINE HYDROCHLORIDE 20 MG/ML
JELLY TOPICAL PRN
Status: DISCONTINUED | OUTPATIENT
Start: 2021-01-01 | End: 2021-12-16 | Stop reason: HOSPADM

## 2021-01-01 RX ORDER — METOPROLOL TARTRATE 37.5 MG/1
TABLET, FILM COATED ORAL
Qty: 60 TABLET | Refills: 5 | Status: SHIPPED | OUTPATIENT
Start: 2021-01-01

## 2021-01-01 RX ORDER — GLYCOPYRROLATE 0.2 MG/ML
0.2 INJECTION INTRAMUSCULAR; INTRAVENOUS EVERY 4 HOURS PRN
Status: DISCONTINUED | OUTPATIENT
Start: 2021-01-01 | End: 2021-12-16 | Stop reason: HOSPADM

## 2021-01-01 RX ORDER — GENTAMICIN SULFATE 1 MG/G
OINTMENT TOPICAL
Qty: 30 G | Refills: 2 | Status: ON HOLD | OUTPATIENT
Start: 2021-01-01 | End: 2021-01-01

## 2021-01-01 RX ORDER — ONDANSETRON 2 MG/ML
4 INJECTION INTRAMUSCULAR; INTRAVENOUS EVERY 6 HOURS PRN
Status: DISCONTINUED | OUTPATIENT
Start: 2021-01-01 | End: 2021-12-16 | Stop reason: HOSPADM

## 2021-01-01 RX ORDER — LORAZEPAM 2 MG/ML
3 INJECTION INTRAMUSCULAR
Status: DISCONTINUED | OUTPATIENT
Start: 2021-01-01 | End: 2021-01-01

## 2021-01-01 RX ORDER — DAPSONE 25 MG/1
TABLET ORAL
Qty: 60 TABLET | Refills: 0 | Status: SHIPPED | OUTPATIENT
Start: 2021-01-01 | End: 2021-01-01

## 2021-01-01 RX ORDER — DAPSONE 25 MG/1
50 TABLET ORAL DAILY
Qty: 60 TABLET | Refills: 2 | Status: SHIPPED | OUTPATIENT
Start: 2021-01-01 | End: 2022-01-02

## 2021-01-01 RX ORDER — DEXMEDETOMIDINE HYDROCHLORIDE 4 UG/ML
.2-1.4 INJECTION, SOLUTION INTRAVENOUS CONTINUOUS
Status: DISCONTINUED | OUTPATIENT
Start: 2021-01-01 | End: 2021-01-01

## 2021-01-01 RX ORDER — CLOBETASOL PROPIONATE 0.5 MG/G
OINTMENT TOPICAL
Qty: 60 G | Refills: 2 | Status: SHIPPED | OUTPATIENT
Start: 2021-01-01 | End: 2021-01-01

## 2021-01-01 RX ORDER — DEXTROSE MONOHYDRATE 50 MG/ML
INJECTION, SOLUTION INTRAVENOUS CONTINUOUS
Status: DISCONTINUED | OUTPATIENT
Start: 2021-01-01 | End: 2021-01-01

## 2021-01-01 RX ORDER — ACETAMINOPHEN 650 MG/1
650 SUPPOSITORY RECTAL EVERY 6 HOURS PRN
Status: DISCONTINUED | OUTPATIENT
Start: 2021-01-01 | End: 2021-01-01

## 2021-01-01 RX ORDER — MORPHINE SULFATE 4 MG/ML
4 INJECTION, SOLUTION INTRAMUSCULAR; INTRAVENOUS
Status: DISCONTINUED | OUTPATIENT
Start: 2021-01-01 | End: 2021-01-01 | Stop reason: HOSPADM

## 2021-01-01 RX ORDER — M-VIT,TX,IRON,MINS/CALC/FOLIC 27MG-0.4MG
1 TABLET ORAL DAILY
Status: DISCONTINUED | OUTPATIENT
Start: 2021-01-01 | End: 2021-01-01

## 2021-01-01 RX ORDER — LIDOCAINE 50 MG/G
1 PATCH TOPICAL DAILY
Qty: 30 PATCH | Refills: 0 | Status: SHIPPED | OUTPATIENT
Start: 2021-01-01 | End: 2021-01-01

## 2021-01-01 RX ORDER — OXYCODONE HYDROCHLORIDE AND ACETAMINOPHEN 5; 325 MG/1; MG/1
1 TABLET ORAL EVERY 4 HOURS PRN
Status: DISCONTINUED | OUTPATIENT
Start: 2021-01-01 | End: 2021-01-01 | Stop reason: HOSPADM

## 2021-01-01 RX ORDER — ATORVASTATIN CALCIUM 10 MG/1
10 TABLET, FILM COATED ORAL DAILY
Qty: 30 TABLET | Refills: 2 | Status: SHIPPED | OUTPATIENT
Start: 2021-01-01

## 2021-01-01 RX ORDER — FENTANYL CITRATE 50 UG/ML
50 INJECTION, SOLUTION INTRAMUSCULAR; INTRAVENOUS
Status: DISCONTINUED | OUTPATIENT
Start: 2021-01-01 | End: 2021-01-01

## 2021-01-01 RX ORDER — ATORVASTATIN CALCIUM 10 MG/1
10 TABLET, FILM COATED ORAL DAILY
Status: DISCONTINUED | OUTPATIENT
Start: 2021-01-01 | End: 2021-01-01

## 2021-01-01 RX ORDER — LIDOCAINE HYDROCHLORIDE 20 MG/ML
JELLY TOPICAL
Qty: 30 ML | Refills: 2 | Status: SHIPPED | OUTPATIENT
Start: 2021-01-01

## 2021-01-01 RX ORDER — ZINC SULFATE 50(220)MG
50 CAPSULE ORAL DAILY
Status: DISCONTINUED | OUTPATIENT
Start: 2021-01-01 | End: 2021-01-01

## 2021-01-01 RX ORDER — LIDOCAINE HYDROCHLORIDE AND EPINEPHRINE BITARTRATE 20; .01 MG/ML; MG/ML
20 INJECTION, SOLUTION SUBCUTANEOUS ONCE
Status: DISCONTINUED | OUTPATIENT
Start: 2021-01-01 | End: 2021-01-01 | Stop reason: HOSPADM

## 2021-01-01 RX ORDER — POLYETHYLENE GLYCOL 3350 17 G/17G
17 POWDER, FOR SOLUTION ORAL DAILY PRN
Status: DISCONTINUED | OUTPATIENT
Start: 2021-01-01 | End: 2021-01-01

## 2021-01-01 RX ORDER — FENTANYL CITRATE 50 UG/ML
50 INJECTION, SOLUTION INTRAMUSCULAR; INTRAVENOUS EVERY 4 HOURS PRN
Status: DISCONTINUED | OUTPATIENT
Start: 2021-01-01 | End: 2021-01-01

## 2021-01-01 RX ORDER — LORAZEPAM 2 MG/ML
1 INJECTION INTRAMUSCULAR
Status: DISCONTINUED | OUTPATIENT
Start: 2021-01-01 | End: 2021-12-16 | Stop reason: HOSPADM

## 2021-01-01 RX ORDER — VITAMIN B COMPLEX
2000 TABLET ORAL DAILY
Status: DISCONTINUED | OUTPATIENT
Start: 2021-01-01 | End: 2021-01-01

## 2021-01-01 RX ORDER — LORAZEPAM 2 MG/ML
1 INJECTION INTRAMUSCULAR EVERY 4 HOURS PRN
Status: DISCONTINUED | OUTPATIENT
Start: 2021-01-01 | End: 2021-01-01

## 2021-01-01 RX ORDER — LIDOCAINE HYDROCHLORIDE 20 MG/ML
JELLY TOPICAL PRN
Status: DISCONTINUED | OUTPATIENT
Start: 2021-01-01 | End: 2021-01-01 | Stop reason: HOSPADM

## 2021-01-01 RX ORDER — SODIUM CHLORIDE 0.9 % (FLUSH) 0.9 %
5-40 SYRINGE (ML) INJECTION EVERY 12 HOURS SCHEDULED
Status: DISCONTINUED | OUTPATIENT
Start: 2021-01-01 | End: 2021-01-01

## 2021-01-01 RX ORDER — METOPROLOL TARTRATE 50 MG/1
75 TABLET, FILM COATED ORAL 2 TIMES DAILY
Status: DISCONTINUED | OUTPATIENT
Start: 2021-01-01 | End: 2021-01-01

## 2021-01-01 RX ORDER — GENTAMICIN SULFATE 1 MG/G
OINTMENT TOPICAL
COMMUNITY
Start: 2021-01-01 | End: 2021-01-01

## 2021-01-01 RX ORDER — PREDNISONE 10 MG/1
10 TABLET ORAL DAILY
Qty: 30 TABLET | Refills: 0 | Status: SHIPPED | OUTPATIENT
Start: 2021-01-01 | End: 2021-01-01

## 2021-01-01 RX ORDER — OXYCODONE HYDROCHLORIDE AND ACETAMINOPHEN 5; 325 MG/1; MG/1
2 TABLET ORAL EVERY 4 HOURS PRN
Status: DISCONTINUED | OUTPATIENT
Start: 2021-01-01 | End: 2021-01-01 | Stop reason: HOSPADM

## 2021-01-01 RX ORDER — CIPROFLOXACIN 500 MG/1
500 TABLET, FILM COATED ORAL 2 TIMES DAILY
Qty: 28 TABLET | Refills: 0 | Status: SHIPPED | OUTPATIENT
Start: 2021-01-01 | End: 2021-01-01

## 2021-01-01 RX ORDER — LIDOCAINE HYDROCHLORIDE 40 MG/ML
SOLUTION TOPICAL ONCE
Status: DISCONTINUED | OUTPATIENT
Start: 2021-01-01 | End: 2021-01-01 | Stop reason: HOSPADM

## 2021-01-01 RX ORDER — POLYETHYLENE GLYCOL 3350 17 G/17G
17 POWDER, FOR SOLUTION ORAL DAILY PRN
Status: DISCONTINUED | OUTPATIENT
Start: 2021-01-01 | End: 2021-01-01 | Stop reason: HOSPADM

## 2021-01-01 RX ORDER — DEXAMETHASONE SODIUM PHOSPHATE 10 MG/ML
6 INJECTION INTRAMUSCULAR; INTRAVENOUS ONCE
Status: COMPLETED | OUTPATIENT
Start: 2021-01-01 | End: 2021-01-01

## 2021-01-01 RX ORDER — MORPHINE SULFATE 2 MG/ML
2 INJECTION, SOLUTION INTRAMUSCULAR; INTRAVENOUS
Status: DISCONTINUED | OUTPATIENT
Start: 2021-01-01 | End: 2021-01-01 | Stop reason: HOSPADM

## 2021-01-01 RX ORDER — POTASSIUM CHLORIDE 7.45 MG/ML
20 INJECTION INTRAVENOUS
Status: ACTIVE | OUTPATIENT
Start: 2021-01-01 | End: 2021-01-01

## 2021-01-01 RX ORDER — ASPIRIN 81 MG/1
81 TABLET ORAL DAILY
Status: DISCONTINUED | OUTPATIENT
Start: 2021-01-01 | End: 2021-01-01 | Stop reason: HOSPADM

## 2021-01-01 RX ORDER — SODIUM CHLORIDE 0.9 % (FLUSH) 0.9 %
10 SYRINGE (ML) INJECTION EVERY 12 HOURS SCHEDULED
Status: DISCONTINUED | OUTPATIENT
Start: 2021-01-01 | End: 2021-01-01 | Stop reason: HOSPADM

## 2021-01-01 RX ORDER — PREDNISONE 10 MG/1
TABLET ORAL
Qty: 30 TABLET | Refills: 0 | OUTPATIENT
Start: 2021-01-01

## 2021-01-01 RX ORDER — PANTOPRAZOLE SODIUM 40 MG/1
40 TABLET, DELAYED RELEASE ORAL
Status: DISCONTINUED | OUTPATIENT
Start: 2021-01-01 | End: 2021-01-01 | Stop reason: HOSPADM

## 2021-01-01 RX ORDER — PROMETHAZINE HYDROCHLORIDE 25 MG/1
12.5 TABLET ORAL EVERY 6 HOURS PRN
Status: DISCONTINUED | OUTPATIENT
Start: 2021-01-01 | End: 2021-01-01 | Stop reason: HOSPADM

## 2021-01-01 RX ORDER — PREDNISONE 10 MG/1
7.5 TABLET ORAL DAILY
COMMUNITY
Start: 2021-01-01

## 2021-01-01 RX ORDER — FAMOTIDINE 20 MG/1
20 TABLET, FILM COATED ORAL 2 TIMES DAILY
Status: DISCONTINUED | OUTPATIENT
Start: 2021-01-01 | End: 2021-01-01

## 2021-01-01 RX ORDER — DOXYCYCLINE HYCLATE 100 MG
100 TABLET ORAL DAILY
Status: DISCONTINUED | OUTPATIENT
Start: 2021-01-01 | End: 2021-01-01

## 2021-01-01 RX ORDER — ALBUTEROL SULFATE 90 UG/1
2 AEROSOL, METERED RESPIRATORY (INHALATION) 4 TIMES DAILY
Status: DISCONTINUED | OUTPATIENT
Start: 2021-01-01 | End: 2021-01-01

## 2021-01-01 RX ORDER — SODIUM CHLORIDE 0.9 % (FLUSH) 0.9 %
5-40 SYRINGE (ML) INJECTION EVERY 12 HOURS SCHEDULED
Status: DISCONTINUED | OUTPATIENT
Start: 2021-01-01 | End: 2021-01-01 | Stop reason: HOSPADM

## 2021-01-01 RX ORDER — LORAZEPAM 2 MG/ML
1 INJECTION INTRAMUSCULAR
Status: DISCONTINUED | OUTPATIENT
Start: 2021-01-01 | End: 2021-01-01

## 2021-01-01 RX ORDER — FENTANYL CITRATE 50 UG/ML
50 INJECTION, SOLUTION INTRAMUSCULAR; INTRAVENOUS
Status: DISCONTINUED | OUTPATIENT
Start: 2021-01-01 | End: 2021-12-16 | Stop reason: HOSPADM

## 2021-01-01 RX ORDER — ASCORBIC ACID 500 MG
500 TABLET ORAL 2 TIMES DAILY
Status: DISCONTINUED | OUTPATIENT
Start: 2021-01-01 | End: 2021-01-01 | Stop reason: HOSPADM

## 2021-01-01 RX ORDER — LINEZOLID 2 MG/ML
600 INJECTION, SOLUTION INTRAVENOUS EVERY 12 HOURS
Status: DISCONTINUED | OUTPATIENT
Start: 2021-01-01 | End: 2021-01-01

## 2021-01-01 RX ORDER — PANTOPRAZOLE SODIUM 40 MG/10ML
40 INJECTION, POWDER, LYOPHILIZED, FOR SOLUTION INTRAVENOUS DAILY
Status: DISCONTINUED | OUTPATIENT
Start: 2021-01-01 | End: 2021-01-01

## 2021-01-01 RX ORDER — MECLIZINE HYDROCHLORIDE CHEWABLE TABLETS 25 MG/1
25 TABLET, CHEWABLE ORAL 3 TIMES DAILY PRN
COMMUNITY

## 2021-01-01 RX ORDER — MULTIVIT-MIN/FERROUS GLUCONATE 9 MG/15 ML
15 LIQUID (ML) ORAL DAILY
Status: DISCONTINUED | OUTPATIENT
Start: 2021-01-01 | End: 2021-01-01

## 2021-01-01 RX ORDER — ONDANSETRON 2 MG/ML
4 INJECTION INTRAMUSCULAR; INTRAVENOUS ONCE
Status: COMPLETED | OUTPATIENT
Start: 2021-01-01 | End: 2021-01-01

## 2021-01-01 RX ORDER — CLOPIDOGREL BISULFATE 75 MG/1
75 TABLET ORAL DAILY
Status: DISCONTINUED | OUTPATIENT
Start: 2021-01-01 | End: 2021-01-01 | Stop reason: HOSPADM

## 2021-01-01 RX ORDER — CYCLOSPORINE 25 MG/1
125 CAPSULE, LIQUID FILLED ORAL 2 TIMES DAILY
Qty: 60 CAPSULE | Refills: 3 | Status: SHIPPED | OUTPATIENT
Start: 2021-01-01 | End: 2021-01-01 | Stop reason: SDUPTHER

## 2021-01-01 RX ORDER — METHYLPREDNISOLONE SODIUM SUCCINATE 40 MG/ML
30 INJECTION, POWDER, LYOPHILIZED, FOR SOLUTION INTRAMUSCULAR; INTRAVENOUS EVERY 12 HOURS
Status: DISCONTINUED | OUTPATIENT
Start: 2021-01-01 | End: 2021-01-01

## 2021-01-01 RX ORDER — LIDOCAINE HYDROCHLORIDE AND EPINEPHRINE 10; 10 MG/ML; UG/ML
1 INJECTION, SOLUTION INFILTRATION; PERINEURAL ONCE
Status: DISCONTINUED | OUTPATIENT
Start: 2021-01-01 | End: 2021-01-01 | Stop reason: HOSPADM

## 2021-01-01 RX ORDER — TRAMADOL HYDROCHLORIDE 50 MG/1
50 TABLET ORAL EVERY 6 HOURS PRN
Status: DISCONTINUED | OUTPATIENT
Start: 2021-01-01 | End: 2021-01-01

## 2021-01-01 RX ORDER — DAPSONE 25 MG/1
50 TABLET ORAL DAILY
Qty: 60 TABLET | Refills: 1 | Status: SHIPPED | OUTPATIENT
Start: 2021-01-01 | End: 2021-01-01

## 2021-01-01 RX ORDER — ACETAMINOPHEN 325 MG/1
650 TABLET ORAL EVERY 6 HOURS PRN
Status: DISCONTINUED | OUTPATIENT
Start: 2021-01-01 | End: 2021-01-01 | Stop reason: HOSPADM

## 2021-01-01 RX ORDER — ONDANSETRON 2 MG/ML
4 INJECTION INTRAMUSCULAR; INTRAVENOUS EVERY 6 HOURS PRN
Status: DISCONTINUED | OUTPATIENT
Start: 2021-01-01 | End: 2021-01-01 | Stop reason: HOSPADM

## 2021-01-01 RX ORDER — AMOXICILLIN AND CLAVULANATE POTASSIUM 875; 125 MG/1; MG/1
1 TABLET, FILM COATED ORAL 2 TIMES DAILY
Qty: 28 TABLET | Refills: 0 | Status: SHIPPED | OUTPATIENT
Start: 2021-01-01 | End: 2021-01-01

## 2021-01-01 RX ORDER — METOPROLOL TARTRATE 5 MG/5ML
2.5 INJECTION INTRAVENOUS ONCE
Status: COMPLETED | OUTPATIENT
Start: 2021-01-01 | End: 2021-01-01

## 2021-01-01 RX ORDER — ACETAMINOPHEN 650 MG/1
650 SUPPOSITORY RECTAL EVERY 4 HOURS PRN
Status: DISCONTINUED | OUTPATIENT
Start: 2021-01-01 | End: 2021-12-16 | Stop reason: HOSPADM

## 2021-01-01 RX ORDER — PREDNISONE 20 MG/1
60 TABLET ORAL DAILY
Qty: 30 TABLET | Refills: 0 | Status: SHIPPED | OUTPATIENT
Start: 2021-01-01 | End: 2021-01-01

## 2021-01-01 RX ORDER — SODIUM CHLORIDE 0.9 % (FLUSH) 0.9 %
5-40 SYRINGE (ML) INJECTION PRN
Status: DISCONTINUED | OUTPATIENT
Start: 2021-01-01 | End: 2021-01-01

## 2021-01-01 RX ORDER — KETOCONAZOLE 20 MG/G
CREAM TOPICAL DAILY
Status: DISCONTINUED | OUTPATIENT
Start: 2021-01-01 | End: 2021-01-01

## 2021-01-01 RX ORDER — CYCLOSPORINE 25 MG/1
1 CAPSULE, LIQUID FILLED ORAL 2 TIMES DAILY
Qty: 180 CAPSULE | Refills: 0 | Status: SHIPPED | OUTPATIENT
Start: 2021-01-01 | End: 2021-01-01

## 2021-01-01 RX ORDER — SODIUM CHLORIDE 9 MG/ML
25 INJECTION, SOLUTION INTRAVENOUS PRN
Status: DISCONTINUED | OUTPATIENT
Start: 2021-01-01 | End: 2021-01-01

## 2021-01-01 RX ORDER — GABAPENTIN 300 MG/1
300 CAPSULE ORAL 2 TIMES DAILY
Status: DISCONTINUED | OUTPATIENT
Start: 2021-01-01 | End: 2021-01-01

## 2021-01-01 RX ORDER — ACETAMINOPHEN 325 MG/1
650 TABLET ORAL EVERY 6 HOURS PRN
Status: DISCONTINUED | OUTPATIENT
Start: 2021-01-01 | End: 2021-01-01

## 2021-01-01 RX ORDER — CLOPIDOGREL BISULFATE 75 MG/1
75 TABLET ORAL DAILY
Status: DISCONTINUED | OUTPATIENT
Start: 2021-01-01 | End: 2021-01-01

## 2021-01-01 RX ORDER — THIAMINE HYDROCHLORIDE 100 MG/ML
100 INJECTION, SOLUTION INTRAMUSCULAR; INTRAVENOUS DAILY
Status: DISCONTINUED | OUTPATIENT
Start: 2021-01-01 | End: 2021-01-01

## 2021-01-01 RX ORDER — FLUCONAZOLE 100 MG/1
100 TABLET ORAL DAILY
Qty: 5 TABLET | Refills: 0 | Status: SHIPPED | OUTPATIENT
Start: 2021-01-01 | End: 2021-01-01

## 2021-01-01 RX ORDER — PREDNISONE 20 MG/1
60 TABLET ORAL DAILY
Qty: 21 TABLET | Refills: 0 | Status: SHIPPED | OUTPATIENT
Start: 2021-01-01 | End: 2021-01-01 | Stop reason: ALTCHOICE

## 2021-01-01 RX ORDER — PANTOPRAZOLE SODIUM 40 MG/1
40 TABLET, DELAYED RELEASE ORAL
Status: DISCONTINUED | OUTPATIENT
Start: 2021-01-01 | End: 2021-01-01

## 2021-01-01 RX ORDER — CIPROFLOXACIN 500 MG/1
500 TABLET, FILM COATED ORAL 2 TIMES DAILY
Qty: 20 TABLET | Refills: 0 | Status: SHIPPED | OUTPATIENT
Start: 2021-01-01 | End: 2021-01-01

## 2021-01-01 RX ORDER — FOLIC ACID 1 MG/1
TABLET ORAL
Qty: 30 TABLET | Refills: 5 | Status: SHIPPED | OUTPATIENT
Start: 2021-01-01

## 2021-01-01 RX ORDER — LORAZEPAM 2 MG/ML
2 INJECTION INTRAMUSCULAR
Status: DISCONTINUED | OUTPATIENT
Start: 2021-01-01 | End: 2021-01-01

## 2021-01-01 RX ORDER — MORPHINE SULFATE 2 MG/ML
1 INJECTION, SOLUTION INTRAMUSCULAR; INTRAVENOUS ONCE
Status: COMPLETED | OUTPATIENT
Start: 2021-01-01 | End: 2021-01-01

## 2021-01-01 RX ORDER — POTASSIUM CHLORIDE 7.45 MG/ML
10 INJECTION INTRAVENOUS
Status: COMPLETED | OUTPATIENT
Start: 2021-01-01 | End: 2021-01-01

## 2021-01-01 RX ORDER — MECLIZINE HYDROCHLORIDE 25 MG/1
25 TABLET ORAL 2 TIMES DAILY PRN
Qty: 20 TABLET | Refills: 1 | Status: SHIPPED | OUTPATIENT
Start: 2021-01-01 | End: 2021-01-01

## 2021-01-01 RX ORDER — CEFEPIME HYDROCHLORIDE 2 G/1
INJECTION, POWDER, FOR SOLUTION INTRAVENOUS
COMMUNITY
Start: 2021-01-01 | End: 2021-01-01 | Stop reason: ALTCHOICE

## 2021-01-01 RX ORDER — CLOBETASOL PROPIONATE 0.5 MG/G
OINTMENT TOPICAL
Qty: 60 G | Refills: 2 | Status: SHIPPED | OUTPATIENT
Start: 2021-01-01 | End: 2021-01-01 | Stop reason: SDUPTHER

## 2021-01-01 RX ORDER — ACETAMINOPHEN 650 MG/1
650 SUPPOSITORY RECTAL EVERY 6 HOURS PRN
Status: DISCONTINUED | OUTPATIENT
Start: 2021-01-01 | End: 2021-01-01 | Stop reason: HOSPADM

## 2021-01-01 RX ORDER — IPRATROPIUM BROMIDE AND ALBUTEROL SULFATE 2.5; .5 MG/3ML; MG/3ML
1 SOLUTION RESPIRATORY (INHALATION) 4 TIMES DAILY
Status: DISCONTINUED | OUTPATIENT
Start: 2021-01-01 | End: 2021-01-01

## 2021-01-01 RX ORDER — ALBUTEROL SULFATE 90 UG/1
2 AEROSOL, METERED RESPIRATORY (INHALATION) EVERY 4 HOURS PRN
Status: DISCONTINUED | OUTPATIENT
Start: 2021-01-01 | End: 2021-12-16 | Stop reason: HOSPADM

## 2021-01-01 RX ORDER — LORAZEPAM 2 MG/1
2 TABLET ORAL
Status: DISCONTINUED | OUTPATIENT
Start: 2021-01-01 | End: 2021-01-01

## 2021-01-01 RX ORDER — LORAZEPAM 2 MG/ML
4 INJECTION INTRAMUSCULAR
Status: DISCONTINUED | OUTPATIENT
Start: 2021-01-01 | End: 2021-01-01

## 2021-01-01 RX ORDER — SODIUM CHLORIDE 9 MG/ML
25 INJECTION, SOLUTION INTRAVENOUS PRN
Status: DISCONTINUED | OUTPATIENT
Start: 2021-01-01 | End: 2021-01-01 | Stop reason: HOSPADM

## 2021-01-01 RX ORDER — DAPSONE 100 MG/1
50 TABLET ORAL DAILY
Status: DISCONTINUED | OUTPATIENT
Start: 2021-01-01 | End: 2021-01-01

## 2021-01-01 RX ORDER — OMEPRAZOLE 40 MG/1
40 CAPSULE, DELAYED RELEASE ORAL DAILY
Qty: 30 CAPSULE | Refills: 5 | Status: SHIPPED | OUTPATIENT
Start: 2021-01-01

## 2021-01-01 RX ORDER — HYDRALAZINE HYDROCHLORIDE 20 MG/ML
10 INJECTION INTRAMUSCULAR; INTRAVENOUS ONCE
Status: COMPLETED | OUTPATIENT
Start: 2021-01-01 | End: 2021-01-01

## 2021-01-01 RX ORDER — FOLIC ACID 1 MG/1
1 TABLET ORAL DAILY
Status: DISCONTINUED | OUTPATIENT
Start: 2021-01-01 | End: 2021-01-01

## 2021-01-01 RX ORDER — SODIUM CHLORIDE 0.9 % (FLUSH) 0.9 %
5-40 SYRINGE (ML) INJECTION PRN
Status: DISCONTINUED | OUTPATIENT
Start: 2021-01-01 | End: 2021-01-01 | Stop reason: HOSPADM

## 2021-01-01 RX ORDER — SODIUM CHLORIDE 0.9 % (FLUSH) 0.9 %
10 SYRINGE (ML) INJECTION PRN
Status: DISCONTINUED | OUTPATIENT
Start: 2021-01-01 | End: 2021-01-01 | Stop reason: HOSPADM

## 2021-01-01 RX ORDER — PREDNISONE 10 MG/1
5 TABLET ORAL DAILY
Status: DISCONTINUED | OUTPATIENT
Start: 2021-01-01 | End: 2021-01-01 | Stop reason: HOSPADM

## 2021-01-01 RX ORDER — MECLIZINE HCL 12.5 MG/1
25 TABLET ORAL 3 TIMES DAILY PRN
Status: DISCONTINUED | OUTPATIENT
Start: 2021-01-01 | End: 2021-01-01

## 2021-01-01 RX ORDER — CYCLOSPORINE 25 MG/1
125 CAPSULE, LIQUID FILLED ORAL 2 TIMES DAILY
Qty: 60 CAPSULE | Refills: 0 | Status: SHIPPED | OUTPATIENT
Start: 2021-01-01 | End: 2021-01-01

## 2021-01-01 RX ORDER — GUAIFENESIN DEXTROMETHORPHAN HYDROBROMIDE ORAL SOLUTION 10; 100 MG/5ML; MG/5ML
5 SOLUTION ORAL EVERY 4 HOURS PRN
Status: DISCONTINUED | OUTPATIENT
Start: 2021-01-01 | End: 2021-12-16 | Stop reason: HOSPADM

## 2021-01-01 RX ORDER — FOLIC ACID 1 MG/1
1 TABLET ORAL DAILY
Status: DISCONTINUED | OUTPATIENT
Start: 2021-01-01 | End: 2021-01-01 | Stop reason: HOSPADM

## 2021-01-01 RX ORDER — DAPSONE 25 MG/1
50 TABLET ORAL DAILY
Qty: 60 TABLET | Refills: 0 | Status: SHIPPED | OUTPATIENT
Start: 2021-01-01 | End: 2021-01-01

## 2021-01-01 RX ORDER — GENTAMICIN SULFATE 1 MG/G
OINTMENT TOPICAL
Qty: 30 G | Refills: 2 | Status: SHIPPED | OUTPATIENT
Start: 2021-01-01 | End: 2021-01-01

## 2021-01-01 RX ADMIN — OXYCODONE HYDROCHLORIDE AND ACETAMINOPHEN 2 TABLET: 5; 325 TABLET ORAL at 11:40

## 2021-01-01 RX ADMIN — LORAZEPAM 1 MG: 2 INJECTION INTRAMUSCULAR at 20:34

## 2021-01-01 RX ADMIN — METHYLPREDNISOLONE SODIUM SUCCINATE 30 MG: 40 INJECTION, POWDER, FOR SOLUTION INTRAMUSCULAR; INTRAVENOUS at 12:45

## 2021-01-01 RX ADMIN — SODIUM CHLORIDE 1000 ML: 9 INJECTION, SOLUTION INTRAVENOUS at 16:22

## 2021-01-01 RX ADMIN — LIDOCAINE HYDROCHLORIDE 5 ML: 40 SOLUTION TOPICAL at 08:38

## 2021-01-01 RX ADMIN — IPRATROPIUM BROMIDE 2 PUFF: 17 AEROSOL, METERED RESPIRATORY (INHALATION) at 09:16

## 2021-01-01 RX ADMIN — OXYCODONE HYDROCHLORIDE AND ACETAMINOPHEN 500 MG: 500 TABLET ORAL at 07:42

## 2021-01-01 RX ADMIN — OXYCODONE HYDROCHLORIDE AND ACETAMINOPHEN 2 TABLET: 5; 325 TABLET ORAL at 20:51

## 2021-01-01 RX ADMIN — FOLIC ACID 1 MG: 1 TABLET ORAL at 09:19

## 2021-01-01 RX ADMIN — LIDOCAINE HYDROCHLORIDE: 20 JELLY TOPICAL at 22:08

## 2021-01-01 RX ADMIN — CLOPIDOGREL 75 MG: 75 TABLET, FILM COATED ORAL at 09:18

## 2021-01-01 RX ADMIN — OXYCODONE HYDROCHLORIDE AND ACETAMINOPHEN 500 MG: 500 TABLET ORAL at 15:35

## 2021-01-01 RX ADMIN — DEXMEDETOMIDINE HYDROCHLORIDE 0.7 MCG/KG/HR: 4 INJECTION, SOLUTION INTRAVENOUS at 15:00

## 2021-01-01 RX ADMIN — CEFEPIME HYDROCHLORIDE 2000 MG: 2 INJECTION, POWDER, FOR SOLUTION INTRAVENOUS at 01:06

## 2021-01-01 RX ADMIN — LIDOCAINE HYDROCHLORIDE: 40 SOLUTION TOPICAL at 10:33

## 2021-01-01 RX ADMIN — GUAIFENESIN DEXTROMETHORPHAN HYDROBROMIDE ORAL SOLUTION 5 ML: 200; 20 SOLUTION ORAL at 06:08

## 2021-01-01 RX ADMIN — TOCILIZUMAB 648 MG: 180 INJECTION, SOLUTION SUBCUTANEOUS at 02:39

## 2021-01-01 RX ADMIN — METHYLPREDNISOLONE SODIUM SUCCINATE 30 MG: 40 INJECTION, POWDER, FOR SOLUTION INTRAMUSCULAR; INTRAVENOUS at 04:21

## 2021-01-01 RX ADMIN — SODIUM CHLORIDE, PRESERVATIVE FREE 10 ML: 5 INJECTION INTRAVENOUS at 10:52

## 2021-01-01 RX ADMIN — CEFEPIME 2000 MG: 2 INJECTION, POWDER, FOR SOLUTION INTRAVENOUS at 15:34

## 2021-01-01 RX ADMIN — Medication 2000 UNITS: at 09:14

## 2021-01-01 RX ADMIN — LIDOCAINE HYDROCHLORIDE 10 ML: 40 SOLUTION TOPICAL at 10:05

## 2021-01-01 RX ADMIN — ENOXAPARIN SODIUM 30 MG: 100 INJECTION SUBCUTANEOUS at 22:08

## 2021-01-01 RX ADMIN — DEXTROSE MONOHYDRATE: 50 INJECTION, SOLUTION INTRAVENOUS at 06:13

## 2021-01-01 RX ADMIN — METOPROLOL TARTRATE 37.5 MG: 25 TABLET ORAL at 23:01

## 2021-01-01 RX ADMIN — LORAZEPAM 1 MG: 2 INJECTION INTRAMUSCULAR at 07:46

## 2021-01-01 RX ADMIN — PANTOPRAZOLE SODIUM 40 MG: 40 TABLET, DELAYED RELEASE ORAL at 05:55

## 2021-01-01 RX ADMIN — METOPROLOL TARTRATE 2.5 MG: 5 INJECTION INTRAVENOUS at 00:11

## 2021-01-01 RX ADMIN — FOLIC ACID 1 MG: 1 TABLET ORAL at 07:42

## 2021-01-01 RX ADMIN — MULTIPLE VITAMINS W/ MINERALS TAB 1 TABLET: TAB at 09:19

## 2021-01-01 RX ADMIN — OXYCODONE HYDROCHLORIDE AND ACETAMINOPHEN 500 MG: 500 TABLET ORAL at 09:50

## 2021-01-01 RX ADMIN — LIDOCAINE HYDROCHLORIDE: 40 SOLUTION TOPICAL at 10:17

## 2021-01-01 RX ADMIN — OXYCODONE HYDROCHLORIDE AND ACETAMINOPHEN 500 MG: 500 TABLET ORAL at 21:45

## 2021-01-01 RX ADMIN — LORAZEPAM 1 MG: 2 INJECTION INTRAMUSCULAR at 00:28

## 2021-01-01 RX ADMIN — OXYCODONE HYDROCHLORIDE AND ACETAMINOPHEN 2 TABLET: 5; 325 TABLET ORAL at 18:05

## 2021-01-01 RX ADMIN — LORAZEPAM 2 MG: 2 INJECTION INTRAMUSCULAR at 22:16

## 2021-01-01 RX ADMIN — OXYCODONE HYDROCHLORIDE AND ACETAMINOPHEN 2 TABLET: 5; 325 TABLET ORAL at 16:43

## 2021-01-01 RX ADMIN — ENOXAPARIN SODIUM 40 MG: 40 INJECTION SUBCUTANEOUS at 12:52

## 2021-01-01 RX ADMIN — CEFTRIAXONE SODIUM 1000 MG: 1 INJECTION, POWDER, FOR SOLUTION INTRAMUSCULAR; INTRAVENOUS at 14:33

## 2021-01-01 RX ADMIN — DEXMEDETOMIDINE HYDROCHLORIDE 0.7 MCG/KG/HR: 4 INJECTION, SOLUTION INTRAVENOUS at 07:45

## 2021-01-01 RX ADMIN — LIDOCAINE HYDROCHLORIDE 10 ML: 40 SOLUTION TOPICAL at 08:36

## 2021-01-01 RX ADMIN — LIDOCAINE HYDROCHLORIDE 10 ML: 40 SOLUTION TOPICAL at 08:39

## 2021-01-01 RX ADMIN — METHYLPREDNISOLONE SODIUM SUCCINATE 30 MG: 40 INJECTION, POWDER, FOR SOLUTION INTRAMUSCULAR; INTRAVENOUS at 17:09

## 2021-01-01 RX ADMIN — OXYCODONE HYDROCHLORIDE AND ACETAMINOPHEN 2 TABLET: 5; 325 TABLET ORAL at 06:16

## 2021-01-01 RX ADMIN — TRAMADOL HYDROCHLORIDE 50 MG: 50 TABLET, COATED ORAL at 13:08

## 2021-01-01 RX ADMIN — CEFEPIME HYDROCHLORIDE 2000 MG: 2 INJECTION, POWDER, FOR SOLUTION INTRAVENOUS at 01:16

## 2021-01-01 RX ADMIN — FOLIC ACID 1 MG: 1 TABLET ORAL at 08:05

## 2021-01-01 RX ADMIN — LORAZEPAM 3 MG: 2 INJECTION INTRAMUSCULAR; INTRAVENOUS at 03:07

## 2021-01-01 RX ADMIN — DOXYCYCLINE HYCLATE 100 MG: 100 TABLET, COATED ORAL at 19:57

## 2021-01-01 RX ADMIN — DEXTROSE MONOHYDRATE: 50 INJECTION, SOLUTION INTRAVENOUS at 10:06

## 2021-01-01 RX ADMIN — IPRATROPIUM BROMIDE 2 PUFF: 17 AEROSOL, METERED RESPIRATORY (INHALATION) at 01:45

## 2021-01-01 RX ADMIN — ATORVASTATIN CALCIUM 10 MG: 10 TABLET, FILM COATED ORAL at 09:18

## 2021-01-01 RX ADMIN — SODIUM CHLORIDE, PRESERVATIVE FREE 20 ML: 5 INJECTION INTRAVENOUS at 09:30

## 2021-01-01 RX ADMIN — LORAZEPAM 1 MG: 2 INJECTION INTRAMUSCULAR at 13:00

## 2021-01-01 RX ADMIN — ONDANSETRON 4 MG: 2 INJECTION INTRAMUSCULAR; INTRAVENOUS at 16:22

## 2021-01-01 RX ADMIN — ATORVASTATIN CALCIUM 10 MG: 10 TABLET, FILM COATED ORAL at 09:13

## 2021-01-01 RX ADMIN — CEFEPIME HYDROCHLORIDE 2000 MG: 2 INJECTION, POWDER, FOR SOLUTION INTRAVENOUS at 09:30

## 2021-01-01 RX ADMIN — LORAZEPAM 1 MG: 2 INJECTION INTRAMUSCULAR at 06:55

## 2021-01-01 RX ADMIN — LIDOCAINE HYDROCHLORIDE 10 ML: 40 SOLUTION TOPICAL at 09:41

## 2021-01-01 RX ADMIN — DEXTROSE MONOHYDRATE: 50 INJECTION, SOLUTION INTRAVENOUS at 17:14

## 2021-01-01 RX ADMIN — METHYLPREDNISOLONE SODIUM SUCCINATE 30 MG: 40 INJECTION, POWDER, FOR SOLUTION INTRAMUSCULAR; INTRAVENOUS at 01:39

## 2021-01-01 RX ADMIN — ENOXAPARIN SODIUM 30 MG: 100 INJECTION SUBCUTANEOUS at 09:14

## 2021-01-01 RX ADMIN — LIDOCAINE HYDROCHLORIDE 5 ML: 40 SOLUTION TOPICAL at 08:22

## 2021-01-01 RX ADMIN — METOPROLOL TARTRATE 37.5 MG: 25 TABLET, FILM COATED ORAL at 16:43

## 2021-01-01 RX ADMIN — METOPROLOL TARTRATE 37.5 MG: 25 TABLET, FILM COATED ORAL at 17:29

## 2021-01-01 RX ADMIN — METOPROLOL TARTRATE 37.5 MG: 25 TABLET, FILM COATED ORAL at 08:05

## 2021-01-01 RX ADMIN — LORAZEPAM 2 MG: 2 INJECTION INTRAMUSCULAR at 18:16

## 2021-01-01 RX ADMIN — ZINC SULFATE 220 MG (50 MG) CAPSULE 50 MG: CAPSULE at 23:02

## 2021-01-01 RX ADMIN — Medication 500 MG: at 23:17

## 2021-01-01 RX ADMIN — SODIUM CHLORIDE, PRESERVATIVE FREE 10 ML: 5 INJECTION INTRAVENOUS at 07:43

## 2021-01-01 RX ADMIN — LORAZEPAM 3 MG: 2 INJECTION INTRAMUSCULAR; INTRAVENOUS at 00:04

## 2021-01-01 RX ADMIN — LORAZEPAM 3 MG: 2 INJECTION INTRAMUSCULAR; INTRAVENOUS at 15:36

## 2021-01-01 RX ADMIN — LORAZEPAM 1 MG: 2 INJECTION INTRAMUSCULAR at 22:13

## 2021-01-01 RX ADMIN — ENOXAPARIN SODIUM 30 MG: 100 INJECTION SUBCUTANEOUS at 09:21

## 2021-01-01 RX ADMIN — ASPIRIN 81 MG: 81 TABLET, COATED ORAL at 09:50

## 2021-01-01 RX ADMIN — FENTANYL CITRATE 50 MCG: 50 INJECTION, SOLUTION INTRAMUSCULAR; INTRAVENOUS at 12:33

## 2021-01-01 RX ADMIN — PANTOPRAZOLE SODIUM 40 MG: 40 TABLET, DELAYED RELEASE ORAL at 06:16

## 2021-01-01 RX ADMIN — MORPHINE SULFATE 1 MG: 2 INJECTION, SOLUTION INTRAMUSCULAR; INTRAVENOUS at 10:35

## 2021-01-01 RX ADMIN — CYCLOSPORINE 125 MG: 100 CAPSULE, LIQUID FILLED ORAL at 23:36

## 2021-01-01 RX ADMIN — LIDOCAINE HYDROCHLORIDE: 40 SOLUTION TOPICAL at 09:53

## 2021-01-01 RX ADMIN — POTASSIUM CHLORIDE 10 MEQ: 7.46 INJECTION, SOLUTION INTRAVENOUS at 20:42

## 2021-01-01 RX ADMIN — FENTANYL CITRATE 50 MCG: 50 INJECTION, SOLUTION INTRAMUSCULAR; INTRAVENOUS at 09:26

## 2021-01-01 RX ADMIN — SODIUM CHLORIDE, PRESERVATIVE FREE 10 ML: 5 INJECTION INTRAVENOUS at 09:21

## 2021-01-01 RX ADMIN — METHYLPREDNISOLONE SODIUM SUCCINATE 30 MG: 40 INJECTION, POWDER, FOR SOLUTION INTRAMUSCULAR; INTRAVENOUS at 04:11

## 2021-01-01 RX ADMIN — LIDOCAINE HYDROCHLORIDE 10 ML: 40 SOLUTION TOPICAL at 09:01

## 2021-01-01 RX ADMIN — CEFEPIME HYDROCHLORIDE 2000 MG: 2 INJECTION, POWDER, FOR SOLUTION INTRAVENOUS at 09:47

## 2021-01-01 RX ADMIN — LIDOCAINE HYDROCHLORIDE: 40 SOLUTION TOPICAL at 10:11

## 2021-01-01 RX ADMIN — PANTOPRAZOLE SODIUM 40 MG: 40 TABLET, DELAYED RELEASE ORAL at 09:18

## 2021-01-01 RX ADMIN — METOPROLOL TARTRATE 37.5 MG: 25 TABLET ORAL at 09:18

## 2021-01-01 RX ADMIN — LORAZEPAM 1 MG: 2 INJECTION INTRAMUSCULAR at 03:01

## 2021-01-01 RX ADMIN — DEXTROSE MONOHYDRATE: 50 INJECTION, SOLUTION INTRAVENOUS at 05:24

## 2021-01-01 RX ADMIN — LIDOCAINE HYDROCHLORIDE 10 ML: 40 SOLUTION TOPICAL at 08:55

## 2021-01-01 RX ADMIN — CLOPIDOGREL BISULFATE 75 MG: 75 TABLET ORAL at 07:42

## 2021-01-01 RX ADMIN — CLOBETASOL PROPIONATE: 0.5 OINTMENT TOPICAL at 21:46

## 2021-01-01 RX ADMIN — METOPROLOL TARTRATE 37.5 MG: 25 TABLET, FILM COATED ORAL at 09:58

## 2021-01-01 RX ADMIN — SODIUM CHLORIDE, PRESERVATIVE FREE 10 ML: 5 INJECTION INTRAVENOUS at 21:00

## 2021-01-01 RX ADMIN — ALBUTEROL SULFATE 2 PUFF: 90 AEROSOL, METERED RESPIRATORY (INHALATION) at 09:14

## 2021-01-01 RX ADMIN — FENTANYL CITRATE 50 MCG: 50 INJECTION, SOLUTION INTRAMUSCULAR; INTRAVENOUS at 15:00

## 2021-01-01 RX ADMIN — OXYCODONE HYDROCHLORIDE AND ACETAMINOPHEN 2 TABLET: 5; 325 TABLET ORAL at 05:55

## 2021-01-01 RX ADMIN — LORAZEPAM 3 MG: 2 INJECTION INTRAMUSCULAR; INTRAVENOUS at 01:39

## 2021-01-01 RX ADMIN — LIDOCAINE HYDROCHLORIDE: 40 SOLUTION TOPICAL at 09:05

## 2021-01-01 RX ADMIN — GABAPENTIN 300 MG: 300 CAPSULE ORAL at 22:07

## 2021-01-01 RX ADMIN — LORAZEPAM 1 MG: 2 INJECTION INTRAMUSCULAR at 15:00

## 2021-01-01 RX ADMIN — LIDOCAINE HYDROCHLORIDE 10 ML: 40 SOLUTION TOPICAL at 09:04

## 2021-01-01 RX ADMIN — SODIUM CHLORIDE, PRESERVATIVE FREE 10 ML: 5 INJECTION INTRAVENOUS at 08:58

## 2021-01-01 RX ADMIN — LORAZEPAM 1 MG: 2 INJECTION INTRAMUSCULAR at 20:02

## 2021-01-01 RX ADMIN — OXYCODONE HYDROCHLORIDE AND ACETAMINOPHEN 2 TABLET: 5; 325 TABLET ORAL at 14:03

## 2021-01-01 RX ADMIN — ASPIRIN 81 MG: 81 TABLET, COATED ORAL at 15:35

## 2021-01-01 RX ADMIN — DEXTROSE MONOHYDRATE: 50 INJECTION, SOLUTION INTRAVENOUS at 23:26

## 2021-01-01 RX ADMIN — LIDOCAINE HYDROCHLORIDE 20 ML: 40 SOLUTION TOPICAL at 11:22

## 2021-01-01 RX ADMIN — ENOXAPARIN SODIUM 30 MG: 100 INJECTION SUBCUTANEOUS at 20:59

## 2021-01-01 RX ADMIN — LORAZEPAM 1 MG: 2 INJECTION INTRAMUSCULAR; INTRAVENOUS at 09:21

## 2021-01-01 RX ADMIN — METHYLPREDNISOLONE SODIUM SUCCINATE 30 MG: 40 INJECTION, POWDER, FOR SOLUTION INTRAMUSCULAR; INTRAVENOUS at 20:42

## 2021-01-01 RX ADMIN — GABAPENTIN 300 MG: 300 CAPSULE ORAL at 09:14

## 2021-01-01 RX ADMIN — THIAMINE HYDROCHLORIDE 100 MG: 100 INJECTION, SOLUTION INTRAMUSCULAR; INTRAVENOUS at 20:41

## 2021-01-01 RX ADMIN — PANTOPRAZOLE SODIUM 40 MG: 40 TABLET, DELAYED RELEASE ORAL at 05:36

## 2021-01-01 RX ADMIN — CEFEPIME HYDROCHLORIDE 2000 MG: 2 INJECTION, POWDER, FOR SOLUTION INTRAVENOUS at 21:57

## 2021-01-01 RX ADMIN — OXYCODONE HYDROCHLORIDE AND ACETAMINOPHEN 2 TABLET: 5; 325 TABLET ORAL at 05:36

## 2021-01-01 RX ADMIN — CYCLOSPORINE 125 MG: 100 CAPSULE, LIQUID FILLED ORAL at 08:06

## 2021-01-01 RX ADMIN — DEXMEDETOMIDINE HYDROCHLORIDE 0.7 MCG/KG/HR: 4 INJECTION, SOLUTION INTRAVENOUS at 15:03

## 2021-01-01 RX ADMIN — OXYCODONE HYDROCHLORIDE AND ACETAMINOPHEN 500 MG: 500 TABLET ORAL at 20:15

## 2021-01-01 RX ADMIN — OXYCODONE HYDROCHLORIDE AND ACETAMINOPHEN 2 TABLET: 5; 325 TABLET ORAL at 10:06

## 2021-01-01 RX ADMIN — ATORVASTATIN CALCIUM 40 MG: 40 TABLET, FILM COATED ORAL at 20:15

## 2021-01-01 RX ADMIN — OXYCODONE HYDROCHLORIDE AND ACETAMINOPHEN 500 MG: 500 TABLET ORAL at 23:39

## 2021-01-01 RX ADMIN — LIDOCAINE HYDROCHLORIDE: 40 SOLUTION TOPICAL at 09:13

## 2021-01-01 RX ADMIN — POTASSIUM CHLORIDE 10 MEQ: 7.46 INJECTION, SOLUTION INTRAVENOUS at 17:30

## 2021-01-01 RX ADMIN — LORAZEPAM 2 MG: 2 INJECTION INTRAMUSCULAR at 18:39

## 2021-01-01 RX ADMIN — OXYCODONE HYDROCHLORIDE AND ACETAMINOPHEN 2 TABLET: 5; 325 TABLET ORAL at 02:17

## 2021-01-01 RX ADMIN — TRAMADOL HYDROCHLORIDE 50 MG: 50 TABLET, COATED ORAL at 22:10

## 2021-01-01 RX ADMIN — OXYCODONE HYDROCHLORIDE AND ACETAMINOPHEN 500 MG: 500 TABLET ORAL at 09:14

## 2021-01-01 RX ADMIN — POTASSIUM CHLORIDE 10 MEQ: 7.46 INJECTION, SOLUTION INTRAVENOUS at 22:14

## 2021-01-01 RX ADMIN — LORAZEPAM 2 MG: 2 INJECTION INTRAMUSCULAR at 21:05

## 2021-01-01 RX ADMIN — LORAZEPAM 1 MG: 2 INJECTION INTRAMUSCULAR at 14:45

## 2021-01-01 RX ADMIN — PREDNISONE 5 MG: 10 TABLET ORAL at 15:35

## 2021-01-01 RX ADMIN — METOPROLOL TARTRATE 37.5 MG: 25 TABLET, FILM COATED ORAL at 07:42

## 2021-01-01 RX ADMIN — ALBUTEROL SULFATE 2 PUFF: 90 AEROSOL, METERED RESPIRATORY (INHALATION) at 09:18

## 2021-01-01 RX ADMIN — LORAZEPAM 1 MG: 2 INJECTION INTRAMUSCULAR at 00:27

## 2021-01-01 RX ADMIN — OXYCODONE HYDROCHLORIDE AND ACETAMINOPHEN 500 MG: 500 TABLET ORAL at 08:04

## 2021-01-01 RX ADMIN — TRAMADOL HYDROCHLORIDE 50 MG: 50 TABLET, COATED ORAL at 09:30

## 2021-01-01 RX ADMIN — SODIUM CHLORIDE, PRESERVATIVE FREE 10 ML: 5 INJECTION INTRAVENOUS at 20:43

## 2021-01-01 RX ADMIN — ZINC SULFATE 220 MG (50 MG) CAPSULE 50 MG: CAPSULE at 09:14

## 2021-01-01 RX ADMIN — DEXAMETHASONE 10 MG: 4 TABLET ORAL at 09:18

## 2021-01-01 RX ADMIN — POTASSIUM BICARBONATE 40 MEQ: 782 TABLET, EFFERVESCENT ORAL at 09:20

## 2021-01-01 RX ADMIN — CEFEPIME 2000 MG: 2 INJECTION, POWDER, FOR SOLUTION INTRAVENOUS at 02:57

## 2021-01-01 RX ADMIN — OXYCODONE HYDROCHLORIDE AND ACETAMINOPHEN 2 TABLET: 5; 325 TABLET ORAL at 14:35

## 2021-01-01 RX ADMIN — LORAZEPAM 3 MG: 2 INJECTION INTRAMUSCULAR; INTRAVENOUS at 09:45

## 2021-01-01 RX ADMIN — OXYCODONE HYDROCHLORIDE AND ACETAMINOPHEN 2 TABLET: 5; 325 TABLET ORAL at 15:28

## 2021-01-01 RX ADMIN — SODIUM CHLORIDE, PRESERVATIVE FREE 10 ML: 5 INJECTION INTRAVENOUS at 08:59

## 2021-01-01 RX ADMIN — OXYCODONE HYDROCHLORIDE AND ACETAMINOPHEN 2 TABLET: 5; 325 TABLET ORAL at 22:08

## 2021-01-01 RX ADMIN — GUAIFENESIN DEXTROMETHORPHAN HYDROBROMIDE ORAL SOLUTION 5 ML: 200; 20 SOLUTION ORAL at 22:08

## 2021-01-01 RX ADMIN — OXYCODONE HYDROCHLORIDE AND ACETAMINOPHEN 2 TABLET: 5; 325 TABLET ORAL at 18:39

## 2021-01-01 RX ADMIN — SODIUM CHLORIDE, PRESERVATIVE FREE 10 ML: 5 INJECTION INTRAVENOUS at 21:01

## 2021-01-01 RX ADMIN — METHYLPREDNISOLONE SODIUM SUCCINATE 30 MG: 40 INJECTION, POWDER, FOR SOLUTION INTRAMUSCULAR; INTRAVENOUS at 20:22

## 2021-01-01 RX ADMIN — PREDNISONE 5 MG: 10 TABLET ORAL at 08:04

## 2021-01-01 RX ADMIN — LIDOCAINE HYDROCHLORIDE 10 ML: 40 SOLUTION TOPICAL at 11:28

## 2021-01-01 RX ADMIN — OXYCODONE HYDROCHLORIDE AND ACETAMINOPHEN 2 TABLET: 5; 325 TABLET ORAL at 09:52

## 2021-01-01 RX ADMIN — OXYCODONE HYDROCHLORIDE AND ACETAMINOPHEN 500 MG: 500 TABLET ORAL at 22:10

## 2021-01-01 RX ADMIN — CLOBETASOL PROPIONATE: 0.5 OINTMENT TOPICAL at 22:08

## 2021-01-01 RX ADMIN — LIDOCAINE HYDROCHLORIDE: 20 JELLY TOPICAL at 21:46

## 2021-01-01 RX ADMIN — CEFTRIAXONE SODIUM 1000 MG: 1 INJECTION, POWDER, FOR SOLUTION INTRAMUSCULAR; INTRAVENOUS at 14:07

## 2021-01-01 RX ADMIN — THIAMINE HYDROCHLORIDE 100 MG: 100 INJECTION, SOLUTION INTRAMUSCULAR; INTRAVENOUS at 22:09

## 2021-01-01 RX ADMIN — Medication 2000 UNITS: at 18:03

## 2021-01-01 RX ADMIN — TOBRAMYCIN SULFATE 352.4 MG: 40 INJECTION, SOLUTION INTRAMUSCULAR; INTRAVENOUS at 15:50

## 2021-01-01 RX ADMIN — MECLIZINE HCL 12.5 MG 25 MG: 12.5 TABLET ORAL at 09:15

## 2021-01-01 RX ADMIN — LIDOCAINE HYDROCHLORIDE: 40 SOLUTION TOPICAL at 08:41

## 2021-01-01 RX ADMIN — DEXMEDETOMIDINE HYDROCHLORIDE 1 MCG/KG/HR: 4 INJECTION, SOLUTION INTRAVENOUS at 03:12

## 2021-01-01 RX ADMIN — DAPSONE 50 MG: 100 TABLET ORAL at 09:19

## 2021-01-01 RX ADMIN — METOPROLOL TARTRATE 37.5 MG: 25 TABLET, FILM COATED ORAL at 17:40

## 2021-01-01 RX ADMIN — ENOXAPARIN SODIUM 30 MG: 100 INJECTION SUBCUTANEOUS at 09:30

## 2021-01-01 RX ADMIN — CLOBETASOL PROPIONATE: 0.5 OINTMENT TOPICAL at 14:19

## 2021-01-01 RX ADMIN — FAMOTIDINE 20 MG: 20 TABLET, FILM COATED ORAL at 09:14

## 2021-01-01 RX ADMIN — TRAMADOL HYDROCHLORIDE 50 MG: 50 TABLET, COATED ORAL at 09:19

## 2021-01-01 RX ADMIN — CLOPIDOGREL BISULFATE 75 MG: 75 TABLET ORAL at 09:50

## 2021-01-01 RX ADMIN — CEFEPIME HYDROCHLORIDE 2000 MG: 2 INJECTION, POWDER, FOR SOLUTION INTRAVENOUS at 09:19

## 2021-01-01 RX ADMIN — ENOXAPARIN SODIUM 30 MG: 100 INJECTION SUBCUTANEOUS at 20:34

## 2021-01-01 RX ADMIN — DEXMEDETOMIDINE HYDROCHLORIDE 0.3 MCG/KG/HR: 100 INJECTION, SOLUTION INTRAVENOUS at 09:31

## 2021-01-01 RX ADMIN — LIDOCAINE HYDROCHLORIDE: 40 SOLUTION TOPICAL at 09:44

## 2021-01-01 RX ADMIN — DEXMEDETOMIDINE HYDROCHLORIDE 0.7 MCG/KG/HR: 4 INJECTION, SOLUTION INTRAVENOUS at 20:20

## 2021-01-01 RX ADMIN — LORAZEPAM 2 MG: 2 INJECTION INTRAMUSCULAR at 12:45

## 2021-01-01 RX ADMIN — TRAMADOL HYDROCHLORIDE 50 MG: 50 TABLET, COATED ORAL at 17:00

## 2021-01-01 RX ADMIN — CLOPIDOGREL BISULFATE 75 MG: 75 TABLET ORAL at 08:05

## 2021-01-01 RX ADMIN — SODIUM CHLORIDE, PRESERVATIVE FREE 10 ML: 5 INJECTION INTRAVENOUS at 10:00

## 2021-01-01 RX ADMIN — THIAMINE HYDROCHLORIDE 100 MG: 100 INJECTION, SOLUTION INTRAMUSCULAR; INTRAVENOUS at 17:30

## 2021-01-01 RX ADMIN — DEXMEDETOMIDINE HYDROCHLORIDE 1 MCG/KG/HR: 4 INJECTION, SOLUTION INTRAVENOUS at 09:33

## 2021-01-01 RX ADMIN — ACETAMINOPHEN 650 MG: 325 TABLET ORAL at 22:07

## 2021-01-01 RX ADMIN — MULTIPLE VITAMINS W/ MINERALS TAB 1 TABLET: TAB at 09:00

## 2021-01-01 RX ADMIN — ENOXAPARIN SODIUM 40 MG: 40 INJECTION SUBCUTANEOUS at 07:42

## 2021-01-01 RX ADMIN — PANTOPRAZOLE SODIUM 40 MG: 40 INJECTION, POWDER, FOR SOLUTION INTRAVENOUS at 09:30

## 2021-01-01 RX ADMIN — FENTANYL CITRATE 50 MCG: 50 INJECTION, SOLUTION INTRAMUSCULAR; INTRAVENOUS at 21:39

## 2021-01-01 RX ADMIN — FOLIC ACID 1 MG: 1 TABLET ORAL at 15:35

## 2021-01-01 RX ADMIN — DEXAMETHASONE 10 MG: 4 TABLET ORAL at 21:00

## 2021-01-01 RX ADMIN — LORAZEPAM 1 MG: 2 INJECTION INTRAMUSCULAR at 18:59

## 2021-01-01 RX ADMIN — LIDOCAINE HYDROCHLORIDE 5 ML: 40 SOLUTION TOPICAL at 09:33

## 2021-01-01 RX ADMIN — FENTANYL CITRATE 50 MCG: 50 INJECTION, SOLUTION INTRAMUSCULAR; INTRAVENOUS at 14:00

## 2021-01-01 RX ADMIN — FENTANYL CITRATE 50 MCG: 50 INJECTION, SOLUTION INTRAMUSCULAR; INTRAVENOUS at 14:58

## 2021-01-01 RX ADMIN — FOLIC ACID 1 MG: 1 TABLET ORAL at 19:58

## 2021-01-01 RX ADMIN — METHYLPREDNISOLONE SODIUM SUCCINATE 30 MG: 40 INJECTION, POWDER, FOR SOLUTION INTRAMUSCULAR; INTRAVENOUS at 13:54

## 2021-01-01 RX ADMIN — CEFEPIME HYDROCHLORIDE 2000 MG: 2 INJECTION, POWDER, FOR SOLUTION INTRAVENOUS at 08:05

## 2021-01-01 RX ADMIN — DAPSONE 50 MG: 100 TABLET ORAL at 21:00

## 2021-01-01 RX ADMIN — LINEZOLID 600 MG: 600 INJECTION, SOLUTION INTRAVENOUS at 00:09

## 2021-01-01 RX ADMIN — GABAPENTIN 300 MG: 300 CAPSULE ORAL at 20:57

## 2021-01-01 RX ADMIN — DEXTROSE MONOHYDRATE: 50 INJECTION, SOLUTION INTRAVENOUS at 23:36

## 2021-01-01 RX ADMIN — FENTANYL CITRATE 50 MCG: 50 INJECTION, SOLUTION INTRAMUSCULAR; INTRAVENOUS at 16:45

## 2021-01-01 RX ADMIN — OXYCODONE HYDROCHLORIDE AND ACETAMINOPHEN 2 TABLET: 5; 325 TABLET ORAL at 10:27

## 2021-01-01 RX ADMIN — CLOPIDOGREL 75 MG: 75 TABLET, FILM COATED ORAL at 19:58

## 2021-01-01 RX ADMIN — SODIUM CHLORIDE, PRESERVATIVE FREE 10 ML: 5 INJECTION INTRAVENOUS at 09:22

## 2021-01-01 RX ADMIN — LIDOCAINE HYDROCHLORIDE: 40 SOLUTION TOPICAL at 08:38

## 2021-01-01 RX ADMIN — LORAZEPAM 1 MG: 2 INJECTION INTRAMUSCULAR at 16:45

## 2021-01-01 RX ADMIN — LIDOCAINE HYDROCHLORIDE 10 ML: 40 SOLUTION TOPICAL at 09:28

## 2021-01-01 RX ADMIN — FOLIC ACID 1 MG: 1 TABLET ORAL at 09:50

## 2021-01-01 RX ADMIN — ENOXAPARIN SODIUM 30 MG: 100 INJECTION SUBCUTANEOUS at 20:43

## 2021-01-01 RX ADMIN — POTASSIUM PHOSPHATE, MONOBASIC AND POTASSIUM PHOSPHATE, DIBASIC 30 MMOL: 224; 236 INJECTION, SOLUTION, CONCENTRATE INTRAVENOUS at 10:28

## 2021-01-01 RX ADMIN — LIDOCAINE HYDROCHLORIDE: 40 SOLUTION TOPICAL at 08:51

## 2021-01-01 RX ADMIN — CEFEPIME HYDROCHLORIDE 2000 MG: 2 INJECTION, POWDER, FOR SOLUTION INTRAVENOUS at 20:41

## 2021-01-01 RX ADMIN — ASPIRIN 81 MG: 81 TABLET, COATED ORAL at 08:04

## 2021-01-01 RX ADMIN — LIDOCAINE HYDROCHLORIDE 10 ML: 40 SOLUTION TOPICAL at 08:53

## 2021-01-01 RX ADMIN — OXYCODONE HYDROCHLORIDE AND ACETAMINOPHEN 1 TABLET: 5; 325 TABLET ORAL at 09:00

## 2021-01-01 RX ADMIN — FENTANYL CITRATE 50 MCG: 50 INJECTION, SOLUTION INTRAMUSCULAR; INTRAVENOUS at 18:59

## 2021-01-01 RX ADMIN — CEFEPIME HYDROCHLORIDE 2000 MG: 2 INJECTION, POWDER, FOR SOLUTION INTRAVENOUS at 17:40

## 2021-01-01 RX ADMIN — LIDOCAINE HYDROCHLORIDE 10 ML: 40 SOLUTION TOPICAL at 09:14

## 2021-01-01 RX ADMIN — ENOXAPARIN SODIUM 40 MG: 40 INJECTION SUBCUTANEOUS at 09:48

## 2021-01-01 RX ADMIN — CYCLOSPORINE 125 MG: 100 CAPSULE, LIQUID FILLED ORAL at 09:49

## 2021-01-01 RX ADMIN — FAMOTIDINE 20 MG: 20 TABLET, FILM COATED ORAL at 09:17

## 2021-01-01 RX ADMIN — ALBUTEROL SULFATE 2 PUFF: 90 AEROSOL, METERED RESPIRATORY (INHALATION) at 01:46

## 2021-01-01 RX ADMIN — LIDOCAINE HYDROCHLORIDE: 40 SOLUTION TOPICAL at 09:19

## 2021-01-01 RX ADMIN — KETOCONAZOLE: 20 CREAM TOPICAL at 15:53

## 2021-01-01 RX ADMIN — METOPROLOL TARTRATE 37.5 MG: 25 TABLET ORAL at 02:38

## 2021-01-01 RX ADMIN — OXYCODONE HYDROCHLORIDE AND ACETAMINOPHEN 2 TABLET: 5; 325 TABLET ORAL at 01:01

## 2021-01-01 RX ADMIN — LORAZEPAM 2 MG: 2 INJECTION INTRAMUSCULAR at 20:45

## 2021-01-01 RX ADMIN — HYDRALAZINE HYDROCHLORIDE 10 MG: 20 INJECTION INTRAMUSCULAR; INTRAVENOUS at 14:36

## 2021-01-01 RX ADMIN — FAMOTIDINE 20 MG: 20 TABLET, FILM COATED ORAL at 22:07

## 2021-01-01 RX ADMIN — LORAZEPAM 2 MG: 2 INJECTION INTRAMUSCULAR at 05:27

## 2021-01-01 RX ADMIN — ENOXAPARIN SODIUM 30 MG: 100 INJECTION SUBCUTANEOUS at 20:42

## 2021-01-01 RX ADMIN — DEXAMETHASONE SODIUM PHOSPHATE 6 MG: 10 INJECTION INTRAMUSCULAR; INTRAVENOUS at 17:19

## 2021-01-01 RX ADMIN — LIDOCAINE HYDROCHLORIDE: 20 JELLY TOPICAL at 14:20

## 2021-01-01 RX ADMIN — ATORVASTATIN CALCIUM 10 MG: 10 TABLET, FILM COATED ORAL at 19:58

## 2021-01-01 RX ADMIN — CLOPIDOGREL BISULFATE 75 MG: 75 TABLET ORAL at 15:35

## 2021-01-01 RX ADMIN — GABAPENTIN 300 MG: 300 CAPSULE ORAL at 09:18

## 2021-01-01 RX ADMIN — CEFEPIME HYDROCHLORIDE 2000 MG: 2 INJECTION, POWDER, FOR SOLUTION INTRAVENOUS at 17:29

## 2021-01-01 RX ADMIN — PANTOPRAZOLE SODIUM 40 MG: 40 TABLET, DELAYED RELEASE ORAL at 09:14

## 2021-01-01 RX ADMIN — CYCLOSPORINE 125 MG: 100 CAPSULE, LIQUID FILLED ORAL at 21:47

## 2021-01-01 RX ADMIN — FAMOTIDINE 20 MG: 20 TABLET, FILM COATED ORAL at 20:57

## 2021-01-01 RX ADMIN — CLOBETASOL PROPIONATE: 0.5 OINTMENT TOPICAL at 08:06

## 2021-01-01 RX ADMIN — OXYCODONE HYDROCHLORIDE AND ACETAMINOPHEN 1 TABLET: 5; 325 TABLET ORAL at 03:01

## 2021-01-01 RX ADMIN — PREDNISONE 5 MG: 10 TABLET ORAL at 09:50

## 2021-01-01 RX ADMIN — ENOXAPARIN SODIUM 30 MG: 100 INJECTION SUBCUTANEOUS at 09:10

## 2021-01-01 RX ADMIN — ENOXAPARIN SODIUM 30 MG: 100 INJECTION SUBCUTANEOUS at 10:03

## 2021-01-01 RX ADMIN — LIDOCAINE HYDROCHLORIDE: 40 SOLUTION TOPICAL at 08:45

## 2021-01-01 RX ADMIN — GLYCOPYRROLATE 0.2 MG: 0.2 INJECTION INTRAMUSCULAR; INTRAVENOUS at 20:02

## 2021-01-01 RX ADMIN — ACETAMINOPHEN 650 MG: 325 TABLET ORAL at 20:59

## 2021-01-01 RX ADMIN — LORAZEPAM 2 MG: 2 INJECTION INTRAMUSCULAR at 08:34

## 2021-01-01 RX ADMIN — ACETAMINOPHEN 650 MG: 325 TABLET ORAL at 06:08

## 2021-01-01 RX ADMIN — DAPSONE 50 MG: 100 TABLET ORAL at 09:14

## 2021-01-01 RX ADMIN — LIDOCAINE HYDROCHLORIDE 15 ML: 40 SOLUTION TOPICAL at 08:47

## 2021-01-01 RX ADMIN — PREDNISONE 5 MG: 10 TABLET ORAL at 07:42

## 2021-01-01 RX ADMIN — MULTIPLE VITAMINS W/ MINERALS TAB 1 TABLET: TAB at 19:57

## 2021-01-01 RX ADMIN — ASPIRIN 81 MG: 81 TABLET, COATED ORAL at 07:41

## 2021-01-01 RX ADMIN — DEXMEDETOMIDINE HYDROCHLORIDE 0.5 MCG/KG/HR: 4 INJECTION, SOLUTION INTRAVENOUS at 02:07

## 2021-01-01 RX ADMIN — SODIUM CHLORIDE, PRESERVATIVE FREE 10 ML: 5 INJECTION INTRAVENOUS at 21:49

## 2021-01-01 RX ADMIN — OXYCODONE HYDROCHLORIDE AND ACETAMINOPHEN 2 TABLET: 5; 325 TABLET ORAL at 22:43

## 2021-01-01 RX ADMIN — SODIUM CHLORIDE, PRESERVATIVE FREE 10 ML: 5 INJECTION INTRAVENOUS at 08:06

## 2021-01-01 RX ADMIN — FENTANYL CITRATE 50 MCG: 50 INJECTION, SOLUTION INTRAMUSCULAR; INTRAVENOUS at 20:01

## 2021-01-01 RX ADMIN — CLOPIDOGREL 75 MG: 75 TABLET, FILM COATED ORAL at 09:14

## 2021-01-01 RX ADMIN — IPRATROPIUM BROMIDE 2 PUFF: 17 AEROSOL, METERED RESPIRATORY (INHALATION) at 09:14

## 2021-01-01 RX ADMIN — LORAZEPAM 2 MG: 2 INJECTION INTRAMUSCULAR at 15:05

## 2021-01-01 RX ADMIN — SODIUM CHLORIDE, PRESERVATIVE FREE 10 ML: 5 INJECTION INTRAVENOUS at 22:04

## 2021-01-01 RX ADMIN — GABAPENTIN 300 MG: 300 CAPSULE ORAL at 23:40

## 2021-01-01 RX ADMIN — CEFEPIME HYDROCHLORIDE 2000 MG: 2 INJECTION, POWDER, FOR SOLUTION INTRAVENOUS at 20:58

## 2021-01-01 RX ADMIN — LORAZEPAM 1 MG: 2 INJECTION INTRAMUSCULAR at 04:11

## 2021-01-01 RX ADMIN — TRAMADOL HYDROCHLORIDE 50 MG: 50 TABLET, COATED ORAL at 20:59

## 2021-01-01 RX ADMIN — FOLIC ACID 1 MG: 1 TABLET ORAL at 09:14

## 2021-01-01 RX ADMIN — SODIUM CHLORIDE, PRESERVATIVE FREE 10 ML: 5 INJECTION INTRAVENOUS at 20:15

## 2021-01-01 RX ADMIN — LORAZEPAM 1 MG: 2 INJECTION INTRAMUSCULAR at 22:43

## 2021-01-01 RX ADMIN — LORAZEPAM 1 MG: 2 INJECTION INTRAMUSCULAR at 14:00

## 2021-01-01 RX ADMIN — OXYCODONE HYDROCHLORIDE AND ACETAMINOPHEN 500 MG: 500 TABLET ORAL at 22:08

## 2021-01-01 SDOH — ECONOMIC STABILITY: FOOD INSECURITY: WITHIN THE PAST 12 MONTHS, THE FOOD YOU BOUGHT JUST DIDN'T LAST AND YOU DIDN'T HAVE MONEY TO GET MORE.: NEVER TRUE

## 2021-01-01 SDOH — ECONOMIC STABILITY: FOOD INSECURITY: WITHIN THE PAST 12 MONTHS, YOU WORRIED THAT YOUR FOOD WOULD RUN OUT BEFORE YOU GOT MONEY TO BUY MORE.: NEVER TRUE

## 2021-01-01 ASSESSMENT — PAIN DESCRIPTION - DESCRIPTORS
DESCRIPTORS: BURNING
DESCRIPTORS: ACHING;BURNING
DESCRIPTORS: BURNING
DESCRIPTORS: BURNING
DESCRIPTORS: ACHING;BURNING
DESCRIPTORS: BURNING
DESCRIPTORS: BURNING;ACHING
DESCRIPTORS: BURNING
DESCRIPTORS: ACHING;BURNING
DESCRIPTORS: BURNING;CONSTANT
DESCRIPTORS: BURNING
DESCRIPTORS: BURNING
DESCRIPTORS: ACHING;BURNING

## 2021-01-01 ASSESSMENT — PAIN DESCRIPTION - PAIN TYPE
TYPE: CHRONIC PAIN
TYPE: ACUTE PAIN
TYPE: ACUTE PAIN;CHRONIC PAIN
TYPE: CHRONIC PAIN
TYPE: ACUTE PAIN
TYPE: CHRONIC PAIN
TYPE: CHRONIC PAIN

## 2021-01-01 ASSESSMENT — PAIN DESCRIPTION - PROGRESSION
CLINICAL_PROGRESSION: NOT CHANGED
CLINICAL_PROGRESSION: GRADUALLY IMPROVING
CLINICAL_PROGRESSION: NOT CHANGED
CLINICAL_PROGRESSION: GRADUALLY WORSENING
CLINICAL_PROGRESSION: NOT CHANGED
CLINICAL_PROGRESSION: NOT CHANGED
CLINICAL_PROGRESSION: GRADUALLY IMPROVING
CLINICAL_PROGRESSION: NOT CHANGED
CLINICAL_PROGRESSION: GRADUALLY IMPROVING
CLINICAL_PROGRESSION: NOT CHANGED
CLINICAL_PROGRESSION: GRADUALLY IMPROVING
CLINICAL_PROGRESSION: NOT CHANGED

## 2021-01-01 ASSESSMENT — PAIN SCALES - GENERAL
PAINLEVEL_OUTOF10: 9
PAINLEVEL_OUTOF10: 9
PAINLEVEL_OUTOF10: 8
PAINLEVEL_OUTOF10: 8
PAINLEVEL_OUTOF10: 5
PAINLEVEL_OUTOF10: 7
PAINLEVEL_OUTOF10: 8
PAINLEVEL_OUTOF10: 7
PAINLEVEL_OUTOF10: 0
PAINLEVEL_OUTOF10: 5
PAINLEVEL_OUTOF10: 7
PAINLEVEL_OUTOF10: 7
PAINLEVEL_OUTOF10: 8
PAINLEVEL_OUTOF10: 8
PAINLEVEL_OUTOF10: 9
PAINLEVEL_OUTOF10: 0
PAINLEVEL_OUTOF10: 5
PAINLEVEL_OUTOF10: 0
PAINLEVEL_OUTOF10: 7
PAINLEVEL_OUTOF10: 5
PAINLEVEL_OUTOF10: 3
PAINLEVEL_OUTOF10: 9
PAINLEVEL_OUTOF10: 8
PAINLEVEL_OUTOF10: 9
PAINLEVEL_OUTOF10: 0
PAINLEVEL_OUTOF10: 2
PAINLEVEL_OUTOF10: 5
PAINLEVEL_OUTOF10: 8
PAINLEVEL_OUTOF10: 8
PAINLEVEL_OUTOF10: 7
PAINLEVEL_OUTOF10: 9
PAINLEVEL_OUTOF10: 8
PAINLEVEL_OUTOF10: 7
PAINLEVEL_OUTOF10: 6
PAINLEVEL_OUTOF10: 5
PAINLEVEL_OUTOF10: 6
PAINLEVEL_OUTOF10: 8
PAINLEVEL_OUTOF10: 3
PAINLEVEL_OUTOF10: 8
PAINLEVEL_OUTOF10: 9
PAINLEVEL_OUTOF10: 7
PAINLEVEL_OUTOF10: 7
PAINLEVEL_OUTOF10: 5
PAINLEVEL_OUTOF10: 8
PAINLEVEL_OUTOF10: 4
PAINLEVEL_OUTOF10: 0
PAINLEVEL_OUTOF10: 7
PAINLEVEL_OUTOF10: 0
PAINLEVEL_OUTOF10: 6
PAINLEVEL_OUTOF10: 7
PAINLEVEL_OUTOF10: 5
PAINLEVEL_OUTOF10: 7
PAINLEVEL_OUTOF10: 8
PAINLEVEL_OUTOF10: 7
PAINLEVEL_OUTOF10: 3
PAINLEVEL_OUTOF10: 7
PAINLEVEL_OUTOF10: 8
PAINLEVEL_OUTOF10: 8
PAINLEVEL_OUTOF10: 4
PAINLEVEL_OUTOF10: 7
PAINLEVEL_OUTOF10: 0
PAINLEVEL_OUTOF10: 6
PAINLEVEL_OUTOF10: 8
PAINLEVEL_OUTOF10: 4
PAINLEVEL_OUTOF10: 8
PAINLEVEL_OUTOF10: 7
PAINLEVEL_OUTOF10: 5
PAINLEVEL_OUTOF10: 0
PAINLEVEL_OUTOF10: 8
PAINLEVEL_OUTOF10: 8
PAINLEVEL_OUTOF10: 4

## 2021-01-01 ASSESSMENT — PAIN DESCRIPTION - FREQUENCY
FREQUENCY: CONTINUOUS

## 2021-01-01 ASSESSMENT — PATIENT HEALTH QUESTIONNAIRE - PHQ9
SUM OF ALL RESPONSES TO PHQ QUESTIONS 1-9: 1
SUM OF ALL RESPONSES TO PHQ QUESTIONS 1-9: 1
2. FEELING DOWN, DEPRESSED OR HOPELESS: 1
SUM OF ALL RESPONSES TO PHQ9 QUESTIONS 1 & 2: 1
SUM OF ALL RESPONSES TO PHQ QUESTIONS 1-9: 1
1. LITTLE INTEREST OR PLEASURE IN DOING THINGS: 0

## 2021-01-01 ASSESSMENT — PAIN - FUNCTIONAL ASSESSMENT
PAIN_FUNCTIONAL_ASSESSMENT: ACTIVITIES ARE NOT PREVENTED
PAIN_FUNCTIONAL_ASSESSMENT: PREVENTS OR INTERFERES SOME ACTIVE ACTIVITIES AND ADLS
PAIN_FUNCTIONAL_ASSESSMENT: PREVENTS OR INTERFERES SOME ACTIVE ACTIVITIES AND ADLS

## 2021-01-01 ASSESSMENT — ENCOUNTER SYMPTOMS
PHOTOPHOBIA: 0
COLOR CHANGE: 1
NAUSEA: 1
NAUSEA: 0
ROS SKIN COMMENTS: DORSAL LEFT FOOT
NAUSEA: 0
NAUSEA: 1
NAUSEA: 0
DIARRHEA: 1
SHORTNESS OF BREATH: 1
SHORTNESS OF BREATH: 1
DIARRHEA: 0
VOMITING: 0
PHOTOPHOBIA: 0
NAUSEA: 0
BACK PAIN: 1
COLOR CHANGE: 1
SHORTNESS OF BREATH: 1
VOMITING: 0
COLOR CHANGE: 0
VOMITING: 1
COLOR CHANGE: 1
VOMITING: 0
COLOR CHANGE: 1
COLOR CHANGE: 1
ROS SKIN COMMENTS: DORSAL LEFT FOOT
DIARRHEA: 0
NAUSEA: 0
WHEEZING: 1
DIARRHEA: 1
DIARRHEA: 0
ABDOMINAL PAIN: 0
VOMITING: 0
COLOR CHANGE: 0
COLOR CHANGE: 0
COUGH: 0
COLOR CHANGE: 0
VOMITING: 0
ROS SKIN COMMENTS: DORSAL LEFT FOOT
BACK PAIN: 1
NAUSEA: 1
NAUSEA: 0
COUGH: 1
SHORTNESS OF BREATH: 1
DIARRHEA: 0
BACK PAIN: 0
SHORTNESS OF BREATH: 0
DIARRHEA: 1
PHOTOPHOBIA: 0
BACK PAIN: 1
RHINORRHEA: 1
VOMITING: 1
PHOTOPHOBIA: 0
WHEEZING: 0
COUGH: 1
SHORTNESS OF BREATH: 0
EYES NEGATIVE: 1
CONSTIPATION: 0
BACK PAIN: 1
COUGH: 1
DIARRHEA: 0
COUGH: 0
PHOTOPHOBIA: 0
VOMITING: 0
ABDOMINAL PAIN: 0
TACHYPNEA: 1
VOMITING: 1
NAUSEA: 1
COLOR CHANGE: 0
COLOR CHANGE: 1
NAUSEA: 1
ROS SKIN COMMENTS: DORSAL LEFT FOOT
DIARRHEA: 1
VOMITING: 1
DIARRHEA: 0
BACK PAIN: 1
SHORTNESS OF BREATH: 1
DIARRHEA: 1
NAUSEA: 0
COUGH: 0
VOMITING: 0
ROS SKIN COMMENTS: DORSAL LEFT FOOT
DIARRHEA: 0
COLOR CHANGE: 1
COUGH: 1
VOMITING: 1
COUGH: 1

## 2021-01-01 ASSESSMENT — LIFESTYLE VARIABLES
HOW OFTEN DURING THE LAST YEAR HAVE YOU HAD A FEELING OF GUILT OR REMORSE AFTER DRINKING: 0
AUDIT-C TOTAL SCORE: 4
AUDIT TOTAL SCORE: 0
HOW OFTEN DURING THE LAST YEAR HAVE YOU FOUND THAT YOU WERE NOT ABLE TO STOP DRINKING ONCE YOU HAD STARTED: 0
HOW OFTEN DURING THE LAST YEAR HAVE YOU BEEN UNABLE TO REMEMBER WHAT HAPPENED THE NIGHT BEFORE BECAUSE YOU HAD BEEN DRINKING: 0
HOW OFTEN DURING THE LAST YEAR HAVE YOU HAD A FEELING OF GUILT OR REMORSE AFTER DRINKING: NEVER
HOW OFTEN DURING THE LAST YEAR HAVE YOU BEEN UNABLE TO REMEMBER WHAT HAPPENED THE NIGHT BEFORE BECAUSE YOU HAD BEEN DRINKING: NEVER
AUDIT-C TOTAL SCORE: 0
HAS A RELATIVE, FRIEND, DOCTOR, OR ANOTHER HEALTH PROFESSIONAL EXPRESSED CONCERN ABOUT YOUR DRINKING OR SUGGESTED YOU CUT DOWN: 0
HOW OFTEN DO YOU HAVE A DRINK CONTAINING ALCOHOL: 2
HOW OFTEN DURING THE LAST YEAR HAVE YOU FAILED TO DO WHAT WAS NORMALLY EXPECTED FROM YOU BECAUSE OF DRINKING: 0
HOW MANY STANDARD DRINKS CONTAINING ALCOHOL DO YOU HAVE ON A TYPICAL DAY: 1
HOW MANY STANDARD DRINKS CONTAINING ALCOHOL DO YOU HAVE ON A TYPICAL DAY: THREE OR FOUR
AUDIT TOTAL SCORE: 4
HOW OFTEN DO YOU HAVE SIX OR MORE DRINKS ON ONE OCCASION: LESS THAN MONTHLY
HAVE YOU OR SOMEONE ELSE BEEN INJURED AS A RESULT OF YOUR DRINKING: 0
HOW OFTEN DO YOU HAVE SIX OR MORE DRINKS ON ONE OCCASION: 1
HAVE YOU OR SOMEONE ELSE BEEN INJURED AS A RESULT OF YOUR DRINKING: NO
HOW OFTEN DO YOU HAVE A DRINK CONTAINING ALCOHOL: TWO TO FOUR TIMES A MONTH
HAS A RELATIVE, FRIEND, DOCTOR, OR ANOTHER HEALTH PROFESSIONAL EXPRESSED CONCERN ABOUT YOUR DRINKING OR SUGGESTED YOU CUT DOWN: NO
HOW OFTEN DURING THE LAST YEAR HAVE YOU NEEDED AN ALCOHOLIC DRINK FIRST THING IN THE MORNING TO GET YOURSELF GOING AFTER A NIGHT OF HEAVY DRINKING: 0
HOW OFTEN DURING THE LAST YEAR HAVE YOU NEEDED AN ALCOHOLIC DRINK FIRST THING IN THE MORNING TO GET YOURSELF GOING AFTER A NIGHT OF HEAVY DRINKING: NEVER
HOW OFTEN DURING THE LAST YEAR HAVE YOU FOUND THAT YOU WERE NOT ABLE TO STOP DRINKING ONCE YOU HAD STARTED: NEVER
HOW OFTEN DURING THE LAST YEAR HAVE YOU FAILED TO DO WHAT WAS NORMALLY EXPECTED FROM YOU BECAUSE OF DRINKING: NEVER

## 2021-01-01 ASSESSMENT — PAIN DESCRIPTION - LOCATION
LOCATION: FOOT

## 2021-01-01 ASSESSMENT — PAIN DESCRIPTION - ONSET
ONSET: ON-GOING

## 2021-01-01 ASSESSMENT — PAIN DESCRIPTION - ORIENTATION
ORIENTATION: LEFT

## 2021-01-01 ASSESSMENT — SOCIAL DETERMINANTS OF HEALTH (SDOH): HOW HARD IS IT FOR YOU TO PAY FOR THE VERY BASICS LIKE FOOD, HOUSING, MEDICAL CARE, AND HEATING?: NOT HARD AT ALL

## 2021-01-04 NOTE — PROGRESS NOTES
[facility]  [address] [place] [address]  Dept: [phone]  Dept Fax: [phone]        Postoperative follow-up note    Subjective:   German Baird is a 60y.o. year old female who presents to our office today for postoperative followup regarding her   1. Primary osteoarthritis of right knee    2. S/P total knee arthroplasty, right    . Chief Complaint   Patient presents with    Knee Pain     right TKA 10/13/2020     Calton Homans  is a 62y.o. year old female who presents to our office today for postoperative follow up after having undergone a right total knee arthroplasty with Dr. Nona Leventhal, DO on 10/13/2020. Patient is very happy with her results thus far. Patient has no complaints of her right knee. She continues to recover from her from her left foot wound. Patient is only using the walker today because of her left foot and not because of her right total knee arthroplasty. Patient has gone to many doctors including the [facility], but now is being manages at [facility] wound care clinic. Review of Systems   Constitutional: Negative for chills and fever. Respiratory: Negative for cough. Gastrointestinal: Negative for constipation, diarrhea and nausea. Musculoskeletal: Positive for arthralgias (right knee). Negative for gait problem, joint swelling and myalgias. Neurological: Negative for dizziness, weakness and numbness. I have reviewed the CC, HPI, ROS, PMH, FHX, Social History, and if not present in this note, I have reviewed in the patient's chart. I agree with the documentation provided by other staff and have reviewed their documentation prior to providing my signature indicating agreement. Objective :   General: German Baird is a 60 y.o. female who is alert and oriented and sitting comfortably in our office.   Ortho Exam MS: Right knee nearly full range of motion. Walking with a walker because of her right foot. Incision right knee is well-healed without signs infection. Motor, sensory, vascular examination of the right lower extremity is grossly intact without focal deficits. Neuro: alert. oriented  Eyes: Extra-ocular muscles intact  Mouth: Oral mucosa moist. No perioral lesions  Pulm: Respirations unlabored and regular. Skin: warm, well perfused  Psych:   Patient has good fund of knowledge and displays understanging of exam, diagnosis, and plan. Radiology:     No x-rays were taken in office today. Assessment:      1. Primary osteoarthritis of right knee    2. S/P total knee arthroplasty, right         Plan:      Patient is doing really well 3 months post op from her right total knee arthroplasty. Patient is to continue her home exercise program. Patient will continue to follow with her wound care specialists for her left foot. Patient can follow up in 1 year for surveillance x-rays. Call the office with any questions or concerns. Follow up: Return in about 1 year (around 1/4/2022) for x rays. No orders of the defined types were placed in this encounter. No orders of the defined types were placed in this encounter. Dominique Goyal RN am scribing for and in the presence of Dr. Amanda Graham  1/6/2021 9:33 AM       I have reviewed and made changes accordingly to the work scribed by Alen Gr RN. The documentation accurately reflects work and decisions made by me. I have also reviewed documentation completed by clinical staff.     Amanda Graham DO, [facility]  1/6/2021 9:34 AM This note is created with the assistance of a speech recognition program.  While intending to generate a document that actually reflects the content of the visit, the document can still have some errors including those of syntax and sound a like substitutions which may escape proof reading.  In such instances, actual meaning can be extrapolated by contextual diversion      Electronically signed by Inder Encarnacion on 1/6/2021 at 9:33 AM

## 2021-01-12 NOTE — PROGRESS NOTES
Wound Care Supplies      Supply Company:     [employer] [address] p: [phone] f: [phone]     Ordering Center:     [address]  [address] CARE Dept: [phone]   FAX NUMBER     Patient Information:      Jeremy Root  [address]    : 1955  AGE: 60 y.o. GENDER: female   EPISODE DATE: 2021    Insurance:      PRIMARY INSURANCE:  Plan: [employer] CHOICE HMO  Coverage: [employer] MEDICARE ADVANTAGE  Effective Date: 2015  Group Number: [unfilled]  Subscriber Number: [id number] - (Medicare Managed)    Payor/Plan Subscr  Sex Relation Sub. Ins. ID Effective Group Num   1. [facility] 1955 Female Self [id number] 1/1/15 [id number]                                    [id number]       Patient Wound Information:      Problem List Items Addressed This Visit     Neoplasm of uncertain behavior of skin    Relevant Orders    Supply: Wound Cleanser    VL TCPO2 SHAILA MULTI LEVEL    Pain in left foot    Relevant Orders    Supply: Wound Cleanser    VL TCPO2 SHAILA MULTI LEVEL    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 ([address]    Relevant Orders    Supply: Wound Cleanser    VL TCPO2 SHAILA MULTI LEVEL    Primary malignant neoplasm of skin of left foot - Primary    Relevant Medications    predniSONE (DELTASONE) 20 MG tablet    Other Relevant Orders    Supply: Wound Cleanser    VL TCPO2 SHAILA MULTI LEVEL          WOUNDS REQUIRING DRESSING SUPPLIES:     Wound 20 Foot Left;Dorsal wound #1 (Active)   Wound Image   21 1031   Wound Etiology Other 21 1031   Dressing Status New drainage noted; Old drainage noted 21 1031   Wound Cleansed Irrigated with saline 21 1031   Wound Length (cm) 6 cm 21 1031   Wound Width (cm) 6.7 cm 21 1031   Wound Depth (cm) 0.5 cm 21 1031 Wound Surface Area (cm^2) 40.2 cm^2 01/12/21 1031   Change in Wound Size % (l*w) -30.52 01/12/21 1031   Wound Volume (cm^3) 20.1 cm^3 01/12/21 1031   Wound Healing % -63 01/12/21 1031   Post-Procedure Length (cm) 6 cm 01/12/21 1031   Post-Procedure Width (cm) 6.7 cm 01/12/21 1031   Post-Procedure Depth (cm) 0.5 cm 01/12/21 1031   Post-Procedure Surface Area (cm^2) 40.2 cm^2 01/12/21 1031   Post-Procedure Volume (cm^3) 20.1 cm^3 01/12/21 1031   Wound Assessment Exposed structure tendon 01/12/21 1031   Drainage Amount Moderate 01/12/21 1031   Drainage Description Serosanguinous; Yellow 01/12/21 1031   Odor None 01/12/21 1031   Jessica-wound Assessment Blanchable erythema 01/12/21 1031   Margins Defined edges 01/12/21 1031   Wound Thickness Description not for Pressure Injury Full thickness 12/04/20 1012   Number of days: 39     Incision 10/13/20 Knee Anterior;Right (Active)   Number of days: 91       Supplies Requested :      WOUND #: 1   PRIMARY DRESSING:  Normal saline moistened 4x4   Cover and Secure with:  Other Gentac silicone border gauze 4x4     ADDITIONAL ITEMS:  [] Gloves Small  [x] Gloves Medium [] Gloves Large [] Gloves Carmen Gildardo  [] Tape 1\" [] Tape 2\" [] Tape 3\"  [] Medipore Tape  [x] Saline  [] Skin Prep   [] Adhesive Remover   [] Cotton Tip Applicators   [] Other:    Patient Wound(s) Debrided: [] Yes if yes please add date     [x] No    Debribement Type:no debridement due to diagnosis debridement not warranted    Patient currently being seen by Home Health: [] Yes   [x] No    Duration for needed supplies:  []15  [x]30  []60  []90 Days    Electronically signed by Alesia Barbosa RN on 1/12/2021 at 11:10 AM     Provider Information:      Tammie Johnson DPM  [id number]

## 2021-01-12 NOTE — PROGRESS NOTES
[address]   Progress Note and Procedure Note      [address] RECORD NUMBER:  [id number]  AGE: 60 y.o. GENDER: female  : 1955  EPISODE DATE:  2021    Subjective:     Chief Complaint   Patient presents with    Wound Check     left foot         HISTORY of PRESENT ILLNESS HPI     Dilip Schilling is a 60 y.o. female who presents today for wound/ulcer evaluation. History of Wound Context: Patient presents for evaluation of chronic left dorsal foot wound. She has undergone extensive testing in the past with little improvement. MRI was negative for osteomyelitis or abscess. Previous punch biopsies have been negative for malignancy or any obvious process. Rheumatologic work-up has revealed elevated anticentromere and positive AMADOR, and full work up is still pending. She has previously been seen by a rheumatologist.  Has had multiple debridements in the past with skin grafting including STSG and allograft. Relates that with each debridement the wound has increased in size and the graft has failed. She does relate a significant smoking history stating that she smoked from childhood heavily and has since quit just recently. She has seen vascular surgery with no intervention. She underwent left lower extremity angiogram and was found to have no flow-limiting stenosis. She has known CAD with stenting in the past. Denies any significant drainage from the ulceration. Wound has remained unchanged.   Wound/Ulcer Pain Timing/Severity: intermittent  Quality of pain: burning  Modifying Factors: Pain worsens with Touching of the wound or pressure  Associated Signs/Symptoms: pain    Ulcer Identification:  Ulcer Type: undetermined, suspect microvascular disease contributing to chronicity of the wound  Contributing Factors: arterial insufficiency and decreased tissue oxygenation          PAST MEDICAL HISTORY        Diagnosis Date    Arthritis knees hips hands shoulders and back    CAD (coronary artery disease) 2019    blockage on cath 9/2019, to have stenting after surgery (arthroplasty) Dr. Maira Smiental Chronic back pain     GERD (gastroesophageal reflux disease)     on rx    Hyperlipidemia     per Dr. Roc Narvaez on rx    Hypertension     [facility] manages    Incomplete RBBB     Irregular heart beat     LAFB (left anterior fascicular block)     Dr. Roc Narvaez, cardiologist, seen 9/2019 prior to surgery on 10/8/2019    Leg wound, left     following with wound care, Dr. Augustin Davis Lumbar disc disease     Osteoporosis     PAC (premature atrial contraction) 06/2018    PACs and PVCs on holter monitor,     PONV (postoperative nausea and vomiting)     requests scop patch    Wears dentures     upper partial    Wears eyeglasses     readers    Wears partial dentures     upper and lower    Wellness examination     MIKE Munoz NP PCP, seen 2/10/2020       PAST SURGICAL HISTORY    Past Surgical History:   Procedure Laterality Date    ABDOMINOPLASTY  1997    BLEPHAROPLASTY Bilateral 1997    BREAST ENHANCEMENT SURGERY Bilateral 1999    breast augmentation    CARDIAC CATHETERIZATION  2019    Dr. Roc Narvaez, blockage but no stents yet    CATARACT REMOVAL WITH IMPLANT Bilateral 2016    COLONOSCOPY  2015    No Polyps    COSMETIC SURGERY      eyelid lift    FINGER SURGERY Right     thumb lesion excised    FIXATION KYPHOPLASTY  2013    Back    FOOT DEBRIDEMENT Left 10/11/2019    SURGICAL PREP WOUND BED LEFT FOOT WITH APPLICATION OF EPIFIX LEFT FOOT 3X4 performed by Lowell Barton DPM at [facility]      kyphoplasty for lumbar fx 2013 Dr. Wilian Rodney ARTHROSCOPY Left 2006   Donice Pinks Left 1/2/2020    FOOT  DEBRIDEMENT WITH WOUND VAC PLACEMENT performed by Ira Maxwell MD at [place] [address]  2014   [facility] 892  2005 diskectomy and spinal fusion    OTHER SURGICAL HISTORY  10/03/2019    Left leg angiogram    NM OFFICE/OUTPT VISIT,PROCEDURE ONLY Right 2018    EXCISION MASS THUMB, 3080 TABLE performed by Fariha Barba DO at [facility] Left 12/3/2019    LEFT FOOT DEBRIDEMENT WITH SKIN GRAFT SPLIT THICKNESS; SKIN GRAFT TAKEN FROM RIGHT ANTERIOR THIGH performed by Gretchen Lowery MD at [facility] Left 2020    DEBRIDEMENT, SPLIT THICKNESS SKIN GRAFT WITH WOUND VAC PLACEMENT FOOT performed by Nicole Huynh MD at [facility] Left 2020    DEBRIDEMENT, SKIN GRAFT SPLIT THICKNESS FOOT  ([facility], [address]) performed by Nicole Huynh MD at [address] [place] [address]      as a child    TOTAL KNEE ARTHROPLASTY Right 10/13/2020    KNEE TOTAL ARTHROPLASTY    TOTAL KNEE ARTHROPLASTY Right 10/13/2020    KNEE TOTAL ARTHROPLASTY- MICROPORT, \ ADVANCED performed by Fariha Barba DO at [place] [address] History   Problem Relation Age of Onset    Coronary Art Dis Mother     Arthritis Mother     Heart Surgery Mother         CABG    Coronary Art Dis Father     Heart Disease Father     Heart Surgery Father         CABG    Coronary Art Dis Sister     Heart Surgery Sister         CABG       SOCIAL HISTORY    Social History     Tobacco Use    Smoking status: Former Smoker     Packs/day: 0.25     Years: 50.00     Pack years: 12.50     Types: Cigarettes     Start date:      Quit date: 2020     Years since quittin.9    Smokeless tobacco: Never Used   Substance Use Topics    Alcohol use: Yes     Frequency: 2-4 times a month     Comment: 2-3 shot liquor for weekends    Drug use: Never       ALLERGIES    No Known Allergies    MEDICATIONS    Current Outpatient Medications on File Prior to Encounter   Medication Sig Dispense Refill    Metoprolol Tartrate 37.5 MG TABS take 1 tablet by mouth twice a day 60 tablet 5  Multiple Vitamins-Minerals (THERAPEUTIC MULTIVITAMIN-MINERALS) tablet Take 1 tablet by mouth daily      ciprofloxacin (CIPRO) 500 MG tablet Take 1 tablet by mouth 2 times daily 28 tablet 0    lidocaine 4 % GEL jelly Apply 1 Dose topically as needed for Pain 90 mL 3    oxyCODONE-acetaminophen (PERCOCET) 5-325 MG per tablet take 1 tablet by mouth four times a day if needed      traMADol (ULTRAM ER) 200 MG extended release tablet take 1 tablet by mouth once daily      aspirin EC 81 MG EC tablet Take 1 tablet by mouth 2 times daily 84 tablet 0    docusate sodium (COLACE) 100 MG capsule Take 1 capsule by mouth 2 times daily as needed for Constipation 60 capsule 0    folic acid (FOLVITE) 1 MG tablet take 1 tablet by mouth once daily (Patient taking differently: take 1 tablet by mouth once daily Steve Rose) 90 tablet 1    vitamin C (ASCORBIC ACID) 500 MG tablet Take 500 mg by mouth 2 times daily      diclofenac sodium (VOLTAREN) 1 % GEL Apply topically as needed Dr. Lashanda Reynoso by Does not apply route 1 each 0    meclizine (ANTIVERT) 25 MG tablet Take 25 mg by mouth 3 times daily as needed Per Hemant Clarity CNP      atorvastatin (LIPITOR) 40 MG tablet Take 1 tablet by mouth nightly (Patient taking differently: Take 40 mg by mouth every morning Per Dr. León Rod) 30 tablet 3    NAPROXEN PO Take 250 mg by mouth daily as needed Indications: patient takes 1-2 times a day Stopping 10 days prior to surgery as instructed by surgeon      omeprazole (PRILOSEC) 40 MG delayed release capsule Take 1 tablet by mouth daily Per Hemant Clarity      Cholecalciferol (VITAMIN D3) 5000 units TABS Take 5,000 Units by mouth daily       calcium carbonate (OSCAL) 500 MG TABS tablet Take 1,000 mg by mouth daily Per Karla huang      alendronate (FOSAMAX) 70 MG tablet Take 70 mg by mouth every 7 days Indications: Mondays Dr. Arnoldo Corrigan  gabapentin (NEURONTIN) 300 MG capsule Take 300 mg by mouth 2 times daily. Per Dr. Elisha Stearns       No current facility-administered medications on file prior to encounter. REVIEW OF SYSTEMS    Review of Systems   Constitutional: Negative for chills and fever. Cardiovascular: Negative for leg swelling. Negative for intermittent claudication   Gastrointestinal: Negative for diarrhea, nausea and vomiting. Skin: Positive for wound. Dorsal left foot   Neurological: Negative for weakness and numbness. Objective:      /76   Pulse 65   Temp 98.6 °F (37 °C) (Tympanic)   Resp 18   Ht 5' 6\" (1.676 m)   Wt 170 lb (77.1 kg)   BMI 27.44 kg/m²     Wt Readings from Last 3 Encounters:   01/12/21 170 lb (77.1 kg)   12/29/20 170 lb (77.1 kg)   12/15/20 170 lb (77.1 kg)       Physical Exam:  General:  Alert and oriented x3. In no acute distress. Lower Extremity Physical Exam:    Vascular: DP pulses are not palpable, Bilateral. PT pulses are not palpable, Bilateral. CFT <4 seconds to all digits, Bilateral.  No edema, Bilateral.  Hair growth is absent to the level of the digits, Bilateral.  Skin temp is warm to cool from the tibial tuberosity to the digits, Bilateral.     Neuro: Saph/sural/SP/DP/plantar sensation intact to light touch. Musculoskeletal: EHL/FHL/GS/TA gross motor intact. Gross deformity is absent. Dermatologic: Open wound present to dorsal left foot wound as documented in detail below. Wound base is fibro-necrotic extending to the level of muscle with exposed extensor tendons. Negative probe to bone. There is no erythema. There is no purulent drainage. There is no fluctuance or crepitus. Interdigital maceration absent, Bilateral.     MRI Left Foot W WO   Impression   1. Large dorsal ulcer in the midfoot centered at the metatarsals.  Suspected   involvement of the extensor digitorum tendons of the 2nd, 3rd and 4th digits   at the level of the ulceration. 2. No tenosynovitis is otherwise evident.  No soft tissue abscess. 3. No changes of acute osteomyelitis. Surgical Pathology  Final Diagnosis   SPECIMEN \"A\":  SKIN, LEFT FOOT, BIOPSY:        EPIDERMAL ULCERATION WITH ASSOCIATED ACUTE INFLAMMATION   NEGATIVE FOR MALIGNANCY   SPECIMEN \"B\":  SKIN, LEFT FOOT, 12 O'CLOCK, BIOPSY:        EPIDERMAL ULCERATION WITH EXTENSIVE NECROSIS WITH ASSOCIATED   ACUTE INFLAMMATION   SCANT FRAGMENT OF DETACHED SQUAMOUS EPITHELIUM WITH ATYPIA (SEE   COMMENT)   SPECIMEN \"C\":  SKIN, LEFT FOOT, 6 O'CLOCK, BIOPSY:        ULCERATED SKIN WITH MARKED ACUTE INFLAMMATION   NEGATIVE FOR MALIGNANCY   Diagnosis Comment   In Part B, a scant fragment of detached squamous epithelium with   cytologic atypia is noted.  This is of uncertain clinical   significance.  Please correlate with appropriate clinical findings. Within Parts A, B and C, an invasive malignancy is not identified.      Left Lower Extremity Angiogram 10/03/2019  Small vessel disease, no flow limiting stenosis in left lower extremity. Assessment:      Active Hospital Problems    Diagnosis Date Noted    [facility] [I73.9] 12/29/2020    Pain in left foot [M79.672]     Chronic ulcer of left foot with fat layer exposed ([facility] [address] [L97.522]          Suspect significant microvascular disease as the cause of delayed healing  Rheumatologic disease not ruled out    Plan:     Treatment Note please see attached Discharge Instructions    Prednisone taper, patient to monitor for signs of improvement. Educated on possible side effects of steroid use. Previous MRI reviewed, negative for abscess or osteomyelitis    Previous vascular testing and notes reviewed. TCPO2 ordered. Patient will require debridement prior to further wound healing attempts; will continue to await further work up from multidisciplinary approach. Educated on signs and symptoms of infection. Instructed to call clinic immediately or go to ER if signs and symptoms of infection are present. RTC 1 to 2 weeks      Written patient discharge instructions given to patient and signed by patient or POA. Discharge Instructions         [address] [facility]                                 Visit Jamal Instructions / Physician Orders  DATE:1/12/21     Home Care:NONE     SUPPLIES ORDERED THRU:     Wound Location:  Left foot     Cleanse with: Liquid antibacterial soap and water, rinse well      Dressing Orders:  Primary dressing normal saline moistened gauze over with Gentac silicone border dressings     Frequency: 1x  Daily     Additional Orders: Increase protein to diet (meat, cheese, eggs, fish, peanut butter, nuts and beans)  Multivitamin daily  TCPO2 to left foot wound please obtain prior to next visit  Start prednisone 60mg for 1 week   4% Lidocaine Jelly to wound daily with wound care     MY CHART []????     Smart Device  []????      HYPERBARIC TREATMENT-                TREATMENT #     Your next appointment with the [address] [place] [address] is in 1 week                                                                                                   ROOM TYPE   []???? CHAIR     []???? BED   []???? EITHER        TIME []???? 45 MIN     []???? 60 MIN     (Please note your next appointment above and if you are unable to keep, kindly give a 24 hour notice.  Thank you.)     If you experience any of the following, please call the [address] [place] [address] during business hours:  [phone]     * Increase in Pain  * Temperature over 101  * Increase in drainage from your wound  * Drainage with a foul odor  * Bleeding  * Increase in swelling  * Need for compression bandage changes due to slippage, breakthrough drainage.    If you need medical attention outside of the business hours of the [address] [place] [address] Road please contact your PCP or go to the nearest emergency room.     The information contained in the After Visit Summary has been reviewed with me, the patient and/or responsible adult, by my health care provider(s). I had the opportunity to ask questions regarding this information. I have elected to receive;      []?? ?? After Visit Summary  [x]????Comprehensive Discharge Instruction        Patient signature______________________________________Date:________  Electronically signed by Atif Damico RN on 1/12/2021 at 10:06 AM  Electronically signed by Maritza Cuellar DPM on 1/12/2021 at 10:41 AM          Electronically signed by Audelia Fletcher DPM on 1/12/2021 at 11:14 AM    I performed a history and physical examination of the patient and discussed management with the resident. I reviewed the residents note and agree with the documented findings and plan of care. Any areas of disagreement are noted on the chart. I was personally present for the key portions of any procedures. I have documented in the chart those procedures where I was not present during the key portions. I have reviewed the Podiatry Resident progress note. I agree with the chief complaint, past medical history, past surgical history, allergies, medications, social and family history as documented unless otherwise noted below. Documentation of the HPI, Physical Exam and Medical Decision Making performed by medical students or scribes is based on my personal performance of the HPI, PE and MDM. I have personally evaluated this patient and have completed at least one if not all key elements of the E/M (history, physical exam, and MDM). Additional findings are as noted.      Maritza Cuellar DPM on 1/13/2021 at 9:28 AM  Board Certified, American Board of Podiatric Surgery  Fellow, [employer] and [employer]

## 2021-01-19 NOTE — PROGRESS NOTES
[address]   Progress Note and Procedure Note      [address] RECORD NUMBER:  [id number]  AGE: 60 y.o. GENDER: female  : 1955  EPISODE DATE:  2021    Subjective:     Chief Complaint   Patient presents with    Wound Check     left foot         Interval HPI (21): Patient was followed up at [facility], but was sent to wound care there who recommended another wound VAC application. She was started on 60 mg Prednisone at the last appointment, she states she feels \"grumpy,\" but no other issues while taking medication. She states the wound has slightly improved while on the Prednisone. PVRs revealed adequate circulation for healing. HISTORY of PRESENT ILLNESS HPI     Katelin Peguero is a 60 y.o. female who presents today for wound/ulcer evaluation. History of Wound Context: Patient presents for evaluation of chronic left dorsal foot wound. She has undergone extensive testing in the past with little improvement. MRI was negative for osteomyelitis or abscess. Previous punch biopsies have been negative for malignancy or any obvious process. Rheumatologic work-up has revealed elevated anticentromere and positive AMADOR, and full work up is still pending. She has previously been seen by a rheumatologist.  Has had multiple debridements in the past with skin grafting including STSG and allograft. Relates that with each debridement the wound has increased in size and the graft has failed. She does relate a significant smoking history stating that she smoked from childhood heavily and has since quit just recently. She has seen vascular surgery with no intervention. She underwent left lower extremity angiogram and was found to have no flow-limiting stenosis. She has known CAD with stenting in the past. Denies any significant drainage from the ulceration. Wound has remained unchanged.   Wound/Ulcer Pain Timing/Severity: intermittent  Quality of pain: burning Modifying Factors: Pain worsens with Touching of the wound or pressure  Associated Signs/Symptoms: pain    Ulcer Identification:  Ulcer Type: undetermined, suspect microvascular disease contributing to chronicity of the wound  Contributing Factors: arterial insufficiency and decreased tissue oxygenation          PAST MEDICAL HISTORY        Diagnosis Date    Arthritis     knees hips hands shoulders and back    CAD (coronary artery disease) 2019    blockage on cath 9/2019, to have stenting after surgery (arthroplasty) Dr. Sue Peralta Chronic back pain     GERD (gastroesophageal reflux disease)     on rx    Hyperlipidemia     per Dr. Shaquille Whitaker on rx    Hypertension     Izabela Bailey manages    Incomplete RBBB     Irregular heart beat     LAFB (left anterior fascicular block)     Dr. Shaquille Whitaker, cardiologist, seen 9/2019 prior to surgery on 10/8/2019    Leg wound, left     following with wound care, Dr. Dodie Siemens Lumbar disc disease     Osteoporosis     PAC (premature atrial contraction) 06/2018    PACs and PVCs on holter monitor,     PONV (postoperative nausea and vomiting)     requests scop patch    Wears dentures     upper partial    Wears eyeglasses     readers    Wears partial dentures     upper and lower    Wellness examination     MIKE Abraham NP PCP, seen 2/10/2020       PAST SURGICAL HISTORY    Past Surgical History:   Procedure Laterality Date    ABDOMINOPLASTY  1997    BLEPHAROPLASTY Bilateral 1997    BREAST ENHANCEMENT SURGERY Bilateral 1999    breast augmentation    CARDIAC CATHETERIZATION  2019    Dr. Shaquille Whitaker, blockage but no stents yet    CATARACT REMOVAL WITH IMPLANT Bilateral 2016    COLONOSCOPY  2015    No Polyps    COSMETIC SURGERY      eyelid lift    FINGER SURGERY Right     thumb lesion excised    FIXATION KYPHOPLASTY  2013    Back    FOOT DEBRIDEMENT Left 10/11/2019  calcium carbonate (OSCAL) 500 MG TABS tablet Take 1,000 mg by mouth daily Per Nadean Kocher rheumatology      alendronate (FOSAMAX) 70 MG tablet Take 70 mg by mouth every 7 days Indications: Mondays Dr. Bryan Nair      gabapentin (NEURONTIN) 300 MG capsule Take 300 mg by mouth 2 times daily. Per Dr. Reza Verdin       No current facility-administered medications on file prior to encounter. REVIEW OF SYSTEMS    Review of Systems   Constitutional: Negative for chills and fever. Cardiovascular: Negative for leg swelling. Negative for intermittent claudication   Gastrointestinal: Negative for diarrhea, nausea and vomiting. Skin: Positive for wound. Dorsal left foot   Neurological: Negative for weakness and numbness. Objective:      BP (!) 179/84   Pulse 62   Temp 98.5 °F (36.9 °C) (Tympanic)   Resp 16   Ht 5' 6\" (1.676 m)   Wt 170 lb (77.1 kg)   BMI 27.44 kg/m²     Wt Readings from Last 3 Encounters:   01/19/21 170 lb (77.1 kg)   01/12/21 170 lb (77.1 kg)   12/29/20 170 lb (77.1 kg)       Physical Exam:  General:  Alert and oriented x3. In no acute distress. Lower Extremity Physical Exam:    Vascular: DP pulses are not palpable, Bilateral. PT pulses are not palpable, Bilateral. CFT <4 seconds to all digits, Bilateral.  No edema, Bilateral.  Hair growth is absent to the level of the digits, Bilateral.  Skin temp is warm to cool from the tibial tuberosity to the digits, Bilateral.     Neuro: Saph/sural/SP/DP/plantar sensation intact to light touch. Musculoskeletal: EHL/FHL/GS/TA gross motor intact. Gross deformity is absent. Dermatologic: Open wound present to dorsal left foot wound as documented in detail below. Wound base is fibro-granular extending to the level of muscle with exposed extensor tendons. Granulation tissue improved on the medial side of the wound. Negative probe to bone. There is no erythema. There is no purulent drainage. There is no fluctuance or crepitus. Interdigital maceration absent, Bilateral.     Wound 12/04/20 Foot Left;Dorsal wound #1 (Active)   Wound Image   01/19/21 1003   Wound Etiology Other 01/19/21 1003   Dressing Status New drainage noted; Old drainage noted 01/19/21 1003   Wound Cleansed Soap and water 01/19/21 1003   Wound Length (cm) 6.2 cm 01/19/21 1003   Wound Width (cm) 6.2 cm 01/19/21 1003   Wound Depth (cm) 0.5 cm 01/19/21 1003   Wound Surface Area (cm^2) 38.44 cm^2 01/19/21 1003   Change in Wound Size % (l*w) -24.81 01/19/21 1003   Wound Volume (cm^3) 19.22 cm^3 01/19/21 1003   Wound Healing % -56 01/19/21 1003   Post-Procedure Length (cm) 6.2 cm 01/19/21 1003   Post-Procedure Width (cm) 6.2 cm 01/19/21 1003   Post-Procedure Depth (cm) 0.5 cm 01/19/21 1003   Post-Procedure Surface Area (cm^2) 38.44 cm^2 01/19/21 1003   Post-Procedure Volume (cm^3) 19.22 cm^3 01/19/21 1003   Wound Assessment Exposed structure tendon;Slough;Pink/red 01/19/21 1003   Drainage Amount Moderate 01/19/21 1003   Drainage Description Yellow 01/19/21 1003   Odor None 01/19/21 1003   Jessica-wound Assessment Blanchable erythema 01/19/21 1003   Margins Defined edges 01/19/21 1003   Wound Thickness Description not for Pressure Injury Full thickness 12/04/20 1012   Number of days: 46       MRI Left Foot W WO   Impression   1. Large dorsal ulcer in the midfoot centered at the metatarsals.  Suspected   involvement of the extensor digitorum tendons of the 2nd, 3rd and 4th digits   at the level of the ulceration. 2. No tenosynovitis is otherwise evident.  No soft tissue abscess. 3. No changes of acute osteomyelitis. Surgical Pathology  Final Diagnosis   SPECIMEN \"A\":  SKIN, LEFT FOOT, BIOPSY:        EPIDERMAL ULCERATION WITH ASSOCIATED ACUTE INFLAMMATION   NEGATIVE FOR MALIGNANCY   SPECIMEN \"B\":  SKIN, LEFT FOOT, 12 O'CLOCK, BIOPSY:        EPIDERMAL ULCERATION WITH EXTENSIVE NECROSIS WITH ASSOCIATED   ACUTE INFLAMMATION   SCANT FRAGMENT OF DETACHED SQUAMOUS EPITHELIUM WITH ATYPIA (SEE   COMMENT)   SPECIMEN \"C\":  SKIN, LEFT FOOT, 6 O'CLOCK, BIOPSY:        ULCERATED SKIN WITH MARKED ACUTE INFLAMMATION   NEGATIVE FOR MALIGNANCY   Diagnosis Comment   In Part B, a scant fragment of detached squamous epithelium with   cytologic atypia is noted.  This is of uncertain clinical   significance.  Please correlate with appropriate clinical findings. Within Parts A, B and C, an invasive malignancy is not identified.      Left Lower Extremity Angiogram 10/03/2019  Small vessel disease, no flow limiting stenosis in left lower extremity. Assessment:      Active Hospital Problems    Diagnosis Date Noted    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 ([facility] [address] [L97.523]     Pain in left foot [M79.672]     Neoplasm of uncertain behavior of skin [D48.5] 01/05/2015         Patient responding on prednisone, no evidence of microvascular disease. Rheumatologic disease not ruled out    Plan:     Treatment Note please see attached Discharge Instructions    Continue Prednisone 60 mg for another week. Will monitor response and taper accordingly. Educated on possible side effects of steroid use. No debridement today. Patient will require debridement prior to further wound healing attempts; will continue to await further work up from multidisciplinary approach. Educated on signs and symptoms of infection. Instructed to call clinic immediately or go to ER if signs and symptoms of infection are present. RTC 1 week      Written patient discharge instructions given to patient and signed by patient or POA. Discharge Instructions         [address] [facility]                                 Visit Jamal Instructions / Physician Orders  DATE:1/12/21     Home Care:NONE     SUPPLIES ORDERED THRU:     Wound Location:  Left foot     Cleanse with: Liquid antibacterial soap and water, rinse well      Dressing Orders: Torin Needles over with Gentac silicone border dressings     Frequency: 1x  Daily     Additional Orders: Increase protein to diet (meat, cheese, eggs, fish, peanut butter, nuts and beans)  Multivitamin daily    Continue prednisone 60mg for 1 more  week   4% Lidocaine Jelly to wound daily with wound care     MY CHART []?????     Smart Device  []?????      HYPERBARIC TREATMENT-                TREATMENT #     Your next appointment with the [address] [place] [address] is in 1 week                                                                                                   ROOM TYPE   []????? CHAIR     []????? BED   []????? EITHER        TIME []????? 45 MIN     []????? 60 MIN     (Please note your next appointment above and if you are unable to keep, kindly give a 24 hour notice. Thank you.)     If you experience any of the following, please call the [address] [place] [address] during business hours:  [phone]     * Increase in Pain  * Temperature over 101  * Increase in drainage from your wound  * Drainage with a foul odor  * Bleeding  * Increase in swelling  * Need for compression bandage changes due to slippage, breakthrough drainage.     If you need medical attention outside of the business hours of the [address] [place] [address] please contact your PCP or go to the nearest emergency room.     The information contained in the After Visit Summary has been reviewed with me, the patient and/or responsible adult, by my health care provider(s). I had the opportunity to ask questions regarding this information. I have elected to receive;      []??? ? ? After Visit Summary [x]? ? ? ? ? Comprehensive Discharge Instruction        Patient signature______________________________________Date:________    Electronically signed by Rubio Mari DPM on 1/19/2021 at 10:14 AM          Electronically signed by Blanquita Montague DPM on 1/19/2021 at 10:54 AM    I performed a history and physical examination of the patient and discussed management with the resident. I reviewed the residents note and agree with the documented findings and plan of care. Any areas of disagreement are noted on the chart. I was personally present for the key portions of any procedures. I have documented in the chart those procedures where I was not present during the key portions. I have reviewed the Podiatry Resident progress note. I agree with the chief complaint, past medical history, past surgical history, allergies, medications, social and family history as documented unless otherwise noted below. Documentation of the HPI, Physical Exam and Medical Decision Making performed by medical students or scribes is based on my personal performance of the HPI, PE and MDM. I have personally evaluated this patient and have completed at least one if not all key elements of the E/M (history, physical exam, and MDM). Additional findings are as noted.      Rubio Mari DPM on 1/22/2021 at 1:23 AM  Board Certified, American Board of Podiatric Surgery  Fellow, [facility] and [email or web address]

## 2021-01-19 NOTE — PLAN OF CARE
Problem: Pain:  Goal: Pain level will decrease  Description: Pain level will decrease  1/19/2021 1053 by Rossy Joshi RN  Outcome: Ongoing  1/19/2021 1053 by Rossy Joshi RN  Outcome: Ongoing  Goal: Control of acute pain  Description: Control of acute pain  1/19/2021 1053 by Rossy Joshi RN  Outcome: Ongoing  1/19/2021 1053 by Rossy Joshi RN  Outcome: Ongoing  Goal: Control of chronic pain  Description: Control of chronic pain  1/19/2021 1053 by Rossy Joshi RN  Outcome: Ongoing  1/19/2021 1053 by Rossy Joshi RN  Outcome: Ongoing

## 2021-01-26 NOTE — PROGRESS NOTES
Wound Care Supplies      Supply Company:     [employer] [address] p: [phone] f: [phone]     Ordering Center:     [address]  [address] CARE Dept: [phone]   FAX NUMBER     Patient Information:      Severino Root  [address]    : 1955  AGE: 60 y.o. GENDER: female   EPISODE DATE: 2021    Insurance:      PRIMARY INSURANCE:  Plan: MEDICAL [employer] ADV[employer] CHOICE HMO  Coverage: MEDICAL E-[employer] MEDICARE ADVANTAGE  Effective Date: 2015  Group Number: [unfilled]  Subscriber Number: [id number] - (Medicare Managed)    Payor/Plan Subscr  Sex Relation Sub. Ins. ID Effective Group Num   1.  [facility] 1955 Female Self [id number] 1/1/15 [id number]                                   [address]       Patient Wound Information:      Problem List Items Addressed This Visit     Neoplasm of uncertain behavior of skin    Relevant Orders    Supply: Wound Cleanser    Supply: Wound Dressings    Supply: Pack Wound    Supply: Cover and Secure    Supply: Edema Control    Pain in left foot    Relevant Orders    Supply: Wound Cleanser    Supply: Wound Dressings    Supply: Pack Wound    Supply: Cover and Secure    Supply: Edema Control    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 ([address] - Primary    Relevant Orders    Supply: Wound Cleanser    Supply: Wound Dressings    Supply: Pack Wound    Supply: Cover and Secure    Supply: Edema Control          WOUNDS REQUIRING DRESSING SUPPLIES:     Wound 20 Foot Left;Dorsal wound #1 (Active)   Wound Image   21 1120   Wound Etiology Other 21 1120   Dressing Status Old drainage noted 21 1120   Wound Cleansed Irrigated with saline 21 1120   Wound Length (cm) 6 cm 21 1120   Wound Width (cm) 6.5 cm 21 1120   Wound Depth (cm) 0.5 cm 21 1120 Wound Surface Area (cm^2) 39 cm^2 01/26/21 1120   Change in Wound Size % (l*w) -26.62 01/26/21 1120   Wound Volume (cm^3) 19.5 cm^3 01/26/21 1120   Wound Healing % -58 01/26/21 1120   Post-Procedure Length (cm) 6 cm 01/26/21 1120   Post-Procedure Width (cm) 6.5 cm 01/26/21 1120   Post-Procedure Depth (cm) 0.5 cm 01/26/21 1120   Post-Procedure Surface Area (cm^2) 39 cm^2 01/26/21 1120   Post-Procedure Volume (cm^3) 19.5 cm^3 01/26/21 1120   Wound Assessment Pink/red;Slough; Exposed structure tendon 01/26/21 1120   Drainage Amount Moderate 01/26/21 1120   Drainage Description Serosanguinous 01/26/21 1120   Odor None 01/26/21 1120   Jessica-wound Assessment Blanchable erythema 01/26/21 1120   Margins Defined edges 01/26/21 1120   Wound Thickness Description not for Pressure Injury Full thickness 12/04/20 1012   Number of days: 53     Incision 10/13/20 Knee Anterior;Right (Active)   Number of days: 105       Supplies Requested :      WOUND #: 1   PRIMARY DRESSING:   Foam  IodoPlex(change in physician order to promote wound healing)   Cover and Secure with: ABD pad and kerlix     FREQUENCY OF DRESSING CHANGES:  Other change every three days         ADDITIONAL ITEMS:  [] Gloves Small  [x] Gloves Medium [] Gloves Large [] Gloves XLarge  [] Tape 1\" [x] Tape 2\" [] Tape 3\"  [] Medipore Tape  [x] Saline  [] Skin Prep   [] Adhesive Remover   [] Cotton Tip Applicators   [] Other:    Patient Wound(s) Debrided: [x] Yes if yes please add date 1/26/21  [] No    Debribement Type: Excisional/Sharp    Patient currently being seen by Home Health: [] Yes   [x] No    Duration for needed supplies:  []15  [x]30  []60  []90 Days    Electronically signed by Artemio Golden RN on 1/26/2021 at 12:21 PM     Provider Information:      PROVIDER'S NAME: Chad Nice DPM  [id number]-[phone]

## 2021-01-26 NOTE — PROGRESS NOTES
[address]   Progress Note and Procedure Note      [address] RECORD NUMBER:  [id number]  AGE: 60 y.o. GENDER: female  : 1955  EPISODE DATE:  2021    Subjective:     Chief Complaint   Patient presents with    Wound Check     left foot         Interval HPI (21): Patient presents to wound care clinic for follow-up of left foot wound. Patient relates she saw Dr. Ava Harris (rheumatology) since her last appointment and he was concerned with patient being on Prednisone and would recommend immunosuppressants for treatment of RA. Relates she still feels \"grumpy\" since being on Prednisone. Reports left foot wound is very painful at the wound edges. No other pedal complaints. HISTORY of PRESENT ILLNESS HPI     Nash Manzanares is a 60 y.o. female who presents today for wound/ulcer evaluation. History of Wound Context: Patient presents for evaluation of chronic left dorsal foot wound. She has undergone extensive testing in the past with little improvement. MRI was negative for osteomyelitis or abscess. Previous punch biopsies have been negative for malignancy or any obvious process. Rheumatologic work-up has revealed elevated anticentromere and positive AMADOR, and full work up is still pending. She has previously been seen by a rheumatologist.  Has had multiple debridements in the past with skin grafting including STSG and allograft. Relates that with each debridement the wound has increased in size and the graft has failed. She does relate a significant smoking history stating that she smoked from childhood heavily and has since quit just recently. She has seen vascular surgery with no intervention. She underwent left lower extremity angiogram and was found to have no flow-limiting stenosis. She has known CAD with stenting in the past. Denies any significant drainage from the ulceration. Wound has remained unchanged. Wound/Ulcer Pain Timing/Severity: intermittent  Quality of pain: burning  Modifying Factors: Pain worsens with Touching of the wound or pressure  Associated Signs/Symptoms: pain    Ulcer Identification:  Ulcer Type: undetermined, suspect microvascular disease contributing to chronicity of the wound  Contributing Factors: arterial insufficiency and decreased tissue oxygenation          PAST MEDICAL HISTORY        Diagnosis Date    Arthritis     knees hips hands shoulders and back    CAD (coronary artery disease) 2019    blockage on cath 9/2019, to have stenting after surgery (arthroplasty) Dr. Sohan Jay Chronic back pain     GERD (gastroesophageal reflux disease)     on rx    Hyperlipidemia     per Dr. Fifi Merino on rx    Hypertension     Toni Jennifer manages    Incomplete RBBB     Irregular heart beat     LAFB (left anterior fascicular block)     Dr. Fifi Mreino, cardiologist, seen 9/2019 prior to surgery on 10/8/2019    Leg wound, left     following with wound care, Dr. Javier Salomon Lumbar disc disease     Osteoporosis     PAC (premature atrial contraction) 06/2018    PACs and PVCs on holter monitor,     PONV (postoperative nausea and vomiting)     requests scop patch    Wears dentures     upper partial    Wears eyeglasses     readers    Wears partial dentures     upper and lower    Wellness examination     MIKE Gates NP PCP, seen 2/10/2020       PAST SURGICAL HISTORY    Past Surgical History:   Procedure Laterality Date    ABDOMINOPLASTY  1997    BLEPHAROPLASTY Bilateral 1997    BREAST ENHANCEMENT SURGERY Bilateral 1999    breast augmentation    CARDIAC CATHETERIZATION  2019    Dr. Fifi Merino, blockage but no stents yet    CATARACT REMOVAL WITH IMPLANT Bilateral 2016    COLONOSCOPY  2015    No Polyps    COSMETIC SURGERY      eyelid lift    FINGER SURGERY Right     thumb lesion excised    FIXATION KYPHOPLASTY  2013    Back    FOOT DEBRIDEMENT Left 10/11/2019 SURGICAL PREP WOUND BED LEFT FOOT WITH APPLICATION OF EPIFIX LEFT FOOT 3X4 performed by Bisi Fodre DPM at [facility]      kyphoplasty for lumbar fx 2013 Dr. Mcghee Limb ARTHROSCOPY Left 2006   Glenna Calderón Left 1/2/2020    FOOT  [address] performed by Silvino Jay MD at [place] [address]  2014   [address]  2005    diskectomy and spinal fusion    OTHER SURGICAL HISTORY  10/03/2019    Left leg angiogram    MA OFFICE/OUTPT VISIT,PROCEDURE ONLY Right 9/11/2018    EXCISION MASS THUMB, 3080 TABLE performed by Nicolasa Vega DO at [facility] Left 12/3/2019    LEFT FOOT DEBRIDEMENT WITH SKIN GRAFT SPLIT THICKNESS; SKIN GRAFT TAKEN FROM RIGHT ANTERIOR THIGH performed by Silvino Jay MD at [facility] Left 2/25/2020    DEBRIDEMENT, SPLIT THICKNESS SKIN GRAFT WITH WOUND VAC PLACEMENT FOOT performed by Florinda Martinez MD at [facility] Left 6/30/2020    DEBRIDEMENT, SKIN GRAFT SPLIT THICKNESS FOOT  ([place] [id number], [address]) performed by Florinda Martinez MD at [facility]      as a child    TOTAL KNEE ARTHROPLASTY Right 10/13/2020    KNEE TOTAL ARTHROPLASTY    TOTAL KNEE ARTHROPLASTY Right 10/13/2020    KNEE TOTAL ARTHROPLASTY- MICROPORT, \ ADVANCED performed by Nicolasa Vega DO at [place] [address] History   Problem Relation Age of Onset    Coronary Art Dis Mother     Arthritis Mother     Heart Surgery Mother         CABG    Coronary Art Dis Father     Heart Disease Father     Heart Surgery Father         CABG    Coronary Art Dis Sister     Heart Surgery Sister         CABG       SOCIAL HISTORY    Social History     Tobacco Use    Smoking status: Former Smoker     Packs/day: 0.25     Years: 50.00     Pack years: 12.50     Types: Cigarettes     Start date: 1968 Quit date: 2020     Years since quittin.9    Smokeless tobacco: Never Used   Substance Use Topics    Alcohol use: Yes     Frequency: 2-4 times a month     Comment: 2-3 shot liquor for weekends    Drug use: Never       ALLERGIES    No Known Allergies    MEDICATIONS    Current Outpatient Medications on File Prior to Encounter   Medication Sig Dispense Refill    folic acid (FOLVITE) 1 MG tablet take 1 tablet by mouth once daily 30 tablet 2    predniSONE (DELTASONE) 20 MG tablet Take 3 tablets by mouth daily for 10 days 30 tablet 0    Metoprolol Tartrate 37.5 MG TABS take 1 tablet by mouth twice a day 60 tablet 5    Multiple Vitamins-Minerals (THERAPEUTIC MULTIVITAMIN-MINERALS) tablet Take 1 tablet by mouth daily      lidocaine 4 % GEL jelly Apply 1 Dose topically as needed for Pain 90 mL 3    oxyCODONE-acetaminophen (PERCOCET) 5-325 MG per tablet take 1 tablet by mouth four times a day if needed      traMADol (ULTRAM ER) 200 MG extended release tablet take 1 tablet by mouth once daily      aspirin EC 81 MG EC tablet Take 1 tablet by mouth 2 times daily 84 tablet 0    docusate sodium (COLACE) 100 MG capsule Take 1 capsule by mouth 2 times daily as needed for Constipation 60 capsule 0    vitamin C (ASCORBIC ACID) 500 MG tablet Take 500 mg by mouth 2 times daily      diclofenac sodium (VOLTAREN) 1 % GEL Apply topically as needed Dr. Maddy Preston by Does not apply route 1 each 0    meclizine (ANTIVERT) 25 MG tablet Take 25 mg by mouth 3 times daily as needed Per Marleen Salinas CNP      atorvastatin (LIPITOR) 40 MG tablet Take 1 tablet by mouth nightly (Patient taking differently: Take 40 mg by mouth every morning Per Dr. Tani Dobbs) 30 tablet 3    NAPROXEN PO Take 250 mg by mouth daily as needed Indications: patient takes 1-2 times a day Stopping 10 days prior to surgery as instructed by surgeon  omeprazole (PRILOSEC) 40 MG delayed release capsule Take 1 tablet by mouth daily Per Angie Schmitt      Cholecalciferol (VITAMIN D3) 5000 units TABS Take 5,000 Units by mouth daily       calcium carbonate (OSCAL) 500 MG TABS tablet Take 1,000 mg by mouth daily Per Christopher Joseph rheumatology      alendronate (FOSAMAX) 70 MG tablet Take 70 mg by mouth every 7 days Indications: Mondays Dr. Saralyn Goltz      gabapentin (NEURONTIN) 300 MG capsule Take 300 mg by mouth 2 times daily. Per Dr. Arlene Edwards       No current facility-administered medications on file prior to encounter. REVIEW OF SYSTEMS    Review of Systems   Constitutional: Negative for chills and fever. Cardiovascular: Negative for leg swelling. Negative for intermittent claudication   Gastrointestinal: Negative for diarrhea, nausea and vomiting. Skin: Positive for wound. Dorsal left foot   Neurological: Negative for weakness and numbness. Objective:      BP (!) 178/80   Pulse 66   Temp 98.5 °F (36.9 °C) (Tympanic)   Resp 16   Ht 5' 6\" (1.676 m)   Wt 170 lb (77.1 kg)   BMI 27.44 kg/m²     Wt Readings from Last 3 Encounters:   01/26/21 170 lb (77.1 kg)   01/19/21 170 lb (77.1 kg)   01/12/21 170 lb (77.1 kg)       Physical Exam:  General:  Alert and oriented x3. In no acute distress. Lower Extremity Physical Exam:    Vascular: DP pulses are not palpable, Bilateral. PT pulses are not palpable, Bilateral. CFT <4 seconds to all digits, Bilateral.  No edema, Bilateral.  Hair growth is absent to the level of the digits, Bilateral.  Skin temp is warm to cool from the tibial tuberosity to the digits, Bilateral.     Neuro: Saph/sural/SP/DP/plantar sensation intact to light touch. Musculoskeletal: EHL/FHL/GS/TA gross motor intact. Gross deformity is absent. Dermatologic: Open wound present to dorsal left foot wound as documented in detail below. Wound base is fibro-granular extending to the level of muscle with exposed extensor tendons. Granulation tissue improved on the medial and dorsal side of the wound. Negative probe to bone. There is no erythema. There is no purulent drainage. There is no fluctuance or crepitus. Interdigital maceration absent, Bilateral.     Wound 12/04/20 Foot Left;Dorsal wound #1 (Active)   Wound Image   01/19/21 1003   Wound Etiology Other 01/19/21 1003   Dressing Status New drainage noted; Old drainage noted 01/19/21 1003   Wound Cleansed Soap and water 01/19/21 1003   Wound Length (cm) 6.2 cm 01/19/21 1003   Wound Width (cm) 6.2 cm 01/19/21 1003   Wound Depth (cm) 0.5 cm 01/19/21 1003   Wound Surface Area (cm^2) 38.44 cm^2 01/19/21 1003   Change in Wound Size % (l*w) -24.81 01/19/21 1003   Wound Volume (cm^3) 19.22 cm^3 01/19/21 1003   Wound Healing % -56 01/19/21 1003   Post-Procedure Length (cm) 6.2 cm 01/19/21 1003   Post-Procedure Width (cm) 6.2 cm 01/19/21 1003   Post-Procedure Depth (cm) 0.5 cm 01/19/21 1003   Post-Procedure Surface Area (cm^2) 38.44 cm^2 01/19/21 1003   Post-Procedure Volume (cm^3) 19.22 cm^3 01/19/21 1003   Wound Assessment Exposed structure tendon;Slough;Pink/red 01/19/21 1003   Drainage Amount Moderate 01/19/21 1003   Drainage Description Yellow 01/19/21 1003   Odor None 01/19/21 1003   Jessica-wound Assessment Blanchable erythema 01/19/21 1003   Margins Defined edges 01/19/21 1003   Wound Thickness Description not for Pressure Injury Full thickness 12/04/20 1012   Number of days: 46       MRI Left Foot W WO   Impression   1. Large dorsal ulcer in the midfoot centered at the metatarsals.  Suspected   involvement of the extensor digitorum tendons of the 2nd, 3rd and 4th digits   at the level of the ulceration. 2. No tenosynovitis is otherwise evident.  No soft tissue abscess. 3. No changes of acute osteomyelitis. Surgical Pathology  Final Diagnosis   SPECIMEN \"A\":  SKIN, LEFT FOOT, BIOPSY:        EPIDERMAL ULCERATION WITH ASSOCIATED ACUTE INFLAMMATION   NEGATIVE FOR MALIGNANCY   SPECIMEN \"B\":  SKIN, LEFT FOOT, 12 O'CLOCK, BIOPSY:        EPIDERMAL ULCERATION WITH EXTENSIVE NECROSIS WITH ASSOCIATED   ACUTE INFLAMMATION   SCANT FRAGMENT OF DETACHED SQUAMOUS EPITHELIUM WITH ATYPIA (SEE   COMMENT)   SPECIMEN \"C\":  SKIN, LEFT FOOT, 6 O'CLOCK, BIOPSY:        ULCERATED SKIN WITH MARKED ACUTE INFLAMMATION   NEGATIVE FOR MALIGNANCY   Diagnosis Comment   In Part B, a scant fragment of detached squamous epithelium with   cytologic atypia is noted.  This is of uncertain clinical   significance.  Please correlate with appropriate clinical findings. Within Parts A, B and C, an invasive malignancy is not identified.      Left Lower Extremity Angiogram 10/03/2019  Small vessel disease, no flow limiting stenosis in left lower extremity. Assessment:      There are no active hospital problems to display for this patient. Patient responding on prednisone, no evidence of microvascular disease. Rheumatologic disease not ruled out    Plan:     Treatment Note please see attached Discharge Instructions    Patient put on Prednisone taper: 30 10 mg tablets dispensed and patient educated on proper way to taper off the Prednisone. 50 mg daily x 2 days, 40 mg daily x 2 days, 30 mg daily x 2 days, 20 mg daily x 2 days, and finish with 10 mg daily x 2 days. Educated on signs and symptoms of infection. Instructed to call clinic immediately or go to ER if signs and symptoms of infection are present. RTC in 1 week      Written patient discharge instructions given to patient and signed by patient or POA. Procedure Note  Indications:  Based on my examination of this patient's wound(s)/ulcer(s) today, debridement is required to promote healing and evaluate the wound base. Performed by: @[facility]@    Consent obtained:  Yes    Time out taken:  Yes      Wound Location: left and foot  Pre Debridement Measurements:  Are located in the Wound/Ulcer Documentation Flow Sheet    Wound/Ulcer #: 1    Procedure in Detail: Lidocaine gel soaked gauze was applied at beginning of wound evaluation. The wound(s) was excisionally debrided sharply of fibrotic, necrotic, and hyperkeratotic tissue, including a layer of surrounding healthy tissue using scissors and forceps. The wound was debrided down through and including muscle. Percent of Wound Debrided: 10%    Total Surface Area Debrided:  4.0 sq cm     Bleeding: Minimal    Post Debridement Measurements:  Wound/Ulcer Descriptions are Pre Debridement except measurements:    Wound 12/04/20 Foot Left;Dorsal wound #1 (Active)   Wound Image   01/26/21 1120   Wound Etiology Other 01/26/21 1120   Dressing Status Old drainage noted 01/26/21 1120   Wound Cleansed Irrigated with saline 01/26/21 1120   Wound Length (cm) 6 cm 01/26/21 1120   Wound Width (cm) 6.5 cm 01/26/21 1120   Wound Depth (cm) 0.5 cm 01/26/21 1120   Wound Surface Area (cm^2) 39 cm^2 01/26/21 1120   Change in Wound Size % (l*w) -26.62 01/26/21 1120   Wound Volume (cm^3) 19.5 cm^3 01/26/21 1120   Wound Healing % -58 01/26/21 1120   Post-Procedure Length (cm) 6 cm 01/26/21 1120   Post-Procedure Width (cm) 6.5 cm 01/26/21 1120   Post-Procedure Depth (cm) 0.5 cm 01/26/21 1120   Post-Procedure Surface Area (cm^2) 39 cm^2 01/26/21 1120   Post-Procedure Volume (cm^3) 19.5 cm^3 01/26/21 1120   Wound Assessment Pink/red;Slough; Exposed structure tendon 01/26/21 1120   Drainage Amount Moderate 01/26/21 1120   Drainage Description Serosanguinous 01/26/21 1120   Odor None 01/26/21 1120   Jessica-wound Assessment Blanchable erythema 01/26/21 1120   Margins Defined edges 01/26/21 1120   Wound Thickness Description not for Pressure Injury Full thickness 12/04/20 1012   Number of days: 53 Patient tolerated procedure well and was given proper instruction. Post debridement wound description:  clean, tender, necrotic  Post debridement Wound Location:Left dorsal foot        Discharge Instructions         [facility]                                 Visit  Discharge Instructions / Physician Orders  DATE:1/26/21     Home Care:NONE     SUPPLIES ORDERED THRU:     Wound Location:  Left foot     Cleanse with: Liquid antibacterial soap and water, rinse well      Dressing Orders: Iodoplex foam to wound cover with abd pad wrap with kerlix     Frequency: change every 3 days     Additional Orders: Increase protein to diet (meat, cheese, eggs, fish, peanut butter, nuts and beans)  Multivitamin daily     Continue prednisone 50mg for two days, 40 mg for 2 days, 30 mg for 2 days, 20mg for 2 days, 10mg for 2 days -DONE  4% Lidocaine Jelly to wound daily with wound care     MY CHART []??????     Smart Device  []??????      HYPERBARIC TREATMENT-                TREATMENT #     [facility] in 1 week                                                                                                   ROOM TYPE   []?????? CHAIR     []?????? BED   []?????? EITHER        TIME []?????? 45 MIN     []?????? 60 MIN     (Please note your next appointment above and if you are unable to keep, kindly give a 24 hour notice.  Thank you.)     If you experience any of the following, please call the [facility] during business hours:  [phone]     * Increase in Pain  * Temperature over 101  * Increase in drainage from your wound  * Drainage with a foul odor  * Bleeding  * Increase in swelling  * Need for compression bandage changes due to slippage, breakthrough drainage.     If you need medical attention outside of the business hours of the [facility] please contact your PCP or go to the nearest emergency room.    The information contained in the After Visit Summary has been reviewed with me, the patient and/or responsible adult, by my health care provider(s). I had the opportunity to ask questions regarding this information. I have elected to receive;      []???? ?? After Visit Summary  [x]???? ?? Comprehensive Discharge Instruction        Patient signature______________________________________Date:________  Electronically signed by Lennox Copas, RN on 1/26/2021 at 12:19 PM    Electronically signed by Naila Valentin DPM on 1/26/2021 at 12:20 PM        Electronically signed by Deepak Sanchez DPM on 1/26/2021 at 1:02 PM     I performed a history and physical examination of the patient and discussed management with the resident. I reviewed the residents note and agree with the documented findings and plan of care. Any areas of disagreement are noted on the chart. I was personally present for the key portions of any procedures. I have documented in the chart those procedures where I was not present during the key portions. I have reviewed the Podiatry Resident progress note. I agree with the chief complaint, past medical history, past surgical history, allergies, medications, social and family history as documented unless otherwise noted below. Documentation of the HPI, Physical Exam and Medical Decision Making performed by medical students or scribes is based on my personal performance of the HPI, PE and MDM. I have personally evaluated this patient and have completed at least one if not all key elements of the E/M (history, physical exam, and MDM). Additional findings are as noted.      Naila Valentin DPM on 1/28/2021 at 72:48 PM  Board Certified, American Board of Podiatric Surgery  Fellow, [employer]

## 2021-02-02 NOTE — PROGRESS NOTES
[address]   Progress Note and Procedure Note      [address] RECORD NUMBER:  [id number]  AGE: 60 y.o. GENDER: female  : 1955  EPISODE DATE:  2021    Subjective:     Chief Complaint   Patient presents with    Wound Check     left dorsal foot         Interval HPI (2021 ): Patient presents to wound care clinic for follow-up of left foot wound. Patient relates she has tapered her oral steroids and is looking for an appointment with Dr Mignon Umaña (rheumatology) who recommend immunosuppressants for treatment of RA. Reports left foot wound is very painful at the distal wound edges. No other pedal complaints. HISTORY of PRESENT ILLNESS HPI     Flor Jerome is a 60 y.o. female who presents today for wound/ulcer evaluation. History of Wound Context: Patient presents for evaluation of chronic left dorsal foot wound. She has undergone extensive testing in the past with little improvement. MRI was negative for osteomyelitis or abscess. Previous punch biopsies have been negative for malignancy or any obvious process. Rheumatologic work-up has revealed elevated anticentromere and positive AMADOR, and full work up is still pending. She has previously been seen by a rheumatologist.  Has had multiple debridements in the past with skin grafting including STSG and allograft. Relates that with each debridement the wound has increased in size and the graft has failed. She does relate a significant smoking history stating that she smoked from childhood heavily and has since quit just recently. She has seen vascular surgery with no intervention. She underwent left lower extremity angiogram and was found to have no flow-limiting stenosis. She has known CAD with stenting in the past. Denies any significant drainage from the ulceration. Wound has remained unchanged.   Wound/Ulcer Pain Timing/Severity: intermittent  Quality of pain: burning Modifying Factors: Pain worsens with Touching of the wound or pressure  Associated Signs/Symptoms: pain    Ulcer Identification:  Ulcer Type: undetermined, suspect microvascular disease contributing to chronicity of the wound  Contributing Factors: arterial insufficiency and decreased tissue oxygenation          PAST MEDICAL HISTORY        Diagnosis Date    Arthritis     knees hips hands shoulders and back    CAD (coronary artery disease) 2019    blockage on cath 9/2019, to have stenting after surgery (arthroplasty) Dr. Adriano Turpin Chronic back pain     GERD (gastroesophageal reflux disease)     on rx    Hyperlipidemia     per Dr. Kelton Sandoval on rx    Hypertension     [place] [facility] manages    Incomplete RBBB     Irregular heart beat     LAFB (left anterior fascicular block)     Dr. Kelton Sandoval, cardiologist, seen 9/2019 prior to surgery on 10/8/2019    Leg wound, left     following with wound care, Dr. Ayla Teresa Lumbar disc disease     Osteoporosis     PAC (premature atrial contraction) 06/2018    PACs and PVCs on holter monitor,     PONV (postoperative nausea and vomiting)     requests scop patch    Wears dentures     upper partial    Wears eyeglasses     readers    Wears partial dentures     upper and lower    Wellness examination     MIKE Posey NP PCP, seen 2/10/2020       PAST SURGICAL HISTORY    Past Surgical History:   Procedure Laterality Date    ABDOMINOPLASTY  1997    BLEPHAROPLASTY Bilateral 1997    BREAST ENHANCEMENT SURGERY Bilateral 1999    breast augmentation    CARDIAC CATHETERIZATION  2019    Dr. Kelton Sandoval, blockage but no stents yet    CATARACT REMOVAL WITH IMPLANT Bilateral 2016    COLONOSCOPY  2015    No Polyps    COSMETIC SURGERY      eyelid lift    FINGER SURGERY Right     thumb lesion excised    FIXATION KYPHOPLASTY  2013    Back    FOOT DEBRIDEMENT Left 10/11/2019 SURGICAL PREP WOUND BED LEFT FOOT WITH APPLICATION OF EPIFIX LEFT FOOT 3X4 performed by Mayuri Hackett DPM at [facility]      kyphoplasty for lumbar fx 2013 Dr. Barriga  ARTHROSCOPY Left 2006   Cori Mikel Left 1/2/2020    FOOT  [address] performed by Mane Cortez MD at [place] [address]  2014   [facility] [address]  2005    diskectomy and spinal fusion    OTHER SURGICAL HISTORY  10/03/2019    Left leg angiogram    DC OFFICE/OUTPT VISIT,PROCEDURE ONLY Right 9/11/2018    EXCISION MASS THUMB, 3080 TABLE performed by Atul Renee DO at [facility] Left 12/3/2019    LEFT FOOT DEBRIDEMENT WITH SKIN GRAFT SPLIT THICKNESS; SKIN GRAFT TAKEN FROM RIGHT ANTERIOR THIGH performed by Mane Cortez MD at [facility] Left 2/25/2020    DEBRIDEMENT, SPLIT THICKNESS SKIN GRAFT WITH WOUND VAC PLACEMENT FOOT performed by Laura Pop MD at [facility] Left 6/30/2020    DEBRIDEMENT, SKIN GRAFT SPLIT THICKNESS FOOT  ([place] [id number] [address] [facility]) performed by Laura Pop MD at [address] [place] [address]      as a child    TOTAL KNEE ARTHROPLASTY Right 10/13/2020    KNEE TOTAL ARTHROPLASTY    TOTAL KNEE ARTHROPLASTY Right 10/13/2020    KNEE TOTAL ARTHROPLASTY- MICROPORT, \ ADVANCED performed by Atul Renee DO at [place] [address] History   Problem Relation Age of Onset    Coronary Art Dis Mother     Arthritis Mother     Heart Surgery Mother         CABG    Coronary Art Dis Father     Heart Disease Father     Heart Surgery Father         CABG    Coronary Art Dis Sister     Heart Surgery Sister         CABG       SOCIAL HISTORY    Social History     Tobacco Use    Smoking status: Former Smoker     Packs/day: 0.25     Years: 50.00     Pack years: 12.50     Types: Cigarettes     Start date: 1968 Quit date: 2020     Years since quittin.0    Smokeless tobacco: Never Used   Substance Use Topics    Alcohol use: Yes     Frequency: 2-4 times a month     Comment: 2-3 shot liquor for weekends    Drug use: Never       ALLERGIES    No Known Allergies    MEDICATIONS    Current Outpatient Medications on File Prior to Encounter   Medication Sig Dispense Refill    calcium-vitamin D (OSCAL) 250-125 MG-UNIT per tablet Take 500 mg by mouth daily      predniSONE (DELTASONE) 10 MG tablet Take 1 tablet by mouth daily for 10 days 30 tablet 0    folic acid (FOLVITE) 1 MG tablet take 1 tablet by mouth once daily 30 tablet 2    Metoprolol Tartrate 37.5 MG TABS take 1 tablet by mouth twice a day 60 tablet 5    Multiple Vitamins-Minerals (THERAPEUTIC MULTIVITAMIN-MINERALS) tablet Take 1 tablet by mouth daily      lidocaine 4 % GEL jelly Apply 1 Dose topically as needed for Pain 90 mL 3    oxyCODONE-acetaminophen (PERCOCET) 5-325 MG per tablet take 1 tablet by mouth four times a day if needed      traMADol (ULTRAM ER) 200 MG extended release tablet take 1 tablet by mouth once daily      aspirin EC 81 MG EC tablet Take 1 tablet by mouth 2 times daily 84 tablet 0    docusate sodium (COLACE) 100 MG capsule Take 1 capsule by mouth 2 times daily as needed for Constipation 60 capsule 0    vitamin C (ASCORBIC ACID) 500 MG tablet Take 500 mg by mouth 2 times daily      diclofenac sodium (VOLTAREN) 1 % GEL Apply topically as needed Dr. Jo Salinas by Does not apply route 1 each 0    meclizine (ANTIVERT) 25 MG tablet Take 25 mg by mouth 3 times daily as needed Per Rosalio Carl CNP      atorvastatin (LIPITOR) 40 MG tablet Take 1 tablet by mouth nightly (Patient taking differently: Take 40 mg by mouth every morning Per Dr. Nikki Calderon) 30 tablet 3  NAPROXEN PO Take 250 mg by mouth daily as needed Indications: patient takes 1-2 times a day Stopping 10 days prior to surgery as instructed by surgeon      omeprazole (PRILOSEC) 40 MG delayed release capsule Take 1 tablet by mouth daily Per Modesta Moreira      Cholecalciferol (VITAMIN D3) 5000 units TABS Take 5,000 Units by mouth daily       calcium carbonate (OSCAL) 500 MG TABS tablet Take 1,000 mg by mouth daily Per Mary Shindominga rheumatology      alendronate (FOSAMAX) 70 MG tablet Take 70 mg by mouth every 7 days Indications: Mondays Dr. Zeynep Mendenhall      gabapentin (NEURONTIN) 300 MG capsule Take 300 mg by mouth 2 times daily. Per Dr. Patricia Aguirre       No current facility-administered medications on file prior to encounter. REVIEW OF SYSTEMS    Review of Systems   Constitutional: Negative for chills and fever. Cardiovascular: Negative for leg swelling. Negative for intermittent claudication   Gastrointestinal: Negative for diarrhea, nausea and vomiting. Skin: Positive for wound. Dorsal left foot   Neurological: Negative for weakness and numbness. Objective:      BP (!) 181/88   Pulse 74   Temp 97.3 °F (36.3 °C) (Tympanic)   Resp 16   Ht 5' 6\" (1.676 m)   Wt 170 lb (77.1 kg)   BMI 27.44 kg/m²     Wt Readings from Last 3 Encounters:   02/02/21 170 lb (77.1 kg)   01/26/21 170 lb (77.1 kg)   01/19/21 170 lb (77.1 kg)       Physical Exam:  General:  Alert and oriented x3. In no acute distress. Lower Extremity Physical Exam:    Vascular: DP pulses are not palpable, Bilateral. PT pulses are not palpable, Bilateral. CFT <4 seconds to all digits, Bilateral.  No edema, Bilateral.  Hair growth is absent to the level of the digits, Bilateral.  Skin temp is warm to cool from the tibial tuberosity to the digits, Bilateral.     Neuro: Saph/sural/SP/DP/plantar sensation intact to light touch. Musculoskeletal: EHL/FHL/GS/TA gross motor intact. Gross deformity is absent. Dermatologic: Open wound present to dorsal left foot wound as documented in detail below. Wound base is fibro-granular extending to the level of muscle with exposed extensor tendons. Granulation tissue improved on the medial and dorsal side of the wound. Negative probe to bone. There is no erythema. There is no purulent drainage. There is no fluctuance or crepitus. Interdigital maceration absent, Bilateral.   Wound Care Documentation:  Wound 12/04/20 Foot Left;Dorsal wound #1 (Active)   Wound Image   02/02/21 0835   Wound Etiology Other 02/02/21 0835   Dressing Status New drainage noted; Old drainage noted 02/02/21 0835   Wound Cleansed Cleansed with saline;Irrigated with saline 02/02/21 0835   Wound Length (cm) 6.1 cm 02/02/21 0835   Wound Width (cm) 6.6 cm 02/02/21 0835   Wound Depth (cm) 0.5 cm 02/02/21 0835   Wound Surface Area (cm^2) 40.26 cm^2 02/02/21 0835   Change in Wound Size % (l*w) -30.71 02/02/21 0835   Wound Volume (cm^3) 20.13 cm^3 02/02/21 0835   Wound Healing % -63 02/02/21 0835   Post-Procedure Length (cm) 6.1 cm 02/02/21 0835   Post-Procedure Width (cm) 6.5 cm 02/02/21 0835   Post-Procedure Depth (cm) 0.5 cm 02/02/21 0835   Post-Procedure Surface Area (cm^2) 39.65 cm^2 02/02/21 0835   Post-Procedure Volume (cm^3) 19.82 cm^3 02/02/21 0835   Wound Assessment Pink/red;Slough 02/02/21 0835   Drainage Amount Moderate 02/02/21 0835   Drainage Description Serosanguinous; Yellow 02/02/21 0835   Odor None 02/02/21 0835   Jessica-wound Assessment Blanchable erythema 02/02/21 0835   Margins Defined edges 02/02/21 0835   Wound Thickness Description not for Pressure Injury Full thickness 12/04/20 1012   Number of days: 59     MRI Left Foot W WO   Impression   1. Large dorsal ulcer in the midfoot centered at the metatarsals.  Suspected   involvement of the extensor digitorum tendons of the 2nd, 3rd and 4th digits   at the level of the ulceration. 2. No tenosynovitis is otherwise evident.  No soft tissue abscess. 3. No changes of acute osteomyelitis. Surgical Pathology  Final Diagnosis   SPECIMEN \"A\":  SKIN, LEFT FOOT, BIOPSY:        EPIDERMAL ULCERATION WITH ASSOCIATED ACUTE INFLAMMATION   NEGATIVE FOR MALIGNANCY   SPECIMEN \"B\":  SKIN, LEFT FOOT, 12 O'CLOCK, BIOPSY:        EPIDERMAL ULCERATION WITH EXTENSIVE NECROSIS WITH ASSOCIATED   ACUTE INFLAMMATION   SCANT FRAGMENT OF DETACHED SQUAMOUS EPITHELIUM WITH ATYPIA (SEE   COMMENT)   SPECIMEN \"C\":  SKIN, LEFT FOOT, 6 O'CLOCK, BIOPSY:        ULCERATED SKIN WITH MARKED ACUTE INFLAMMATION   NEGATIVE FOR MALIGNANCY   Diagnosis Comment   In Part B, a scant fragment of detached squamous epithelium with   cytologic atypia is noted.  This is of uncertain clinical   significance.  Please correlate with appropriate clinical findings. Within Parts A, B and C, an invasive malignancy is not identified.      Left Lower Extremity Angiogram 10/03/2019  Small vessel disease, no flow limiting stenosis in left lower extremity. Assessment:      There are no active hospital problems to display for this patient. Patient not responding on prednisone, no evidence of microvascular disease. Rheumatologic disease not ruled out. Plan:     Treatment Note please see attached Discharge Instructions    Patient continuing her Prednisone taper (from beginning to end): 30 10 mg tablets dispensed and patient educated on proper way to taper off the Prednisone. 50 mg daily x 2 days, 40 mg daily x 2 days, 30 mg daily x 2 days, 20 mg daily x 2 days, and finish with 10 mg daily x 2 days. Educated on signs and symptoms of infection. Instructed to call clinic immediately or go to ER if signs and symptoms of infection are present. Lidocaine patch prescribed to decrease wound pain    Consult to Rheumatology     RTC in 1 week      Written patient discharge instructions given to patient and signed by patient or POA. Procedure Note  Indications:  Based on my examination of this patient's wound(s)/ulcer(s) today, debridement is required to promote healing and evaluate the wound base. Performed by: Steve Cárdenas    Consent obtained:  Yes    Time out taken:  Yes      Wound Location: left and foot  Pre Debridement Measurements:  Are located in the Wound/Ulcer Documentation Flow Sheet    Wound/Ulcer #: 1    Procedure in Detail: Lidocaine gel soaked gauze was applied at beginning of wound evaluation. The wound(s) was excisionally debrided sharply of fibrotic, necrotic, and hyperkeratotic tissue, including a layer of surrounding healthy tissue using scissors and forceps. The wound was debrided down through and including muscle. Percent of Wound Debrided: 10%    Total Surface Area Debrided:  4.2 sq cm     Bleeding: Minimal    Post Debridement Measurements:  Wound Care Documentation:  Wound 12/04/20 Foot Left;Dorsal wound #1 (Active)   Wound Image   02/02/21 0835   Wound Etiology Other 02/02/21 0835   Dressing Status New drainage noted; Old drainage noted 02/02/21 0835   Wound Cleansed Cleansed with saline;Irrigated with saline 02/02/21 0835   Wound Length (cm) 6.1 cm 02/02/21 0835   Wound Width (cm) 6.6 cm 02/02/21 0835   Wound Depth (cm) 0.5 cm 02/02/21 0835   Wound Surface Area (cm^2) 40.26 cm^2 02/02/21 0835   Change in Wound Size % (l*w) -30.71 02/02/21 0835   Wound Volume (cm^3) 20.13 cm^3 02/02/21 0835   Wound Healing % -63 02/02/21 0835   Post-Procedure Length (cm) 6.1 cm 02/02/21 0835   Post-Procedure Width (cm) 6.5 cm 02/02/21 0835   Post-Procedure Depth (cm) 0.5 cm 02/02/21 0835   Post-Procedure Surface Area (cm^2) 39.65 cm^2 02/02/21 0835   Post-Procedure Volume (cm^3) 19.82 cm^3 02/02/21 0835   Wound Assessment Pink/red;Slough 02/02/21 0835   Drainage Amount Moderate 02/02/21 0835   Drainage Description Serosanguinous; Yellow 02/02/21 0835   Odor None 02/02/21 0835 Jessica-wound Assessment Blanchable erythema 02/02/21 0835   Margins Defined edges 02/02/21 0835   Wound Thickness Description not for Pressure Injury Full thickness 12/04/20 1012   Number of days: 59         Patient tolerated procedure well and was given proper instruction. Post debridement wound description:  clean, tender, necrotic  Post debridement Wound Location:Left dorsal foot            Electronically signed by Oneyda Reza DPM on 2/2/2021 at 8:53 AM     I performed a history and physical examination of the patient and discussed management with the resident. I reviewed the residents note and agree with the documented findings and plan of care. Any areas of disagreement are noted on the chart. I was personally present for the key portions of any procedures. I have documented in the chart those procedures where I was not present during the key portions. I have reviewed the Podiatry Resident progress note. I agree with the chief complaint, past medical history, past surgical history, allergies, medications, social and family history as documented unless otherwise noted below. Documentation of the HPI, Physical Exam and Medical Decision Making performed by medical students or scribes is based on my personal performance of the HPI, PE and MDM. I have personally evaluated this patient and have completed at least one if not all key elements of the E/M (history, physical exam, and MDM). Additional findings are as noted.      Vincenzo Denson DPM on 2/2/2021 at 6:09 PM  Board Certified, American Board of Podiatric Surgery  Fellow, [employer] and [employer]

## 2021-02-03 PROBLEM — Z95.5 S/P DRUG ELUTING CORONARY STENT PLACEMENT: Status: ACTIVE | Noted: 2021-01-01

## 2021-02-09 NOTE — PROGRESS NOTES
Yen Chacon [address]   Progress Note and Procedure Note      [address] RECORD NUMBER:  [id number]  AGE: 60 y.o. GENDER: female  : 1955  EPISODE DATE:  2021    Subjective:     Chief Complaint   Patient presents with    Wound Check     left dorsal foot         HISTORY of PRESENT ILLNESS SHANNAN Porter is a 60 y.o. female who presents today for wound/ulcer evaluation. Saw rheumatologist 2/4 and started on methotrexate. Relates pain persists even with lidocaine gel to left foot wound. Notes no n/c/f/v         HISTORY of PRESENT ILLNESS SHANNAN Porter is a 60 y.o. female who presents today for wound/ulcer evaluation. History of Wound Context: Patient presents for evaluation of chronic left dorsal foot wound. She has undergone extensive testing in the past with little improvement. MRI was negative for osteomyelitis or abscess. Previous punch biopsies have been negative for malignancy or any obvious process. Rheumatologic work-up has revealed elevated anticentromere and positive AMADOR, and full work up is still pending. She has previously been seen by a rheumatologist.  Has had multiple debridements in the past with skin grafting including STSG and allograft. Relates that with each debridement the wound has increased in size and the graft has failed. She does relate a significant smoking history stating that she smoked from childhood heavily and has since quit just recently. She has seen vascular surgery with no intervention. She underwent left lower extremity angiogram and was found to have no flow-limiting stenosis. She has known CAD with stenting in the past. Denies any significant drainage from the ulceration. Wound has remained unchanged.   Wound/Ulcer Pain Timing/Severity: intermittent  Quality of pain: burning  Modifying Factors: Pain worsens with Touching of the wound or pressure  Associated Signs/Symptoms: pain     Ulcer Identification: Ulcer Type: undetermined, suspect microvascular disease contributing to chronicity of the wound  Contributing Factors: arterial insufficiency and decreased tissue oxygenationPAST MEDICAL HISTORY        Diagnosis Date    Arthritis     knees hips hands shoulders and back    CAD (coronary artery disease) 2019    blockage on cath 9/2019, to have stenting after surgery (arthroplasty) Dr. Jonathan Beltrán Chronic back pain     GERD (gastroesophageal reflux disease)     on rx    Hyperlipidemia     per Dr. Comfort Duarte on rx    Hypertension     [place] manages    Incomplete RBBB     Irregular heart beat     LAFB (left anterior fascicular block)     Dr. Comfort Duarte, cardiologist, seen 9/2019 prior to surgery on 10/8/2019    Leg wound, left     following with wound care, Dr. Adam Cole Lumbar disc disease     Osteoporosis     PAC (premature atrial contraction) 06/2018    PACs and PVCs on holter monitor,     PONV (postoperative nausea and vomiting)     requests scop patch    Wears dentures     upper partial    Wears eyeglasses     readers    Wears partial dentures     upper and lower    Wellness examination     MIKE Gutierrez NP PCP, seen 2/10/2020       PAST SURGICAL HISTORY    Past Surgical History:   Procedure Laterality Date    ABDOMINOPLASTY  1997    BACK SURGERY      BLEPHAROPLASTY Bilateral 1997    BREAST ENHANCEMENT SURGERY Bilateral 1999    breast augmentation    CARDIAC CATHETERIZATION  2019    Dr. Comfort Duarte, blockage but no stents yet    CATARACT REMOVAL WITH IMPLANT Bilateral 2016    COLONOSCOPY  2015    No Polyps    COSMETIC SURGERY      eyelid lift    FINGER SURGERY Right     thumb lesion excised    FIXATION KYPHOPLASTY  2013    Back    FOOT DEBRIDEMENT Left 10/11/2019    SURGICAL PREP WOUND BED LEFT FOOT WITH APPLICATION OF EPIFIX LEFT FOOT 3X4 performed by Hannah Camarillo DPM at [place] [id number]      kyphoplasty for lumbar fx 2013 Dr. Arpan Vázquez ARTHROSCOPY Left 2006  LAMINECTOMY  1994    LEG SURGERY Left 2020    FOOT  DEBRIDEMENT WITH WOUND VAC PLACEMENT performed by Vishal Issa MD at [facility]     Ven[address]ates[address]      diskectomy and spinal fusion    OTHER SURGICAL HISTORY  10/03/2019    Left leg angiogram    [place] OFFICE/OUTPT VISIT,PROCEDURE ONLY Right 2018    EXCISION MASS THUMB, 3080 TABLE performed by Veronique Mitchell DO at [facility] Left 12/3/2019    LEFT FOOT DEBRIDEMENT WITH SKIN GRAFT SPLIT THICKNESS; SKIN GRAFT TAKEN FROM RIGHT ANTERIOR THIGH performed by Vishal Issa MD at [facility] Left 2020    DEBRIDEMENT, SPLIT THICKNESS SKIN GRAFT WITH WOUND VAC PLACEMENT FOOT performed by Rickey East MD at [facility] Left 2020    DEBRIDEMENT, SKIN GRAFT SPLIT THICKNESS FOOT  ([facility], [address] [facility]) performed by Rickey East MD at [address] [place] [address]      as a child    TOTAL KNEE ARTHROPLASTY Right 10/13/2020    KNEE TOTAL ARTHROPLASTY    TOTAL KNEE ARTHROPLASTY Right 10/13/2020    KNEE TOTAL ARTHROPLASTY- MICROPORT, \ ADVANCED performed by Veronique Mitchell DO at [place] [address] History   Problem Relation Age of Onset    Coronary Art Dis Mother     Arthritis Mother     Heart Surgery Mother         CABG    Coronary Art Dis Father     Heart Disease Father     Heart Surgery Father         CABG    Coronary Art Dis Sister     Heart Surgery Sister         CABG       SOCIAL HISTORY    Social History     Tobacco Use    Smoking status: Former Smoker     Packs/day: 0.25     Years: 50.00     Pack years: 12.50     Types: Cigarettes     Start date:      Quit date: 2020     Years since quittin.0    Smokeless tobacco: Never Used   Substance Use Topics    Alcohol use: Yes     Frequency: 2-4 times a month     Comment: 2-3 shot liquor for weekends    Drug use: Never ALLERGIES    No Known Allergies    MEDICATIONS    Current Outpatient Medications on File Prior to Encounter   Medication Sig Dispense Refill    methotrexate (RHEUMATREX) 2.5 MG chemo tablet Take 15 mg by mouth once a week Thursdays      clopidogrel (PLAVIX) 75 MG tablet Take 1 tablet by mouth daily 30 tablet 11    predniSONE (DELTASONE) 20 MG tablet Take 20 mg by mouth 3 times daily      folic acid (FOLVITE) 1 MG tablet take 1 tablet by mouth once daily 30 tablet 2    Metoprolol Tartrate 37.5 MG TABS take 1 tablet by mouth twice a day (Patient taking differently: Take 25 mg by mouth 2 times daily ) 60 tablet 5    Multiple Vitamins-Minerals (THERAPEUTIC MULTIVITAMIN-MINERALS) tablet Take 1 tablet by mouth daily      oxyCODONE-acetaminophen (PERCOCET) 5-325 MG per tablet take 1 tablet by mouth four times a day if needed      traMADol (ULTRAM ER) 200 MG extended release tablet take 1 tablet by mouth once daily      aspirin EC 81 MG EC tablet Take 1 tablet by mouth 2 times daily 84 tablet 0    vitamin C (ASCORBIC ACID) 500 MG tablet Take 500 mg by mouth 2 times daily      diclofenac sodium (VOLTAREN) 1 % GEL Apply topically as needed Dr. Todd Merino      meclizine (ANTIVERT) 25 MG tablet Take 25 mg by mouth 3 times daily as needed Per Comfort Peace CNP      atorvastatin (LIPITOR) 40 MG tablet Take 1 tablet by mouth nightly (Patient taking differently: Take 40 mg by mouth every morning Per Dr. Alice Cid) 30 tablet 3    NAPROXEN PO Take 250 mg by mouth daily as needed Indications: patient takes 1-2 times a day Stopping 10 days prior to surgery as instructed by surgeon      omeprazole (PRILOSEC) 40 MG delayed release capsule Take 1 tablet by mouth daily Per Comfort Peace      Cholecalciferol (VITAMIN D3) 5000 units TABS Take 5,000 Units by mouth daily       calcium carbonate (OSCAL) 500 MG TABS tablet Take 1,000 mg by mouth daily Per Sarah Almodovar rheumatology  alendronate (FOSAMAX) 70 MG tablet Take 70 mg by mouth every 7 days Indications: Mondays Dr. Todd Boxer      gabapentin (NEURONTIN) 300 MG capsule Take 300 mg by mouth 2 times daily. Per Dr. Claude Grumbles       No current facility-administered medications on file prior to encounter. REVIEW OF SYSTEMS    Pertinent items are noted in HPI. Objective:      BP (!) 152/85   Pulse 67   Temp 97.9 °F (36.6 °C) (Tympanic)   Resp 16   Ht 5' 6\" (1.676 m)   Wt 172 lb (78 kg)   BMI 27.76 kg/m²     Wt Readings from Last 3 Encounters:   02/09/21 172 lb (78 kg)   02/03/21 172 lb (78 kg)   02/02/21 170 lb (77.1 kg)       PHYSICAL EXAM       Vascular: DP pulses are not palpable, Bilateral. PT pulses are not palpable, Bilateral. CFT <4 seconds to all digits, Bilateral.  No edema, Bilateral.  Hair growth is absent to the level of the digits, Bilateral.  Skin temp is warm to cool from the tibial tuberosity to the digits, Bilateral.      Neuro: Saph/sural/SP/DP/plantar sensation intact to light touch.     Musculoskeletal: EHL/FHL/GS/TA gross motor intact. Gross deformity is absent.      Dermatologic: Open wound present to dorsal left foot wound as documented in detail below. Wound base is fibro-granular extending to the level of muscle with exposed extensor tendons. Granulation tissue improved on the medial and dorsal side of the wound. Negative probe to bone. There is no erythema. There is no purulent drainage. There is no fluctuance or crepitus.  Interdigital maceration absent, Bilateral.       Assessment:      Patient Active Problem List   Diagnosis Code    Neoplasm of uncertain behavior of skin D48.5    Other plastic surgery for unacceptable cosmetic appearance Z41.1    Chronic bilateral low back pain without sciatica M54.5, G89.29    Mass of finger of right hand R22.31    Chronic ulcer of left foot with fat layer exposed ([facility] L97.522    Pain in left foot M79.672    Hypertension I10  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 ([facility]) L97.523    Wound, open, foot with complication, left, initial encounter S91.302A    Status post total knee replacement using cement, right Z96.651    Primary osteoarthritis of right knee M17.11    Gastroesophageal reflux disease without esophagitis K21.9    Anemia D64.9    PAD (peripheral artery disease) ([facility]) I73.9    S/P drug eluting coronary stent placement Z95.5        Procedure Note  Indications:  Based on my examination of this patient's wound(s)/ulcer(s) today, debridement is not required to promote healing and evaluate the wound base. Performed by: Tayler Nice DPM    Post Debridement Measurements:  Wound/Ulcer Descriptions are Pre Debridement except measurements:    Wound 12/04/20 Foot Left;Dorsal wound #1 (Active)   Wound Image   02/09/21 1007   Wound Etiology Other 02/09/21 [facility]   Dressing Status New drainage noted; Old drainage noted 02/09/21 1007   Wound Cleansed Cleansed with saline 02/09/21 1007   Dressing/Treatment Other (comment) 02/02/21 0909   Wound Length (cm) 6.2 cm 02/09/21 1007   Wound Width (cm) 7 cm 02/09/21 1007   Wound Depth (cm) 0.3 cm 02/09/21 1007   Wound Surface Area (cm^2) 43.4 cm^2 02/09/21 1007   Change in Wound Size % (l*w) -40.91 02/09/21 1007   Wound Volume (cm^3) 13.02 cm^3 02/09/21 1007   Wound Healing % -6 02/09/21 1007   Post-Procedure Length (cm) 6.2 cm 02/09/21 1007   Post-Procedure Width (cm) 6.4 cm 02/09/21 1007   Post-Procedure Depth (cm) 0.3 cm 02/09/21 1007   Post-Procedure Surface Area (cm^2) 39.68 cm^2 02/09/21 1007   Post-Procedure Volume (cm^3) 11.9 cm^3 02/09/21 1007   Wound Assessment Pink/red;Slough 02/09/21 1007   Drainage Amount Moderate 02/09/21 1007   Drainage Description Serosanguinous; Yellow 02/09/21 1007   Odor None 02/09/21 1007   Jessica-wound Assessment Blanchable erythema 02/09/21 1007   Margins Defined edges 02/09/21 1007 Wound Thickness Description not for Pressure Injury Full thickness 12/04/20 1012   Number of days: 67         Plan:     Treatment Note please see attached Discharge Instructions    Continue recs per rheum. Plastics referral for possible skin graft. Will apply for epifix application. Started on fibracol dressings. F/u 1 week    Written patient dismissal instructions given to patient and signed by patient or POA. Discharge Instructions         [address] [facility]  C[facility]                                 Visit Jamal Instructions / Physician Orders  [address]     SUPPLIES ORDERED THRU:Medline     Wound Location:  Left foot     Cleanse with: Liquid antibacterial soap and water, rinse well      Dressing Orders:   Fibracol, Ioplex foam to wound cover with abd pad wrap with kerlix     Frequency: Daily     Additional Orders: Increase protein to diet (meat, cheese, eggs, fish, peanut butter, nuts and beans)  Multivitamin daily     MY CHART []????????     Smart Device  []????????      HYPERBARIC TREATMENT-                TREATMENT #     Your next appointment with the [address] [place] [address] is in 1 week Dr. Reyna Griffiths                                                                                                       2 weeks Dr. Minerva Kuo                                                                                                   ROOM TYPE   []???????? CHAIR     []???????? BED   []???????? EITHER        TIME []???????? 45 MIN     []???????? 60 MIN     (Please note your next appointment above and if you are unable to keep, kindly give a 24 hour notice.  Thank you.)     If you experience any of the following, please call the [address] [place] [address] during business hours:  [phone]     * Increase in Pain  * Temperature over 101  * Increase in drainage from your wound  * Drainage with a foul odor  * Bleeding  * Increase in swelling * Need for compression bandage changes due to slippage, breakthrough drainage.     If you need medical attention outside of the business hours of the [address] [place] [address] Road please contact your PCP or go to the nearest emergency room.     The information contained in the After Visit Summary has been reviewed with me, the patient and/or responsible adult, by my health care provider(s). I had the opportunity to ask questions regarding this information. I have elected to receive;      []?????? ?? After Visit Summary  [x]???????? Comprehensive Discharge Instruction        Patient signature______________________________________Date:________    Electronically signed by Josef Rinaldi DPM on 2/9/2021 at 10:15 AM  Electronically signed by Johnny Maki RN on 2/9/2021 at 10:36 AM          Electronically signed by Shedrick Goldmann, DPM on 2/9/2021 at 10:42 AM    I performed a history and physical examination of the patient and discussed management with the resident. I reviewed the residents note and agree with the documented findings and plan of care. Any areas of disagreement are noted on the chart. I was personally present for the key portions of any procedures. I have documented in the chart those procedures where I was not present during the key portions. I have reviewed the Podiatry Resident progress note. I agree with the chief complaint, past medical history, past surgical history, allergies, medications, social and family history as documented unless otherwise noted below. Documentation of the HPI, Physical Exam and Medical Decision Making performed by medical students or scribes is based on my personal performance of the HPI, PE and MDM. I have personally evaluated this patient and have completed at least one if not all key elements of the E/M (history, physical exam, and MDM). Additional findings are as noted.      Josef Rinaldi DPM on 2/10/2021 at 5:01 PM  Board Certified, American Board of Podiatric Surgery Fellow, [employer]

## 2021-02-19 NOTE — PROGRESS NOTES
[address]   Progress Note and Procedure Note      [address] RECORD NUMBER:  [id number]  AGE: 60 y.o. GENDER: female  : 1955  EPISODE DATE:  2021    Subjective:     Chief Complaint   Patient presents with    Wound Check     left foot         HISTORY of PRESENT ILLNESS HPI     Flor Jerome is a 60 y.o. female who presents today for wound/ulcer evaluation. History of Wound Context: here to follow up on chronic ulcer of left dorsal foot. Has had more drainage that is green in color this week and became concerned. Wound/Ulcer Pain Timing/Severity: intermittent  Quality of pain: burning  Severity:  4 / 10   Modifying Factors: Pain worsens with touching  Associated Signs/Symptoms: drainage and pain    Ulcer Identification:  Ulcer Type: undetermined  Contributing Factors: arterial insufficiency and decreased tissue oxygenation    Wound: N/A        PAST MEDICAL HISTORY        Diagnosis Date    Arthritis     knees hips hands shoulders and back    CAD (coronary artery disease)     blockage on cath 2019, to have stenting after surgery (arthroplasty) Dr. Paradise Kruse Chronic back pain     GERD (gastroesophageal reflux disease)     on rx    Hyperlipidemia     per Dr. Demetrio Suggs on rx    Hypertension     Isaura Brooke manages    Incomplete RBBB     Irregular heart beat     LAFB (left anterior fascicular block)     Dr. Demetrio Suggs, cardiologist, seen 2019 prior to surgery on 10/8/2019    Leg wound, left     following with wound care, Dr. William Su Lumbar disc disease     Osteoporosis     PAC (premature atrial contraction) 2018    PACs and PVCs on holter monitor,     PONV (postoperative nausea and vomiting)     requests scop patch    Wears dentures     upper partial    Wears eyeglasses     readers    Wears partial dentures     upper and lower    Wellness examination     MIKE Van NP PCP, seen 2/10/2020       PAST SURGICAL HISTORY Past Surgical History:   Procedure Laterality Date    ABDOMINOPLASTY  1997    BACK SURGERY      BLEPHAROPLASTY Bilateral 1997    BREAST ENHANCEMENT SURGERY Bilateral 1999    breast augmentation    CARDIAC CATHETERIZATION  2019    Dr. Pennie Abebe, blockage but no stents yet    CATARACT REMOVAL WITH IMPLANT Bilateral 2016    COLONOSCOPY  2015    No Polyps    COSMETIC SURGERY      eyelid lift    FINGER SURGERY Right     thumb lesion excised    FIXATION KYPHOPLASTY  2013    Back    FOOT DEBRIDEMENT Left 10/11/2019    SURGICAL PREP WOUND BED LEFT FOOT WITH APPLICATION OF EPIFIX LEFT FOOT 3X4 performed by Ernesto Watson DPM at [facility]      kyphoplasty for lumbar fx 2013 Dr. Yancy Reddy ARTHROSCOPY Left 2006   Belvin Handing Left 1/2/2020    FOOT  DEBRIDEMENT WITH WOUND VAC PLACEMENT performed by Sharon Cantor MD at [place] [address]  2014   [address]  2005    diskectomy and spinal fusion    OTHER SURGICAL HISTORY  10/03/2019    Left leg angiogram    [place] OFFICE/OUTPT VISIT,PROCEDURE ONLY Right 9/11/2018    EXCISION MASS THUMB, 3080 TABLE performed by Yancy Lorenzo DO at [facility] Left 12/3/2019    LEFT FOOT DEBRIDEMENT WITH SKIN GRAFT SPLIT THICKNESS; SKIN GRAFT TAKEN FROM RIGHT ANTERIOR THIGH performed by Sharon Cantor MD at [facility] Left 2/25/2020    DEBRIDEMENT, SPLIT THICKNESS SKIN GRAFT WITH WOUND VAC PLACEMENT FOOT performed by Steven Shah MD at [facility] Left 6/30/2020    DEBRIDEMENT, SKIN GRAFT SPLIT THICKNESS FOOT  ([place] [address], [facility]) performed by Steven Shah MD at [address] [place] [address]      as a child    TOTAL KNEE ARTHROPLASTY Right 10/13/2020    KNEE TOTAL ARTHROPLASTY    TOTAL KNEE ARTHROPLASTY Right 10/13/2020 KNEE TOTAL ARTHROPLASTY- MICROPORT, \ ADVANCED performed by Long Burgess DO at [facility] History   Problem Relation Age of Onset    Coronary Art Dis Mother     Arthritis Mother     Heart Surgery Mother         CABG    Coronary Art Dis Father     Heart Disease Father     Heart Surgery Father         CABG    Coronary Art Dis Sister     Heart Surgery Sister         CABG       SOCIAL HISTORY    Social History     Tobacco Use    Smoking status: Former Smoker     Packs/day: 0.25     Years: 50.00     Pack years: 12.50     Types: Cigarettes     Start date:      Quit date: 2020     Years since quittin.0    Smokeless tobacco: Never Used   Substance Use Topics    Alcohol use: Yes     Frequency: 2-4 times a month     Comment: 2-3 shot liquor for weekends    Drug use: Never       ALLERGIES    No Known Allergies    MEDICATIONS    Current Outpatient Medications on File Prior to Encounter   Medication Sig Dispense Refill    methotrexate (RHEUMATREX) 2.5 MG chemo tablet Take 15 mg by mouth once a week       folic acid (FOLVITE) 1 MG tablet take 1 tablet by mouth once daily 30 tablet 2    Metoprolol Tartrate 37.5 MG TABS take 1 tablet by mouth twice a day (Patient taking differently: Take 25 mg by mouth 2 times daily ) 60 tablet 5    oxyCODONE-acetaminophen (PERCOCET) 5-325 MG per tablet take 1 tablet by mouth four times a day if needed      traMADol (ULTRAM ER) 200 MG extended release tablet take 1 tablet by mouth once daily      aspirin EC 81 MG EC tablet Take 1 tablet by mouth 2 times daily 84 tablet 0    diclofenac sodium (VOLTAREN) 1 % GEL Apply topically as needed Dr. Noah Allen      meclizine (ANTIVERT) 25 MG tablet Take 25 mg by mouth 3 times daily as needed Per Brain Deepak CNP      atorvastatin (LIPITOR) 40 MG tablet Take 1 tablet by mouth nightly (Patient taking differently: Take 40 mg by mouth every morning Per Dr. Steven Garvin) 30 tablet 3 Head: normocephalic and atraumatic  Eyes: pupils equal, round, extraocular eye movements intact, and conjunctivae normal  Pulmonary/Chest: normal air movement, no respiratory distress  Extremities: no cyanosis and no clubbing  Musculoskeletal: no joint swelling, deformity or tenderness  Neurologic: gait, coordination normal and speech normal      Assessment:     Problem List Items Addressed This Visit     Neoplasm of uncertain behavior of skin    Relevant Medications    lidocaine (XYLOCAINE) 4 % external solution (Start on 2/19/2021  9:30 AM)    Other Relevant Orders    Supply: Wound Cleanser    Supply: Wound Dressings    Supply: Pack Wound    Supply: Cover and Secure    Pain in left foot    Relevant Medications    lidocaine (XYLOCAINE) 4 % external solution (Start on 2/19/2021  9:30 AM)    Other Relevant Orders    Supply: Wound Cleanser    Supply: Wound Dressings    Supply: Pack Wound    Supply: Cover and Secure    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) - Primary    Relevant Medications    lidocaine (XYLOCAINE) 4 % external solution (Start on 2/19/2021  9:30 AM)    Other Relevant Orders    Supply: Wound Cleanser    Supply: Wound Dressings    Supply: Pack Wound    Supply: Cover and Secure           Procedure Note  Indications:  Based on my examination of this patient's wound(s)/ulcer(s) today, debridement is required to promote healing and evaluate the wound base. Performed by: NUPUR Toure CNP    Consent obtained:  Yes    Time out taken:  Yes    Pain Control: Anesthetic  Anesthetic: 4% Lidocaine Liquid Topical       Debridement:Other (comment) open debridement    Using scissors and forceps the wound(s)/ulcer(s) was/were sharply debrided down through and including the removal of dermis.         Devitalized Tissue Debrided:  slough    Pre Debridement Measurements:  Are located in the Wound/Ulcer Documentation Flow Sheet    Wound/Ulcer #: 1    Post Debridement Measurements: Wound/Ulcer Descriptions are Pre Debridement except measurements:    Wound 12/04/20 Foot Left;Dorsal wound #1 (Active)   Wound Image   02/19/21 0833   Wound Etiology Other 02/19/21 0833   Dressing Status New drainage noted 02/19/21 0833   Wound Cleansed Soap and water 02/19/21 0833   Dressing/Treatment Other (comment) 02/02/21 0909   Wound Length (cm) 6 cm 02/19/21 0833   Wound Width (cm) 6.5 cm 02/19/21 0833   Wound Depth (cm) 0.3 cm 02/19/21 0833   Wound Surface Area (cm^2) 39 cm^2 02/19/21 0833   Change in Wound Size % (l*w) -26.62 02/19/21 0833   Wound Volume (cm^3) 11.7 cm^3 02/19/21 0833   Wound Healing % 5 02/19/21 0833   Post-Procedure Length (cm) 6 cm 02/19/21 0833   Post-Procedure Width (cm) 6.5 cm 02/19/21 0833   Post-Procedure Depth (cm) 0.3 cm 02/19/21 0833   Post-Procedure Surface Area (cm^2) 39 cm^2 02/19/21 0833   Post-Procedure Volume (cm^3) 11.7 cm^3 02/19/21 0833   Wound Assessment Pink/red;Slough 02/19/21 0833   Drainage Amount Moderate 02/19/21 0833   Drainage Description Green 02/19/21 0833   Odor None 02/19/21 0833   Jessica-wound Assessment Blanchable erythema 02/19/21 0833   Margins Defined edges 02/19/21 0655   Wound Thickness Description not for Pressure Injury Full thickness 02/19/21 0833   Number of days: 76          Percent of Wound(s)/Ulcer(s) Debrided: 30%    Total Surface Area Debrided:  11.70 sq cm       Diabetic/Pressure/Non Pressure Ulcers only:  Ulcer: Non-Pressure ulcer, fat layer exposed      Estimated Blood Loss:  None    Hemostasis Achieved:  not needed    Procedural Pain:  4  / 10     Post Procedural Pain:  4 / 10     Response to treatment:  Well tolerated by patient. Plan:     Treatment Note please see attached Discharge Instructions    Written patient dismissal instructions given to patient and signed by patient or POA.          Discharge Instructions         [address] [place] [address] and HYPERBARIC TREATMENT Aayush Buckley Visit  Discharge Instructions / Physician Orders  DATE:2/19/21     Home Care:NONE     SUPPLIES ORDERED THRU:Medline     Wound Location:  Left foot     Cleanse with: Wash with Acetic acid 0.25%     Dressing Orders: Ioplex foam to wound cover with abd pad wrap with kerlix     Frequency: Daily     Additional Orders: Increase protein to diet (meat, cheese, eggs, fish, peanut butter, nuts and beans)  Multivitamin daily   Cipro 500mg 2 x per day for 10 days  MY CHART []??????????     Smart Device  []??????????      HYPERBARIC TREATMENT-                TREATMENT #     Your next appointment with the [address] [place] [address] is in 1 week Dr. Fabiana Hernandez  Friday 2/26 at 8:15am                                                                                                       [address] with Laurent Plata on Monday 2/21                                                                                                                                                                                                         ROOM TYPE   []?????????? CHAIR     []?????????? BED   []?????????? EITHER        TIME []?????????? 45 MIN     []?????????? 60 MIN     (Please note your next appointment above and if you are unable to keep, kindly give a 24 hour notice.  Thank you.)     If you experience any of the following, please call the [address] [place] [address] during business hours:  [phone]     * Increase in Pain  * Temperature over 101  * Increase in drainage from your wound  * Drainage with a foul odor  * Bleeding  * Increase in swelling  * Need for compression bandage changes due to slippage, breakthrough drainage.     If you need medical attention outside of the business hours of the [address] [place] [address] please contact your PCP or go to the nearest emergency room.

## 2021-02-26 NOTE — PROGRESS NOTES
[address]   Progress Note and Procedure Note      [address] RECORD NUMBER:  [id number]  AGE: 60 y.o. GENDER: female  : 1955  EPISODE DATE:  2021    Subjective:     Chief Complaint   Patient presents with    Wound Check     Left dorsal foot         HISTORY of PRESENT ILLNESS SHANNAN Hong is a 60 y.o. female who presents today for wound/ulcer evaluation. Saw rheumatologist 2/4 and started on methotrexate. Relates pain persists even with lidocaine gel to left foot wound. She developed an infection and now on Cipro. Notes no n/c/f/v         HISTORY of PRESENT ILLNESS SHANNAN Hong is a 60 y.o. female who presents today for wound/ulcer evaluation. History of Wound Context: Patient presents for evaluation of chronic left dorsal foot wound. She has undergone extensive testing in the past with little improvement. MRI was negative for osteomyelitis or abscess. Previous punch biopsies have been negative for malignancy or any obvious process. Rheumatologic work-up has revealed elevated anticentromere and positive AMADOR, and full work up is still pending. She has previously been seen by a rheumatologist.  Has had multiple debridements in the past with skin grafting including STSG and allograft. Relates that with each debridement the wound has increased in size and the graft has failed. She does relate a significant smoking history stating that she smoked from childhood heavily and has since quit just recently. She has seen vascular surgery with no intervention. She underwent left lower extremity angiogram and was found to have no flow-limiting stenosis. She has known CAD with stenting in the past. Denies any significant drainage from the ulceration. Wound has remained unchanged.   Wound/Ulcer Pain Timing/Severity: intermittent  Quality of pain: burning  Modifying Factors: Pain worsens with Touching of the wound or pressure  Associated Signs/Symptoms: pain    Ulcer Identification:  Ulcer Type: undetermined, suspect microvascular disease contributing to chronicity of the wound  Contributing Factors: arterial insufficiency and decreased tissue oxygenationPAST MEDICAL HISTORY        Diagnosis Date    Arthritis     knees hips hands shoulders and back    CAD (coronary artery disease) 2019    blockage on cath 9/2019, to have stenting after surgery (arthroplasty) Dr. Aga Myles Chronic back pain     GERD (gastroesophageal reflux disease)     on rx    Hyperlipidemia     per Dr. Latrice Rich on rx    Hypertension     Ayanna Peal manages    Incomplete RBBB     Irregular heart beat     LAFB (left anterior fascicular block)     Dr. Latrice Rich, cardiologist, seen 9/2019 prior to surgery on 10/8/2019    Leg wound, left     following with wound care, Dr. Jared Valerio Lumbar disc disease     Osteoporosis     PAC (premature atrial contraction) 06/2018    PACs and PVCs on holter monitor,     PONV (postoperative nausea and vomiting)     requests scop patch    Wears dentures     upper partial    Wears eyeglasses     readers    Wears partial dentures     upper and lower    Wellness examination     MIKE Miranda NP PCP, seen 2/10/2020       PAST SURGICAL HISTORY    Past Surgical History:   Procedure Laterality Date    ABDOMINOPLASTY  1997    BACK SURGERY      BLEPHAROPLASTY Bilateral 1997    BREAST ENHANCEMENT SURGERY Bilateral 1999    breast augmentation   [address]  2019    Dr. Latrice Rich, blockage but no stents yet    CATARACT REMOVAL WITH IMPLANT Bilateral 2016    COLONOSCOPY  2015    No Polyps    COSMETIC SURGERY      eyelid lift    FINGER SURGERY Right     thumb lesion excised    FIXATION KYPHOPLASTY  2013    Back    FOOT DEBRIDEMENT Left 10/11/2019    SURGICAL PREP WOUND BED LEFT FOOT WITH APPLICATION OF EPIFIX LEFT FOOT 3X4 performed by Jarvis Chun DPM at [place] [address]      kyphoplasty for lumbar fx 2013 Dr. Sabina Reyes  KNEE ARTHROSCOPY Left 2006    LAMINECTOMY  1994    LEG SURGERY Left 2020    FOOT  DEBRIDEMENT WITH WOUND VAC PLACEMENT performed by Marjan Khoury MD at [place] [address]     [address]      diskectomy and spinal fusion    OTHER SURGICAL HISTORY  10/03/2019    Left leg angiogram    [place] OFFICE/OUTPT VISIT,PROCEDURE ONLY Right 2018    EXCISION MASS THUMB, 3080 TABLE performed by Walter Moss DO at [facility] Left 12/3/2019    LEFT FOOT DEBRIDEMENT WITH SKIN GRAFT SPLIT THICKNESS; SKIN GRAFT TAKEN FROM RIGHT ANTERIOR THIGH performed by Marjan Khoury MD at [facility] Left 2020    DEBRIDEMENT, SPLIT THICKNESS SKIN GRAFT WITH WOUND VAC PLACEMENT FOOT performed by Iris Huang MD at [facility] Left 2020    DEBRIDEMENT, SKIN GRAFT SPLIT THICKNESS FOOT  ([place] [address], [facility]) performed by Iris Huang MD at [facility]      as a child    TOTAL KNEE ARTHROPLASTY Right 10/13/2020    KNEE TOTAL ARTHROPLASTY    TOTAL KNEE ARTHROPLASTY Right 10/13/2020    KNEE TOTAL ARTHROPLASTY- MICROPORT, \ ADVANCED performed by Walter Moss DO at [place] [address] History   Problem Relation Age of Onset    Coronary Art Dis Mother     Arthritis Mother     Heart Surgery Mother         CABG    Coronary Art Dis Father     Heart Disease Father     Heart Surgery Father         CABG    Coronary Art Dis Sister     Heart Surgery Sister         CABG       SOCIAL HISTORY    Social History     Tobacco Use    Smoking status: Former Smoker     Packs/day: 0.25     Years: 50.00     Pack years: 12.50     Types: Cigarettes     Start date:      Quit date: 2020     Years since quittin.0    Smokeless tobacco: Never Used   Substance Use Topics    Alcohol use: Yes     Frequency: 2-4 times a month Comment: 2-3 shot liquor for weekends    Drug use: Never       ALLERGIES    No Known Allergies    MEDICATIONS    Current Outpatient Medications on File Prior to Encounter   Medication Sig Dispense Refill    meclizine (ANTIVERT) 25 MG tablet Take 1 tablet by mouth 2 times daily as needed for Dizziness 20 tablet 1    ciprofloxacin (CIPRO) 500 MG tablet Take 1 tablet by mouth 2 times daily for 10 days 20 tablet 0    methotrexate (RHEUMATREX) 2.5 MG chemo tablet Take 15 mg by mouth once a week Thursdays      clopidogrel (PLAVIX) 75 MG tablet Take 1 tablet by mouth daily 30 tablet 11    predniSONE (DELTASONE) 20 MG tablet Take 5 mg by mouth daily       folic acid (FOLVITE) 1 MG tablet take 1 tablet by mouth once daily 30 tablet 2    Metoprolol Tartrate 37.5 MG TABS take 1 tablet by mouth twice a day (Patient taking differently: Take 25 mg by mouth 2 times daily ) 60 tablet 5    Multiple Vitamins-Minerals (THERAPEUTIC MULTIVITAMIN-MINERALS) tablet Take 1 tablet by mouth daily      oxyCODONE-acetaminophen (PERCOCET) 5-325 MG per tablet take 1 tablet by mouth four times a day if needed      traMADol (ULTRAM ER) 200 MG extended release tablet take 1 tablet by mouth once daily      aspirin EC 81 MG EC tablet Take 1 tablet by mouth 2 times daily 84 tablet 0    vitamin C (ASCORBIC ACID) 500 MG tablet Take 500 mg by mouth 2 times daily      diclofenac sodium (VOLTAREN) 1 % GEL Apply topically as needed Dr. Fe Her      atorvastatin (LIPITOR) 40 MG tablet Take 1 tablet by mouth nightly (Patient taking differently: Take 40 mg by mouth every morning Per Dr. Nikki Calderon) 30 tablet 3    NAPROXEN PO Take 250 mg by mouth daily as needed Indications: patient takes 1-2 times a day Stopping 10 days prior to surgery as instructed by surgeon      omeprazole (PRILOSEC) 40 MG delayed release capsule Take 1 tablet by mouth daily Per Rosalio Carl  Cholecalciferol (VITAMIN D3) 5000 units TABS Take 5,000 Units by mouth daily       calcium carbonate (OSCAL) 500 MG TABS tablet Take 1,000 mg by mouth daily Per Karla Cowan rheumatology      alendronate (FOSAMAX) 70 MG tablet Take 70 mg by mouth every 7 days Indications: Mondays Dr. Arnoldo Corrigan      gabapentin (NEURONTIN) 300 MG capsule Take 300 mg by mouth 2 times daily. Per Dr. Gema Rosales       No current facility-administered medications on file prior to encounter. REVIEW OF SYSTEMS    Pertinent items are noted in HPI. Objective:      /71   Pulse 69   Temp 98.3 °F (36.8 °C) (Oral)   Resp 16   Ht 5' 5.98\" (1.676 m)   Wt 174 lb (78.9 kg)   BMI 28.10 kg/m²     Wt Readings from Last 3 Encounters:   02/26/21 174 lb (78.9 kg)   02/26/21 174 lb (78.9 kg)   02/09/21 172 lb (78 kg)       PHYSICAL EXAM       Vascular: DP pulses are not palpable, Bilateral. PT pulses are not palpable, Bilateral. CFT <4 seconds to all digits, Bilateral.  No edema, Bilateral.  Hair growth is absent to the level of the digits, Bilateral.  Skin temp is warm to cool from the tibial tuberosity to the digits, Bilateral.      Neuro: Saph/sural/SP/DP/plantar sensation intact to light touch.     Musculoskeletal: EHL/FHL/GS/TA gross motor intact. Gross deformity is absent.      Dermatologic: Open wound present to dorsal left foot wound as documented in detail below. Wound base is fibro-granular extending to the level of muscle with exposed extensor tendons. Granulation tissue improved on the medial and dorsal side of the wound. Negative probe to bone. There is no erythema. There is no purulent drainage. There is no fluctuance or crepitus.  Interdigital maceration absent, Bilateral.       Assessment:      Patient Active Problem List   Diagnosis Code    Neoplasm of uncertain behavior of skin D48.5    Other plastic surgery for unacceptable cosmetic appearance Z41.1    Chronic bilateral low back pain without sciatica M54.5, G89.29  Mass of finger of right hand R22.31    Chronic ulcer of left foot with fat layer exposed ([address] L97.522    Pain in left foot M79.672    Hypertension I10    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 ([facility]) L97.523    Wound, open, foot with complication, left, initial encounter S91.302A    Status post total knee replacement using cement, right Z96.651    Primary osteoarthritis of right knee M17.11    Gastroesophageal reflux disease without esophagitis K21.9    Anemia D64.9    PAD (peripheral artery disease) ([facility]) I73.9    S/P drug eluting coronary stent placement Z95.5        Procedure Note  Indications:  Based on my examination of this patient's wound(s)/ulcer(s) today, debridement is not required to promote healing and evaluate the wound base.     Performed by: Mauro Coffman DPM    Post Debridement Measurements:  Wound/Ulcer Descriptions are Pre Debridement except measurements:    Wound 12/04/20 Foot Left;Dorsal wound #1 (Active)   Wound Image   02/19/21 0833   Wound Etiology Other 02/19/21 0833   Dressing Status New drainage noted 02/19/21 0833   Wound Cleansed Soap and water 02/19/21 0833   Dressing/Treatment Other (comment) 02/02/21 0909   Wound Length (cm) 6.8 cm 02/26/21 1123   Wound Width (cm) 7.4 cm 02/26/21 1123   Wound Depth (cm) 0.4 cm 02/26/21 1123   Wound Surface Area (cm^2) 50.32 cm^2 02/26/21 1123   Change in Wound Size % (l*w) -63.38 02/26/21 1123   Wound Volume (cm^3) 20.13 cm^3 02/26/21 1123   Wound Healing % -63 02/26/21 1123   Post-Procedure Length (cm) 6 cm 02/19/21 0833   Post-Procedure Width (cm) 6.5 cm 02/19/21 0833   Post-Procedure Depth (cm) 0.3 cm 02/19/21 0833   Post-Procedure Surface Area (cm^2) 39 cm^2 02/19/21 0833   Post-Procedure Volume (cm^3) 11.7 cm^3 02/19/21 0833   Wound Assessment Pink/red;Slough 02/26/21 1123   Drainage Amount Large 02/26/21 1123   Drainage Description Yellow;Serosanguinous 02/26/21 1123   Odor None 02/26/21 1123 Jessica-wound Assessment Blanchable erythema 02/26/21 1123   Margins Defined edges 02/26/21 1123   Wound Thickness Description not for Pressure Injury Full thickness 02/19/21 0833   Number of days: 84          Plan:     Treatment Note please see attached Discharge Instructions    Collagen, silvercel, extrasorb, unna boot left    Continue recs per rheum. Plastics referral for possible skin graft. Will apply for epifix application. Started on fibracol dressings. F/u 1 week    Written patient dismissal instructions given to patient and signed by patient or POA.            Ratna Peters DPM on 2/26/2021 at 05:88 AM  Board Certified, American Board of Podiatric Surgery  Fellow, [employer] and [employer] [facility]

## 2021-02-26 NOTE — PROGRESS NOTES
Erlene Milling Application   Below Knee    NAME:  Brody Prince  YOB: 1955  MEDICAL RECORD NUMBER:  [id number]  DATE:  2/26/2021    ? [x] Removed old Unna boot if indicated and wash leg with mild soap and water. ? [x] Applied moisturizing agent to dry skin as needed. ? [x] Appied primary and secondary dressing as ordered    ? [x] Applied Unna roll from toes to knee overlapping each time. ? [x] Applied ace wrap or coban from toes to below the knee. ? [x] Secured with tape and/or metal clips covered with tape. ? [x] Instructed patient/caregiver to keep dressing dry and intact. DO NOT REMOVE DRESSING. ? [x] Instructed pt/family/caregiver to report excessive draining, loose bandage, wet dressing, severe pain or tingling in toes. ? [x] Applied Erlene Milling dressing below the knee to Left lower leg(s)   ? Unna Boot(s) were applied per  Guidelines.      Electronically signed by Manjeet Garner RN on 2/26/2021 at 11:48 AM

## 2021-02-26 NOTE — PROGRESS NOTES
[address], [address] [place]  [phone]    Brody Prince is a 60 y.o. female who presents today for her  medical conditions/complaints as noted below. Brody Prince is c/o of Check-Up  . HPI:     HPI   She is being tx for an autoimmune from dr Corinna Ferraro. They think this is what is causing her wound issue. They are weaning her off the prednisone and starting methotrexate. She continues to f/u with podiatry, wound care, plastics, cardio, ortho for knee (doing much better). Eye exam- goes annually    Refuses mammo    Wt Readings from Last 3 Encounters:   02/26/21 174 lb (78.9 kg)   02/09/21 172 lb (78 kg)   02/03/21 172 lb (78 kg)     Nursing note reviewed and discussed with patient. Patient's medications, allergies, past medical, surgical, social and family histories were reviewed and updated asappropriate.     Current Outpatient Medications   Medication Sig Dispense Refill    meclizine (ANTIVERT) 25 MG tablet Take 1 tablet by mouth 2 times daily as needed for Dizziness 20 tablet 1    ciprofloxacin (CIPRO) 500 MG tablet Take 1 tablet by mouth 2 times daily for 10 days 20 tablet 0    methotrexate (RHEUMATREX) 2.5 MG chemo tablet Take 15 mg by mouth once a week Thursdays      clopidogrel (PLAVIX) 75 MG tablet Take 1 tablet by mouth daily 30 tablet 11    predniSONE (DELTASONE) 20 MG tablet Take 5 mg by mouth daily       folic acid (FOLVITE) 1 MG tablet take 1 tablet by mouth once daily 30 tablet 2    Metoprolol Tartrate 37.5 MG TABS take 1 tablet by mouth twice a day (Patient taking differently: Take 25 mg by mouth 2 times daily ) 60 tablet 5    Multiple Vitamins-Minerals (THERAPEUTIC MULTIVITAMIN-MINERALS) tablet Take 1 tablet by mouth daily      oxyCODONE-acetaminophen (PERCOCET) 5-325 MG per tablet take 1 tablet by mouth four times a day if needed      traMADol (ULTRAM ER) 200 MG extended release tablet take 1 tablet by mouth once daily      aspirin EC 81 MG EC tablet Take 1 tablet by mouth 2 times daily 84 tablet 0    vitamin C (ASCORBIC ACID) 500 MG tablet Take 500 mg by mouth 2 times daily      diclofenac sodium (VOLTAREN) 1 % GEL Apply topically as needed Dr. Claude Grumbles      atorvastatin (LIPITOR) 40 MG tablet Take 1 tablet by mouth nightly (Patient taking differently: Take 40 mg by mouth every morning Per Dr. Orellana Dose) 30 tablet 3    NAPROXEN PO Take 250 mg by mouth daily as needed Indications: patient takes 1-2 times a day Stopping 10 days prior to surgery as instructed by surgeon      omeprazole (PRILOSEC) 40 MG delayed release capsule Take 1 tablet by mouth daily Per Aline Berumen      Cholecalciferol (VITAMIN D3) 5000 units TABS Take 5,000 Units by mouth daily       calcium carbonate (OSCAL) 500 MG TABS tablet Take 1,000 mg by mouth daily Per Molly Paz rheumatology      alendronate (FOSAMAX) 70 MG tablet Take 70 mg by mouth every 7 days Indications: Mondays Dr. Todd Boxer      gabapentin (NEURONTIN) 300 MG capsule Take 300 mg by mouth 2 times daily. Per Dr. Claude Grumbles       No current facility-administered medications for this visit.       Past Medical History:   Diagnosis Date    Arthritis     knees hips hands shoulders and back    CAD (coronary artery disease) 2019    blockage on cath 9/2019, to have stenting after surgery (arthroplasty) Dr. Heidi Carrillo Chronic back pain     GERD (gastroesophageal reflux disease)     on rx    Hyperlipidemia     per Dr. Esteban Huston on rx    Hypertension     Aline Berumen manages    Incomplete RBBB     Irregular heart beat     LAFB (left anterior fascicular block)     Dr. Esteban Huston, cardiologist, seen 9/2019 prior to surgery on 10/8/2019    Leg wound, left     following with wound care, Dr. Mo Christianson Lumbar disc disease     Osteoporosis     PAC (premature atrial contraction) 06/2018    PACs and PVCs on holter monitor,     PONV (postoperative nausea and vomiting)     requests scop patch    Wears dentures     upper partial  Wears eyeglasses     readers    Wears partial dentures     upper and lower    Wellness examination     MIKE Chapman, STEPHANIE PCP, seen 2/10/2020      Past Surgical History:   Procedure Laterality Date    ABDOMINOPLASTY  1997    BACK SURGERY      BLEPHAROPLASTY Bilateral 1997    BREAST ENHANCEMENT SURGERY Bilateral 1999    breast augmentation   [address]  2019    Dr. Pennie Abebe, blockage but no stents yet    CATARACT REMOVAL WITH IMPLANT Bilateral 2016    COLONOSCOPY  2015    No Polyps    COSMETIC SURGERY      eyelid lift    FINGER SURGERY Right     thumb lesion excised    FIXATION KYPHOPLASTY  2013    Back    FOOT DEBRIDEMENT Left 10/11/2019    SURGICAL PREP WOUND BED LEFT FOOT WITH APPLICATION OF EPIFIX LEFT FOOT 3X4 performed by Ernesto Watson DPM at [address]      kyphoplasty for lumbar fx 2013 Dr. Yancy Reddy [facility]ROSCOPY [facility]    FOOT  DEBRIDEMENT WITH WOUND VAC PLACEMENT performed by Sharon Cantor MD at [place] [address]  2014   [address]  2005    diskectomy and spinal fusion    OTHER SURGICAL HISTORY  10/03/2019    Left leg angiogram    [place] OFFICE/OUTPT VISIT,PROCEDURE ONLY Right 9/11/2018    EXCISION MASS THUMB, 3080 TABLE performed by Yancy Lorenzo DO at [facility] Left 12/3/2019    LEFT FOOT DEBRIDEMENT WITH SKIN GRAFT SPLIT THICKNESS; SKIN GRAFT TAKEN FROM RIGHT ANTERIOR THIGH performed by Sharon Cantor MD at [facility] Left 2/25/2020    DEBRIDEMENT, SPLIT THICKNESS SKIN GRAFT WITH WOUND VAC PLACEMENT FOOT performed by Steven Shah MD at [facility] Left 6/30/2020    DEBRIDEMENT, SKIN GRAFT SPLIT THICKNESS FOOT  (Obadiah Breath) performed by Steven Shah MD at [facility]      as a child    TOTAL KNEE ARTHROPLASTY Right 10/13/2020    KNEE TOTAL ARTHROPLASTY    TOTAL KNEE ARTHROPLASTY Right 10/13/2020    KNEE TOTAL ARTHROPLASTY- MICROPORT, \ ADVANCED performed by Eden Waters DO at [address] [place] [address] History   Problem Relation Age of Onset    Coronary Art Dis Mother     Arthritis Mother     Heart Surgery Mother         CABG    Coronary Art Dis Father     Heart Disease Father     Heart Surgery Father         CABG    Coronary Art Dis Sister     Heart Surgery Sister         CABG     Social History     Tobacco Use    Smoking status: Former Smoker     Packs/day: 0.25     Years: 50.00     Pack years: 12.50     Types: Cigarettes     Start date:      Quit date: 2020     Years since quittin.0    Smokeless tobacco: Never Used   Substance Use Topics    Alcohol use: Yes     Frequency: 2-4 times a month     Comment: 2-3 shot liquor for weekends      No Known Allergies    Subjective:      Review of Systems   Constitutional: Negative for chills and fever. Respiratory: Negative for cough and shortness of breath. Cardiovascular: Negative for chest pain, palpitations and leg swelling. Skin: Positive for wound. Other pertinent ROS in HPI  Objective:     /80 (Site: Left Upper Arm, Position: Sitting, Cuff Size: Large Adult)   Pulse 68   Temp 97.4 °F (36.3 °C)   Wt 174 lb (78.9 kg)   SpO2 98%   BMI 28.08 kg/m²    Physical Exam  Constitutional:       Appearance: She is well-developed. HENT:      Right Ear: External ear normal.      Left Ear: External ear normal.      Nose: Nose normal.   Neck:      Musculoskeletal: Full passive range of motion without pain and normal range of motion. Cardiovascular:      Rate and Rhythm: Normal rate and regular rhythm. Heart sounds: Normal heart sounds, S1 normal and S2 normal.   Pulmonary:      Effort: Pulmonary effort is normal. No respiratory distress. Breath sounds: Normal breath sounds. Musculoskeletal: Normal range of motion. General: No deformity.    Skin: General: Skin is warm and dry. Neurological:      Mental Status: She is alert and oriented to person, place, and time. Assessment/PLAN   1. Pre-diabetes  Stable   - POCT microalbumin    2. Essential hypertension  Stable. RTO if symptoms worsen or fail to improve  Pt agreeable with plan      Patient given educational materials - see patientinstructions. Discussed use, benefit, and side effects of prescribed medications. All patient questions answered. Pt voiced understanding. Reviewed health maintenance. Instructed to continue current medications, diet andexercise. 1.  Kirit Vargas received counseling on the following healthy behaviors: medication adherence  2. Patient given educationalmaterials when available - see patient instructions when applicable  3. Discussed use, benefit, and side effects of prescribed medications. Barriersto medication compliance addressed. All patient questions answered. Pt voiced understanding. 4.  Reviewed prior labs and health maintenance when applicable. 5.  Continue current medications, diet and exercise. CompletedRefills   Requested Prescriptions     Signed Prescriptions Disp Refills    meclizine (ANTIVERT) 25 MG tablet 20 tablet 1     Sig: Take 1 tablet by mouth 2 times daily as needed for Dizziness       This note was transcribed using dictation with Dragon services. Efforts were made to correct any errors but some words may be misinterpreted.     Electronically signed by Frankie Madrid CNP on 2/26/2021 at 8:29 AM

## 2021-03-02 NOTE — PROGRESS NOTES
[address]   Progress Note and Procedure Note      [address] RECORD NUMBER:  [id number]  AGE: 60 y.o. GENDER: female  : 1955  EPISODE DATE:  3/2/2021    Subjective:     Chief Complaint   Patient presents with    Wound Check     let dorsal foot         HISTORY of PRESENT ILLNESS HPI Caryle Senters is a 60 y.o. female who presents today for wound/ulcer evaluation. Saw rheumatologist 2/4 and started on methotrexate. Did well in the unna boot all week. notes no n/c/f/v         HISTORY of PRESENT ILLNESS HPI Caryle Senters is a 60 y.o. female who presents today for wound/ulcer evaluation. History of Wound Context: Patient presents for evaluation of chronic left dorsal foot wound. She has undergone extensive testing in the past with little improvement. MRI was negative for osteomyelitis or abscess. Previous punch biopsies have been negative for malignancy or any obvious process. Rheumatologic work-up has revealed elevated anticentromere and positive AMADOR, and full work up is still pending. She has previously been seen by a rheumatologist.  Has had multiple debridements in the past with skin grafting including STSG and allograft. Relates that with each debridement the wound has increased in size and the graft has failed. She does relate a significant smoking history stating that she smoked from childhood heavily and has since quit just recently. She has seen vascular surgery with no intervention. She underwent left lower extremity angiogram and was found to have no flow-limiting stenosis. She has known CAD with stenting in the past. Denies any significant drainage from the ulceration. Wound has remained unchanged.   Wound/Ulcer Pain Timing/Severity: intermittent  Quality of pain: burning  Modifying Factors: Pain worsens with Touching of the wound or pressure  Associated Signs/Symptoms: pain     Ulcer Identification:  Ulcer Type: undetermined, suspect microvascular disease contributing to chronicity of the wound  Contributing Factors: arterial insufficiency and decreased tissue oxygenationPAST MEDICAL HISTORY        Diagnosis Date    Arthritis     knees hips hands shoulders and back    CAD (coronary artery disease) 2019    blockage on cath 9/2019, to have stenting after surgery (arthroplasty) Dr. Carrion Read Chronic back pain     GERD (gastroesophageal reflux disease)     on rx    Hyperlipidemia     per Dr. Baker on rx    Hypertension     Gerrianne Duverney manages    Incomplete RBBB     Irregular heart beat     LAFB (left anterior fascicular block)     Dr. Baker, cardiologist, seen 9/2019 prior to surgery on 10/8/2019    Leg wound, left     following with wound care, Dr. Sweta Antoine Lumbar disc disease     Osteoporosis     PAC (premature atrial contraction) 06/2018    PACs and PVCs on holter monitor,     PONV (postoperative nausea and vomiting)     requests scop patch    Wears dentures     upper partial    Wears eyeglasses     readers    Wears partial dentures     upper and lower    Wellness examination     MIKE Farris NP PCP, seen 2/10/2020       PAST SURGICAL HISTORY    Past Surgical History:   Procedure Laterality Date    ABDOMINOPLASTY  1997    BACK SURGERY      BLEPHAROPLASTY Bilateral 1997    BREAST ENHANCEMENT SURGERY Bilateral 1999    breast augmentation    CARDIAC CATHETERIZATION  2019    Dr. Baker, blockage but no stents yet    CATARACT REMOVAL WITH IMPLANT Bilateral 2016    COLONOSCOPY  2015    No Polyps    COSMETIC SURGERY      eyelid lift    FINGER SURGERY Right     thumb lesion excised    FIXATION KYPHOPLASTY  2013    Back    FOOT DEBRIDEMENT Left 10/11/2019    SURGICAL PREP WOUND BED LEFT FOOT WITH APPLICATION OF EPIFIX LEFT FOOT 3X4 performed by Milagro Quiroz DPM at [facility]      kyphoplasty for lumbar fx 2013 Dr. Antonio Haywood ARTHROSCOPY Left 2006   Joann Beccae Left 1/2/2020    FOOT DEBRIDEMENT WITH WOUND VAC PLACEMENT performed by Patrica Ellison MD at [facility]     [address]  2005    diskectomy and spinal fusion    OTHER SURGICAL HISTORY  10/03/2019    Left leg angiogram    AL OFFICE/OUTPT VISIT,PROCEDURE ONLY Right 2018    EXCISION MASS THUMB, 3080 TABLE performed by Josr Hallman DO at [facility] Left 12/3/2019    LEFT FOOT DEBRIDEMENT WITH SKIN GRAFT SPLIT THICKNESS; SKIN GRAFT TAKEN FROM RIGHT ANTERIOR THIGH performed by Patrica Ellison MD at [facility] Left 2020    DEBRIDEMENT, SPLIT THICKNESS SKIN GRAFT WITH WOUND VAC PLACEMENT FOOT performed by Tani Mccabe MD at [facility] Left 2020    DEBRIDEMENT, SKIN GRAFT SPLIT THICKNESS FOOT  ([place] [address], [address]) performed by Tani Mccabe MD at [address] [place] [address]      as a child    TOTAL KNEE ARTHROPLASTY Right 10/13/2020    KNEE TOTAL ARTHROPLASTY    TOTAL KNEE ARTHROPLASTY Right 10/13/2020    KNEE TOTAL ARTHROPLASTY- MICROPORT, \ ADVANCED performed by Josr Hallman DO at [place] [address] History   Problem Relation Age of Onset    Coronary Art Dis Mother     Arthritis Mother     Heart Surgery Mother         CABG    Coronary Art Dis Father     Heart Disease Father     Heart Surgery Father         CABG    Coronary Art Dis Sister     Heart Surgery Sister         CABG       SOCIAL HISTORY    Social History     Tobacco Use    Smoking status: Former Smoker     Packs/day: 0.25     Years: 50.00     Pack years: 12.50     Types: Cigarettes     Start date:      Quit date: 2020     Years since quittin.0    Smokeless tobacco: Never Used   Substance Use Topics    Alcohol use: Yes     Frequency: 2-4 times a month     Comment: 2-3 shot liquor for weekends    Drug use: Never       ALLERGIES    No Known Allergies    MEDICATIONS Current Outpatient Medications on File Prior to Encounter   Medication Sig Dispense Refill    meclizine (ANTIVERT) 25 MG tablet Take 1 tablet by mouth 2 times daily as needed for Dizziness 20 tablet 1    methotrexate (RHEUMATREX) 2.5 MG chemo tablet Take 15 mg by mouth once a week Thursdays      clopidogrel (PLAVIX) 75 MG tablet Take 1 tablet by mouth daily 30 tablet 11    predniSONE (DELTASONE) 20 MG tablet Take 5 mg by mouth daily       folic acid (FOLVITE) 1 MG tablet take 1 tablet by mouth once daily 30 tablet 2    Metoprolol Tartrate 37.5 MG TABS take 1 tablet by mouth twice a day (Patient taking differently: Take 25 mg by mouth 2 times daily ) 60 tablet 5    Multiple Vitamins-Minerals (THERAPEUTIC MULTIVITAMIN-MINERALS) tablet Take 1 tablet by mouth daily      oxyCODONE-acetaminophen (PERCOCET) 5-325 MG per tablet take 1 tablet by mouth four times a day if needed      traMADol (ULTRAM ER) 200 MG extended release tablet take 1 tablet by mouth once daily      aspirin EC 81 MG EC tablet Take 1 tablet by mouth 2 times daily 84 tablet 0    vitamin C (ASCORBIC ACID) 500 MG tablet Take 500 mg by mouth 2 times daily      diclofenac sodium (VOLTAREN) 1 % GEL Apply topically as needed Dr. Jeramy Sagastume      atorvastatin (LIPITOR) 40 MG tablet Take 1 tablet by mouth nightly (Patient taking differently: Take 40 mg by mouth every morning Per Dr. Trey Resendez) 30 tablet 3    NAPROXEN PO Take 250 mg by mouth daily as needed Indications: patient takes 1-2 times a day Stopping 10 days prior to surgery as instructed by surgeon      omeprazole (PRILOSEC) 40 MG delayed release capsule Take 1 tablet by mouth daily Per Nikhil Pemberton      Cholecalciferol (VITAMIN D3) 5000 units TABS Take 5,000 Units by mouth daily       calcium carbonate (OSCAL) 500 MG TABS tablet Take 1,000 mg by mouth daily Per Daysi Knox rheumatology      alendronate (FOSAMAX) 70 MG tablet Take 70 mg by mouth every 7 days Indications: Mondays Dr. Richard Alberto 03/02/21 0950   Wound Healing % -17 03/02/21 0950   Post-Procedure Length (cm) 6.8 cm 02/26/21 1123   Post-Procedure Width (cm) 7.4 cm 02/26/21 1123   Post-Procedure Depth (cm) 0.4 cm 02/26/21 1123   Post-Procedure Surface Area (cm^2) 50.32 cm^2 02/26/21 1123   Post-Procedure Volume (cm^3) 20.13 cm^3 02/26/21 1123   Wound Assessment [facility] 03/02/21 0950   Drainage Amount Moderate 03/02/21 0950   Drainage Description Serosanguinous; Yellow 03/02/21 0950   Odor None 03/02/21 0950   Jessica-wound Assessment Blanchable erythema 03/02/21 0950   Margins Defined edges 03/02/21 0950   Wound Thickness Description not for Pressure Injury Full thickness 02/19/21 6112   Number of days: 88        Assessment:      Patient Active Problem List   Diagnosis Code    Neoplasm of uncertain behavior of skin D48.5    Other plastic surgery for unacceptable cosmetic appearance Z41.1    Chronic bilateral low back pain without sciatica M54.5, G89.29    Mass of finger of right hand R22.31    Chronic ulcer of left foot with fat layer exposed ([facility] [address] L97.522    Pain in left foot M79.672    Hypertension I10    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 ([facility]) L97.523    Wound, open, foot with complication, left, initial encounter S91.302A    Status post total knee replacement using cement, right Z96.651    Primary osteoarthritis of right knee M17.11    Gastroesophageal reflux disease without esophagitis K21.9    Anemia D64.9    PAD (peripheral artery disease) ([facility]) I73.9    S/P drug eluting coronary stent placement Z95.5        Procedure Note  Indications:  Based on my examination of this patient's wound(s)/ulcer(s) today, debridement is not required to promote healing and evaluate the wound base.     Performed by: Inga Ybarra DPM    Post Debridement Measurements:  Wound/Ulcer Descriptions are Pre Debridement except measurements:    Wound 12/04/20 Foot Left;Dorsal wound #1 (Active)   Wound Image   02/26/21 1123   Wound Etiology Other 03/02/21 0950   Dressing Status New drainage noted; Old drainage noted 03/02/21 0950   Wound Cleansed Soap and water 03/02/21 0950   Dressing/Treatment Other (comment) 02/26/21 1146   Wound Length (cm) 6.5 cm 03/02/21 0950   Wound Width (cm) 7.4 cm 03/02/21 0950   Wound Depth (cm) 0.3 cm 03/02/21 0950   Wound Surface Area (cm^2) 48.1 cm^2 03/02/21 0950   Change in Wound Size % (l*w) -56.17 03/02/21 0950   Wound Volume (cm^3) 14.43 cm^3 03/02/21 0950   Wound Healing % -17 03/02/21 0950   Post-Procedure Length (cm) 6.8 cm 02/26/21 1123   Post-Procedure Width (cm) 7.4 cm 02/26/21 1123   Post-Procedure Depth (cm) 0.4 cm 02/26/21 1123   Post-Procedure Surface Area (cm^2) 50.32 cm^2 02/26/21 1123   Post-Procedure Volume (cm^3) 20.13 cm^3 02/26/21 1123   Wound Assessment [facility] 03/02/21 0950   Drainage Amount Moderate 03/02/21 0950   Drainage Description Serosanguinous; Yellow 03/02/21 0950   Odor None 03/02/21 0950   Jessica-wound Assessment Blanchable erythema 03/02/21 0950   Margins Defined edges 03/02/21 0950   Wound Thickness Description not for Pressure Injury Full thickness 02/19/21 0833   Number of days: 88          Plan:     Treatment Note please see attached Discharge Instructions    Collagen, silvercel, extrasorb, unna boot left    Continue recs per rheum. Plastics referral for possible skin graft. Will apply for epifix application. Started on fibracol dressings. F/u 1 week    Written patient dismissal instructions given to patient and signed by patient or POA.          See Dr. Jemal Gonzalez next week    Narendra Joshi DPM on 3/2/2021 at 31:06 AM  Board Certified, American Board of Podiatric Surgery  Fellow, [employer] and [employer]

## 2021-03-08 NOTE — PROGRESS NOTES
[address]       Progress Note and Procedure Note      Progress note     Chief Complaint   Patient presents with    Wound Check     left foot        HPI:   Tani Taylor is a 60 y.o. female who presents for evaluation treatment of wound on the dorsum of the foot. Patient wound is chronic in nature. She has gone extensive testing and surgical intervention including at least 3 skin grafts. Patient had an MRI which was negative for osteomyelitis. That she has had previous punch biopsies which indicated there was no malignancy. Patient had a rheumatological work-up. Patient's rheumatological work-up indicated an elevated anticentromere and a positive AMADOR. Additional work-up is still pending. Patient has been started on methotrexate. Patient's has pain associated with it. Her wound started in 2019.     .  Medications:     Current Outpatient Medications   Medication Sig Dispense Refill    meclizine (ANTIVERT) 25 MG tablet Take 1 tablet by mouth 2 times daily as needed for Dizziness 20 tablet 1    methotrexate (RHEUMATREX) 2.5 MG chemo tablet Take 15 mg by mouth once a week Thursdays      clopidogrel (PLAVIX) 75 MG tablet Take 1 tablet by mouth daily 30 tablet 11    predniSONE (DELTASONE) 20 MG tablet Take 5 mg by mouth daily       folic acid (FOLVITE) 1 MG tablet take 1 tablet by mouth once daily 30 tablet 2    Metoprolol Tartrate 37.5 MG TABS take 1 tablet by mouth twice a day (Patient taking differently: Take 25 mg by mouth 2 times daily ) 60 tablet 5    Multiple Vitamins-Minerals (THERAPEUTIC MULTIVITAMIN-MINERALS) tablet Take 1 tablet by mouth daily      oxyCODONE-acetaminophen (PERCOCET) 5-325 MG per tablet take 1 tablet by mouth four times a day if needed      traMADol (ULTRAM ER) 200 MG extended release tablet take 1 tablet by mouth once daily      vitamin C (ASCORBIC ACID) 500 MG tablet Take 500 mg by mouth 2 times daily      diclofenac sodium (VOLTAREN) 1 % GEL Apply topically as needed Dr. Marv Mcclure      atorvastatin (LIPITOR) 40 MG tablet Take 1 tablet by mouth nightly (Patient taking differently: Take 40 mg by mouth every morning Per Dr. Kirby Berry) 30 tablet 3    NAPROXEN PO Take 250 mg by mouth daily as needed Indications: patient takes 1-2 times a day Stopping 10 days prior to surgery as instructed by surgeon      omeprazole (PRILOSEC) 40 MG delayed release capsule Take 1 tablet by mouth daily Per Jasmine Ferrer      Cholecalciferol (VITAMIN D3) 5000 units TABS Take 5,000 Units by mouth daily       calcium carbonate (OSCAL) 500 MG TABS tablet Take 1,000 mg by mouth daily Per George Caicedo rheumatology      alendronate (FOSAMAX) 70 MG tablet Take 70 mg by mouth every 7 days Indications: Mondays Dr. Natalie Dumont      gabapentin (NEURONTIN) 300 MG capsule Take 300 mg by mouth 2 times daily. Per Dr. Kriss Palma aspirin EC 81 MG EC tablet Take 1 tablet by mouth 2 times daily 84 tablet 0     No current facility-administered medications for this encounter. Allergies:   No Known Allergies  Review of Systems:   Constitutional: Negative for fever, chills, fatigue and unexpected weight change. HENT: Negative for hearing loss, sore throat and facial swelling. Eyes: Negative for pain and discharge. Respiratory: Negative for cough and shortness of breath. Cardiovascular: Has a history of coronary artery disease, and hyperlipidemia, and hypertension. Patient has an incomplete right bundle branch block. Patient has an irregular heartbeat. Gastrointestinal: Negative for nausea, vomiting, diarrhea and constipation. Skin: Negative for pallor and rash. Neurological: Negative for seizures, syncope and numbness. Hematological: Does not bruise/bleed easily. Psychiatric/Behavioral: Negative for behavioral problems. The patient is not nervous/anxious. Social: Patient has a history of osteoporosis.   Past Medical History:   Diagnosis Date    Arthritis     knees hips hands shoulders and back    CAD (coronary artery disease) 2019    blockage on cath 9/2019, to have stenting after surgery (arthroplasty) Dr. Lasha Schumacher Chronic back pain     GERD (gastroesophageal reflux disease)     on rx    Hyperlipidemia     per Dr. Ann Combs on rx    Hypertension     [facility]hel[facility] manages    Incomplete RBBB     Irregular heart beat     LAFB (left anterior fascicular block)     Dr. Ann Combs, cardiologist, seen 9/2019 prior to surgery on 10/8/2019    Leg wound, left     following with wound care, Dr. Sharp Free Lumbar disc disease     Osteoporosis     PAC (premature atrial contraction) 06/2018    PACs and PVCs on holter monitor,     PONV (postoperative nausea and vomiting)     requests scop patch    Wears dentures     upper partial    Wears eyeglasses     readers    Wears partial dentures     upper and lower    Wellness examination     MIKE Smith, STEPHANIE PCP, seen 2/10/2020     Past Surgical History:   Procedure Laterality Date    ABDOMINOPLASTY  1997    BACK SURGERY      BLEPHAROPLASTY Bilateral 1997    BREAST ENHANCEMENT SURGERY Bilateral 1999    breast augmentation   [address]  2019    Dr. Ann Combs, blockage but no stents yet    CATARACT REMOVAL WITH IMPLANT Bilateral 2016    COLONOSCOPY  2015    No Polyps    COSMETIC SURGERY      eyelid lift    FINGER SURGERY Right     thumb lesion excised    FIXATION KYPHOPLASTY  2013    Back    FOOT DEBRIDEMENT Left 10/11/2019    SURGICAL PREP WOUND BED LEFT FOOT WITH APPLICATION OF EPIFIX LEFT FOOT 3X4 performed by Rubi King DPM at [facility]      kyphoplasty for lumbar fx 2013 Dr. Mendel Traore ARTHROSCOPY Left 2006   Yashira Irena Left 1/2/2020    FOOT  DEBRIDEMENT WITH WOUND VAC PLACEMENT performed by Chad Grimes MD at [place] [address]  2014    LUMBAR SPINE SURGERY  2005    diskectomy and spinal fusion    OTHER SURGICAL HISTORY  10/03/2019    Left leg angiogram    [place] OFFICE/OUTPT VISIT,PROCEDURE ONLY Right 2018    EXCISION MASS THUMB, 3080 TABLE performed by Walter Moss DO at [facility] Left 12/3/2019    LEFT FOOT DEBRIDEMENT WITH SKIN GRAFT SPLIT THICKNESS; SKIN GRAFT TAKEN FROM RIGHT ANTERIOR THIGH performed by Marjan Khoury MD at [facility] Left 2020    DEBRIDEMENT, SPLIT THICKNESS SKIN GRAFT WITH WOUND VAC PLACEMENT FOOT performed by Iris Huang MD at [facility] Left 2020    DEBRIDEMENT, SKIN GRAFT SPLIT THICKNESS FOOT  ([employer]) performed by Iris Huang MD at [address] [place] [address]      as a child    TOTAL KNEE ARTHROPLASTY Right 10/13/2020    KNEE TOTAL ARTHROPLASTY    TOTAL KNEE ARTHROPLASTY Right 10/13/2020    KNEE TOTAL ARTHROPLASTY- MICROPORT, \ ADVANCED performed by Walter Moss DO at [address] [place] [address] History     Socioeconomic History    Marital status:      Spouse name: Cori Guzman Number of children: 2    Years of education: Not on file    Highest education level: Not on file   Occupational History    Occupation: Retired RN   Social Needs    Financial resource strain: Not on file    Food insecurity     Worry: Not on file     Inability: Not on file   [employer] needs     Medical: Not on file     Non-medical: Not on file   Tobacco Use    Smoking status: Former Smoker     Packs/day: 0.25     Years: 50.00     Pack years: 12.50     Types: Cigarettes     Start date:      Quit date: 2020     Years since quittin.1    Smokeless tobacco: Never Used   Substance and Sexual Activity    Alcohol use: Yes     Frequency: 2-4 times a month     Comment: 2-3 shot liquor for weekends    Drug use: Never    Sexual activity: Yes     Partners: Male   Lifestyle    Physical activity     Days per week: Not on file     Minutes per session: Not on file    Stress: Not on file   Relationships  Social connections     Talks on phone: Not on file     Gets together: Not on file     Attends [employer]ism service: Not on file     Active member of club or organization: Not on file     Attends meetings of clubs or organizations: Not on file     Relationship status: Not on file    Intimate partner violence     Fear of current or ex partner: Not on file     Emotionally abused: Not on file     Physically abused: Not on file     Forced sexual activity: Not on file   Other Topics Concern    Not on file   Social History Narrative    Not on file     Family History   Problem Relation Age of Onset    Coronary Art Dis Mother     Arthritis Mother     Heart Surgery Mother         CABG    Coronary Art Dis Father     Heart Disease Father     Heart Surgery Father         CABG    Coronary Art Dis Sister     Heart Surgery Sister         CABG     Physical Exam:   /74   Pulse 69   Temp 97.8 °F (36.6 °C) (Tympanic)   Resp 18   Ht 5' 6\" (1.676 m)   Wt 174 lb (78.9 kg)   BMI 28.08 kg/m²    Body mass index is 28.08 kg/m². Physical Exam   Nursing note and vitals reviewed. Constitutional: Oriented to person, place, and time. Appears well-developed and well-nourished. No distress. Head: Normocephalic and atraumatic. Eyes: Conjunctivae and EOM are normal.   Pulmonary/Chest: Effort normal. No respiratory distress. Neurological: Alert and oriented to person, place, and time. Lillian Dao Psychiatric: Normal mood and affect. Behavior is normal        Wound 12/04/20 Foot Left;Dorsal wound #1 (Active)   Wound Image   03/08/21 1238   Wound Etiology Other 03/08/21 1238   Dressing Status New drainage noted; Old drainage noted 03/08/21 1238   Wound Cleansed Soap and water 03/08/21 1238   Dressing/Treatment Other (comment) 02/26/21 1146   Wound Length (cm) 7 cm 03/08/21 1238   Wound Width (cm) 7 cm 03/08/21 1238   Wound Depth (cm) 0.3 cm 03/08/21 1238   Wound Surface Area (cm^2) 49 cm^2 03/08/21 1238   Change in Wound Size % (l*w) -59.09 03/08/21 1238   Wound Volume (cm^3) 14.7 cm^3 03/08/21 1238   Wound Healing % -19 03/08/21 1238   Post-Procedure Length (cm) 6.8 cm 02/26/21 1123   Post-Procedure Width (cm) 7.4 cm 02/26/21 1123   Post-Procedure Depth (cm) 0.4 cm 02/26/21 1123   Post-Procedure Surface Area (cm^2) 50.32 cm^2 02/26/21 1123   Post-Procedure Volume (cm^3) 20.13 cm^3 02/26/21 1123   Wound Assessment Slough 03/08/21 1238   Drainage Amount Moderate 03/08/21 1238   Drainage Description Serosanguinous; Yellow 03/08/21 1238   Odor None 03/08/21 1238   Jessica-wound Assessment Blanchable erythema 03/08/21 1238   Margins Defined edges 03/08/21 1238   Wound Thickness Description not for Pressure Injury Full thickness 03/08/21 1238   Number of days: 94                  Imaging:   [unfilled]        Impression/Plan:     Problem List Items Addressed This Visit     Neoplasm of uncertain behavior of skin    Pain in left foot    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 ([address]          Patient Active Problem List   Diagnosis    Neoplasm of uncertain behavior of skin    Other plastic surgery for unacceptable cosmetic appearance    Chronic bilateral low back pain without sciatica    Mass of finger of right hand    Chronic ulcer of left foot with fat layer exposed ([address]    Pain in left foot    Hypertension    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 ([address]    Wound, open, foot with complication, left, initial encounter    Status post total knee replacement using cement, right    Primary osteoarthritis of right knee    Gastroesophageal reflux disease without esophagitis    Anemia    PAD (peripheral artery disease) ([address]    S/P drug eluting coronary stent placement       Plan:  I recommended evaluation for free flap. We will send the patient to [address] [place] [address],[address] to be evaluated for a free flap.        Electronically signed by:  Shirley Ramirez MD 3/8/2021

## 2021-03-08 NOTE — PROGRESS NOTES
[employer] Application   Below Knee    NAME:  Flor Jerome  YOB: 1955  MEDICAL RECORD NUMBER:  [id number]  DATE:  3/8/2021     [x] Applied moisturizing agent to dry skin as needed.  [x] Appied primary and secondary dressing as ordered     [x] Applied Unna roll from toes to knee overlapping each time.  [x] Applied ace wrap or coban from toes to below the knee.  [x] Secured with tape and/or metal clips covered with tape.  [x] Instructed patient/caregiver to keep dressing dry and intact. DO NOT REMOVE DRESSING.  [x] Instructed pt/family/caregiver to report excessive draining, loose bandage, wet dressing, severe pain or tingling in toes.  [x] Applied [employer] dressing below the knee to Left lower leg(s)        Unna Boot(s) were applied per  Guidelines.      Electronically signed by Walt Ross RN on 3/8/2021 at 1:37 PM

## 2021-03-16 NOTE — PROGRESS NOTES
Touching of the wound or pressure  Associated Signs/Symptoms: pain     Ulcer Identification:  Ulcer Type: undetermined, suspect microvascular disease contributing to chronicity of the wound  Contributing Factors: arterial insufficiency and decreased tissue oxygenationPAST MEDICAL HISTORY        Diagnosis Date    Arthritis     knees hips hands shoulders and back    CAD (coronary artery disease) 2019    blockage on cath 9/2019, to have stenting after surgery (arthroplasty) Dr. Dayday Castelan Chronic back pain     GERD (gastroesophageal reflux disease)     on rx    Hyperlipidemia     per Dr. Adrian Brown on rx    Hypertension     [facility] manages    Incomplete RBBB     Irregular heart beat     LAFB (left anterior fascicular block)     Dr. Adrian Brown, cardiologist, seen 9/2019 prior to surgery on 10/8/2019    Leg wound, left     following with wound care, Dr. William Adrian Lumbar disc disease     Osteoporosis     PAC (premature atrial contraction) 06/2018    PACs and PVCs on holter monitor,     PONV (postoperative nausea and vomiting)     requests scop patch    Wears dentures     upper partial    Wears eyeglasses     readers    Wears partial dentures     upper and lower    Wellness examination     MIKE Phelps NP PCP, seen 2/10/2020       PAST SURGICAL HISTORY    Past Surgical History:   Procedure Laterality Date    ABDOMINOPLASTY  1997    BACK SURGERY      BLEPHAROPLASTY Bilateral 1997    BREAST ENHANCEMENT SURGERY Bilateral 1999    breast augmentation   [address]  2019    Dr. Adrian Brown, blockage but no stents yet    CATARACT REMOVAL WITH IMPLANT Bilateral 2016    COLONOSCOPY  2015    No Polyps    COSMETIC SURGERY      eyelid lift    FINGER SURGERY Right     thumb lesion excised    FIXATION KYPHOPLASTY  2013    Back    FOOT DEBRIDEMENT Left 10/11/2019    SURGICAL PREP WOUND BED LEFT FOOT WITH APPLICATION OF EPIFIX LEFT FOOT 3X4 performed by Julisa Donald DPM [facility] Indications: Mondays Dr. Escalante Massed      gabapentin (NEURONTIN) 300 MG capsule Take 300 mg by mouth 2 times daily. Per Dr. Fe Her       No current facility-administered medications on file prior to encounter. REVIEW OF SYSTEMS    Pertinent items are noted in HPI. Objective:      BP (!) 153/91   Pulse 67   Temp 98.5 °F (36.9 °C) (Tympanic)   Resp 18   Ht 5' 6\" (1.676 m)   Wt 174 lb (78.9 kg)   BMI 28.08 kg/m²     Wt Readings from Last 3 Encounters:   03/16/21 174 lb (78.9 kg)   03/08/21 174 lb (78.9 kg)   03/02/21 174 lb (78.9 kg)       PHYSICAL EXAM       Vascular: DP pulses are not palpable, Bilateral. PT pulses are not palpable, Bilateral. CFT <4 seconds to all digits, Bilateral.  No edema, Bilateral.  Hair growth is absent to the level of the digits, Bilateral.  Skin temp is warm to cool from the tibial tuberosity to the digits, Bilateral.      Neuro: Saph/sural/SP/DP/plantar sensation intact to light touch.     Musculoskeletal: EHL/FHL/GS/TA gross motor intact. Gross deformity is absent.      Dermatologic: Open wound present to dorsal left foot wound as documented in detail below. Wound base is fibro-granular extending to the level of muscle with exposed extensor tendons. Granulation tissue improved over the wound, granulation buds appreciated. Negative probe to bone. There is no erythema. There is no purulent drainage. There is no fluctuance or crepitus. Interdigital maceration absent, Bilateral.     Wound 12/04/20 Foot Left;Dorsal wound #1 (Active)   Wound Image   03/16/21 0843   Wound Etiology Other 03/08/21 1238   Dressing Status New drainage noted; Old drainage noted 03/16/21 0843   Wound Cleansed Soap and water 03/16/21 0843   Dressing/Treatment Other (comment) 02/26/21 1146   Wound Length (cm) 6.3 cm 03/16/21 0843   Wound Width (cm) 8 cm 03/16/21 0843   Wound Depth (cm) 0.2 cm 03/16/21 0843   Wound Surface Area (cm^2) 50.4 cm^2 03/16/21 0843   Change in Wound Size % (l*w) -63.64 03/16/21 0846 Wound Volume (cm^3) 10.08 cm^3 03/16/21 0843   Wound Healing % 18 03/16/21 0843   Post-Procedure Length (cm) 7 cm 03/08/21 1238   Post-Procedure Width (cm) 7 cm 03/08/21 1238   Post-Procedure Depth (cm) 0.3 cm 03/08/21 1238   Post-Procedure Surface Area (cm^2) 49 cm^2 03/08/21 1238   Post-Procedure Volume (cm^3) 14.7 cm^3 03/08/21 1238   Wound Assessment [facility] 03/16/21 0843   Drainage Amount Moderate 03/16/21 0843   Drainage Description Serosanguinous; Yellow 03/16/21 0843   Odor None 03/16/21 0843   Jessica-wound Assessment Blanchable erythema 03/16/21 0843   Margins Defined edges 03/16/21 0843   Wound Thickness Description not for Pressure Injury Full thickness 03/16/21 0843   Number of days: 101        Assessment:      Patient Active Problem List   Diagnosis Code    Neoplasm of uncertain behavior of skin D48.5    Other plastic surgery for unacceptable cosmetic appearance Z41.1    Chronic bilateral low back pain without sciatica M54.5, G89.29    Mass of finger of right hand R22.31    Chronic ulcer of left foot with fat layer exposed ([facility] L97.522    Pain in left foot M79.672    Hypertension I10    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 ([facility]) L97.523    Wound, open, foot with complication, left, initial encounter S91.302A    Status post total knee replacement using cement, right Z96.651    Primary osteoarthritis of right knee M17.11    Gastroesophageal reflux disease without esophagitis K21.9    Anemia D64.9    PAD (peripheral artery disease) ([facility]) I73.9    S/P drug eluting coronary stent placement Z95.5        Procedure Note  Indications:  Based on my examination of this patient's wound(s)/ulcer(s) today, selective debridement was required to promote healing and evaluate the wound base. Wound edges debrided with pickups and scissors. Performed by:  Mike Delgado DPM    Post Debridement Measurements:  Wound/Ulcer Descriptions are Pre Debridement except measurements:    Wound 12/04/20 Foot Left;Dorsal wound #1 (Active)   Wound Image   03/16/21 0843   Wound Etiology Other 03/08/21 1238   Dressing Status New drainage noted; Old drainage noted 03/16/21 0843   Wound Cleansed Soap and water 03/16/21 0843   Dressing/Treatment Other (comment) 02/26/21 1146   Wound Length (cm) 6.3 cm 03/16/21 0843   Wound Width (cm) 8 cm 03/16/21 0843   Wound Depth (cm) 0.2 cm 03/16/21 0843   Wound Surface Area (cm^2) 50.4 cm^2 03/16/21 0843   Change in Wound Size % (l*w) -63.64 03/16/21 0843   Wound Volume (cm^3) 10.08 cm^3 03/16/21 0843   Wound Healing % 18 03/16/21 0843   Post-Procedure Length (cm) 7 cm 03/08/21 1238   Post-Procedure Width (cm) 7 cm 03/08/21 1238   Post-Procedure Depth (cm) 0.3 cm 03/08/21 1238   Post-Procedure Surface Area (cm^2) 49 cm^2 03/08/21 1238   Post-Procedure Volume (cm^3) 14.7 cm^3 03/08/21 1238   Wound Assessment [facility] 03/16/21 0843   Drainage Amount Moderate 03/16/21 0843   Drainage Description Serosanguinous; Yellow 03/16/21 0843   Odor None 03/16/21 0843   Jessica-wound Assessment Blanchable erythema 03/16/21 0843   Margins Defined edges 03/16/21 0843   Wound Thickness Description not for Pressure Injury Full thickness 03/16/21 0843   Number of days: 101          Plan:     Treatment Note please see attached Discharge Instructions    Continue Collagen, silvercel, extrasorb, unna boot left    Continue recs per rheum. Will apply for epifix application. Started on fibracol dressings. F/u 1 week    Written patient dismissal instructions given to patient and signed by patient or POA. Deepak Sanchez DPM on 3/16/2021 at 9:07 AM    I performed a history and physical examination of the patient and discussed management with the resident. I reviewed the residents note and agree with the documented findings and plan of care. Any areas of disagreement are noted on the chart. I was personally present for the key portions of any procedures.  I have documented in the chart those procedures where I was not present during the key portions. I have reviewed the Podiatry Resident progress note. I agree with the chief complaint, past medical history, past surgical history, allergies, medications, social and family history as documented unless otherwise noted below. Documentation of the HPI, Physical Exam and Medical Decision Making performed by medical students or scribes is based on my personal performance of the HPI, PE and MDM. I have personally evaluated this patient and have completed at least one if not all key elements of the E/M (history, physical exam, and MDM). Additional findings are as noted.      Chante Schmid DPM on 3/18/2021 at 4:70 AM  Board Certified, American Board of Podiatric Surgery  Fellow, #[employer] [facility] and [facility]

## 2021-03-23 NOTE — PROGRESS NOTES
Call from client stating that wound had drained through unna boot. She did remove wrap and applied silvercel to wound.  Client does have an appointment 3/25/21

## 2021-03-25 NOTE — PROGRESS NOTES
Signs/Symptoms: pain    Ulcer Identification:  Ulcer Type: undetermined, suspect microvascular disease contributing to chronicity of the wound  Contributing Factors: arterial insufficiency and decreased tissue oxygenation          PAST MEDICAL HISTORY        Diagnosis Date    Arthritis     knees hips hands shoulders and back    CAD (coronary artery disease) 2019    blockage on cath 9/2019, to have stenting after surgery (arthroplasty) Dr. Carrion Read Chronic back pain     GERD (gastroesophageal reflux disease)     on rx    Hyperlipidemia     per Dr. Baker on rx    Hypertension     Gerrianne Duverney manages    Incomplete RBBB     Irregular heart beat     LAFB (left anterior fascicular block)     Dr. Baker, cardiologist, seen 9/2019 prior to surgery on 10/8/2019    Leg wound, left     following with wound care, Dr. Sweta Antoine Lumbar disc disease     Osteoporosis     PAC (premature atrial contraction) 06/2018    PACs and PVCs on holter monitor,     PONV (postoperative nausea and vomiting)     requests scop patch    Wears dentures     upper partial    Wears eyeglasses     readers    Wears partial dentures     upper and lower    Wellness examination     MIKE Farris NP PCP, seen 2/10/2020       PAST SURGICAL HISTORY    Past Surgical History:   Procedure Laterality Date    ABDOMINOPLASTY  1997    BACK SURGERY      BLEPHAROPLASTY Bilateral 1997    BREAST ENHANCEMENT SURGERY Bilateral 1999    breast augmentation   Catha Lucero  2019    Dr. Baker, blockage but no stents yet    CATARACT REMOVAL WITH IMPLANT Bilateral 2016    COLONOSCOPY  2015    No Polyps    COSMETIC SURGERY      eyelid lift    FINGER SURGERY Right     thumb lesion excised    FIXATION KYPHOPLASTY  2013    Back    FOOT DEBRIDEMENT Left 10/11/2019    SURGICAL PREP WOUND BED LEFT FOOT WITH APPLICATION OF EPIFIX LEFT FOOT 3X4 performed by Milagro Quiroz DPM at [facility]      kyphoplasty for lumbar fx 2013 Dr. Zeynep Merino Left    Glenna Calderón Left 2020    FOOT  DEBRIDEMENT WITH WOUND VAC PLACEMENT performed by Silvino Jay MD at [place] [address]     [facility]      diskectomy and spinal fusion    OTHER SURGICAL HISTORY  10/03/2019    Left leg angiogram    [place] OFFICE/OUTPT VISIT,PROCEDURE ONLY Right 2018    EXCISION MASS THUMB, 3080 TABLE performed by Nicolasa Vega DO at [facility] Left 12/3/2019    LEFT FOOT DEBRIDEMENT WITH SKIN GRAFT SPLIT THICKNESS; SKIN GRAFT TAKEN FROM RIGHT ANTERIOR THIGH performed by Silvino Jay MD at [facility] Left 2020    DEBRIDEMENT, SPLIT THICKNESS SKIN GRAFT WITH WOUND VAC PLACEMENT FOOT performed by Florinda Martinez MD at [facility] Left 2020    DEBRIDEMENT, SKIN GRAFT SPLIT THICKNESS FOOT  ([place] [address] [address] [facility]) performed by Florinda Martinez MD at [address] [place] [address]      as a child    TOTAL KNEE ARTHROPLASTY Right 10/13/2020    KNEE TOTAL ARTHROPLASTY    TOTAL KNEE ARTHROPLASTY Right 10/13/2020    KNEE TOTAL ARTHROPLASTY- MICROPORT, \ ADVANCED performed by Nicolasa Vega DO at [place] [address] History   Problem Relation Age of Onset    Coronary Art Dis Mother     Arthritis Mother     Heart Surgery Mother         CABG    Coronary Art Dis Father     Heart Disease Father     Heart Surgery Father         CABG    Coronary Art Dis Sister     Heart Surgery Sister         CABG       SOCIAL HISTORY    Social History     Tobacco Use    Smoking status: Former Smoker     Packs/day: 0.25     Years: 50.00     Pack years: 12.50     Types: Cigarettes     Start date:      Quit date: 2020     Years since quittin.1    Smokeless tobacco: Never Used   Substance Use Topics    Alcohol use: Yes     Frequency: 2-4 times a month     Comment: RTC 1 week       Written patient discharge instructions given to patient and signed by patient or POA. Discharge Instructions         [address], [address] and HYPERBARIC TREATMENT [facility]  Visit Jamal Instructions / Physician Orders  [address]     SUPPLIES ORDERED THRU:Medline     Wound Location:  Left foot     Cleanse with:  Keep wrap dry and intact     Dressing Orders: Endoform, Hydraferra blue ready transfer, kerramax unna boot      Frequency: Keep wrap dry and intact     Additional Orders: Increase protein to diet (meat, cheese, eggs, fish, peanut butter, nuts and beans)  Multivitamin daily      MY CHART []??????????????     Smart Device  []??????????????    Refer to [facility]O for potential Free Flap  HYPERBARIC TREATMENT-                TREATMENT #     Your next appointment with the [address] [place] [address] is in 1 week Dr. Vinny Whitaker  Tuesday                                                                                                             ROOM TYPE   []?????????????? CHAIR     []?????????????? BED   []?????????????? EITHER        TIME []?????????????? 45 MIN     []?????????????? 60 MIN     (Please note your next appointment above and if you are unable to keep, kindly give a 24 hour notice. Thank you.)     If you experience any of the following, please call the [address] [place] [address] during business hours:  [phone]     * Increase in Pain  * Temperature over 101  * Increase in drainage from your wound  * Drainage with a foul odor  * Bleeding  * Increase in swelling  * Need for compression bandage changes due to slippage, breakthrough drainage.     If you need medical attention outside of the business hours of the [address] [place] [address] please contact your PCP or go to the nearest emergency room.     The information contained in the After Visit Summary has been reviewed with me, the patient and/or responsible adult, by my health care provider(s).  I had the opportunity to ask questions regarding this information. I have elected to receive;      []???????????? ?? After Visit Summary  [x]?????????????? Comprehensive Discharge Instruction        Patient signature______________________________________Date:________  Electronically signed by Elias Diaz DPM on 3/25/2021 at 8:43 AM          Electronically signed by Elias Diaz DPM on 3/25/2021 at 9:24 AM

## 2021-03-25 NOTE — PROGRESS NOTES
[employer] Application   Below Knee    NAME:  Abimbola Alvarado  YOB: 1955  MEDICAL RECORD NUMBER:  [id number]  DATE:  3/25/2021     [x] Applied moisturizing agent to dry skin as needed.  [x] Appied primary and secondary dressing as ordered     [x] Applied Unna roll from toes to knee overlapping each time.  [x] Applied ace wrap or coban from toes to below the knee.  [x] Secured with tape and/or metal clips covered with tape.  [x] Instructed patient/caregiver to keep dressing dry and intact. DO NOT REMOVE DRESSING.  [x] Instructed pt/family/caregiver to report excessive draining, loose bandage, wet dressing, severe pain or tingling in toes.  [x] Applied [employer] dressing below the knee to  LEFT lower leg(s)   Stephanie Bollard Boot(s) were applied per  Guidelines.      Electronically signed by Suzie Mcmullen RN on 3/25/2021 at 9:30 AM

## 2021-03-25 NOTE — TELEPHONE ENCOUNTER
Patient left a voicemail stating she needs a global referral to [facility] but did not leave any other details. Called patient back but had to leave a message for a return call.

## 2021-03-30 NOTE — PROGRESS NOTES
Yen Chacon [facility]   Progress Note and Procedure Note      [address] RECORD NUMBER:  [id number]  AGE: 60 y.o. GENDER: female  : 1955  EPISODE DATE:  3/30/2021    Subjective:     Chief Complaint   Patient presents with    Wound Check     left foot         HISTORY of PRESENT ILLNESS HPI     Yeimy Fields is a 60 y.o. female who presents today for wound/ulcer evaluation. Patient has now been on methotrexate for approximately 8 weeks per rheumatology recommendation. Reports she continues to have difficulty with insurance in scheduling appointments at [facility] due to it being out-of-network. Reports Dr. Lissette Stokes recommends going to [facility] for a free flap graft over the wound. Patient did well in her unna boot all week. Denies n/v/f/c.     HISTORY of PRESENT ILLNESS HPI      Yeimy Fields is a 60 y.o. female who presents today for wound/ulcer evaluation. History of Wound Context: Patient presents for evaluation of chronic left dorsal foot wound. She has undergone extensive testing in the past with little improvement. MRI was negative for osteomyelitis or abscess. Previous punch biopsies have been negative for malignancy or any obvious process. Rheumatologic work-up has revealed elevated anticentromere and positive AMADOR, and full work up is still pending. She has previously been seen by a rheumatologist.  Has had multiple debridements in the past with skin grafting including STSG and allograft. Relates that with each debridement the wound has increased in size and the graft has failed. She does relate a significant smoking history stating that she smoked from childhood heavily and has since quit just recently. She has seen vascular surgery with no intervention. She underwent left lower extremity angiogram and was found to have no flow-limiting stenosis. She has known CAD with stenting in the past. Denies any significant drainage from the ulceration.  Wound has PREP WOUND BED LEFT FOOT WITH APPLICATION OF EPIFIX LEFT FOOT 3X4 performed by Debo Reynoso DPM at [address]      kyphoplasty for lumbar fx 2013 Dr. Jill Morgan ARTHROSCOPY Left 2006   Luis M Finley Left 1/2/2020    FOOT  [address] performed by Kristal Fernandez MD at [place] [address]  2014   [facility] [address]  2005    diskectomy and spinal fusion    OTHER SURGICAL HISTORY  10/03/2019    Left leg angiogram    [place] OFFICE/OUTPT VISIT,PROCEDURE ONLY Right 9/11/2018    EXCISION MASS THUMB, 3080 TABLE performed by Rosa Moreno DO at [facility] Left 12/3/2019    LEFT FOOT DEBRIDEMENT WITH SKIN GRAFT SPLIT THICKNESS; SKIN GRAFT TAKEN FROM RIGHT ANTERIOR THIGH performed by Kristal Fernandez MD at [facility] Left 2/25/2020    DEBRIDEMENT, SPLIT THICKNESS SKIN GRAFT WITH WOUND VAC PLACEMENT FOOT performed by Gracie Waters MD at [facility] Left 6/30/2020    DEBRIDEMENT, SKIN GRAFT SPLIT THICKNESS FOOT  ([facility], [address] [facility]) performed by Gracie Waters MD at [address] [place] [address]      as a child    TOTAL KNEE ARTHROPLASTY Right 10/13/2020    KNEE TOTAL ARTHROPLASTY    TOTAL KNEE ARTHROPLASTY Right 10/13/2020    KNEE TOTAL ARTHROPLASTY- MICROPORT, \ ADVANCED performed by Rosa Moreno DO at [facility] History   Problem Relation Age of Onset    Coronary Art Dis Mother     Arthritis Mother     Heart Surgery Mother         CABG    Coronary Art Dis Father     Heart Disease Father     Heart Surgery Father         CABG    Coronary Art Dis Sister     Heart Surgery Sister         CABG       SOCIAL HISTORY    Social History     Tobacco Use    Smoking status: Former Smoker     Packs/day: 0.25     Years: 50.00     Pack years: 12.50     Types: Cigarettes     Start date: 1968     Quit date: 1,000 mg by mouth daily Per Lenard Anne rheumatology      alendronate (FOSAMAX) 70 MG tablet Take 70 mg by mouth every 7 days Indications: Mondays Dr. Renzo Wang      gabapentin (NEURONTIN) 300 MG capsule Take 300 mg by mouth 2 times daily. Per Dr. Derrick Schwarz       No current facility-administered medications on file prior to encounter. REVIEW OF SYSTEMS    Pertinent items are noted in HPI. Objective:      /73   Pulse 69   Temp 98 °F (36.7 °C) (Tympanic)   Resp 18   Ht 5' 6\" (1.676 m)   Wt 174 lb (78.9 kg)   BMI 28.08 kg/m²     Wt Readings from Last 3 Encounters:   03/30/21 174 lb (78.9 kg)   03/25/21 174 lb (78.9 kg)   03/16/21 174 lb (78.9 kg)       PHYSICAL EXAM       Vascular: DP pulses are not palpable, Bilateral. PT pulses are not palpable, Bilateral. CFT <4 seconds to all digits, Bilateral.  No edema, Bilateral.  Hair growth is absent to the level of the digits, Bilateral.  Skin temp is warm to cool from the tibial tuberosity to the digits, Bilateral.      Neuro: Saph/sural/SP/DP/plantar sensation intact to light touch.     Musculoskeletal: EHL/FHL/GS/TA gross motor intact. Gross deformity is absent.      Dermatologic: Open wound present to dorsal left foot wound as documented in detail below. Wound base is fibro-granular extending to the level of muscle with exposed extensor tendons. Granulation tissue improved over the wound, granulation buds appreciated. Negative probe to bone. There is no erythema. There is no purulent drainage. There is no fluctuance or crepitus. Interdigital maceration absent, Bilateral.     Wound 12/04/20 Foot Left;Dorsal wound #1 (Active)   Wound Image   03/30/21 0913   Wound Etiology Other 03/30/21 0913   Dressing Status New drainage noted; Old drainage noted 03/30/21 0913   Wound Cleansed Soap and water 03/30/21 0913   Dressing/Treatment Other (comment) 02/26/21 1146   Wound Length (cm) 6.5 cm 03/30/21 0913   Wound Width (cm) 7 cm 03/30/21 0913   Wound Depth (cm) 0.2 cm 03/30/21 0913   Wound Surface Area (cm^2) 45.5 cm^2 03/30/21 0913   Change in Wound Size % (l*w) -47.73 03/30/21 0913   Wound Volume (cm^3) 9.1 cm^3 03/30/21 0913   Wound Healing % 26 03/30/21 0913   Post-Procedure Length (cm) 6.5 cm 03/25/21 0851   Post-Procedure Width (cm) 7.5 cm 03/25/21 0851   Post-Procedure Depth (cm) 0.2 cm 03/25/21 0851   Post-Procedure Surface Area (cm^2) 48.75 cm^2 03/25/21 0851   Post-Procedure Volume (cm^3) 9.75 cm^3 03/25/21 0851   Wound Assessment Fibrinous; Slough;Pink/red;Epithelialization 03/30/21 0913   Drainage Amount Large 03/30/21 0913   Drainage Description Serosanguinous; Yellow 03/30/21 0913   Odor None 03/30/21 0913   Jessica-wound Assessment Blanchable erythema 03/30/21 0913   Margins Defined edges 03/30/21 0913   Wound Thickness Description not for Pressure Injury Full thickness 03/30/21 0913   Number of days: 115        Assessment:      Patient Active Problem List   Diagnosis Code    Neoplasm of uncertain behavior of skin D48.5    Other plastic surgery for unacceptable cosmetic appearance Z41.1    Chronic bilateral low back pain without sciatica M54.5, G89.29    Mass of finger of right hand R22.31    [facility] of [facility] L97.522    Pain in left foot M79.672    Hypertension I10    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 ([facility]) L97.523    Wound, open, foot with complication, left, initial encounter S91.302A    Status post total knee replacement using cement, right Z96.651    Primary osteoarthritis of right knee M17.11    Gastroesophageal reflux disease without esophagitis K21.9    Anemia D64.9    PAD (peripheral artery disease) ([facility]) I73.9    S/P drug eluting coronary stent placement Z95.5        Procedure Note  Indications:  Based on my examination of this patient's wound(s)/ulcer(s) today, no debridement was required today.     Post Debridement Measurements:  Wound/Ulcer Descriptions are Pre Debridement except measurements:    Wound 12/04/20 Foot Left;Dorsal wound #1 (Active)   Wound Image   03/30/21 0913   Wound Etiology Other 03/30/21 0913   Dressing Status New drainage noted; Old drainage noted 03/30/21 0913   Wound Cleansed Soap and water 03/30/21 0913   Dressing/Treatment Other (comment) 02/26/21 1146   Wound Length (cm) 6.5 cm 03/30/21 0913   Wound Width (cm) 7 cm 03/30/21 0913   Wound Depth (cm) 0.2 cm 03/30/21 0913   Wound Surface Area (cm^2) 45.5 cm^2 03/30/21 0913   Change in Wound Size % (l*w) -47.73 03/30/21 0913   Wound Volume (cm^3) 9.1 cm^3 03/30/21 0913   Wound Healing % 26 03/30/21 0913   Post-Procedure Length (cm) 6.5 cm 03/25/21 0851   Post-Procedure Width (cm) 7.5 cm 03/25/21 0851   Post-Procedure Depth (cm) 0.2 cm 03/25/21 0851   Post-Procedure Surface Area (cm^2) 48.75 cm^2 03/25/21 0851   Post-Procedure Volume (cm^3) 9.75 cm^3 03/25/21 0851   Wound Assessment Fibrinous; Slough;Pink/red;Epithelialization 03/30/21 0913   Drainage Amount Large 03/30/21 0913   Drainage Description Serosanguinous; Yellow 03/30/21 0913   Odor None 03/30/21 0913   Jessica-wound Assessment Blanchable erythema 03/30/21 0913   Margins Defined edges 03/30/21 0913   Wound Thickness Description not for Pressure Injury Full thickness 03/30/21 0913   Number of days: 115          Plan:     Treatment Note please see attached Discharge Instructions    Continue Endoform, Hydraferra blue transfer ready, kerramax unna boot     Continue recs per rheum. Will apply for epifix application. Started on fibracol dressings. F/u 1 week    Written patient dismissal instructions given to patient and signed by patient or POA. Griselda Marvin DPM on 3/30/2021 at 9:41 AM    I performed a history and physical examination of the patient and discussed management with the resident. I reviewed the residents note and agree with the documented findings and plan of care. Any areas of disagreement are noted on the chart.  I was personally present for the

## 2021-03-30 NOTE — TELEPHONE ENCOUNTER
Pt states that [address] [place] [address] suggest she go to [facility] for LT foot wound. She has had wound of LT foot since 05/2019. Saadia Gallego states that INS said referral has to come from PCP  Her appt is 04/09/2021    Fax number for the referral:   [facility], Dr Marylou Jose  For evaluation free flap  [phone]

## 2021-03-30 NOTE — PROGRESS NOTES
[facility] Application   Below Knee    NAME:  Caryle Senters  YOB: 1955  MEDICAL RECORD NUMBER:  [id number]  DATE:  3/30/2021     [x] Applied moisturizing agent to dry skin as needed.  [x] Appied primary and secondary dressing as ordered     [x] Applied Unna roll from toes to knee overlapping each time.  [x] Applied ace wrap or coban from toes to below the knee.  [x] Secured with tape and/or metal clips covered with tape.  [x] Instructed patient/caregiver to keep dressing dry and intact. DO NOT REMOVE DRESSING.  [x] Instructed pt/family/caregiver to report excessive draining, loose bandage, wet dressing, severe pain or tingling in toes.  [x] Applied Yvonne Fran dressing below the knee to Left lower leg(s)        Unna Boot(s) were applied per  Guidelines.      Electronically signed by Carol Colbert RN on 3/30/2021 at 9:51 AM

## 2021-04-01 NOTE — TELEPHONE ENCOUNTER
Dr Chad Botello is a plastic surgeon. It was recommended she see him in regards to foot wound and to get evaluation for free flap to be done.     Yes she is at [facility] wound care

## 2021-04-06 NOTE — PROGRESS NOTES
Yen Chacon [address]   Progress Note and Procedure Note      [address] RECORD NUMBER:  [id number]  AGE: 60 y.o. GENDER: female  : 1955  EPISODE DATE:  2021    Subjective:     Chief Complaint   Patient presents with    Wound Check     L foot         HISTORY of PRESENT ILLNESS SHANNAN Mayorga is a 60 y.o. female who presents today for wound/ulcer evaluation. Patient has now been on methotrexate for approximately 8 weeks per rheumatology recommendation. Reports she continues to have difficulty with insurance in scheduling appointments at [address] [facility] due to it being out-of-network. Reports Dr. Winter Kirkland recommends going to [facility] for a free flap graft over the wound. Patient has been tolerating Unna boots without issues. Denies n/v/f/c.     HISTORY of PRESENT ILLNESS SHANNAN Mayorga is a 60 y.o. female who presents today for wound/ulcer evaluation. History of Wound Context: Patient presents for evaluation of chronic left dorsal foot wound. She has undergone extensive testing in the past with little improvement. MRI was negative for osteomyelitis or abscess. Previous punch biopsies have been negative for malignancy or any obvious process. Rheumatologic work-up has revealed elevated anticentromere and positive AMADOR, and full work up is still pending. She has previously been seen by a rheumatologist.  Has had multiple debridements in the past with skin grafting including STSG and allograft. Relates that with each debridement the wound has increased in size and the graft has failed. She does relate a significant smoking history stating that she smoked from childhood heavily and has since quit just recently. She has seen vascular surgery with no intervention. She underwent left lower extremity angiogram and was found to have no flow-limiting stenosis. She has known CAD with stenting in the past. Denies any significant drainage from the ulceration. Wound has remained unchanged. Wound/Ulcer Pain Timing/Severity: intermittent  Quality of pain: burning  Modifying Factors: Pain worsens with Touching of the wound or pressure  Associated Signs/Symptoms: pain     Ulcer Identification:  Ulcer Type: undetermined, suspect microvascular disease contributing to chronicity of the wound  Contributing Factors: arterial insufficiency and decreased tissue oxygenationPAST MEDICAL HISTORY        Diagnosis Date    Arthritis     knees hips hands shoulders and back    CAD (coronary artery disease) 2019    blockage on cath 9/2019, to have stenting after surgery (arthroplasty) Dr. Ulises Rajput Chronic back pain     GERD (gastroesophageal reflux disease)     on rx    Hyperlipidemia     per Dr. Selene Akins on rx    Hypertension     Nimo Winslow manages    Incomplete RBBB     Irregular heart beat     LAFB (left anterior fascicular block)     Dr. Selene Akins, cardiologist, seen 9/2019 prior to surgery on 10/8/2019    Leg wound, left     following with wound care, Dr. Adam Kirk Lumbar disc disease     Osteoporosis     PAC (premature atrial contraction) 06/2018    PACs and PVCs on holter monitor,     PONV (postoperative nausea and vomiting)     requests scop patch    Wears dentures     upper partial    Wears eyeglasses     readers    Wears partial dentures     upper and lower    Wellness examination     MIKE Elder NP PCP, seen 2/10/2020       PAST SURGICAL HISTORY    Past Surgical History:   Procedure Laterality Date    ABDOMINOPLASTY  1997    BACK SURGERY      BLEPHAROPLASTY Bilateral 1997    BREAST ENHANCEMENT SURGERY Bilateral 1999    breast augmentation    CARDIAC CATHETERIZATION  2019    Dr. Selene Akins, blockage but no stents yet    CATARACT REMOVAL WITH IMPLANT Bilateral 2016    COLONOSCOPY  2015    No Polyps    COSMETIC SURGERY      eyelid lift    FINGER SURGERY Right     thumb lesion excised    FIXATION KYPHOPLASTY  2013    Back    FOOT DEBRIDEMENT Left 10/11/2019 Quit date: 2020     Years since quittin.1    Smokeless tobacco: Never Used   Substance Use Topics    Alcohol use: Yes     Frequency: 2-4 times a month     Comment: 2-3 shot liquor for weekends    Drug use: Never       ALLERGIES    No Known Allergies    MEDICATIONS    Current Outpatient Medications on File Prior to Encounter   Medication Sig Dispense Refill    methotrexate (RHEUMATREX) 2.5 MG chemo tablet Take 15 mg by mouth once a week       clopidogrel (PLAVIX) 75 MG tablet Take 1 tablet by mouth daily 30 tablet 11    predniSONE (DELTASONE) 20 MG tablet Take 5 mg by mouth daily       folic acid (FOLVITE) 1 MG tablet take 1 tablet by mouth once daily 30 tablet 2    Metoprolol Tartrate 37.5 MG TABS take 1 tablet by mouth twice a day (Patient taking differently: Take 25 mg by mouth 2 times daily ) 60 tablet 5    Multiple Vitamins-Minerals (THERAPEUTIC MULTIVITAMIN-MINERALS) tablet Take 1 tablet by mouth daily      oxyCODONE-acetaminophen (PERCOCET) 5-325 MG per tablet take 1 tablet by mouth four times a day if needed      traMADol (ULTRAM ER) 200 MG extended release tablet take 1 tablet by mouth once daily      aspirin EC 81 MG EC tablet Take 1 tablet by mouth 2 times daily 84 tablet 0    vitamin C (ASCORBIC ACID) 500 MG tablet Take 500 mg by mouth 2 times daily      diclofenac sodium (VOLTAREN) 1 % GEL Apply topically as needed Dr. Augustus Rodriguez      atorvastatin (LIPITOR) 40 MG tablet Take 1 tablet by mouth nightly (Patient taking differently: Take 40 mg by mouth every morning Per Dr. Becky Quinones) 30 tablet 3    NAPROXEN PO Take 250 mg by mouth daily as needed Indications: patient takes 1-2 times a day Stopping 10 days prior to surgery as instructed by surgeon      omeprazole (PRILOSEC) 40 MG delayed release capsule Take 1 tablet by mouth daily Per Didier Loredo      Cholecalciferol (VITAMIN D3) 5000 units TABS Take 5,000 Units by mouth daily       calcium carbonate (OSCAL) 500 MG TABS tablet Take 1,000 mg by mouth daily Per Nereida Tolentino rheumatology      alendronate (FOSAMAX) 70 MG tablet Take 70 mg by mouth every 7 days Indications: Mondays Dr. Zak Jacques      gabapentin (NEURONTIN) 300 MG capsule Take 300 mg by mouth 2 times daily. Per Dr. Mayte Celis       No current facility-administered medications on file prior to encounter. REVIEW OF SYSTEMS    Pertinent items are noted in HPI. Objective:      BP (!) 157/91   Pulse 68   Temp 97.2 °F (36.2 °C) (Tympanic)   Resp 18     Wt Readings from Last 3 Encounters:   03/30/21 174 lb (78.9 kg)   03/25/21 174 lb (78.9 kg)   03/16/21 174 lb (78.9 kg)       PHYSICAL EXAM       Vascular: DP pulses are not palpable, Bilateral. PT pulses are not palpable, Bilateral. CFT <4 seconds to all digits, Bilateral.  No edema, Bilateral.  Hair growth is absent to the level of the digits, Bilateral.  Skin temp is warm to cool from the tibial tuberosity to the digits, Bilateral.      Neuro: Saph/sural/SP/DP/plantar sensation intact to light touch.     Musculoskeletal: EHL/FHL/GS/TA gross motor intact. Gross deformity is absent.      Dermatologic: Open wound present to dorsal left foot wound as documented in detail below. Wound base is fibro-granular extending to the level of muscle with an exposed extensor tendon. Granulation tissue improved over the wound, granulation buds appreciated. Negative probe to bone. There is no erythema. There is no purulent drainage. There is no fluctuance or crepitus.  Interdigital maceration absent, Bilateral.     Wound 12/04/20 Foot Left;Dorsal wound #1 (Active)   Wound Image   04/06/21 0924   Wound Etiology Other 04/06/21 0924   Dressing Status Old drainage noted 04/06/21 0924   Wound Cleansed Soap and water 04/06/21 0924   Dressing/Treatment Other (comment) 04/06/21 1042   Wound Length (cm) 6.4 cm 04/06/21 0924   Wound Width (cm) 7.2 cm 04/06/21 0924   Wound Depth (cm) 0.2 cm 04/06/21 0924   Wound Surface Area (cm^2) 46.08 cm^2 04/06/21 personally present for the key portions of any procedures. I have documented in the chart those procedures where I was not present during the key portions. I have reviewed the Podiatry Resident progress note. I agree with the chief complaint, past medical history, past surgical history, allergies, medications, social and family history as documented unless otherwise noted below. Documentation of the HPI, Physical Exam and Medical Decision Making performed by medical students or scribes is based on my personal performance of the HPI, PE and MDM. I have personally evaluated this patient and have completed at least one if not all key elements of the E/M (history, physical exam, and MDM). Additional findings are as noted.      Andre Ferrari DPM on 4/6/2021 at 53:96 AM  Board Certified, American Board of Podiatric Surgery  Fellow, [employer] [facility] and [facility]

## 2021-04-06 NOTE — PROGRESS NOTES
[employer] Application   Below Knee    NAME:  Asenath Eisenmenger  YOB: 1955  MEDICAL RECORD NUMBER:  [id number]  DATE:  4/6/2021     [x] Removed old Sadaf Stair boot if indicated and wash leg with mild soap and water.  [x] Applied moisturizing agent to dry skin as needed.  [x] Appied primary and secondary dressing as ordered     [x] Applied Unna roll from toes to knee overlapping each time.  [x] Applied ace wrap or coban from toes to below the knee.  [x] Secured with tape and/or metal clips covered with tape.  [x] Instructed patient/caregiver to keep dressing dry and intact. DO NOT REMOVE DRESSING.  [x] Instructed pt/family/caregiver to report excessive draining, loose bandage, wet dressing, severe pain or tingling in toes.  [x] Applied [employer] dressing below the knee to Left lower leg(s)        Unna Boot(s) were applied per  Guidelines.      Electronically signed by Noma Denver, RN on 4/6/2021 at 10:42 AM

## 2021-04-13 NOTE — PROGRESS NOTES
Wound Care Supplies      Supply Company:     [employer] [address], [address] p: [phone] f: [phone]     [facility]:     [address] [place] [address],[address] Dept: [phone]   FAX NUMBER [unfilled]    Patient Information:      Mike Muniz [address]   [phone]   : 1955  AGE: 60 y.o.      GENDER: female   TODAYS DATE:  2021    Insurance:      PRIMARY INSURANCE:  Plan: [employer] CHOICE HMO  Coverage: [employer] MEDICARE ADVANTAGE  Effective Date: 2015  [id number] - (Medicare Managed)    SECONDARY INSURANCE:  Plan:   Coverage:   Effective Date:   [unfilled]    [unfilled]   [unfilled]     Patient Wound Information:      Problem List Items Addressed This Visit     Neoplasm of uncertain behavior of skin    Relevant Orders    Supply: Wound Cleanser    Supply: Wound Dressings    Supply: Cover and Secure    Pain in left foot    Relevant Orders    Supply: Wound Cleanser    Supply: Wound Dressings    Supply: Cover and Secure    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 ([address] - Primary    Relevant Orders    Supply: Wound Cleanser    Supply: Wound Dressings    Supply: Cover and Secure          WOUNDS REQUIRING DRESSING SUPPLIES:     Wound 20 Foot Left;Dorsal wound #1 (Active)   Wound Image   21 0848   Wound Etiology Other 21 0848   Dressing Status New drainage noted 21 0848   Wound Cleansed Irrigated with saline 21 0848   Dressing/Treatment Other (comment) 21 1042   Wound Length (cm) 6.6 cm 21 0848   Wound Width (cm) 7.8 cm 21 0848   Wound Depth (cm) 0.2 cm 21 0848   Wound Surface Area (cm^2) 51.48 cm^2 21 0848   Change in Wound Size % (l*w) -67.14 21 0848   Wound Volume (cm^3) 10.3 cm^3 21 0848   Wound Healing % 16 21 0848   Post-Procedure Length (cm) 6.6 cm 2148   Post-Procedure Width (cm) 7.8 cm 04/13/21 0848   Post-Procedure Depth (cm) 0.2 cm 04/13/21 0848   Post-Procedure Surface Area (cm^2) 51.48 cm^2 04/13/21 0848   Post-Procedure Volume (cm^3) 10.3 cm^3 04/13/21 0848   Wound Assessment Fibrinous; Slough;Pink/red;Epithelialization 04/13/21 0848   Drainage Amount Large 04/13/21 0848   Drainage Description Serosanguinous; Yellow 04/13/21 0848   Odor None 04/13/21 0848   Jessica-wound Assessment Blanchable erythema 04/13/21 0848   Margins Defined edges 04/13/21 0848   Wound Thickness Description not for Pressure Injury Full thickness 04/13/21 0848   Number of days: 129          Supplies Requested :      WOUND #: 1   PRIMARY DRESSING:  Other: endoform,, hydrofera blue ready transfer   Cover and Secure with: ABD pad  Bulky roll gauze  Ace wrap     FREQUENCY OF DRESSING CHANGES:  Every other day         ADDITIONAL ITEMS:  [] Gloves Small  [x] Gloves Medium [] Gloves Large [] Gloves XLarge  [] Tape 1\" [x] Tape 2\" [] Tape 3\"  [] Medipore Tape  [x] Saline  [] Skin Prep   [] Adhesive Remover   [] Cotton Tip Applicators   [] Other:    Patient Wound(s) Debrided: [x] Yes   [] No    Debribement Type: selective debridement    Debridement Date: 4/13/2021    Patient currently being seen by Home Health: [] Yes   [x] No    Duration for needed supplies:  []15  [x]30  []60  []90 Days    Provider Information:      PROVIDER'S NAME: Basilia Damon DPM  [id number]

## 2021-04-13 NOTE — PROGRESS NOTES
Signs/Symptoms: pain     Ulcer Identification:  Ulcer Type: undetermined, suspect microvascular disease contributing to chronicity of the wound  Contributing Factors: arterial insufficiency and decreased tissue oxygenation    PAST MEDICAL HISTORY        Diagnosis Date    Arthritis     knees hips hands shoulders and back    CAD (coronary artery disease) 2019    blockage on cath 9/2019, to have stenting after surgery (arthroplasty) Dr. Skip Landrum Chronic back pain     GERD (gastroesophageal reflux disease)     on rx    Hyperlipidemia     per Dr. Misty Trinidad on rx    Hypertension     Geneva Licona manages    Incomplete RBBB     Irregular heart beat     LAFB (left anterior fascicular block)     Dr. Misty Trinidad, cardiologist, seen 9/2019 prior to surgery on 10/8/2019    Leg wound, left     following with wound care, Dr. Maurisio Simpson Lumbar disc disease     Osteoporosis     PAC (premature atrial contraction) 06/2018    PACs and PVCs on holter monitor,     PONV (postoperative nausea and vomiting)     requests scop patch    Wears dentures     upper partial    Wears eyeglasses     readers    Wears partial dentures     upper and lower    Wellness examination     MIKE Clinton NP PCP, seen 2/10/2020       PAST SURGICAL HISTORY    Past Surgical History:   Procedure Laterality Date    ABDOMINOPLASTY  1997    BACK SURGERY      BLEPHAROPLASTY Bilateral 1997    BREAST ENHANCEMENT SURGERY Bilateral 1999    breast augmentation   [address]  2019    Dr. Misty Trinidad, blockage but no stents yet    CATARACT REMOVAL WITH IMPLANT Bilateral 2016    COLONOSCOPY  2015    No Polyps    COSMETIC SURGERY      eyelid lift    FINGER SURGERY Right     thumb lesion excised    FIXATION KYPHOPLASTY  2013    Back    FOOT DEBRIDEMENT Left 10/11/2019    SURGICAL PREP WOUND BED LEFT FOOT WITH APPLICATION OF EPIFIX LEFT FOOT 3X4 [facility]      [facility]    KNEE ARTHROSCOPY Left 2006    LAMINECTOMY  1994    LEG SURGERY Left 2020    FOOT  DEBRIDEMENT WITH WOUND VAC PLACEMENT performed by Phoebe Closs, MD at [facility]     [facility]      diskectomy and spinal fusion    OTHER SURGICAL HISTORY  10/03/2019    Left leg angiogram    UT OFFICE/OUTPT VISIT,PROCEDURE ONLY Right 2018    EXCISION MASS THUMB, 3080 TABLE performed by Louisa Pride DO at [facility] Left 12/3/2019    LEFT FOOT DEBRIDEMENT WITH SKIN GRAFT SPLIT THICKNESS; SKIN GRAFT TAKEN FROM RIGHT ANTERIOR THIGH performed by Phoebe Closs, MD at [facility] Left 2020    DEBRIDEMENT, SPLIT THICKNESS SKIN GRAFT WITH WOUND VAC PLACEMENT FOOT performed by Hiwot Lambert MD at [facility] Left 2020    DEBRIDEMENT, SKIN GRAFT SPLIT THICKNESS FOOT  ([place] [address], [address] [facility]) performed by Hiwot Lambert MD at [facility]      as a child    TOTAL KNEE ARTHROPLASTY Right 10/13/2020    KNEE TOTAL ARTHROPLASTY    TOTAL KNEE ARTHROPLASTY Right 10/13/2020    KNEE TOTAL ARTHROPLASTY- MICROPORT, \ ADVANCED performed by Louisa Pride DO at [place] [address] History   Problem Relation Age of Onset    Coronary Art Dis Mother     Arthritis Mother     Heart Surgery Mother         CABG    Coronary Art Dis Father     Heart Disease Father     Heart Surgery Father         CABG    Coronary Art Dis Sister     Heart Surgery Sister         CABG       SOCIAL HISTORY    Social History     Tobacco Use    Smoking status: Former Smoker     Packs/day: 0.25     Years: 50.00     Pack years: 12.50     Types: Cigarettes     Start date:      Quit date: 2020     Years since quittin.2    Smokeless tobacco: Never Used   Substance Use Topics    Alcohol use: Yes     Frequency: 2-4 times a month     Comment: 2-3 shot (NEURONTIN) 300 MG capsule Take 300 mg by mouth 2 times daily. Per Dr. Sancho Robb       No current facility-administered medications on file prior to encounter. REVIEW OF SYSTEMS    Pertinent items are noted in HPI. Objective:      BP (!) 159/88   Pulse 70   Temp 97.1 °F (36.2 °C) (Tympanic)   Resp 18   Ht 5' 6\" (1.676 m)   Wt 174 lb (78.9 kg)   BMI 28.08 kg/m²     Wt Readings from Last 3 Encounters:   04/13/21 174 lb (78.9 kg)   03/30/21 174 lb (78.9 kg)   03/25/21 174 lb (78.9 kg)       PHYSICAL EXAM       Vascular: DP pulses are not palpable, Bilateral. PT pulses are not palpable, Bilateral. CFT <4 seconds to all digits, Bilateral.  No edema, Bilateral.  Hair growth is absent to the level of the digits, Bilateral.  Skin temp is warm to cool from the tibial tuberosity to the digits, Bilateral.      Neuro: Saph/sural/SP/DP/plantar sensation intact to light touch.     Musculoskeletal: EHL/FHL/GS/TA gross motor intact. Gross deformity is absent.      Dermatologic: Open wound present to dorsal left foot wound as documented in detail below. Wound base is fibro-granular extending to the level of muscle with an exposed extensor tendon. Granulation tissue improved over the wound, granulation buds appreciated. Negative probe to bone. There is no erythema. There is no purulent drainage. There is no fluctuance or crepitus.  Interdigital maceration absent, Bilateral.     Wound 12/04/20 Foot Left;Dorsal wound #1 (Active)   Wound Image   04/13/21 0848   Wound Etiology Other 04/13/21 0848   Dressing Status New drainage noted 04/13/21 0848   Wound Cleansed Irrigated with saline 04/13/21 0848   Dressing/Treatment Other (comment) 04/06/21 1042   Wound Length (cm) 6.6 cm 04/13/21 0848   Wound Width (cm) 7.8 cm 04/13/21 0848   Wound Depth (cm) 0.2 cm 04/13/21 0848   Wound Surface Area (cm^2) 51.48 cm^2 04/13/21 0848   Change in Wound Size % (l*w) -67.14 04/13/21 0848   Wound Volume (cm^3) 10.3 cm^3 04/13/21 0848   Wound Healing % 16 04/13/21 0848   Post-Procedure Length (cm) 6.6 cm 04/13/21 0848   Post-Procedure Width (cm) 7.8 cm 04/13/21 0848   Post-Procedure Depth (cm) 0.2 cm 04/13/21 0848   Post-Procedure Surface Area (cm^2) 51.48 cm^2 04/13/21 0848   Post-Procedure Volume (cm^3) 10.3 cm^3 04/13/21 0848   Wound Assessment Fibrinous; Slough;Pink/red;Epithelialization 04/13/21 0848   Drainage Amount Large 04/13/21 0848   Drainage Description Serosanguinous; Yellow 04/13/21 0848   Odor None 04/13/21 0848   Jessica-wound Assessment Blanchable erythema 04/13/21 0848   Margins Defined edges 04/13/21 0848   Wound Thickness Description not for Pressure Injury Full thickness 04/13/21 0848   Number of days: 129        Assessment:      Patient Active Problem List   Diagnosis Code    Neoplasm of uncertain behavior of skin D48.5    Other plastic surgery for unacceptable cosmetic appearance Z41.1    Chronic bilateral low back pain without sciatica M54.5, G89.29    Mass of finger of right hand R22.31    Chronic ulcer of left foot with fat layer exposed ([facility] [address] L97.522    Pain in left foot M79.672    Hypertension I10    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 ([facility]) L97.523    Wound, open, foot with complication, left, initial encounter S91.302A    Status post total knee replacement using cement, right Z96.651    Primary osteoarthritis of right knee M17.11    Gastroesophageal reflux disease without esophagitis K21.9    Anemia D64.9    PAD (peripheral artery disease) ([facility]) I73.9    S/P drug eluting coronary stent placement Z95.5        Procedure Note  Indications:  Based on my examination of this patient's wound(s)/ulcer(s) today,    Procedure:  Wound Location: left foot, dorsum    Lidocaine gel soaked gauze was applied at beginning of wound evaluation. The wound(s) was excisionally debrided sharply of fibrotic, necrotic, and hyperkeratotic tissue, including a layer of surrounding healthy tissue using curette.   The wound was debrided down through and including full thickness skin. Percent of Wound Debrided: 100%    Total Surface Area Debrided:  (see above for sq cm)    Bleeding: Minimal    Patient tolerated procedure well and was given proper instruction. Post debridement wound description:  clean  Post debridement Wound Location:left foot      Post Debridement Measurements:  Wound/Ulcer Descriptions are Pre Debridement except measurements:    Wound 12/04/20 Foot Left;Dorsal wound #1 (Active)   Wound Image   04/13/21 0848   Wound Etiology Other 04/13/21 0848   Dressing Status New drainage noted 04/13/21 0848   Wound Cleansed Irrigated with saline 04/13/21 0848   Dressing/Treatment Other (comment) 04/06/21 1042   Wound Length (cm) 6.6 cm 04/13/21 0848   Wound Width (cm) 7.8 cm 04/13/21 0848   Wound Depth (cm) 0.2 cm 04/13/21 0848   Wound Surface Area (cm^2) 51.48 cm^2 04/13/21 0848   Change in Wound Size % (l*w) -67.14 04/13/21 0848   Wound Volume (cm^3) 10.3 cm^3 04/13/21 0848   Wound Healing % 16 04/13/21 0848   Post-Procedure Length (cm) 6.6 cm 04/13/21 0848   Post-Procedure Width (cm) 7.8 cm 04/13/21 0848   Post-Procedure Depth (cm) 0.2 cm 04/13/21 0848   Post-Procedure Surface Area (cm^2) 51.48 cm^2 04/13/21 0848   Post-Procedure Volume (cm^3) 10.3 cm^3 04/13/21 0848   Wound Assessment Fibrinous; Slough;Pink/red;Epithelialization 04/13/21 0848   Drainage Amount Large 04/13/21 0848   Drainage Description Serosanguinous; Yellow 04/13/21 0848   Odor None 04/13/21 0848   Jessica-wound Assessment Blanchable erythema 04/13/21 0848   Margins Defined edges 04/13/21 0848   Wound Thickness Description not for Pressure Injury Full thickness 04/13/21 0848   Number of days: 129          Plan:     Treatment Note please see attached Discharge Instructions    Continue Endoform, Hydraferra blue transfer ready, kerlix and ace. .    Continue recs per rheum. Referrel to [facility] for evaluation of a free flap graft to help facilitate wound healing. Written patient dismissal instructions given to patient and signed by patient or POA. Ava Kohli DPM on 4/13/2021 at 9:49 AM    I performed a history and physical examination of the patient and discussed management with the resident. I reviewed the residents note and agree with the documented findings and plan of care. Any areas of disagreement are noted on the chart. I was personally present for the key portions of any procedures. I have documented in the chart those procedures where I was not present during the key portions. I have reviewed the Podiatry Resident progress note. I agree with the chief complaint, past medical history, past surgical history, allergies, medications, social and family history as documented unless otherwise noted below. Documentation of the HPI, Physical Exam and Medical Decision Making performed by medical students or scribes is based on my personal performance of the HPI, PE and MDM. I have personally evaluated this patient and have completed at least one if not all key elements of the E/M (history, physical exam, and MDM). Additional findings are as noted.      Elena Vega DPM on 4/13/2021 at 2:06 PM  Board Certified, American Board of Podiatric Surgery  Fellow, [employer] [facility] and [facility]

## 2021-04-20 NOTE — PROGRESS NOTES
Yen [facility]acon [address]   Progress Note and Procedure Note      [address] RECORD NUMBER:  [id number]  AGE: 60 y.o. GENDER: female  : 1955  EPISODE DATE:  2021    Subjective:     Chief Complaint   Patient presents with    Wound Check     left foot         HISTORY of PRESENT ILLNESS SHANNAN Mayorga is a 60 y.o. female who presents today for wound/ulcer evaluation. Reports she continues to have no improvement in wound. Patient reports still on Prednisone & Methotrexate per her rheumatologist.      HISTORY of PRESENT ILLNESS SHANNAN Mayorga is a 60 y.o. female who presents today for wound/ulcer evaluation. History of Wound Context: Patient presents for evaluation of chronic left dorsal foot wound. She has undergone extensive testing in the past with little improvement. MRI was negative for osteomyelitis or abscess. Previous punch biopsies have been negative for malignancy or any obvious process. Rheumatologic work-up has revealed elevated anticentromere and positive AMADOR, and full work up is still pending. She has previously been seen by a rheumatologist.  Has had multiple debridements in the past with skin grafting including STSG and allograft. Relates that with each debridement the wound has increased in size and the graft has failed. She does relate a significant smoking history stating that she smoked from childhood heavily and has since quit just recently. She has seen vascular surgery with no intervention. She underwent left lower extremity angiogram and was found to have no flow-limiting stenosis. She has known CAD with stenting in the past. Denies any significant drainage from the ulceration. Wound has remained unchanged.   Wound/Ulcer Pain Timing/Severity: intermittent  Quality of pain: burning  Modifying Factors: Pain worsens with Touching of the wound or pressure  Associated Signs/Symptoms: pain     Ulcer Identification:  Ulcer Type: undetermined, suspect 2020    FOOT  DEBRIDEMENT WITH WOUND VAC PLACEMENT performed by Ada Carcamo MD at [facility]     [facility]      diskectomy and spinal fusion    OTHER SURGICAL HISTORY  10/03/2019    Left leg angiogram    RI OFFICE/OUTPT VISIT,PROCEDURE ONLY Right 2018    EXCISION MASS THUMB, 3080 TABLE performed by Savanna Yuen DO at [address] [place] [address] Left 12/3/2019    LEFT FOOT DEBRIDEMENT WITH SKIN GRAFT SPLIT THICKNESS; SKIN GRAFT TAKEN FROM RIGHT ANTERIOR THIGH performed by Ada Carcamo MD at [address] [place] [address] Left 2020    DEBRIDEMENT, SPLIT THICKNESS SKIN GRAFT WITH WOUND VAC PLACEMENT FOOT performed by Denita Ramos MD at [address] [place] [address] Left 2020    DEBRIDEMENT, SKIN GRAFT SPLIT THICKNESS FOOT  ([place] [address], [address] [place] [address]) performed by Denita Ramos MD at [address] [place] [address]      as a child    TOTAL KNEE ARTHROPLASTY Right 10/13/2020    KNEE TOTAL ARTHROPLASTY    TOTAL KNEE ARTHROPLASTY Right 10/13/2020    KNEE TOTAL ARTHROPLASTY- MICROPORT, \ ADVANCED performed by Savanna Yuen DO at [place] [address] History   Problem Relation Age of Onset    Coronary Art Dis Mother     Arthritis Mother     Heart Surgery Mother         CABG    Coronary Art Dis Father     Heart Disease Father     Heart Surgery Father         CABG    Coronary Art Dis Sister     Heart Surgery Sister         CABG       SOCIAL HISTORY    Social History     Tobacco Use    Smoking status: Former Smoker     Packs/day: 0.25     Years: 50.00     Pack years: 12.50     Types: Cigarettes     Start date:      Quit date: 2020     Years since quittin.2    Smokeless tobacco: Never Used   Substance Use Topics    Alcohol use: Yes     Frequency: 2-4 times a month     Comment: 2-3 shot liquor for weekends    Drug use: Never       ALLERGIES    No Known Allergies    MEDICATIONS    Current Outpatient Medications on File Prior to Encounter   Medication Sig Dispense Refill    methotrexate (RHEUMATREX) 2.5 MG chemo tablet Take 15 mg by mouth once a week Thursdays      clopidogrel (PLAVIX) 75 MG tablet Take 1 tablet by mouth daily 30 tablet 11    predniSONE (DELTASONE) 20 MG tablet Take 5 mg by mouth daily       folic acid (FOLVITE) 1 MG tablet take 1 tablet by mouth once daily 30 tablet 2    Metoprolol Tartrate 37.5 MG TABS take 1 tablet by mouth twice a day (Patient taking differently: Take 25 mg by mouth 2 times daily ) 60 tablet 5    Multiple Vitamins-Minerals (THERAPEUTIC MULTIVITAMIN-MINERALS) tablet Take 1 tablet by mouth daily      oxyCODONE-acetaminophen (PERCOCET) 5-325 MG per tablet take 1 tablet by mouth four times a day if needed      traMADol (ULTRAM ER) 200 MG extended release tablet take 1 tablet by mouth once daily      aspirin EC 81 MG EC tablet Take 1 tablet by mouth 2 times daily 84 tablet 0    vitamin C (ASCORBIC ACID) 500 MG tablet Take 500 mg by mouth 2 times daily      diclofenac sodium (VOLTAREN) 1 % GEL Apply topically as needed Dr. Kaelyn Rodriguez      atorvastatin (LIPITOR) 40 MG tablet Take 1 tablet by mouth nightly (Patient taking differently: Take 40 mg by mouth every morning Per Dr. Suma Medina) 30 tablet 3    NAPROXEN PO Take 250 mg by mouth daily as needed Indications: patient takes 1-2 times a day Stopping 10 days prior to surgery as instructed by surgeon      omeprazole (PRILOSEC) 40 MG delayed release capsule Take 1 tablet by mouth daily Per Bree Elmore      Cholecalciferol (VITAMIN D3) 5000 units TABS Take 5,000 Units by mouth daily       calcium carbonate (OSCAL) 500 MG TABS tablet Take 1,000 mg by mouth daily Per Jacquelin Coombs rheumatology      alendronate (FOSAMAX) 70 MG tablet Take 70 mg by mouth every 7 days Indications: Mondays Dr. Olin Aschoff      gabapentin (NEURONTIN) 300 MG capsule Take 300 mg by mouth 2 times daily.  Per  cm 04/20/21 0934   Post-Procedure Width (cm) 8 cm 04/20/21 0934   Post-Procedure Depth (cm) 0.2 cm 04/20/21 0934   Post-Procedure Surface Area (cm^2) 56 cm^2 04/20/21 0934   Post-Procedure Volume (cm^3) 11.2 cm^3 04/20/21 0934   Wound Assessment Fibrinous; [facility] 04/20/21 0934   Drainage Amount Large 04/20/21 0934   Drainage Description Serosanguinous; Yellow 04/20/21 0934   Odor None 04/20/21 0934   Jessica-wound Assessment Blanchable erythema 04/20/21 0934   Margins Defined edges 04/20/21 0934   Wound Thickness Description not for Pressure Injury Full thickness 04/13/21 0848   Number of days: 136        Assessment:      Patient Active Problem List   Diagnosis Code    Neoplasm of uncertain behavior of skin D48.5    Other plastic surgery for unacceptable cosmetic appearance Z41.1    Chronic bilateral low back pain without sciatica M54.5, G89.29    Mass of finger of right hand R22.31    Chronic ulcer of left foot with fat layer exposed ([facility] [address] L97.522    Pain in left foot M79.672    Hypertension I10    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 ([facility]) L97.523    Wound, open, foot with complication, left, initial encounter S91.302A    Status post total knee replacement using cement, right Z96.651    Primary osteoarthritis of right knee M17.11    Gastroesophageal reflux disease without esophagitis K21.9    Anemia D64.9    PAD (peripheral artery disease) ([facility]) I73.9    S/P drug eluting coronary stent placement Z95.5        Procedure Note  Indications:  Based on my examination of this patient's wound(s)/ulcer(s) today,    Procedure:  Wound Location: left foot, dorsum    3 cc of 1% lidocaine with epinephrine was used for local anesthetic prior to debridement and punch biopsy. The wound(s) was excisionally debrided sharply of fibrotic, necrotic, and hyperkeratotic tissue, including a layer of surrounding healthy tissue using curette.   The wound was debrided down through and including full thickness skin.    Percent of Wound Debrided: 10%    Total Surface Area Debrided: 5 sq cm (see above for sq cm)    Bleeding: Minimal    Patient tolerated procedure well and was given proper instruction. Post debridement wound description:  clean  Post debridement Wound Location:left foot      Post Debridement Measurements:  Wound/Ulcer Descriptions are Pre Debridement except measurements:    Wound 12/04/20 Foot Left;Dorsal wound #1 (Active)   Wound Image   04/20/21 0934   Wound Etiology Other 04/20/21 0934   Dressing Status New drainage noted; Old drainage noted 04/20/21 0934   Wound Cleansed Irrigated with saline 04/20/21 0934   Dressing/Treatment Other (comment) 04/06/21 1042   Wound Length (cm) 7 cm 04/20/21 0934   Wound Width (cm) 8 cm 04/20/21 0934   Wound Depth (cm) 0.2 cm 04/20/21 0934   Wound Surface Area (cm^2) 56 cm^2 04/20/21 0934   Change in Wound Size % (l*w) -81.82 04/20/21 0934   Wound Volume (cm^3) 11.2 cm^3 04/20/21 0934   Wound Healing % 9 04/20/21 0934   Post-Procedure Length (cm) 7 cm 04/20/21 0934   Post-Procedure Width (cm) 8 cm 04/20/21 0934   Post-Procedure Depth (cm) 0.2 cm 04/20/21 0934   Post-Procedure Surface Area (cm^2) 56 cm^2 04/20/21 0934   Post-Procedure Volume (cm^3) 11.2 cm^3 04/20/21 0934   Wound Assessment Fibrinous; [facility] 04/20/21 0934   Drainage Amount Large 04/20/21 0934   Drainage Description Serosanguinous; Yellow 04/20/21 0934   Odor None 04/20/21 0934   Jessica-wound Assessment Blanchable erythema 04/20/21 0934   Margins Defined edges 04/20/21 0934   Wound Thickness Description not for Pressure Injury Full thickness 04/13/21 0848   Number of days: 136          Plan:     Punch Biopsy (42651)  Discussed treatment options with the patient, We agreed upon a punch biopsy procedure due to uncertainty of etiology of the lesion. For informed consent, the more common risks, benefits, and alternatives to the procedure were thoroughly discussed with the patient.  No guarantees were given regarding the outcome. Patient desired to have the procedure performed today. Verbal consent form was obtained indicating the patient understands the procedure and its possible complications. Operation and Findings:  The patient was placed in the office chair in a supine position. Time out was performed. 3cc of 1% lidocaine with epi was used for a local block. Attention was then directed to the left foot where a 2mm punch biopsie(s) were obtained at the distal wound edge. One specimen placed in formalin, other in normal saline for aerobic/anaerobic/fungal cultures. Hemostasis achieved with direct pressure. Wound was dressed with endoform and hydraferra blue. The entire procedure was performed with a total estimated blood loss of less than 3 cc's. Patient tolerated procedure well and left the office ambulating in own shoegear. Treatment Note please see attached Discharge Instructions    Continue Endoform, Hydraferra blue transfer ready, kerlix and ace. .    Continue recs per rheum. Referral to Dr. Gurvinder Schwarz for ID eval.     Referrel to [facility] for evaluation of a free flap graft to help facilitate wound healing. Written patient dismissal instructions given to patient and signed by patient or POA. Jaye Henley DPM on 4/20/2021 at 10:52 AM    I performed a history and physical examination of the patient and discussed management with the resident. I reviewed the residents note and agree with the documented findings and plan of care. Any areas of disagreement are noted on the chart. I was personally present for the key portions of any procedures. I have documented in the chart those procedures where I was not present during the key portions. I have reviewed the Podiatry Resident progress note. I agree with the chief complaint, past medical history, past surgical history, allergies, medications, social and family history as documented unless otherwise noted below.  Documentation of the HPI, Physical Exam and Medical Decision Making performed by medical students or scribes is based on my personal performance of the HPI, PE and MDM. I have personally evaluated this patient and have completed at least one if not all key elements of the E/M (history, physical exam, and MDM). Additional findings are as noted.      Jason Blanc DPM on 4/21/2021 at 6:94 PM  Board Certified, American Board of Podiatric Surgery  Fellow, [employer] [facility] and [facility]

## 2021-04-21 NOTE — TELEPHONE ENCOUNTER
Last visit: 2/26/21  Last Med refill: 1/19/21  Does patient have enough medication for 72 hours: No:     Next Visit Date:  Future Appointments   Date Time Provider Keshawn De Dios   4/26/2021  8:15 AM MD TRINITY Yun [facility]   5/4/2021  8:30 AM MARCELINA Aguilera [facility] [place]   1/7/2022  7:30 AM Chad Onofre,  [facility] Maintenance   Topic Date Due    Hepatitis C screen  Never done    Diabetic retinal exam  Never done    HIV screen  Never done    COVID-19 Vaccine (1) Never done    DTaP/Tdap/Td vaccine (1 - Tdap) Never done    Breast cancer screen  04/17/2017    Cervical cancer screen  04/01/2018    Diabetic foot exam  11/11/2020    Annual Wellness Visit (AWV)  Never done    Shingles Vaccine (1 of 2) 02/26/2022 (Originally 12/30/2006)    A1C test (Diabetic or Prediabetic)  09/30/2021    Lipid screen  11/11/2021    Diabetic microalbuminuria test  02/26/2022    Colon cancer screen colonoscopy  06/26/2025    Flu vaccine  Completed    Pneumococcal 0-64 years Vaccine  Completed    Hepatitis A vaccine  Aged Out    Hib vaccine  Aged Out    Meningococcal (ACWY) vaccine  Aged Out       Hemoglobin A1C (%)   Date Value   09/30/2020 5.2   09/25/2019 5.2   01/13/2017 4.9             ( goal A1C is < 7)   No results found for: LABMICR  LDL Cholesterol (mg/dL)   Date Value   11/11/2020 45   01/25/2018 75     LDL Calculated (mg/dL)   Date Value   01/13/2017 100   03/20/2015 104       (goal LDL is <100)   AST (U/L)   Date Value   12/08/2020 14     ALT (U/L)   Date Value   12/08/2020 12     BUN (mg/dL)   Date Value   01/21/2021 21     BP Readings from Last 3 Encounters:   04/20/21 (!) 164/81   04/13/21 (!) 159/88   04/06/21 (!) 157/91          (goal 120/80)    All Future Testing planned in [facility]  Lab Frequency Next Occurrence   MRI FOOT LEFT W CONTRAST Once 01/14/2021   VL TCPO2 SHAILA MULTI LEVEL Once 01/12/2021               Patient Active Problem

## 2021-04-26 NOTE — PROGRESS NOTES
[address]   Progress Note and Procedure Note      [address] RECORD NUMBER:  [id number]  AGE: 60 y.o. GENDER: female  : 1955  EPISODE DATE:  2021    Subjective:     Chief Complaint   Patient presents with    Wound Check     left dorsal foot         HISTORY of PRESENT ILLNESS HPI     Adenike Records is a 60 y.o. female who presents today for wound/ulcer evaluation. History of Wound Context: The patient had nonhealing wound to the dorsum of her left foot for 2 years. Work-up included multiple punch biopsies most recent was done on 2021 showed fragment of benign fibrous connective tissue, tissue culture grew Enterococcus faecalis on broth medium only that was sensitive to ampicillin and vancomycin with ZAIDA of 2. She was positive for AMADOR and anticentromere was evaluated by rheumatology was started on prednisone and methotrexate. She had multiple debridements and grafting in the past that was failed. History of smoking  She was evaluated by vascular surgery had angiogram with no reported stenosis. She denied history of diabetes. Tissue culture in December and 2021 grew Pseudomonas aeruginosa was treated with a course of Cipro. Tissue culture 2021 grew Pseudomonas aeruginosa that was sensitive to Cipro, previous tissue culture 2021 grew Pseudomonas aeruginosa that was resistant to Cipro. History of coronary artery disease status post stent  Wound/Ulcer Pain Timing/Severity: intermittent  Quality of pain: burning  Severity:  9 / 10   Modifying Factors: None  Associated Signs/Symptoms: Drainage    Ulcer Identification:  Ulcer Type: undetermined possible autoimmune related, pyoderma or microvascular disease    Contributing Factors: decreased tissue oxygenation  History of smoking, quit, history of coronary artery disease status post stent.       PAST MEDICAL HISTORY        Diagnosis Date    Arthritis     knees hips hands shoulders and back  CAD (coronary artery disease) 2019    blockage on cath 9/2019, to have stenting after surgery (arthroplasty) Dr. Kayla Smith Chronic back pain     GERD (gastroesophageal reflux disease)     on rx    Hyperlipidemia     per Dr. Paul Barton on rx    Hypertension     Nguyễn Walker manages    Incomplete RBBB     Irregular heart beat     LAFB (left anterior fascicular block)     Dr. Paul Barton, cardiologist, seen 9/2019 prior to surgery on 10/8/2019    Leg wound, left     following with wound care, Dr. Jacque Ramsey Lumbar disc disease     Osteoporosis     PAC (premature atrial contraction) 06/2018    PACs and PVCs on holter monitor,     PONV (postoperative nausea and vomiting)     requests scop patch    Wears dentures     upper partial    Wears eyeglasses     readers    Wears partial dentures     upper and lower    Wellness examination     MIKE Tolentino, STEPHANIE PCP, seen 2/10/2020       PAST SURGICAL HISTORY    Past Surgical History:   Procedure Laterality Date    ABDOMINOPLASTY  1997    BACK SURGERY      BLEPHAROPLASTY Bilateral 1997    BREAST ENHANCEMENT SURGERY Bilateral 1999    breast augmentation    CARDIAC CATHETERIZATION  2019    Dr. Paul Barton, blockage but no stents yet    CATARACT REMOVAL WITH IMPLANT Bilateral 2016    COLONOSCOPY  2015    No Polyps    COSMETIC SURGERY      eyelid lift    FINGER SURGERY Right     thumb lesion excised    FIXATION KYPHOPLASTY  2013    Back    FOOT DEBRIDEMENT Left 10/11/2019    SURGICAL PREP WOUND BED LEFT FOOT WITH APPLICATION OF EPIFIX LEFT FOOT 3X4 performed by Latoya Osborn DPM at [place] [address]      kyphoplasty for lumbar fx 2013 Dr. Parker Mota ARTHROSCOPY Left 2006   Ramsey Hestand Left 1/2/2020    FOOT  DEBRIDEMENT WITH WOUND VAC PLACEMENT performed by Nicko Mandel MD at [place] [address]  2014    LUMBAR SPINE SURGERY  2005    diskectomy and spinal fusion    OTHER Take 15 mg by mouth once a week Thursdays      clopidogrel (PLAVIX) 75 MG tablet Take 1 tablet by mouth daily 30 tablet 11    predniSONE (DELTASONE) 20 MG tablet Take 5 mg by mouth daily       Metoprolol Tartrate 37.5 MG TABS take 1 tablet by mouth twice a day (Patient taking differently: Take 25 mg by mouth 2 times daily ) 60 tablet 5    Multiple Vitamins-Minerals (THERAPEUTIC MULTIVITAMIN-MINERALS) tablet Take 1 tablet by mouth daily      oxyCODONE-acetaminophen (PERCOCET) 5-325 MG per tablet take 1 tablet by mouth four times a day if needed      traMADol (ULTRAM ER) 200 MG extended release tablet take 1 tablet by mouth once daily      aspirin EC 81 MG EC tablet Take 1 tablet by mouth 2 times daily 84 tablet 0    vitamin C (ASCORBIC ACID) 500 MG tablet Take 500 mg by mouth 2 times daily      diclofenac sodium (VOLTAREN) 1 % GEL Apply topically as needed Dr. Carmen Marin      atorvastatin (LIPITOR) 40 MG tablet Take 1 tablet by mouth nightly (Patient taking differently: Take 40 mg by mouth every morning Per Dr. Paul Barton) 30 tablet 3    NAPROXEN PO Take 250 mg by mouth daily as needed Indications: patient takes 1-2 times a day Stopping 10 days prior to surgery as instructed by surgeon      omeprazole (PRILOSEC) 40 MG delayed release capsule Take 1 tablet by mouth daily Per Nguyễn Walker      Cholecalciferol (VITAMIN D3) 5000 units TABS Take 5,000 Units by mouth daily       calcium carbonate (OSCAL) 500 MG TABS tablet Take 1,000 mg by mouth daily Per Minus Yi rheumatology      alendronate (FOSAMAX) 70 MG tablet Take 70 mg by mouth every 7 days Indications: Mondays Dr. Maya Hager      gabapentin (NEURONTIN) 300 MG capsule Take 300 mg by mouth 2 times daily. Per Dr. Carmen Marin       No current facility-administered medications on file prior to encounter. REVIEW OF SYSTEMS  Review of Systems    Pertinent items are noted in HPI.   Other than above 12 system review was negative    Objective:      BP (!) 152/83   Pulse 1810   Wound Width (cm) 9.3 cm 04/26/21 0836   Wound Depth (cm) 0.2 cm 04/26/21 0836   Wound Surface Area (cm^2) 65.1 cm^2 04/26/21 0836   Change in Wound Size % (l*w) -111.36 04/26/21 0836   Wound Volume (cm^3) 13.02 cm^3 04/26/21 0836   Wound Healing % -6 04/26/21 0836   Post-Procedure Length (cm) 7 cm 04/26/21 0836   Post-Procedure Width (cm) 9.3 cm 04/26/21 0836   Post-Procedure Depth (cm) 0.2 cm 04/26/21 0836   Post-Procedure Surface Area (cm^2) 65.1 cm^2 04/26/21 0836   Post-Procedure Volume (cm^3) 13.02 cm^3 04/26/21 0836   Wound Assessment Exposed structure tendon;Slough; Subcutaneous 04/26/21 0836   Drainage Amount Large 04/26/21 0836   Drainage Description Green;Garcia 04/26/21 0836   Odor None 04/26/21 0836   Jessica-wound Assessment Blanchable erythema 04/26/21 0836   Margins Defined edges 04/26/21 0836   Wound Thickness Description not for Pressure Injury Full thickness 04/13/21 0848   Number of days: 142                Plan:   P.o. Augmentin and Cipro for 2 weeks. Dermatology evaluation. Treatment Note please see attached Discharge Instructions    Written patient dismissal instructions given to patient and signed by patient or POA.          Discharge Instructions         [address] and HYPERBARIC TREATMENT 5 [facility]  Visit Jamal Instructions / Physician Orders  DATE:4/26/21     Home Care:NONE     SUPPLIES ORDERED THRU:Medline     Wound Location:  Left foot     Cleanse with:  Keep wrap dry and intact     Dressing Orders:Adaptic (cut like a snow flake) cover with silvercel, ABD kerlix,   ace wrap     Frequency: every other day     Additional Orders: Increase protein to diet (meat, cheese, eggs, fish, peanut butter, nuts and beans)  Multivitamin daily      MY CHART []???????????????????     Smart Device  []???????????????????      HYPERBARIC TREATMENT-                TREATMENT #     Your next appointment with the [address] is in Monday with Dr. Vijay Ellison                                                                                                        ROOM TYPE   []??????????????????? CHAIR     []??????????????????? BED   []??????????????????? EITHER        TIME []??????????????????? 45 MIN     []??????????????????? 60 MIN     (Please note your next appointment above and if you are unable to keep, kindly give a 24 hour notice. Thank you.)     If you experience any of the following, please call the [address] [place] [address] Road during business hours:  [phone]     * Increase in Pain  * Temperature over 101  * Increase in drainage from your wound  * Drainage with a foul odor  * Bleeding  * Increase in swelling  * Need for compression bandage changes due to slippage, breakthrough drainage.     If you need medical attention outside of the business hours of the [address] [place] [address] Road please contact your PCP or go to the nearest emergency room.     The information contained in the After Visit Summary has been reviewed with me, the patient and/or responsible adult, by my health care provider(s). I had the opportunity to ask questions regarding this information. I have elected to receive;      []????????????????? ?? After Visit Summary  [x]??????????????????? Comprehensive Discharge Instruction        Patient signature______________________________________Date:_______  Electronically signed by Fabiana Rodriguez RN on 4/26/2021 at 8:25 AM          Electronically signed by Gianna Benson MD on 4/26/2021 at 8:58 AM

## 2021-04-26 NOTE — TELEPHONE ENCOUNTER
Called both numbers in patient registration, left 2 voicemail's to attempt to schedule appointment tomorrow at 8:30a. I will book the visit as 's schedule is very full and if patient cannot make that time we will find something else. Self

## 2021-04-28 PROBLEM — M79.609 PAIN IN LIMB: Status: ACTIVE | Noted: 2021-01-01

## 2021-04-28 PROBLEM — I49.9 ARRHYTHMIA: Status: ACTIVE | Noted: 2021-01-01

## 2021-04-28 NOTE — PROGRESS NOTES
Dermatology Patient Note  [facility] [address]  [address]  [address]  Dept: [phone]  Dept Fax: [phone]      VISITDATE: 4/28/2021   REFERRING PROVIDER: No ref. provider found      Marah Naqvi is a 60 y.o. female  who presents today in the office for:    New Patient (Unhealing wound since may of 2019. She states that it started very small and it has gotten bigger. 3 skin grafts, several surgical debridments.  )      PERTINENT HISTORY NOT LISTED ABOVE:  Patient presents for evaluation of ulcer on left dorsal foot  - it has been ongoing for 2 years ago. Started as a small bump and has gotten larger. She notes that debridement, skin grafts, and biopsies make it grow larger. It is painful. She has had wound vac treatment with no improvement  - she had a non-diagnostic biopsy. Tissue cultures have grown psuedomonas and enterococcus, but treatment has not resolved the ulcers  - she has a positive AMADOR and anti-centromere and saw rheumatology, and was started on MTX. She states that she saw pyoderma gangrenosum listed as diagnosis but does not know much about this diagnosis.  She was told she does not have CREST syndrome    CURRENT MEDICATIONS:   Current Outpatient Medications   Medication Sig Dispense Refill    amoxicillin-clavulanate (AUGMENTIN) 875-125 MG per tablet Take 1 tablet by mouth 2 times daily for 14 days 28 tablet 0    ciprofloxacin (CIPRO) 500 MG tablet Take 1 tablet by mouth 2 times daily for 14 days 28 tablet 0    folic acid (FOLVITE) 1 MG tablet take 1 tablet by mouth once daily 30 tablet 5    methotrexate (RHEUMATREX) 2.5 MG chemo tablet Take 15 mg by mouth once a week Thursdays      clopidogrel (PLAVIX) 75 MG tablet Take 1 tablet by mouth daily 30 tablet 11    predniSONE (DELTASONE) 20 MG tablet Take 5 mg by mouth daily       Metoprolol Tartrate 37.5 MG TABS take 1 tablet by mouth twice a day (Patient taking differently: Take 25 mg by mouth 2 times daily ) 60 tablet 5    Multiple Vitamins-Minerals (THERAPEUTIC MULTIVITAMIN-MINERALS) tablet Take 1 tablet by mouth daily      oxyCODONE-acetaminophen (PERCOCET) 5-325 MG per tablet take 1 tablet by mouth four times a day if needed      traMADol (ULTRAM ER) 200 MG extended release tablet take 1 tablet by mouth once daily      aspirin EC 81 MG EC tablet Take 1 tablet by mouth 2 times daily 84 tablet 0    vitamin C (ASCORBIC ACID) 500 MG tablet Take 500 mg by mouth 2 times daily      diclofenac sodium (VOLTAREN) 1 % GEL Apply topically as needed Dr. Melly Mccullough      atorvastatin (LIPITOR) 40 MG tablet Take 1 tablet by mouth nightly (Patient taking differently: Take 40 mg by mouth every morning Per Dr. Jani Fischer) 30 tablet 3    NAPROXEN PO Take 250 mg by mouth daily as needed Indications: patient takes 1-2 times a day Stopping 10 days prior to surgery as instructed by surgeon      omeprazole (PRILOSEC) 40 MG delayed release capsule Take 1 tablet by mouth daily Per Austen Solares      Cholecalciferol (VITAMIN D3) 5000 units TABS Take 5,000 Units by mouth daily       calcium carbonate (OSCAL) 500 MG TABS tablet Take 1,000 mg by mouth daily Per Wil Brady rheumatology      alendronate (FOSAMAX) 70 MG tablet Take 70 mg by mouth every 7 days Indications:  Dr. Bert Wilcox      gabapentin (NEURONTIN) 300 MG capsule Take 300 mg by mouth 2 times daily.  Per Dr. Melly Mccullough       Current Facility-Administered Medications   Medication Dose Route Frequency Provider Last Rate Last Admin    lidocaine-EPINEPHrine 1 %-1:906111 injection 0.5 mL  0.5 mL Intradermal Once Chelly Levin MD           ALLERGIES:   No Known Allergies    SOCIAL HISTORY:  Social History     Tobacco Use    Smoking status: Former Smoker     Packs/day: 0.25     Years: 50.00     Pack years: 12.50     Types: Cigarettes     Start date:      Quit date: 2020     Years since quittin.2    Smokeless tobacco: Never Used   Substance Use Topics    Alcohol - laboratory work-up for underlying cause of PG  - ROS is negative - no diarrhea or other symptoms to suggest underlying illness   Positive AMADOR but no evidence of primary rheumatologic disease per rheumatology  - lidocaine-EPINEPHrine 1 %-1:097168 injection 0.5 mL  - Surgical Pathology; Future  - SC PUNCH BIOPSY SKIN SINGLE LESION  - CBC Auto Differential; Future  - Comprehensive Metabolic Panel; Future  - Anti-Neutrophilic Cytoplasmic Antibody; Future  - Hepatitis Panel, Acute; Future  - Electrophoresis Protein, Serum without Reflex to Immunofixation; Future  - Quantiferon (R) TB Gold, (Incubated); Future  - HIV Screen; Future          RTC pending path and labs    Patient Instructions   -Complete Lab Works  -Consider Clobetasol ointment    BIOPSY WOUND CARE    A biopsy is where a small piece of skin tissue is removed and examined by a pathologist.  When a biopsy is done, there is a small wound site that requires proper care to prevent infection and scarring. Some biopsies require sutures and their removal.    How to Care for Biopsy Wound    A.  Leave band-aid or dressing on for 24 hours. B. Wash two times a day with soap and water. C.  Let the wound air dry, then apply Vaseline ointment and cover with a Band-Aid       unless otherwise instructed by your provider. D. If there is slight discomfort, you may give acetaminophen or ibuprofen. When To Call the Doctor    Call the Dermatology Clinic or your doctor if any of the following occur:    A. Redness and swelling  B. Tenderness and warm to touch  C.  Drainage from wound  D. Fever    Biopsy Results    Biopsy results are usually available in 1-2 weeks. We provide biopsy results in letters for begin results or we will call for any concerning results. If you have not heard from our staff please call the office within 2 weeks. Please call our office with any concerns at [phone].                Pyoderma gangrenosum is a rare, inflammatory skin disease where painful pustules or nodules become ulcers that progressively grow. Pyoderma gangrenosum is not infectious. Treatments may include corticosteroids, ciclosporin, infliximab, or canakinumab        This note was created with the assistance of a speech-recognition program.  Although the intention is to generate a document that actually reflects the content of the visit, no guarantees can be provided that every mistake has been identified and corrected byediting.     Electronically signed by Ronda Sanz MD on 4/28/21 at 6:01 PM EDT

## 2021-04-28 NOTE — PATIENT INSTRUCTIONS
-Complete Lab Works  -Consider Clobetasol ointment    BIOPSY WOUND CARE    A biopsy is where a small piece of skin tissue is removed and examined by a pathologist.  When a biopsy is done, there is a small wound site that requires proper care to prevent infection and scarring. Some biopsies require sutures and their removal.    How to Care for Biopsy Wound    A.  Leave band-aid or dressing on for 24 hours. B. Wash two times a day with soap and water. C.  Let the wound air dry, then apply Vaseline ointment and cover with a Band-Aid       unless otherwise instructed by your provider. D. If there is slight discomfort, you may give acetaminophen or ibuprofen. When To Call the Doctor    Call the Dermatology Clinic or your doctor if any of the following occur:    A. Redness and swelling  B. Tenderness and warm to touch  C.  Drainage from wound  D. Fever    Biopsy Results    Biopsy results are usually available in 1-2 weeks. We provide biopsy results in letters for begin results or we will call for any concerning results. If you have not heard from our staff please call the office within 2 weeks. Please call our office with any concerns at [phone]. Pyoderma gangrenosum is a rare, inflammatory skin disease where painful pustules or nodules become ulcers that progressively grow. Pyoderma gangrenosum is not infectious.  Treatments may include corticosteroids, ciclosporin, infliximab, or canakinumab

## 2021-05-04 NOTE — TELEPHONE ENCOUNTER
patient want's to know what the next step for her?   would like to know Lab results ? Please let patient know?  Thank you

## 2021-05-04 NOTE — TELEPHONE ENCOUNTER
I'm so sorry I haven't been able to call her yet, I wanted to wait until I had time to sit down and really discuss it with her. The biopsy result surprisingly was not a slam dunk for pyoderma gangrenosum. It showed vasculitis (blood vessel inflammation), and we need to wait for all lab results to determine what exactly this means. All of her labs are not back yet, but should be by the end of the week, and then we will formulate a plan. I will call her on Friday if I don't talk to her before that.

## 2021-05-04 NOTE — PLAN OF CARE
Problem: Pain:  Goal: Pain level will decrease  Description: Pain level will decrease  5/4/2021 0942 by Chelsey Hardy RN  Outcome: Ongoing  5/4/2021 0939 by Chelsey Hardy RN  Outcome: Ongoing  Goal: Control of acute pain  Description: Control of acute pain  5/4/2021 0942 by Chelsey Hardy RN  Outcome: Ongoing  5/4/2021 0939 by Chelsey Hardy RN  Outcome: Ongoing  Goal: Control of chronic pain  Description: Control of chronic pain  5/4/2021 0942 by Chelsey Hardy RN  Outcome: Ongoing  5/4/2021 0939 by Chelsey Hardy RN  Outcome: Ongoing     Problem: Wound:  Goal: Will show signs of wound healing; wound closure and no evidence of infection  Description: Will show signs of wound healing; wound closure and no evidence of infection  Outcome: Ongoing     Problem: Falls - Risk of:  Goal: Will remain free from falls  Description: Will remain free from falls  Outcome: Ongoing

## 2021-05-04 NOTE — PROGRESS NOTES
Yen Chacon 37   Progress Note and Procedure Note      [address] RECORD NUMBER:  [id number]  AGE: 60 y.o. GENDER: female  : 1955  EPISODE DATE:  2021    Subjective:     Chief Complaint   Patient presents with    Wound Check     L foot         HISTORY of PRESENT ILLNESS HPI     Aidee Zambrano is a 60 y.o. female who presents today for wound/ulcer evaluation. Reports she continues to have no improvement in wound. Patient reports still on Prednisone & Methotrexate per her rheumatologist which has been increased.        HISTORY of PRESENT ILLNESS HPI      Aidee Zambrano is a 60 y.o. female who presents today for wound/ulcer evaluation. History of Wound Context: Patient presents for evaluation of chronic left dorsal foot wound. She has undergone extensive testing in the past with little improvement. MRI was negative for osteomyelitis or abscess. Previous punch biopsies have been negative for malignancy or any obvious process. Rheumatologic work-up has revealed elevated anticentromere and positive AMADOR, and full work up is still pending. She has previously been seen by a rheumatologist.  Has had multiple debridements in the past with skin grafting including STSG and allograft. Relates that with each debridement the wound has increased in size and the graft has failed. She does relate a significant smoking history stating that she smoked from childhood heavily and has since quit just recently. She has seen vascular surgery with no intervention. She underwent left lower extremity angiogram and was found to have no flow-limiting stenosis. She has known CAD with stenting in the past. Denies any significant drainage from the ulceration. Wound has remained unchanged.   Wound/Ulcer Pain Timing/Severity: intermittent  Quality of pain: burning  Modifying Factors: Pain worsens with Touching of the wound or pressure  Associated Signs/Symptoms: pain     Ulcer Identification:  Ulcer Type: undetermined, suspect microvascular disease contributing to chronicity of the wound  Contributing Factors: arterial insufficiency and decreased tissue oxygenation    PAST MEDICAL HISTORY        Diagnosis Date    Arthritis     knees hips hands shoulders and back    CAD (coronary artery disease) 2019    blockage on cath 9/2019, to have stenting after surgery (arthroplasty) Dr. Laura Singh Chronic back pain     GERD (gastroesophageal reflux disease)     on rx    Hyperlipidemia     per Dr. Mendy Watts on rx    Hypertension     Dareen Holter manages    Incomplete RBBB     Irregular heart beat     LAFB (left anterior fascicular block)     Dr. Mendy Watts, cardiologist, seen 9/2019 prior to surgery on 10/8/2019    Leg wound, left     following with wound care, Dr. Amor Lees Lumbar disc disease     Osteoporosis     PAC (premature atrial contraction) 06/2018    PACs and PVCs on holter monitor,     PONV (postoperative nausea and vomiting)     requests scop patch    Wears dentures     upper partial    Wears eyeglasses     readers    Wears partial dentures     upper and lower    Wellness examination     MIKE Huynh NP PCP, seen 2/10/2020       PAST SURGICAL HISTORY    Past Surgical History:   Procedure Laterality Date    ABDOMINOPLASTY  1997    BACK SURGERY      BLEPHAROPLASTY Bilateral 1997    BREAST ENHANCEMENT SURGERY Bilateral 1999    breast augmentation    CARDIAC CATHETERIZATION  2019    Dr. Mendy Watts, blockage but no stents yet    CATARACT REMOVAL WITH IMPLANT Bilateral 2016    COLONOSCOPY  2015    No Polyps    COSMETIC SURGERY      eyelid lift    FINGER SURGERY Right     thumb lesion excised    FIXATION KYPHOPLASTY  2013    Back    FOOT DEBRIDEMENT Left 10/11/2019    SURGICAL PREP WOUND BED LEFT FOOT WITH APPLICATION OF EPIFIX LEFT FOOT 3X4 performed by Karrie Schmid DPM at [address]      kyphoplasty for lumbar fx 2013 Dr. Buffy Biggs Left 2006   [address]  LEG SURGERY Left 2020    FOOT  DEBRIDEMENT WITH WOUND VAC PLACEMENT performed by Emmanuelle Aleman MD at [facility]     [address]      diskectomy and spinal fusion    OTHER SURGICAL HISTORY  10/03/2019    Left leg angiogram    AR OFFICE/OUTPT VISIT,PROCEDURE ONLY Right 2018    EXCISION MASS THUMB, 3080 TABLE performed by Maria R Forbes DO at [facility] Left 12/3/2019    LEFT FOOT DEBRIDEMENT WITH SKIN GRAFT SPLIT THICKNESS; SKIN GRAFT TAKEN FROM RIGHT ANTERIOR THIGH performed by Emmanuelle Aleman MD at [facility] Left 2020    DEBRIDEMENT, SPLIT THICKNESS SKIN GRAFT WITH WOUND VAC PLACEMENT FOOT performed by Lashell Vazquez MD at [facility] Left 2020    DEBRIDEMENT, SKIN GRAFT SPLIT THICKNESS FOOT  ([place] [id number], [address] [facility]) performed by Lashell Vazquez MD at [place] [address]      as a child    TOTAL KNEE ARTHROPLASTY Right 10/13/2020    KNEE TOTAL ARTHROPLASTY    TOTAL KNEE ARTHROPLASTY Right 10/13/2020    KNEE TOTAL ARTHROPLASTY- MICROPORT, \ ADVANCED performed by Maria R Forbes DO at [place] [address] History   Problem Relation Age of Onset    Coronary Art Dis Mother     Arthritis Mother     Heart Surgery Mother         CABG    Coronary Art Dis Father     Heart Disease Father     Heart Surgery Father         CABG    Coronary Art Dis Sister     Heart Surgery Sister         CABG       SOCIAL HISTORY    Social History     Tobacco Use    Smoking status: Former Smoker     Packs/day: 0.25     Years: 50.00     Pack years: 12.50     Types: Cigarettes     Start date:      Quit date: 2020     Years since quittin.2    Smokeless tobacco: Never Used   Substance Use Topics    Alcohol use: Yes     Frequency: 2-4 times a month     Comment: 2-3 shot liquor for weekends    Drug use: Never       ALLERGIES    No Known Allergies    MEDICATIONS    Current Outpatient Medications on File Prior to Encounter   Medication Sig Dispense Refill    amoxicillin-clavulanate (AUGMENTIN) 875-125 MG per tablet Take 1 tablet by mouth 2 times daily for 14 days 28 tablet 0    ciprofloxacin (CIPRO) 500 MG tablet Take 1 tablet by mouth 2 times daily for 14 days 28 tablet 0    folic acid (FOLVITE) 1 MG tablet take 1 tablet by mouth once daily 30 tablet 5    methotrexate (RHEUMATREX) 2.5 MG chemo tablet Take 15 mg by mouth once a week Thursdays      clopidogrel (PLAVIX) 75 MG tablet Take 1 tablet by mouth daily 30 tablet 11    predniSONE (DELTASONE) 20 MG tablet Take 5 mg by mouth daily       Metoprolol Tartrate 37.5 MG TABS take 1 tablet by mouth twice a day (Patient taking differently: Take 25 mg by mouth 2 times daily ) 60 tablet 5    Multiple Vitamins-Minerals (THERAPEUTIC MULTIVITAMIN-MINERALS) tablet Take 1 tablet by mouth daily      oxyCODONE-acetaminophen (PERCOCET) 5-325 MG per tablet take 1 tablet by mouth four times a day if needed      traMADol (ULTRAM ER) 200 MG extended release tablet take 1 tablet by mouth once daily      aspirin EC 81 MG EC tablet Take 1 tablet by mouth 2 times daily 84 tablet 0    vitamin C (ASCORBIC ACID) 500 MG tablet Take 500 mg by mouth 2 times daily      diclofenac sodium (VOLTAREN) 1 % GEL Apply topically as needed Dr. Sancho Robb      atorvastatin (LIPITOR) 40 MG tablet Take 1 tablet by mouth nightly (Patient taking differently: Take 40 mg by mouth every morning Per Dr. Elizabeth [facility]) 30 tablet 3    NAPROXEN PO Take 250 mg by mouth daily as needed Indications: patient takes 1-2 times a day Stopping 10 days prior to surgery as instructed by surgeon      omeprazole (PRILOSEC) 40 MG delayed release capsule Take 1 tablet by mouth daily Per Velma Gilford      Cholecalciferol (VITAMIN D3) 5000 units TABS Take 5,000 Units by mouth daily       calcium carbonate (OSCAL) 500 MG TABS tablet Take 1,000 mg by mouth daily Per [facility] rheumatology      alendronate (FOSAMAX) 70 MG tablet Take 70 mg by mouth every 7 days Indications: Mondays Dr. German Buerger      gabapentin (NEURONTIN) 300 MG capsule Take 300 mg by mouth 2 times daily. Per Dr. Ruth Timmons       Current Facility-Administered Medications on File Prior to Encounter   Medication Dose Route Frequency Provider Last Rate Last Admin    lidocaine-EPINEPHrine 1 %-[id number] injection 0.5 mL  0.5 mL Intradermal Once Yancy Segundo MD           REVIEW OF SYSTEMS    Pertinent items are noted in HPI. Objective:      BP (!) 161/79   Pulse 73   Temp 97.6 °F (36.4 °C) (Tympanic)   Resp 18     Wt Readings from Last 3 Encounters:   04/28/21 173 lb 12.8 oz (78.8 kg)   04/20/21 174 lb (78.9 kg)   04/13/21 174 lb (78.9 kg)       PHYSICAL EXAM       Vascular: DP pulses are not palpable, Bilateral. PT pulses are not palpable, Bilateral. CFT <4 seconds to all digits, Bilateral.  No edema, Bilateral.  Hair growth is absent to the level of the digits, Bilateral.  Skin temp is warm to cool from the tibial tuberosity to the digits, Bilateral.      Neuro: Saph/sural/SP/DP/plantar sensation intact to light touch.     Musculoskeletal: EHL/FHL/GS/TA gross motor intact. Gross deformity is absent.      Dermatologic: Open wound present to dorsal left foot wound as documented in detail below. Wound base is fibro-granular extending to the level of muscle with an exposed extensor tendon. Granulation tissue improved over the wound, granulation buds appreciated. Negative probe to bone. There is no erythema. There is no purulent drainage. There is no fluctuance or crepitus. Interdigital maceration absent, Bilateral.     Wound 12/04/20 Foot Left;Dorsal wound #1 (Active)   Wound Image   05/04/21 0840   Wound Etiology Other 05/04/21 0840   Dressing Status New drainage noted; Old drainage noted 05/04/21 0840   Wound Cleansed Irrigated with saline 05/04/21 0840   Dressing/Treatment Other (comment) 04/06/21 1042   Wound Length (cm) 8.2 cm 05/04/21 0840   Wound Width (cm) 10.4 cm 05/04/21 0840   Wound Depth (cm) 0.2 cm 05/04/21 0840   Wound Surface Area (cm^2) 85.28 cm^2 05/04/21 0840   Change in Wound Size % (l*w) -176.88 05/04/21 0840   Wound Volume (cm^3) 17.06 cm^3 05/04/21 0840   Wound Healing % -38 05/04/21 0840   Post-Procedure Length (cm) 8.2 cm 05/04/21 0840   Post-Procedure Width (cm) 10.4 cm 05/04/21 0840   Post-Procedure Depth (cm) 0.2 cm 05/04/21 0840   Post-Procedure Surface Area (cm^2) 85.28 cm^2 05/04/21 0840   Post-Procedure Volume (cm^3) 17.06 cm^3 05/04/21 0840   Wound Assessment Exposed structure tendon;Slough 05/04/21 0840   Drainage Amount Large 05/04/21 0840   Drainage Description Serosanguinous; Yellow 05/04/21 0840   Odor Mild 05/04/21 0840   Jessica-wound Assessment Blanchable erythema 05/04/21 0840   Margins Defined edges 05/04/21 0840   Wound Thickness Description not for Pressure Injury Full thickness 04/13/21 0848   Number of days: 150        Assessment:      Patient Active Problem List   Diagnosis Code    Neoplasm of uncertain behavior of skin D48.5    Other plastic surgery for unacceptable cosmetic appearance Z41.1    Chronic bilateral low back pain without sciatica M54.5, G89.29    Mass of finger of right hand R22.31    Chronic ulcer of left foot with fat layer exposed ([facility] [address] L97.522    Pain in left foot M79.672    Hypertension I10    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 ([facility]) L97.523    Wound, open, foot with complication, left, initial encounter S91.302A    Status post total knee replacement using cement, right Z96.651    Primary osteoarthritis of right knee M17.11    Gastroesophageal reflux disease without esophagitis K21.9    Anemia D64.9    PAD (peripheral artery disease) ([facility]) I73.9    S/P drug eluting coronary stent placement Z95.5    Pain in limb M79.609    Cataract H26.9    Arrhythmia I49.9              Wound 12/04/20 Foot Left;Dorsal wound #1 (Active) Wound Image   05/04/21 0840   Wound Etiology Other 05/04/21 0840   Dressing Status New drainage noted; Old drainage noted 05/04/21 0840   Wound Cleansed Irrigated with saline 05/04/21 0840   Dressing/Treatment Other (comment) 04/06/21 1042   Wound Length (cm) 8.2 cm 05/04/21 0840   Wound Width (cm) 10.4 cm 05/04/21 0840   Wound Depth (cm) 0.2 cm 05/04/21 0840   Wound Surface Area (cm^2) 85.28 cm^2 05/04/21 0840   Change in Wound Size % (l*w) -176.88 05/04/21 0840   Wound Volume (cm^3) 17.06 cm^3 05/04/21 0840   Wound Healing % -38 05/04/21 0840   Post-Procedure Length (cm) 8.2 cm 05/04/21 0840   Post-Procedure Width (cm) 10.4 cm 05/04/21 0840   Post-Procedure Depth (cm) 0.2 cm 05/04/21 0840   Post-Procedure Surface Area (cm^2) 85.28 cm^2 05/04/21 0840   Post-Procedure Volume (cm^3) 17.06 cm^3 05/04/21 0840   Wound Assessment Exposed structure tendon;Slough 05/04/21 0840   Drainage Amount Large 05/04/21 0840   Drainage Description Serosanguinous; Yellow 05/04/21 0840   Odor Mild 05/04/21 0840   Jessica-wound Assessment Blanchable erythema 05/04/21 0840   Margins Defined edges 05/04/21 0840   Wound Thickness Description not for Pressure Injury Full thickness 04/13/21 0848   Number of days: 150          Plan:     Biopsy result indicate likely pyoderma gangrenosum    Treatment Note please see attached Discharge Instructions    Dressings: adaptic, abd's for comfort     Continue recs per rheum/derm    Referrel back to dermatology following pathology report indicating likely pyoderma gangrenosum     Written patient dismissal instructions given to patient and signed by patient or POA. Irma Mcmahon DPM on 5/4/2021 at 9:37 AM      I performed a history and physical examination of the patient and discussed management with the resident. I reviewed the residents note and agree with the documented findings and plan of care. Any areas of disagreement are noted on the chart.  I was personally present for the key portions of any procedures. I have documented in the chart those procedures where I was not present during the key portions. I have reviewed the Podiatry Resident progress note. I agree with the chief complaint, past medical history, past surgical history, allergies, medications, social and family history as documented unless otherwise noted below. Documentation of the HPI, Physical Exam and Medical Decision Making performed by medical students or scribes is based on my personal performance of the HPI, PE and MDM. I have personally evaluated this patient and have completed at least one if not all key elements of the E/M (history, physical exam, and MDM). Additional findings are as noted.      Nola Diamond DPM on 5/7/2021 at 7:11 AM  Board Certified, American Board of Podiatric Surgery  Fellow, [employer] [facility] [employer] and [facility]

## 2021-05-07 NOTE — TELEPHONE ENCOUNTER
Spoke to patient about biopsy results. Though clinically this looks like PG, biopsy was not consistent with pyoderma gangrenosum, but more consistent with LCV. It's possible that LCV can be seen at the edge of a PG ulcer, but not typical.    Given that the biopsy was not entirely supportive of PG, we are keeping the ddx open. Patient reports that the ulcer continues to worsen after pred taper transitioning to MTX (prescribed by DR. Andres Hartman rheumatologist). Though the biopsy did not show infection and tissue cultures have been negative, she has not yet had mycobacterial or fungal cultures. Please schedule appt for biopsy for tissue culture. In the meantime, start clobetasol ointment at each dressing change. Please contact Dr. Andres Hartman and ask him to call me on my cell at his convenience to discuss this patient.

## 2021-05-07 NOTE — TELEPHONE ENCOUNTER
Future Appointments   Date Time Provider Keshawn De Dios   5/10/2021  8:30 AM MD TRINITY Harris [facility] [place]   5/25/2021 10:30 AM MARCELINA Ferguson [facility] [place]   5/27/2021 11:45 AM George Celis MD [id number]   1/7/2022  7:30 AM Chad Reynolds, DO [facility] [address]       I attempted to contact Dr. Kortney Newell with Rheum, their office is closed and can try again Monday. Is that the correct provider?

## 2021-05-10 NOTE — TELEPHONE ENCOUNTER
Spoke to Dr. Kortney Newell about my visit with Deangelo Sanchez and her biopsy results. He would like to give the MTX longer to work because he states the prednisone was not given long enough.  In the meantime, will get tissue cultures for mycobacterium and fungal, then can discuss changing therapy if not infectious and not improving

## 2021-05-10 NOTE — PROGRESS NOTES
[address]   Progress Note and Procedure Note      [address] RECORD NUMBER:  [id number]  AGE: 60 y.o. GENDER: female  : 1955  EPISODE DATE:  5/10/2021    Subjective:     Chief Complaint   Patient presents with    Wound Check     left dorsal foot         HISTORY of PRESENT ILLNESS HPI     Halima Alvarez is a 60 y.o. female who presents today for wound/ulcer evaluation. History of Wound Context: The patient had nonhealing wound to the dorsum of her left foot for 2 years. She was evaluated by dermatology Dr. Diana Taylor on 2021 had a punch biopsy done was suggestive of LEUKOCYTOCLASTIC VASCULITIS   HIV screen was negative, QuantiFERON-TB gold test was negative, hepatitis panel negative, ANCA negative  She had multiple punch biopsies most recent was done on 2021 showed fragment of benign fibrous connective tissue, tissue culture grew Enterococcus faecalis on broth medium only that was sensitive to ampicillin and vancomycin with ZAIDA of 2. She was positive for AMADOR and anticentromere was evaluated by rheumatology was started on prednisone and methotrexate. She had multiple debridements and grafting in the past that was failed. History of smoking  She was evaluated by vascular surgery had angiogram with no reported stenosis. She denied history of diabetes. Tissue culture in December and 2021 grew Pseudomonas aeruginosa was treated with a course of Cipro. Tissue culture 2021 grew Pseudomonas aeruginosa that was sensitive to Cipro, previous tissue culture 2021 grew Pseudomonas aeruginosa that was resistant to Cipro.   History of coronary artery disease status post stent  Wound/Ulcer Pain Timing/Severity: intermittent  Quality of pain: burning  Severity:5/10  Modifying Factors: None  Associated Signs/Symptoms: Drainage    Ulcer Identification:  Ulcer Type: undetermined     Contributing Factors: decreased tissue oxygenation  History of smoking, quit, history of coronary artery disease status post stent. PAST MEDICAL HISTORY        Diagnosis Date    Arthritis     knees hips hands shoulders and back    CAD (coronary artery disease) 2019    blockage on cath 9/2019, to have stenting after surgery (arthroplasty) Dr. Ulises Rajput Chronic back pain     GERD (gastroesophageal reflux disease)     on rx    Hyperlipidemia     per Dr. Selene Akins on rx    Hypertension     Nimo Winslow manages    Incomplete RBBB     Irregular heart beat     LAFB (left anterior fascicular block)     Dr. Selene Akins, cardiologist, seen 9/2019 prior to surgery on 10/8/2019    Leg wound, left     following with wound care, Dr. Adam Kirk Lumbar disc disease     Osteoporosis     PAC (premature atrial contraction) 06/2018    PACs and PVCs on holter monitor,     PONV (postoperative nausea and vomiting)     requests scop patch    Wears dentures     upper partial    Wears eyeglasses     readers    Wears partial dentures     upper and lower    Wellness examination     MIKE Elder NP PCP, seen 2/10/2020       PAST SURGICAL HISTORY    Past Surgical History:   Procedure Laterality Date    ABDOMINOPLASTY  1997    BACK SURGERY      BLEPHAROPLASTY Bilateral 1997    BREAST ENHANCEMENT SURGERY Bilateral 1999    breast augmentation    CARDIAC CATHETERIZATION  2019    Dr. Selene Akins, blockage but no stents yet    CATARACT REMOVAL WITH IMPLANT Bilateral 2016    COLONOSCOPY  2015    No Polyps    COSMETIC SURGERY      eyelid lift    FINGER SURGERY Right     thumb lesion excised    FIXATION KYPHOPLASTY  2013    Back    FOOT DEBRIDEMENT Left 10/11/2019    SURGICAL PREP WOUND BED LEFT FOOT WITH APPLICATION OF EPIFIX LEFT FOOT 3X4 performed by Rula Sellers DPM at [place] [address]      kyphoplasty for lumbar fx 2013 Dr. Maria Del Carmen Barrett ARTHROSCOPY Left 2006   [facility] [place] Left 1/2/2020    FOOT  DEBRIDEMENT WITH WOUND VAC PLACEMENT performed by Jose Jama Alexis Daen MD at [facility]  2014   Venkates[address] Norman 892  2005    diskectomy and spinal fusion    OTHER SURGICAL HISTORY  10/03/2019    Left leg angiogram    CO OFFICE/OUTPT VISIT,PROCEDURE ONLY Right 2018    EXCISION MASS THUMB, 3080 TABLE performed by Manan Billings DO at [facility] Left 12/3/2019    LEFT FOOT DEBRIDEMENT WITH SKIN GRAFT SPLIT THICKNESS; SKIN GRAFT TAKEN FROM RIGHT ANTERIOR THIGH performed by Alek Cotto MD at [facility] Left 2020    DEBRIDEMENT, SPLIT THICKNESS SKIN GRAFT WITH WOUND VAC PLACEMENT FOOT performed by Denver Lean, MD at [facility] Left 2020    DEBRIDEMENT, SKIN GRAFT SPLIT THICKNESS FOOT  ([place] [address], [facility]) performed by Denver Lean, MD at [address] [place] [address]      as a child    TOTAL KNEE ARTHROPLASTY Right 10/13/2020    KNEE TOTAL ARTHROPLASTY    TOTAL KNEE ARTHROPLASTY Right 10/13/2020    KNEE TOTAL ARTHROPLASTY- MICROPORT, \ ADVANCED performed by Manan Billings DO at [place] [address] History   Problem Relation Age of Onset    Coronary Art Dis Mother     Arthritis Mother     Heart Surgery Mother         CABG    Coronary Art Dis Father     Heart Disease Father     Heart Surgery Father         CABG    Coronary Art Dis Sister     Heart Surgery Sister         CABG       SOCIAL HISTORY    Social History     Tobacco Use    Smoking status: Former Smoker     Packs/day: 0.25     Years: 50.00     Pack years: 12.50     Types: Cigarettes     Start date:      Quit date: 2020     Years since quittin.2    Smokeless tobacco: Never Used   Substance Use Topics    Alcohol use: Yes     Frequency: 2-4 times a month     Comment: 2-3 shot liquor for weekends    Drug use: Never       ALLERGIES    No Known Allergies    MEDICATIONS    Current Outpatient Medications on File Prior to Encounter Medication Sig Dispense Refill    clobetasol (TEMOVATE) 0.05 % ointment Apply to ulcer at dressing changes.  60 g 2    amoxicillin-clavulanate (AUGMENTIN) 875-125 MG per tablet Take 1 tablet by mouth 2 times daily for 14 days 28 tablet 0    ciprofloxacin (CIPRO) 500 MG tablet Take 1 tablet by mouth 2 times daily for 14 days 28 tablet 0    folic acid (FOLVITE) 1 MG tablet take 1 tablet by mouth once daily 30 tablet 5    methotrexate (RHEUMATREX) 2.5 MG chemo tablet Take 15 mg by mouth once a week Thursdays      clopidogrel (PLAVIX) 75 MG tablet Take 1 tablet by mouth daily 30 tablet 11    predniSONE (DELTASONE) 20 MG tablet Take 5 mg by mouth daily       Metoprolol Tartrate 37.5 MG TABS take 1 tablet by mouth twice a day (Patient taking differently: Take 25 mg by mouth 2 times daily ) 60 tablet 5    Multiple Vitamins-Minerals (THERAPEUTIC MULTIVITAMIN-MINERALS) tablet Take 1 tablet by mouth daily      oxyCODONE-acetaminophen (PERCOCET) 5-325 MG per tablet take 1 tablet by mouth four times a day if needed      traMADol (ULTRAM ER) 200 MG extended release tablet take 1 tablet by mouth once daily      aspirin EC 81 MG EC tablet Take 1 tablet by mouth 2 times daily 84 tablet 0    vitamin C (ASCORBIC ACID) 500 MG tablet Take 500 mg by mouth 2 times daily      diclofenac sodium (VOLTAREN) 1 % GEL Apply topically as needed Dr. Sancho Robb      atorvastatin (LIPITOR) 40 MG tablet Take 1 tablet by mouth nightly (Patient taking differently: Take 40 mg by mouth every morning Per  [facility]) 30 tablet 3    NAPROXEN PO Take 250 mg by mouth daily as needed Indications: patient takes 1-2 times a day Stopping 10 days prior to surgery as instructed by surgeon      omeprazole (PRILOSEC) 40 MG delayed release capsule Take 1 tablet by mouth daily Per Velma Gilford      Cholecalciferol (VITAMIN D3) 5000 units TABS Take 5,000 Units by mouth daily       calcium carbonate (OSCAL) 500 MG TABS tablet Take 1,000 mg by mouth daily Per Jass Chatterjee rheumatology      alendronate (FOSAMAX) 70 MG tablet Take 70 mg by mouth every 7 days Indications: Mondays Dr. Mireille Isaac      gabapentin (NEURONTIN) 300 MG capsule Take 300 mg by mouth 2 times daily. Per Dr. Radha Otero       Current Facility-Administered Medications on File Prior to Encounter   Medication Dose Route Frequency Provider Last Rate Last Admin    lidocaine-EPINEPHrine 1 %-1:[id number] injection 0.5 mL  0.5 mL Intradermal Once Rowan Rod MD           REVIEW OF SYSTEMS  Review of Systems    Pertinent items are noted in HPI. Other than above 12 system review was negative    Objective:      Pulse 69   Temp 98.4 °F (36.9 °C) (Tympanic)   Resp 18     Wt Readings from Last 3 Encounters:   04/28/21 173 lb 12.8 oz (78.8 kg)   04/20/21 174 lb (78.9 kg)   04/13/21 174 lb (78.9 kg)       PHYSICAL EXAM  Physical Exam  Constitutional:       General: She is not in acute distress. Appearance: Normal appearance. She is not ill-appearing. HENT:      Right Ear: External ear normal.      Left Ear: External ear normal.      Mouth/Throat:      Pharynx: No oropharyngeal exudate or posterior oropharyngeal erythema. Eyes:      General: No scleral icterus. Conjunctiva/sclera: Conjunctivae normal.   Neck:      Musculoskeletal: Neck supple. No neck rigidity. Cardiovascular:      Rate and Rhythm: Normal rate and regular rhythm. Heart sounds: No murmur. Pulmonary:      Effort: Pulmonary effort is normal. No respiratory distress. Breath sounds: Normal breath sounds. Abdominal:      General: There is no distension. Palpations: Abdomen is soft. Tenderness: There is no abdominal tenderness. Skin:     General: Skin is warm. Coloration: Skin is not jaundiced. Neurological:      General: No focal deficit present. Mental Status: She is alert and oriented to person, place, and time.    Psychiatric:         Mood and Affect: Mood normal.         Behavior: Behavior normal. instructions given to patient and signed by patient or POA. Discharge Instructions         [facility]  CENTER                                 Visit Jamal Instructions / Physician Orders  [address]     SUPPLIES ORDERED THRU:Medline     Wound Location:  Left foot     Cleanse with:  Keep wrap dry and intact     Dressing Orders:Adaptic ABD,  kerlix,   ace wrap     Frequency: DAILY     Additional Orders: Increase protein to diet (meat, cheese, eggs, fish, peanut butter, nuts and beans)  Multivitamin daily  Finish cipro  MY CHART []?????????????????????     Smart Device  []?????????????????????      HYPERBARIC TREATMENT-                TREATMENT #     Your next appointment with the [address] [place] [address] is in                                                                                                           DERMATOLOGY APPOINT   DR. PICKARD/ Continue to follow with Dr. Robbin Claude   []????????????????????? CHAIR     []????????????????????? BED   []????????????????????? EITHER        TIME []????????????????????? 45 MIN     []????????????????????? 60 MIN     (Please note your next appointment above and if you are unable to keep, kindly give a 24 hour notice.  Thank you.)     If you experience any of the following, please call the [address] [place] [address] during business hours:  [phone]     * Increase in Pain  * Temperature over 101  * Increase in drainage from your wound  * Drainage with a foul odor  * Bleeding  * Increase in swelling  * Need for compression bandage changes due to slippage, breakthrough drainage.     If you need medical attention outside of the business hours of the [address] [place] [address] please contact your PCP or go to the nearest emergency room.     The information contained in the After Visit Summary has been reviewed with me, the

## 2021-05-17 NOTE — PROGRESS NOTES
Wound Care Supplies      Supply Company:     [address] [place] [address] [facility] p: [phone] f: [address]:     [address] [place] [address]  [phone]  WOUND CARE Dept: [address] [phone]    Patient Information:      Asenath Eisenmenger Lovemohenrry  [address]    : 1955  AGE: 60 y.o. GENDER: female   EPISODE DATE: 5/10/2021    Insurance:      PRIMARY INSURANCE:  Plan: MEDICAL [employer] CHOICE HMO  Coverage: MEDICAL [employer] MEDICARE ADVANTAGE  Effective Date: 2015  Group Number: [unfilled]  Subscriber Number: [id number] - (Medicare Managed)    Payor/Plan Subscr  Sex Relation Sub. Ins. ID Effective Group Num   1. [facility] 1955 Female Self [id number] 1/1/15 [id number]                                    [id number]       Patient Wound Information:      Problem List Items Addressed This Visit     Neoplasm of uncertain behavior of skin    Pain in left foot    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 ([facility] [address] - Primary          WOUNDS REQUIRING DRESSING SUPPLIES:     Wound 20 Foot Left;Dorsal wound #1 (Active)   Wound Image   21 0840   Wound Etiology Other 05/10/21 08   Dressing Status New dressing applied; Intact; Old drainage noted 05/10/21 08   Wound Cleansed Irrigated with saline 05/10/21 08   Dressing/Treatment Other (comment) 21 1042   Wound Length (cm) 8.4 cm 05/10/21 08   Wound Width (cm) 10.5 cm 05/10/21 08   Wound Depth (cm) 0.2 cm 05/10/21 0826   Wound Surface Area (cm^2) 88.2 cm^2 05/10/21 0826   Change in Wound Size % (l*w) -186.36 05/10/21 0826   Wound Volume (cm^3) 17.64 cm^3 05/10/21 0826   Wound Healing % -43 05/10/21 0826   Post-Procedure Length (cm) 8.4 cm 05/10/21 0826   Post-Procedure Width (cm) 10.5 cm 05/10/21 0826   Post-Procedure Depth (cm) 0.2 cm 05/10/21 0826   Post-Procedure Surface Area (cm^2) 88.2 cm^2 05/10/21 0826   Post-Procedure Volume (cm^3) 17.64 cm^3 05/10/21 0826   Wound Assessment Exposed structure tendon;Slough 05/10/21 0826   Drainage Amount Moderate 05/10/21 0826   Drainage Description Yellow 05/10/21 0826   Odor None 05/10/21 0826   Jessica-wound Assessment Blanchable erythema 05/10/21 0826   Margins Defined edges 05/10/21 0826   Wound Thickness Description not for Pressure Injury Full thickness 04/13/21 0848   Number of days: 163          Supplies Requested :      WOUND #: 1   PRIMARY DRESSING:  Other: Adaptic   Cover and Secure with: ABD pad wrap with kerlix     FREQUENCY OF DRESSING CHANGES:  Daily       ADDITIONAL ITEMS:  [] Gloves Small  [x] Gloves Medium [] Gloves Large [] Gloves XLarge  [] Tape 1\" [x] Tape 2\" [] Tape 3\"  [] Medipore Tape  [x] Saline  [] Skin Prep   [] Adhesive Remover   [] Cotton Tip Applicators   [] Other:    Patient Wound(s) Debrided: [] Yes if yes please add date   [x] No    Debribement Type:  Other no debridement due to pyroderma diagnosis    Patient currently being seen by Home Health: [] Yes   [x] No    Duration for needed supplies:  []15  [x]30  []60  []90 Days    Electronically signed by Luisana Valles RN on 5/10/2021 at 9:25 AM     Provider Information:      PROVIDER'S NAME: Todd Mcmahon  MZO#-[id number]

## 2021-05-27 NOTE — PATIENT INSTRUCTIONS

## 2021-05-31 NOTE — PROGRESS NOTES
Patient presents for f/u probable pyoderma gangrenosum of left dorsal foot. Biopsy was not diagnostic at . She is on MTX 25 mg weekly and prednisone 5 mg daily per rheum. She has had tissue cultures but now AFB or fungal cultures. Since  her ulcer has expanded and tendon is now visible in ulcer. Biopsy today - another H&E + tissue culture + AFB + fungal culture    Plan to PA for Humira and if not approved and/or ulcer continues to rapidly worsen, will discuss with rheum switching from MTX to cyclosporine    Dermatology Procedure Note   [facility] [address]  [address]  [facility] [address]  Dept: [phone]  Dept Fax: [phone]      Procedure Date: 5/30/2021  Procedure Time: 11:22 PM    Procedure Practitioner: Amanda Asher MD    Procedure: Skin Biopsy    Pre-Procedure Diagnosis: Pyoderma gangrenosum    Post-Procedure Diagnosis: Same as Pre-Procedure Diagnosis    Informed Consent: The procedure and its risks were explained including but not limited to pain, bleeding, infection, permanent scar, permanent pigment alteration and recurrence. Consent to proceed with the procedure was obtained from the patient or the parent by the practitioner    Time Out:  A time out was conducted immediately before starting the procedure that confirmed a final verification of the correct patient, correct procedure, and correct site. Procedure Details:  Punch Biopsy x 2: The procedure and its risks were explained including but not limited to pain, bleeding, infection, permanent scar, permanent pigment alteration and need for an additional procedure. Consent to proceed with the procedure was obtained from the patient or the parent. After cleaning with alcohol the ulcer edge anesthetized with 1% lidocaine with epinephrine and two 3 mm punches were performed on the left dorsal foot. Hemostasis was achieved spontaneously. Vaseline and a bandage were applied.     Procedure Performed By: Michael Nguyen MD    Estimated Blood Loss: Minimal    Pathologic Specimen: H&E    Procedure Tolerance: Good    Complication(s): None    Electronically signed by Michael Nguyen MD on 5/30/21 at 11:22 PM EDT

## 2021-06-01 PROBLEM — L03.116 CELLULITIS OF LEFT FOOT: Status: ACTIVE | Noted: 2021-01-01

## 2021-06-01 NOTE — PLAN OF CARE
Problem: Pain:  Goal: Pain level will decrease  Description: Pain level will decrease  6/1/2021 0850 by Georges Olson RN  Outcome: Ongoing  6/1/2021 0845 by Georges Olson RN  Outcome: Ongoing  Goal: Control of acute pain  Description: Control of acute pain  6/1/2021 0850 by Georges Olson RN  Outcome: Ongoing  6/1/2021 0845 by Georges Olson RN  Outcome: Ongoing  Goal: Control of chronic pain  Description: Control of chronic pain  6/1/2021 0850 by Georges Olson RN  Outcome: Ongoing  6/1/2021 0845 by Georges Olson RN  Outcome: Ongoing     Problem: Wound:  Goal: Will show signs of wound healing; wound closure and no evidence of infection  Description: Will show signs of wound healing; wound closure and no evidence of infection  6/1/2021 0850 by Georges Olson RN  Outcome: Ongoing  6/1/2021 0845 by Georges Olson RN  Outcome: Ongoing     Problem: Falls - Risk of:  Goal: Will remain free from falls  Description: Will remain free from falls  6/1/2021 0850 by Georges Olson RN  Outcome: Ongoing  6/1/2021 0845 by Georges Olson RN  Outcome: Ongoing

## 2021-06-01 NOTE — PROGRESS NOTES
Physical Therapy    DATE: 2021    NAME: Wilbert Yañez  MRN: [id number]   : 1955      Patient not seen this date for Physical Therapy due to: Other: 21: Needs eval. Awaiting clarification of WB status L foot 1355 - RN Michael checking with podiatry.        Electronically signed by Rebecca Winkler PT on 2021 at 3:37 PM

## 2021-06-01 NOTE — H&P
Admission History and Physical  Podiatric Medicine and Surgery     Subjective     Chief Complaint: Left foot wound    HPI:  Stanislav Domínguez is a 60 y.o. female seen at [facility] wound care center for left foot wound. Patient is well-known to podiatry service and Dr. Juvenal Faye. Patient relates wound is getting larger since last visit, reports she is still on prednisone and methotrexate per rheumatologist.  Patient was seen by dermatologist, Dr. Aida Wright on 5/30/21. No other pedal complaints this time. PCP is NUPUR Angulo - CNP    ROS:   Review of Systems   Constitutional: Negative for chills and fever. Respiratory: Negative for cough and shortness of breath. Cardiovascular: Negative for leg swelling. Gastrointestinal: Negative for nausea and vomiting. Musculoskeletal: Positive for arthralgias, gait problem and myalgias. Skin: Positive for color change and wound. Neurological: Negative for numbness. Psychiatric/Behavioral: Negative for agitation. The patient is not nervous/anxious. Past Medical History   has a past medical history of Arthritis, CAD (coronary artery disease), Chronic back pain, GERD (gastroesophageal reflux disease), Hyperlipidemia, Hypertension, Incomplete RBBB, Irregular heart beat, LAFB (left anterior fascicular block), Leg wound, left, Lumbar disc disease, Osteoporosis, PAC (premature atrial contraction), PONV (postoperative nausea and vomiting), Wears dentures, Wears eyeglasses, Wears partial dentures, and Wellness examination. Past Surgical History   has a past surgical history that includes Knee arthroscopy (Left, 2006); Abdominoplasty (1997); blepharoplasty (Bilateral, 1997); laminectomy (1994); lumbar laminectomy (2011); lumbar laminectomy (2014); Fixation Kyphoplasty (2013); Finger surgery (Right); Colonoscopy (2015); pr office/outpt visit,procedure only (Right, 9/11/2018); Lumbar spine surgery (2005);  Cataract removal with implant (Bilateral, 2016); other surgical history (10/03/2019); Foot Debridement (Left, 10/11/2019); Skin graft (Left, 12/3/2019); Leg Surgery (Left, 1/2/2020); Breast enhancement surgery (Bilateral, 1999); Skin graft (Left, 2/25/2020); fracture surgery; Tonsillectomy; Skin graft (Left, 6/30/2020); Cosmetic surgery; Cardiac catheterization (2019); Total knee arthroplasty (Right, 10/13/2020); Total knee arthroplasty (Right, 10/13/2020); and back surgery. Medications  Prior to Admission medications    Medication Sig Start Date End Date Taking? Authorizing Provider   meclizine (ANTIVERT) 25 MG CHEW Take 25 mg by mouth 3 times daily as needed   Yes Historical Provider, MD   clobetasol (TEMOVATE) 0.05 % ointment Apply to ulcer at dressing changes.  5/7/21  Yes Andrew Mock MD   folic acid (FOLVITE) 1 MG tablet take 1 tablet by mouth once daily 4/23/21  Yes NUPUR Wilkinson CNP   methotrexate (RHEUMATREX) 2.5 MG chemo tablet Take 15 mg by mouth once a week Thursdays   Yes Historical Provider, MD   clopidogrel (PLAVIX) 75 MG tablet Take 1 tablet by mouth daily 2/5/21  Yes Yady Merlos MD   Metoprolol Tartrate 37.5 MG TABS take 1 tablet by mouth twice a day  Patient taking differently: Take 75 mg by mouth 2 times daily  12/21/20  Yes NUPUR Wilkinson CNP   Multiple Vitamins-Minerals (THERAPEUTIC MULTIVITAMIN-MINERALS) tablet Take 1 tablet by mouth daily   Yes Historical Provider, MD   oxyCODONE-acetaminophen (PERCOCET) 5-325 MG per tablet take 1 tablet by mouth four times a day if needed 10/26/20  Yes Historical Provider, MD   traMADol [facility]) 200 MG extended release tablet take 1 tablet by mouth once daily 10/26/20  Yes Historical Provider, MD   aspirin EC 81 MG EC tablet Take 1 tablet by mouth 2 times daily 10/14/20 6/1/21 Yes Juan Pastor MD   vitamin C (ASCORBIC ACID) 500 MG tablet Take 500 mg by mouth 2 times daily   Yes Historical Provider, MD   diclofenac sodium (VOLTAREN) 1 % GEL Apply topically as needed Dr. Drea Felix   Yes Historical Provider, MD   atorvastatin (LIPITOR) 40 MG tablet Take 1 tablet by mouth nightly  Patient taking differently: Take 40 mg by mouth every morning Per Dr. Wanda Luque 9/11/19  Yes John Meadows MD   NAPROXEN PO Take 250 mg by mouth daily as needed Indications: patient takes 1-2 times a day Stopping 10 days prior to surgery as instructed by surgeon   Yes Historical Provider, MD   omeprazole (PRILOSEC) 40 MG delayed release capsule Take 1 tablet by mouth daily Per Chaz Jones   Yes Historical Provider, MD   Cholecalciferol (VITAMIN D3) 5000 units TABS Take 1,000 Units by mouth daily    Yes Historical Provider, MD   calcium carbonate (OSCAL) 500 MG TABS tablet Take 1,000 mg by mouth daily Per Christel Roldan rheumatology   Yes Historical Provider, MD   alendronate (FOSAMAX) 70 MG tablet Take 70 mg by mouth every 7 days Indications: Mondays Dr. Elen Burgos   Patient takes on Mondays 6/4/18  Yes Historical Provider, MD   gabapentin (NEURONTIN) 300 MG capsule Take 300 mg by mouth 2 times daily. Per Dr. Drea Felix 2/27/15  Yes Historical Provider, MD    Scheduled Meds:   sodium chloride flush  5-40 mL Intravenous 2 times per day    enoxaparin  40 mg Subcutaneous Daily    aspirin EC  81 mg Oral Daily    atorvastatin  40 mg Oral Nightly    folic acid  1 mg Oral Daily    [START ON 6/2/2021] pantoprazole  40 mg Oral QAM AC    vitamin C  500 mg Oral BID    clopidogrel  75 mg Oral Daily    metoprolol tartrate  37.5 mg Oral BID    predniSONE  5 mg Oral Daily     Continuous Infusions:   sodium chloride       PRN Meds:.sodium chloride flush, sodium chloride, promethazine **OR** ondansetron, polyethylene glycol, acetaminophen **OR** acetaminophen, oxyCODONE-acetaminophen **OR** oxyCODONE-acetaminophen, morphine **OR** morphine    Allergies  has No Known Allergies. Family History  family history includes Arthritis in her mother; Coronary Art Dis in her father, mother, and sister;  Heart Disease in her father; Heart Surgery in her father, mother, and sister. Social History   reports that she quit smoking about 16 months ago. Her smoking use included cigarettes. She started smoking about 53 years ago. She has a 12.50 pack-year smoking history. She has never used smokeless tobacco.   reports current alcohol use. reports no history of drug use. Objective     Vitals:  Patient Vitals for the past 8 hrs:   BP Temp Temp src Pulse Resp SpO2 Height Weight   21 1401 120/66 97.9 °F (36.6 °C) Oral 76 15 97 % -- --   21 1046 -- -- -- -- -- -- 5' 6\" (1.676 m) 170 lb (77.1 kg)   21 1015 (!) 133/59 98.1 °F (36.7 °C) Oral 76 16 98 % -- --     Average, Min, and Max for last 24 hours Vitals:  TEMPERATURE:  Temp  Av.3 °F (36.8 °C)  Min: 97.9 °F (36.6 °C)  Max: 98.8 °F (37.1 °C)    RESPIRATIONS RANGE: Resp  Av.3  Min: 15  Max: 18    PULSE RANGE: Pulse  Av.3  Min: 74  Max: 76    BLOOD PRESSURE RANGE:  Systolic (39AWU), MJN:401 , Min:120 , NQA:225   ; Diastolic (37WNV), QAZ:29, Min:59, Max:75      PULSE OXIMETRY RANGE: SpO2  Av.5 %  Min: 97 %  Max: 98 %  I&O:  No intake/output data recorded. CBC:  No results for input(s): WBC, HGB, HCT, PLT, CRP in the last 72 hours. Invalid input(s):  ESR     BMP:  No results for input(s): NA, K, CL, CO2, BUN, CREATININE, GLUCOSE, CALCIUM in the last 72 hours. Coags:  No results for input(s): APTT, PROT, INR in the last 72 hours. Lab Results   Component Value Date    LABA1C 5.2 2020     Lab Results   Component Value Date    SEDRATE 4 2020      Lab Results   Component Value Date    CRP 1.5 2020        Physical Exam:    General: A&Ox3, NAD  Heart: Regular rate and rhythm. Lungs: Equal air entry. No increased effort. Abdomen: Soft, non-tender to palpation. Not distended.     Lower Extremity Physical Exam:    Vascular: DP pulses are not palpable, Bilateral. PT pulses are not palpable, Bilateral. CFT <4 seconds to all digits, Bilateral.  No edema, Bilateral.  Hair growth is absent to the level of the digits, Bilateral.  Skin temp is warm to cool from the tibial tuberosity to the digits, Bilateral.      Neuro: Saph/sural/SP/DP/plantar sensation intact to light touch. Musculoskeletal: EHL/FHL/GS/TA gross motor intact. Gross deformity is absent. Dermatologic: Open wound present to dorsal left foot, measuring approximately 8.4 cm x 10.5 cm x 0.2 cm. Wound base is fibro-necrotic extending to the level of muscle with exposed extensor tendons. Negative probe to bone. Minimal periwound erythema appreciated mild associated increase in warmth,. There is no purulent drainage. There is no fluctuance or crepitus. Interdigital maceration absent, Bilateral.     Clinical Images:        Imaging:   No orders to display         Assessment     Rosi Esquivel is a 60 y.o. female with   Nonhealing wound to level of muscle, left foot  PAD  Pyoderma gangrenosum, left foot    Active Problems:    Hypertension    Anemia    PAD (peripheral artery disease) (HCC)    S/P drug eluting coronary stent placement    Cellulitis of left foot  Resolved Problems:    * No resolved hospital problems.  *       Plan     Patient examined and evaluated at bedside   Treatment options discussed in detail with the patient  IM consulted-medical management  ID consulted- appreciate abx recommendations  Abx: Cefepime  Dermatology consulted- further evaluation of dorsal foot wound        Podiatry will continue daily dressing changes and follow recommendations of rheumatology/dermatology/infectious disease  Dressing applied to Left foot: Aquacel Ag saturated with saline, adaptic, ABDs DSD, ACE  Discussed with Dr. Manohar Palacios    DVT ppx: lovenox    Diet: General   Activity: NWB to Left lower extremity  Pain Control: Percocet and morphine pain panel ordered      Charanjit Stauffer DPM   Podiatric Medicine & Surgery   6/1/2021 at 2:14 PM    I performed a history and physical examination of the patient and discussed management with the resident. I reviewed the residents note and agree with the documented findings and plan of care. Any areas of disagreement are noted on the chart. I was personally present for the key portions of any procedures. I have documented in the chart those procedures where I was not present during the key portions. I have reviewed the Podiatry Resident progress note. I agree with the chief complaint, past medical history, past surgical history, allergies, medications, social and family history as documented unless otherwise noted below. Documentation of the HPI, Physical Exam and Medical Decision Making performed by medical students or scribes is based on my personal performance of the HPI, PE and MDM. I have personally evaluated this patient and have completed at least one if not all key elements of the E/M (history, physical exam, and MDM). Additional findings are as noted.      Aline Solo DPM on 6/1/2021 at 2:41 PM  Board Certified, American Board of Podiatric Surgery  Fellow, [employer] [facility] and [employer] [facility]

## 2021-06-01 NOTE — PROGRESS NOTES
Yen [facility]   Progress Note and Procedure Note      [address] RECORD NUMBER:  [id number]  AGE: 60 y.o. GENDER: female  : 1955  EPISODE DATE:  2021    Subjective:     Chief Complaint   Patient presents with    Wound Check     LLE         HISTORY of PRESENT ILLNESS SHANNAN Harris is a 60 y.o. female who presents today for wound/ulcer evaluation. Reports worsening pain and quality of the wound starting last week. She is out of prednisone 5 mg. Patient reports still on Methotrexate per her rheumatologist.       HISTORY of PRESENT ILLNESS SHANNAN Harris is a 60 y.o. female who presents today for wound/ulcer evaluation. History of Wound Context: Patient presents for evaluation of chronic left dorsal foot wound. She has undergone extensive testing in the past with little improvement. MRI was negative for osteomyelitis or abscess. Previous punch biopsies have been negative for malignancy or any obvious process. Rheumatologic work-up has revealed elevated anticentromere and positive AMADOR, and full work up is still pending. She has previously been seen by a rheumatologist.  Has had multiple debridements in the past with skin grafting including STSG and allograft. Relates that with each debridement the wound has increased in size and the graft has failed. She does relate a significant smoking history stating that she smoked from childhood heavily and has since quit just recently. She has seen vascular surgery with no intervention. She underwent left lower extremity angiogram and was found to have no flow-limiting stenosis. She has known CAD with stenting in the past. Denies any significant drainage from the ulceration. Wound has remained unchanged.   Wound/Ulcer Pain Timing/Severity: intermittent  Quality of pain: burning  Modifying Factors: Pain worsens with Touching of the wound or pressure  Associated Signs/Symptoms: pain     Ulcer Identification:  Ulcer Type: undetermined, suspect microvascular disease contributing to chronicity of the wound  Contributing Factors: arterial insufficiency and decreased tissue oxygenation    PAST MEDICAL HISTORY        Diagnosis Date    Arthritis     knees hips hands shoulders and back    CAD (coronary artery disease) 2019    blockage on cath 9/2019, to have stenting after surgery (arthroplasty) Dr. Preet Romano Chronic back pain     GERD (gastroesophageal reflux disease)     on rx    Hyperlipidemia     per Dr. Jani Fischer on rx    Hypertension     Colen Ahr manages    Incomplete RBBB     Irregular heart beat     LAFB (left anterior fascicular block)     Dr. Jani Fischer, cardiologist, seen 9/2019 prior to surgery on 10/8/2019    Leg wound, left     following with wound care, Dr. Fleming Prior Lumbar disc disease     Osteoporosis     PAC (premature atrial contraction) 06/2018    PACs and PVCs on holter monitor,     PONV (postoperative nausea and vomiting)     requests scop patch    Wears dentures     upper partial    Wears eyeglasses     readers    Wears partial dentures     upper and lower    Wellness examination     MIKE Hull NP PCP, seen 2/10/2020       PAST SURGICAL HISTORY    Past Surgical History:   Procedure Laterality Date    ABDOMINOPLASTY  1997    BACK SURGERY      BLEPHAROPLASTY Bilateral 1997    BREAST ENHANCEMENT SURGERY Bilateral 1999    breast augmentation    CARDIAC CATHETERIZATION  2019    Dr. Jani Fischer, blockage but no stents yet    CATARACT REMOVAL WITH IMPLANT Bilateral 2016    COLONOSCOPY  2015    No Polyps    COSMETIC SURGERY      eyelid lift    FINGER SURGERY Right     thumb lesion excised    FIXATION KYPHOPLASTY  2013    Back    FOOT DEBRIDEMENT Left 10/11/2019    SURGICAL PREP WOUND BED LEFT FOOT WITH APPLICATION OF EPIFIX LEFT FOOT 3X4 performed by Carlos Jefferson DPM at [facility]      kyphoplasty for lumbar fx 2013 Dr. Nadine Castillo Left 2006   [address]  LEG SURGERY Left 2020    FOOT  DEBRIDEMENT WITH WOUND VAC PLACEMENT performed by Raya Michelle MD at [facility] [address]     [address]      diskectomy and spinal fusion    OTHER SURGICAL HISTORY  10/03/2019    Left leg angiogram    MD OFFICE/OUTPT VISIT,PROCEDURE ONLY Right 2018    EXCISION MASS THUMB, 3080 TABLE performed by Long Burgess DO at [facility] Left 12/3/2019    LEFT FOOT DEBRIDEMENT WITH SKIN GRAFT SPLIT THICKNESS; SKIN GRAFT TAKEN FROM RIGHT ANTERIOR THIGH performed by Raya Michelle MD at [facility] Left 2020    DEBRIDEMENT, SPLIT THICKNESS SKIN GRAFT WITH WOUND VAC PLACEMENT FOOT performed by Lilian Ibarra MD at [facility] Left 2020    DEBRIDEMENT, SKIN GRAFT SPLIT THICKNESS FOOT  ([place] [address] [address] [facility]) performed by Lilian Ibarra MD at [address] [place] [address]      as a child    TOTAL KNEE ARTHROPLASTY Right 10/13/2020    KNEE TOTAL ARTHROPLASTY    TOTAL KNEE ARTHROPLASTY Right 10/13/2020    KNEE TOTAL ARTHROPLASTY- MICROPORT, \ ADVANCED performed by Long Burgess DO at [place] [address] History   Problem Relation Age of Onset    Coronary Art Dis Mother     Arthritis Mother     Heart Surgery Mother         CABG    Coronary Art Dis Father     Heart Disease Father     Heart Surgery Father         CABG    Coronary Art Dis Sister     Heart Surgery Sister         CABG       SOCIAL HISTORY    Social History     Tobacco Use    Smoking status: Former Smoker     Packs/day: 0.25     Years: 50.00     Pack years: 12.50     Types: Cigarettes     Start date:      Quit date: 2020     Years since quittin.3    Smokeless tobacco: Never Used   Vaping Use    Vaping Use: Never used   Substance Use Topics    Alcohol use: Yes     Comment: 2-3 shot liquor for weekends    Drug use: Never ALLERGIES    No Known Allergies    MEDICATIONS    Current Outpatient Medications on File Prior to Encounter   Medication Sig Dispense Refill    gentamicin (GARAMYCIN) 0.1 % ointment Apply to ulcer twice daily 30 g 2    clobetasol (TEMOVATE) 0.05 % ointment Apply to ulcer at dressing changes.  60 g 2    folic acid (FOLVITE) 1 MG tablet take 1 tablet by mouth once daily 30 tablet 5    methotrexate (RHEUMATREX) 2.5 MG chemo tablet Take 15 mg by mouth once a week Thursdays      clopidogrel (PLAVIX) 75 MG tablet Take 1 tablet by mouth daily 30 tablet 11    predniSONE (DELTASONE) 20 MG tablet Take 5 mg by mouth daily       Metoprolol Tartrate 37.5 MG TABS take 1 tablet by mouth twice a day (Patient taking differently: Take 25 mg by mouth 2 times daily ) 60 tablet 5    Multiple Vitamins-Minerals (THERAPEUTIC MULTIVITAMIN-MINERALS) tablet Take 1 tablet by mouth daily      oxyCODONE-acetaminophen (PERCOCET) 5-325 MG per tablet take 1 tablet by mouth four times a day if needed      traMADol (ULTRAM ER) 200 MG extended release tablet take 1 tablet by mouth once daily      aspirin EC 81 MG EC tablet Take 1 tablet by mouth 2 times daily 84 tablet 0    vitamin C (ASCORBIC ACID) 500 MG tablet Take 500 mg by mouth 2 times daily      diclofenac sodium (VOLTAREN) 1 % GEL Apply topically as needed Dr. Melly Mccullough      atorvastatin (LIPITOR) 40 MG tablet Take 1 tablet by mouth nightly (Patient taking differently: Take 40 mg by mouth every morning Per Dr. Jani Fischer) 30 tablet 3    NAPROXEN PO Take 250 mg by mouth daily as needed Indications: patient takes 1-2 times a day Stopping 10 days prior to surgery as instructed by surgeon      omeprazole (PRILOSEC) 40 MG delayed release capsule Take 1 tablet by mouth daily Per Austen Solares      Cholecalciferol (VITAMIN D3) 5000 units TABS Take 5,000 Units by mouth daily       calcium carbonate (OSCAL) 500 MG TABS tablet Take 1,000 mg by mouth daily Per Wil Pew rheumatology      alendronate (FOSAMAX) 70 MG tablet Take 70 mg by mouth every 7 days Indications: Mondays Dr. Bibiana Chaudhry      gabapentin (NEURONTIN) 300 MG capsule Take 300 mg by mouth 2 times daily. Per Dr. Yoli Quinteros       Current Facility-Administered Medications on File Prior to Encounter   Medication Dose Route Frequency Provider Last Rate Last Admin    lidocaine-EPINEPHrine 1 %-1:204199 injection 1 mL  1 mL Intradermal Once Angie Ortiz MD        lidocaine-EPINEPHrine 1 %-1:964244 injection 0.5 mL  0.5 mL Intradermal Once Angie Ortiz MD           REVIEW OF SYSTEMS    Pertinent items are noted in HPI. Objective:      BP (!) 152/75   Pulse 74   Temp 98.8 °F (37.1 °C)   Resp 18     Wt Readings from Last 3 Encounters:   05/27/21 165 lb (74.8 kg)   04/28/21 173 lb 12.8 oz (78.8 kg)   04/20/21 174 lb (78.9 kg)       PHYSICAL EXAM       Vascular: DP pulses are not palpable, Bilateral. PT pulses are not palpable, Bilateral. CFT <4 seconds to all digits, Bilateral.  No edema, Bilateral.  Hair growth is absent to the level of the digits, Bilateral.  Skin temp is warm to cool from the tibial tuberosity to the digits, Bilateral.      Neuro: Saph/sural/SP/DP/plantar sensation intact to light touch.     Musculoskeletal: EHL/FHL/GS/TA gross motor intact. Gross deformity is absent.      Dermatologic: Open wound present to dorsal left foot wound as documented in detail below. Wound base is fibro-granular extending to the level of muscle with an exposed extensor tendon. Granulation tissue improved over the wound, granulation buds appreciated. Negative probe to bone. There is no erythema. There is no purulent drainage. There is no fluctuance or crepitus.  Interdigital maceration absent, Bilateral.     Wound 12/04/20 Foot Left;Dorsal wound #1 (Active)   Wound Image   06/01/21 0818   Wound Etiology Other 06/01/21 0818   Dressing Status Old drainage noted;New drainage noted 06/01/21 0818   Wound Cleansed Irrigated with saline 06/01/21 0818 Plan:   Pt was evaluated and examined. Patient was given personalized discharge instructions. Pt will follow up in 1 weeks or sooner if any problems arise. Diagnosis was discussed with the pt and all of their questions were answered in detail. Proper foot hygiene and care was discussed with the pt. Patient to check feet daily and contact the office with any questions/problems/concerns. Other comorbidity noted and will be managed by PCP. Biopsy result indicate likely pyoderma gangrenosum    Treatment Note please see attached Discharge Instructions    Dressings: Aquacel Ag extra, saturated with saline, adaptic, abd's for comfort     Continue recs per rheum/derm  Discussed case with Dr. Cobian/Dermatology  Will admit today with virtual consult to Dr. Checo Triplett      Written patient dismissal instructions given to patient and signed by patient or POA.          Katia Carl DPM on 6/1/2021 at 8:28 AM      Katia Carl DPM on 6/1/2021 at 0:78 AM  Board Certified, American Board of Podiatric Surgery  Fellow, [employer] and [employer]

## 2021-06-01 NOTE — CONSULTS
[facility]    CONSULTATION / HISTORY AND PHYSICAL EXAMINATION            Date:   6/1/2021  Patient name:  [place]  Date of admission:  6/1/2021 10:08 AM  MRN:   [id number]  Account:  [de-identified]  YOB: 1955  PCP:    NUPUR Quijano CNP  Room:   2065/2065-01  Code Status:    Full Code    Physician Requesting Consult: Jason Blanc DPM    Reason for Consult:  medical management    Chief Complaint:     No chief complaint on file. History Obtained From:     Patient medical record nursing staff    History of Present Illness:   Patient mated to [facility], as a direct admission from podiatrist office.   Patient has chronic wound on dorsal surface of left foot, she mentioned that it started as a small ulcer in May 2019, which progressively getting worse, patient had extensive testing, which include CT angiogram of leg which is negative for peripheral artery disease, underwent biopsies of wound twice concerning for pyoderma gangrenosum, patient has seen dermatologist and rheumatologist for the same, on methotrexate 15 mg weekly and 5 mg of prednisone daily  Patient has past medical history multiple medical problem which include hypertension, coronary artery disease s/p recent stent placement in February of this year, chronic back pain underwent multiple surgeries in lower back  Patient today is complaining of pain in left foot, has chronic pain in back  Patient had wound culture sent on 5/27 which is growing Pseudomonas      Past Medical History:     Past Medical History:   Diagnosis Date    Arthritis     knees hips hands shoulders and back    CAD (coronary artery disease) 2019    blockage on cath 9/2019, to have stenting after surgery (arthroplasty) Dr. Arabella Crump Chronic back pain     GERD (gastroesophageal reflux disease)     on rx    Hyperlipidemia     per Dr. Sourav Cabezas on rx    Hypertension     Kianna Cabello THICKNESS; SKIN GRAFT TAKEN FROM RIGHT ANTERIOR THIGH performed by Yuki Ontiveros MD at [facility]. Left 2/25/2020    DEBRIDEMENT, SPLIT THICKNESS SKIN GRAFT WITH WOUND VAC PLACEMENT FOOT performed by Melvina Irene MD at [facility] 3. Left 6/30/2020    DEBRIDEMENT, SKIN GRAFT SPLIT THICKNESS FOOT  (kSyler Marino) performed by Melvina Irene MD at [address] [place] [address]      as a child    TOTAL KNEE ARTHROPLASTY Right 10/13/2020    KNEE TOTAL ARTHROPLASTY    TOTAL KNEE ARTHROPLASTY Right 10/13/2020    KNEE TOTAL ARTHROPLASTY- MICROPORT, \ ADVANCED performed by Lizz Chanel DO at [place] [address]        Medications Prior to Admission:     Prior to Admission medications    Medication Sig Start Date End Date Taking? Authorizing Provider   meclizine (ANTIVERT) 25 MG CHEW Take 25 mg by mouth 3 times daily as needed   Yes Historical Provider, MD   clobetasol (TEMOVATE) 0.05 % ointment Apply to ulcer at dressing changes.  5/7/21  Yes Wojciech Paul MD   folic acid (FOLVITE) 1 MG tablet take 1 tablet by mouth once daily 4/23/21  Yes NUPUR Hernandez CNP   methotrexate (RHEUMATREX) 2.5 MG chemo tablet Take 15 mg by mouth once a week Thursdays   Yes Historical Provider, MD   clopidogrel (PLAVIX) 75 MG tablet Take 1 tablet by mouth daily 2/5/21  Yes Betsy Taylor MD   Metoprolol Tartrate 37.5 MG TABS take 1 tablet by mouth twice a day  Patient taking differently: Take 75 mg by mouth 2 times daily  12/21/20  Yes NUPUR Hernandez CNP   Multiple Vitamins-Minerals (THERAPEUTIC MULTIVITAMIN-MINERALS) tablet Take 1 tablet by mouth daily   Yes Historical Provider, MD   oxyCODONE-acetaminophen (PERCOCET) 5-325 MG per tablet take 1 tablet by mouth four times a day if needed 10/26/20  Yes Historical Provider, MD   traMADol [facility]) 200 MG extended release tablet take 1 tablet by mouth once daily 10/26/20  Yes Historical Provider, MD   aspirin EC 81 MG EC tablet Take 1 tablet by mouth 2 times daily 10/14/20 6/1/21 Yes Benny Sultana MD   vitamin C (ASCORBIC ACID) 500 MG tablet Take 500 mg by mouth 2 times daily   Yes Historical Provider, MD   diclofenac sodium (VOLTAREN) 1 % GEL Apply topically as needed Dr. Yoli Quinteros   Yes Historical Provider, MD   atorvastatin (LIPITOR) 40 MG tablet Take 1 tablet by mouth nightly  Patient taking differently: Take 40 mg by mouth every morning Per Dr. Flavia Dennis 9/11/19  Yes Sanjuana Torres MD   NAPROXEN PO Take 250 mg by mouth daily as needed Indications: patient takes 1-2 times a day Stopping 10 days prior to surgery as instructed by surgeon   Yes Historical Provider, MD   omeprazole (PRILOSEC) 40 MG delayed release capsule Take 1 tablet by mouth daily Per Jenn Degroot   Yes Historical Provider, MD   Cholecalciferol (VITAMIN D3) 5000 units TABS Take 1,000 Units by mouth daily    Yes Historical Provider, MD   calcium carbonate (OSCAL) 500 MG TABS tablet Take 1,000 mg by mouth daily Per Nathan Anthony rheumatology   Yes Historical Provider, MD   alendronate (FOSAMAX) 70 MG tablet Take 70 mg by mouth every 7 days Indications: Mondays Dr. Bibiana Chaudhry   Patient takes on Mondays 6/4/18  Yes Historical Provider, MD   gabapentin (NEURONTIN) 300 MG capsule Take 300 mg by mouth 2 times daily. Per Dr. Yoli Quinteros 2/27/15  Yes Historical Provider, MD        Allergies:     Patient has no known allergies. Social History:     Tobacco:    reports that she quit smoking about 16 months ago. Her smoking use included cigarettes. She started smoking about 53 years ago. She has a 12.50 pack-year smoking history. She has never used smokeless tobacco.  Alcohol:      reports current alcohol use. Drug Use:  reports no history of drug use.     Family History:     Family History   Problem Relation Age of Onset    Coronary Art Dis Mother     Arthritis Mother     Heart Surgery Mother         CABG    Coronary Art Dis Father     Heart Disease Father     Heart Surgery Father CABG    Coronary Art Dis Sister     Heart Surgery Sister         CABG       Review of Systems:     Positive and Negative as described in HPI. CONSTITUTIONAL:  negative for fevers, chills, sweats, fatigue, weight loss  HEENT:  negative for vision, hearing changes, runny nose, throat pain  RESPIRATORY:  negative for shortness of breath, cough, congestion, wheezing. CARDIOVASCULAR:  negative for chest pain, palpitations. GASTROINTESTINAL:  negative for nausea, vomiting, diarrhea, constipation, change in bowel habits, abdominal pain   GENITOURINARY:  negative for difficulty of urination, burning with urination, frequency   INTEGUMENT:  negative for rash, skin lesions, easy bruising   HEMATOLOGIC/LYMPHATIC:  negative for swelling/edema   ALLERGIC/IMMUNOLOGIC:  negative for urticaria , itching  ENDOCRINE:  negative increase in drinking, increase in urination, hot or cold intolerance  MUSCULOSKELETAL: Chronic ulcers on dorsum of left foot, chronic back pain  NEUROLOGICAL:  negative for headaches, dizziness, lightheadedness, numbness, pain, tingling extremities  BEHAVIOR/PSYCH:      Physical Exam:     BP (!) 133/59   Pulse 76   Temp 98.1 °F (36.7 °C) (Oral)   Resp 16   Ht 5' 6\" (1.676 m)   Wt 170 lb (77.1 kg)   SpO2 98%   BMI 27.44 kg/m²   Temp (24hrs), Av.5 °F (36.9 °C), Min:98.1 °F (36.7 °C), Max:98.8 °F (37.1 °C)    No results for input(s): POCGLU in the last 72 hours. No intake or output data in the 24 hours ending 21 1347    General Appearance:  alert, well appearing, and in no acute distress  Mental status: oriented to person, place, and time with normal affect  Head:  normocephalic, atraumatic.   Eye: no icterus, redness, pupils equal and reactive, extraocular eye movements intact, conjunctiva clear  Ear: normal external ear, no discharge, hearing intact  Nose:  no drainage noted  Mouth: mucous membranes moist  Neck: supple, no carotid bruits, thyroid not palpable  Lungs: Bilateral equal air entry, clear to ausculation, no wheezing, rales or rhonchi, normal effort  Cardiovascular: normal rate, regular rhythm, no murmur, gallop, rub. Abdomen: Soft, nontender, nondistended, normal bowel sounds, no hepatomegaly or splenomegaly  Neurologic: There are no new focal motor or sensory deficits, normal muscle tone and bulk, no abnormal sensation, normal speech, cranial nerves II through XII grossly intact  Skin: No gross lesions, rashes, bruising or bleeding on exposed skin area  Extremities: Left foot in dressing, tenderness lower back  Psych: Investigations:      Laboratory Testing:  No results found for this or any previous visit (from the past 24 hour(s)). Consultations:   IP CONSULT TO HOSPITALIST  Assessment :      Primary Problem  <principal problem not specified>    Active Hospital Problems    Diagnosis Date Noted    Cellulitis of left foot [L03.116] 06/01/2021    S/P drug eluting coronary stent placement [Z95.5] 02/03/2021    PAD (peripheral artery disease) ([facility] [address] [I73.9] 12/29/2020    Anemia [D64.9] 10/15/2020    Hypertension [I10] 12/30/2019       Plan:     1. Chronic wound in dorsum of left foot failed 2 skin graft in past, nonhealing, biopsy concerning for pyoderma gangrenosum  2. We will continue with home dose of prednisone 5 mg daily, strongly suggest consulting ID, since culture on 5/27 growing Pseudomonas, need to be treated for Pseudomonas infection  3. Pyoderma gangrenosum, will have virtual visit with the dermatologist while in-house as per notes  4. Patient has coronary artery disease s/p stents in February of this year, starting patient on aspirin, Plavix, home dose of Lopressor, in case patient need debridement and antiplatelet need to be held, we need to involve cardiologist for clearance to stop antiplatelet with recent cardiac stent  5. On DVT prophylaxis Lovenox  6. Inflammatory polyarthritis diagnosed by rheumatologist on methotrexate weekly  7.  PT/OT consult        Lovely Carrel, MD  6/1/2021  1:47 PM    Copy sent to Dr. Leo Oconnell, APRN - CNP    Please note that this chart was generated using voice recognition Dragon dictation software. Although every effort was made to ensure the accuracy of this automated transcription, some errors in transcription may have occurred.

## 2021-06-01 NOTE — PROGRESS NOTES
Dermatology      Writer received call requesting set up tele health camera.  Writer obtained camera- spoke with patient     Called phone line that I was instructed to call, left a message

## 2021-06-01 NOTE — CONSULTS
TELEHEALTH EVALUATION -- Audio/Visual (During [id number] public health emergency)    Dermatology Patient Note  Chris Herrera [place] [address]  Dept: [phone]  Loc: [phone]      CONSULT DATE: 6/1/2021     Chief Complaint: Ulcer of left foot worsening with exposed tendons    HISTORY OF PRESENT ILLNESS:  Fernando Blanchard is a 61 yo F with a 2 year history of expanding left dorsal foot ulcer, working diagnosis of pyoderma gangrenosum, admitted due to deepening/widedning of ulcer with exposed tendons. Started in May 2019 with a small ulcer that has continued to expand over time. She has had multiple diagnostic and therapeutic interventions without improvement, including two skin biopsies (see below), CT angiogram (normal), tissue cultures (enterococcus and pseudomonas, treated with culture-directed antibiotics with no improvement), extensive lab work up (positive AMADOR and anti-centromere antibody). She has had debridements and skin grafts with no improvement, and believes procedural intervention worsens the ulcer. In early 2021 she was started on prednisone 60 mg that was tapered down. She took her last 5 mg tablet on Monday. She states that the prednisone did not help. In February 2021 she was also started on MTX 15 mg weekly by Dr. Isaiah Dasilva in rheumatology. At f/u at the end of April she had no improvement, so dose was increased to 25 mg weekly. She first saw me 4/28/21 at which time I felt her ulcer to be clinically consistent with pyoderma gangrenosum. 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 She started using clobetasol to the wound at that time. She followed up again 5/27/21 and the ulcer had continued to expand and deepen, now with exposed tendons.  Given incongruent clinical morphology and pathology, a second biopsy and tissue culture for bacteria, fungus, and mycobacterium was performed at that time, also showing vasculitis. Prior skin biopsies:  12/4/20: Final Diagnosis   SPECIMEN \"A\":  SKIN, LEFT FOOT, BIOPSY:        EPIDERMAL ULCERATION WITH ASSOCIATED ACUTE INFLAMMATION   NEGATIVE FOR MALIGNANCY     SPECIMEN \"B\":  SKIN, LEFT FOOT, 12 O'CLOCK, BIOPSY:        EPIDERMAL ULCERATION WITH EXTENSIVE NECROSIS WITH ASSOCIATED   ACUTE INFLAMMATION   SCANT FRAGMENT OF DETACHED SQUAMOUS EPITHELIUM WITH ATYPIA (SEE   COMMENT)     SPECIMEN \"C\":  SKIN, LEFT FOOT, 6 O'CLOCK, BIOPSY:        ULCERATED SKIN WITH MARKED ACUTE INFLAMMATION   NEGATIVE FOR MALIGNANCY     Diagnosis Comment   In Part B, a scant fragment of detached squamous epithelium with   cytologic atypia is noted.  This is of uncertain clinical   significance.  Please correlate with appropriate clinical findings. Within Parts A, B and C, an invasive malignancy is not identified.       4/28/21  SKIN, LEFT FOOT, PUNCH BIOPSY:             -  LEUKOCYTOCLASTIC VASCULITIS AND BACKGROUND ECZEMATOUS   SPONGIOTIC DERMATITIS SUGGESTIVE OF STASIS TYPE CHANGES.      -  SEE COMMENT. -- Diagnosis Comment --   THIS PATIENT HAS A KNOWN POSITIVE AMADOR AND ANTICENTROMERE ANTIBODY.  AN   AUTOIMMUNE ETIOLOGY MAY POSSIBLY BE CONTRIBUTING TO THE VASCULITIS. ALTERNATIVELY, ALTHOUGH CONTROVERSIAL IN THE DERMATOPATHOLOGICAL   LITERATURE, A LEUKOCYTOCLASTIC VASCULITIS HAS BEEN REPORTED IN THE   PERIPHERAL ERYTHEMATOUS EDGE OF PYODERMA GANGRENOSUM.       CORRELATION WITH CLINICAL PICTURE IS RECOMMENDED TO DETERMINE WHICH   APPEARS TO BEST CORRELATE IN THIS PATIENT.     5/27/21  SKIN, LEFT FOOT, PUNCH BIOPSY:        -  DEEP ULCER WITH LEUKOCYTOCLASTIC VASCULITIS.      -  SEE COMMENT. -- Diagnosis Comment --   THE HISTOLOGIC FINDINGS COULD BE CONSISTENT WITH PYODERMA GANGRENOSUM,   IF THIS BEST FITS THE CLINICAL IMPRESSION. HOWEVER, THE DIFFERENTIAL DIAGNOSIS COULD ALSO INCLUDE A PRIMARY   LEUKOCYTOCLASTIC VASCULITIS.   CONSIDERATION FOR AN AUTOIMMUNE ORIGIN   WOULD BE SUGGESTED, GIVEN THE PATIENT'S HISTORY OF POSITIVE AMADOR AND   ANTICENTROMERE ANTIBODY, AND PERSISTENCE OF THE WOUND SINCE 2019. THE WOUND BACTERIAL CULTURE IS ALSO CURRENTLY GROWING PSEUDOMONAS,   WHICH HAS BEEN PRESENT IN WOUND CULTURES FROM THIS SITE IN THE PAST AS   WELL.      CURRENT MEDICATIONS:   Current Facility-Administered Medications   Medication Dose Route Frequency Provider Last Rate Last Admin    sodium chloride flush 0.9 % injection 5-40 mL  5-40 mL Intravenous 2 times per day Koko Pears, DPM        sodium chloride flush 0.9 % injection 5-40 mL  5-40 mL Intravenous PRN Koko Pears, DPM        0.9 % sodium chloride infusion  25 mL Intravenous PRN Koko Pears, DPM        enoxaparin (LOVENOX) injection 40 mg  40 mg Subcutaneous Daily Valentin Dural Sautter, DPM   40 mg at 06/01/21 1252    promethazine (PHENERGAN) tablet 12.5 mg  12.5 mg Oral Q6H PRN Koko Pears, DPM        Or    ondansetron (ZOFRAN) injection 4 mg  4 mg Intravenous Q6H PRN Koko Pears, DPM        polyethylene glycol (GLYCOLAX) packet 17 g  17 g Oral Daily PRN Koko Pears, DPM        acetaminophen (TYLENOL) tablet 650 mg  650 mg Oral Q6H PRN Koko Pears, DPM        Or    acetaminophen (TYLENOL) suppository 650 mg  650 mg Rectal Q6H PRN Koko Pears, DPM        oxyCODONE-acetaminophen (PERCOCET) 5-325 MG per tablet 1 tablet  1 tablet Oral Q4H PRN Koko Pears, DPM        Or    oxyCODONE-acetaminophen (PERCOCET) 5-325 MG per tablet 2 tablet  2 tablet Oral Q4H PRN Koko Pears, DPM   2 tablet at 06/01/21 1643    morphine (PF) injection 2 mg  2 mg Intravenous Q2H PRN Koko Pears, DPM        Or    morphine sulfate (PF) injection 4 mg  4 mg Intravenous Q2H PRN Valentin Dural Sautter, DPM        aspirin EC tablet 81 mg  81 mg Oral Daily Shan Delgadillo MD   81 mg at 06/01/21 1535    atorvastatin (LIPITOR) tablet 40 mg  40 mg Oral Nightly Shan Delgadillo MD        folic acid (FOLVITE) tablet 1 mg  1 mg Oral Daily Josie Kocher, MD   1 mg at 21 1535    [START ON 2021] pantoprazole (PROTONIX) tablet 40 mg  40 mg Oral QAM AC Josie Kocher, MD        ascorbic acid (VITAMIN C) tablet 500 mg  500 mg Oral BID Josie Kocher, MD   500 mg at 21 1535    clopidogrel (PLAVIX) tablet 75 mg  75 mg Oral Daily Josie Kocher, MD   75 mg at 21 1535    metoprolol tartrate (LOPRESSOR) tablet 37.5 mg  37.5 mg Oral BID Josie Kocher, MD   37.5 mg at 21 1643    predniSONE (DELTASONE) tablet 5 mg  5 mg Oral Daily Josie Kocher, MD   5 mg at 21 1535    cefepime (MAXIPIME) 2000 mg IVPB minibag  2,000 mg Intravenous Q12H Cynthia Pattee, DPM   Stopped at 21 1602       ALLERGIES:   No Known Allergies    SOCIAL HISTORY:  Social History     Tobacco Use    Smoking status: Former Smoker     Packs/day: 0.25     Years: 50.00     Pack years: 12.50     Types: Cigarettes     Start date:      Quit date: 2020     Years since quittin.3    Smokeless tobacco: Never Used   Substance Use Topics    Alcohol use: Yes     Comment: 2-3 shot liquor for weekends       REVIEW OF SYSTEMS:  Review of Systems  Skin:Denies any new changing, growing or bleeding lesions or rashes except as described in the HPI     PHYSICAL EXAM:   Vitals:    21 1643   BP: 133/70   Pulse: 76   Resp:    Temp:    SpO2:         General Exam:  General Appearance: No acute distress, Well nourished     Neuro: Alert and oriented to person, place and time  Psych: Normal affect   Lymph Node: Not performed    Cutaneous Exam: Performed as documented in clinic note below. Sun-exposed skin,which includes the head/face, neck, both arms, digits and/or nails was examined. Due to this being a TeleHealth encounter, evaluation of the following organ systems is limited: Vitals/Constitutional/EENT/Resp/CV/GI//MS/Neuro/Skin/Heme-Lymph-Imm. In particular examination of the skin is limited by video quality and patient available technology.     Pertinent Physical Exam Findings:  Physical Exam  Left foot with large ulcer with exposed tendons, erythematous to slightly purpuric edges    Medical Necessity of Exam Performed:   Distribution of patient concerns    Additional Diagnostic Testing performed during exam: Not performed ,  Not performed    ASSESSMENT:  59 yo F with chronic expanding ulcer of unknown etiology of left foot, in the setting of positive AMADOR and anti-centromere ab with no other systemic manifestations of autoimmune disease, negative work-up for vascular occlusion, and two cultures positive for pseudomonas, but unresolved with antibiotic treatment. Overall, I favor an autoimmune etiology for this ulcer, specifically pyoderma gangrenosum. The ulcer has reportedly exhibited pathergy with debridements and biopsies. However, several biopsies have failed to conclusively diagnose pyoderma gangrenosum, instead exhibiting leukocytoclastic vasculitis. Her extensive work-up has not revealed any serologic or clinical manifestations of systemic vasculitic syndromes. Ulcers can form in isolated cutaneous small vessel vasculitis, however the clinical morphology is not consistent, as typically there would be multiple purpuric papules in addition to ulcers. The two tissue cultures growing pseudomonas are noteworthy, though I favor this being a colonizer instead of pathogenic cause of the ulcer. Ecthyma gangrenosum is typically seen in critically ill immunosuppressed patients with pseudomonas septicemia. A 2 year chronic ulcer with no systemic symptoms would be atypical. Prior treatment with ciprofloxacin has not improved the ulcer. However, the pseudomonas colonization can certainly impede healing and should be treated. I agree with initiation of cefepime and appreciate input from ID. Tissue cultures for fungal and mycobacterial organisms are pending.       RECOMMENDATIONS:  As pyoderma gangrenosum is the leading diagnosis, I recommend treating as such while also addressing possible infectious causes (appreciate input from ID)  - recommend discontinuing methotrexate and starting cyclosporine at 3 mg/kg/day divided BID. I communicated this change to rheumatologist Dr. Vicki Ortiz who agrees  Labs and blood pressure monitoring on cyclosporine:   -- Baseline: UA, uric acid, magnesium, lipids (CBC, CMP, quant gold, hep panel, HIV already completed)  - at least daily monitoring of blood pressure  - CBC, CMP, UA, Magnesium weekly x 2 weeks, then monthly thereafter  - will monitor response then plan to transition to anti-TNF long-term if responds well  - continue wound care  - continue clobetasol ointment to edges of ulcer only    - monitor daily for changes, please photograph wound at each dressing change    Side Effects  Side effects of cyclosporine reviewed with patient. These include, but are not limited to: paresthesias and tremors which are common and often begin within 1 week of medication initiation (these are often self-limited and self-resolve); also nausea, vomiting, diarrhea, cholelithiasis, papulopustular acne, sebaceous hyperplasia, gingival hyperplasia, folliculitis, and hypertrichosis. Cyclosporine may raise blood pressure. As such, blood pressure should be monitored at each physician visit. Will perform routine laboratory monitoring for nephrotoxicity (usually by means of afferent glomerular arteriolar constriction), hypomagnesemia, hyperkalemia, hyperuricemia, metabolic acidosis, hyperbilirubinemia, and elevated alkaline phosphatase. I will continue to follow this patient, please do not hesitate to contact me via [employer] with questions.     Wojciech Paul MD      Pursuant to the emergency declaration under the [facility] waiver authority and the [employer] and [employer] Act, this Virtual  Visit was conducted, with patient's consent, to reduce the patient's risk of exposure to COVID-19 and provide continuity of care for an established patient. Services were provided through a video synchronous discussion virtually to substitute for in-person clinic visit.      Electronically signed by Mal Goltz, MD on 6/1/21 at 6:16 PM EDT

## 2021-06-01 NOTE — PLAN OF CARE
Problem: Pain:  Goal: Pain level will decrease  Description: Pain level will decrease  Outcome: Ongoing, Patient complains of chronic pain to Left foot wound seen by podiatry. PRN medications given as needed, Alternatives to medications offered      Problem: Falls - Risk of:  Goal: Will remain free from falls  Description: Will remain free from falls  Outcome: Ongoing, Patient is alert and oriented, able to make needs known.  Patient is aware of limitations, Patient up per self

## 2021-06-02 NOTE — FLOWSHEET NOTE
Patient was crying when writer entered room; she shared her 3 yr gomez with infection in her foot and the \"horrible\" pain. She said it was the pain level that brought her to the hospital. Writer provided listening presence and patient welcomed prayer. 06/02/21 1218   Encounter Summary   Services provided to: Patient   Referral/Consult From: [employer] [facility] Spouse   Continue Visiting   (6-2-21)   Complexity of Encounter High   Length of Encounter 15 minutes   Spiritual Assessment Completed Yes   Spiritual/Buddhism   Type Spiritual support   Assessment Tearful; Anxious   Intervention Active listening;Explored feelings, thoughts, concerns;Explored coping resources;Prayer;Sustaining presence/ Ministry of presence; Discussed illness/injury and it's impact   Outcome Expressed gratitude;Engaged in conversation;Expressed feelings/needs/concerns;Receptive;Venting emotion

## 2021-06-02 NOTE — CONSULTS
[facility] -   Infectious diseases evaluation  admission date 6/1/2021    reason for consultation:   Infected, nonhealing foot wound. 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  Coronary artery disease  Arthritis  Hyperlipidemia  GERD      Recommendations   · IV cefepime, increased dose to 2g every 8 hours for 2 weeks  · Tobramycin one dose  · Midline for home IV cefepime(patient failed several courses of oral Cipro in the past)  · Dermatology evaluation noted considering discontinuing methotrexate and starting cyclosporine        History of Present Illness:   Initial history:  Alexx Olsen is a 60y.o.-year-old female  had nonhealing wound to the dorsum of her left foot for around 2 years worsening with necrotic tissue and exposed tendons associated with pain and greenish drainage. She denied any fever or chills, denied nausea or vomiting, no diarrhea. She does have hands small joints pain. She has been following with dermatology Dr. Cesar Horton and rheumatology. on 4/27/2021 had a punch biopsy done was suggestive of LEUKOCYTOCLASTIC VASCULITIS , Pseudomonas aeruginosa grew on tissue culture that was sensitive to cefepime and Cipro. She was treated with Cipro courses previously with no improvement. HIV screen was negative, QuantiFERON-TB gold test was negative, hepatitis panel negative, ANCA negative  She had multiple punch biopsies most recent was done on previous punch biopsy on 4/20/2021 showed fragment of benign fibrous connective tissue, tissue culture grew Enterococcus faecalis on broth medium only that was sensitive to ampicillin and vancomycin with ZAIDA of 2. She was positive for AMADOR and anticentromere was evaluated by rheumatology was started on prednisone and methotrexate. She had multiple debridements and grafting in the past that was failed.   History of smoking  She was evaluated by vascular surgery, had angiogram done with no reported stenosis. She denied history of diabetes. Tissue culture in December and February 2021 grew Pseudomonas aeruginosa was treated with a course of Cipro. Tissue culture 2/19/2021 grew Pseudomonas aeruginosa that was sensitive to Cipro, previous tissue culture 1/26/2021 grew Pseudomonas aeruginosa that was resistant to Cipro. Interval changes  6/2/2021   Patient Vitals for the past 8 hrs:   BP Temp Temp src Pulse Resp SpO2 Weight   06/02/21 1206 (!) 152/64 98.6 °F (37 °C) Oral 70 16 96 % 192 lb 3.9 oz (87.2 kg)   06/02/21 0628 (!) 141/71 98.1 °F (36.7 °C) Oral 68 16 96 % --           I have personally reviewed the past medical history, past surgical history, medications, social history, and family history, and I haveupdated the database accordingly. Allergies:   Patient has no known allergies. Review of Systems:     Review of Systems  As per history present illness, other than above 12 system review was negative  Physical Examination :       Physical Exam  Constitutional:       General: She is not in acute distress. HENT:      Head: Normocephalic and atraumatic. Right Ear: External ear normal.      Left Ear: External ear normal.      Mouth/Throat:      Pharynx: Oropharynx is clear. No posterior oropharyngeal erythema. Eyes:      General: No scleral icterus. Conjunctiva/sclera: Conjunctivae normal.   Cardiovascular:      Rate and Rhythm: Normal rate and regular rhythm. Heart sounds: No murmur heard. Pulmonary:      Effort: Pulmonary effort is normal. No respiratory distress. Abdominal:      General: Abdomen is flat. There is no distension. Palpations: Abdomen is soft. Musculoskeletal:      Cervical back: Neck supple. No rigidity. Skin:     General: Skin is warm. Coloration: Skin is not jaundiced.       Comments: Large left foot dorsum ulcer with exposed tendons, necrotic tissue, mild amount of purulent drainage, erythematous edges. Neurological:      General: No focal deficit present. Mental Status: She is alert and oriented to person, place, and time. Cranial Nerves: No cranial nerve deficit. Past Medical History:     Past Medical History:   Diagnosis Date    Arthritis     knees hips hands shoulders and back    CAD (coronary artery disease) 2019    blockage on cath 9/2019, to have stenting after surgery (arthroplasty) Dr. Palmer Monday Chronic back pain     GERD (gastroesophageal reflux disease)     on rx    Hyperlipidemia     per Dr. Becky Quinones on rx    Hypertension     Onemo Venkatesh manages    Incomplete RBBB     Irregular heart beat     LAFB (left anterior fascicular block)     Dr. Becky Quinones, cardiologist, seen 9/2019 prior to surgery on 10/8/2019    Leg wound, left     following with wound care, Dr. Amor Nettles Lumbar disc disease     Osteoporosis     PAC (premature atrial contraction) 06/2018    PACs and PVCs on holter monitor,     PONV (postoperative nausea and vomiting)     requests scop patch    Wears dentures     upper partial    Wears eyeglasses     readers    Wears partial dentures     upper and lower    Wellness examination     MIKE Barrera NP PCP, seen 2/10/2020       Past Surgical  History:     Past Surgical History:   Procedure Laterality Date    ABDOMINOPLASTY  1997    BACK SURGERY      BLEPHAROPLASTY Bilateral 1997    BREAST ENHANCEMENT SURGERY Bilateral 1999    breast augmentation   [address]  2019    Dr. Becky Quinones, blockage but no stents yet    CATARACT REMOVAL WITH IMPLANT Bilateral 2016    COLONOSCOPY  2015    No Polyps    COSMETIC SURGERY      eyelid lift    FINGER SURGERY Right     thumb lesion excised    FIXATION KYPHOPLASTY  2013    Back    FOOT DEBRIDEMENT Left 10/11/2019    SURGICAL PREP WOUND BED LEFT FOOT WITH APPLICATION OF EPIFIX LEFT FOOT 3X4 performed by Sarah Flores DPM at [place] [address]      kyphoplasty for Not on file   Occupational History    Occupation: Retired RN   Tobacco Use    Smoking status: Former Smoker     Packs/day: 0.25     Years: 50.00     Pack years: 12.50     Types: Cigarettes     Start date:      Quit date: 2020     Years since quittin.3    Smokeless tobacco: Never Used   Vaping Use    Vaping Use: Never used   Substance and Sexual Activity    Alcohol use: Yes     Comment: 2-3 shot liquor for weekends    Drug use: Never    Sexual activity: Yes     Partners: Male   Other Topics Concern    Not on file   Social History Narrative    Not on file     Social Determinants of Health     Financial Resource Strain:     Difficulty of Paying Living Expenses:    Food Insecurity:     Worried About Running Out of Food in the Last Year:     [address] in the Last Year:    Transportation Needs:     Lack of Transportation (Medical):      Lack of Transportation (Non-Medical):    Physical Activity:     Days of Exercise per Week:     Minutes of Exercise per Session:    Stress:     Feeling of Stress :    Social Connections:     Frequency of Communication with Friends and Family:     Frequency of Social Gatherings with Friends and Family:     Attends Evangelical Services:     Active Member of Clubs or Organizations:     Attends Club or Organization Meetings:     Marital Status:    Intimate Partner Violence:     Fear of Current or Ex-Partner:     Emotionally Abused:     Physically Abused:     Sexually Abused:        Family History:     Family History   Problem Relation Age of Onset    Coronary Art Dis Mother     Arthritis Mother     Heart Surgery Mother         CABG    Coronary Art Dis Father     Heart Disease Father     Heart Surgery Father         CABG    Coronary Art Dis Sister     Heart Surgery Sister         CABG      Medical Decision Making:   I have independently reviewed/ordered the following labs:    CBC with Differential:   Recent Labs     21  0511   WBC 6.9 HGB 11.0*   HCT 34.0*        BMP:  Recent Labs     06/02/21  0511      K 4.3      CO2 27   BUN 11   CREATININE 0.54       Lab Results   Component Value Date    CREATININE 0.54 06/02/2021    GLUCOSE 110 06/02/2021       Detailed results: Thank you for allowing us to participate in the care of this patient. Please call with questions. This note is created with the assistance of a speech recognition program.  While intending to generate adocument that actually reflects the content of the visit, the document can still have some errors including those of syntax and sound a like substitutions which may escape proof reading. It such instances, actual meaningcan be extrapolated by contextual diversion.     Shantell Yin MD  Office: [phone]  Perfect serve / office [phone]

## 2021-06-02 NOTE — PROGRESS NOTES
manages    Incomplete RBBB     Irregular heart beat     LAFB (left anterior fascicular block)     Dr. Lorie Davis, cardiologist, seen 9/2019 prior to surgery on 10/8/2019    Leg wound, left     following with wound care, Dr. Juan Luis Cee Lumbar disc disease     Osteoporosis     PAC (premature atrial contraction) 06/2018    PACs and PVCs on holter monitor,     PONV (postoperative nausea and vomiting)     requests scop patch    Wears dentures     upper partial    Wears eyeglasses     readers    Wears partial dentures     upper and lower    Wellness examination     MIKE Parmar, STEPHANIE PCP, seen 2/10/2020        Past Surgical History:     Past Surgical History:   Procedure Laterality Date    ABDOMINOPLASTY  1997    BACK SURGERY      BLEPHAROPLASTY Bilateral 1997    BREAST ENHANCEMENT SURGERY Bilateral 1999    breast augmentation   [facility]  2019    Dr. Lorie Davis, blockage but no stents yet    CATARACT REMOVAL WITH IMPLANT Bilateral 2016    COLONOSCOPY  2015    No Polyps    COSMETIC SURGERY      eyelid lift    FINGER SURGERY Right     thumb lesion excised    FIXATION KYPHOPLASTY  2013    Back    FOOT DEBRIDEMENT Left 10/11/2019    SURGICAL PREP WOUND BED LEFT FOOT WITH APPLICATION OF EPIFIX LEFT FOOT 3X4 performed by Raisa Sherman DPM at [place] [address]      [facility]   Ardell Pimple Left 1/2/2020    FOOT  DEBRIDEMENT WITH WOUND VAC PLACEMENT performed by Yuki Ontiveros MD at [place] [address]  2014   [place] [id number]  2005    diskectomy and spinal fusion    OTHER SURGICAL HISTORY  10/03/2019    Left leg angiogram    IN OFFICE/OUTPT VISIT,PROCEDURE ONLY Right 9/11/2018    EXCISION MASS THUMB, 3080 TABLE performed by Lizz Chanel DO at [facility] Left 12/3/2019    LEFT FOOT DEBRIDEMENT WITH SKIN GRAFT SPLIT tablet Take 1 tablet by mouth 2 times daily 10/14/20 6/1/21 Yes Alan Curry MD   vitamin C (ASCORBIC ACID) 500 MG tablet Take 500 mg by mouth 2 times daily   Yes Historical Provider, MD   diclofenac sodium (VOLTAREN) 1 % GEL Apply topically as needed Dr. Rach Gonsalves   Yes Historical Provider, MD   atorvastatin (LIPITOR) 40 MG tablet Take 1 tablet by mouth nightly  Patient taking differently: Take 40 mg by mouth every morning Per Dr. Laura Jefferson 9/11/19  Yes Armida Apley, MD   NAPROXEN PO Take 250 mg by mouth daily as needed Indications: patient takes 1-2 times a day Stopping 10 days prior to surgery as instructed by surgeon   Yes Historical Provider, MD   omeprazole (PRILOSEC) 40 MG delayed release capsule Take 1 tablet by mouth daily Per Salvador Louie   Yes Historical Provider, MD   Cholecalciferol (VITAMIN D3) 5000 units TABS Take 1,000 Units by mouth daily    Yes Historical Provider, MD   calcium carbonate (OSCAL) 500 MG TABS tablet Take 1,000 mg by mouth daily Per Alondra [place] rheumatology   Yes Historical Provider, MD   alendronate (FOSAMAX) 70 MG tablet Take 70 mg by mouth every 7 days Indications: Mondays Dr. Wen Zimmerman   Patient takes on Mondays 6/4/18  Yes Historical Provider, MD   gabapentin (NEURONTIN) 300 MG capsule Take 300 mg by mouth 2 times daily. Per Dr. Rach Gonsalves 2/27/15  Yes Historical Provider, MD        Allergies:     Patient has no known allergies. Social History:     Tobacco:    reports that she quit smoking about 16 months ago. Her smoking use included cigarettes. She started smoking about 53 years ago. She has a 12.50 pack-year smoking history. She has never used smokeless tobacco.  Alcohol:      reports current alcohol use. Drug Use:  reports no history of drug use.     Family History:     Family History   Problem Relation Age of Onset    Coronary Art Dis Mother     Arthritis Mother     Heart Surgery Mother         CABG    Coronary Art Dis Father     Heart Disease Father     Heart Surgery Father CABG    Coronary Art Dis Sister     Heart Surgery Sister         CABG       Review of Systems:     Positive and Negative as described in HPI. CONSTITUTIONAL:  negative for fevers, chills, sweats, fatigue, weight loss  HEENT:  negative for vision, hearing changes, runny nose, throat pain  RESPIRATORY:  negative for shortness of breath, cough, congestion, wheezing. CARDIOVASCULAR:  negative for chest pain, palpitations. GASTROINTESTINAL:  negative for nausea, vomiting, diarrhea, constipation, change in bowel habits, abdominal pain   GENITOURINARY:  negative for difficulty of urination, burning with urination, frequency   INTEGUMENT:  negative for rash, skin lesions, easy bruising   HEMATOLOGIC/LYMPHATIC:  negative for swelling/edema   ALLERGIC/IMMUNOLOGIC:  negative for urticaria , itching  ENDOCRINE:  negative increase in drinking, increase in urination, hot or cold intolerance  MUSCULOSKELETAL: Chronic ulcers on dorsum of left foot, chronic back pain  NEUROLOGICAL:  negative for headaches, dizziness, lightheadedness, numbness, pain, tingling extremities  BEHAVIOR/PSYCH:      Physical Exam:     BP (!) 152/64   Pulse 70   Temp 98.6 °F (37 °C) (Oral)   Resp 16   Ht 5' 6\" (1.676 m)   Wt 192 lb 3.9 oz (87.2 kg)   SpO2 96%   BMI 31.03 kg/m²   Temp (24hrs), Av.3 °F (36.8 °C), Min:98.1 °F (36.7 °C), Max:98.6 °F (37 °C)    No results for input(s): POCGLU in the last 72 hours. Intake/Output Summary (Last 24 hours) at 2021 1411  Last data filed at 2021 0914  Gross per 24 hour   Intake 120 ml   Output --   Net 120 ml       General Appearance:  alert, well appearing, and in no acute distress  Mental status: oriented to person, place, and time with normal affect  Head:  normocephalic, atraumatic.   Eye: no icterus, redness, pupils equal and reactive, extraocular eye movements intact, conjunctiva clear  Ear: normal external ear, no discharge, hearing intact  Nose:  no drainage noted  Mouth: mucous membranes moist  Neck: supple, no carotid bruits, thyroid not palpable  Lungs: Bilateral equal air entry, clear to ausculation, no wheezing, rales or rhonchi, normal effort  Cardiovascular: normal rate, regular rhythm, no murmur, gallop, rub. Abdomen: Soft, nontender, nondistended, normal bowel sounds, no hepatomegaly or splenomegaly  Neurologic: There are no new focal motor or sensory deficits, normal muscle tone and bulk, no abnormal sensation, normal speech, cranial nerves II through XII grossly intact  Skin: No gross lesions, rashes, bruising or bleeding on exposed skin area  Extremities: Left foot in dressing, tenderness lower back  Psych:      Investigations:      Laboratory Testing:  Recent Results (from the past 24 hour(s))   Basic Metabolic Panel w/ Reflex to MG    Collection Time: 06/02/21  5:11 AM   Result Value Ref Range    Glucose 110 (H) 70 - 99 mg/dL    BUN 11 8 - 23 mg/dL    CREATININE 0.54 0.50 - 0.90 mg/dL    Bun/Cre Ratio NOT REPORTED 9 - 20    Calcium 9.8 8.6 - 10.4 mg/dL    Sodium 141 135 - 144 mmol/L    Potassium 4.3 3.7 - 5.3 mmol/L    Chloride 106 98 - 107 mmol/L    CO2 27 20 - 31 mmol/L    Anion Gap 8 (L) 9 - 17 mmol/L    GFR Non-African American >60 >60 mL/min    GFR African American >60 >60 mL/min    GFR Comment          GFR Staging NOT REPORTED    CBC    Collection Time: 06/02/21  5:11 AM   Result Value Ref Range    WBC 6.9 3.5 - 11.0 k/uL    RBC 3.20 (L) 4.0 - 5.2 m/uL    Hemoglobin 11.0 (L) 12.0 - 16.0 g/dL    Hematocrit 34.0 (L) 36 - 46 %    .1 (H) 80 - 100 fL    MCH 34.4 (H) 26 - 34 pg    MCHC 32.4 31 - 37 g/dL    RDW 15.6 (H) 11.5 - 14.9 %    Platelets 871 629 - 352 k/uL    MPV 7.5 6.0 - 12.0 fL    NRBC Automated NOT REPORTED per 100 WBC           Consultations:   IP CONSULT TO HOSPITALIST  IP CONSULT TO INFECTIOUS DISEASES  IP CONSULT TO DERMATOLOGY  Assessment :      Primary Problem  <principal problem not specified>    Active Hospital Problems    Diagnosis Date Noted  Cellulitis of left foot [Q98.587] 06/01/2021    S/P drug eluting coronary stent placement [Z95.5] 02/03/2021    PAD (peripheral artery disease) ([facility] [address] [I73.9] 12/29/2020    Anemia [D64.9] 10/15/2020    Hypertension [I10] 12/30/2019       Plan:     1. Chronic wound in dorsum of left foot failed 2 skin graft in past, nonhealing, biopsy concerning for pyoderma gangrenosum  2. We will continue with home dose of prednisone 5 mg daily, strongly suggest consulting ID, since culture on 5/27 growing Pseudomonas, need to be treated for Pseudomonas infection  3. Pyoderma gangrenosum, will have virtual visit with the dermatologist while in-house as per notes  4. Patient has coronary artery disease s/p stents in February of this year, starting patient on aspirin, Plavix, home dose of Lopressor, in case patient need debridement and antiplatelet need to be held, we need to involve cardiologist for clearance to stop antiplatelet with recent cardiac stent  5. On DVT prophylaxis Lovenox  6. Inflammatory polyarthritis diagnosed by rheumatologist on methotrexate weekly  7. PT/OT consult    6/2  Patient feeling better  Evaluated by dermatologist, recommending cyclosporine  We will talk to podiatrist about starting cyclosporine  On cefepime    Shan Delgadillo MD  6/2/2021  2:11 PM    Copy sent to Dr. Selam Todd, APRN - CNP    Please note that this chart was generated using voice recognition Dragon dictation software. Although every effort was made to ensure the accuracy of this automated transcription, some errors in transcription may have occurred.

## 2021-06-02 NOTE — PROGRESS NOTES
[address]   Occupational Therapy Evaluation  Date: 21  Patient Name: Scott Moralez       Room: 4791/7257-62  MRN: [id number]  Account: [de-identified]   : 1955  (63 y.o.) Gender: female     Discharge Recommendations:  Further Occupational Therapy is recommended upon facility discharge. OT Equipment Recommendations  Equipment Needed:  (appropriate sized front wheeled walker)    Referring Practitioner: Dr. Anthony Coppola  Diagnosis: Sepsis related  to left foot dorsal wound  Additional Pertinent Hx: CAD, Arthritis, Chronic back pain, spinal  fusion, R TKA, GERD, HTN,    Treatment Diagnosis: Impaired ability to complete functional standing and mobility during ADL and IADL tasks upon discharging home. Past Medical History:  has a past medical history of Arthritis, CAD (coronary artery disease), Chronic back pain, GERD (gastroesophageal reflux disease), Hyperlipidemia, Hypertension, Incomplete RBBB, Irregular heart beat, LAFB (left anterior fascicular block), Leg wound, left, Lumbar disc disease, Osteoporosis, PAC (premature atrial contraction), PONV (postoperative nausea and vomiting), Wears dentures, Wears eyeglasses, Wears partial dentures, and Wellness examination. Past Surgical History:   has a past surgical history that includes Knee arthroscopy (Left, ); Abdominoplasty (); blepharoplasty (Bilateral, ); laminectomy (); lumbar laminectomy (); lumbar laminectomy (); Fixation Kyphoplasty (); Finger surgery (Right); Colonoscopy (); pr office/outpt visit,procedure only (Right, 2018); Lumbar spine surgery (); Cataract removal with implant (Bilateral, ); other surgical history (10/03/2019); Foot Debridement (Left, 10/11/2019); Skin graft (Left, 12/3/2019); Leg Surgery (Left, 2020); Breast enhancement surgery (Bilateral, ); Skin graft (Left, 2020); fracture surgery; Tonsillectomy; Skin graft (Left, 2020);  Cosmetic surgery; Cardiac catheterization (2019); Total knee arthroplasty (Right, 10/13/2020); Total knee arthroplasty (Right, 10/13/2020); and back surgery. Restrictions  Restrictions/Precautions: Weight Bearing, Contact Precautions, Fall Risk (L LE NWB)  Implants present? : Metal implants (R TKA and spinal fusion)  Left Lower Extremity Weight Bearing: Non Weight Bearing  Required Braces or Orthoses?: No     Vitals  Temp: 98.6 °F (37 °C)  Pulse: 70  Resp: 16  BP: (!) 152/64  Height: 5' 6\" (167.6 cm)  Weight: 192 lb 3.9 oz (87.2 kg)  BMI (Calculated): 31.1  Oxygen Therapy  SpO2: 96 %  O2 Device: None (Room air)  Level of Consciousness: Alert (0)    Subjective  Subjective: Patient stated she did not sleep well last night. During evaluation,patient reported 7-8/10 pain in her left foot. Comments: Patient pleasant and cooperative and receptive to OT evaluation. Overall Orientation Status: Within Functional Limits  Vision  Vision: Impaired  Vision Exceptions: Wears glasses for reading  Hearing  Hearing: Within functional limits  Social/Functional History  Lives With: Spouse  Type of Home: House  Home Layout: Two level (Bedroom upstairs, bathroom on first floor, is able to sleep in recliner on the first floor.)  Home Access: Stairs to enter without rails  Entrance Stairs - Number of Steps: 1+1 steps (not platform) at front entrance. Bathroom Shower/Tub: Tub/Shower unit, Curtain (patient stated her tub/ shower is actually a claw foot tub with a bench across the tub.)  Bathroom Toilet: Standard  Bathroom Equipment: Grab bars in shower (one grab bar by the toilet, and one positioned as you enter the tub.)  Bathroom Accessibility: Accessible (for walker)  Home Equipment: Rolling walker, Cane, Grab bars,  and tub seat.   Receives Help From: Family ()  ADL Assistance: Independent  Homemaking Assistance: Independent (patient completes all housekeeping tasks except  laundry in which her  completes.)  Homemaking Responsibilities: Yes (patient does all cooking, cleaning,  does the laundry,)  Ambulation Assistance: Independent (Ambulating at home without device, furniture walking secondary to 6-7/10 pain in her left foot.)  Transfer Assistance: Independent  Active : Yes  Mode of Transportation: Truck (has a jeep and a pick-up truck.)  Occupation: Retired  Type of occupation: worked in [employer] at [employer]: gardening, sewing, cooking, embroidery  IADL Comments: Patient stated she was able to complete all IADL tasks prior to admission. Additional Comments: No recent PT OT.  is retired - can assist upon d/c 24/7. Pain Assessment  Pain Level: 8  Pain Location: Foot (left foot)    Objective      Cognition  Overall Cognitive Status: WFL   Sensation  Overall Sensation Status: Impaired (Patient reported numbness in her big toe on her left foot.)   ADL  Feeding: Independent  Grooming: Modified independent  (in seated position , CGA if completed in standing position.)  UE Bathing: Modified independent  (in seated position)  LE Bathing: Modified independent ( in seated position). UE Dressing: Independent  LE Dressing: Contact guard assistance  Toileting: Contact guard assistance (upon standing to complete anterior hygiene.)  Additional Comments: ADL scores based on skilled observation or clinical reasoning unless otherwise noted . Patient able to complete toileting at [facility] upon standing to complete anterior hygiene. Lower body dressing completed from EOB at MOD I to don/doff right sock.     UE Function           LUE Strength  L Hand General: 5/5  Left Hand Strength -  (lbs)  Handle Setting 2: 40# ( Norm 35#-57#)  LUE Tone: Normotonic     LUE AROM (degrees)  LUE AROM : WFL     Left Hand AROM (degrees)  Left Hand AROM: WFL  RUE Strength  R Hand General: 5/5   Right Hand Strength -  (lbs)  Handle Setting 2: 40#( Norm 35#-57#)  RUE Tone: Normotonic     RUE AROM (degrees)  RUE AROM : WFL     Right Hand AROM Functional Outcome Measures  AM-PAC Daily Activity Inpatient   How much help for putting on and taking off regular lower body clothing?: A Little  How much help for Bathing?: A Little  How much help for Toileting?: A Little  How much help for putting on and taking off regular upper body clothing?: None  How much help for taking care of personal grooming?: None  How much help for eating meals?: None  AM-[facility] Inpatient Daily Activity Raw Score: 21  AM-PAC Inpatient ADL T-Scale Score : 44.27  ADL Inpatient CMS 0-100% Score: 32.79  ADL Inpatient CMS G-Code Modifier : CJ       Goals  Patient Goals   Patient goals : Patient stated, I just want to get home. \"  Short term goals  Time Frame for Short term goals: By discharge, patient will:  Short term goal 1: Demonstrate and verbalize understanding of importance of complying to left lower extremity NWB during IADL tasks including reaching into cabinets, and wiping down counter. Short term goal 2: Demonstrate and verbalize home exercise program to increase bilateral upper extremity tricep strength facilitating increased independence in functional transfers and mobility. Short term goal 3: complete functional transfers at SBA with compliance of left lower extremity NWB 3/3 trials  Short term goal 4: complete lower body dressing at SBA with good compliance of left lower extremity NWB  Short term goal 5: complete toileting at SBA with good compliance of left lower extremity NWB.     Plan  Safety Devices  Safety Devices in place: Yes  Type of devices: Left in bed, Call light within reach, Gait belt     Plan  Times per week: 1-3 times a week  Times per day: Daily  Current Treatment Recommendations: Strengthening, Functional Mobility Training, Home Management Training, Patient/Caregiver Education & Training, Safety Education & Training, Self-Care / ADL, Endurance Training  OT Education  OT Education: OT Role, Precautions, Home Exercise Program, ADL Adaptive Strategies,

## 2021-06-02 NOTE — PROGRESS NOTES
Physical Therapy    Facility/Department: [facility] MED SURG  Initial Assessment    NAME: Asenath Eisenmenger  : 1955  MRN: [id number]    Date of Service: 2021    Discharge Recommendations: The patient may need non-skilled mobility assistance after discharge. PT Equipment Recommendations  Equipment Needed: Yes  Other: Knee scooter    Assessment   Body structures, Functions, Activity limitations: Decreased functional mobility ; Decreased ADL status; Decreased ROM; Decreased strength;Decreased endurance;Decreased balance; Increased pain  Assessment: Pt most limited by pain this date. Pt amb with RW and trialed knee scooter for increased distances aand safety. pt would benefit from use of knee scooter. Treatment Diagnosis: Impaired functional mobillity 2* cellulitis L foot  Specific instructions for Next Treatment: knee scooter/RW, stairs, HEP  Prognosis: Good  Decision Making: Low Complexity  History: 60 y.o. female seen at [place] [address] wound care center for left foot wound. Patient is well-known to podiatry service and Dr. Natalie Eric. Patient relates wound is getting larger since last visit, reports she is still on prednisone and methotrexate per rheumatologist.  Patient was seen by dermatologist, Dr. Thang Price on 21. Exam: ROM, MMT, bed mobility, transfers, amb, balance  Clinical Presentation: Pt alert, cooperative, pleasant  Barriers to Learning: pain  REQUIRES PT FOLLOW UP: Yes  Activity Tolerance  Activity Tolerance: Patient limited by pain; Patient Tolerated treatment well       Patient Diagnosis(es): There were no encounter diagnoses.      has a past medical history of Arthritis, CAD (coronary artery disease), Chronic back pain, GERD (gastroesophageal reflux disease), Hyperlipidemia, Hypertension, Incomplete RBBB, Irregular heart beat, LAFB (left anterior fascicular block), Leg wound, left, Lumbar disc disease, Osteoporosis, PAC (premature atrial contraction), PONV (postoperative nausea and Assessment  Pain Assessment: 0-10  Pain Level: 7  Pain Type: Chronic pain  Pain Location: Foot  Pain Orientation: Left  Pain Descriptors: Burning  Pain Frequency: Continuous  Pain Onset: On-going  Clinical Progression: Gradually improving  Functional Pain Assessment: Prevents or interferes some active activities and ADLs  Non-Pharmaceutical Pain Intervention(s): Ambulation/Increased Activity;Cold applied;Distraction;Elevation;Repositioned; Rest  Response to Pain Intervention: Patient Satisfied  Vital Signs  Patient Currently in Pain: Yes  Oxygen Therapy  O2 Device: None (Room air)  Patient Observation  Observations: L foot ACE wrapped       Orientation  Orientation  Overall Orientation Status: Within Normal Limits  Social/Functional History  Social/Functional History  Lives With: Spouse  Type of Home: House  Home Layout: Two level (Bedroom upstairs, bathroom on first floor, is able to sleep in recliner on the first floor.)  Home Access: Stairs to enter without rails  Entrance Stairs - Number of Steps: 1+1 step into home (not platform) at front entrance.   Bathroom Shower/Tub: Tub/Shower unit, Curtain (patient stated her tub/ shower is actually a claw foot tub with a bench across the tub.)  Bathroom Toilet: Standard  Bathroom Equipment: Grab bars in shower (one grab bar by the toilet, and one positioned as you enter the tub.)  Bathroom Accessibility: Accessible (for walker)  Home Equipment: Rolling walker, Cane, Grab bars (and tub seat)  Receives Help From: Family ()  ADL Assistance: Independent  Homemaking Assistance: Independent (patient completes all housekeeping tasks except  laundry in which her  completes.)  Homemaking Responsibilities: Yes (patient does all cooking, cleaning,  does the laundry,)  Ambulation Assistance: Independent (Ambulating at home without device, furniture walking secondary to 6-7/10 pain in her left foot.)  Transfer Assistance: Independent  Active : Yes  Mode of Transportation: Truck (has a jeep and a pick-up truck.)  Occupation: Retired  Type of occupation: worked in [employer] at [employer]: gardening, sewing, cooking, embroidery  IADL Comments: Patient stated  she was able to complete all IADL tasks prior to admission. Additional Comments: No recent PT OT.  is retired - can assist upon d/c 24/7. Cognition        Objective          AROM RLE (degrees)  RLE AROM: WNL  AROM LLE (degrees)  LLE AROM : WFL  LLE General AROM: hip/knee  AROM RUE (degrees)  RUE General AROM: See OT  AROM LUE (degrees)  LUE General AROM: See OT  Strength RLE  Strength RLE: WNL  Comment: Grossly 4/5  Strength LLE  Strength LLE: WFL  Comment: Grossly 3/5 at hip/knee  Strength RUE  Comment: see OT  Strength LUE  Comment: See OT     Sensation  Overall Sensation Status: WFL (pt denies - dull feeling on L great toe)  Bed mobility  Rolling to Left: Modified independent  Supine to Sit: Modified independent  Sit to Supine: Modified independent  Scooting: Independent  Comment: HOB elevated, good management of LLE to EOB. Transfers  Sit to Stand: Contact guard assistance  Stand to sit: Contact guard assistance  Comment: CGA with RW from toilet and frmo bedside using RW. One transfer included on and off knee scooter for technique. Ambulation  Ambulation?: Yes  WB Status: NWB LLE  More Ambulation?: Yes  Ambulation 1  Surface: level tile  Device: Rolling Walker  Assistance: Contact guard assistance  Quality of Gait: decreased step length and toe clearance on R LE, slow tj, emphasis on B UE, no LOB  Gait Deviations: Slow Tj;Decreased step length;Decreased step height  Distance: 10' x2  Comments: Good technique of RW - safety for technique. Pt prefers RW low for ability to use B UE.   Ambulation 2  Surface - 2: level tile  Device 2:  (knee scooter)  Assistance 2: Contact guard assistance  Quality of Gait 2: slow tj, no LOB, steady, good use of knee scooter with turns  Gait Deviations: Slow Sharon  Distance: 25'  Comments: Education on technique, use of knee scooter/brakes, getting on/off knee scooter. Stairs/Curb  Stairs?: No (pt defers today due to pain)     Balance  Posture: Good  Sitting - Static: Good  Sitting - Dynamic: Good  Standing - Static: Good;-  Standing - Dynamic: Good;-  Comments: Standing balance with RW/knee scooter        Plan   Plan  Times per week: 5x/week  Specific instructions for Next Treatment: knee scooter/RW, stairs, HEP  Current Treatment Recommendations: Strengthening, ROM, Balance Training, Functional Mobility Training, Transfer Training, Endurance Training, Gait Training, Stair training, Equipment Evaluation, Education, & procurement, Patient/Caregiver Education & Training, Safety Education & Training, Pain Management, Home Exercise Program, Positioning  Safety Devices  Type of devices: All fall risk precautions in place, Call light within reach, Gait belt, Patient at risk for falls, Left in bed, Nurse notified (RN Dorn Lefort)    G-Code       OutComes Score                                                  AM-PAC Score  AM-PAC Inpatient Mobility Raw Score : 18 (06/02/21 1451)  AM-PAC Inpatient T-Scale Score : 43.63 (06/02/21 1451)  Mobility Inpatient CMS 0-100% Score: 46.58 (06/02/21 1451)  Mobility Inpatient CMS G-Code Modifier : CK (06/02/21 1451)          Goals  Short term goals  Time Frame for Short term goals: 3-4 days  Short term goal 1: Pt to demo IND bed mobility. Short term goal 2: Pt to demo Mod I transfers. Short term goal 3: Pt to amb 48' with RW and 150' with knee scooter, SBA. Short term goal 4: Pt to ascend/descend 1+1 steps per home set up, min to mod x1 with family training if present. Short term goal 5: Pt to demo good technique for HEP. Patient Goals   Patient goals :  To go home       Therapy Time   Individual Concurrent Group Co-treatment   Time In 1029         Time Out 1101         Minutes 32         Timed Code Treatment Minutes: 12 Minutes       [place], [place]

## 2021-06-02 NOTE — PROGRESS NOTES
Progress Note  Podiatric Medicine and Surgery     Subjective     CC: Left foot wound    Patient seen and examined at bedside. Tolerating diet well. Pain under control. No new complaints overnight. Afebrile, vital signs stable. HPI :  Edgardo Shearer is a 60 y.o. female seen at [facility] wound care center for left foot wound. Patient is well-known to podiatry service and Dr. Nilo Brady. Patient relates wound is getting larger since last visit, reports she is still on prednisone and methotrexate per rheumatologist.  Patient was seen by dermatologist, Dr. Mary Shay on 21. No other pedal complaints this time. PCP is Blanca Washingotn, APRN - CNP    ROS: Denies N/V/F/C/SOB/CP/Cough.        Medications:  Scheduled Meds:   sodium chloride flush  5-40 mL Intravenous 2 times per day    enoxaparin  40 mg Subcutaneous Daily    aspirin EC  81 mg Oral Daily    atorvastatin  40 mg Oral Nightly    folic acid  1 mg Oral Daily    pantoprazole  40 mg Oral QAM AC    vitamin C  500 mg Oral BID    clopidogrel  75 mg Oral Daily    metoprolol tartrate  37.5 mg Oral BID    predniSONE  5 mg Oral Daily    cefepime  2,000 mg Intravenous Q12H       Continuous Infusions:   sodium chloride         PRN Meds:sodium chloride flush, sodium chloride, promethazine **OR** ondansetron, polyethylene glycol, acetaminophen **OR** acetaminophen, oxyCODONE-acetaminophen **OR** oxyCODONE-acetaminophen, morphine **OR** morphine    Objective     Vitals:  Patient Vitals for the past 8 hrs:   BP Temp Temp src Pulse Resp SpO2   21 0628 (!) 141/71 98.1 °F (36.7 °C) Oral 68 16 96 %     Average, Min, and Max for last 24 hours Vitals:  TEMPERATURE:  Temp  Av.2 °F (36.8 °C)  Min: 97.9 °F (36.6 °C)  Max: 98.8 °F (37.1 °C)    RESPIRATIONS RANGE: Resp  Av.2  Min: 15  Max: 18    PULSE RANGE: Pulse  Av.7  Min: 66  Max: 76    BLOOD PRESSURE RANGE:  Systolic (99SXO), NBS:971 , Min:120 , XUZ:375   ; Diastolic (76GIW), XZO:61, gangrenosum, vasculitis left foot  PAD    Active Problems:    Hypertension    Anemia    PAD (peripheral artery disease) ([facility])    S/P drug eluting coronary stent placement    Cellulitis of left foot  Resolved Problems:    * No resolved hospital problems. *       Plan     Patient examined and evaluated at bedside   Treatment options discussed in detail with the patient  Continue medical management per IM   ID consulted- appreciate abx recommendations  Abx: Cefepime  Dermatology on board- they recommend discontinuation of Methotrexate & starting Cyclosporine--Please refer to dermatology consult note for further advisements. Podiatry will continue A.M. dressing changes and follow recommendations of rheumatology/dermatology/infectious disease  Dressing applied to Left foot: Aquacel Ag saturated with saline, adaptic, ABDs DSD, ACE  Nursing to please change dressing  to left foot once per evening shift as follows: Lidocaine jelly surrounding wound, Clobetasol to edges of wound, saline-soaked aquacell Ag to wound bed, Adaptic overlying aquacell, 4x4 gauze, Abd pad, Kerlix, light Ace or tape. Will discuss with Dr. Natalie Eric    DVT ppx: lovenox    Diet: General   Activity: NWB to Left lower extremity  Pain Control: Percocet and morphine pain panel ordered      Jaye Henley DPM   Podiatric Medicine & Surgery   6/2/2021 at 7:44 AM    I performed a history and physical examination of the patient and discussed management with the resident. I reviewed the residents note and agree with the documented findings and plan of care. Any areas of disagreement are noted on the chart. I was personally present for the key portions of any procedures. I have documented in the chart those procedures where I was not present during the key portions. I have reviewed the Podiatry Resident progress note.  I agree with the chief complaint, past medical history, past surgical history, allergies, medications, social and family history as

## 2021-06-02 NOTE — CARE COORDINATION
CASE MANAGEMENT NOTE:    Admission Date:  6/1/2021 Leni Gar is a 60 y.o.  female    Admitted for : Cellulitis of left foot [L03.116]    Met with:  Patient    PCP:  Ricardo Ballard                                Insurance:  [facility] [place]      Is patient alert and oriented at time of discussion:  Yes    Current Residence/ Living Arrangements:  independently at home w/         Current Services PTA:  Yes, [address]    Does patient go to outpatient dialysis: No  If yes, location and chair time: NA    Is patient agreeable to VNS: Yes    Freedom of choice provided:  Yes    List of [address] provided: No    VNS chosen:  Yes, Katy Bland, notified, to follow. DME:  walker and shower chair    Home Oxygen: No    Nebulizer: No    CPAP/BIPAP: No    Supplier: N/A    Potential Assistance Needed: Follow for possible IV antibiotics, VNS, Knee Scooter    SNF needed: No    Freedom of choice and list provided: NA    Pharmacy:  [address] [place] [address] on [place]       Does Patient want to use MEDS to BEDS? No    Is patient currently receiving oral anticoagulation therapy? No    Is the Patient an OLMAN MONTEIRO [facility] with Readmission Risk Score greater than 14%? No  If yes, pt needs a follow up appointment made within 7 days. Family Members/Caregivers that pt would like involved in their care:    Yes    If yes, list name here:  , Crispin Saenz    Transportation Provider:  Patient             Discharge Plan:  6/2/21 [facility] [place] Pt. Lives in [address] home, W/ , Has [employer] 1st floor. DME, walker, [place], Would like a knee scooter, will follow. Current w/ [facility] & [facility]. PT/OT, IV Cefepime. ID/ Dermatology, + Pseudomonas on 5/27. Notified, Melanie from [place], notified to follow, Mitzi from [place], following as well. Orange header 9%. [place] will need signed/completed.  Will follow closely for all needs//KB                    Electronically signed by: Jama Alpers, RN on 6/2/2021 at 1:16 PM

## 2021-06-03 NOTE — CARE COORDINATION
Writer notified, by Beth Joyce, from [place], that is Pt. Needs IV antibiotics, the cost is $20.00 per week & she will have a supply cost until OOP is met.

## 2021-06-03 NOTE — PROGRESS NOTES
[address] [facility]    INPATIENT OCCUPATIONAL THERAPY  PROGRESS NOTE  Date: 6/3/2021  Patient Name: Kaden Tirado      Room:   MRN: [id number]    : 1955  (63 y.o.) Gender: female     Discharge Recommendations:  Further Occupational Therapy is recommended upon facility discharge. OT Equipment Recommendations  Equipment Needed:  (appropriate sized front wheeled walker)    Referring Practitioner: Dr. Linda Lee  Diagnosis: Sepsis related  to left foot dorsal wound  General  Chart Reviewed: Yes, Progress Notes  Patient assessed for rehabilitation services?: Yes  Additional Pertinent Hx: CAD, Arthritis, Chronic back pain, spinal  fusion, R TKA, GERD, HTN,  Family / Caregiver Present: Yes ( present at beginning of evaluation.)  Referring Practitioner: Dr. Linda Lee  Diagnosis: Sepsis related  to left foot dorsal wound    Restrictions  Restrictions/Precautions: Weight Bearing, Contact Precautions, Fall Risk (L LE NWB)  Implants present? : Metal implants (R TKA 2020 and spinal fusion)  Other position/activity restrictions: up as tolerated  Left Lower Extremity Weight Bearing: Non Weight Bearing  Required Braces or Orthoses?: No      Subjective  Subjective: \"zero, none\" pt verbalizes understanding of LLE NWB  Comments: Patient pleasant and cooperative and receptive to OT evaluation.   Patient Currently in Pain: Yes  Pain Level: 8  Pain Location: Foot  Pain Orientation: Left  Overall Orientation Status: Within Functional Limits  Patient Observation  Observations: L foot ACE wrapped  Pain Assessment  Pain Assessment: 0-10  Pain Level: 8  Pain Type: Chronic pain  Pain Location: Foot  Pain Orientation: Left  Clinical Progression: Gradually improving  Response to Pain Intervention: Patient Satisfied    Objective     Bed mobility  Rolling to Left: Modified independent  Supine to Sit: Modified independent  Sit to Supine: Modified independent  Scooting: Independent  Balance  Sitting Balance: Independent  Standing Balance: Contact guard assistance  Standing Balance  Time: 30 seconds  Activity: static stand  Comment: no VCs required to maintain LLE NWB, pt limited by L foot pain when standing      ADL  Feeding: Independent  Grooming: Modified independent  (SBA if completed in standing position.)  UE Bathing: Modified independent  (in seated position)  LE Bathing: Modified independent   UE Dressing: Independent  LE Dressing: Contact guard assistance  Toileting: Contact guard assistance (upon standing to complete anterior hygiene per pt report)  Additional Comments: ADL scores based on skilled observation or clinical reasoning unless otherwise noted . Transfers  Sit to stand: Stand by assistance  Stand to sit: Stand by assistance  Comment: from/to EOB, pt demo G understanding of NWB precaution    Upper Extremity Function  UE Strengthing: BUE, HEP with lime green resistence band, 4 exercises x 15 reps to support mobility and transfers                          Assessment  Performance deficits / Impairments: Decreased functional mobility ; Decreased ADL status; Decreased high-level IADLs;Decreased endurance  Assessment: Patient's ability to complete ADL's and IADL's independently and safely is impacted by NWB of left lower extremity as well as endurance required to maintain NWB status during functional standing and mobility. Prognosis: Good (Patient with good support system including her  who is home and available 24/7, also patient is motivated to return to her prior level of function.)  Discharge Recommendations: Home with Home health OT  Activity Tolerance: Patient Tolerated treatment well;Patient limited by pain  Activity Tolerance: Although patient reported 7-8/10 pain in left foot, she tolerated session well.   Safety Devices in place: Yes  Type of devices: Left in bed;Call light within reach;Gait belt             Patient Education:  Patient Education: OT POC, HEP, CATY hernandez and tech

## 2021-06-03 NOTE — PLAN OF CARE
Problem: Pain:  Goal: Pain level will decrease  Description: Pain level will decrease  Outcome: Ongoing  Goal: Control of acute pain  Description: Control of acute pain  6/3/2021 0504 by Geneva Feliz RN  Outcome: Ongoing  6/2/2021 1636 by Sergei Saldana RN  Outcome: Ongoing  Goal: Control of chronic pain  Description: Control of chronic pain  Outcome: Ongoing     Problem: Falls - Risk of:  Goal: Will remain free from falls  Description: Will remain free from falls  6/3/2021 0504 by Geneva Feliz RN  Outcome: Ongoing  6/2/2021 1636 by Sergei Saldana RN  Outcome: Ongoing  Goal: Absence of physical injury  Description: Absence of physical injury  Outcome: Ongoing     Problem: Musculor/Skeletal Functional Status  Goal: Highest potential functional level  Outcome: Ongoing  Goal: Absence of falls  Outcome: Ongoing

## 2021-06-03 NOTE — DISCHARGE INSTR - COC
2014    LUMBAR SPINE SURGERY  2005    diskectomy and spinal fusion    OTHER SURGICAL HISTORY  10/03/2019    Left leg angiogram    [place] OFFICE/OUTPT VISIT,PROCEDURE ONLY Right 9/11/2018    EXCISION MASS THUMB, 3080 TABLE performed by Jaime Rene DO at [facility] Left 12/3/2019    LEFT FOOT DEBRIDEMENT WITH SKIN GRAFT SPLIT THICKNESS; SKIN GRAFT TAKEN FROM RIGHT ANTERIOR THIGH performed by Loulou Marcos MD at [facility] Left 2/25/2020    DEBRIDEMENT, SPLIT THICKNESS SKIN GRAFT WITH WOUND VAC PLACEMENT FOOT performed by Michael Kuo MD at [facility] Left 6/30/2020    DEBRIDEMENT, SKIN GRAFT SPLIT THICKNESS FOOT  ([facility], [address] [facility]) performed by Michael Kuo MD at [address] [place] [address]      as a child   [address] Right 10/13/2020    KNEE TOTAL ARTHROPLASTY    TOTAL KNEE ARTHROPLASTY Right 10/13/2020    KNEE TOTAL ARTHROPLASTY- MICROPORT, \ ADVANCED performed by Jaime Rene DO at [facility] History:   Immunization History   Administered Date(s) Administered    Influenza, Quadv, IM, (6 mo and older Fluzone, Flulaval, Fluarix and 3 yrs and older Afluria) 01/03/2018, 12/30/2019    Influenza, Dene Schlein, IM, PF (6 mo and older Fluzone, Flulaval, Fluarix, and 3 yrs and older Afluria) 10/30/2018, 10/14/2020    Pneumococcal Polysaccharide ([id number]) 01/12/2017       Active Problems:  Patient Active Problem List   Diagnosis Code    Neoplasm of uncertain behavior of skin D48.5    Other plastic surgery for unacceptable cosmetic appearance Z41.1    Chronic bilateral low back pain without sciatica M54.5, G89.29    Mass of finger of right hand R22.31    Chronic ulcer of left foot with fat layer exposed ([address] L97.522    Pain in left foot M79.672    Hypertension I10    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 ([address] L97.523    Wound, open, foot with complication, left, initial encounter Z47.108H    Status post total knee replacement using cement, right Z96.651    Primary osteoarthritis of right knee M17.11    Gastroesophageal reflux disease without esophagitis K21.9    Anemia D64.9    PAD (peripheral artery disease) ([facility]) I73.9    S/P drug eluting coronary stent placement Z95.5    Pain in limb M79.609    Cataract H26.9    Arrhythmia I49.9    Cellulitis of left foot L03.116       Isolation/Infection:   Isolation            Contact          Patient Infection Status       Infection Onset Added Last Indicated Last Indicated By Review Planned Expiration Resolved Resolved By    MRSA 10/31/19 11/03/19 10/31/19 Anaerobic and Aerobic Culture        Resolved    COVID-19 Rule Out 10/09/20 10/09/20 10/09/20 Covid-19 Ambulatory (Ordered)   10/11/20 Rule-Out Test Resulted    COVID-19 Rule Out 06/27/20 06/27/20 06/27/20 COVID-19 (Ordered)   06/29/20 Rule-Out Test Resulted            Nurse Assessment:  Last Vital Signs: /69   Pulse 73   Temp 98.1 °F (36.7 °C) (Oral)   Resp 18   Ht 5' 6\" (1.676 m)   Wt 192 lb 3.9 oz (87.2 kg)   SpO2 96%   BMI 31.03 kg/m²     Last documented pain score (0-10 scale): Pain Level: 8  Last Weight:   Wt Readings from Last 1 Encounters:   06/02/21 192 lb 3.9 oz (87.2 kg)     Mental Status:  oriented    IV Access:  Right basilic PICC inserted on 6/3/21    Nursing Mobility/ADLs:  Walking   Independent  Transfer  Independent  Bathing  Independent  Dressing  Independent  Toileting  Independent  Feeding  Independent  Med Admin  Independent  Med Delivery   whole    Wound Care Documentation and Therapy:  Wound 12/04/20 Foot Left;Dorsal wound #1 (Active)   Wound Image   06/01/21 0818   Wound Etiology Other 06/01/21 0818   Dressing Status Clean;Dry; Intact; New dressing applied 06/03/21 0800   Wound Cleansed Irrigated with saline 06/01/21 0818   Dressing/Treatment Ace wrap;Alginate with Ag;Petroleum impregnated gauze; Roll gauze 06/03/21 0800   Wound Length (cm) 8.7 cm 06/01/21 0818   Wound Width (cm) 9.5 cm 06/01/21 0818   Wound Depth (cm) 0.2 cm 06/01/21 0818   Wound Surface Area (cm^2) 82.65 cm^2 06/01/21 0818   Change in Wound Size % (l*w) -168.34 06/01/21 0818   Wound Volume (cm^3) 16.53 cm^3 06/01/21 0818   Wound Healing % -34 06/01/21 0818   Post-Procedure Length (cm) 8.7 cm 06/01/21 0818   Post-Procedure Width (cm) 9.5 cm 06/01/21 0818   Post-Procedure Depth (cm) 0.2 cm 06/01/21 0818   Post-Procedure Surface Area (cm^2) 82.65 cm^2 06/01/21 0818   Post-Procedure Volume (cm^3) 16.53 cm^3 06/01/21 0818   Wound Assessment Exposed structure tendon;Slough; Devitalized tissue 06/03/21 0800   Drainage Amount None 06/03/21 0800   Drainage Description Serosanguinous 06/01/21 0818   Odor None 06/03/21 0800   Jessica-wound Assessment Other (Comment); Intact 06/03/21 0800   Margins Defined edges 06/01/21 0818   Wound Thickness Description not for Pressure Injury Full thickness 06/01/21 0818   Number of days: 181        Elimination:  Continence:   · Bowel: Yes  · Bladder: Yes  Urinary Catheter: None   Colostomy/Ileostomy/Ileal Conduit: No       Date of Last BM: 6/3/21  No intake or output data in the 24 hours ending 06/03/21 1304  I/O last 3 completed shifts: In: 120 [P.O.:120]  Out: -     Safety Concerns:     None    Impairments/Disabilities:      None    Nutrition Therapy:  Current Nutrition Therapy:   - Oral Diet:  General    Routes of Feeding: Oral  Liquids: No Restrictions  Daily Fluid Restriction: no  Last Modified Barium Swallow with Video (Video Swallowing Test): not done    Treatments at the Time of Hospital Discharge:   Respiratory Treatments:   Oxygen Therapy:  is not on home oxygen therapy. Ventilator:    - No ventilator support    Rehab Therapies: Please Eval if PT is needed. Weight Bearing Status/Restrictions: Non-weight bearing on left leg  Other Medical Equipment (for information only, NOT a DME order): Knee Scooter  Other Treatments: Skilled Nursing Assessment Per Protocol. Medication Education & Monitoring. PICC Line Care Per Protocol. 10 ML, Normal Saline Flush after each IV antibiotic & PRN., Per Dr. Wil English, IV Cefepime, 2 gram, Every 8 hours, until 6/15/21, Next Dose Due today at 5PM. LABS: CBC, BUN/CR weekly while on IV antibiotics. Patient's personal belongings (please select all that are sent with patient):  None    RN SIGNATURE:  Electronically signed by Rossy Torre RN on 6/4/21 at 3:22 PM EDT    CASE MANAGEMENT/SOCIAL WORK SECTION    Inpatient Status Date:     Readmission Risk Assessment Score:  Readmission Risk              Risk of Unplanned Readmission:  10           Discharging to Facility/ [address] [facility] [address]  [address] [place] [address]   Phone [phone]   Fax  [phone]      [address] [place] [address] [address]  [address], [facility]  P:  [phone]  F:  [phone]    Dialysis Facility (if applicable)   · Name:  · Address:  · Dialysis Schedule:  · Phone:  · Fax:    / signature: Electronically signed by Young Spence RN on 6/3/21 at 1:05 PM EDT    PHYSICIAN SECTION    Prognosis: Fair    Condition at Discharge: Stable    Rehab Potential (if transferring to Rehab): N/a    Recommended Labs or Other Treatments After Discharge:   -Nursing to please change dressing to Left foot daily: Lidocaine jelly to foot surrounding wound, Clobetasol to edges of wound, Aquacel Ag saturated with saline, adaptic, ABDs, DSD, light ACE. -Non weight bearing to left lower extremity  -Take medications as prescribed   -Follow up with Dr. Christa Jay in wound care, call to schedule appointment. Physician Certification: I certify the above information and transfer of Ora Jarrett  is necessary for the continuing treatment of the diagnosis listed and that she requires [address] for greater 30 days.      Update Admission H&P: No change in H&P    PHYSICIAN SIGNATURE:  Lupillo Lackey DPM

## 2021-06-03 NOTE — PROGRESS NOTES
[facility]    CONSULTATION / HISTORY AND PHYSICAL EXAMINATION            Date:   6/3/2021  Patient name:  Asenath Eisenmenger  Date of admission:  6/1/2021 10:08 AM  MRN:   [id number]  Account:  [de-identified]  YOB: 1955  PCP:    NUPUR Gonzalez CNP  Room:   2065/2065-01  Code Status:    Full Code    Physician Requesting Consult: Chen Manzanares DPM    Reason for Consult:  medical management    Chief Complaint:     No chief complaint on file. History Obtained From:     Patient medical record nursing staff    History of Present Illness:   Patient mated to [facility], as a direct admission from podiatrist office.   Patient has chronic wound on dorsal surface of left foot, she mentioned that it started as a small ulcer in May 2019, which progressively getting worse, patient had extensive testing, which include CT angiogram of leg which is negative for peripheral artery disease, underwent biopsies of wound twice concerning for pyoderma gangrenosum, patient has seen dermatologist and rheumatologist for the same, on methotrexate 15 mg weekly and 5 mg of prednisone daily  Patient has past medical history multiple medical problem which include hypertension, coronary artery disease s/p recent stent placement in February of this year, chronic back pain underwent multiple surgeries in lower back  Patient today is complaining of pain in left foot, has chronic pain in back  Patient had wound culture sent on 5/27 which is growing Pseudomonas      Past Medical History:     Past Medical History:   Diagnosis Date    Arthritis     knees hips hands shoulders and back    CAD (coronary artery disease) 2019    blockage on cath 9/2019, to have stenting after surgery (arthroplasty) Dr. Silvana Nunez Chronic back pain     GERD (gastroesophageal reflux disease)     on rx    Hyperlipidemia     per Dr. Suma Medina on rx    Hypertension     Bree Quivers THICKNESS; SKIN GRAFT TAKEN FROM RIGHT ANTERIOR THIGH performed by Vasquez Goins MD at [place] [address] Left 2/25/2020    DEBRIDEMENT, SPLIT THICKNESS SKIN GRAFT WITH WOUND VAC PLACEMENT FOOT performed by Anabell Eden MD at [place] [address] Left 6/30/2020    DEBRIDEMENT, SKIN GRAFT SPLIT THICKNESS FOOT  (Jillyn Line) performed by Anabell Eden MD at [address]      as a child    TOTAL KNEE ARTHROPLASTY Right 10/13/2020    KNEE TOTAL ARTHROPLASTY    TOTAL KNEE ARTHROPLASTY Right 10/13/2020    KNEE TOTAL ARTHROPLASTY- MICROPORT, \ ADVANCED performed by Inder Temple DO at [place] [address]        Medications Prior to Admission:     Prior to Admission medications    Medication Sig Start Date End Date Taking? Authorizing Provider   meclizine (ANTIVERT) 25 MG CHEW Take 25 mg by mouth 3 times daily as needed   Yes Historical Provider, MD   clobetasol (TEMOVATE) 0.05 % ointment Apply to ulcer at dressing changes.  5/7/21  Yes Michael Nguyen MD   folic acid (FOLVITE) 1 MG tablet take 1 tablet by mouth once daily 4/23/21  Yes Reyna Friday, APRN - CNP   methotrexate (RHEUMATREX) 2.5 MG chemo tablet Take 15 mg by mouth once a week Thursdays   Yes Historical Provider, MD   clopidogrel (PLAVIX) 75 MG tablet Take 1 tablet by mouth daily 2/5/21  Yes Ilene Edmondson MD   Metoprolol Tartrate 37.5 MG TABS take 1 tablet by mouth twice a day  Patient taking differently: Take 75 mg by mouth 2 times daily  12/21/20  Yes Reyna Friday, APRN - CNP   Multiple Vitamins-Minerals (THERAPEUTIC MULTIVITAMIN-MINERALS) tablet Take 1 tablet by mouth daily   Yes Historical Provider, MD   oxyCODONE-acetaminophen (PERCOCET) 5-325 MG per tablet take 1 tablet by mouth four times a day if needed 10/26/20  Yes Historical Provider, MD   traMADol [facility]) 200 MG extended release tablet take 1 tablet by mouth once daily 10/26/20  Yes Historical Provider, MD   aspirin EC 81 MG EC tablet Take 1 tablet by mouth 2 times daily 10/14/20 6/1/21 Yes Susanna Hayes MD   vitamin C (ASCORBIC ACID) 500 MG tablet Take 500 mg by mouth 2 times daily   Yes Historical Provider, MD   diclofenac sodium (VOLTAREN) 1 % GEL Apply topically as needed Dr. Ruth Timmons   Yes Historical Provider, MD   atorvastatin (LIPITOR) 40 MG tablet Take 1 tablet by mouth nightly  Patient taking differently: Take 40 mg by mouth every morning Per Dr. Harinder Rodriguez 9/11/19  Yes Ace Calderon MD   NAPROXEN PO Take 250 mg by mouth daily as needed Indications: patient takes 1-2 times a day Stopping 10 days prior to surgery as instructed by surgeon   Yes Historical Provider, MD   omeprazole (PRILOSEC) 40 MG delayed release capsule Take 1 tablet by mouth daily Per Gianni You   Yes Historical Provider, MD   Cholecalciferol (VITAMIN D3) 5000 units TABS Take 1,000 Units by mouth daily    Yes Historical Provider, MD   calcium carbonate (OSCAL) 500 MG TABS tablet Take 1,000 mg by mouth daily Per [facility] rheumatology   Yes Historical Provider, MD   alendronate (FOSAMAX) 70 MG tablet Take 70 mg by mouth every 7 days Indications: Mondays Dr. German Buerger   Patient takes on Mondays 6/4/18  Yes Historical Provider, MD   gabapentin (NEURONTIN) 300 MG capsule Take 300 mg by mouth 2 times daily. Per Dr. Ruth Timmons 2/27/15  Yes Historical Provider, MD        Allergies:     Patient has no known allergies. Social History:     Tobacco:    reports that she quit smoking about 16 months ago. Her smoking use included cigarettes. She started smoking about 53 years ago. She has a 12.50 pack-year smoking history. She has never used smokeless tobacco.  Alcohol:      reports current alcohol use. Drug Use:  reports no history of drug use.     Family History:     Family History   Problem Relation Age of Onset    Coronary Art Dis Mother     Arthritis Mother     Heart Surgery Mother         CABG    Coronary Art Dis Father     Heart Disease Father     Heart Surgery Father CABG    Coronary Art Dis Sister     Heart Surgery Sister         CABG       Review of Systems:     Positive and Negative as described in HPI. CONSTITUTIONAL:  negative for fevers, chills, sweats, fatigue, weight loss  HEENT:  negative for vision, hearing changes, runny nose, throat pain  RESPIRATORY:  negative for shortness of breath, cough, congestion, wheezing. CARDIOVASCULAR:  negative for chest pain, palpitations. GASTROINTESTINAL:  negative for nausea, vomiting, diarrhea, constipation, change in bowel habits, abdominal pain   GENITOURINARY:  negative for difficulty of urination, burning with urination, frequency   INTEGUMENT:  negative for rash, skin lesions, easy bruising   HEMATOLOGIC/LYMPHATIC:  negative for swelling/edema   ALLERGIC/IMMUNOLOGIC:  negative for urticaria , itching  ENDOCRINE:  negative increase in drinking, increase in urination, hot or cold intolerance  MUSCULOSKELETAL: Chronic ulcers on dorsum of left foot, chronic back pain  NEUROLOGICAL:  negative for headaches, dizziness, lightheadedness, numbness, pain, tingling extremities  BEHAVIOR/PSYCH:      Physical Exam:     /69   Pulse 73   Temp 98.1 °F (36.7 °C) (Oral)   Resp 18   Ht 5' 6\" (1.676 m)   Wt 192 lb 3.9 oz (87.2 kg)   SpO2 96%   BMI 31.03 kg/m²   Temp (24hrs), Av.1 °F (36.7 °C), Min:98 °F (36.7 °C), Max:98.2 °F (36.8 °C)    No results for input(s): POCGLU in the last 72 hours. No intake or output data in the 24 hours ending 21 1347    General Appearance:  alert, well appearing, and in no acute distress  Mental status: oriented to person, place, and time with normal affect  Head:  normocephalic, atraumatic.   Eye: no icterus, redness, pupils equal and reactive, extraocular eye movements intact, conjunctiva clear  Ear: normal external ear, no discharge, hearing intact  Nose:  no drainage noted  Mouth: mucous membranes moist  Neck: supple, no carotid bruits, thyroid not palpable  Lungs: Bilateral equal air entry, clear to ausculation, no wheezing, rales or rhonchi, normal effort  Cardiovascular: normal rate, regular rhythm, no murmur, gallop, rub. Abdomen: Soft, nontender, nondistended, normal bowel sounds, no hepatomegaly or splenomegaly  Neurologic: There are no new focal motor or sensory deficits, normal muscle tone and bulk, no abnormal sensation, normal speech, cranial nerves II through XII grossly intact  Skin: No gross lesions, rashes, bruising or bleeding on exposed skin area  Extremities: Left foot in dressing, tenderness lower back  Psych: Investigations:      Laboratory Testing:  Recent Results (from the past 24 hour(s))   MAGNESIUM    Collection Time: 06/02/21  5:17 PM   Result Value Ref Range    Magnesium 1.9 1.6 - 2.6 mg/dL   URIC ACID    Collection Time: 06/02/21  5:17 PM   Result Value Ref Range    Uric Acid 4.9 2.4 - 5.7 mg/dL   LIPID PANEL    Collection Time: 06/02/21  5:17 PM   Result Value Ref Range    Cholesterol 133 <200 mg/dL    HDL 69 >40 mg/dL    LDL Cholesterol 49 0 - 130 mg/dL    Chol/HDL Ratio 1.9 <5    Triglycerides 74 <150 mg/dL    VLDL NOT REPORTED 1 - 30 mg/dL   URINALYSIS    Collection Time: 06/02/21  5:47 PM   Result Value Ref Range    Color, UA YELLOW YELLOW    Turbidity UA CLEAR CLEAR    Glucose, Ur NEGATIVE NEGATIVE    Bilirubin Urine NEGATIVE NEGATIVE    Ketones, Urine NEGATIVE NEGATIVE    Specific Gravity, UA 1.012 1.000 - 1.030    Urine Hgb NEGATIVE NEGATIVE    pH, UA 6.5 5.0 - 8.0    Protein, UA NEGATIVE NEGATIVE    Urobilinogen, Urine Normal Normal    Nitrite, Urine NEGATIVE NEGATIVE    Leukocyte Esterase, Urine NEGATIVE NEGATIVE    Urinalysis Comments       Microscopic exam not performed based on chemical results unless requested in original order.            Consultations:   IP CONSULT TO HOSPITALIST  IP CONSULT TO INFECTIOUS DISEASES  IP CONSULT TO DERMATOLOGY  Assessment :      Primary Problem  <principal problem not specified>    Active Hospital Problems Diagnosis Date Noted    Cellulitis of left foot [L03.116] 06/01/2021    S/P drug eluting coronary stent placement [Z95.5] 02/03/2021    [facility] [I73.9] 12/29/2020    Anemia [D64.9] 10/15/2020    Hypertension [I10] 12/30/2019       Plan:     1. Chronic wound in dorsum of left foot failed 2 skin graft in past, nonhealing, biopsy concerning for pyoderma gangrenosum  2. We will continue with home dose of prednisone 5 mg daily, strongly suggest consulting ID, since culture on 5/27 growing Pseudomonas, need to be treated for Pseudomonas infection  3. Pyoderma gangrenosum, will have virtual visit with the dermatologist while in-house as per notes  4. Patient has coronary artery disease s/p stents in February of this year, starting patient on aspirin, Plavix, home dose of Lopressor, in case patient need debridement and antiplatelet need to be held, we need to involve cardiologist for clearance to stop antiplatelet with recent cardiac stent  5. On DVT prophylaxis Lovenox  6. Inflammatory polyarthritis diagnosed by rheumatologist on methotrexate weekly  7. PT/OT consult    6/2  Patient feeling better  Evaluated by dermatologist, recommending cyclosporine  We will talk to podiatrist about starting cyclosporine  On cefepime    6/3  Patient doing much better today  DC planning tomorrow started on cyclosporine    Devin Officer, MD  6/3/2021  1:47 PM    Copy sent to Dr. Joseph Sierra, APRN - CNP    Please note that this chart was generated using voice recognition Dragon dictation software. Although every effort was made to ensure the accuracy of this automated transcription, some errors in transcription may have occurred.

## 2021-06-03 NOTE — PLAN OF CARE
Problem: Pain:  Goal: Control of acute pain  Description: Control of acute pain  6/3/2021 1558 by Jed Yoon RN  Outcome: Ongoing   Pt pain is adequately controlled, see MAR. Problem: Falls - Risk of:  Goal: Will remain free from falls  Description: Will remain free from falls  6/3/2021 1558 by Jed Yoon RN  Outcome: Ongoing   Pt. Remains free of falls, appropriate fall precautions in place.

## 2021-06-03 NOTE — PROGRESS NOTES
ZCC:217   ; Diastolic (44TCP), RYE:41, Min:64, Max:83      PULSE OXIMETRY RANGE: SpO2  Av.7 %  Min: 96 %  Max: 100 %    I/O last 3 completed shifts: In: 120 [P.O.:120]  Out: -     CBC:  Recent Labs     21  0511   WBC 6.9   HGB 11.0*   HCT 34.0*           BMP:  Recent Labs     21  0511      K 4.3      CO2 27   BUN 11   CREATININE 0.54   GLUCOSE 110*   CALCIUM 9.8        Coags:  No results for input(s): APTT, PROT, INR in the last 72 hours. Lab Results   Component Value Date    SEDRATE 4 2020     No results for input(s): CRP in the last 72 hours. Physical Exam:     General: A&Ox3, NAD  Heart: Regular rate and rhythm. Lungs: Equal air entry. No increased effort. Abdomen: Soft, non-tender to palpation. Not distended. Lower Extremity Physical Exam:     Vascular: DP pulses are not palpable, Bilateral. PT pulses are not palpable, Bilateral. CFT <4 seconds to all digits, Bilateral.  No edema, Bilateral.  Hair growth is absent to the level of the digits, Bilateral.  Skin temp is warm to cool from the tibial tuberosity to the digits, Bilateral.      Neuro: Saph/sural/SP/DP/plantar sensation intact to light touch. Musculoskeletal: EHL/FHL/GS/TA gross motor intact. Gross deformity is absent. Dermatologic: Open wound present to dorsal left foot, measuring approximately 8.4 cm x 10.5 cm x 0.2 cm. Wound base is fibro-necrotic extending to the level of muscle with exposed extensor tendons. Negative probe to bone. Mild periwound erythema appreciated mild associated increase in warmth,. There is no purulent drainage. There is no fluctuance or crepitus.  Interdigital maceration absent, Bilateral.     Clinical Images:        Imaging:   IR FLUORO GUIDED CVA DEVICE PLMT/REPLACE/REMOVAL    (Results Pending)       Cultures:   Fungus-: NGTD  Tissue-: Pseudomonas aeruginosa light growth, No anaerobic GTD, No AFB  Derm Path-: Leukocytoclastic vasculitis physical examination of the patient and discussed management with the resident. I reviewed the residents note and agree with the documented findings and plan of care. Any areas of disagreement are noted on the chart. I was personally present for the key portions of any procedures. I have documented in the chart those procedures where I was not present during the key portions. I have reviewed the Podiatry Resident progress note. I agree with the chief complaint, past medical history, past surgical history, allergies, medications, social and family history as documented unless otherwise noted below. Documentation of the HPI, Physical Exam and Medical Decision Making performed by medical students or scribes is based on my personal performance of the HPI, PE and MDM. I have personally evaluated this patient and have completed at least one if not all key elements of the E/M (history, physical exam, and MDM). Additional findings are as noted.      Jason Blanc DPM on 6/3/2021 at 0:19 PM  Board Certified, American Board of Podiatric Surgery  Fellow, [employer] [facility] and [employer] [facility]

## 2021-06-03 NOTE — CARE COORDINATION
ONGOING DISCHARGE PLAN:    Patient is alert and oriented x4. Spoke with patient regarding discharge plan and patient confirms that plan is still to return to home w/ . Pt. Will have VNS, [facility] when she DC to home. Melanie, from [place], is following. Pt. Will most likely need IV Cefepime, 2 Gram, Q8, for 2 weeks. Pt. Had PICC Line placed today. ID is on board. Pt is teachable & is willing to learn how to do the IV antibiotics at home. , at the bedside, he will also assist.     Cost of Cefepime, will be $20 per week + Supply Cost, until OOP is met. Pt. Is agreeable & this is affordable. Dermatology on board, Per Nursing, would like to monitor on Oral Cyclosporine for 1 more day & DC anisa. Podiatry continues to follow. Pt. Is requesting a knee scooter. Awaiting DME orders. Writer faxed Face Sheet Podiatry/PT notes to [place]. Jaz Ybarra is following for this. PT/OT on board. Will continue to follow for additional discharge needs.     Electronically signed by Wing Josse RN on 6/3/2021 at 10:24 AM

## 2021-06-03 NOTE — PROGRESS NOTES
[facility] -   Infectious diseases evaluation  admission date 6/1/2021    reason for consultation:   Infected, nonhealing foot wound. 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  Coronary artery disease  Arthritis  Hyperlipidemia  GERD      Recommendations   · IV cefepime 2g every 8 hours until 6/15/21  · Tobramycin one dose received yesterday  · Midline was placed  · Cyclosporine was started  · Follow CBC, BUN/creatinine weekly while on antibiotics  · Follow-up with me at the wound care clinic in 2 weeks  · Keep midline until further evaluation        History of Present Illness:   Initial history:  Aidee Zambrano is a 60y.o.-year-old female  had nonhealing wound to the dorsum of her left foot for around 2 years worsening with necrotic tissue and exposed tendons associated with pain and greenish drainage. She denied any fever or chills, denied nausea or vomiting, no diarrhea. She does have hands small joints pain. She has been following with dermatology Dr. Checo Triplett and rheumatology. on 4/27/2021 had a punch biopsy done was suggestive of LEUKOCYTOCLASTIC VASCULITIS , Pseudomonas aeruginosa grew on tissue culture that was sensitive to cefepime and Cipro. She was treated with Cipro courses previously with no improvement. HIV screen was negative, QuantiFERON-TB gold test was negative, hepatitis panel negative, ANCA negative  She had multiple punch biopsies most recent was done on previous punch biopsy on 4/20/2021 showed fragment of benign fibrous connective tissue, tissue culture grew Enterococcus faecalis on broth medium only that was sensitive to ampicillin and vancomycin with ZAIDA of 2. She was positive for AMADOR and anticentromere was evaluated by rheumatology was started on prednisone and methotrexate. She had multiple debridements and grafting in the past that was failed.   History of of purulent drainage, erythematous edges. Neurological:      General: No focal deficit present. Mental Status: She is alert and oriented to person, place, and time. Cranial Nerves: No cranial nerve deficit. Past Medical History:     Past Medical History:   Diagnosis Date    Arthritis     knees hips hands shoulders and back    CAD (coronary artery disease) 2019    blockage on cath 9/2019, to have stenting after surgery (arthroplasty) Dr. Mario Larsen Chronic back pain     GERD (gastroesophageal reflux disease)     on rx    Hyperlipidemia     per Dr. He Collado on rx    Hypertension     Merril Ek manages    Incomplete RBBB     Irregular heart beat     LAFB (left anterior fascicular block)     Dr. He Collado, cardiologist, seen 9/2019 prior to surgery on 10/8/2019    Leg wound, left     following with wound care, Dr. Vladimir Felton Lumbar disc disease     Osteoporosis     PAC (premature atrial contraction) 06/2018    PACs and PVCs on holter monitor,     PONV (postoperative nausea and vomiting)     requests scop patch    Wears dentures     upper partial    Wears eyeglasses     readers    Wears partial dentures     upper and lower    Wellness examination     A. Brandy Gosselin, NP PCP, seen 2/10/2020       Past Surgical  History:     Past Surgical History:   Procedure Laterality Date    ABDOMINOPLASTY  1997    BACK SURGERY      BLEPHAROPLASTY Bilateral 1997    BREAST ENHANCEMENT SURGERY Bilateral 1999    breast augmentation   [address]  2019    Dr. He Collado, blockage but no stents yet    CATARACT REMOVAL WITH IMPLANT Bilateral 2016    COLONOSCOPY  2015    No Polyps    COSMETIC SURGERY      eyelid lift    FINGER SURGERY Right     thumb lesion excised    FIXATION KYPHOPLASTY  2013    Back    FOOT DEBRIDEMENT Left 10/11/2019    SURGICAL PREP WOUND BED LEFT FOOT WITH APPLICATION OF EPIFIX LEFT FOOT 3X4 performed by Kristal Magana DPM at [facility] kyphoplasty for lumbar fx 2013 Dr. Diana Riojas ARTHROSCOPY Left 2006   Josiah Lockhart Left 1/2/2020    FOOT  DEBRIDEMENT WITH WOUND VAC PLACEMENT performed by Rustam Alvarez MD [place] [address] [place] [address]  2005    diskectomy and spinal fusion    OTHER SURGICAL HISTORY  10/03/2019    Left leg angiogram    [place] OFFICE/OUTPT VISIT,PROCEDURE ONLY Right 9/11/2018    EXCISION MASS THUMB, 3080 TABLE performed by Niharika Cain DO at [facility] Left 12/3/2019    LEFT FOOT DEBRIDEMENT WITH SKIN GRAFT SPLIT THICKNESS; SKIN GRAFT TAKEN FROM RIGHT ANTERIOR THIGH performed by Rustam Alvarez MD at [facility] Left 2/25/2020    DEBRIDEMENT, SPLIT THICKNESS SKIN GRAFT WITH WOUND VAC PLACEMENT FOOT performed by BUTCH Cantrell MD at [facility] Left 6/30/2020    DEBRIDEMENT, SKIN GRAFT SPLIT THICKNESS FOOT  ([place] [address], [address] [facility]) performed by Darshan Flynn MD at [address] [place] [address]      as a child    TOTAL KNEE ARTHROPLASTY Right 10/13/2020    KNEE TOTAL ARTHROPLASTY    TOTAL KNEE ARTHROPLASTY Right 10/13/2020    KNEE TOTAL ARTHROPLASTY- MICROPORT, \ ADVANCED performed by Niharika Cain DO at [facility] OR       Medications:      sodium chloride flush  10 mL Intravenous 2 times per day    clobetasol   Topical BID    cefepime  2,000 mg Intravenous Q8H    cycloSPORINE modified  125 mg Oral BID    sodium chloride flush  5-40 mL Intravenous 2 times per day    enoxaparin  40 mg Subcutaneous Daily    aspirin EC  81 mg Oral Daily    folic acid  1 mg Oral Daily    pantoprazole  40 mg Oral QAM AC    vitamin C  500 mg Oral BID    clopidogrel  75 mg Oral Daily    metoprolol tartrate  37.5 mg Oral BID    predniSONE  5 mg Oral Daily       Social History:     Social History     Socioeconomic History    Marital status:      Spouse name: Noemy Stratton Babs of children: 2    Years of education: Not on file    Highest education level: Not on file   Occupational History    Occupation: Retired RN   Tobacco Use    Smoking status: Former Smoker     Packs/day: 0.25     Years: 50.00     Pack years: 12.50     Types: Cigarettes     Start date:      Quit date: 2020     Years since quittin.3    Smokeless tobacco: Never Used   Vaping Use    Vaping Use: Never used   Substance and Sexual Activity    Alcohol use: Yes     Comment: 2-3 shot liquor for weekends    Drug use: Never    Sexual activity: Yes     Partners: Male   Other Topics Concern    Not on file   Social History Narrative    Not on file     Social Determinants of Health     Financial Resource Strain:     Difficulty of Paying Living Expenses:    Food Insecurity:     Worried About Running Out of Food in the Last Year:     [address] in the Last Year:    Transportation Needs:     Lack of Transportation (Medical):      Lack of Transportation (Non-Medical):    Physical Activity:     Days of Exercise per Week:     Minutes of Exercise per Session:    Stress:     Feeling of Stress :    Social Connections:     Frequency of Communication with Friends and Family:     Frequency of Social Gatherings with Friends and Family:     Attends Mu-ism Services:     Active Member of Clubs or Organizations:     Attends Club or Organization Meetings:     Marital Status:    Intimate Partner Violence:     Fear of Current or Ex-Partner:     Emotionally Abused:     Physically Abused:     Sexually Abused:        Family History:     Family History   Problem Relation Age of Onset    Coronary Art Dis Mother     Arthritis Mother     Heart Surgery Mother         CABG    Coronary Art Dis Father     Heart Disease Father     Heart Surgery Father         CABG    Coronary Art Dis Sister     Heart Surgery Sister         CABG      Medical Decision Making:   I have independently reviewed/ordered the following

## 2021-06-03 NOTE — BRIEF OP NOTE
Brief Postoperative Note    Dong George  YOB: 1955  [id number]    Pre-operative Diagnosis: Foot wound    Post-operative Diagnosis: Same    Procedure: PICC    Anesthesia: Local    Surgeons/Assistants: Castro    Estimated Blood Loss: less than 50     Complications: None    Specimens: Was Not Obtained    Electronically signed by Willie Greene MD on 6/3/2021 at 9:50 AM

## 2021-06-04 NOTE — CARE COORDINATION
ONGOING DISCHARGE PLAN:    Patient is alert and oriented x4. Spoke with patient regarding discharge plan and patient confirms that plan is still  to return to home w/ .      Pt. Will have VNS, [facility] when she DC to home. Pt will have IV Cefepime, 2 Gram, Q8, for 2 Weeks. Has Picc Line. ID is following. Melanie, from [facility], is following for this. She will be here today to discuss cost of Meds. Cefepime will be $20.00 per Week + $15 a day supply cost.     Knee Scooter is coming from [place], Per Keshawn Howard, will be delivered to pt's room today. Podiatry continues to follow. Anticipate DC today. Will continue to follow for additional discharge needs.     Electronically signed by Pamla Kocher, RN on 6/4/2021 at 8:44 AM

## 2021-06-04 NOTE — FLOWSHEET NOTE
Patient discharged home with personal belongings gathered. Patient also has scooter that was delivered from [place].

## 2021-06-04 NOTE — PROGRESS NOTES
Progress Note  Podiatric Medicine and Surgery     Subjective     CC: Left foot wound    Patient seen and examined at bedside. Tolerating diet well. Pain under control. No new complaints overnight. Afebrile, vital signs stable. HPI :  Rosey Bryson is a 60 y.o. female seen at [facility] wound care center for left foot wound. Patient is well-known to podiatry service and Dr. Naila Vee. Patient relates wound is getting larger since last visit, reports she is still on prednisone and methotrexate per rheumatologist.  Patient was seen by dermatologist, Dr. Calla Favre on 21. No other pedal complaints this time. PCP is Joseph Sierra, APRN - CNP    ROS: Denies N/V/F/C/SOB/CP/Cough.        Medications:  Scheduled Meds:   sodium chloride flush  10 mL Intravenous 2 times per day    clobetasol   Topical BID    cefepime  2,000 mg Intravenous Q8H    cycloSPORINE modified  125 mg Oral BID    sodium chloride flush  5-40 mL Intravenous 2 times per day    enoxaparin  40 mg Subcutaneous Daily    aspirin EC  81 mg Oral Daily    folic acid  1 mg Oral Daily    pantoprazole  40 mg Oral QAM AC    vitamin C  500 mg Oral BID    clopidogrel  75 mg Oral Daily    metoprolol tartrate  37.5 mg Oral BID    predniSONE  5 mg Oral Daily       Continuous Infusions:   sodium chloride         PRN Meds:sodium chloride flush, lidocaine, sodium chloride flush, sodium chloride, promethazine **OR** ondansetron, polyethylene glycol, acetaminophen **OR** acetaminophen, oxyCODONE-acetaminophen **OR** oxyCODONE-acetaminophen, morphine **OR** morphine    Objective     Vitals:  Patient Vitals for the past 8 hrs:   BP Temp Temp src Pulse Resp SpO2   21 0727 -- -- -- -- -- 96 %   21 0706 (!) 133/55 98.4 °F (36.9 °C) Oral 75 18 --     Average, Min, and Max for last 24 hours Vitals:  TEMPERATURE:  Temp  Av.1 °F (36.7 °C)  Min: 97.8 °F (36.6 °C)  Max: 98.4 °F (36.9 °C)    RESPIRATIONS RANGE: Resp  Av  Min: 18  Max: 18    PULSE RANGE: Pulse  Av.5  Min: 71  Max: 83    BLOOD PRESSURE RANGE:  Systolic (67AQT), BNB:171 , Min:127 , YGX:088   ; Diastolic (84BDF), CQW:77, Min:55, Max:70      PULSE OXIMETRY RANGE: SpO2  Av.7 %  Min: 96 %  Max: 98 %    No intake/output data recorded. CBC:  Recent Labs     21  0511   WBC 6.9   HGB 11.0*   HCT 34.0*           BMP:  Recent Labs     21  0511      K 4.3      CO2 27   BUN 11   CREATININE 0.54   GLUCOSE 110*   CALCIUM 9.8        Coags:  No results for input(s): APTT, PROT, INR in the last 72 hours. Lab Results   Component Value Date    SEDRATE 4 2020     No results for input(s): CRP in the last 72 hours. Physical Exam:     General: A&Ox3, NAD  Heart: Regular rate and rhythm. Lungs: Equal air entry. No increased effort. Abdomen: Soft, non-tender to palpation. Not distended. Lower Extremity Physical Exam: Dressing left intact, nursing to perform dressing change prior to patient discharge today. Vascular: DP pulses are not palpable, Bilateral. PT pulses are not palpable, Bilateral. CFT <4 seconds to all digits, Bilateral.  No edema, Bilateral.  Hair growth is absent to the level of the digits, Bilateral.  Skin temp is warm to cool from the tibial tuberosity to the digits, Bilateral.      Neuro: Saph/sural/SP/DP/plantar sensation intact to light touch. Musculoskeletal: EHL/FHL/GS/TA gross motor intact. Gross deformity is absent. Dermatologic: Open wound present to dorsal left foot, measuring approximately 8.4 cm x 10.5 cm x 0.2 cm. Wound base is fibro-necrotic extending to the level of muscle with exposed extensor tendons. Negative probe to bone. Mild periwound erythema appreciated mild associated increase in warmth,. There is no purulent drainage. There is no fluctuance or crepitus.  Interdigital maceration absent, Bilateral.     Clinical Images: From 6/3/2021        Imaging:   IR FLUORO GUIDED CVA DEVICE PLMT/REPLACE/REMOVAL   Final Result   Successful ultrasound and fluoroscopy guided right basilic vein single-lumen   power injectable PICC placement. Ready for use. Cultures:   Fungus-5/27: NGTD  Tissue-5/27: Pseudomonas aeruginosa light growth, No anaerobic GTD, No AFB  Derm Path-5/27: Leukocytoclastic vasculitis     Assessment   Melly Davies is a 60 y.o. female with   Nonhealing wound to level of muscle, left foot  Pyoderma gangrenosum, vasculitis left foot  PAD    Active Problems:    Hypertension    Anemia    PAD (peripheral artery disease) ([facility])    S/P drug eluting coronary stent placement    Cellulitis of left foot  Resolved Problems:    * No resolved hospital problems. *       Plan     Patient examined and evaluated at bedside   Treatment options discussed in detail with the patient  Continue medical management per IM   ID on board  Abx: Cefepime: Upon DC 2gram every 8 until 6/15/2021. Patient has follow-up appoint with Dr. Alpa Selby on 6/14/2021. Podiatry will continue A.M. dressing changes and follow recommendations of rheumatology/dermatology/infectious disease  Dressing intact to Left foot: Lidocaine jelly to foot surrounding wound, Clobetasol to edges of wound, Aquacel Ag saturated with saline, adaptic, ABDs DSD, ACE  Nursing to please change dressing  to left foot daily as follows: Lidocaine jelly surrounding wound, Clobetasol to edges of wound, saline-soaked aquacell Ag to wound bed, Adaptic overlying aquacell, 4x4 gauze, Abd pad, Kerlix, light Ace or tape. Order placed for knee scooter. Patient to be discharged today when home health care requirements in place. Cyclosporine Rx per derm recommendation sent to patient's pharmacy. She will follow-up in clinic with Dr. Irena Jerez.   Discussed with Dr. Irena Jerez    DVT ppx: lovenox    Diet: General   Activity: NWB to Left lower extremity  Pain Control: Percocet and morphine pain panel ordered        Melly Davies requires knee scooter due to impaired ambulation and for increased stability in order to participate in or complete daily living tasks of: personal cares and ambulating. The patient is able to safely use the knee scooter and the functional mobility deficit can be sufficiently resolved. Jam Montoya DPM   Podiatric Medicine & Surgery   6/4/2021 at 8:38 AM    I performed a history and physical examination of the patient and discussed management with the resident. I reviewed the residents note and agree with the documented findings and plan of care. Any areas of disagreement are noted on the chart. I was personally present for the key portions of any procedures. I have documented in the chart those procedures where I was not present during the key portions. I have reviewed the Podiatry Resident progress note. I agree with the chief complaint, past medical history, past surgical history, allergies, medications, social and family history as documented unless otherwise noted below. Documentation of the HPI, Physical Exam and Medical Decision Making performed by medical students or scribes is based on my personal performance of the HPI, PE and MDM. I have personally evaluated this patient and have completed at least one if not all key elements of the E/M (history, physical exam, and MDM). Additional findings are as noted.      Eddie Moran DPM on 6/7/2021 at 8:09 AM  Board Certified, American Board of Podiatric Surgery  Fellow, [employer] and [facility]

## 2021-06-04 NOTE — CARE COORDINATION
Continuity of Care Form    Patient Name: Sharon Scheuermann   :  1955  MRN:  [id number]    [facility] date:  2021  Discharge date:  21    Code Status Order: Full Code   Advance Directives:   Advance Care Flowsheet Documentation       Date/Time Healthcare Directive Type of Healthcare Directive Copy in [address] Agent's Name Healthcare Agent's Phone Number    21 1025  Yes, patient has an advance directive for healthcare treatment  Health care treatment directive  Yes, copy in chart  [address] [address] [facility]  --            Admitting Physician:  Jesu Orantes DPM  PCP: NUPUR Whitfield CNP    Discharging Nurse:   [address] Unit/Room#: 5  Discharging Unit Phone Number:     Emergency Contact:   Extended Emergency Contact Information  Primary Emergency Contact: Zoë Heredia  Address: [facility] [address] Phone: [phone]  Relation: Spouse    Past Surgical History:  Past Surgical History:   Procedure Laterality Date    ABDOMINOPLASTY      BACK SURGERY      BLEPHAROPLASTY Bilateral     BREAST ENHANCEMENT SURGERY Bilateral     breast augmentation   [address]  2019    Dr. Selene Akins, blockage but no stents yet    CATARACT REMOVAL WITH IMPLANT Bilateral     COLONOSCOPY  2015    No Polyps    COSMETIC SURGERY      eyelid lift    FINGER SURGERY Right     thumb lesion excised    FIXATION KYPHOPLASTY  2013    Back    FOOT DEBRIDEMENT Left 10/11/2019    SURGICAL PREP WOUND BED LEFT FOOT WITH APPLICATION OF EPIFIX LEFT FOOT 3X4 performed by Rula Sellers DPM at [facility]      kyphoplasty for lumbar fx  Dr. Maria Del Carmen Barrett ARTHROSCOPY Left    Mat Sharon Left 2020    FOOT  [address] performed by Kush Yoon MD at [place] [address] 2014    LUMBAR SPINE SURGERY  2005    diskectomy and spinal fusion    OTHER SURGICAL HISTORY  10/03/2019    Left leg angiogram    NM OFFICE/OUTPT VISIT,PROCEDURE ONLY Right 9/11/2018    EXCISION MASS THUMB, 3080 TABLE performed by Louisa Pride DO at [address] [place] [address] Left 12/3/2019    LEFT FOOT DEBRIDEMENT WITH SKIN GRAFT SPLIT THICKNESS; SKIN GRAFT TAKEN FROM RIGHT ANTERIOR THIGH performed by Phoebe Closs, MD at [address] [place] [address] Left 2/25/2020    DEBRIDEMENT, SPLIT THICKNESS SKIN GRAFT WITH WOUND VAC PLACEMENT FOOT performed by Hiwot Lambert MD at [address] [place] [address] Left 6/30/2020    DEBRIDEMENT, SKIN GRAFT SPLIT THICKNESS FOOT  ([facility], [address] [facility]) performed by Hiwot Lambert MD at [facility]      as a child   [address] Right 10/13/2020    KNEE TOTAL ARTHROPLASTY    TOTAL KNEE ARTHROPLASTY Right 10/13/2020    KNEE TOTAL ARTHROPLASTY- MICROPORT, \ ADVANCED performed by Louisa Pride DO at [address] [place] [address] History:   Immunization History   Administered Date(s) Administered    Influenza, Quadv, IM, (6 mo and older Fluzone, Flulaval, Fluarix and 3 yrs and older Afluria) 01/03/2018, 12/30/2019    Influenza, Ringling Caio, IM, PF (6 mo and older Fluzone, Flulaval, Fluarix, and 3 yrs and older Afluria) 10/30/2018, 10/14/2020    Pneumococcal Polysaccharide ([id number]) 01/12/2017       Active Problems:  Patient Active Problem List   Diagnosis Code    Neoplasm of uncertain behavior of skin D48.5    Other plastic surgery for unacceptable cosmetic appearance Z41.1    Chronic bilateral low back pain without sciatica M54.5, G89.29    Mass of finger of right hand R22.31    Chronic ulcer of left foot with fat layer exposed ([address] L97.522    Pain in left foot M79.672    Hypertension I10    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 ([address] L97.523    Wound, open, foot with complication, left, initial encounter W20.993B    Status post total knee replacement using cement, right Z96.651    Primary osteoarthritis of right knee M17.11    Gastroesophageal reflux disease without esophagitis K21.9    Anemia D64.9    PAD (peripheral artery disease) ([facility]) I73.9    S/P drug eluting coronary stent placement Z95.5    Pain in limb M79.609    Cataract H26.9    Arrhythmia I49.9    Cellulitis of left foot L03.116       Isolation/Infection:   Isolation            Contact          Patient Infection Status       Infection Onset Added Last Indicated Last Indicated By Review Planned Expiration Resolved Resolved By    MRSA 10/31/19 11/03/19 10/31/19 Anaerobic and Aerobic Culture        Resolved    COVID-19 Rule Out 10/09/20 10/09/20 10/09/20 Covid-19 Ambulatory (Ordered)   10/11/20 Rule-Out Test Resulted    COVID-19 Rule Out 06/27/20 06/27/20 06/27/20 COVID-19 (Ordered)   06/29/20 Rule-Out Test Resulted            Nurse Assessment:  Last Vital Signs: /69   Pulse 73   Temp 98.1 °F (36.7 °C) (Oral)   Resp 18   Ht 5' 6\" (1.676 m)   Wt 192 lb 3.9 oz (87.2 kg)   SpO2 96%   BMI 31.03 kg/m²     Last documented pain score (0-10 scale): Pain Level: 8  Last Weight:   Wt Readings from Last 1 Encounters:   06/02/21 192 lb 3.9 oz (87.2 kg)     Mental Status:  oriented    IV Access:  Right basilic PICC inserted on 6/3/21    Nursing Mobility/ADLs:  Walking   Independent  Transfer  Independent  Bathing  Independent  Dressing  Independent  Toileting  Independent  Feeding  Independent  Med Admin  Independent  Med Delivery   whole    Wound Care Documentation and Therapy:  Wound 12/04/20 Foot Left;Dorsal wound #1 (Active)   Wound Image   06/01/21 0818   Wound Etiology Other 06/01/21 0818   Dressing Status Clean;Dry; Intact; New dressing applied 06/03/21 0800   Wound Cleansed Irrigated with saline 06/01/21 0818   Dressing/Treatment Ace wrap;Alginate with Ag;Petroleum impregnated gauze; Roll gauze 06/03/21 0800   Wound Length (cm) 8.7 cm 06/01/21 0818   Wound Width (cm) 9.5 cm 06/01/21 0818   Wound Depth (cm) 0.2 cm 06/01/21 0818   Wound Surface Area (cm^2) 82.65 cm^2 06/01/21 0818   Change in Wound Size % (l*w) -168.34 06/01/21 0818   Wound Volume (cm^3) 16.53 cm^3 06/01/21 0818   Wound Healing % -34 06/01/21 0818   Post-Procedure Length (cm) 8.7 cm 06/01/21 0818   Post-Procedure Width (cm) 9.5 cm 06/01/21 0818   Post-Procedure Depth (cm) 0.2 cm 06/01/21 0818   Post-Procedure Surface Area (cm^2) 82.65 cm^2 06/01/21 0818   Post-Procedure Volume (cm^3) 16.53 cm^3 06/01/21 0818   Wound Assessment Exposed structure tendon;Slough; Devitalized tissue 06/03/21 0800   Drainage Amount None 06/03/21 0800   Drainage Description Serosanguinous 06/01/21 0818   Odor None 06/03/21 0800   Jessica-wound Assessment Other (Comment); Intact 06/03/21 0800   Margins Defined edges 06/01/21 0818   Wound Thickness Description not for Pressure Injury Full thickness 06/01/21 0818   Number of days: 181        Elimination:  Continence:   · Bowel: Yes  · Bladder: Yes  Urinary Catheter: None   Colostomy/Ileostomy/Ileal Conduit: No       Date of Last BM: 6/3/21  No intake or output data in the 24 hours ending 06/03/21 1304  I/O last 3 completed shifts: In: 120 [P.O.:120]  Out: -     Safety Concerns:     None    Impairments/Disabilities:      None    Nutrition Therapy:  Current Nutrition Therapy:   - Oral Diet:  General    Routes of Feeding: Oral  Liquids: No Restrictions  Daily Fluid Restriction: no  Last Modified Barium Swallow with Video (Video Swallowing Test): not done    Treatments at the Time of Hospital Discharge:   Respiratory Treatments:   Oxygen Therapy:  is not on home oxygen therapy. Ventilator:    - No ventilator support    Rehab Therapies: Please Eval if PT is needed. Weight Bearing Status/Restrictions: Non-weight bearing on left leg  Other Medical Equipment (for information only, NOT a DME order): Knee Scooter  Other Treatments: Skilled Nursing Assessment Per Protocol. Medication Education & Monitoring. PICC Line Care Per Protocol. 10 ML, Normal Saline Flush after each IV antibiotic & PRN., Per Dr. Abdon Head, IV Cefepime, 2 gram, Every 8 hours, until 6/15/21, Next Dose Due today at 5PM. LABS: CBC, BUN/CR weekly while on IV antibiotics. Patient's personal belongings (please select all that are sent with patient):  None    RN SIGNATURE:  Electronically signed by Trevor Daley RN on 6/4/21 at 3:22 PM EDT    CASE MANAGEMENT/SOCIAL WORK SECTION    Inpatient Status Date:     Readmission Risk Assessment Score:  Readmission Risk              Risk of Unplanned Readmission:  10           Discharging to Facility/ [address]  [address] [place] [address]   Phone [phone]   Fax  [phone]      [address] [place] [address] Rd  [address], [facility]  P:  [phone]  F:  [phone]    Dialysis Facility (if applicable)   · Name:  · Address:  · Dialysis Schedule:  · Phone:  · Fax:    / signature: Electronically signed by Horace Khanna RN on 6/3/21 at 1:05 PM EDT    PHYSICIAN SECTION    Prognosis: Fair    Condition at Discharge: Stable    Rehab Potential (if transferring to Rehab): N/a    Recommended Labs or Other Treatments After Discharge:   -Nursing to please change dressing to Left foot daily: Lidocaine jelly to foot surrounding wound, Clobetasol to edges of wound, Aquacel Ag saturated with saline, adaptic, ABDs, DSD, light ACE. -Non weight bearing to left lower extremity  -Take medications as prescribed   -Follow up with Dr. Miguelito Cardenas in wound care, call to schedule appointment. Physician Certification: I certify the above information and transfer of Sharon Scheuermann  is necessary for the continuing treatment of the diagnosis listed and that she requires [address] for greater 30 days.      Update Admission H&P: No change in H&P    PHYSICIAN SIGNATURE:  Jesu Orantes DPM     Current Take 250 mg by mouth daily as needed Indications: patient takes 1-2 times a day Stopping 10 days prior to surgery as instructed by surgeon       omeprazole (PRILOSEC) 40 MG delayed release capsule 1 tablet   Take 1 tablet by mouth daily Per Maxine Montero       Cholecalciferol (VITAMIN D3) 5000 units TABS 1,000 Units   Take 1,000 Units by mouth daily        calcium carbonate (OSCAL) 500 MG TABS tablet 1,000 mg   Take 1,000 mg by mouth daily Per Jovita Jasso rheumatology       alendronate (FOSAMAX) 70 MG tablet 70 mg   Take 70 mg by mouth every 7 days Indications: Mondays Dr. Niko Bautista   Patient takes on Mondays       gabapentin (NEURONTIN) 300 MG capsule 300 mg   Take 300 mg by mouth 2 times daily.  Per Dr. Kristal Car taking these previous medications    Medication Dose Reason for Stopping Comments   (STOP TAKING) gentamicin (GARAMYCIN) 0.1 % ointment            (STOP TAKING) methotrexate (RHEUMATREX) 2.5 MG chemo tablet 15 mg           (STOP TAKING) predniSONE (DELTASONE) 20 MG tablet 5 mg           Printing Report    Report ID Report Name Print   [id number] Discharge Meds Print

## 2021-06-04 NOTE — CARE COORDINATION
Follow up appointment made for patient with Dr Candelario Last on 6/14/21 at 8:15 am.  Notified patient of date and time. Patient verbalizes understanding. Appointment entered into discharge navigator.     Electronically signed by Juany Sal RN on 6/4/2021 at 9:59 AM

## 2021-06-04 NOTE — CARE COORDINATION
Pt. Spoke to Taye Rush from [employer] in regards to pt's Cyclosporine. They needed to order the med & it will be available on Monday. Cost of Meds will be $15.00. Pt. Informed & states understanding. Nurse, Jesus Alberto Dwyer for Pt. Informed as well.

## 2021-06-04 NOTE — CARE COORDINATION
Writer Jax, 21 [phone], spoke to [address] [place] [address] In, per Dr. Andrew Garcia:    IV Cefepime, 2 gram, every 8 Hours until 6/15/21.    10 ML Normal Saline Flush, after Each IV antibiotic & PRN. LABS: CBC, BUN/CR, weekly while on IV antibiotics.

## 2021-06-04 NOTE — PROGRESS NOTES
[facility] -   Infectious diseases evaluation  admission date 6/1/2021    reason for consultation:   Infected, nonhealing foot wound. 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  Coronary artery disease  Arthritis  Hyperlipidemia  GERD      Recommendations   · IV cefepime 2g every 8 hours until 6/15/21  · Tobramycin one dose received 6/2/21  · Midline was placed  · Cyclosporine was started  · Follow CBC, BUN/creatinine weekly while on antibiotics  · Follow-up with me at the wound care clinic in 2 weeks  · Keep midline until further evaluation  · Discussed with the discharge planner and nursing staff        History of Present Illness:   Initial history:  Asenath Eisenmenger is a 60y.o.-year-old female  had nonhealing wound to the dorsum of her left foot for around 2 years worsening with necrotic tissue and exposed tendons associated with pain and greenish drainage. She denied any fever or chills, denied nausea or vomiting, no diarrhea. She does have hands small joints pain. She has been following with dermatology Dr. Janell Felix and rheumatology. on 4/27/2021 had a punch biopsy done was suggestive of LEUKOCYTOCLASTIC VASCULITIS , Pseudomonas aeruginosa grew on tissue culture that was sensitive to cefepime and Cipro. She was treated with Cipro courses previously with no improvement. HIV screen was negative, QuantiFERON-TB gold test was negative, hepatitis panel negative, ANCA negative  She had multiple punch biopsies most recent was done on previous punch biopsy on 4/20/2021 showed fragment of benign fibrous connective tissue, tissue culture grew Enterococcus faecalis on broth medium only that was sensitive to ampicillin and vancomycin with ZAIDA of 2. She was positive for AMADOR and anticentromere was evaluated by rheumatology was started on prednisone and methotrexate.   She had multiple debridements and grafting in the past that was failed. History of smoking  She was evaluated by vascular surgery, had angiogram done with no reported stenosis. She denied history of diabetes. Tissue culture in December and February 2021 grew Pseudomonas aeruginosa was treated with a course of Cipro. Tissue culture 2/19/2021 grew Pseudomonas aeruginosa that was sensitive to Cipro, previous tissue culture 1/26/2021 grew Pseudomonas aeruginosa that was resistant to Cipro. Interval changes  6/4/2021   She is complaining of foot pain, denied fever or chills, denied nausea or vomiting, no other complaints  Patient Vitals for the past 8 hrs:   BP Temp Temp src Pulse Resp SpO2   06/04/21 1308 133/73 98.1 °F (36.7 °C) Oral 63 16 100 %   06/04/21 0727 -- -- -- -- -- 96 %   06/04/21 0706 (!) 133/55 98.4 °F (36.9 °C) Oral 75 18 --           I have personally reviewed the past medical history, past surgical history, medications, social history, and family history, and I haveupdated the database accordingly. Allergies:   Patient has no known allergies. Review of Systems:     Review of Systems  As per history present illness, other than above 12 system review was negative  Physical Examination :       Physical Exam  Constitutional:       General: She is not in acute distress. HENT:      Head: Normocephalic and atraumatic. Right Ear: External ear normal.      Left Ear: External ear normal.      Mouth/Throat:      Pharynx: Oropharynx is clear. No posterior oropharyngeal erythema. Eyes:      General: No scleral icterus. Conjunctiva/sclera: Conjunctivae normal.   Cardiovascular:      Rate and Rhythm: Normal rate and regular rhythm. Heart sounds: No murmur heard. Pulmonary:      Effort: Pulmonary effort is normal. No respiratory distress. Abdominal:      General: Abdomen is flat. There is no distension. Palpations: Abdomen is soft. Musculoskeletal:      Cervical back: Neck supple. No rigidity.    Skin: General: Skin is warm. Coloration: Skin is not jaundiced. Comments: Large left foot dorsum ulcer with exposed tendons, necrotic tissue, mild amount of purulent drainage, erythematous edges. Neurological:      General: No focal deficit present. Mental Status: She is alert and oriented to person, place, and time. Cranial Nerves: No cranial nerve deficit. Past Medical History:     Past Medical History:   Diagnosis Date    Arthritis     knees hips hands shoulders and back    CAD (coronary artery disease) 2019    blockage on cath 9/2019, to have stenting after surgery (arthroplasty) Dr. Len Napier Chronic back pain     GERD (gastroesophageal reflux disease)     on rx    Hyperlipidemia     per Dr. Blair Stockton on rx    Hypertension     Keith Ramirez manages    Incomplete RBBB     Irregular heart beat     LAFB (left anterior fascicular block)     Dr. Blair Stockton, cardiologist, seen 9/2019 prior to surgery on 10/8/2019    Leg wound, left     following with wound care, Dr. Eric Salcedo Lumbar disc disease     Osteoporosis     PAC (premature atrial contraction) 06/2018    PACs and PVCs on holter monitor,     PONV (postoperative nausea and vomiting)     requests scop patch    Wears dentures     upper partial    Wears eyeglasses     readers    Wears partial dentures     upper and lower    Wellness examination     MIKE Burroughs NP PCP, seen 2/10/2020       Past Surgical  History:     Past Surgical History:   Procedure Laterality Date    ABDOMINOPLASTY  1997    BACK SURGERY      BLEPHAROPLASTY Bilateral 1997    BREAST ENHANCEMENT SURGERY Bilateral 1999    breast augmentation   [facility]  2019    Dr. Blair Stockton, blockage but no stents yet    CATARACT REMOVAL WITH IMPLANT Bilateral 2016    COLONOSCOPY  2015    No Polyps    COSMETIC SURGERY      eyelid lift    FINGER SURGERY Right     thumb lesion excised    FIXATION KYPHOPLASTY  2013    Back    FOOT DEBRIDEMENT Left 10/11/2019 SURGICAL PREP WOUND BED LEFT FOOT WITH APPLICATION OF EPIFIX LEFT FOOT 3X4 performed by Kristal Hull DPM at [facility]      kyphoplasty for lumbar fx 2013 Dr. Anaid Rodriguez ARTHROSCOPY Left 2006   Nadia Natividad Left 1/2/2020    FOOT  DEBRIDEMENT WITH WOUND VAC PLACEMENT performed by Alek Cotto MD at [place] [address]  2014   [address]  2005    diskectomy and spinal fusion    OTHER SURGICAL HISTORY  10/03/2019    Left leg angiogram    DC OFFICE/OUTPT VISIT,PROCEDURE ONLY Right 9/11/2018    EXCISION MASS THUMB, 3080 TABLE performed by Manan Billings DO at [address] [place] [address] 12/3/2019    LEFT FOOT DEBRIDEMENT WITH SKIN GRAFT SPLIT THICKNESS; SKIN GRAFT TAKEN FROM RIGHT ANTERIOR THIGH performed by Alek Cotot MD at [address] [place] [address] Left 2/25/2020    DEBRIDEMENT, SPLIT THICKNESS SKIN GRAFT WITH WOUND VAC PLACEMENT FOOT performed by BUTCH Brice MD at [address] [place] [address] 6/30/2020    DEBRIDEMENT, SKIN GRAFT SPLIT THICKNESS FOOT  ([place] [address], [facility]) performed by Denver Lean, MD at [place] [address]      as a child    TOTAL KNEE ARTHROPLASTY Right 10/13/2020    KNEE TOTAL ARTHROPLASTY    TOTAL KNEE ARTHROPLASTY Right 10/13/2020    KNEE TOTAL ARTHROPLASTY- MICROPORT, \ ADVANCED performed by Manan Billings DO at [facility] OR       Medications:      sodium chloride flush  10 mL Intravenous 2 times per day    clobetasol   Topical BID    cefepime  2,000 mg Intravenous Q8H    cycloSPORINE modified  125 mg Oral BID    sodium chloride flush  5-40 mL Intravenous 2 times per day    enoxaparin  40 mg Subcutaneous Daily    aspirin EC  81 mg Oral Daily    folic acid  1 mg Oral Daily    pantoprazole  40 mg Oral QAM AC    vitamin C  500 mg Oral BID    clopidogrel  75 mg Oral Daily    metoprolol tartrate  37.5 mg Oral BID    CABG    Coronary Art Dis Sister     Heart Surgery Sister         CABG      Medical Decision Making:   I have independently reviewed/ordered the following labs:    CBC with Differential:   Recent Labs     06/02/21  0511   WBC 6.9   HGB 11.0*   HCT 34.0*        BMP:  Recent Labs     06/02/21  0511 06/02/21  1717     --    K 4.3  --      --    CO2 27  --    BUN 11  --    CREATININE 0.54  --    MG  --  1.9       Lab Results   Component Value Date    CREATININE 0.54 06/02/2021    GLUCOSE 110 06/02/2021       Detailed results: Thank you for allowing us to participate in the care of this patient. Please call with questions. This note is created with the assistance of a speech recognition program.  While intending to generate adocument that actually reflects the content of the visit, the document can still have some errors including those of syntax and sound a like substitutions which may escape proof reading. It such instances, actual meaningcan be extrapolated by contextual diversion.     Chris Foley MD  Office: [phone]  Perfect serve / office [phone]

## 2021-06-04 NOTE — CARE COORDINATION
Writer faxed [facility]/DC med list to [facility] & informed of DC home today. Informed to call writer w/ any questions. Katy, from [place], notified, of DC. She will follow.

## 2021-06-04 NOTE — PLAN OF CARE
Problem: Pain:  Goal: Pain level will decrease  Description: Pain level will decrease  Outcome: Ongoing   Pain assessment and reassessment completed so far this shift. Pt. able to rest after the use of pain medication. Patient medicated with 1-2 percocets Q4hrs PRN for c/o pain in left foot. Pt. Repositions per self for comfort. Nonverbal cues indicate pain relief. Pt. Rests quietly with eyes closed after pain medication administration. Respirations easy and unlabored. Appears free from distress. Problem: Falls - Risk of:  Goal: Will remain free from falls  Description: Will remain free from falls  6/4/2021 0504 by Stanley Arroyo RN  Outcome: Met This Shift  No falls or injuries sustained at this time. Pt is independent in room, up per self. Call light within reach and pt. uses appropriately for assistance. Siderails up x 2. Nonskid footwear remains on. Bed in low and locked position. Hourly nursing rounds made. Pt. Alert and oriented, aware of limitations, and exhibits good safety judgement. Pt. uses assistive devices appropriately. Pt. understands individual fall risk factors. Pt. reminded to use call light with each nurse/patient interaction. Pt. room located close to nurse's station.

## 2021-06-04 NOTE — FLOWSHEET NOTE
Discharge instructions reviewed with the patient. Appointments and medications reviewed. Dressing changed as ordered and patient tolerated well.

## 2021-06-04 NOTE — PROGRESS NOTES
[facility]    CONSULTATION / HISTORY AND PHYSICAL EXAMINATION            Date:   6/4/2021  Patient name:  Rosey Bryson  Date of admission:  6/1/2021 10:08 AM  MRN:   [id number]  Account:  [de-identified]  YOB: 1955  PCP:    NUPUR High CNP  Room:   2065/2065-01  Code Status:    Full Code    Physician Requesting Consult: Jewel Laguerre DPM    Reason for Consult:  medical management    Chief Complaint:     No chief complaint on file. History Obtained From:     Patient medical record nursing staff    History of Present Illness:   Patient mated to [facility], as a direct admission from podiatrist office.   Patient has chronic wound on dorsal surface of left foot, she mentioned that it started as a small ulcer in May 2019, which progressively getting worse, patient had extensive testing, which include CT angiogram of leg which is negative for peripheral artery disease, underwent biopsies of wound twice concerning for pyoderma gangrenosum, patient has seen dermatologist and rheumatologist for the same, on methotrexate 15 mg weekly and 5 mg of prednisone daily  Patient has past medical history multiple medical problem which include hypertension, coronary artery disease s/p recent stent placement in February of this year, chronic back pain underwent multiple surgeries in lower back  Patient today is complaining of pain in left foot, has chronic pain in back  Patient had wound culture sent on 5/27 which is growing Pseudomonas      Past Medical History:     Past Medical History:   Diagnosis Date    Arthritis     knees hips hands shoulders and back    CAD (coronary artery disease) 2019    blockage on cath 9/2019, to have stenting after surgery (arthroplasty) Dr. Lilly Loyd Chronic back pain     GERD (gastroesophageal reflux disease)     on rx    Hyperlipidemia     per Dr. Al Cali on rx    Hypertension     Alecia Orellana manages    Incomplete RBBB     Irregular heart beat     LAFB (left anterior fascicular block)     Dr. Betsy Sanders, cardiologist, seen 9/2019 prior to surgery on 10/8/2019    Leg wound, left     following with wound care, Dr. Carlos Alanis Lumbar disc disease     Osteoporosis     PAC (premature atrial contraction) 06/2018    PACs and PVCs on holter monitor,     PONV (postoperative nausea and vomiting)     requests scop patch    Wears dentures     upper partial    Wears eyeglasses     readers    Wears partial dentures     upper and lower    Wellness examination     ASlim Parada NP PCP, seen 2/10/2020        Past Surgical History:     Past Surgical History:   Procedure Laterality Date    ABDOMINOPLASTY  1997    BACK SURGERY      BLEPHAROPLASTY Bilateral 1997    BREAST ENHANCEMENT SURGERY Bilateral 1999    breast augmentation   [facility]  2019    Dr. Betsy Sanders, blockage but no stents yet    CATARACT REMOVAL WITH IMPLANT Bilateral 2016    COLONOSCOPY  2015    No Polyps    COSMETIC SURGERY      eyelid lift    FINGER SURGERY Right     thumb lesion excised    FIXATION KYPHOPLASTY  2013    Back    FOOT DEBRIDEMENT Left 10/11/2019    SURGICAL PREP WOUND BED LEFT FOOT WITH APPLICATION OF EPIFIX LEFT FOOT 3X4 performed by Kristal Hull DPM at [place] [address]      kyphoplasty for lumbar fx 2013 Dr. Anaid Rodriguez ARTHROSCOPY Left 2006   Nadia Natividad Left 1/2/2020    FOOT  DEBRIDEMENT WITH WOUND VAC PLACEMENT performed by Alek Cotto MD at [place] [address]  2014   [place] [address]  2005    diskectomy and spinal fusion    OTHER SURGICAL HISTORY  10/03/2019    Left leg angiogram    IN OFFICE/OUTPT VISIT,PROCEDURE ONLY Right 9/11/2018    EXCISION MASS THUMB, 3080 TABLE performed by Manan Billings DO at [facility] Left 12/3/2019    LEFT FOOT DEBRIDEMENT WITH SKIN GRAFT SPLIT THICKNESS; SKIN GRAFT TAKEN FROM RIGHT ANTERIOR THIGH performed by Tammi Grayson MD at [place] [address] Left 2/25/2020    DEBRIDEMENT, SPLIT THICKNESS SKIN GRAFT WITH WOUND VAC PLACEMENT FOOT performed by Whitney Meléndez MD at [place] [address] Left 6/30/2020    DEBRIDEMENT, SKIN GRAFT SPLIT THICKNESS FOOT  ([facility]) performed by Whitney Meléndez MD at [address] [place] [address]      as a child    TOTAL KNEE ARTHROPLASTY Right 10/13/2020    KNEE TOTAL ARTHROPLASTY    TOTAL KNEE ARTHROPLASTY Right 10/13/2020    KNEE TOTAL ARTHROPLASTY- MICROPORT, \ ADVANCED performed by Jose Francisco Leal DO at [place] [address]        Medications Prior to Admission:     Prior to Admission medications    Medication Sig Start Date End Date Taking? Authorizing Provider   cycloSPORINE modified (NEORAL) 25 MG capsule Take 5 capsules by mouth 2 times daily 6/4/21  Yes Keith Shepherd DPM   lidocaine (XYLOCAINE) 2 % jelly Do not apply directly to wound. 6/4/21  Yes Keith Shepherd DPM   cefepime (MAXIPIME) infusion Infuse 2,000 mg intravenously every 8 hours for 11 days Compound per protocol 6/4/21 6/15/21 Yes Andrew Ortiz MD   atorvastatin (LIPITOR) 10 MG tablet Take 1 tablet by mouth daily Take 10mg daily while on Cyclosporine. 6/4/21  Yes Keith Shepherd DPM   meclizine (ANTIVERT) 25 MG CHEW Take 25 mg by mouth 3 times daily as needed   Yes Historical Provider, MD   clobetasol (TEMOVATE) 0.05 % ointment Apply to ulcer at dressing changes.  5/7/21  Yes Telma Garcia MD   folic acid (FOLVITE) 1 MG tablet take 1 tablet by mouth once daily 4/23/21  Yes NUPUR Bentley CNP   clopidogrel (PLAVIX) 75 MG tablet Take 1 tablet by mouth daily 2/5/21  Yes Oh Jj MD   Metoprolol Tartrate 37.5 MG TABS take 1 tablet by mouth twice a day  Patient taking differently: Take 75 mg by mouth 2 times daily  12/21/20  Yes NUPUR Bentley CNP   Multiple Vitamins-Minerals (THERAPEUTIC MULTIVITAMIN-MINERALS) tablet Take 1 tablet by mouth daily   Yes Historical Provider, MD   oxyCODONE-acetaminophen (PERCOCET) 5-325 MG per tablet take 1 tablet by mouth four times a day if needed 10/26/20  Yes Historical Provider, MD   traMADol [facility]) 200 MG extended release tablet take 1 tablet by mouth once daily 10/26/20  Yes Historical Provider, MD   aspirin EC 81 MG EC tablet Take 1 tablet by mouth 2 times daily 10/14/20 6/1/21 Yes Jam Ortiz MD   vitamin C (ASCORBIC ACID) 500 MG tablet Take 500 mg by mouth 2 times daily   Yes Historical Provider, MD   diclofenac sodium (VOLTAREN) 1 % GEL Apply topically as needed Dr. Anila Blanc   Yes Historical Provider, MD   NAPROXEN PO Take 250 mg by mouth daily as needed Indications: patient takes 1-2 times a day Stopping 10 days prior to surgery as instructed by surgeon   Yes Historical Provider, MD   omeprazole (PRILOSEC) 40 MG delayed release capsule Take 1 tablet by mouth daily Per Paul Morataya   Yes Historical Provider, MD   Cholecalciferol (VITAMIN D3) 5000 units TABS Take 1,000 Units by mouth daily    Yes Historical Provider, MD   calcium carbonate (OSCAL) 500 MG TABS tablet Take 1,000 mg by mouth daily Per [facility] rheumatology   Yes Historical Provider, MD   alendronate (FOSAMAX) 70 MG tablet Take 70 mg by mouth every 7 days Indications: Mondays Dr. Michael Faulkner   Patient takes on Mondays 6/4/18  Yes Historical Provider, MD   gabapentin (NEURONTIN) 300 MG capsule Take 300 mg by mouth 2 times daily. Per Dr. Anila Blanc 2/27/15  Yes Historical Provider, MD        Allergies:     Patient has no known allergies. Social History:     Tobacco:    reports that she quit smoking about 16 months ago. Her smoking use included cigarettes. She started smoking about 53 years ago. She has a 12.50 pack-year smoking history. She has never used smokeless tobacco.  Alcohol:      reports current alcohol use. Drug Use:  reports no history of drug use.     Family History:     Family History   Problem Relation Age of Onset    Coronary Art Dis Mother     Arthritis Mother     Heart Surgery Mother         CABG    Coronary Art Dis Father     Heart Disease Father     Heart Surgery Father         CABG    Coronary Art Dis Sister     Heart Surgery Sister         CABG       Review of Systems:     Positive and Negative as described in HPI. CONSTITUTIONAL:  negative for fevers, chills, sweats, fatigue, weight loss  HEENT:  negative for vision, hearing changes, runny nose, throat pain  RESPIRATORY:  negative for shortness of breath, cough, congestion, wheezing. CARDIOVASCULAR:  negative for chest pain, palpitations. GASTROINTESTINAL:  negative for nausea, vomiting, diarrhea, constipation, change in bowel habits, abdominal pain   GENITOURINARY:  negative for difficulty of urination, burning with urination, frequency   INTEGUMENT:  negative for rash, skin lesions, easy bruising   HEMATOLOGIC/LYMPHATIC:  negative for swelling/edema   ALLERGIC/IMMUNOLOGIC:  negative for urticaria , itching  ENDOCRINE:  negative increase in drinking, increase in urination, hot or cold intolerance  MUSCULOSKELETAL: Chronic ulcers on dorsum of left foot, chronic back pain  NEUROLOGICAL:  negative for headaches, dizziness, lightheadedness, numbness, pain, tingling extremities  BEHAVIOR/PSYCH:      Physical Exam:     /73   Pulse 63   Temp 98.1 °F (36.7 °C) (Oral)   Resp 16   Ht 5' 6\" (1.676 m)   Wt 192 lb 3.9 oz (87.2 kg)   SpO2 100%   BMI 31.03 kg/m²   Temp (24hrs), Av.1 °F (36.7 °C), Min:97.8 °F (36.6 °C), Max:98.4 °F (36.9 °C)    No results for input(s): POCGLU in the last 72 hours. No intake or output data in the 24 hours ending 21 6927    General Appearance:  alert, well appearing, and in no acute distress  Mental status: oriented to person, place, and time with normal affect  Head:  normocephalic, atraumatic.   Eye: no icterus, redness, pupils equal and reactive, extraocular eye movements intact, conjunctiva clear  Ear: normal external ear, no discharge, hearing intact  Nose:  no drainage noted  Mouth: mucous membranes moist  Neck: supple, no carotid bruits, thyroid not palpable  Lungs: Bilateral equal air entry, clear to ausculation, no wheezing, rales or rhonchi, normal effort  Cardiovascular: normal rate, regular rhythm, no murmur, gallop, rub. Abdomen: Soft, nontender, nondistended, normal bowel sounds, no hepatomegaly or splenomegaly  Neurologic: There are no new focal motor or sensory deficits, normal muscle tone and bulk, no abnormal sensation, normal speech, cranial nerves II through XII grossly intact  Skin: No gross lesions, rashes, bruising or bleeding on exposed skin area  Extremities: Left foot in dressing, tenderness lower back  Psych: Investigations:      Laboratory Testing:  No results found for this or any previous visit (from the past 24 hour(s)). Consultations:   IP CONSULT TO HOSPITALIST  IP CONSULT TO INFECTIOUS DISEASES  IP CONSULT TO DERMATOLOGY  Assessment :      Primary Problem  <principal problem not specified>    Active Hospital Problems    Diagnosis Date Noted    Cellulitis of left foot [L03.116] 06/01/2021    S/P drug eluting coronary stent placement [Z95.5] 02/03/2021    PAD (peripheral artery disease) ([facility] [address] [I73.9] 12/29/2020    Anemia [D64.9] 10/15/2020    Hypertension [I10] 12/30/2019       Plan:     1. Chronic wound in dorsum of left foot failed 2 skin graft in past, nonhealing, biopsy concerning for pyoderma gangrenosum  2. We will continue with home dose of prednisone 5 mg daily, strongly suggest consulting ID, since culture on 5/27 growing Pseudomonas, need to be treated for Pseudomonas infection  3. Pyoderma gangrenosum, will have virtual visit with the dermatologist while in-house as per notes  4.  Patient has coronary artery disease s/p stents in February of this year, starting patient on aspirin, Plavix, home dose of Lopressor, in case patient need debridement and antiplatelet need to be held, we need to involve cardiologist for clearance to stop antiplatelet with recent cardiac stent  5. On DVT prophylaxis Lovenox  6. Inflammatory polyarthritis diagnosed by rheumatologist on methotrexate weekly  7. PT/OT consult    6/2  Patient feeling better  Evaluated by dermatologist, recommending cyclosporine  We will talk to podiatrist about starting cyclosporine  On cefepime    6/3  Patient doing much better today  DC planning tomorrow started on cyclosporine    6/4  Patient doing much better today  Plan for discharge today  Will be on cefepime for 2 weeks per GINO Delgadillo MD  6/4/2021  2:27 PM    Copy sent to Dr. Selam Todd, APRN - CNP    Please note that this chart was generated using voice recognition Dragon dictation software. Although every effort was made to ensure the accuracy of this automated transcription, some errors in transcription may have occurred.

## 2021-06-07 NOTE — TELEPHONE ENCOUNTER
Please call patient:    1. Needs labs done at [facility] lab before Thursday. Orders entered. 2. Needs appt (hospital f/u) with me sometime next week. Needs to be in person so I can examine the wound. 3. Does she have a blood pressure machine at home? I would like her to monitor her blood pressure closely. Keep a log. Cyclosporine increases blood pressure    Let me know if she has any questions or concerns.

## 2021-06-07 NOTE — CARE COORDINATION
Anil 45 Transitions Initial Follow Up Call    Call within 2 business days of discharge: Yes    Patient: Sharon Scheuermann Patient : 1955   MRN: [id number]  Reason for Admission:   Discharge Date: 21 RARS: Readmission Risk Score: 10      Last Discharge [facility]       Complaint Diagnosis Description Type Department Provider    21   Admission (Discharged) [facility], DPM           # 1 attempt- unable to reach patient, left vm message with name and call back information, requested call back//AIME    Facility: [address] [place] [address]    Non-face-to-face services provided:  Communication with home health agencies or other community services the patient is currently using-writer spoke to Kori at [facility], confirmed referral was received and care was started//AIME    Care Transitions 24 Hour Call    Care Transitions Interventions         Follow Up  Future Appointments   Date Time Provider Keshawn De Dios   2021  8:15 AM MD TRINITY Harris WND [facility]   2021 10:00 AM NUPUR Ferrer - CNP [facility] SARAH [facility]   2022  7:30 AM Todd Oneill DO [address] [address]       Dee Dee Echavarria RN

## 2021-06-08 NOTE — TELEPHONE ENCOUNTER
Yasmin Mcburney returned call to the office,    Pt states she will be getting her labs drawn this week. I was able to schedule her for an appt with you for next week in the office (6/16/21). Yasmin Mcburney states she does have a blood pressure machine at home and she will keep track of her BP. She did have issues with getting her Cyclosporine, per pt her pharmacy has been out since Friday, she states she will be receiving it today. Pt also would like to update you and let you know that with the tendons being exposed during her visit at the hospital she no longer is able to move her toes on the left side.

## 2021-06-14 NOTE — PROGRESS NOTES
[address]   Progress Note and Procedure Note      [address] RECORD NUMBER:  [id number]  AGE: 60 y.o. GENDER: female  : 1955  EPISODE DATE:  2021    Subjective:     Chief Complaint   Patient presents with    Wound Check     left dorsal foot         HISTORY of PRESENT ILLNESS HPI     Sixot Galdamez is a 60 y.o. female who presents today for wound/ulcer evaluation. History of Wound Context: The patient had nonhealing wound to the dorsum of her left foot for 2 years. She is complaining of foot pain and greenish drainage. She denied any fever or chills, denied nausea or vomiting, no diarrhea. She does have hands small joints pain.     She has been following with dermatology Dr. Serafin Miller and rheumatology.  Juan Ramon Arzate 2021 had a punch biopsy done was suggestive of LEUKOCYTOCLASTIC VASCULITIS , Pseudomonas aeruginosa grew on tissue culture that was sensitive to cefepime and Cipro. She was treated with Cipro courses previously with no improvement. Patient was hospitalized at [facility] 2021 through 2021 was discharged on IV cefepime that she is tolerating. HIV screen was negative, QuantiFERON-TB gold test was negative, hepatitis panel negative, ANCA negative  She had multiple punch biopsies most recent was done on previous punch biopsy on 2021 showed fragment of benign fibrous connective tissue, tissue culture grew Enterococcus faecalis on broth medium only that was sensitive to ampicillin and vancomycin with ZAIDA of 2. She was positive for AMADOR and anticentromere was evaluated by rheumatology was previously on prednisone and methotrexate that was changed to cyclosporine on her recent hospitalization. She had multiple debridements and grafting in the past that was failed. History of smoking  She was evaluated by vascular surgery, had angiogram done with no reported stenosis. She denied history of diabetes.   Tissue culture in December and February 2021 grew Pseudomonas aeruginosa was treated with a course of Cipro. Tissue culture 2/19/2021 grew Pseudomonas aeruginosa that was sensitive to Cipro, previous tissue culture 1/26/2021 grew Pseudomonas aeruginosa that was resistant to Cipro.     History of coronary artery disease status post stent  Wound/Ulcer Pain Timing/Severity: intermittent  Quality of pain: burning  Severity:5/10  Modifying Factors: None  Associated Signs/Symptoms: Drainage    Ulcer Identification:  Ulcer Type: undetermined     Contributing Factors: decreased tissue oxygenation  History of smoking, quit, history of coronary artery disease status post stent. PAST MEDICAL HISTORY        Diagnosis Date    Arthritis     knees hips hands shoulders and back    CAD (coronary artery disease) 2019    blockage on cath 9/2019, to have stenting after surgery (arthroplasty) Dr. Michael Potts Chronic back pain     GERD (gastroesophageal reflux disease)     on rx    Hyperlipidemia     per Dr. Milton Varela on rx    Hypertension     Vicki Emersonalgo manages    Incomplete RBBB     Irregular heart beat     LAFB (left anterior fascicular block)     Dr. Milton Varela, cardiologist, seen 9/2019 prior to surgery on 10/8/2019    Leg wound, left     following with wound care, Dr. Chantale Crenshaw Lumbar disc disease     Osteoporosis     PAC (premature atrial contraction) 06/2018    PACs and PVCs on holter monitor,     PONV (postoperative nausea and vomiting)     requests scop patch    Wears dentures     upper partial    Wears eyeglasses     readers    Wears partial dentures     upper and lower    Wellness examination     MIKE Horan NP PCP, seen 2/10/2020       PAST SURGICAL HISTORY    Past Surgical History:   Procedure Laterality Date    ABDOMINOPLASTY  1997    BACK SURGERY      BLEPHAROPLASTY Bilateral 1997    BREAST ENHANCEMENT SURGERY Bilateral 1999    breast augmentation   Armandogemma Soler  2019    Dr. Milton Varela, blockage but no stents yet    CATARACT REMOVAL WITH IMPLANT Bilateral 2016    COLONOSCOPY  2015    No Polyps    COSMETIC SURGERY      eyelid lift    FINGER SURGERY Right     thumb lesion excised    FIXATION KYPHOPLASTY  2013    Back    FOOT DEBRIDEMENT Left 10/11/2019    SURGICAL PREP WOUND BED LEFT FOOT WITH APPLICATION OF EPIFIX LEFT FOOT 3X4 performed by Cathy Vizcaino DPM at [address] [address]      kyphoplasty for lumbar fx 2013 Dr. Nima Quesada ARTHROSCOPY Left 2006   Gregor Stage Left 1/2/2020    FOOT  DEBRIDEMENT WITH WOUND VAC PLACEMENT performed by Nina Timmons MD at [address] [address]  2005    diskectomy and spinal fusion    OTHER SURGICAL HISTORY  10/03/2019    Left leg angiogram    CO OFFICE/OUTPT VISIT,PROCEDURE ONLY Right 9/11/2018    EXCISION MASS THUMB, 3080 TABLE performed by Koko Nevarez DO at [address] [place] [address] 165 Left 12/3/2019    LEFT FOOT DEBRIDEMENT WITH SKIN GRAFT SPLIT THICKNESS; SKIN GRAFT TAKEN FROM RIGHT ANTERIOR THIGH performed by Nina Timmons MD at [address] [place] [address] 165 Left 2/25/2020    DEBRIDEMENT, SPLIT THICKNESS SKIN GRAFT WITH WOUND VAC PLACEMENT FOOT performed by Werner Tobar MD at [address] [place] [address] 165 Left 6/30/2020    DEBRIDEMENT, SKIN GRAFT SPLIT THICKNESS FOOT  ([facility], [address]) performed by Werner Tobar MD at [facility]      as a child    TOTAL KNEE ARTHROPLASTY Right 10/13/2020    KNEE TOTAL ARTHROPLASTY    TOTAL KNEE ARTHROPLASTY Right 10/13/2020    KNEE TOTAL ARTHROPLASTY- MICROPORT, \ ADVANCED performed by Koko Nevarez DO at [address] HISTORY    Family History   Problem Relation Age of Onset    Coronary Art Dis Mother     Arthritis Mother     Heart Surgery Mother         CABG    Coronary Art Dis Father     Heart Disease Father     Heart Surgery Father         CABG    Coronary Art Dis Sister     Heart Surgery Sister         CABG       SOCIAL HISTORY    Social History     Tobacco Use    Smoking status: Former Smoker     Packs/day: 0.25     Years: 50.00     Pack years: 12.50     Types: Cigarettes     Start date:      Quit date: 2020     Years since quittin.3    Smokeless tobacco: Never Used   Vaping Use    Vaping Use: Never used   Substance Use Topics    Alcohol use: Yes     Comment: 2-3 shot liquor for weekends    Drug use: Never       ALLERGIES    No Known Allergies    MEDICATIONS    Current Outpatient Medications on File Prior to Encounter   Medication Sig Dispense Refill    cycloSPORINE modified (NEORAL) 25 MG capsule Take 5 capsules by mouth 2 times daily 60 capsule 3    lidocaine (XYLOCAINE) 2 % jelly Do not apply directly to wound. 30 mL 2    cefepime (MAXIPIME) infusion Infuse 2,000 mg intravenously every 8 hours for 11 days Compound per protocol 66 g 0    atorvastatin (LIPITOR) 10 MG tablet Take 1 tablet by mouth daily Take 10mg daily while on Cyclosporine. 30 tablet 2    meclizine (ANTIVERT) 25 MG CHEW Take 25 mg by mouth 3 times daily as needed      clobetasol (TEMOVATE) 0.05 % ointment Apply to ulcer at dressing changes.  60 g 2    folic acid (FOLVITE) 1 MG tablet take 1 tablet by mouth once daily 30 tablet 5    clopidogrel (PLAVIX) 75 MG tablet Take 1 tablet by mouth daily 30 tablet 11    Metoprolol Tartrate 37.5 MG TABS take 1 tablet by mouth twice a day (Patient taking differently: Take 75 mg by mouth 2 times daily ) 60 tablet 5    Multiple Vitamins-Minerals (THERAPEUTIC MULTIVITAMIN-MINERALS) tablet Take 1 tablet by mouth daily      oxyCODONE-acetaminophen (PERCOCET) 5-325 MG per tablet take 1 tablet by mouth four times a day if needed      traMADol (ULTRAM ER) 200 MG extended release tablet take 1 tablet by mouth once daily      aspirin EC 81 MG EC tablet Take 1 tablet by mouth 2 times daily 84 tablet 0    vitamin C (ASCORBIC ACID) 500 MG tablet Take 500 mg by mouth 2 times daily      diclofenac sodium (VOLTAREN) 1 % GEL Apply topically as needed Dr. Sangita Ye      NAPROXEN PO Take 250 mg by mouth daily as needed Indications: patient takes 1-2 times a day Stopping 10 days prior to surgery as instructed by surgeon      omeprazole (PRILOSEC) 40 MG delayed release capsule Take 1 tablet by mouth daily Per Alice Chavez      Cholecalciferol (VITAMIN D3) 5000 units TABS Take 1,000 Units by mouth daily       calcium carbonate (OSCAL) 500 MG TABS tablet Take 1,000 mg by mouth daily Per Erica Hidalgo rheumatology      alendronate (FOSAMAX) 70 MG tablet Take 70 mg by mouth every 7 days Indications: Mondays Dr. Kamila Daniels   Patient takes on Mondays      gabapentin (NEURONTIN) 300 MG capsule Take 300 mg by mouth 2 times daily. Per Dr. Sangita Ye       No current facility-administered medications on file prior to encounter. REVIEW OF SYSTEMS  Review of Systems    Pertinent items are noted in HPI. Other than above 12 system review was negative    Objective:      BP (!) 175/80   Pulse 72   Temp 98.1 °F (36.7 °C) (Tympanic)   Resp 18   Ht 5' 6\" (1.676 m)   Wt 192 lb (87.1 kg)   BMI 30.99 kg/m²     Wt Readings from Last 3 Encounters:   06/14/21 192 lb (87.1 kg)   06/02/21 192 lb 3.9 oz (87.2 kg)   05/27/21 165 lb (74.8 kg)       PHYSICAL EXAM  Physical Exam  Constitutional:       General: She is not in acute distress. Appearance: Normal appearance. She is not ill-appearing. HENT:      Right Ear: External ear normal.      Left Ear: External ear normal.      Mouth/Throat:      Pharynx: No oropharyngeal exudate or posterior oropharyngeal erythema. Eyes:      General: No scleral icterus. Conjunctiva/sclera: Conjunctivae normal.   Cardiovascular:      Rate and Rhythm: Normal rate and regular rhythm. Heart sounds: No murmur heard. Pulmonary:      Effort: Pulmonary effort is normal. No respiratory distress. Breath sounds: Normal breath sounds. Abdominal:      General: There is no distension. Palpations: Abdomen is soft. Tenderness: There is no abdominal tenderness. Musculoskeletal:      Cervical back: Neck supple. No rigidity. Skin:     General: Skin is warm. Coloration: Skin is not jaundiced. Neurological:      General: No focal deficit present. Mental Status: She is alert and oriented to person, place, and time. Psychiatric:         Mood and Affect: Mood normal.         Behavior: Behavior normal.           Assessment:        Problem List Items Addressed This Visit                 Pain in left foot    Relevant Orders    Supply: Wound Cleanser    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 ([facility] [address] - Primary    Relevant Orders    Supply: Wound Cleanser                     Pre Debridement Measurements:    Wound 12/04/20 Foot Left;Dorsal wound #1 (Active)   Wound Image   06/14/21 0837   Wound Etiology Other 06/14/21 0837   Dressing Status New drainage noted; Old drainage noted 06/14/21 0837   Wound Cleansed Not Cleansed 06/14/21 0837   Dressing/Treatment Ace wrap 06/04/21 0800   Dressing Change Due 06/04/21 06/03/21 2145   Wound Length (cm) 10 cm 06/14/21 0837   Wound Width (cm) 9.5 cm 06/14/21 0837   Wound Depth (cm) 0.2 cm 06/14/21 0837   Wound Surface Area (cm^2) 95 cm^2 06/14/21 0837   Change in Wound Size % (l*w) -208.44 06/14/21 0837   Wound Volume (cm^3) 19 cm^3 06/14/21 0837   Wound Healing % -54 06/14/21 0837   Post-Procedure Length (cm) 10 cm 06/14/21 0837   Post-Procedure Width (cm) 9.5 cm 06/14/21 0837   Post-Procedure Depth (cm) 0.2 cm 06/14/21 0837   Post-Procedure Surface Area (cm^2) 95 cm^2 06/14/21 0837   Post-Procedure Volume (cm^3) 19 cm^3 06/14/21 0837   Wound Assessment Exposed structure tendon 06/14/21 0837   Drainage Amount None 06/14/21 0837   Drainage Description Serosanguinous; Yellow 06/14/21 0837   Odor None 06/14/21 0837   Jessica-wound Assessment Intact 06/14/21 0837   Margins Defined edges 06/14/21 4626   Wound Thickness Description not for Pressure Injury Full thickness 06/01/21 0818   Number of days: 191                Plan:   Continue IV cefepime through 6/22/2021. Tissue culture was obtained will follow. Consider surgical debridement if okay with rheumatology and dermatology. Follow CBC, BUN/creatinine, C-reactive protein and sed rate. Treatment Note please see attached Discharge Instructions    Written patient dismissal instructions given to patient and signed by patient or POA. Discharge Instructions         [address] [facility]  CENTER                                 Visit Jamal Instructions / Physician Orders  DATE:6/14/21     Home Care:NONE     SUPPLIES ORDERED [address]     Wound Location:  Left foot     Cleanse with:  Keep wrap dry and intact     Dressing Orders: Aquacel AG moisten with normal saline cover with adaptic then abd pad and wrap with kerlix     Frequency: Change- 2x per day     Additional Orders: Increase protein to diet (meat, cheese, eggs, fish, peanut butter, nuts and beans)  Multivitamin daily     MY CHART []???????????????????????     Smart Device  []???????????????????????      HYPERBARIC TREATMENT-                TREATMENT #     Your next appointment with the [address] 1 week with                                                                                                2 weeks with                                                                                                          DERMATOLOGY APPOINT   DR. Nancy Bojorquez for dermatology follow up   Discuss surgical options- Needs debridement to remove nonviable tissue                                                                                           ROOM TYPE   []??????????????????????? CHAIR     []??????????????????????? BED   []??????????????????????? EITHER        TIME []??????????????????????? 45 MIN     []??????????????????????? 60 MIN     (Please note your next appointment above and if you are unable to keep, kindly give a 24 hour notice. Thank you.)     If you experience any of the following, please call the [address] [place] [address] Road during business hours:  [phone]     * Increase in Pain  * Temperature over 101  * Increase in drainage from your wound  * Drainage with a foul odor  * Bleeding  * Increase in swelling  * Need for compression bandage changes due to slippage, breakthrough drainage.     If you need medical attention outside of the business hours of the [address] please contact your PCP or go to the nearest emergency room.     The information contained in the After Visit Summary has been reviewed with me, the patient and/or responsible adult, by my health care provider(s). I had the opportunity to ask questions regarding this information. I have elected to receive;      []????????????????????? ?? After Visit Summary  [x]??????????????????????? Comprehensive Discharge Instruction        Patient signature______________________________________Date:_______  Electronically signed by Heron Jeter RN on 6/14/2021 at 8:36 AM          Electronically signed by Andrew Ortiz MD on 6/14/2021 at 8:54 AM

## 2021-06-14 NOTE — PROGRESS NOTES
Medicare Annual Wellness Visit  Name: Rodney Narvaez Date: 2021   MRN: [id number] Sex: Female   Age: 60 y.o. Ethnicity: Non-/Non    : 1955 Race: Corina Bess is here for Medicare AWV    Screenings for behavioral, psychosocial and functional/safety risks, and cognitive dysfunction are all negative except as indicated below. These results, as well as other patient data from the [address] form, are documented in Flowsheets linked to this Encounter. Pt refuses mammo and pap    No Known Allergies    Prior to Visit Medications    Medication Sig Taking? Authorizing Provider   gentamicin (GARAMYCIN) 0.1 % ointment apply to ULCER twice a day Yes Historical Provider, MD   cycloSPORINE modified (NEORAL) 25 MG capsule Take 5 capsules by mouth 2 times daily Yes Noni Amaya DPM   lidocaine (XYLOCAINE) 2 % jelly Do not apply directly to wound. Yes Noni Amaya DPM   cefepime (MAXIPIME) infusion Infuse 2,000 mg intravenously every 8 hours for 11 days Compound per protocol Yes Hayes Warner MD   atorvastatin (LIPITOR) 10 MG tablet Take 1 tablet by mouth daily Take 10mg daily while on Cyclosporine. Yes Noni Amaya DPM   meclizine (ANTIVERT) 25 MG CHEW Take 25 mg by mouth 3 times daily as needed Yes Historical Provider, MD   clobetasol (TEMOVATE) 0.05 % ointment Apply to ulcer at dressing changes.  Yes Michael Nguyen MD   folic acid (FOLVITE) 1 MG tablet take 1 tablet by mouth once daily Yes Reyna FridayNUPUR - CNP   clopidogrel (PLAVIX) 75 MG tablet Take 1 tablet by mouth daily Yes Ilene Edmondson MD   Metoprolol Tartrate 37.5 MG TABS take 1 tablet by mouth twice a day  Patient taking differently: Take 75 mg by mouth 2 times daily  Yes Reyna FridayNUPUR - CNP   Multiple Vitamins-Minerals (THERAPEUTIC MULTIVITAMIN-MINERALS) tablet Take 1 tablet by mouth daily Yes Historical Provider, MD   oxyCODONE-acetaminophen (PERCOCET) 5-325 MG per tablet take 1 tablet by mouth four times a day if needed Yes Historical Provider, MD   traMADol Wallene Naegeli ER) 200 MG extended release tablet take 1 tablet by mouth once daily Yes Historical Provider, MD   aspirin EC 81 MG EC tablet Take 1 tablet by mouth 2 times daily Yes Mary Kay Choudhury MD   vitamin C (ASCORBIC ACID) 500 MG tablet Take 500 mg by mouth 2 times daily Yes Historical Provider, MD   diclofenac sodium (VOLTAREN) 1 % GEL Apply topically as needed Dr. Khris Lamb Yes Historical Provider, MD   NAPROXEN PO Take 250 mg by mouth daily as needed Indications: patient takes 1-2 times a day Stopping 10 days prior to surgery as instructed by surgeon Yes Historical Provider, MD   omeprazole (PRILOSEC) 40 MG delayed release capsule Take 1 tablet by mouth daily Per [place] Duong Yes Historical Provider, MD   Cholecalciferol (VITAMIN D3) 5000 units TABS Take 1,000 Units by mouth daily  Yes Historical Provider, MD   calcium carbonate (OSCAL) 500 MG TABS tablet Take 1,000 mg by mouth daily Per Baylee Herbert rheumatology Yes Historical Provider, MD   alendronate (FOSAMAX) 70 MG tablet Take 70 mg by mouth every 7 days Indications: Mondays Dr. Nacho Corbin   Patient takes on Mondays Yes Historical Provider, MD   gabapentin (NEURONTIN) 300 MG capsule Take 300 mg by mouth 2 times daily.  Per Dr. Khris Lamb Yes Historical Provider, MD       Past Medical History:   Diagnosis Date    Arthritis     knees hips hands shoulders and back    CAD (coronary artery disease) 2019    blockage on cath 9/2019, to have stenting after surgery (arthroplasty) Dr. Stanley Cooper Chronic back pain     GERD (gastroesophageal reflux disease)     on rx    Hyperlipidemia     per Dr. Yousif Rodas on rx    Hypertension     [facility] Du[facility] manages    Incomplete RBBB     Irregular heart beat     LAFB (left anterior fascicular block)     Dr. Yousif Rodas, cardiologist, seen 9/2019 prior to surgery on 10/8/2019    Leg wound, left     following with wound care, Dr. Sachin Larios Lumbar disc disease     Osteoporosis     PAC (premature atrial contraction) 06/2018    PACs and PVCs on holter monitor,     PONV (postoperative nausea and vomiting)     requests scop patch    Wears dentures     upper partial    Wears eyeglasses     readers    Wears partial dentures     upper and lower    Wellness examination     MIKE Flowers NP PCP, seen 2/10/2020       Past Surgical History:   Procedure Laterality Date    ABDOMINOPLASTY  1997    BACK SURGERY      BLEPHAROPLASTY Bilateral 1997    BREAST ENHANCEMENT SURGERY Bilateral 1999    breast augmentation   Gretchen Dance  2019    Dr. Viki Smith, blockage but no stents yet    CATARACT REMOVAL WITH IMPLANT Bilateral 2016    COLONOSCOPY  2015    No Polyps    COSMETIC SURGERY      eyelid lift    FINGER SURGERY Right     thumb lesion excised    FIXATION KYPHOPLASTY  2013    Back    FOOT DEBRIDEMENT Left 10/11/2019    SURGICAL PREP WOUND BED LEFT FOOT WITH APPLICATION OF EPIFIX LEFT FOOT 3X4 performed by Lesley Tafoya DPM at [place] [address]      kyphoplasty for lumbar fx 2013 Dr. Edilberto Hameed ARTHROSCOPY Left 2006   Evaristo Side Left 1/2/2020    FOOT  DEBRIDEMENT WITH WOUND VAC PLACEMENT performed by Tequila Aguilar MD at [place] [address]  2014   [place] [address]  2005    diskectomy and spinal fusion    OTHER SURGICAL HISTORY  10/03/2019    Left leg angiogram    ME OFFICE/OUTPT VISIT,PROCEDURE ONLY Right 9/11/2018    EXCISION MASS THUMB, 3080 TABLE performed by Anup Ramires DO at [facility] Left 12/3/2019    LEFT FOOT DEBRIDEMENT WITH SKIN GRAFT SPLIT THICKNESS; SKIN GRAFT TAKEN FROM RIGHT ANTERIOR THIGH performed by Tequila Aguilar MD at [facility] Left 2/25/2020    DEBRIDEMENT, SPLIT THICKNESS SKIN GRAFT WITH WOUND VAC PLACEMENT FOOT performed by Jarad Noble MD at [facility] Left 6/30/2020 DEBRIDEMENT, SKIN GRAFT SPLIT THICKNESS FOOT  (Ally Hackett) performed by Chris Wilks MD at [facility] 56      as a child    TOTAL KNEE ARTHROPLASTY Right 10/13/2020    KNEE TOTAL ARTHROPLASTY    TOTAL KNEE ARTHROPLASTY Right 10/13/2020    KNEE TOTAL ARTHROPLASTY- MICROPORT, \ ADVANCED performed by Kristin Rosales DO at [place] History   Problem Relation Age of Onset    Coronary Art Dis Mother     Arthritis Mother     Heart Surgery Mother         CABG    Coronary Art Dis Father     Heart Disease Father     Heart Surgery Father         CABG    Coronary Art Dis Sister     Heart Surgery Sister         CABG       CareTeam (Including outside providers/suppliers regularly involved in providing care):   Patient Care Team:  NUPUR Jordan CNP as PCP - General (Family Nurse Practitioner)  NUPUR Jordan CNP as PCP - [facility]  Rei Marroquin MD as Consulting Physician (Pain Management)  Joselo Peguero MD as Consulting Physician (Internal Medicine)  Nelli Jaramillo DPM as Physician (Podiatry)  Quique Guzmán MD as Surgeon (Vascular Surgery)  Mary Kolb RN as Care Transitions Nurse    Wt Readings from Last 3 Encounters:   06/14/21 165 lb (74.8 kg)   06/14/21 192 lb (87.1 kg)   06/02/21 192 lb 3.9 oz (87.2 kg)     Vitals:    06/14/21 0952   BP: 130/60   Site: Left Upper Arm   Position: Sitting   Cuff Size: Medium Adult   Pulse: 67   SpO2: 99%   Weight: 165 lb (74.8 kg)     Body mass index is 26.63 kg/m². Based upon direct observation of the patient, evaluation of cognition reveals recent and remote memory intact. Patient's complete Health Risk Assessment and screening values have been reviewed and are found in Flowsheets. The following problems were reviewed today and where indicated follow up appointments were made and/or referrals ordered.     Positive Risk Factor Screenings with Interventions: General Health and ACP:  General  In general, how would you say your health is?: Good  In the past 7 days, have you experienced any of the following?  New or Increased Pain, New or Increased Fatigue, Loneliness, Social Isolation, Stress or Anger?: (!) New or Increased Pain  Do you get the social and emotional support that you need?: Yes  Do you have a Living Will?: Yes  Advance Directives     Power of  Living Will ACP-Advance Directive ACP-Power of     Not on File Filed on 11/10/20 Filed Not on File      General Health Risk Interventions:  · Pain issues: foot      Safety:  Safety  Do you have working smoke detectors?: Yes  Have all throw rugs been removed or fastened?: (!) No  Do you have non-slip mats or surfaces in all bathtubs/showers?: (!) No  Do all of your stairways have a railing or banister?: (!) No  Are your doorways, halls and stairs free of clutter?: Yes  Do you always fasten your seatbelt when you are in a car?: Yes  Safety Interventions:  · Home safety tips provided     Personalized Preventive Plan   Current Health Maintenance Status  Immunization History   Administered Date(s) Administered    Influenza, Quadv, IM, (6 mo and older Fluzone, Flulaval, Fluarix and 3 yrs and older Afluria) 01/03/2018, 12/30/2019    Influenza, Epimenio Maria R, IM, PF (6 mo and older Fluzone, Flulaval, Fluarix, and 3 yrs and older Afluria) 10/30/2018, 10/14/2020    Pneumococcal Polysaccharide ([id number]) 01/12/2017        Health Maintenance   Topic Date Due    Diabetic retinal exam  Never done    COVID-19 Vaccine (1) Never done    Breast cancer screen  04/17/2017    Cervical cancer screen  04/01/2018    Diabetic foot exam  11/11/2020    Annual Wellness Visit (AWV)  Never done    Shingles Vaccine (1 of 2) 02/26/2022 (Originally 12/30/2006)    DTaP/Tdap/Td vaccine (1 - Tdap) 06/14/2022 (Originally 12/30/1975)    A1C test (Diabetic or Prediabetic)  09/30/2021    Pneumococcal 0-64 years Vaccine (2 of 2) 01/12/2022    Diabetic microalbuminuria test  02/26/2022    Lipid screen  06/02/2022    Colon cancer screen colonoscopy  06/26/2025    Flu vaccine  Completed    Hepatitis C screen  Completed    HIV screen  Completed    Hepatitis A vaccine  Aged Out    Hib vaccine  Aged Out    Meningococcal (ACWY) vaccine  Aged Out     Recommendations for [employer] Due: see orders and patient instructions/AVS.  . Recommended screening schedule for the next 5-10 years is provided to the patient in written form: see Patient Instructions/AVS.    There are no diagnoses linked to this encounter.

## 2021-06-14 NOTE — PATIENT INSTRUCTIONS
Personalized Preventive Plan for Pedro Hector - 6/14/2021  Medicare offers a range of preventive health benefits. Some of the tests and screenings are paid in full while other may be subject to a deductible, co-insurance, and/or copay. Some of these benefits include a comprehensive review of your medical history including lifestyle, illnesses that may run in your family, and various assessments and screenings as appropriate. After reviewing your medical record and screening and assessments performed today your provider may have ordered immunizations, labs, imaging, and/or referrals for you. A list of these orders (if applicable) as well as your Preventive Care list are included within your After Visit Summary for your review. Other Preventive Recommendations:    · A preventive eye exam performed by an eye specialist is recommended every 1-2 years to screen for glaucoma; cataracts, macular degeneration, and other eye disorders. · A preventive dental visit is recommended every 6 months. · Try to get at least 150 minutes of exercise per week or 10,000 steps per day on a pedometer . · Order or download the FREE \"Exercise & Physical Activity: Your Everyday Guide\" from [employer] [place] [employer]. Call 0-328.830.7338 or search [employer] [place] [employer] online. · You need 8693-9737 mg of calcium and 3469-2793 IU of vitamin D per day. It is possible to meet your calcium requirement with diet alone, but a vitamin D supplement is usually necessary to meet this goal.  · When exposed to the sun, use a sunscreen that protects against both UVA and UVB radiation with an SPF of 30 or greater. Reapply every 2 to 3 hours or after sweating, drying off with a towel, or swimming. · Always wear a seat belt when traveling in a car. Always wear a helmet when riding a bicycle or motorcycle.

## 2021-06-15 NOTE — CARE COORDINATION
[facility] Transitions Follow Up Call    6/15/2021    Patient: Candance Austria  Patient : 1955   MRN: [id number]  Reason for Admission: Cellulitis Left Foot  Discharge Date: 21 RARS: Readmission Risk Score: 8         Spoke with: [facility] Transitions Subsequent and Final Call    Subsequent and Final Calls  Do you have any ongoing symptoms?: Yes  Onset of Patient-reported symptoms: Other  Patient-reported symptoms: Pain, Other  Interventions for patient-reported symptoms: Other  Have your medications changed?: No  Do you have any questions related to your medications?: No  Do you currently have any active services?: Yes  Are you currently active with any services?: Home Health  Care Transitions Interventions  Other Interventions:       States it has been 2 years since she has had problems with her foot. States, \"It hurts like hell\". Rates pain at a 7 on pain scale, take her pain medication and it goes from a 9 to a 7. [facility] comes in to monitor infection, PICC line care and wound assessments. States cannot extend her toes, has no ability to do so. States no heat to site, scant amount yellow drainage, no odor. Denies heat to site or fever. Has been taking Cyclosporin for about 10 days now. Eats ok, states pain is preventing her from sleeping. Sleeps on ave. 2 hours at a time throughout day and night. Wound Care is following. F/U appointment with dermatology tomorrow.     Follow Up  Future Appointments   Date Time Provider Keshawn Marxi   2021  8:00 AM MD joanne Quick derm [id number]   2021  8:30 AM MD TRINITY Edward [facility]   2021  8:30 AM MARCELINA Patiño [facility]   2022  7:30 AM Lizz Chanel DO [address], RN

## 2021-06-16 NOTE — PROGRESS NOTES
Dermatology Patient Note  [facility] [address]  [address]  [address]  Dept: [phone]  Dept Fax: [phone]      VISITDATE: 6/16/2021   REFERRING PROVIDER: No ref. provider found      Marah Naqvi is a 60 y.o. female  who presents today in the office for:    Follow-up [facility] follow up. Has a PICC line now for home antbx therapy. No imporvement in the foot)      PERTINENT HISTORY NOT LISTED ABOVE:  Patient presents for f/u presumed pyoderma gangrenosum (biopsies not diagnostic but overall picture c/w PG) after recent hospital stay. She has been switched from MTX to cyclosporine 3mg/kg/day. She has been on it a little over a week but missed a few days due to the pharmacy not having it in stock. She has had no SE from the cyclosporine. Monitoring her BP at home and it is a little elevated. Cr has remained stable. She thinks it may be improving and shrinking. Her tends are exposed and she can no longer move her toes. She is following with podiatry for wound care. She is also seeing ID and is on home IV cefepime through PICC, as ulcer grew pseudomonas    CURRENT MEDICATIONS:   Current Outpatient Medications   Medication Sig Dispense Refill    ceFEPIme (MAXIPIME) 2 g injection       cycloSPORINE modified (NEORAL) 25 MG capsule Take 5 capsules by mouth 2 times daily 60 capsule 0    gentamicin (GARAMYCIN) 0.1 % ointment Apply to wound at dressing changes 30 g 2    gentamicin (GARAMYCIN) 0.1 % ointment apply to ULCER twice a day      lidocaine (XYLOCAINE) 2 % jelly Do not apply directly to wound. 30 mL 2    atorvastatin (LIPITOR) 10 MG tablet Take 1 tablet by mouth daily Take 10mg daily while on Cyclosporine. 30 tablet 2    meclizine (ANTIVERT) 25 MG CHEW Take 25 mg by mouth 3 times daily as needed      clobetasol (TEMOVATE) 0.05 % ointment Apply to ulcer at dressing changes.  60 g 2    folic acid (FOLVITE) 1 MG tablet take 1 tablet by mouth once daily 30 tablet 5    clopidogrel (PLAVIX) 75 MG tablet Take 1 tablet by mouth daily 30 tablet 11    Metoprolol Tartrate 37.5 MG TABS take 1 tablet by mouth twice a day (Patient taking differently: Take 75 mg by mouth 2 times daily ) 60 tablet 5    Multiple Vitamins-Minerals (THERAPEUTIC MULTIVITAMIN-MINERALS) tablet Take 1 tablet by mouth daily      oxyCODONE-acetaminophen (PERCOCET) 5-325 MG per tablet take 1 tablet by mouth four times a day if needed      traMADol (ULTRAM ER) 200 MG extended release tablet take 1 tablet by mouth once daily      aspirin EC 81 MG EC tablet Take 1 tablet by mouth 2 times daily 84 tablet 0    vitamin C (ASCORBIC ACID) 500 MG tablet Take 500 mg by mouth 2 times daily      diclofenac sodium (VOLTAREN) 1 % GEL Apply topically as needed Dr. Saw Rodriguez      NAPROXEN PO Take 250 mg by mouth daily as needed Indications: patient takes 1-2 times a day Stopping 10 days prior to surgery as instructed by surgeon      omeprazole (PRILOSEC) 40 MG delayed release capsule Take 1 tablet by mouth daily Per Sánchez Mcclellan      Cholecalciferol (VITAMIN D3) 5000 units TABS Take 1,000 Units by mouth daily       calcium carbonate (OSCAL) 500 MG TABS tablet Take 1,000 mg by mouth daily Per [facility]      alendronate (FOSAMAX) 70 MG tablet Take 70 mg by mouth every 7 days Indications:  Dr. Binu Saleh   Patient takes on       gabapentin (NEURONTIN) 300 MG capsule Take 300 mg by mouth 2 times daily. Per Dr. Saw Rodriguez       No current facility-administered medications for this visit.        ALLERGIES:   No Known Allergies    SOCIAL HISTORY:  Social History     Tobacco Use    Smoking status: Former Smoker     Packs/day: 0.25     Years: 50.00     Pack years: 12.50     Types: Cigarettes     Start date:      Quit date: 2020     Years since quittin.3    Smokeless tobacco: Never Used   Substance Use Topics    Alcohol use: Yes     Comment: 2-3 shot liquor for weekends       Pertinent ROS:  Review of Systems  Skin: Denies any new changing, growing or bleeding lesions or rashes except as described in the HPI   Constitutional: Denies fevers, chills, and malaise. PHYSICAL EXAM:   /67   Pulse 61   Temp 97.2 °F (36.2 °C)   Ht 5' 6\" (1.676 m)   Wt 165 lb (74.8 kg)   SpO2 98%   BMI 26.63 kg/m²     The patient is generally well appearing, well nourished, alert and conversational. Affect is normal.    Cutaneous Exam:  Physical Exam  Head/face,neck, both arms, digits and/or nails, and limited lower extremities (that which is visible with pants/shorts and shoes/socks on) was examined. Diagnoses/exam findings/medical history pertinent to this visit are listed below:    Assessment and Plan:  Assessment   1. Pyoderma gangrenosum, localized to left foot  - chronic illness, responding to treatment but not yet at goal   - continue cyclosporine  - avoid debridement (trauma worsens PG)  - CBC, CMP, Mg to be drawn at home through PICC line in one week (6/23)  - continue cyclosporine 3 mg/kg/day. Patient denies SE and is monitoring BP at home. Creatinine has remained stable  - continue home cefepime  - photo and measurement today  - cycloSPORINE modified (NEORAL) 25 MG capsule; Take 5 capsules by mouth 2 times daily  Dispense: 60 capsule; Refill: 0  - gentamicin (GARAMYCIN) 0.1 % ointment; Apply to wound at dressing changes  Dispense: 30 g; Refill: 2          RTC 10 days    There are no Patient Instructions on file for this visit. This note was created with the assistance of a speech-recognition program.  Although the intention is to generate a document that actually reflects the content of the visit, no guarantees can be provided that every mistake has been identified and corrected byediting.     Electronically signed by Gómez Jordan MD on 6/16/21 at 9:27 AM EDT

## 2021-06-21 NOTE — TELEPHONE ENCOUNTER
Pt called, requested clobetasol cream called in. Pt asking it to be sent to [facility] on [address].

## 2021-06-21 NOTE — PROGRESS NOTES
[address]   Progress Note and Procedure Note      [address] RECORD NUMBER:  [id number]  AGE: 60 y.o. GENDER: female  : 1955  EPISODE DATE:  2021    Subjective:     Chief Complaint   Patient presents with    Wound Check     left dorsal foot         HISTORY of PRESENT ILLNESS HPI     Fernando Blanchard is a 60 y.o. female who presents today for wound/ulcer evaluation. History of Wound Context: The patient had nonhealing wound to the dorsum of her left foot for 2 years. She is complaining of pain to the left foot, had foot drop, exposed tendon currently not functioning. Wound culture from last week no growth. She is tolerating cyclosporine and IV cefepime. She denied fever or chills, denied nausea or vomiting no other complaints     She has been following with dermatology Dr. Astrid Zambrano and rheumatology.  Myra Clements 2021 had a punch biopsy done was suggestive of LEUKOCYTOCLASTIC VASCULITIS , Pseudomonas aeruginosa grew on tissue culture that was sensitive to cefepime and Cipro. She was treated with Cipro courses previously with no improvement. Patient was hospitalized at [facility] 2021was discharged on IV cefepime that she is tolerating. HIV screen was negative, QuantiFERON-TB gold test was negative, hepatitis panel negative, ANCA negative  She had multiple punch biopsies most recent was done on previous punch biopsy on 2021 showed fragment of benign fibrous connective tissue, tissue culture grew Enterococcus faecalis on broth medium only that was sensitive to ampicillin and vancomycin with ZAIDA of 2. She was positive for AMADOR and anticentromere was evaluated by rheumatology was previously on prednisone and methotrexate that was changed to cyclosporine on her recent hospitalization. She had multiple debridements and grafting in the past that was failed.   History of smoking  She was evaluated by vascular surgery, had angiogram done with no reported stenosis. She denied history of diabetes. Tissue culture in December and February 2021 grew Pseudomonas aeruginosa was treated with a course of Cipro. Tissue culture 2/19/2021 grew Pseudomonas aeruginosa that was sensitive to Cipro, previous tissue culture 1/26/2021 grew Pseudomonas aeruginosa that was resistant to Cipro.     History of coronary artery disease status post stent  Wound/Ulcer Pain Timing/Severity: intermittent  Quality of pain: burning  Severity:5/10  Modifying Factors: None  Associated Signs/Symptoms: Drainage    Ulcer Identification:  Ulcer Type: undetermined     Contributing Factors: decreased tissue oxygenation  History of smoking, quit, history of coronary artery disease status post stent. PAST MEDICAL HISTORY        Diagnosis Date    Arthritis     knees hips hands shoulders and back    CAD (coronary artery disease) 2019    blockage on cath 9/2019, to have stenting after surgery (arthroplasty) Dr. Judith Michelle Chronic back pain     GERD (gastroesophageal reflux disease)     on rx    Hyperlipidemia     per Dr. Bibi Hi on rx    Hypertension     Mardell Shown manages    Incomplete RBBB     Irregular heart beat     LAFB (left anterior fascicular block)     Dr. Bibi Hi, cardiologist, seen 9/2019 prior to surgery on 10/8/2019    Leg wound, left     following with wound care, Dr. Delmi Griffin Lumbar disc disease     Osteoporosis     PAC (premature atrial contraction) 06/2018    PACs and PVCs on holter monitor,     PONV (postoperative nausea and vomiting)     requests scop patch    Wears dentures     upper partial    Wears eyeglasses     readers    Wears partial dentures     upper and lower    Wellness examination     MIKE Hutchins NP PCP, seen 2/10/2020       PAST SURGICAL HISTORY    Past Surgical History:   Procedure Laterality Date    ABDOMINOPLASTY  1997    BACK SURGERY      BLEPHAROPLASTY Bilateral 1997    BREAST ENHANCEMENT SURGERY Bilateral 1999    breast tablet take 1 tablet by mouth once daily      aspirin EC 81 MG EC tablet Take 1 tablet by mouth 2 times daily 84 tablet 0    vitamin C (ASCORBIC ACID) 500 MG tablet Take 500 mg by mouth 2 times daily      diclofenac sodium (VOLTAREN) 1 % GEL Apply topically as needed Dr. Luis Mock      NAPROXEN PO Take 250 mg by mouth daily as needed Indications: patient takes 1-2 times a day Stopping 10 days prior to surgery as instructed by surgeon      omeprazole (PRILOSEC) 40 MG delayed release capsule Take 1 tablet by mouth daily Per Geneva Licona      Cholecalciferol (VITAMIN D3) 5000 units TABS Take 1,000 Units by mouth daily       calcium carbonate (OSCAL) 500 MG TABS tablet Take 1,000 mg by mouth daily Per David Heredia rheumatology      alendronate (FOSAMAX) 70 MG tablet Take 70 mg by mouth every 7 days Indications: Mondays Dr. Naa Hector   Patient takes on Mondays      gabapentin (NEURONTIN) 300 MG capsule Take 300 mg by mouth 2 times daily. Per Dr. Luis Mock       No current facility-administered medications on file prior to encounter. REVIEW OF SYSTEMS  Review of Systems    Pertinent items are noted in HPI. Other than above 12 system review was negative    Objective:      BP (!) 184/84   Pulse 62   Temp 98.7 °F (37.1 °C) (Tympanic)   Resp 18   Ht 5' 6\" (1.676 m)   Wt 165 lb (74.8 kg)   BMI 26.63 kg/m²     Wt Readings from Last 3 Encounters:   06/21/21 165 lb (74.8 kg)   06/16/21 165 lb (74.8 kg)   06/14/21 192 lb (87.1 kg)       PHYSICAL EXAM  Physical Exam  Constitutional:       General: She is not in acute distress. Appearance: Normal appearance. She is not ill-appearing. HENT:      Right Ear: External ear normal.      Left Ear: External ear normal.      Mouth/Throat:      Pharynx: No oropharyngeal exudate or posterior oropharyngeal erythema. Eyes:      General: No scleral icterus. Conjunctiva/sclera: Conjunctivae normal.   Cardiovascular:      Rate and Rhythm: Normal rate and regular rhythm. Wound Assessment Exposed structure tendon 06/21/21 0849   Drainage Amount None 06/21/21 0849   Drainage Description Serosanguinous; Yellow 06/21/21 0849   Odor None 06/21/21 0849   Jessica-wound Assessment Intact 06/21/21 0849   Margins Defined edges 06/21/21 0849   Wound Thickness Description not for Pressure Injury Full thickness 06/01/21 0818   Number of days: 198                Plan:   No debridement per dermatology recommendations. IV cefepime until 6/22/2021 . Treatment Note please see attached Discharge Instructions    Written patient dismissal instructions given to patient and signed by patient or POA. Discharge Instructions         [address] [place] [facility]                                 Visit Po Instructions / Physician Orders  [address]     SUPPLIES ORDERED [address]     Wound Location:  Left foot     Cleanse with:  Keep wrap dry and intact     Dressing Orders: Aquacel AG moisten with normal saline cover with adaptic then abd pad and wrap with kerlix   clobetasol around wound per Derm  Frequency: Change- 2x per day     Additional Orders: Increase protein to diet (meat, cheese, eggs, fish, peanut butter, nuts and beans)  Multivitamin daily     MY CHART []????????????????????????     Smart Device  []????????????????????????      HYPERBARIC TREATMENT-                TREATMENT #     Your next appointment with the [address] [place] [address] Road 1 week with                                                                                                2 weeks with                                                                                                          DERMATOLOGY APPOINT   DR. Nighat Wood for dermatology follow up   Discuss surgical options- Needs debridement to remove nonviable tissue                                                                                           ROOM TYPE   []???????????????????????? CHAIR     []???????????????????????? BED   []???????????????????????? EITHER        TIME []???????????????????????? 45 MIN     []???????????????????????? 60 MIN     (Please note your next appointment above and if you are unable to keep, kindly give a 24 hour notice. Thank you.)     If you experience any of the following, please call the [address] during business hours:  [phone]     * Increase in Pain  * Temperature over 101  * Increase in drainage from your wound  * Drainage with a foul odor  * Bleeding  * Increase in swelling  * Need for compression bandage changes due to slippage, breakthrough drainage.     If you need medical attention outside of the business hours of the [address] please contact your PCP or go to the nearest emergency room.     The information contained in the After Visit Summary has been reviewed with me, the patient and/or responsible adult, by my health care provider(s). I had the opportunity to ask questions regarding this information. I have elected to receive;      []?????????????????????? ?? After Visit Summary  [x]???????????????????????? Comprehensive Discharge Instruction        Patient signature______________________________________Date:_______  Electronically signed by Lázaro Roth RN on 6/21/2021 at 8:59 AM          Electronically signed by Shantell Yin MD on 6/21/2021 at 9:12 AM

## 2021-06-21 NOTE — PLAN OF CARE
Problem: Pain:  Goal: Pain level will decrease  Description: Pain level will decrease  6/21/2021 0900 by Fernando Lopez RN  Outcome: Ongoing  6/21/2021 0900 by Fernando Lopez RN  Outcome: Ongoing  Goal: Control of acute pain  Description: Control of acute pain  6/21/2021 0900 by Fernando Lopez RN  Outcome: Ongoing  6/21/2021 0900 by Fernando Loepz RN  Outcome: Ongoing  Goal: Control of chronic pain  Description: Control of chronic pain  6/21/2021 0900 by Fernando Lopez RN  Outcome: Ongoing  6/21/2021 0900 by Fernando Lopez RN  Outcome: Ongoing     Problem: Wound:  Goal: Will show signs of wound healing; wound closure and no evidence of infection  Description: Will show signs of wound healing; wound closure and no evidence of infection  Outcome: Ongoing     Problem: Falls - Risk of:  Goal: Will remain free from falls  Description: Will remain free from falls  Outcome: Ongoing

## 2021-06-22 NOTE — DISCHARGE SUMMARY
Discharge Summary  Podiatric Medicine and Surgery    Patient Identification     Pedro Hector is a 60 y.o. female  :  1955  MRN: [id number]  Acct: [de-identified]     Admit date: 2021  Discharge date and time: 2021  4:11 PM     Admitting Physician: Nita Hernandez DPM   Discharge Physician: Elisabeth Middleton DPM    Admission Diagnoses: Cellulitis of left foot [L03.116]  Discharge Diagnoses:   Patient Active Problem List   Diagnosis    Neoplasm of uncertain behavior of skin    Other plastic surgery for unacceptable cosmetic appearance    Chronic bilateral low back pain without sciatica    Mass of finger of right hand    Chronic ulcer of left foot with fat layer exposed ([address]    Pain in left foot    Hypertension    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 ([address]    Wound, open, foot with complication, left, initial encounter    Status post total knee replacement using cement, right    Primary osteoarthritis of right knee    Gastroesophageal reflux disease without esophagitis    Anemia    PAD (peripheral artery disease) ([address]    S/P drug eluting coronary stent placement    Pain in limb    Cataract    Arrhythmia    Cellulitis of left foot       Admission Condition: fair  Discharged Condition: fair    Hospital Course     Brief Inpatient Course: Admitted on 2021 10:08 AM for Cellulitis of left foot [L03.116]. Patient was admitted for extensive open wound to the left foot with exposure of tendons. ID and Dermatology were consulted for wound care recommendations. Dermatology and ID offered their medical advisements. Daily dressing changes were continued. Patient discharged on the new medications prescribed by ID and Derm and she will follow up in the office with Dr. Maggy Coley.      Consults: ID, IM, Dermatology     Patient Instructions     Medications:      Medication List      START taking these medications    lidocaine 2 % jelly  Commonly known as: XYLOCAINE  Do not apply directly to wound. CHANGE how you take these medications    atorvastatin 10 MG tablet  Commonly known as: LIPITOR  Take 1 tablet by mouth daily Take 10mg daily while on Cyclosporine. What changed:   · medication strength  · how much to take  · when to take this  · additional instructions     Metoprolol Tartrate 37.5 MG Tabs  take 1 tablet by mouth twice a day  What changed: See the new instructions. CONTINUE taking these medications    alendronate 70 MG tablet  Commonly known as: FOSAMAX     aspirin EC 81 MG EC tablet  Take 1 tablet by mouth 2 times daily     calcium carbonate 500 MG Tabs tablet  Commonly known as: OSCAL     clopidogrel 75 MG tablet  Commonly known as: PLAVIX  Take 1 tablet by mouth daily     diclofenac sodium 1 % Gel  Commonly known as: VOLTAREN     folic acid 1 MG tablet  Commonly known as: FOLVITE  take 1 tablet by mouth once daily     gabapentin 300 MG capsule  Commonly known as: NEURONTIN     meclizine 25 MG Chew  Commonly known as: ANTIVERT     NAPROXEN PO     omeprazole 40 MG delayed release capsule  Commonly known as: PRILOSEC     oxyCODONE-acetaminophen 5-325 MG per tablet  Commonly known as: PERCOCET     therapeutic multivitamin-minerals tablet     traMADol 200 MG extended release tablet  Commonly known as: ULTRAM ER     vitamin C 500 MG tablet  Commonly known as: ASCORBIC ACID     Vitamin D3 125 MCG (5000 UT) Tabs        STOP taking these medications    methotrexate 2.5 MG chemo tablet  Commonly known as: RHEUMATREX        ASK your doctor about these medications    cefepime  infusion  Commonly known as: MAXIPIME  Infuse 2,000 mg intravenously every 8 hours for 11 days Compound per protocol  Ask about: Should I take this medication?            Where to Get Your Medications      These medications were sent to [address] [place] [address] [facility] [phone]  [address] [place] [id number]    Phone:

## 2021-06-22 NOTE — CARE COORDINATION
[facility] Transitions Follow Up Call    2021    Patient: Sharon Scheuermann  Patient : 1955   MRN: [id number]  Reason for Admission:   Discharge Date: 21 RARS: Readmission Risk Score: 8         Spoke with: Elaine Nicole spoke to patient, she is doing ok, was to see ID yesterday, started on Santyl, waiting to hear from her pharmacy when medication gets in, no new needs or concerns at this time, reviewed up coming appointments, will continue to follow//JU  Care Transitions Follow Up Call    Needs to be reviewed by the provider     CTN provided contact information for future needs. Plan for follow-up call in 7-10 days based on severity of symptoms and risk factors. Plan for next call: routine          Care Transitions Subsequent and Final Call    Subsequent and Final Calls  Do you have any ongoing symptoms?: Yes  Have your medications changed?: Yes  Patient Reports: waiting to hear back from the pharmacy when medicatin gets in [facility] [place])  Are you currently active with any services?: Home Health  Do you have any needs or concerns that I can assist you with?: No  Care Transitions Interventions  Other Interventions:            Follow Up  Future Appointments   Date Time Provider Keshawn De Dios   2021 11:30 AM MD joanne Petersen derm [id number]   2021  8:30 AM MARCELINA Ferguson [facility]   2022  7:30 AM Todd Oneill DO [address] [id number]       Dee Dee Echavarria RN

## 2021-06-24 NOTE — Clinical Note
Please call [facility] to arrange consult with dermatology for pyoderma gangrenosum. I expanded my note to include detailed history and results for this patient, but they will likely want official path results and lab reports as well.  I will route to you what needs to be included in the referral

## 2021-06-24 NOTE — PROGRESS NOTES
Dermatology Patient Note  [facility]  [address]  [address]  Dept: [phone]  Dept Fax: [phone]      VISITDATE: 6/24/2021   REFERRING PROVIDER: No ref. provider found      Halima Alvarez is a 60 y.o. 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 She has been on cyclosporine 3 mg/kg/day x about 2 weeks. There has been no worsening but not sure if it's getting better. She denies SE of cyclosporine. She has also been on IV cefepime from ID as tissue culture grew pseudomonas. She just finished this course and had PICC line removed. Sharp debridement has been discontinued and just started santyl (collagenase) for chemical debridement  Uses gentamicin to wound with clobetasol at periphery  Lab has not been drawing the labs I ordered (no Mg or chem panel), however Cr has remained stable and BP stable. Prior history:  Started in May 2019 with a small ulcer that has continued to expand over time. She has had multiple diagnostic and therapeutic interventions without improvement, including three skin biopsies (see below), CT angiogram (normal), tissue cultures (enterococcus and pseudomonas, treated with culture-directed antibiotics with no improvement), extensive lab work up (positive AMADOR and anti-centromere antibody). She has had debridements, skin grafts, and wound vac with no improvement, and believes procedural intervention worsens the ulcer.  She was referred to [facility] plastic surgery previously and was supposed to also see dermatology but was not scheduled.     In early 2021 she was started on prednisone 60 mg POSITIVE AMADOR AND ANTICENTROMERE ANTIBODY.  AN   AUTOIMMUNE ETIOLOGY MAY POSSIBLY BE CONTRIBUTING TO THE VASCULITIS. ALTERNATIVELY, ALTHOUGH CONTROVERSIAL IN THE DERMATOPATHOLOGICAL   LITERATURE, A LEUKOCYTOCLASTIC VASCULITIS HAS BEEN REPORTED IN THE   PERIPHERAL ERYTHEMATOUS EDGE OF PYODERMA GANGRENOSUM.       CORRELATION WITH CLINICAL PICTURE IS RECOMMENDED TO DETERMINE WHICH   APPEARS TO BEST CORRELATE IN THIS PATIENT.      5/27/21  SKIN, LEFT FOOT, PUNCH BIOPSY:        -  DEEP ULCER WITH LEUKOCYTOCLASTIC VASCULITIS.      -  SEE COMMENT. -- Diagnosis Comment --   THE HISTOLOGIC FINDINGS COULD BE CONSISTENT WITH PYODERMA GANGRENOSUM,   IF THIS BEST FITS THE CLINICAL IMPRESSION. HOWEVER, THE DIFFERENTIAL DIAGNOSIS COULD ALSO INCLUDE A PRIMARY   LEUKOCYTOCLASTIC VASCULITIS.  CONSIDERATION FOR AN AUTOIMMUNE ORIGIN   WOULD BE SUGGESTED, GIVEN THE PATIENT'S HISTORY OF POSITIVE AMADOR AND   ANTICENTROMERE ANTIBODY, AND PERSISTENCE OF THE WOUND SINCE 2019. THE WOUND BACTERIAL CULTURE IS ALSO CURRENTLY GROWING PSEUDOMONAS,   WHICH HAS BEEN PRESENT IN WOUND CULTURES FROM THIS SITE IN THE PAST AS   WELL. Cultures (summary below - please see results in chart for sensitivities)    10/31/19 SWAB  Specimen Description 10/31/2019 11:37  [address]   . WOUND . FOOT    Special Requests 10/31/2019 11:37  [address]   NOT REPORTED    Direct Exam Abnormal  10/31/2019 11:37  [address]   RARE NEUTROPHILS    Direct Exam Abnormal  10/31/2019 11:37  [address]   MANY GRAM POSITIVE COCCI IN CLUSTERS    Culture Abnormal  10/31/2019 11:37  [address]   METHICILLIN RESISTANT STAPHYLOCOCCUS AUREUS HEAVY GROWTH    Culture Abnormal  10/31/2019 11:37  [address]   ENTEROCOCCUS FAECALIS HEAVY GROWTH    Culture Abnormal  10/31/2019 11:37  [address]   NO ANAEROBIC ORGANISMS ISOLATED AT 5 DAYS 12/4/20 TISSUE  FOOT LEFT    Special Requests 12/04/2020  2:07  [facility]   NOT REPORTED    Direct Exam 12/04/2020  2:07  [address]   NO NEUTROPHILS SEEN    Direct Exam 12/04/2020  2:07  [address]   NO BACTERIA SEEN    Culture Abnormal  12/04/2020  2:07 PM [address] AERUGINOSA LIGHT GROWTH    Culture Abnormal  12/04/2020  2:07  [address]   NO ANAEROBIC ORGANISMS ISOLATED AT 5 DAYS      1/26/21 TISSUE  Specimen Description 01/26/2021 12:30  [facility]   . FOOT LEFT    Special Requests 01/26/2021 12:30  [facility]   LEFT FOOT    Direct Exam 01/26/2021 12:30  [address]   NO NEUTROPHILS SEEN    Direct Exam 01/26/2021 12:30  [address]   NO BACTERIA SEEN    Culture Abnormal  01/26/2021 12:30  [address]   PSEUDOMONAS SPECIES LIGHT GROWTH    Culture Abnormal  01/26/2021 12:30  [address]   PSEUDOMONAS AERUGINOSA LIGHT GROWTH    Culture 01/26/2021 12:30  [address]   NORMAL GRISEL    Culture Abnormal  01/26/2021 12:30  [address]   NO ANAEROBIC ORGANISMS ISOLATED AT 5 DAYS       2/19/21  Specimen Description 02/19/2021  9:54 AM [facility]   . FOOT    Special Requests 02/19/2021  9:54  [facility]   LEFT    Direct Exam Abnormal  02/19/2021  9:54  [facility]    Direct Exam Abnormal  02/19/2021  9:54  [address]   RARE NEUTROPHILS    Culture Abnormal  02/19/2021  9:54  [address]   PSEUDOMONAS AERUGINOSA MODERATE GROWTH    Culture 02/19/2021  9:54  [address]   NORMAL GRISEL    Culture Abnormal  02/19/2021  9:54  [address]   NO ANAEROBIC ORGANISMS ISOLATED AT 5 DAYS      4/20/21 TISSUE  Component Collected Lab Specimen Description 04/20/2021  1:26 PM [place] Requests 04/20/2021  1:26  [facility]   LEFT FOOT    Direct Exam 04/20/2021  1:26  [address] [facility]   NO NEUTROPHILS SEEN    Direct Exam 04/20/2021  1:26  [address] [facility]   NO BACTERIA SEEN    Culture Abnormal  04/20/2021  1:26  [facility] in broth medium only.  The significance of broth-only isolates has not been established.  Clinical correlation suggested. Culture 04/20/2021  1:26  [facility] in broth medium only.  The significance of broth-only isolates has not been established.  Clinical correlation suggested. Susceptibilities have not been performed.  Notify the laboratory within 7 days for further workup if clinically indicated. Culture Abnormal  04/20/2021  1:26  [address]   NO ANAEROBIC ORGANISMS ISOLATED AT 5 DAYS      5/27/21  [facility]   Specimen Description 05/27/2021  6:09  [address]   . TISSUE    Special Requests 05/27/2021  6:09  [address]   NOT REPORTED    Direct Exam 05/27/2021  6:09  [address]   NO NEUTROPHILS SEEN    Direct Exam 05/27/2021  6:09  [address]   NO ORGANISMS SEEN    Culture Abnormal  05/27/2021  6:09  [address]   PSEUDOMONAS AERUGINOSA LIGHT GROWTH    Culture 05/27/2021  6:09  [address]   NORMAL GRISEL    Culture Abnormal  05/27/2021  6:09  [address]   NO ANAEROBIC ORGANISMS ISOLATED AT 5 DAYS      5/27/21 - FUNGUS AND AFB CULTURE WITH NO GROWTH TO DATE    Laboratory work-up:  Chronically slight anemic (hbg ~11) and was macrocytic when on MTX, now with normal MCV  Leukocytosis with left shift mostly attributed to prednsone use  AMADOR positive 1:640 with anticentromere 189 and 163 Negative/normal: HIV, Hep panel, ANCAs, anticardiolipin, SPEP and DES, UAj, CRP, ESR, C3, C4      CURRENT MEDICATIONS:   Current Outpatient Medications   Medication Sig Dispense Refill    clobetasol (TEMOVATE) 0.05 % ointment Apply to ulcer at dressing changes. 60 g 2    gentamicin (GARAMYCIN) 0.1 % ointment apply to ULCER twice a day      lidocaine (XYLOCAINE) 2 % jelly Do not apply directly to wound. 30 mL 2    atorvastatin (LIPITOR) 10 MG tablet Take 1 tablet by mouth daily Take 10mg daily while on Cyclosporine.  30 tablet 2    meclizine (ANTIVERT) 25 MG CHEW Take 25 mg by mouth 3 times daily as needed      folic acid (FOLVITE) 1 MG tablet take 1 tablet by mouth once daily 30 tablet 5    clopidogrel (PLAVIX) 75 MG tablet Take 1 tablet by mouth daily 30 tablet 11    Metoprolol Tartrate 37.5 MG TABS take 1 tablet by mouth twice a day (Patient taking differently: Take 75 mg by mouth 2 times daily ) 60 tablet 5    Multiple Vitamins-Minerals (THERAPEUTIC MULTIVITAMIN-MINERALS) tablet Take 1 tablet by mouth daily      oxyCODONE-acetaminophen (PERCOCET) 5-325 MG per tablet take 1 tablet by mouth four times a day if needed      traMADol (ULTRAM ER) 200 MG extended release tablet take 1 tablet by mouth once daily      aspirin EC 81 MG EC tablet Take 1 tablet by mouth 2 times daily 84 tablet 0    vitamin C (ASCORBIC ACID) 500 MG tablet Take 500 mg by mouth 2 times daily      diclofenac sodium (VOLTAREN) 1 % GEL Apply topically as needed Dr. Augustus Rodriguez      NAPROXEN PO Take 250 mg by mouth daily as needed Indications: patient takes 1-2 times a day Stopping 10 days prior to surgery as instructed by surgeon      omeprazole (PRILOSEC) 40 MG delayed release capsule Take 1 tablet by mouth daily Per Didier Loredo      Cholecalciferol (VITAMIN D3) 5000 units TABS Take 1,000 Units by mouth daily       calcium carbonate (OSCAL) 500 MG TABS tablet Take 1,000 mg by mouth daily Per [facility]      alendronate (FOSAMAX) 70 MG tablet Take 70 mg by mouth every 7 days Indications:  Dr. Bibiana Chaudhry   Patient takes on       gabapentin (NEURONTIN) 300 MG capsule Take 300 mg by mouth 2 times daily. Per Dr. Valorie Forbes 25 MG capsule take 5 capsules by mouth twice a day 60 capsule 0    collagenase (SANTYL) 250 UNIT/GM ointment Apply topically to wound followed by secondary dressing change daily. 90 Tube 1    ceFEPIme (MAXIPIME) 2 g injection  (Patient not taking: Reported on 2021)       No current facility-administered medications for this visit. ALLERGIES:   No Known Allergies    SOCIAL HISTORY:  Social History     Tobacco Use    Smoking status: Former Smoker     Packs/day: 0.25     Years: 50.00     Pack years: 12.50     Types: Cigarettes     Start date:      Quit date: 2020     Years since quittin.4    Smokeless tobacco: Never Used   Substance Use Topics    Alcohol use: Yes     Comment: 2-3 shot liquor for weekends       Pertinent ROS:  Review of Systems  Skin: Denies any new changing, growing or bleeding lesions or rashes except as described in the HPI   Constitutional: Denies fevers, chills, and malaise. PHYSICAL EXAM:   /83 (Site: Left Upper Arm, Position: Sitting, Cuff Size: Medium Adult)   Pulse 103   Temp 97.2 °F (36.2 °C) (Temporal)   Ht 5' 6\" (1.676 m)   Wt 172 lb (78 kg)   SpO2 98%   BMI 27.76 kg/m²     The patient is generally well appearing, well nourished, alert and conversational. Affect is normal.    Cutaneous Exam:  Physical Exam  Head/face,neck, both arms, digits and/or nails, and limited lower extremities (that which is visible with pants/shorts and shoes/socks on) was examined.     Diagnoses/exam findings/medical history pertinent to this visit are listed below:    Assessment and Plan:  Assessment   Chronic ulcer of left foot most c/w pyoderma gangrenosum, localized to left foot    59 yo F with chronic expanding ulcer of left foot, reportedly exhibiting pathergy, in the setting of positive AMADOR and anti-centromere ab with no other systemic manifestations of autoimmune disease, negative further serologic work-up, negative work-up for vascular occlusion,, and two cultures positive for pseudomonas, but unresolved with antibiotic treatment. - chronic illness, responding to treatment but not yet at goal   - clinically there is significantly less inflammation at the periphery of the wound, and there may be slight reduction in size. Will continue to monitor  - continue cyclosporine. Labs: Mg, CMP, CBC, UA  . Patient denies SE and is monitoring BP at home. Creatinine has remained stable  - photo and measurement today  - Santyl to ulcer base (per wound care and ID), then gentamicin, then clobetasol at periphery, then wound dressing per wound care    - Patient was previously seen at [facility] plastic surgery. I would like input from Dermatology at [facility]. Will placed referral and assist with scheduling an appointment  - future considerations - transitioning from cyclosporine to anti-TNF (especially if develops renal impairment or electrolyte abnormalities from CsA)          RTC 3 weeks    There are no Patient Instructions on file for this visit. This note was created with the assistance of a speech-recognition program.  Although the intention is to generate a document that actually reflects the content of the visit, no guarantees can be provided that every mistake has been identified and corrected byediting.     Electronically signed by Preethi Gates MD on 6/16/21 at 9:27 AM [email or web address]

## 2021-06-25 NOTE — PROGRESS NOTES
Please call [facility] to arrange consult with dermatology for pyoderma gangrenosum. I expanded my note to include detailed history and results for this patient, but they will likely want official path results and lab reports as well. I will route to you what needs to be included in the referral     [place] FAXED. SPOKE WITH NAIMA AT  ([phone]) AND [facility] APPT IS sEPTEMBER 28TH AT 9:30 AM WITH DR. Anaid Russell. PT CAN CALL FOR CANCELLATIONS AT ANY TIME TO SEE ABOUT MOVING APPT UP. THEY HAVE NO WAY OF GETTING HER IN SOONER AS 11 PHYSICIANS ARE EITHER ON MATERNITY LEAVE, GOING ON MATERNITY LEAVE OR RETIRING RIGHT NOW.        [address] [place], [place] [address]  [address]TH FLOOR, [address]  ALL INFOR GIVEN TO PT

## 2021-06-25 NOTE — RESULT ENCOUNTER NOTE
Magnesium is very slightly low on the cyclosporine. Would like to see the rest of her labs (that I ordered today) and decide the best course of action with the cyclosporine. I am sending a refill but would like her to get her labs done within the next few days.

## 2021-06-28 NOTE — TELEPHONE ENCOUNTER
----- Message from Avni Hutchins MD sent at 6/27/2021 11:31 AM EDT -----  Labs resulted. The magnesium looks better but potassium is slightly high. Kidney function is still normal, however there is protein in the urine. Let's reduce the dose of cyclosporine to 75 mg BID (3 tablets twice daily). If you experience muscle cramping or weakness, and especially any heart palpitations or chest pain, stop the medication right away and go to the ER. Labs ordered to repeat in 1 week. Let's also initiate prior authorization for Humira (HS/Crohns dosing) for pyoderma gangrenosum, which can hopefully allow us to slow reduce the dose of cyclosporine and eventually switch entirely.

## 2021-06-29 PROBLEM — L88 PYODERMA GANGRENOSUM: Status: ACTIVE | Noted: 2021-01-01

## 2021-06-29 NOTE — PROGRESS NOTES
[address]   Progress Note and Procedure Note      [address] RECORD NUMBER:  [id number]  AGE: 60 y.o. GENDER: female  : 1955  EPISODE DATE:  2021    Subjective:     Chief Complaint   Patient presents with    Wound Check     Left foot        HISTORY of PRESENT ILLNESS HPI     Candance Austria is a 60 y.o. female who presents today for wound/ulcer evaluation. History of Wound Context: The patient had nonhealing wound to the dorsum of her left foot for 2 years. She is complaining of foot pain and greenish drainage with increased size over the past several weeks. She denied any fever or chills, denied nausea or vomiting, no diarrhea. She does have hands small joints pain. She has been following with dermatology Dr. Janeth Benjamin and rheumatology on 2021 had a punch biopsy done was suggestive of LEUKOCYTOCLASTIC VASCULITIS, Pseudomonas aeruginosa grew on tissue culture that was sensitive to cefepime and Cipro. She was treated with Cipro courses previously with no improvement. Patient was hospitalized at [facility] 2021 through 2021 was discharged on IV cefepime that she is tolerating. HIV screen was negative, QuantiFERON-TB gold test was negative, hepatitis panel negative, ANCA negative, She had multiple punch biopsies most recent was done on previous punch biopsy on 2021 showed fragment of benign fibrous connective tissue, tissue culture grew Enterococcus faecalis on broth medium only that was sensitive to ampicillin and vancomycin with ZAIDA of 2. She was positive for AMADOR and anticentromere was evaluated by rheumatology was previously on prednisone and methotrexate that was changed to cyclosporine on her recent hospitalization. She had multiple debridements and grafting in the past that was failed. History of smoking  She was evaluated by vascular surgery, had angiogram done with no reported stenosis. She denied history of diabetes. Tissue culture in December and February 2021 grew Pseudomonas aeruginosa was treated with a course of Cipro. Tissue culture 2/19/2021 grew Pseudomonas aeruginosa that was sensitive to Cipro, previous tissue culture 1/26/2021 grew Pseudomonas aeruginosa that was resistant to Cipro. History of coronary artery disease status post stent  Wound/Ulcer Pain Timing/Severity: intermittent  Quality of pain: burning  Severity:5/10  Modifying Factors: None  Associated Signs/Symptoms: Drainage    Ulcer Identification:  Ulcer Type: undetermined     Contributing Factors: decreased tissue oxygenation  History of smoking, quit, history of coronary artery disease status post stent. PAST MEDICAL HISTORY      Diagnosis Date    Arthritis     knees hips hands shoulders and back    CAD (coronary artery disease) 2019    blockage on cath 9/2019, to have stenting after surgery (arthroplasty) Dr. Osmany Jeffery    Chronic back pain     GERD (gastroesophageal reflux disease)     on rx    Hyperlipidemia     per Dr. Osmany Jeffery on rx    Hypertension     Yobani Hallman manages    Incomplete RBBB     Irregular heart beat     LAFB (left anterior fascicular block)     Dr. Osmany Jeffery, cardiologist, seen 9/2019 prior to surgery on 10/8/2019    Leg wound, left     following with wound care, Dr. Nallely Pavon    Lumbar disc disease     Osteoporosis     PAC (premature atrial contraction) 06/2018    PACs and PVCs on holter monitor,     PONV (postoperative nausea and vomiting)     requests scop patch    Wears dentures     upper partial    Wears eyeglasses     readers    Wears partial dentures     upper and lower    Wellness examination     MIKE Boothe NP PCP, seen 2/10/2020       PAST SURGICAL HISTORY    Past Surgical History:   Procedure Laterality Date    ABDOMINOPLASTY  1997    BACK SURGERY      BLEPHAROPLASTY Bilateral 1997    BREAST ENHANCEMENT SURGERY Bilateral 1999    breast augmentation    CARDIAC CATHETERIZATION  2019    Dr. Osmany Jeffery, blockage but no stents yet CATARACT REMOVAL WITH IMPLANT Bilateral 2016    COLONOSCOPY  2015    No Polyps    COSMETIC SURGERY      eyelid lift    FINGER SURGERY Right     thumb lesion excised    FIXATION KYPHOPLASTY  2013    Back    FOOT DEBRIDEMENT Left 10/11/2019    SURGICAL PREP WOUND BED LEFT FOOT WITH APPLICATION OF EPIFIX LEFT FOOT 3X4 performed by Sourav Doyle DPM at [facility]      kyphoplasty for lumbar fx 2013 Dr. Alondra Renee ARTHROSCOPY Left 2006    [facility] Left 1/2/2020    FOOT  DEBRIDEMENT WITH WOUND VAC PLACEMENT performed by Gracie Bailey MD at [facility]  2014    LUMBAR SPINE SURGERY  2005    diskectomy and spinal fusion    OTHER SURGICAL HISTORY  10/03/2019    Left leg angiogram    AK OFFICE/OUTPT VISIT,PROCEDURE ONLY Right 9/11/2018    EXCISION MASS THUMB, 3080 TABLE performed by Percy Adam DO at [facility] Left 12/3/2019    LEFT FOOT DEBRIDEMENT WITH SKIN GRAFT SPLIT THICKNESS; SKIN GRAFT TAKEN FROM RIGHT ANTERIOR THIGH performed by Gracie Bailey MD at [facility] Left 2/25/2020    DEBRIDEMENT, SPLIT THICKNESS SKIN GRAFT WITH WOUND VAC PLACEMENT FOOT performed by Leroy De Los Santos MD at [address] [place] [address] Left 6/30/2020    DEBRIDEMENT, SKIN GRAFT SPLIT THICKNESS FOOT  ([facility], [facility]) performed by Leroy De Los Santos MD at [facility]      as a child    TOTAL KNEE ARTHROPLASTY Right 10/13/2020    KNEE TOTAL ARTHROPLASTY    TOTAL KNEE ARTHROPLASTY Right 10/13/2020    KNEE TOTAL ARTHROPLASTY- MICROPORT, \ ADVANCED performed by Percy Adam DO at [address] HISTORY    Family History   Problem Relation Age of Onset    Coronary Art Dis Mother     Arthritis Mother     Heart Surgery Mother         CABG    Coronary Art Dis Father     Heart Disease Father     Heart Surgery Father         CABG    Coronary Art Dis Sister     Heart Surgery Sister         CABG       SOCIAL HISTORY    Social History     Tobacco Use    Smoking status: Former Smoker     Packs/day: 0.25     Years: 50.00     Pack years: 12.50     Types: Cigarettes     Start date:      Quit date: 2020     Years since quittin.4    Smokeless tobacco: Never Used   Vaping Use    Vaping Use: Never used   Substance Use Topics    Alcohol use: Yes     Comment: 2-3 shot liquor for weekends    Drug use: Never       ALLERGIES    No Known Allergies    MEDICATIONS    Current Outpatient Medications on File Prior to Encounter   Medication Sig Dispense Refill    cycloSPORINE modified (GENGRAF) 25 MG capsule Take 3 capsules by mouth 2 times daily 180 capsule 0    collagenase (SANTYL) 250 UNIT/GM ointment Apply topically to wound followed by secondary dressing change daily. 90 Tube 1    clobetasol (TEMOVATE) 0.05 % ointment Apply to ulcer at dressing changes. 60 g 2    gentamicin (GARAMYCIN) 0.1 % ointment apply to ULCER twice a day      lidocaine (XYLOCAINE) 2 % jelly Do not apply directly to wound. 30 mL 2    atorvastatin (LIPITOR) 10 MG tablet Take 1 tablet by mouth daily Take 10mg daily while on Cyclosporine.  30 tablet 2    meclizine (ANTIVERT) 25 MG CHEW Take 25 mg by mouth 3 times daily as needed      folic acid (FOLVITE) 1 MG tablet take 1 tablet by mouth once daily 30 tablet 5    clopidogrel (PLAVIX) 75 MG tablet Take 1 tablet by mouth daily 30 tablet 11    Metoprolol Tartrate 37.5 MG TABS take 1 tablet by mouth twice a day (Patient taking differently: Take 75 mg by mouth 2 times daily ) 60 tablet 5    Multiple Vitamins-Minerals (THERAPEUTIC MULTIVITAMIN-MINERALS) tablet Take 1 tablet by mouth daily      oxyCODONE-acetaminophen (PERCOCET) 5-325 MG per tablet take 1 tablet by mouth four times a day if needed      traMADol (ULTRAM ER) 200 MG extended release tablet take 1 tablet by mouth once daily      aspirin EC 81 MG EC tablet Take 1 tablet by mouth 2 times daily 84 tablet 0    vitamin C (ASCORBIC ACID) 500 MG tablet Take 500 mg by mouth 2 times daily      diclofenac sodium (VOLTAREN) 1 % GEL Apply topically as needed Dr. Kaelyn Rodriguez      NAPROXEN PO Take 250 mg by mouth daily as needed Indications: patient takes 1-2 times a day Stopping 10 days prior to surgery as instructed by surgeon      omeprazole (PRILOSEC) 40 MG delayed release capsule Take 1 tablet by mouth daily Per Bree Elmore      Cholecalciferol (VITAMIN D3) 5000 units TABS Take 1,000 Units by mouth daily       calcium carbonate (OSCAL) 500 MG TABS tablet Take 1,000 mg by mouth daily Per Jacquelin Coombs rheumatology      alendronate (FOSAMAX) 70 MG tablet Take 70 mg by mouth every 7 days Indications: Mondays Dr. Olin Aschoff   Patient takes on Mondays      gabapentin (NEURONTIN) 300 MG capsule Take 300 mg by mouth 2 times daily. Per Dr. Kaelyn Rodriguez       No current facility-administered medications on file prior to encounter. REVIEW OF SYSTEMS  Review of Systems    Pertinent items are noted in HPI. Other than above 12 system review was negative    Objective:      BP (!) 187/85   Pulse 60   Temp 97.6 °F (36.4 °C) (Tympanic)   Resp 18   Ht 5' 6\" (1.676 m)   Wt 172 lb (78 kg)   BMI 27.76 kg/m²     Wt Readings from Last 3 Encounters:   06/29/21 172 lb (78 kg)   06/24/21 172 lb (78 kg)   06/21/21 165 lb (74.8 kg)       PHYSICAL EXAM  Physical Exam  Constitutional:       General: She is not in acute distress. Appearance: Normal appearance. She is not ill-appearing. HENT:      Right Ear: External ear normal.      Left Ear: External ear normal.      Mouth/Throat:      Pharynx: No oropharyngeal exudate or posterior oropharyngeal erythema. Eyes:      General: No scleral icterus. Conjunctiva/sclera: Conjunctivae normal.   Cardiovascular:      Rate and Rhythm: Normal rate and regular rhythm. Heart sounds: No murmur heard. Pulmonary:      Effort: Pulmonary effort is normal. No respiratory distress.       Breath sounds: Normal breath sounds. Abdominal:      General: There is no distension. Palpations: Abdomen is soft. Tenderness: There is no abdominal tenderness. Musculoskeletal:      Cervical back: Neck supple. No rigidity. Skin:     General: Skin is warm. Coloration: Skin is not jaundiced. Neurological:      General: No focal deficit present. Mental Status: She is alert and oriented to person, place, and time. Psychiatric:         Mood and Affect: Mood normal.         Behavior: Behavior normal.       Assessment:      1. 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)    2. Pyoderma gangrenosum    3. Pain in left foot    4. Neoplasm of uncertain behavior of skin        Problem List Items Addressed This Visit                   Pain in left foot    Relevant Orders    Supply: Wound Cleanser    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 ([facility] [address] - Primary    Relevant Orders    Supply: Wound Cleanser            Pre Debridement Measurements:    Wound 12/04/20 Foot Left;Dorsal wound #1 (Active)   Wound Image   06/14/21 0837   Wound Etiology Other 06/29/21 0853   Dressing Status New drainage noted; Old drainage noted 06/29/21 0853   Wound Cleansed Soap and water 06/29/21 0853   Dressing/Treatment Ace wrap 06/04/21 0800   Dressing Change Due 06/04/21 06/03/21 2145   Wound Length (cm) 10.3 cm 06/29/21 0853   Wound Width (cm) 10.5 cm 06/29/21 0853   Wound Depth (cm) 0.4 cm 06/29/21 0853   Wound Surface Area (cm^2) 108.15 cm^2 06/29/21 0853   Change in Wound Size % (l*w) -251.14 06/29/21 0853   Wound Volume (cm^3) 43.26 cm^3 06/29/21 0853   Wound Healing % -251 06/29/21 0853   Post-Procedure Length (cm) 10.3 cm 06/29/21 0853   Post-Procedure Width (cm) 10.5 cm 06/29/21 0853   Post-Procedure Depth (cm) 0.4 cm 06/29/21 0853   Post-Procedure Surface Area (cm^2) 108.15 cm^2 06/29/21 0853   Post-Procedure Volume (cm^3) 43.26 cm^3 06/29/21 0853   Wound Assessment Exposed structure muscle; Exposed structure thickness on wound cover with normal saline moistened gauze wrap with kerlix   Frequency: Daily     Additional Orders: Increase protein to diet (meat, cheese, eggs, fish, peanut butter, nuts and beans)  Multivitamin daily   Finish IV antibiotics- Home care may pull Picc line after last dose  MY CHART []     Smart Device  []      HYPERBARIC TREATMENT-                TREATMENT #   Dr. Ben Mccracken has referred patient to Dermatology at [facility] as  Of 6/24/21  Your next appointment with the [facility] 1 week with                                                                                                          ROOM TYPE   [] CHAIR     [] BED   [] EITHER        TIME [] 45 MIN     [] 60 MIN     (Please note your next appointment above and if you are unable to keep, kindly give a 24 hour notice. Thank you.)     If you experience any of the following, please call the [facility] during business hours:  [phone]     * Increase in Pain  * Temperature over 101  * Increase in drainage from your wound  * Drainage with a foul odor  * Bleeding  * Increase in swelling  * Need for compression bandage changes due to slippage, breakthrough drainage. If you need medical attention outside of the business hours of the [facility] please contact your PCP or go to the nearest emergency room. The information contained in the After Visit Summary has been reviewed with me, the patient and/or responsible adult, by my health care provider(s). I had the opportunity to ask questions regarding this information.  I have elected to receive;      []After Visit Summary  [x]Comprehensive Discharge Instruction        Patient signature______________________________________Date:_______  Electronically signed by Rubina Batista RN on 6/29/2021 at 9:11 AM  Electronically signed by Elena Vega DPM on 6/29/2021 at 8:48 AM          Electronically signed by Kendrick Dickerson DPM on 6/29/2021 at 9:25 AM    I performed a history and physical examination of the patient and discussed management with the resident. I reviewed the residents note and agree with the documented findings and plan of care. Any areas of disagreement are noted on the chart. I was personally present for the key portions of any procedures. I have documented in the chart those procedures where I was not present during the key portions. I have reviewed the Podiatry Resident progress note. I agree with the chief complaint, past medical history, past surgical history, allergies, medications, social and family history as documented unless otherwise noted below. Documentation of the HPI, Physical Exam and Medical Decision Making performed by medical students or scribes is based on my personal performance of the HPI, PE and MDM. I have personally evaluated this patient and have completed at least one if not all key elements of the E/M (history, physical exam, and MDM). Additional findings are as noted.      Aline Solo DPM on 6/29/2021 at 1:93 PM  Board Certified, American Board of Podiatric Surgery  Fellow, [facility] and [facility]

## 2021-06-30 NOTE — CARE COORDINATION
[facility] Transitions Follow Up Call    2021    Patient: Halima Alvarez  Patient : 1955   MRN: [id number]  Reason for Admission:   Discharge Date: 21 RARS: Readmission Risk Score: 8         Spoke with: Arabella Postal spoke to patient, she stated everything is pretty much the same, waiting to hear back from [address] [place] [address] for an appointment, has f/u with wound care next week, has an appointment with dermatology on 21, will continue to follow//ju  Care Transitions Follow Up Call    Needs to be reviewed by the provider       CTN provided contact information for future needs. Plan for follow-up call in 5-7 days based on severity of symptoms and risk factors. Plan for next call: final          Care Transitions Subsequent and Final Call    Subsequent and Final Calls  Do you have any ongoing symptoms?: Yes  Onset of Patient-reported symptoms: In the past 7 days  Patient-reported symptoms: Pain  Do you currently have any active services?: Yes  Are you currently active with any services?: Home Health  Care Transitions Interventions  Other Interventions:            Follow Up  Future Appointments   Date Time Provider Keshawn De Dios   2021 10:00 AM Preethi Gates MD [id number]   2022  7:30 AM Chad Bowens DO [address] [address]       Emily Gardiner RN

## 2021-07-05 NOTE — RESULT ENCOUNTER NOTE
Labs have improved on lower dose of cyclosporine. Mg normal, K normal, Cr stable. Patient is persistently anemic with macrocytosis. This was initially attributed to methotrexate. MCV initially normalized off MTX but has recurred and is worsening. B12 and folate were checked in 2018 and were normal, and at that time MCV was also high but hbg normal. Called lab and was able to add on B12 but not folate. Will check folate at next blood draw. Will consider referral to heme-onc if no obvious cause of macrocytic anemia found.

## 2021-07-06 NOTE — PROGRESS NOTES
[address]   Progress Note and Procedure Note      [address] RECORD NUMBER:  [id number]  AGE: 60 y.o. GENDER: female  : 1955  EPISODE DATE:  2021    Subjective:     Chief Complaint   Patient presents with    Wound Check        HISTORY of PRESENT ILLNESS HPI     Deepa Powers is a 60 y.o. female who presents today for wound/ulcer evaluation. Patient contineus to dress the owund with santyl and saline soaked dressing. Also applies gentamycin. States she has noticed some blistering at skin edges. Wound has become painful    History of Wound Context: History of pyoderma gangrenosum. Follows with dermatology. The patient had nonhealing wound to the dorsum of her left foot for 2 years. She is complaining of foot pain and greenish drainage with increased size over the past several weeks. She denied any fever or chills, denied nausea or vomiting, no diarrhea. She does have hands small joints pain. She has been following with dermatology Dr. Digna Walls and rheumatology on 2021 had a punch biopsy done was suggestive of LEUKOCYTOCLASTIC VASCULITIS, Pseudomonas aeruginosa grew on tissue culture that was sensitive to cefepime and Cipro. She was treated with Cipro courses previously with no improvement. Patient was hospitalized at [facility] 2021 through 2021 was discharged on IV cefepime that she is tolerating. HIV screen was negative, QuantiFERON-TB gold test was negative, hepatitis panel negative, ANCA negative, She had multiple punch biopsies most recent was done on previous punch biopsy on 2021 showed fragment of benign fibrous connective tissue, tissue culture grew Enterococcus faecalis on broth medium only that was sensitive to ampicillin and vancomycin with ZAIDA of 2.   She was positive for AMADOR and anticentromere was evaluated by rheumatology was previously on prednisone and methotrexate that was changed to cyclosporine on her recent hospitalization. She had multiple debridements and grafting in the past that was failed. History of smoking  She was evaluated by vascular surgery, had angiogram done with no reported stenosis. She denied history of diabetes. Tissue culture in December and February 2021 grew Pseudomonas aeruginosa was treated with a course of Cipro. Tissue culture 2/19/2021 grew Pseudomonas aeruginosa that was sensitive to Cipro, previous tissue culture 1/26/2021 grew Pseudomonas aeruginosa that was resistant to Cipro. History of coronary artery disease status post stent  Wound/Ulcer Pain Timing/Severity: intermittent  Quality of pain: burning  Severity:5/10  Modifying Factors: None  Associated Signs/Symptoms: Drainage    Ulcer Identification:  Ulcer Type: undetermined     Contributing Factors: decreased tissue oxygenation  History of smoking, quit, history of coronary artery disease status post stent. PAST MEDICAL HISTORY      Diagnosis Date    Arthritis     knees hips hands shoulders and back    CAD (coronary artery disease) 2019    blockage on cath 9/2019, to have stenting after surgery (arthroplasty) Dr. Aashish Holloway    Chronic back pain     GERD (gastroesophageal reflux disease)     on rx    Hyperlipidemia     per Dr. Aashish Holloway on rx    Hypertension     [facility] manages    Incomplete RBBB     Irregular heart beat     LAFB (left anterior fascicular block)     Dr. Aashish Holloway, cardiologist, seen 9/2019 prior to surgery on 10/8/2019    Leg wound, left     following with wound care, Dr. Sarah Hawkins    Lumbar disc disease     Osteoporosis     PAC (premature atrial contraction) 06/2018    PACs and PVCs on holter monitor,     PONV (postoperative nausea and vomiting)     requests scop patch    Wears dentures     upper partial    Wears eyeglasses     readers    Wears partial dentures     upper and lower    Wellness examination     MIKE Valentine, NP PCP, seen 2/10/2020       PAST SURGICAL HISTORY    Past Surgical History:   Procedure Laterality Date    ABDOMINOPLASTY  1997    BACK SURGERY      BLEPHAROPLASTY Bilateral 1997    BREAST ENHANCEMENT SURGERY Bilateral 1999    breast augmentation    CARDIAC CATHETERIZATION  2019    Dr. Mendel Mori, blockage but no stents yet    CATARACT REMOVAL WITH IMPLANT Bilateral 2016    COLONOSCOPY  2015    No Polyps    COSMETIC SURGERY      eyelid lift    FINGER SURGERY Right     thumb lesion excised    FIXATION KYPHOPLASTY  2013    Back    FOOT DEBRIDEMENT Left 10/11/2019    SURGICAL PREP WOUND BED LEFT FOOT WITH APPLICATION OF EPIFIX LEFT FOOT 3X4 performed by Viviane Andrade DPM at [address]      kyphoplasty for lumbar fx 2013 Dr. Mita Hayward ARTHROSCOPY Left 2006    Collene Greenwood Left 1/2/2020    FOOT  DEBRIDEMENT WITH WOUND VAC PLACEMENT performed by Sinan Medina MD at [facility]  2014    LUMBAR SPINE SURGERY  2005    diskectomy and spinal fusion    OTHER SURGICAL HISTORY  10/03/2019    Left leg angiogram    [place] OFFICE/OUTPT VISIT,PROCEDURE ONLY Right 9/11/2018    EXCISION MASS THUMB, 3080 TABLE performed by Varun Kaminski DO at [address] [place] [address] Left 12/3/2019    LEFT FOOT DEBRIDEMENT WITH SKIN GRAFT SPLIT THICKNESS; SKIN GRAFT TAKEN FROM RIGHT ANTERIOR THIGH performed by Sinan Medina MD at [address] [place] [address] Left 2/25/2020    DEBRIDEMENT, SPLIT THICKNESS SKIN GRAFT WITH WOUND VAC PLACEMENT FOOT performed by Noam Fried MD at [address] [place] [address] Left 6/30/2020    DEBRIDEMENT, SKIN GRAFT SPLIT THICKNESS FOOT  ([facility], [address] [facility]) performed by Noam Fried MD at [facility]      as a child    TOTAL KNEE ARTHROPLASTY Right 10/13/2020    KNEE TOTAL ARTHROPLASTY    TOTAL KNEE ARTHROPLASTY Right 10/13/2020    KNEE TOTAL ARTHROPLASTY- MICROPORT, \ ADVANCED performed by Varun Kaminski DO at [place] [address] History   Problem Relation Age of Onset    Coronary Art Dis Mother     Arthritis Mother     Heart Surgery Mother         CABG    Coronary Art Dis Father     Heart Disease Father     Heart Surgery Father         CABG    Coronary Art Dis Sister     Heart Surgery Sister         CABG       SOCIAL HISTORY    Social History     Tobacco Use    Smoking status: Former Smoker     Packs/day: 0.25     Years: 50.00     Pack years: 12.50     Types: Cigarettes     Start date:      Quit date: 2020     Years since quittin.4    Smokeless tobacco: Never Used   Vaping Use    Vaping Use: Never used   Substance Use Topics    Alcohol use: Yes     Comment: 2-3 shot liquor for weekends    Drug use: Never       ALLERGIES    No Known Allergies    MEDICATIONS    Current Outpatient Medications on File Prior to Encounter   Medication Sig Dispense Refill    cycloSPORINE modified (GENGRAF) 25 MG capsule Take 3 capsules by mouth 2 times daily 180 capsule 0    collagenase (SANTYL) 250 UNIT/GM ointment Apply topically to wound followed by secondary dressing change daily. 90 Tube 1    clobetasol (TEMOVATE) 0.05 % ointment Apply to ulcer at dressing changes. 60 g 2    gentamicin (GARAMYCIN) 0.1 % ointment apply to ULCER twice a day      lidocaine (XYLOCAINE) 2 % jelly Do not apply directly to wound. 30 mL 2    atorvastatin (LIPITOR) 10 MG tablet Take 1 tablet by mouth daily Take 10mg daily while on Cyclosporine.  30 tablet 2    meclizine (ANTIVERT) 25 MG CHEW Take 25 mg by mouth 3 times daily as needed      folic acid (FOLVITE) 1 MG tablet take 1 tablet by mouth once daily 30 tablet 5    clopidogrel (PLAVIX) 75 MG tablet Take 1 tablet by mouth daily 30 tablet 11    Metoprolol Tartrate 37.5 MG TABS take 1 tablet by mouth twice a day (Patient taking differently: Take 75 mg by mouth 2 times daily ) 60 tablet 5    Multiple Vitamins-Minerals (THERAPEUTIC MULTIVITAMIN-MINERALS) tablet Take 1 tablet by mouth daily      oxyCODONE-acetaminophen (PERCOCET) 5-325 MG Dermatologic: Open wound present to dorsal left foot wound as documented in detail below. Wound base is fibro-necrotic extending to the level of muscle with an exposed extensor tendon and 3rd MT exposed. There is minimal erythema at skin edges. There is no purulent drainage. There is no fluctuance or crepitus. Interdigital maceration absent, Bilateral.     Wound 12/04/20 Foot Left;Dorsal wound #1 (Active)   Wound Image   07/06/21 0927   Wound Etiology Other 07/06/21 0927   Dressing Status New dressing applied; Intact; New drainage noted; Old drainage noted 07/06/21 0927   Wound Cleansed Irrigated with saline 07/06/21 0927   Dressing/Treatment Other (comment) 06/01/21 0854   Wound Length (cm) 10.6 cm 07/06/21 0927   Wound Width (cm) 10 cm 07/06/21 0927   Wound Depth (cm) 0.4 cm 07/06/21 0927   Wound Surface Area (cm^2) 106 cm^2 07/06/21 0927   Change in Wound Size % (l*w) -244.16 07/06/21 0927   Wound Volume (cm^3) 42.4 cm^3 07/06/21 0927   Wound Healing % -244 07/06/21 0927   Post-Procedure Length (cm) 10.6 cm 07/06/21 0927   Post-Procedure Width (cm) 10 cm 07/06/21 0927   Post-Procedure Depth (cm) 0.4 cm 07/06/21 0927   Post-Procedure Surface Area (cm^2) 106 cm^2 07/06/21 0927   Post-Procedure Volume (cm^3) 42.4 cm^3 07/06/21 0927   Wound Assessment Exposed structure bone;Pink/red;Slough 07/06/21 0927   Drainage Amount Large 07/06/21 0927   Drainage Description Yellow 07/06/21 0927   Odor None 07/06/21 0927   Jessica-wound Assessment Blanchable erythema 07/06/21 0927   Margins Defined edges 07/06/21 0927   Wound Thickness Description not for Pressure Injury Full thickness 06/01/21 0818   Number of days: 213         Assessment:      1. 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)    2. Pain in left foot    3. Neoplasm of uncertain behavior of skin          Plan:   Treatment:     1. Pt was evaluated and examined. Patient was given personalized discharge instructions.  Diagnosis was discussed with the pt and all of their questions were answered in detail. Proper foot hygiene and care was discussed with the pt. Patient to check feet daily and contact the office with any questions/problems/concerns. Other comorbidity noted and will be managed by PCP. 2. Dressings: Santyl, saline soaked gauze, DSD, ACE  3. Follow up: 1 week. 4. Detailed home instructions and education material given to patient prior to discharge. 5. Extensive discussion had with patient regarding definitive treatment options including continued conservative therapy with wound care, flap reconstruction of the left lower extremity, and below knee amputation of the left leg. All questions were answered to her satisfaction. Patient demonstrates and verbalizes understanding. She is amendable to treatment plan. 6. Maintain follow up appointment with dermatology and [facility]   7. No wound debridement today    Electronically signed by Moraima Arias DPM on 7/6/2021 at 10:10 AM      I performed a history and physical examination of the patient and discussed management with the resident. I reviewed the residents note and agree with the documented findings and plan of care. Any areas of disagreement are noted on the chart. I was personally present for the key portions of any procedures. I have documented in the chart those procedures where I was not present during the key portions. I have reviewed the Podiatry Resident progress note. I agree with the chief complaint, past medical history, past surgical history, allergies, medications, social and family history as documented unless otherwise noted below. Documentation of the HPI, Physical Exam and Medical Decision Making performed by medical students or scribes is based on my personal performance of the HPI, PE and MDM. I have personally evaluated this patient and have completed at least one if not all key elements of the E/M (history, physical exam, and MDM). Additional findings are as noted. Mike Ballesteros DPM on 7/8/2021 at 6:89 AM  Board Certified, American Board of Podiatric Surgery  Fellow, [facility] and [facility]

## 2021-07-06 NOTE — PROGRESS NOTES
Wound Care Supplies      Supply Company:     [address] [facility] [address] p: [phone] f: [address]:     [address] [place] [id number]  [phone]  WOUND CARE Dept: [phone]   FAX NUMBER [unfilled]    Patient Information:      Ginger James   [phone]   : 1955  AGE: 60 y.o. GENDER: female   TODAYS DATE:  2021    Insurance:      PRIMARY INSURANCE:  Plan: [employer] CHOICE HMO  Coverage: MEDICAL M[employer] MEDICARE ADVANTAGE  Effective Date: 2015  [id number] - (Medicare Managed)    SECONDARY INSURANCE:  Plan:   Coverage:   Effective Date:   [unfilled]    [unfilled]   [unfilled]     Patient Wound Information:      Problem List Items Addressed This Visit     Neoplasm of uncertain behavior of skin    Relevant Orders    Supply: Wound Cleanser    Supply: Jessica Wound    Supply: Wound Dressings    Supply: Cover and Secure    Pain in left foot    Relevant Orders    Supply: Wound Cleanser    Supply: Jessica Wound    Supply: Wound Dressings    Supply: Cover and Secure    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 ([address] - Primary    Relevant Orders    Supply: Wound Cleanser    Supply: Jessica Wound    Supply: Wound Dressings    Supply: Cover and Secure          WOUNDS REQUIRING DRESSING SUPPLIES:     Wound 20 Foot Left;Dorsal wound #1 (Active)   Wound Image   21   Wound Etiology Other 21   Dressing Status New dressing applied; Intact; New drainage noted; Old drainage noted 21   Wound Cleansed Irrigated with saline 21   Dressing/Treatment Other (comment) 21 0854   Wound Length (cm) 10.6 cm 21   Wound Width (cm) 10 cm 21   Wound Depth (cm) 0.4 cm 21   Wound Surface Area (cm^2) 106 cm^2 21   Change in Wound Size % (l*w) -244.16 21   Wound Volume (cm^3) 42.4 cm^3 07/06/21 0927   Wound Healing % -244 07/06/21 0927   Post-Procedure Length (cm) 10.6 cm 07/06/21 0927   Post-Procedure Width (cm) 10 cm 07/06/21 5582   Post-Procedure Depth (cm) 0.4 cm 07/06/21 0927   Post-Procedure Surface Area (cm^2) 106 cm^2 07/06/21 0927   Post-Procedure Volume (cm^3) 42.4 cm^3 07/06/21 0927   Wound Assessment Exposed structure bone;Pink/red;Slough 07/06/21 0927   Drainage Amount Large 07/06/21 0927   Drainage Description Yellow 07/06/21 0927   Odor None 07/06/21 0927   Jessica-wound Assessment Blanchable erythema 07/06/21 0927   Margins Defined edges 07/06/21 0927   Wound Thickness Description not for Pressure Injury Full thickness 06/01/21 0818   Number of days: 213          Supplies Requested :      WOUND #: 1   PRIMARY DRESSING:  Pharmaceutical medication santyl, (patient supplied).    Cover and Secure with: 4X4 gauze pad  Conforming roll gauze     FREQUENCY OF DRESSING CHANGES:  Daily         ADDITIONAL ITEMS:  [] Gloves Small  [x] Gloves Medium [] Gloves Large [] Gloves Delaney Oddi  [] Tape 1\" [x] Tape 2\" [] Tape 3\"  [] Medipore Tape  [x] Saline  [] Skin Prep   [] Adhesive Remover   [] Cotton Tip Applicators   [] Other:    Patient Wound(s) Debrided: [x] Yes   [] No, santyl  Debribement Type: enzymatic    Debridement Date: 7/6/2021    Patient currently being seen by Home Health: [] Yes   [x] No    Duration for needed supplies:  []15  [x]30  []60  []90 Days    Provider Information:      PROVIDER'S NAME: Mishel Ernst DPM  [id number]

## 2021-07-06 NOTE — PROGRESS NOTES
: apply thin layer of gentamycin ointment then,   appy Santyl - put david thickness on wound cover with normal saline moistened gauze wrap with kerlix

## 2021-07-06 NOTE — TELEPHONE ENCOUNTER
----- Message from Bruce Garcia MD sent at 7/5/2021  2:28 PM EDT -----  Labs have improved on lower dose of cyclosporine. Mg normal, K normal, Cr stable. Patient is persistently anemic with macrocytosis. This was initially attributed to methotrexate. MCV initially normalized off MTX but has recurred and is worsening. B12 and folate were checked in 2018 and were normal, and at that time MCV was also high but hbg normal. Called lab and was able to add on B12 but not folate. Will check folate at next blood draw. Will consider referral to heme-onc if no obvious cause of macrocytic anemia found.

## 2021-07-07 NOTE — CARE COORDINATION
Anil [facility] Transitions Follow Up Call    2021    Patient: Montana Jaquez  Patient : 1955   MRN: [id number]  Reason for Admission:   Discharge Date: 21 RARS: Readmission Risk Score: 10         Final call-unable to reach patient, left vm message with name and contact information, encouraged to call with any needs, informed of final call, care transitions completed//JU    Care Transitions Subsequent and Final Call    Subsequent and Final Calls  Are you currently active with any services?: Home Health  Care Transitions Interventions  Other Interventions:            Follow Up  Future Appointments   Date Time Provider Keshawn De Dios   2021 10:00 AM Mireille Huizar MD [id number]   2021  8:45 AM MARCELINA ValerioCZ WND [facility]   2022  7:30 AM Chad Garcia DO [address] [place] [address]       Elieser Valencia RN

## 2021-07-13 NOTE — PROGRESS NOTES
Dermatology Patient Note  [address]  [address]  [address]  Dept: [phone]  Dept Fax: [phone]      VISITDATE: 7/13/2021   REFERRING PROVIDER: No ref. provider found      Carmine Che is a 60 y.o. female  who presents today in the office for:    Follow-up (PT here for wound follow up. She is says bp is increasing from the meds, the foot looks worse)      HISTORY OF PRESENT ILLNESS:  Patient presents for f/u presumed pyoderma gangrenosum ulcer of left dorsal foot  SEE 6/28/21 NOTE FOR FULL HISTORY OF ULCER INCLUDING DIAGNOSTIC WORK-UP    IH:  - feels it is worsening  - ulcer is now down to bone  - developed a new blister that evolved into small erosion on the edge of the large ulcer    - currently on cyclosporine 2 mg/kg x 2 weeks. Before than was on 3 mg/kg for 3 weeks. She has been been off cefepime infusions since LV 6/24/21  - Humira has not yet been approved  - BP has been higher on cyclosporine. She takes metoprolol. Cr has been stable. Has one low Mg and one high K but this has normalized at lower dose  - has appt at [facility] 9/17. Was unable to get sooner appt. - using clobetasol at edge of ulcer and gentamicin in the center    Patient reports have anemia for many years, reports \"large blood cells\" as well. This is why she gets B12. She does report drinking a couple of beers daily. States she quit and it didn't help the ulcer so she started again.       MEDICAL PROBLEMS:  Patient Active Problem List    Diagnosis Date Noted    Pyoderma gangrenosum 06/29/2021    Cellulitis of left foot 06/01/2021    Pain in limb 04/28/2021    Arrhythmia 04/28/2021    S/P drug eluting coronary stent placement 02/03/2021    PAD (peripheral artery disease) [facility] [address] 12/29/2020    Gastroesophageal reflux disease without esophagitis 10/15/2020    Anemia 10/15/2020    Primary osteoarthritis of right knee     Status post total knee replacement using cement, right 10/13/2020    Wound, open, foot with complication, left, initial encounter 03/16/2020    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 ([address]     Hypertension 12/30/2019    Pain in left foot     Chronic ulcer of left foot with fat layer exposed ([address]     Mass of finger of right hand     Chronic bilateral low back pain without sciatica 01/03/2018    Cataract 05/06/2016    Neoplasm of uncertain behavior of skin 01/05/2015    Other plastic surgery for unacceptable cosmetic appearance 01/05/2015       CURRENT MEDICATIONS:   Current Outpatient Medications   Medication Sig Dispense Refill    cycloSPORINE modified (GENGRAF) 25 MG capsule Take 3 capsules by mouth 2 times daily 180 capsule 0    collagenase (SANTYL) 250 UNIT/GM ointment Apply topically to wound followed by secondary dressing change daily. 90 Tube 1    clobetasol (TEMOVATE) 0.05 % ointment Apply to ulcer at dressing changes. 60 g 2    gentamicin (GARAMYCIN) 0.1 % ointment apply to ULCER twice a day      lidocaine (XYLOCAINE) 2 % jelly Do not apply directly to wound. 30 mL 2    atorvastatin (LIPITOR) 10 MG tablet Take 1 tablet by mouth daily Take 10mg daily while on Cyclosporine.  30 tablet 2    meclizine (ANTIVERT) 25 MG CHEW Take 25 mg by mouth 3 times daily as needed      folic acid (FOLVITE) 1 MG tablet take 1 tablet by mouth once daily 30 tablet 5    clopidogrel (PLAVIX) 75 MG tablet Take 1 tablet by mouth daily 30 tablet 11    Metoprolol Tartrate 37.5 MG TABS take 1 tablet by mouth twice a day (Patient taking differently: Take 75 mg by mouth 2 times daily ) 60 tablet 5    Multiple Vitamins-Minerals (THERAPEUTIC MULTIVITAMIN-MINERALS) tablet Take 1 tablet by mouth daily      oxyCODONE-acetaminophen (PERCOCET) 5-325 MG per tablet take 1 tablet by mouth four times a day if needed      traMADol (ULTRAM ER) 200 MG extended release tablet take 1 tablet by mouth once daily      aspirin EC 81 MG EC tablet Take 1 tablet by mouth 2 times daily 84 tablet 0    vitamin C (ASCORBIC ACID) 500 MG tablet Take 500 mg by mouth 2 times daily      diclofenac sodium (VOLTAREN) 1 % GEL Apply topically as needed Dr. Jae Moscoso      NAPROXEN PO Take 250 mg by mouth daily as needed Indications: patient takes 1-2 times a day Stopping 10 days prior to surgery as instructed by surgeon      omeprazole (PRILOSEC) 40 MG delayed release capsule Take 1 tablet by mouth daily Per [facility]      Cholecalciferol (VITAMIN D3) 5000 units TABS Take 1,000 Units by mouth daily       calcium carbonate (OSCAL) 500 MG TABS tablet Take 1,000 mg by mouth daily Per [facility] Rothmanyer rheumatology      alendronate (FOSAMAX) 70 MG tablet Take 70 mg by mouth every 7 days Indications:  Dr. Chayito Haskins   Patient takes on       gabapentin (NEURONTIN) 300 MG capsule Take 300 mg by mouth 2 times daily. Per Dr. Cade Salcido        No current facility-administered medications for this visit. ALLERGIES:   No Known Allergies    SOCIAL HISTORY:  Social History     Tobacco Use    Smoking status: Former Smoker     Packs/day: 0.25     Years: 50.00     Pack years: 12.50     Types: Cigarettes     Start date:      Quit date: 2020     Years since quittin.4    Smokeless tobacco: Never Used   Substance Use Topics    Alcohol use: Yes     Comment: 2-3 shot liquor for weekends       Pertinent ROS:  Review of Systems  Skin: Denies any new changing, growing or bleeding lesions or rashes except as described in the HPI   Constitutional: Denies fevers, chills, and malaise.     PHYSICAL EXAM:   BP (!) 176/92   Pulse 73   Temp 97.3 °F (36.3 °C)   Ht 5' 6\" (1.676 m)   Wt 170 lb (77.1 kg)   SpO2 96%   BMI 27.44 kg/m²     The patient is generally well appearing, well nourished, alert and conversational. Affect is normal.    Cutaneous Exam:  Physical Exam  Focused exam of left dorsal foot was performed    Facial covering was not removed during examination. Diagnoses/exam findings/medical history pertinent to this visit are listed below:    Assessment:   Diagnosis Orders   1. High risk medication use  Folate    CBC Auto Differential    Comprehensive Metabolic Panel    Magnesium    Uric Acid   2. Macrocytic anemia  April Pace MD, Hematology/Oncology, [facility]   3. Pyoderma gangrenosum          Plan:  Chronic ulcer of left foot most c/w pyoderma gangrenosum, localized to left foot     61 yo F with chronic expanding ulcer of left foot, reportedly exhibiting pathergy, in the setting of positive AMADOR and anti-centromere ab with no other systemic manifestations of autoimmune disease, negative further serologic work-up, negative work-up for vascular occlusion,, and two cultures positive for pseudomonas, but unresolved with antibiotic treatment. - chronic illness with progression and/or exacerbation  - enlarging from LV despite cyclosporine  - will expedite appeal for Humira crohn's dosing  - will call CC to request earlier appt. Needs multidisciplinary care at tertiary care center  - referral to heme-onc for chronic macrocytosis. Likely related to alcohol use but warrants evaluation to determine if hematologic abnormalities could contribute to slow healing of ulcer. B12/folate normal    May need add'l antihypertensive agent while on cyclosporine, will discuss with PCP    RTC 2 weeks    Future Appointments   Date Time Provider Keshawn De Dios   7/20/2021  8:45 AM Rodrigue Archibald DPM [facility] [address]   7/27/2021  9:00 AM MD joanne Jaquez derm [facility]   1/7/2022  7:30 AM Chad Davis,  ORTHO SPECIA [facility]         There are no Patient Instructions on file for this visit.     This note was created with the assistance of a speech-recognition program.  Although the intention is to generate a document that actually reflects the content of the visit, no guarantees can be provided that every mistake has been identified and corrected by editing.     Electronically signed by Scarlett Singh MD on 7/13/21 at 10:07 AM ALEXSANDRA

## 2021-07-13 NOTE — TELEPHONE ENCOUNTER
Sent PA of Humira to [employer]. They submitted the paperwork to [employer]. I spoke with pt and checked the chart/paperwork and pt has [employer]. She states that when she spoke to the insurance they told her to submit it to [employer]. I resent it over asking that they submit it to the correct insurance which is [employer], we have never submitted [employer] info to [employer]. They will have to reprocess it.

## 2021-07-14 NOTE — RESULT ENCOUNTER NOTE
Labs are reasonable to continue cyclosporine for now. I am expediting appeal of Humira and will call CC on Friday to ask for expedited appt. I will also be contacting her PCP about blood pressure.

## 2021-07-16 NOTE — PROGRESS NOTES
(cm) 11 cm 07/16/21 1017   Post-Procedure Width (cm) 10.5 cm 07/16/21 1017   Post-Procedure Depth (cm) 0.5 cm 07/16/21 1017   Post-Procedure Surface Area (cm^2) 115.5 cm^2 07/16/21 1017   Post-Procedure Volume (cm^3) 57.75 cm^3 07/16/21 1017   Wound Assessment Exposed structure bone;Pink/red;Slough 07/16/21 1017   Drainage Amount Large 07/16/21 1017   Drainage Description Yellow 07/16/21 1017   Odor None 07/16/21 1017   Jessica-wound Assessment Blanchable erythema 07/16/21 1017   Margins Defined edges 07/16/21 1017   Wound Thickness Description not for Pressure Injury Full thickness 07/16/21 1017   Number of days: 224          Supplies Requested :      WOUND #: 1   PRIMARY DRESSING:  Other: ADAPTIC   SECONDARY     FREQUENCY OF DRESSING CHANGES:  Daily       ADDITIONAL ITEMS:  [] Gloves Small  [x] Gloves Medium [] Gloves Large [] Gloves Ryley Arnoldo  [] Tape 1\" [x] Tape 2\" [] Tape 3\"  [] Medipore Tape  [] Saline  [] Skin Prep   [] Adhesive Remover   [] Cotton Tip Applicators   [] Other:    Patient Wound(s) Debrided: [x] Yes      [] No    Debribement Type: Excisional/Sharp    Patient currently being seen by Home Health: [] Yes   [x] No    Duration for needed supplies:  []15  [x]30  []60  []90 Days    Electronically signed by Daniela Hawkins RN on 7/16/2021 at 12:46 PM     Provider Information:      PROVIDER'S NAME: Johanna Greene DPM  [id number]

## 2021-07-16 NOTE — PROGRESS NOTES
[address]   Progress Note and Procedure Note      [address] RECORD NUMBER:  [id number]  AGE: 60 y.o. GENDER: female  : 1955  EPISODE DATE:  2021    Subjective:     No chief complaint on file. HISTORY of PRESENT ILLNESS HPI     Ynay Wheatley is a 60 y.o. female who presents today for wound/ulcer evaluation. Patient contineus to dress the owund with santyl and saline soaked dressing. Using gent ointment and clobetasol. Also applies gentamycin. States she has noticed some blistering at skin edges. Wound has become painful and getting bigger. History of Wound Context: History of pyoderma gangrenosum. Follows with dermatology. The patient had nonhealing wound to the dorsum of her left foot for 2 years. She is complaining of foot pain and greenish drainage with increased size over the past several weeks. She denied any fever or chills, denied nausea or vomiting, no diarrhea. She does have hands small joints pain. She has been following with dermatology Dr. Carl Phelan and rheumatology on 2021 had a punch biopsy done was suggestive of LEUKOCYTOCLASTIC VASCULITIS, Pseudomonas aeruginosa grew on tissue culture that was sensitive to cefepime and Cipro. She was treated with Cipro courses previously with no improvement. Patient was hospitalized at [facility] 2021 through 2021 was discharged on IV cefepime that she is tolerating. HIV screen was negative, QuantiFERON-TB gold test was negative, hepatitis panel negative, ANCA negative, She had multiple punch biopsies most recent was done on previous punch biopsy on 2021 showed fragment of benign fibrous connective tissue, tissue culture grew Enterococcus faecalis on broth medium only that was sensitive to ampicillin and vancomycin with ZAIDA of 2.   She was positive for AMADOR and anticentromere was evaluated by rheumatology was previously on prednisone and methotrexate that was changed to cyclosporine on her recent hospitalization. She had multiple debridements and grafting in the past that was failed. History of smoking  She was evaluated by vascular surgery, had angiogram done with no reported stenosis. She denied history of diabetes. Tissue culture in December and February 2021 grew Pseudomonas aeruginosa was treated with a course of Cipro. Tissue culture 2/19/2021 grew Pseudomonas aeruginosa that was sensitive to Cipro, previous tissue culture 1/26/2021 grew Pseudomonas aeruginosa that was resistant to Cipro. History of coronary artery disease status post stent  Wound/Ulcer Pain Timing/Severity: intermittent  Quality of pain: burning  Severity:5/10  Modifying Factors: None  Associated Signs/Symptoms: Drainage    Ulcer Identification:  Ulcer Type: undetermined     Contributing Factors: decreased tissue oxygenation  History of smoking, quit, history of coronary artery disease status post stent. PAST MEDICAL HISTORY      Diagnosis Date    Arthritis     knees hips hands shoulders and back    CAD (coronary artery disease) 2019    blockage on cath 9/2019, to have stenting after surgery (arthroplasty) Dr. Rene Valdivia Chronic back pain     GERD (gastroesophageal reflux disease)     on rx    Hyperlipidemia     per Dr. Brenda Reynoso on rx    Hypertension     Jeremieazeb Duran manages    Incomplete RBBB     Irregular heart beat     LAFB (left anterior fascicular block)     Dr. Brenda Reynoso, cardiologist, seen 9/2019 prior to surgery on 10/8/2019    Leg wound, left     following with wound care, Dr. Stephanie Maki Lumbar disc disease     Osteoporosis     PAC (premature atrial contraction) 06/2018    PACs and PVCs on holter monitor,     PONV (postoperative nausea and vomiting)     requests scop patch    Wears dentures     upper partial    Wears eyeglasses     readers    Wears partial dentures     upper and lower    Wellness examination     MIKE Parker Monday, NP PCP, seen 2/10/2020       PAST SURGICAL HISTORY    Past Surgical History:   Procedure Laterality Date    ABDOMINOPLASTY  1997    BACK SURGERY      BLEPHAROPLASTY Bilateral 1997    BREAST ENHANCEMENT SURGERY Bilateral 1999    breast augmentation    CARDIAC CATHETERIZATION  2019    Dr. Rock Hutchins, blockage but no stents yet    CATARACT REMOVAL WITH IMPLANT Bilateral 2016    COLONOSCOPY  2015    No Polyps    COSMETIC SURGERY      eyelid lift    FINGER SURGERY Right     thumb lesion excised    FIXATION KYPHOPLASTY  2013    Back    FOOT DEBRIDEMENT Left 10/11/2019    SURGICAL PREP WOUND BED LEFT FOOT WITH APPLICATION OF EPIFIX LEFT FOOT 3X4 performed by Kim Charles DPM at [place] [address]      kyphoplasty for lumbar fx 2013 Dr. Yuriy Stovall ARTHROSCOPY Left 2006   Radha Mealing SURGERY Left 1/2/2020    FOOT  DEBRIDEMENT WITH WOUND VAC PLACEMENT performed by Rakan Vázquez MD at [place] [address]  2014   [facility]  2005    diskectomy and spinal fusion    OTHER SURGICAL HISTORY  10/03/2019    Left leg angiogram    MT OFFICE/OUTPT VISIT,PROCEDURE ONLY Right 9/11/2018    EXCISION MASS THUMB, 3080 TABLE performed by Wyatt Haro DO at [facility] Left 12/3/2019    LEFT FOOT DEBRIDEMENT WITH SKIN GRAFT SPLIT THICKNESS; SKIN GRAFT TAKEN FROM RIGHT ANTERIOR THIGH performed by Rakan Vázquez MD at [facility] Left 2/25/2020    DEBRIDEMENT, SPLIT THICKNESS SKIN GRAFT WITH WOUND VAC PLACEMENT FOOT performed by Tanmay Chacko MD at [facility] Left 6/30/2020    DEBRIDEMENT, SKIN GRAFT SPLIT THICKNESS FOOT  ([facility] [facility]) performed by Tanmay Chacko MD at [facility]      as a child    TOTAL KNEE ARTHROPLASTY Right 10/13/2020    KNEE TOTAL ARTHROPLASTY    TOTAL KNEE ARTHROPLASTY Right 10/13/2020    KNEE TOTAL ARTHROPLASTY- MICROPORT, \ ADVANCED performed by Claudia Araya Jenifer Barcenas DO at [address] HISTORY    Family History   Problem Relation Age of Onset    Coronary Art Dis Mother     Arthritis Mother     Heart Surgery Mother         CABG    Coronary Art Dis Father     Heart Disease Father     Heart Surgery Father         CABG    Coronary Art Dis Sister     Heart Surgery Sister         CABG       SOCIAL HISTORY    Social History     Tobacco Use    Smoking status: Former Smoker     Packs/day: 0.25     Years: 50.00     Pack years: 12.50     Types: Cigarettes     Start date:      Quit date: 2020     Years since quittin.4    Smokeless tobacco: Never Used   Vaping Use    Vaping Use: Never used   Substance Use Topics    Alcohol use: Yes     Comment: 2-3 shot liquor for weekends    Drug use: Never       ALLERGIES    No Known Allergies    MEDICATIONS    Current Outpatient Medications on File Prior to Encounter   Medication Sig Dispense Refill    UNABLE TO FIND       cycloSPORINE modified (GENGRAF) 25 MG capsule Take 3 capsules by mouth 2 times daily 180 capsule 0    collagenase (SANTYL) 250 UNIT/GM ointment Apply topically to wound followed by secondary dressing change daily. 90 Tube 1    clobetasol (TEMOVATE) 0.05 % ointment Apply to ulcer at dressing changes. 60 g 2    gentamicin (GARAMYCIN) 0.1 % ointment apply to ULCER twice a day      lidocaine (XYLOCAINE) 2 % jelly Do not apply directly to wound. 30 mL 2    atorvastatin (LIPITOR) 10 MG tablet Take 1 tablet by mouth daily Take 10mg daily while on Cyclosporine.  30 tablet 2    meclizine (ANTIVERT) 25 MG CHEW Take 25 mg by mouth 3 times daily as needed      folic acid (FOLVITE) 1 MG tablet take 1 tablet by mouth once daily 30 tablet 5    clopidogrel (PLAVIX) 75 MG tablet Take 1 tablet by mouth daily 30 tablet 11    Metoprolol Tartrate 37.5 MG TABS take 1 tablet by mouth twice a day (Patient taking differently: Take 75 mg by mouth 2 times daily ) 60 tablet 5    Multiple Vitamins-Minerals (THERAPEUTIC MULTIVITAMIN-MINERALS) tablet Take 1 tablet by mouth daily      oxyCODONE-acetaminophen (PERCOCET) 5-325 MG per tablet take 1 tablet by mouth four times a day if needed      traMADol (ULTRAM ER) 200 MG extended release tablet take 1 tablet by mouth once daily      aspirin EC 81 MG EC tablet Take 1 tablet by mouth 2 times daily 84 tablet 0    vitamin C (ASCORBIC ACID) 500 MG tablet Take 500 mg by mouth 2 times daily      diclofenac sodium (VOLTAREN) 1 % GEL Apply topically as needed Dr. Mary Rahman      NAPROXEN PO Take 250 mg by mouth daily as needed Indications: patient takes 1-2 times a day Stopping 10 days prior to surgery as instructed by surgeon      omeprazole (PRILOSEC) 40 MG delayed release capsule Take 1 tablet by mouth daily Per Claudia Perkins      Cholecalciferol (VITAMIN D3) 5000 units TABS Take 1,000 Units by mouth daily       calcium carbonate (OSCAL) 500 MG TABS tablet Take 1,000 mg by mouth daily Per [facility] rheumatology      alendronate (FOSAMAX) 70 MG tablet Take 70 mg by mouth every 7 days Indications: Mondays Dr. Genaro Carroll   Patient takes on Mondays      gabapentin (NEURONTIN) 300 MG capsule Take 300 mg by mouth 2 times daily. Per Dr. Mary Rahman       No current facility-administered medications on file prior to encounter. REVIEW OF SYSTEMS  Review of Systems    Pertinent items are noted in HPI. Other than above 12 system review was negative    Objective: There were no vitals taken for this visit.     Wt Readings from Last 3 Encounters:   07/13/21 170 lb (77.1 kg)   06/29/21 172 lb (78 kg)   06/24/21 172 lb (78 kg)       PHYSICAL EXAM  Vascular: DP pulses are not palpable, Bilateral. PT pulses are not palpable, Bilateral. CFT <4 seconds to all digits, Bilateral.  No edema, Bilateral.  Hair growth is absent to the level of the digits, Bilateral.  Skin temp is warm to cool from the tibial tuberosity to the digits, Bilateral.      Neuro: Saph/sural/SP/DP/plantar sensation intact to light touch. Musculoskeletal: EHL/FHL/GS/TA gross motor intact. Gross deformity is absent. Dermatologic: Open wound present to dorsal left foot wound as documented in detail below. Wound base is fibro-necrotic extending to the level of muscle with an exposed extensor tendon and 3rd MT exposed. There is minimal erythema at skin edges. There is no purulent drainage. There is no fluctuance or crepitus. Interdigital maceration absent, Bilateral.     Wound 12/04/20 Foot Left;Dorsal wound #1 (Active)   Wound Image   07/16/21 1017   Wound Etiology Other 07/16/21 1017   Dressing Status New dressing applied; Intact; New drainage noted; Old drainage noted 07/06/21 0927   Wound Cleansed Irrigated with saline 07/16/21 1017   Dressing/Treatment Other (comment) 06/01/21 0854   Wound Length (cm) 11 cm 07/16/21 1017   Wound Width (cm) 10.5 cm 07/16/21 1017   Wound Depth (cm) 0.5 cm 07/16/21 1017   Wound Surface Area (cm^2) 115.5 cm^2 07/16/21 1017   Change in Wound Size % (l*w) -275 07/16/21 1017   Wound Volume (cm^3) 57.75 cm^3 07/16/21 1017   Wound Healing % -369 07/16/21 1017   Post-Procedure Length (cm) 10.6 cm 07/06/21 0927   Post-Procedure Width (cm) 10 cm 07/06/21 0927   Post-Procedure Depth (cm) 0.4 cm 07/06/21 0927   Post-Procedure Surface Area (cm^2) 106 cm^2 07/06/21 0927   Post-Procedure Volume (cm^3) 42.4 cm^3 07/06/21 0927   Wound Assessment Exposed structure bone;Pink/red;Slough 07/16/21 1017   Drainage Amount Large 07/16/21 1017   Drainage Description Yellow 07/16/21 1017   Odor None 07/16/21 1017   Jessica-wound Assessment Blanchable erythema 07/16/21 1017   Margins Defined edges 07/16/21 1017   Wound Thickness Description not for Pressure Injury Full thickness 07/16/21 1017   Number of days: 223         Assessment:      1. 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)    2. Pain in left foot    3. Neoplasm of uncertain behavior of skin          Plan:   Treatment:     1.  Pt was evaluated and examined. Patient was given personalized discharge instructions. Diagnosis was discussed with the pt and all of their questions were answered in detail. Proper foot hygiene and care was discussed with the pt. Patient to check feet daily and contact the office with any questions/problems/concerns. Other comorbidity noted and will be managed by PCP. 2. Dressings: Santyl, alone for 2 weeks to assess if there is a reaction with the other topicals being used. saline soaked gauze, DSD, ACE  3. Follow up: 1 week. 4. Detailed home instructions and education material given to patient prior to discharge. 5. Extensive discussion had with patient regarding definitive treatment options including continued conservative therapy with wound care, flap reconstruction of the left lower extremity, and below knee amputation of the left leg. All questions were answered to her satisfaction. Patient demonstrates and verbalizes understanding. She is amendable to treatment plan. 6. Maintain follow up appointment with dermatology and [facility]   7.  No wound debridement today      Eden Staton DPM on 7/16/2021 at 83:00 AM  Board Certified, American Board of Podiatric Surgery  Fellow, [employer] and [employer]

## 2021-07-16 NOTE — PLAN OF CARE
Problem: Pain:  Goal: Pain level will decrease  Description: Pain level will decrease  Outcome: Ongoing  Goal: Control of acute pain  Description: Control of acute pain  Outcome: Ongoing  Goal: Control of chronic pain  Description: Control of chronic pain  Outcome: Ongoing     Problem: Wound:  Goal: Will show signs of wound healing; wound closure and no evidence of infection  Description: Will show signs of wound healing; wound closure and no evidence of infection  Outcome: Ongoing     Problem: Falls - Risk of:  Goal: Will remain free from falls  Description: Will remain free from falls  Outcome: Ongoing
02-Jun-2019 17:05

## 2021-07-21 NOTE — TELEPHONE ENCOUNTER
[id number] if possible, otherwise second appeal and add photographs to appeal letter (fax to [employer])

## 2021-07-21 NOTE — TELEPHONE ENCOUNTER
[employer]: is going to do a 2nd level of appeal for the humira. I tried to call the phone number provided by [employer]avier.

## 2021-07-23 NOTE — TELEPHONE ENCOUNTER
Update regarding patient's referral to [facility]:    I have messaged Dr. Bard Pugh at [address] [place] [address] last week who stated he would try to find the patient an earlier appt. I also spoke last week with staff at [facility] who has put in a message to Dr. Cheng Haynes (physician who Carleen Cadet is schedule with in September) to work on an earlier appt. I conveyed another message to Venkatesh Sánchez today to get an update on appt status. Will wait to hear back.

## 2021-07-26 NOTE — TELEPHONE ENCOUNTER
Per provider, called patient to let her know with her elevated blood pressure and having symptoms she will need to go to the ER. Patient stated she cannot afford to go there. Informed her she can still come to the appointment, but it is possible the provider would call EMS given her symptoms. Explained to the patient that is the best place to go with her symptoms because there is more that can be done for her there. Patient again stated she cannot afford to go to the ER and she stated she will not be coming to her appt today.

## 2021-07-26 NOTE — TELEPHONE ENCOUNTER
Reason for Disposition   Systolic BP >= 730 OR Diastolic >= 745    Answer Assessment - Initial Assessment Questions  1. BLOOD PRESSURE: \"What is the blood pressure? \" \"Did you take at least two measurements 5 minutes apart? \"      Last BP this morning 195/102, just took one reading    2. ONSET: \"When did you take your blood pressure? \"      Took it this morning    3. HOW: \"How did you obtain the blood pressure? \" (e.g., visiting nurse, automatic home BP monitor)      Home BP cuff    4. HISTORY: \"Do you have a history of high blood pressure? \"      Has HTN, was seen on 07/16/21 and BP was 180/101 in office    5. MEDICATIONS: Cleatis Sessions you taking any medications for blood pressure? \" \"Have you missed any doses recently? \"      She does take Metoprolol and is taking steroids now for foot pain    6. OTHER SYMPTOMS: \"Do you have any symptoms? \" (e.g., headache, chest pain, blurred vision, difficulty breathing, weakness)      Headache that is dull began one week ago    7. PREGNANCY: \"Is there any chance you are pregnant? \" \"When was your last menstrual period? \"      No    Protocols used: HIGH BLOOD PRESSURE-ADULT-OH    Received call from Keshawn Lane  at [facility]  with Manga Corta. Brief description of triage: Pressure is elevated with mild headache, she is on steroids for foot. Triage indicates for patient to Be seen in office today    Care advice provided, patient verbalizes understanding; denies any other questions or concerns; instructed to call back for any new or worsening symptoms. Writer provided warm transfer to Lloyd Davis at [facility]  for appointment scheduling. Attention Provider: Thank you for allowing me to participate in the care of your patient. The patient was connected to triage in response to information provided to the [facility]. Please do not respond through this encounter as the response is not directed to a shared pool.

## 2021-07-28 NOTE — PROGRESS NOTES
Dermatology Patient Note  [facility] [address]  [address]  [address]  Dept: [phone]  Dept Fax: [phone]      VISITDATE: 7/27/2021   REFERRING PROVIDER: No ref. provider found      Ginger Lala is a 60 y.o. 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 PCP recommended she visit ER as she was having headaches, she refused due to cost    - currently on cyclosporine 2 mg/kg x 2 weeks. Before than was on 3 mg/kg for 3 weeks. She has been been off cefepime infusions since  6/24/21  - Humira has not yet been approved  - has appt at [facility] 9/17.  Was unable to get sooner appt, despite multiple phonecalls by me and patient  - using clobetasol at edge of ulcer and gentamicin in the center    Has upcoming appt with heme-onc      MEDICAL PROBLEMS:  Patient Active Problem List    Diagnosis Date Noted    Pyoderma gangrenosum 06/29/2021    Cellulitis of left foot 06/01/2021    Pain in limb 04/28/2021    Arrhythmia 04/28/2021    S/P drug eluting coronary stent placement 02/03/2021    PAD (peripheral artery disease) ([address] 12/29/2020    Gastroesophageal reflux disease without esophagitis 10/15/2020    Anemia 10/15/2020    Primary osteoarthritis of right knee     Status post total knee replacement using cement, right 10/13/2020    Wound, open, foot with complication, left, initial encounter 03/16/2020    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 ([address]     Hypertension 12/30/2019    Pain in left foot     Chronic ulcer of left foot with fat layer exposed ([address]     Mass of finger of right hand     Chronic bilateral low back pain without sciatica 01/03/2018    Cataract 05/06/2016    Neoplasm of uncertain behavior of skin 01/05/2015    Other plastic surgery for unacceptable cosmetic appearance 01/05/2015       CURRENT MEDICATIONS:   Current Outpatient Medications   Medication Sig Dispense Refill    UNABLE TO FIND       cycloSPORINE modified (GENGRAF) 25 MG capsule Take 3 capsules by mouth 2 times daily 180 capsule 0    collagenase (SANTYL) 250 UNIT/GM ointment Apply topically to wound followed by secondary dressing change daily. 90 Tube 1    clobetasol (TEMOVATE) 0.05 % ointment Apply to ulcer at dressing changes. 60 g 2    gentamicin (GARAMYCIN) 0.1 % ointment apply to ULCER twice a day      lidocaine (XYLOCAINE) 2 % jelly Do not apply directly to wound. 30 mL 2    atorvastatin (LIPITOR) 10 MG tablet Take 1 tablet by mouth daily Take 10mg daily while on Cyclosporine.  30 tablet 2    meclizine (ANTIVERT) 25 MG CHEW Take 25 mg by mouth 3 times daily as needed      folic acid (FOLVITE) 1 MG tablet take 1 tablet by mouth once daily 30 tablet 5    clopidogrel (PLAVIX) 75 MG tablet Take 1 tablet by mouth daily 30 tablet 11    Metoprolol Tartrate 37.5 MG TABS take 1 tablet by mouth twice a day (Patient taking differently: Take 75 mg by mouth 2 times daily ) 60 tablet 5    Multiple Vitamins-Minerals (THERAPEUTIC MULTIVITAMIN-MINERALS) tablet Take 1 tablet by mouth daily      oxyCODONE-acetaminophen (PERCOCET) 5-325 MG per tablet take 1 tablet by mouth four times a day if needed      traMADol (ULTRAM ER) 200 MG extended release tablet take 1 tablet by mouth once daily      aspirin EC 81 MG EC tablet Take 1 tablet by mouth 2 times daily 84 tablet 0    vitamin C (ASCORBIC ACID) 500 MG tablet Take 500 mg by mouth 2 times daily      diclofenac sodium (VOLTAREN) 1 % GEL Apply topically as needed Dr. Mila Walker      NAPROXEN PO Take 250 mg by mouth daily as needed Indications: patient takes 1-2 times a day Stopping 10 days prior to surgery as instructed by surgeon      omeprazole (PRILOSEC) 40 MG delayed release capsule Take 1 tablet by mouth daily Per Verona Villanueva      Cholecalciferol (VITAMIN D3) 5000 units TABS Take 1,000 Units by mouth daily       calcium carbonate (OSCAL) 500 MG TABS tablet Take 1,000 mg by mouth daily Per Dio Acosta rheumatology      alendronate (FOSAMAX) 70 MG tablet Take 70 mg by mouth every 7 days Indications:  Dr. Sanchez Blood   Patient takes on       gabapentin (NEURONTIN) 300 MG capsule Take 300 mg by mouth 2 times daily. Per Dr. Kaitlynn Kiser       No current facility-administered medications for this visit. ALLERGIES:   No Known Allergies    SOCIAL HISTORY:  Social History     Tobacco Use    Smoking status: Former Smoker     Packs/day: 0.25     Years: 50.00     Pack years: 12.50     Types: Cigarettes     Start date:      Quit date: 2020     Years since quittin.4    Smokeless tobacco: Never Used   Substance Use Topics    Alcohol use: Yes     Comment: 2-3 shot liquor for weekends       Pertinent ROS:  Review of Systems  Skin: Denies any new changing, growing or bleeding lesions or rashes except as described in the HPI   Constitutional: Denies fevers, chills, and malaise. PHYSICAL EXAM:   BP (!) 170/80   Pulse 65   Temp 98.1 °F (36.7 °C)   Ht 5' 6\" (1.676 m)   Wt 170 lb (77.1 kg)   SpO2 94%   BMI 27.44 kg/m²     The patient is generally well appearing, well nourished, alert and conversational. Affect is normal.    Cutaneous Exam:  Physical Exam  Focused exam of left dorsal foot was performed    Facial covering was not removed during examination. Diagnoses/exam findings/medical history pertinent to this visit are listed below:    Assessment:   Diagnosis Orders   1.  High risk medication use  CBC Auto Differential    Comprehensive Metabolic Panel    Lipid Panel    Magnesium        Plan:  Chronic ulcer of left foot most c/w pyoderma gangrenosum, localized to left foot     59 yo F with chronic expanding ulcer of left foot, reportedly exhibiting pathergy, in the setting of positive AMADOR and anti-centromere ab with no other systemic manifestations of autoimmune disease, negative further serologic work-up, negative work-up for vascular occlusion,, and two cultures positive for pseudomonas, but unresolved with antibiotic treatment. 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    - chronic illness with progression and/or exacerbation  - enlarging from LV despite cyclosporine  - labs today and may have to stop cyclosporine if abnl, daniel given uncontrolled BP  Needs multidisciplinary care at tertiary care center, have not been able to get earlier appt at [address] [place] [address], will reach out to [facility] and [facility] (patient states she as previously denied out-of-network benefits at [facility])  - future options if cyclosporine discontinued - ILK, dapsone, anti-TNF (Humira, Cimzia, enbrel, or infliximab), another course of prednisone, IVIG. - further surgical intervention comes with significant risk of pathergy and worsening of the disease      RTC 2 weeks    Future Appointments   Date Time Provider Keshawn De Dios   8/3/2021  8:45 AM Vinny Jerez DPM [facility]   8/10/2021  9:45 AM Rosalinda Jerez MD  derm [facility]   8/17/2021 12:30 PM Rob Alvarenga MD [facility]   1/7/2022  7:30 AM [place] [id number]         Patient Instructions   - labs today  - return to office in 2 weeks      This note was created with the assistance of a speech-recognition program.  Although the intention is to generate a document that actually reflects the content of the visit, no guarantees can be provided that every mistake has been identified and corrected by editing.     Electronically signed by Rosalinda Jerez MD on 7/13/21 at 10:07 AM EDT

## 2021-07-28 NOTE — TELEPHONE ENCOUNTER
Spoke to patient about critical potassium 6.1 She denies muscle aches, fatigue, muscle weakness, cramping, heart palpitations, or chest pain. She was counseled to stop cyclosporine immediately. I recommended that she go to the ER for her high potassium (can get EKG and urgent potassium lowering meds if indicated). She refused due to cost.     Repeat potassium will be drawn tomorrow afternoon. She will also get G6PD in anticipation of possibly starting dapsone. Patient counseled to go to ER immediately if she experiences any of the above symptoms.

## 2021-07-29 NOTE — TELEPHONE ENCOUNTER
Despite multiple attempts to obtain an earlier appt, Marcus Quezada still is not scheduled with [facility] until 9/17. Patient needs to see academic dermatology to implement immunomodulatory therapy that may save her foot. I am placing additional referrals to both [facility] and [facility], and recommend that she see whoever gives her the first appt. We will need to do a prior authorization for out of network benefits for [facility], where they have an expert in pyoderma gangrenosum. Failure of her insurance to approve this visit may result in surgery, hospital stay, and potential amputation. With both referrals, please include the same info in the prior referral to [facility].

## 2021-07-30 NOTE — TELEPHONE ENCOUNTER
Patient called, states [facility] called her to schedule an appt. She took an appt for 8/9. Pt states she spoke with  about her insurance not being in network with [facility]. I notified her we sent a PA to her insurance company and will update her with the information.

## 2021-08-02 NOTE — TELEPHONE ENCOUNTER
Attempted to check on PA with [facility], representative told me the PA doesn't show as under review which on Friday a different representative told me it was. I asked to speak to a supervisor regarding this because had I not called to check, I would have thought it was being worked on. Urgent form sent to [facility] requesting [facility] services be approved as they have the specialists in [place] [id number].

## 2021-08-03 NOTE — TELEPHONE ENCOUNTER
----- Message from NUPUR Tesfaye CNP sent at 8/3/2021 12:29 PM EDT -----  Please confirm if any changes have been made and confirm doses on med  ----- Message -----  From: Eliza Dietz MD  Sent: 7/14/2021  11:20 AM EDT  To: Neville Daniel, APRN - CNP    Hello Allisan,I'm attempting to get Maite Heredia seen at [facility] for this stubborn ulcer of her left foot. My working diagnosis is still pyoderma gangrenosum and I'd like to keep her on cyclosporine until we can get Humira approved and started. Her BP is going up on the cyclosporine but her kidneys thus far are tolerating it well. Would it be possible to adjust her antihypertensive regimen while she's on cyclosporine?  Thank you,  Eliza Dietz MD

## 2021-08-03 NOTE — PROGRESS NOTES
[address]   Progress Note and Procedure Note      [address] RECORD NUMBER:  [id number]  AGE: 60 y.o. GENDER: female  : 1955  EPISODE DATE:  8/3/2021    Subjective:     Chief Complaint   Patient presents with    Wound Check     left foot      Interval History: Patient was seen in wound care today. She relates no longer being able to afford Santyl for dressing changes. Patient currently is awaiting insurance approval for appointments at [address] [place] [address] and [facility]. She does not report much improvement in the wound size. Patient has been compliant with at-home dressing changes as previously instructed. HISTORY of PRESENT ILLNESS HPI     Yany Wheatley is a 60 y.o. female who presents today for wound/ulcer evaluation. Patient contineus to dress the owund with santyl and saline soaked dressing. Using gent ointment and clobetasol. Also applies gentamycin. States she has noticed some blistering at skin edges. Wound has become painful and getting bigger. History of Wound Context: History of pyoderma gangrenosum. Follows with dermatology. The patient had nonhealing wound to the dorsum of her left foot for 2 years. She is complaining of foot pain and greenish drainage with increased size over the past several weeks. She denied any fever or chills, denied nausea or vomiting, no diarrhea. She does have hands small joints pain. She has been following with dermatology Dr. Carl Phelan and rheumatology on 2021 had a punch biopsy done was suggestive of LEUKOCYTOCLASTIC VASCULITIS, Pseudomonas aeruginosa grew on tissue culture that was sensitive to cefepime and Cipro. She was treated with Cipro courses previously with no improvement. Patient was hospitalized at [facility] 2021 through 2021 was discharged on IV cefepime that she is tolerating.  HIV screen was negative, QuantiFERON-TB gold test was negative, hepatitis panel negative, ANCA negative, She had multiple punch biopsies most recent was done on previous punch biopsy on 4/20/2021 showed fragment of benign fibrous connective tissue, tissue culture grew Enterococcus faecalis on broth medium only that was sensitive to ampicillin and vancomycin with ZAIDA of 2. She was positive for AMADOR and anticentromere was evaluated by rheumatology was previously on prednisone and methotrexate that was changed to cyclosporine on her recent hospitalization. She had multiple debridements and grafting in the past that was failed. History of smoking  She was evaluated by vascular surgery, had angiogram done with no reported stenosis. She denied history of diabetes. Tissue culture in December and February 2021 grew Pseudomonas aeruginosa was treated with a course of Cipro. Tissue culture 2/19/2021 grew Pseudomonas aeruginosa that was sensitive to Cipro, previous tissue culture 1/26/2021 grew Pseudomonas aeruginosa that was resistant to Cipro. History of coronary artery disease status post stent  Wound/Ulcer Pain Timing/Severity: intermittent  Quality of pain: burning  Severity:5/10  Modifying Factors: None  Associated Signs/Symptoms: Drainage    Ulcer Identification:  Ulcer Type: undetermined     Contributing Factors: decreased tissue oxygenation  History of smoking, quit, history of coronary artery disease status post stent.       PAST MEDICAL HISTORY      Diagnosis Date    Arthritis     knees hips hands shoulders and back    CAD (coronary artery disease) 2019    blockage on cath 9/2019, to have stenting after surgery (arthroplasty) Dr. Mendel Mori    Chronic back pain     GERD (gastroesophageal reflux disease)     on rx    Hyperlipidemia     per Dr. Mendel Mori on rx    Hypertension     Melindasa Lit manages    Incomplete RBBB     Irregular heart beat     LAFB (left anterior fascicular block)     Dr. Mendel Mori, cardiologist, seen 9/2019 prior to surgery on 10/8/2019    Leg wound, left     following with wound care,  [facility]    Lumbar disc disease     Osteoporosis     PAC (premature atrial contraction) 06/2018    PACs and PVCs on holter monitor,     PONV (postoperative nausea and vomiting)     requests scop patch    Wears dentures     upper partial    Wears eyeglasses     readers    Wears partial dentures     upper and lower    Wellness examination     MIKE Vega NP PCP, seen 2/10/2020       PAST SURGICAL HISTORY    Past Surgical History:   Procedure Laterality Date    ABDOMINOPLASTY  1997    BACK SURGERY      BLEPHAROPLASTY Bilateral 1997    BREAST ENHANCEMENT SURGERY Bilateral 1999    breast augmentation    CARDIAC CATHETERIZATION  2019    Dr. Bethany John, blockage but no stents yet    CATARACT REMOVAL WITH IMPLANT Bilateral 2016    COLONOSCOPY  2015    No Polyps    COSMETIC SURGERY      eyelid lift    FINGER SURGERY Right     thumb lesion excised    FIXATION KYPHOPLASTY  2013    Back    FOOT DEBRIDEMENT Left 10/11/2019    SURGICAL PREP WOUND BED LEFT FOOT WITH APPLICATION OF EPIFIX LEFT FOOT 3X4 performed by Marisabel Collazo DPM at [address]      kyphoplasty for lumbar fx 2013 Dr. Nusrat Cheng ARTHROSCOPY Left 2006    [place] [place] Left 1/2/2020    FOOT  DEBRIDEMENT WITH WOUND VAC PLACEMENT performed by Williams Lepe MD at [facility]  2014    LUMBAR SPINE SURGERY  2005    diskectomy and spinal fusion    OTHER SURGICAL HISTORY  10/03/2019    Left leg angiogram    MO OFFICE/OUTPT VISIT,PROCEDURE ONLY Right 9/11/2018    EXCISION MASS THUMB, 3080 TABLE performed by Forrest Latif DO at [facility] Left 12/3/2019    LEFT FOOT DEBRIDEMENT WITH SKIN GRAFT SPLIT THICKNESS; SKIN GRAFT TAKEN FROM RIGHT ANTERIOR THIGH performed by Williams Lepe MD at [address] [place] [address] Left 2/25/2020    DEBRIDEMENT, SPLIT THICKNESS SKIN GRAFT WITH WOUND VAC PLACEMENT FOOT performed by Jewel Elliott MD at [place] [address] SKIN GRAFT Left 2020    DEBRIDEMENT, SKIN GRAFT SPLIT THICKNESS FOOT  ([facility], [address] [facility]) performed by Sanam Page MD at [facility]      as a child    TOTAL KNEE ARTHROPLASTY Right 10/13/2020    KNEE TOTAL ARTHROPLASTY    TOTAL KNEE ARTHROPLASTY Right 10/13/2020    KNEE TOTAL ARTHROPLASTY- MICROPORT, \ ADVANCED performed by Yolie Bailey DO at [address] HISTORY    Family History   Problem Relation Age of Onset    Coronary Art Dis Mother     Arthritis Mother     Heart Surgery Mother         CABG    Coronary Art Dis Father     Heart Disease Father     Heart Surgery Father         CABG    Coronary Art Dis Sister     Heart Surgery Sister         CABG       SOCIAL HISTORY    Social History     Tobacco Use    Smoking status: Former Smoker     Packs/day: 0.25     Years: 50.00     Pack years: 12.50     Types: Cigarettes     Start date:      Quit date: 2020     Years since quittin.5    Smokeless tobacco: Never Used   Vaping Use    Vaping Use: Never used   Substance Use Topics    Alcohol use: Yes     Comment: 2-3 shot liquor for weekends    Drug use: Never       ALLERGIES    No Known Allergies    MEDICATIONS    Current Outpatient Medications on File Prior to Encounter   Medication Sig Dispense Refill    UNABLE TO FIND       cycloSPORINE modified (GENGRAF) 25 MG capsule Take 3 capsules by mouth 2 times daily 180 capsule 0    collagenase (SANTYL) 250 UNIT/GM ointment Apply topically to wound followed by secondary dressing change daily. 90 Tube 1    clobetasol (TEMOVATE) 0.05 % ointment Apply to ulcer at dressing changes. 60 g 2    gentamicin (GARAMYCIN) 0.1 % ointment apply to ULCER twice a day      lidocaine (XYLOCAINE) 2 % jelly Do not apply directly to wound. 30 mL 2    atorvastatin (LIPITOR) 10 MG tablet Take 1 tablet by mouth daily Take 10mg daily while on Cyclosporine.  30 tablet 2    meclizine (ANTIVERT) 25 MG CHEW Take 25 mg by mouth 3 times daily as needed folic acid (FOLVITE) 1 MG tablet take 1 tablet by mouth once daily 30 tablet 5    clopidogrel (PLAVIX) 75 MG tablet Take 1 tablet by mouth daily 30 tablet 11    Metoprolol Tartrate 37.5 MG TABS take 1 tablet by mouth twice a day (Patient taking differently: Take 75 mg by mouth 2 times daily ) 60 tablet 5    Multiple Vitamins-Minerals (THERAPEUTIC MULTIVITAMIN-MINERALS) tablet Take 1 tablet by mouth daily      oxyCODONE-acetaminophen (PERCOCET) 5-325 MG per tablet take 1 tablet by mouth four times a day if needed      traMADol (ULTRAM ER) 200 MG extended release tablet take 1 tablet by mouth once daily      aspirin EC 81 MG EC tablet Take 1 tablet by mouth 2 times daily 84 tablet 0    vitamin C (ASCORBIC ACID) 500 MG tablet Take 500 mg by mouth 2 times daily      diclofenac sodium (VOLTAREN) 1 % GEL Apply topically as needed Dr. Cynthia Winslow      NAPROXEN PO Take 250 mg by mouth daily as needed Indications: patient takes 1-2 times a day Stopping 10 days prior to surgery as instructed by surgeon      omeprazole (PRILOSEC) 40 MG delayed release capsule Take 1 tablet by mouth daily Per Suegi Cooler      Cholecalciferol (VITAMIN D3) 5000 units TABS Take 1,000 Units by mouth daily       calcium carbonate (OSCAL) 500 MG TABS tablet Take 1,000 mg by mouth daily Per [place] [facility]      alendronate (FOSAMAX) 70 MG tablet Take 70 mg by mouth every 7 days Indications: Mondays Dr. Sherif Fisher   Patient takes on Mondays      gabapentin (NEURONTIN) 300 MG capsule Take 300 mg by mouth 2 times daily. Per Dr. Cynthia Winslow       No current facility-administered medications on file prior to encounter. REVIEW OF SYSTEMS  Review of Systems   Skin: Positive for color change and wound. Pertinent items are noted in HPI.   Other than above 12 system review was negative    Objective:      BP (!) 175/84   Pulse 70   Temp 97.5 °F (36.4 °C) (Tympanic)   Resp 18   Ht 5' 6\" (1.676 m)   Wt 170 lb (77.1 kg)   BMI 27.44 kg/m²     Wt Readings from Last 3 Encounters:   08/03/21 170 lb (77.1 kg)   07/27/21 170 lb (77.1 kg)   07/13/21 170 lb (77.1 kg)       PHYSICAL EXAM  Vascular: DP pulses are not palpable, Bilateral. PT pulses are not palpable, Bilateral. CFT <4 seconds to all digits, Bilateral.  No edema, Bilateral.  Hair growth is absent to the level of the digits, Bilateral.  Skin temp is warm to cool from the tibial tuberosity to the digits, Bilateral.      Neuro: Saph/sural/SP/DP/plantar sensation intact to light touch. Musculoskeletal: EHL/FHL/GS/TA gross motor intact. Gross deformity is absent. Dermatologic: Open wound present to dorsal left foot wound as documented in detail below. Wound base is fibro-necrotic extending to the level of muscle with an exposed extensor tendon and 3rd MT exposed. There is minimal erythema at skin edges. There is no purulent drainage. There is no fluctuance or crepitus. Interdigital maceration absent, Bilateral.     Wound 12/04/20 Foot Left;Dorsal wound #1 (Active)   Wound Image   08/03/21 0912   Wound Etiology Other 08/03/21 0912   Dressing Status New dressing applied; Intact; New drainage noted; Old drainage noted 07/06/21 0927   Wound Cleansed Irrigated with saline 08/03/21 0912   Dressing/Treatment Other (comment) 06/01/21 0854   Wound Length (cm) 10 cm 08/03/21 0912   Wound Width (cm) 12 cm 08/03/21 0912   Wound Depth (cm) 0.5 cm 08/03/21 0912   Wound Surface Area (cm^2) 120 cm^2 08/03/21 0912   Change in Wound Size % (l*w) -289.61 08/03/21 0912   Wound Volume (cm^3) 60 cm^3 08/03/21 0912   Wound Healing % -387 08/03/21 0912   Post-Procedure Length (cm) 10 cm 08/03/21 0912   Post-Procedure Width (cm) 12 cm 08/03/21 0912   Post-Procedure Depth (cm) 0.5 cm 08/03/21 0912   Post-Procedure Surface Area (cm^2) 120 cm^2 08/03/21 0912   Post-Procedure Volume (cm^3) 60 cm^3 08/03/21 0912   Wound Assessment Exposed structure bone;Exposed structure fascia; Exposed structure muscle; Exposed structure tendon 08/03/21 0912   Drainage Amount Large 08/03/21 0912   Drainage Description Yellow 08/03/21 0912   Odor None 08/03/21 0912   Jessica-wound Assessment Blanchable erythema 08/03/21 0912   Margins Defined edges 08/03/21 0912   Wound Thickness Description not for Pressure Injury Full thickness 07/16/21 1017   Number of days: 241         Assessment:      1. 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)    2. Pain in left foot    3. Neoplasm of uncertain behavior of skin          Plan:   Treatment:     1. Pt was evaluated and examined. Patient was given personalized discharge instructions. Diagnosis was discussed with the pt and all of their questions were answered in detail. Proper foot hygiene and care was discussed with the pt. Patient to check feet daily and contact the office with any questions/problems/concerns. Other comorbidity noted and will be managed by PCP. 2. Dressings: If able to afford Santyl then continue use. Apply adaptic, DSD, ACE  3. Follow up: 2 weeks. 4. Detailed home instructions and education material given to patient prior to discharge. 5. Extensive discussion had with patient regarding definitive treatment options including continued conservative therapy with wound care, corticosteroid injections, flap reconstruction of the left lower extremity, and below knee amputation of the left leg. All questions were answered to her satisfaction. Patient demonstrates and verbalizes understanding. She is amenable to treatment plan. 6. Maintain follow up appointment with dermatology, [facility] and [facility]   7. No wound debridement today  8. Dressing applied: topical lidocaine, adaptic, DSD, Tamika Worley DPM on 8/3/2021 at 9:52 AM    I performed a history and physical examination of the patient and discussed management with the resident. I reviewed the residents note and agree with the documented findings and plan of care. Any areas of disagreement are noted on the chart.  I was personally present for the key portions of any procedures. I have documented in the chart those procedures where I was not present during the key portions. I have reviewed the Podiatry Resident progress note. I agree with the chief complaint, past medical history, past surgical history, allergies, medications, social and family history as documented unless otherwise noted below. Documentation of the HPI, Physical Exam and Medical Decision Making performed by medical students or scribes is based on my personal performance of the HPI, PE and MDM. I have personally evaluated this patient and have completed at least one if not all key elements of the E/M (history, physical exam, and MDM). Additional findings are as noted.      Diamond Currie DPM on 8/5/2021 at 2:93 AM  Board Certified, American Board of Podiatric Surgery  Fellow, [employer] and [facility]

## 2021-08-03 NOTE — TELEPHONE ENCOUNTER
LM for patient to call the office.     ----- Message from NUPUR Cordero CNP sent at 8/3/2021 12:29 PM EDT -----  Please confirm if any changes have been made and confirm doses on med

## 2021-08-03 NOTE — TELEPHONE ENCOUNTER
Spoke to Jas Louie about lab result and next steps. Her potassium normalized and her G6PD is normal. She is scheduled to see [facility] and plans to attend though insurance approval is pending. She saw rheumatology today who is ordering more labs for possible RA and suggested starting infliximab. I think this is an excellent option and may be more effective and more likely to have insurance coverage than Humira. In the meantime I would like to try dapsone, which can be effective for PG and LCV (her biopsy has shown LCV twice). She is slightly anemic and I counseled Jas Louie that dapsone may worsen this and we will watch her blood counts closely. I counseled other SE including fatigue and peripheral neuropathy. She is willing to proceed.     Dapsone 50 mg daily, labs in 2 weeks

## 2021-08-03 NOTE — TELEPHONE ENCOUNTER
Called [facility] again today, the provider action form that I was told to send in was not correct. I was informed to fax in a request for Organization Determination and to inform them why the patient needs to go to [facility], faxed that to plan today.

## 2021-08-03 NOTE — PROGRESS NOTES
[address]:     [employer] [id number] [facility], [address] [place], [place] [address]e [facility] p: [phone] f: [phone]     Ordering Center:     [address] [place] [address] Wound and [address] [address]  [address] [facility]      Patient Information:      Tonya Greenberg [address]    : 1955  AGE: 60 y.o. GENDER: female   EPISODE DATE: 8/3/2021    Insurance:      PRIMARY INSURANCE:  Plan: [employer] CHOICE HMO  Coverage: [employer] MEDICARE ADVANTAGE  Effective Date: 2015  Group Number: [unfilled]  Subscriber Number: [id number] - (Medicare Managed)    Payor/Plan Subscr  Sex Relation Sub. Ins. ID Effective Group Num   1. Marley 1955 Female Self [id number] 1/1/15 [id number]                                   [address]       Patient Wound Information:      Problem List Items Addressed This Visit     Neoplasm of uncertain behavior of skin    Relevant Orders    Supply: Wound Cleanser    Pain in left foot    Relevant Orders    Supply: Wound Cleanser    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 ([facility] [address] - Primary    Relevant Orders    Supply: Wound Cleanser          WOUNDS REQUIRING DRESSING SUPPLIES:     Wound 20 Foot Left;Dorsal wound #1 (Active)   Wound Image   21 0912   Wound Etiology Other 21 09   Dressing Status New dressing applied; Intact; New drainage noted; Old drainage noted 21 0927   Wound Cleansed Irrigated with saline 21 0912   Dressing/Treatment Other (comment) 21 0854   Wound Length (cm) 10 cm 21 0912   Wound Width (cm) 12 cm 21 0912   Wound Depth (cm) 0.5 cm 21 0912   Wound Surface Area (cm^2) 120 cm^2 21 0912   Change in Wound Size % (l*w) -289.61 21 0912   Wound Volume (cm^3) 60 cm^3 21 0912   Wound Healing % -387 21 09   Post-Procedure Length (cm) 11 cm 07/16/21 1017   Post-Procedure Width (cm) 10.5 cm 07/16/21 1017   Post-Procedure Depth (cm) 0.5 cm 07/16/21 1017   Post-Procedure Surface Area (cm^2) 115.5 cm^2 07/16/21 1017   Post-Procedure Volume (cm^3) 57.75 cm^3 07/16/21 1017   Wound Assessment Exposed structure bone;Exposed structure fascia; Exposed structure muscle; Exposed structure tendon 08/03/21 0912   Drainage Amount Large 08/03/21 0912   Drainage Description Yellow 08/03/21 0912   Odor None 08/03/21 0912   Jessica-wound Assessment Blanchable erythema 08/03/21 0912   Margins Defined edges 08/03/21 0912   Wound Thickness Description not for Pressure Injury Full thickness 07/16/21 1017   Number of days: 241          Supplies Requested :      WOUND #: 1   PRIMARY DRESSING:  Other: adaptic   Cover and Secure with: ABD pad wrap with Kerlix     FREQUENCY OF DRESSING CHANGES:  Daily           ADDITIONAL ITEMS:  [] Gloves Small  [] Gloves Medium [] Gloves Large [] Gloves XLarge  [] Tape 1\" [x] Tape 2\" [] Tape 3\"  [] Medipore Tape  [x] Saline  [] Skin Prep   [] Adhesive Remover   [] Cotton Tip Applicators   [] Other:    Patient Wound(s) Debrided: [] Yes if yes please add date    [x] No    Debribement Type:  Other none at this time due to [facility] dx    Patient currently being seen by Home Health: [] Yes   [x] No    Duration for needed supplies:  []15  [x]30  []60  []90 Days    Electronically signed by Avelino Daley RN on 8/3/2021 at 9:39 AM     Provider Information:      PROVIDER'S NAME: Ann Kaiser DPM  [id number]

## 2021-08-04 NOTE — TELEPHONE ENCOUNTER
Pt states Dr. Memo Brannon took pt off the cyclosporine. She states she is currently taking Metoprolol 37.5 mg BID. Pt states she is doing much better. Pt states her BP today was 165/90 before medication. Pt states she did go to rheumatology and was approved for Renakaid. Pt also goes to  of  on Monday 08/09/2021 for the ulcer of the foot.

## 2021-08-04 NOTE — TELEPHONE ENCOUNTER
Called [facility] again, I sent in an Organization Determination yesterday as advise by the insurance company to send in the required information and lisette it as URGENT which I did, spoke to the insurance today and they provided me with the case #[id number] and stated the due date is 8/17/21. I stated that I wrote urgent on it, she said in that case then they have 72 hours to complete the PA requested.      I will call back Friday to check on this*

## 2021-08-05 NOTE — TELEPHONE ENCOUNTER
Spoke to Alma Rosa at [facility], provided case number, she confirmed that they received all necessary documents for the organization determination, it is marked urgent with the new due date of 8/8/21

## 2021-08-05 NOTE — TELEPHONE ENCOUNTER
Alray Collet called to let us know the waiver/organization determination has been approved for 8/9/21. [facility] will need to submit any other PA for additional services via [address] after Monday. She wasn't able to fax me anything on the approval but, she stated it will be in the mail.  She did attempt to contact the pt and left her a message

## 2021-08-05 NOTE — TELEPHONE ENCOUNTER
Cand[facility] [place] fax # [phone]    Patient is going to see.  Dr. Luisa Ibarra 8/9/21 IV discontinued, cath removed intact

## 2021-08-05 NOTE — TELEPHONE ENCOUNTER
Patient called asking if you can send in omeprazole for her. States she has been getting it otc and it's too expensive.

## 2021-08-05 NOTE — TELEPHONE ENCOUNTER
Alray Collet, whom is a nurse  with [address] [place] [address], working on this case called, states she wants to f/u  On the paper work that was faxed in. [phone].

## 2021-08-10 NOTE — PATIENT INSTRUCTIONS
- Ask about infliximab  - Do they still want you to see podiatry  - Inquire about insurance approval  - Follow up in the office in 1 month

## 2021-08-10 NOTE — PROGRESS NOTES
Dermatology Patient Note  [address]  [address]  [address]  Dept: [phone]  Dept Fax: [phone]      VISITDATE: 8/10/2021   REFERRING PROVIDER: No ref. provider found      [place] Jaquez is a 60 y.o. female  who presents today in the office for:    Follow-up (Follow up to [facility] visit yesterday)      HISTORY OF PRESENT ILLNESS:  Patient presents for f/u pyoderma gangrenosum    She saw [facility] yesterday who implemented the following plan:  - internal read of biopsies done here  - Wound care for the ulcerations: Continue Santyl for chemical debridement on excessive white tissue. silvadene 1% cream to the wounds bed, then cover with xeroform guaze, then normal gauze, then wrap areas with kerlix. Once the pain improves, also recommend adding ACE wraps with small amount of compression so as to help with the edema of the legs.   - start Humira after labs    She has been on dapsone 50 mg daily for 5 days now  She forgot to tell [facility] that she has been approved for infliximab here      MEDICAL PROBLEMS:  Patient Active Problem List    Diagnosis Date Noted    Pyoderma gangrenosum 06/29/2021    Cellulitis of left foot 06/01/2021    Pain in limb 04/28/2021    Arrhythmia 04/28/2021    S/P drug eluting coronary stent placement 02/03/2021    PAD (peripheral artery disease) ([address] 12/29/2020    Gastroesophageal reflux disease without esophagitis 10/15/2020    Anemia 10/15/2020    Primary osteoarthritis of right knee     Status post total knee replacement using cement, right 10/13/2020    Wound, open, foot with complication, left, initial encounter 03/16/2020    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 ([address]     Hypertension 12/30/2019    Pain in left foot     Chronic ulcer of left foot with fat layer exposed ([address]     Mass of finger of right hand     Chronic bilateral low back pain without sciatica 01/03/2018    Cataract 05/06/2016    topically as needed Dr. Rose Mcgee NAPROXEN PO Take 250 mg by mouth daily as needed Indications: patient takes 1-2 times a day Stopping 10 days prior to surgery as instructed by surgeon      Cholecalciferol (VITAMIN D3) 5000 units TABS Take 1,000 Units by mouth daily       calcium carbonate (OSCAL) 500 MG TABS tablet Take 1,000 mg by mouth daily Per Thierry Win rheumatology      alendronate (FOSAMAX) 70 MG tablet Take 70 mg by mouth every 7 days Indications:  Dr. Alfonso Cardenas   Patient takes on       gabapentin (NEURONTIN) 300 MG capsule Take 300 mg by mouth 2 times daily. Per Dr. Jack Schneider       No current facility-administered medications for this visit. ALLERGIES:   No Known Allergies    SOCIAL HISTORY:  Social History     Tobacco Use    Smoking status: Former Smoker     Packs/day: 0.25     Years: 50.00     Pack years: 12.50     Types: Cigarettes     Start date:      Quit date: 2020     Years since quittin.5    Smokeless tobacco: Never Used   Substance Use Topics    Alcohol use: Yes     Comment: 2-3 shot liquor for weekends       Pertinent ROS:  Review of Systems  Skin: Denies any new changing, growing or bleeding lesions or rashes except as described in the HPI   Constitutional: Denies fevers, chills, and malaise. PHYSICAL EXAM:   /87 (Site: Right Upper Arm, Position: Sitting, Cuff Size: Medium Adult)   Temp 98.2 °F (36.8 °C)   Ht 5' 6\" (1.676 m)   Wt 170 lb (77.1 kg)   SpO2 98%   BMI 27.44 kg/m²     The patient is generally well appearing, well nourished, alert and conversational. Affect is normal.    Cutaneous Exam:  Physical Exam  Face and neck only was examined. Examination of wound was deferred today as it was recently examined by [facility]. I looked at a photograph on her phone from wound dressing change today and there appears to be more granulation tissue than prior. Facial covering was not removed during examination.     Diagnoses/exam findings/medical history pertinent to this visit are listed below:    Assessment:   Diagnosis Orders   1. Pyoderma gangrenosum          Plan:  - chronic illness, responding to treatment but not yet at goal   - continue dapsone 50 mg daily - will review labs ordered by Shereen then order my own repeats to monitor dapsone.  - call Dr. Lois Mix office at [facility] and let them know that infliximab has been approved and that Humira has been denied despite multiple appeals here. They may recommend just starting infliximab  - follow Shereen's wound care orders, ask them if they would have her continue with podiatry    RTC 1 month    Future Appointments   Date Time Provider Keshawn De Dios   8/17/2021  8:30 AM MARCELINA Fernandez WND [facility] [address]   8/17/2021 12:30 PM Shine Wang MD [facility]   9/14/2021  8:30 AM Lianne Carrillo MD  derm [facility]   1/7/2022  7:30 AM Chad Hagan DO [facility] [id number]         Patient Instructions   - Ask about infliximab  - Do they still want you to see podiatry  - Inquire about insurance approval  - Follow up in the office in 1 month      This note was created with the assistance of a speech-recognition program.  Although the intention is to generate a document that actually reflects the content of the visit, no guarantees can be provided that every mistake has been identified and corrected by editing.     Electronically signed by Lianne Carrillo MD on 8/10/21 at 10:07 AM MAYRAT

## 2021-08-17 PROBLEM — F10.10 ALCOHOL ABUSE: Status: ACTIVE | Noted: 2021-01-01

## 2021-08-17 PROBLEM — D53.9 MACROCYTIC ANEMIA: Status: ACTIVE | Noted: 2020-10-15

## 2021-08-17 NOTE — TELEPHONE ENCOUNTER
JOHNATHAN ARRIVES AMBULATORY FOR CONSULTATION APPOINTMENT  DR Chris Zavala IN TO SEE PATIENT  ORDERS RECEIVED  LABS SOON  RV OR VIRTUAL AFTER TEST RESULTS  LABS CDP FE TIBC FERRITIN SIFX TSH WITH REFLEX HAPTOGLOBIN RETIC ERYTHROPOIETIN WILL BE DONE THIS WEEK AT A [facility] LAB, ORDERS GIVEN TO PATIENT  MD VISIT 9/9/21 @2PM DUE TO PATIENT'S SCHEDULE  AVS PRINTED AND GIVEN TO PATIENT WITH INSTRUCTIONS  PATIENT DISCHARGED AMBULATORY

## 2021-08-17 NOTE — PROGRESS NOTES
[address]   Progress Note and Procedure Note      [address] RECORD NUMBER:  [id number]  AGE: 60 y.o. GENDER: female  : 1955  EPISODE DATE:  2021    Subjective:     Chief Complaint   Patient presents with    Wound Check     L foot dorsal      Interval History: [facility], she was started on Remicade. Patient has been compliant with at-home dressing changes as instructed by Shereen. HISTORY of PRESENT ILLNESS HPI     Prnicess Long is a 60 y.o. female who presents today for wound/ulcer evaluation. Patient contineus to dress the owund with santyl and saline soaked dressing. Using gent ointment and clobetasol. Also applies gentamycin. States she has noticed some blistering at skin edges. Wound has become painful and getting bigger. History of Wound Context: History of pyoderma gangrenosum. Follows with dermatology. The patient had nonhealing wound to the dorsum of her left foot for 2 years. She is complaining of foot pain and greenish drainage with increased size over the past several weeks. She denied any fever or chills, denied nausea or vomiting, no diarrhea. She does have hands small joints pain. She has been following with dermatology Dr. Hannah Dumas and rheumatology on 2021 had a punch biopsy done was suggestive of LEUKOCYTOCLASTIC VASCULITIS, Pseudomonas aeruginosa grew on tissue culture that was sensitive to cefepime and Cipro. She was treated with Cipro courses previously with no improvement. Patient was hospitalized at [facility] 2021 through 2021 was discharged on IV cefepime that she is tolerating.  HIV screen was negative, QuantiFERON-TB gold test was negative, hepatitis panel negative, ANCA negative, She had multiple punch biopsies most recent was done on previous punch biopsy on 2021 showed fragment of benign fibrous connective tissue, tissue culture grew Enterococcus faecalis on broth medium only that was sensitive to ampicillin and vancomycin with ZAIDA of 2. She was positive for AMADOR and anticentromere was evaluated by rheumatology was previously on prednisone and methotrexate that was changed to cyclosporine on her recent hospitalization. She had multiple debridements and grafting in the past that was failed. History of smoking  She was evaluated by vascular surgery, had angiogram done with no reported stenosis. She denied history of diabetes. Tissue culture in December and February 2021 grew Pseudomonas aeruginosa was treated with a course of Cipro. Tissue culture 2/19/2021 grew Pseudomonas aeruginosa that was sensitive to Cipro, previous tissue culture 1/26/2021 grew Pseudomonas aeruginosa that was resistant to Cipro. History of coronary artery disease status post stent  Wound/Ulcer Pain Timing/Severity: intermittent  Quality of pain: burning  Severity:5/10  Modifying Factors: None  Associated Signs/Symptoms: Drainage    Ulcer Identification:  Ulcer Type: undetermined     Contributing Factors: decreased tissue oxygenation  History of smoking, quit, history of coronary artery disease status post stent.       PAST MEDICAL HISTORY      Diagnosis Date    Arthritis     knees hips hands shoulders and back    CAD (coronary artery disease) 2019    blockage on cath 9/2019, to have stenting after surgery (arthroplasty) Dr. Filippo Rowe    Chronic back pain     GERD (gastroesophageal reflux disease)     on rx    Hyperlipidemia     per Dr. Filippo Rowe on rx    Hypertension     Kathy Pole manages    Incomplete RBBB     Irregular heart beat     LAFB (left anterior fascicular block)     Dr. Filippo Rowe, cardiologist, seen 9/2019 prior to surgery on 10/8/2019    Leg wound, left     following with wound care, Dr. Trish Hudson    Lumbar disc disease     Osteoporosis     PAC (premature atrial contraction) 06/2018    PACs and PVCs on holter monitor,     PONV (postoperative nausea and vomiting)     requests scop patch    Wears dentures upper partial    Wears eyeglasses     readers    Wears partial dentures     upper and lower    Wellness examination     A.  Elieser Rodriguez, NP PCP, seen 2/10/2020       PAST SURGICAL HISTORY    Past Surgical History:   Procedure Laterality Date    ABDOMINOPLASTY  1997    BACK SURGERY      BLEPHAROPLASTY Bilateral 1997    BREAST ENHANCEMENT SURGERY Bilateral 1999    breast augmentation    CARDIAC CATHETERIZATION  2019    Dr. David Terry, blockage but no stents yet    CATARACT REMOVAL WITH IMPLANT Bilateral 2016    COLONOSCOPY  2015    No Polyps    COSMETIC SURGERY      eyelid lift    FINGER SURGERY Right     thumb lesion excised    FIXATION KYPHOPLASTY  2013    Back    FOOT DEBRIDEMENT Left 10/11/2019    SURGICAL PREP WOUND BED LEFT FOOT WITH APPLICATION OF EPIFIX LEFT FOOT 3X4 performed by Aden Jimenez DPM at [facility]      kyphoplasty for lumbar fx 2013 Dr. Obi Ortega ARTHROSCOPY Left 2006    Wilma Smiling Left 1/2/2020    FOOT  DEBRIDEMENT WITH WOUND VAC PLACEMENT performed by Carlene Torres MD at [facility]  2014    LUMBAR SPINE SURGERY  2005    diskectomy and spinal fusion    OTHER SURGICAL HISTORY  10/03/2019    Left leg angiogram    [place] OFFICE/OUTPT VISIT,PROCEDURE ONLY Right 9/11/2018    EXCISION MASS THUMB, 3080 TABLE performed by Son Tomlin DO at [address] [place] [address] Left 12/3/2019    LEFT FOOT DEBRIDEMENT WITH SKIN GRAFT SPLIT THICKNESS; SKIN GRAFT TAKEN FROM RIGHT ANTERIOR THIGH performed by Carlene Torres MD at [address] [place] [address] Left 2/25/2020    DEBRIDEMENT, SPLIT THICKNESS SKIN GRAFT WITH WOUND VAC PLACEMENT FOOT performed by Maxine Madden MD at [address] [place] [address] Left 6/30/2020    DEBRIDEMENT, SKIN GRAFT SPLIT THICKNESS FOOT  ([place]) performed by Maxine Madden MD at [address]      as a child    TOTAL KNEE ARTHROPLASTY Right 10/13/2020 (THERAPEUTIC MULTIVITAMIN-MINERALS) tablet Take 1 tablet by mouth daily      oxyCODONE-acetaminophen (PERCOCET) 5-325 MG per tablet take 1 tablet by mouth four times a day if needed      traMADol (ULTRAM ER) 200 MG extended release tablet take 1 tablet by mouth once daily      aspirin EC 81 MG EC tablet Take 1 tablet by mouth 2 times daily 84 tablet 0    vitamin C (ASCORBIC ACID) 500 MG tablet Take 500 mg by mouth 2 times daily      diclofenac sodium (VOLTAREN) 1 % GEL Apply topically as needed Dr. Dania Cloud      NAPROXEN PO Take 250 mg by mouth daily as needed Indications: patient takes 1-2 times a day Stopping 10 days prior to surgery as instructed by surgeon      Cholecalciferol (VITAMIN D3) 5000 units TABS Take 1,000 Units by mouth daily       calcium carbonate (OSCAL) 500 MG TABS tablet Take 1,000 mg by mouth daily Per Myna Fossa rheumatology      alendronate (FOSAMAX) 70 MG tablet Take 70 mg by mouth every 7 days Indications: Mondays Dr. Heather Holcomb   Patient takes on Mondays      gabapentin (NEURONTIN) 300 MG capsule Take 300 mg by mouth 2 times daily. Per Dr. Dania Cloud      collagenase [facility]) 250 UNIT/GM ointment Apply topically daily. Wound measurements: 10cm x 12cm x 0.4cm 5 g 1    collagenase (SANTYL) 250 UNIT/GM ointment Apply topically to wound followed by secondary dressing change daily. 90 Tube 1     No current facility-administered medications on file prior to encounter. REVIEW OF SYSTEMS  Review of Systems   Skin: Positive for color change and wound. Pertinent items are noted in HPI.   Other than above 12 system review was negative    Objective:      /86   Pulse 78   Temp 97.2 °F (36.2 °C) (Tympanic)   Resp 18   Wt 170 lb (77.1 kg)   BMI 27.44 kg/m²     Wt Readings from Last 3 Encounters:   08/17/21 170 lb (77.1 kg)   08/10/21 170 lb (77.1 kg)   08/03/21 170 lb (77.1 kg)       PHYSICAL EXAM  Vascular: DP pulses are not palpable, Bilateral. PT pulses are not palpable, Bilateral. CFT <4 seconds to all digits, Bilateral.  No edema, Bilateral.  Hair growth is absent to the level of the digits, Bilateral.  Skin temp is warm to cool from the tibial tuberosity to the digits, Bilateral.      Neuro: Saph/sural/SP/DP/plantar sensation intact to light touch. Musculoskeletal: EHL/FHL/GS/TA gross motor intact. Gross deformity is absent. Dermatologic: Open wound present to dorsal left foot wound as documented in detail below. Wound base is fibro-necrotic extending to the level of muscle with an exposed extensor tendon and 3rd MT exposed. There is minimal erythema at skin edges. There is no purulent drainage. There is no fluctuance or crepitus. Interdigital maceration absent, Bilateral.     Wound 12/04/20 Foot Left;Dorsal wound #1 (Active)   Wound Image   08/17/21 0901   Wound Etiology Other 08/17/21 0901   Dressing Status Old drainage noted;New drainage noted 08/17/21 0901   Wound Cleansed Irrigated with saline 08/17/21 0901   Dressing/Treatment Other (comment) 06/01/21 0854   Wound Length (cm) 10.5 cm 08/17/21 0901   Wound Width (cm) 11.7 cm 08/17/21 0901   Wound Depth (cm) 0.5 cm 08/17/21 0901   Wound Surface Area (cm^2) 122.85 cm^2 08/17/21 0901   Change in Wound Size % (l*w) -298.86 08/17/21 0901   Wound Volume (cm^3) 61.425 cm^3 08/17/21 0901   Wound Healing % -399 08/17/21 0901   Post-Procedure Length (cm) 10.5 cm 08/17/21 0901   Post-Procedure Width (cm) 11.7 cm 08/17/21 0901   Post-Procedure Depth (cm) 0.5 cm 08/17/21 0901   Post-Procedure Surface Area (cm^2) 122.85 cm^2 08/17/21 0901   Post-Procedure Volume (cm^3) 61.425 cm^3 08/17/21 0901   Wound Assessment Exposed structure fascia; Slough;Pink/red 08/17/21 0901   Drainage Amount Large 08/17/21 0901   Drainage Description Yellow 08/17/21 0901   Odor None 08/17/21 0901   Jessica-wound Assessment Blanchable erythema 08/17/21 0901   Margins Defined edges 08/17/21 0901   Wound Thickness Description not for Pressure Injury Full thickness 08/17/21 0901 Number of days: 255         Assessment:      1. 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)    2. Pain in left foot    3. Neoplasm of uncertain behavior of skin          Plan:   Treatment:     1. Pt was evaluated and examined. Patient was given personalized discharge instructions. Diagnosis was discussed with the pt and all of their questions were answered in detail. Proper foot hygiene and care was discussed with the pt. Patient to check feet daily and contact the office with any questions/problems/concerns. Other comorbidity noted and will be managed by PCP. 2. Dressings: If able to afford Santyl then continue use. Apply adaptic, DSD, ACE  3. Follow up: 2 weeks. 4. Detailed home instructions and education material given to patient prior to discharge. 5. Extensive discussion had with patient regarding definitive treatment options including continued conservative therapy with wound care, corticosteroid injections, flap reconstruction of the left lower extremity, and below knee amputation of the left leg. All questions were answered to her satisfaction. Patient demonstrates and verbalizes understanding. She is amenable to treatment plan. 6. Maintain follow up appointments with dermatology, [facility], and [facility]   7. No wound debridement today  8. Dressing applied: topical lidocaine, adaptic, DSD, Robert Nascimento DPM on 8/17/2021 at 9:56 AM    I performed a history and physical examination of the patient and discussed management with the resident. I reviewed the residents note and agree with the documented findings and plan of care. Any areas of disagreement are noted on the chart. I was personally present for the key portions of any procedures. I have documented in the chart those procedures where I was not present during the key portions. I have reviewed the Podiatry Resident progress note.  I agree with the chief complaint, past medical history, past surgical history, allergies, medications, social and family history as documented unless otherwise noted below. Documentation of the HPI, Physical Exam and Medical Decision Making performed by medical students or scribes is based on my personal performance of the HPI, PE and MDM. I have personally evaluated this patient and have completed at least one if not all key elements of the E/M (history, physical exam, and MDM). Additional findings are as noted.      Kelley Barnes DPM on 8/19/2021 at 8:45 AM  Board Certified, American Board of Podiatric Surgery  Fellow, [employer] [facility] [employer] and [facility]

## 2021-08-17 NOTE — LETTER
_    Abdullahi Brown MD    8/17/2021     Yany Ambrose, APRN - CNP   [address] [facility] [address]    Dear Андрей Nunez:    Thank you for referring Binu Marcelino, 1955, to me for evaluation. Below are the relevant portions of my assessment and plan of care. Ms. Binu Marcelino is a very pleasant 60 y.o. female with history of multiple co morbidities as listed. Patient is referred for further management of macrocytosis. The patient had multiple lab tests which consistently showing high MCV. Patient is not aware of any hematologic problem in the past.  She is having mild generalized weakness. No dizziness. No palpitation. She has shortness of breath on exertion. No fever or infections. Patient had normal liver function. Normal thyroid function. She has history of chronic alcohol abuse. She quit smoking about 2 years ago. Patient had problem with the left foot ulcer. Was diagnosed with pyoderma gangrenosum. She continues to have follow-up with wound clinic. PAST MEDICAL HISTORY: has a past medical history of Arthritis, CAD (coronary artery disease), Chronic back pain, GERD (gastroesophageal reflux disease), Hyperlipidemia, Hypertension, Incomplete RBBB, Irregular heart beat, LAFB (left anterior fascicular block), Leg wound, left, Lumbar disc disease, Osteoporosis, PAC (premature atrial contraction), PONV (postoperative nausea and vomiting), Wears dentures, Wears eyeglasses, Wears partial dentures, and Wellness examination. PAST SURGICAL HISTORY: has a past surgical history that includes Knee arthroscopy (Left, 2006); Abdominoplasty (1997); blepharoplasty (Bilateral, 1997); laminectomy (1994); lumbar laminectomy (2011); lumbar laminectomy (2014); Fixation Kyphoplasty (2013); Finger surgery (Right); Colonoscopy (2015); pr office/outpt visit,procedure only (Right, 9/11/2018); Lumbar spine surgery (2005);  Cataract removal with implant (Bilateral, 2016); other surgical history (10/03/2019); Foot Debridement (Left, 10/11/2019); Skin graft (Left, 12/3/2019); Leg Surgery (Left, 1/2/2020); Breast enhancement surgery (Bilateral, 1999); Skin graft (Left, 2/25/2020); fracture surgery; Tonsillectomy; Skin graft (Left, 6/30/2020); Cosmetic surgery; Cardiac catheterization (2019); Total knee arthroplasty (Right, 10/13/2020); Total knee arthroplasty (Right, 10/13/2020); and back surgery. CURRENT MEDICATIONS:  has a current medication list which includes the following prescription(s): infliximab, omeprazole, metoprolol tartrate, dapsone, lidocaine, atorvastatin, meclizine, folic acid, clopidogrel, therapeutic multivitamin-minerals, oxycodone-acetaminophen, tramadol, aspirin ec, vitamin c, diclofenac sodium, naproxen, vitamin d3, calcium carbonate, alendronate, gabapentin, collagenase, collagenase, and clobetasol. ALLERGIES:  has No Known Allergies. FAMILY HISTORY: Negative for any hematological or oncological conditions. SOCIAL HISTORY:  reports that she quit smoking about 18 months ago. Her smoking use included cigarettes. She started smoking about 53 years ago. She has a 12.50 pack-year smoking history. She has never used smokeless tobacco. She reports current alcohol use. She reports that she does not use drugs. REVIEW OF SYSTEMS:     · General: Positive for weakness and fatigue. No unanticipated weight loss or decreased appetite. No fever or chills. · Eyes: No blurred vision, eye pain or double vision. · Ears: No hearing problems or drainage. No tinnitus. · Throat: No sore throat, problems with swallowing or dysphagia. · Respiratory: No cough, sputum or hemoptysis. No shortness of breath. No pleuritic chest pain. · Cardiovascular: No chest pain, orthopnea or PND. No lower extremity edema. No palpitation. · Gastrointestinal: No problems with swallowing. No abdominal pain or bloating. No nausea or vomiting. No diarrhea or constipation. No GI bleeding.    · Genitourinary: No dysuria, hematuria, frequency or urgency. · Musculoskeletal: No muscle aches or pains. No limitation of movement. No back pain. No gait disturbance, No joint complaints. · Dermatologic: No skin rashes or pruritus. No skin lesions or discolorations. · Psychiatric: No depression, anxiety, or stress or signs of schizophrenia. No change in mood or affect. · Hematologic: No history of bleeding tendency. No bruises or ecchymosis. No history of clotting problems. · Infectious disease: No fever, chills or frequent infections. · Endocrine: No polydipsia or polyuria. No temperature intolerance. · Neurologic: No headaches or dizziness. No weakness or numbness of the extremities. No changes in balance, coordination,  memory, mentation, behavior. · Allergic/Immunologic: No nasal congestion or hives. No repeated infections. PHYSICAL EXAM:  The patient is not in acute distress. Vital signs: Blood pressure (!) 151/81, pulse 62, temperature 96.7 °F (35.9 °C), temperature source Temporal, height 5' 6\" (1.676 m), weight 169 lb 4.8 oz (76.8 kg), not currently breastfeeding.      General appearance - well appearing, not in pain or distress  Mental status - good mood, alert and oriented  Eyes - pupils equal and reactive, extraocular eye movements intact  Ears - bilateral TM's and external ear canals normal  Nose - normal and patent, no erythema, discharge or polyps  Mouth - mucous membranes moist, pharynx normal without lesions  Neck - supple, no significant adenopathy  Lymphatics - no palpable lymphadenopathy, no hepatosplenomegaly  Chest - clear to auscultation, no wheezes, rales or rhonchi, symmetric air entry  Heart - normal rate, regular rhythm, normal S1, S2, no murmurs, rubs, clicks or gallops  Abdomen - soft, nontender, nondistended, no masses or organomegaly  Neurological - alert, oriented, normal speech, no focal findings or movement disorder noted  Musculoskeletal - no joint tenderness, deformity or swelling  Extremities - peripheral pulses normal, patient has ulcerated wound in the dorsum of the left foot. Skin - normal coloration and turgor, no rashes, no suspicious skin lesions noted     Review of Diagnostic data:   Lab Results   Component Value Date    WBC 8.6 08/10/2021    HGB 10.8 (L) 08/10/2021    HCT 34.2 (L) 08/10/2021    .6 (H) 08/10/2021     08/10/2021       Chemistry        Component Value Date/Time     08/10/2021 1050    K 4.8 08/10/2021 1050     08/10/2021 1050    CO2 20 08/10/2021 1050    BUN 11 08/10/2021 1050    CREATININE 0.84 08/10/2021 1050        Component Value Date/Time    CALCIUM 9.7 08/10/2021 1050    ALKPHOS 124 (H) 08/10/2021 1050    AST 13 08/10/2021 1050    ALT 10 08/10/2021 1050    BILITOT 0.16 (L) 08/10/2021 1050            IMPRESSION:   Macrocytic anemia  Pyoderma gangrenosum  Chronic alcohol abuse  Multiple comorbidities as listed    PLAN: I reviewed the labs available to me and discussed with the patient. For more than 60 minutes of face to face discussion, I explained to the patient the nature of this hematologic problem. I explained the significance of these abnormalities in layman language. Patient is having persistent macrocytosis for several years. Vitamin B12 and folate were normal.  Liver function is normal.  Possibly related to chronic alcohol abuse. However we will do further work-up to rule out other possibilities. If still in doubt we will do a bone marrow testing to rule out myelodysplasia. Patient was counseled about importance of alcohol cessation. Patient's questions were answered to the best of her satisfaction and she verbalized full understanding and agreement. If you have questions, please do not hesitate to call me. I look forward to following Foster Kemp along with you.     Sincerely,                            [address] [facility]                            This note is created with the assistance of a speech recognition program.  While intending to generate a document that actually reflects the content of the visit, the document can still have some errors including those of syntax and sound a like substitutions which may escape proof reading. It such instances, actual meaning can be extrapolated by contextual diversion.

## 2021-08-18 NOTE — PROGRESS NOTES
[address]:     [employer] [facility] [id number] [address] [facility] p: [phone] f: [phone]     Ordering Center:     [address]  [address]  [phone]  WOUND CARE Dept: [phone]   FAX NUMBER     Patient Information:      Gregoria Scott   [phone]   : 1955  AGE: 60 y.o. GENDER: female   EPISODE DATE: 2021    Insurance:      PRIMARY INSURANCE:  Plan: MEDICAL [employer] ADVANTAGE CHOICE HMO  Coverage: [employer] MEDICARE ADVANTAGE  Effective Date: 2015  Group Number: [unfilled]  Subscriber Number: [id number] - (Medicare Managed)    Payor/Plan Subscr  Sex Relation Sub. Ins. ID Effective Group Num   1.  Marley 1955 Female Self [id number] 1/1/15 [id number]                                    [id number]       Patient Wound Information:      Problem List Items Addressed This Visit     Neoplasm of uncertain behavior of skin    Pain in left foot    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 ([facility] [address] - Primary          WOUNDS REQUIRING DRESSING SUPPLIES:     Wound 20 Foot Left;Dorsal wound #1 (Active)   Wound Image   21 09   Wound Etiology Other 21 09   Dressing Status Old drainage noted;New drainage noted 21   Wound Cleansed Irrigated with saline 21   Dressing/Treatment Other (comment) 21 0854   Wound Length (cm) 10.5 cm 21 09   Wound Width (cm) 11.7 cm 21   Wound Depth (cm) 0.5 cm 21   Wound Surface Area (cm^2) 122.85 cm^2 21 09   Change in Wound Size % (l*w) -298.86 21   Wound Volume (cm^3) 61.425 cm^3 21 09   Wound Healing % -399 21   Post-Procedure Length (cm) 10.5 cm 21   Post-Procedure Width (cm) 11.7 cm 21   Post-Procedure Depth (cm) 0.5 cm 21   Post-Procedure Surface Area (cm^2) 122.85 cm^2 08/17/21 0901   Post-Procedure Volume (cm^3) 61.425 cm^3 08/17/21 0901   Wound Assessment Exposed structure fascia; Slough;Pink/red 08/17/21 0901   Drainage Amount Large 08/17/21 0901   Drainage Description Yellow 08/17/21 0901   Odor None 08/17/21 0901   Jessica-wound Assessment Blanchable erythema 08/17/21 0901   Margins Defined edges 08/17/21 0901   Wound Thickness Description not for Pressure Injury Full thickness 08/17/21 0901   Number of days: 256          Supplies Requested :      WOUND #: 1   PRIMARY DRESSING:  Other: xeroform vaseline gauze   Cover and Secure with: ABD pad secure with kelrix and paper tape     FREQUENCY OF DRESSING CHANGES:  Daily         ADDITIONAL ITEMS:  [] Gloves Small  [x] Gloves Medium [] Gloves Large [] Gloves XLarge  [] Tape 1\" [x] Tape 2\" [] Tape 3\"  [] Medipore Tape  [x] Saline  [] Skin Prep   [] Adhesive Remover   [] Cotton Tip Applicators   [] Other:    Patient Wound(s) Debrided: [] Yes if yes please add date   [x] No    Debribement Type:  Other no debridment due to pyroderma gangrenosa diagnosis    Patient currently being seen by Home Health: [] Yes   [x] No    Duration for needed supplies:  []15  [x]30  []60  []90 Days    Electronically signed by June Reyes RN on 8/17/2021 at 10:01 AM     Provider Information:      PROVIDER'S NAME: Salome Amezcua DPM  [id number]

## 2021-08-18 NOTE — PROGRESS NOTES
_               Ms. Yuliet Hunt is a very pleasant 60 y.o. female with history of multiple co morbidities as listed. Patient is referred for further management of macrocytosis. The patient had multiple lab tests which consistently showing high MCV. Patient is not aware of any hematologic problem in the past.  She is having mild generalized weakness. No dizziness. No palpitation. She has shortness of breath on exertion. No fever or infections. Patient had normal liver function. Normal thyroid function. She has history of chronic alcohol abuse. She quit smoking about 2 years ago. Patient had problem with the left foot ulcer. Was diagnosed with pyoderma gangrenosum. She continues to have follow-up with wound clinic. PAST MEDICAL HISTORY: has a past medical history of Arthritis, CAD (coronary artery disease), Chronic back pain, GERD (gastroesophageal reflux disease), Hyperlipidemia, Hypertension, Incomplete RBBB, Irregular heart beat, LAFB (left anterior fascicular block), Leg wound, left, Lumbar disc disease, Osteoporosis, PAC (premature atrial contraction), PONV (postoperative nausea and vomiting), Wears dentures, Wears eyeglasses, Wears partial dentures, and Wellness examination. PAST SURGICAL HISTORY: has a past surgical history that includes Knee arthroscopy (Left, 2006); Abdominoplasty (1997); blepharoplasty (Bilateral, 1997); laminectomy (1994); lumbar laminectomy (2011); lumbar laminectomy (2014); Fixation Kyphoplasty (2013); Finger surgery (Right); Colonoscopy (2015); pr office/outpt visit,procedure only (Right, 9/11/2018); Lumbar spine surgery (2005); Cataract removal with implant (Bilateral, 2016); other surgical history (10/03/2019); Foot Debridement (Left, 10/11/2019); Skin graft (Left, 12/3/2019); Leg Surgery (Left, 1/2/2020); Breast enhancement surgery (Bilateral, 1999);  Skin graft (Left, 2/25/2020); fracture surgery; Tonsillectomy; Skin graft (Left, 6/30/2020); Cosmetic surgery; Cardiac catheterization (2019); Total knee arthroplasty (Right, 10/13/2020); Total knee arthroplasty (Right, 10/13/2020); and back surgery. CURRENT MEDICATIONS:  has a current medication list which includes the following prescription(s): infliximab, omeprazole, metoprolol tartrate, dapsone, lidocaine, atorvastatin, meclizine, folic acid, clopidogrel, therapeutic multivitamin-minerals, oxycodone-acetaminophen, tramadol, aspirin ec, vitamin c, diclofenac sodium, naproxen, vitamin d3, calcium carbonate, alendronate, gabapentin, collagenase, collagenase, and clobetasol. ALLERGIES:  has No Known Allergies. FAMILY HISTORY: Negative for any hematological or oncological conditions. SOCIAL HISTORY:  reports that she quit smoking about 18 months ago. Her smoking use included cigarettes. She started smoking about 53 years ago. She has a 12.50 pack-year smoking history. She has never used smokeless tobacco. She reports current alcohol use. She reports that she does not use drugs. REVIEW OF SYSTEMS:     · General: Positive for weakness and fatigue. No unanticipated weight loss or decreased appetite. No fever or chills. · Eyes: No blurred vision, eye pain or double vision. · Ears: No hearing problems or drainage. No tinnitus. · Throat: No sore throat, problems with swallowing or dysphagia. · Respiratory: No cough, sputum or hemoptysis. No shortness of breath. No pleuritic chest pain. · Cardiovascular: No chest pain, orthopnea or PND. No lower extremity edema. No palpitation. · Gastrointestinal: No problems with swallowing. No abdominal pain or bloating. No nausea or vomiting. No diarrhea or constipation. No GI bleeding. · Genitourinary: No dysuria, hematuria, frequency or urgency. · Musculoskeletal: No muscle aches or pains. No limitation of movement. No back pain.  No gait disturbance, No joint complaints. · Dermatologic: No skin rashes or pruritus. No skin lesions or discolorations. · Psychiatric: No depression, anxiety, or stress or signs of schizophrenia. No change in mood or affect. · Hematologic: No history of bleeding tendency. No bruises or ecchymosis. No history of clotting problems. · Infectious disease: No fever, chills or frequent infections. · Endocrine: No polydipsia or polyuria. No temperature intolerance. · Neurologic: No headaches or dizziness. No weakness or numbness of the extremities. No changes in balance, coordination,  memory, mentation, behavior. · Allergic/Immunologic: No nasal congestion or hives. No repeated infections. PHYSICAL EXAM:  The patient is not in acute distress. Vital signs: Blood pressure (!) 151/81, pulse 62, temperature 96.7 °F (35.9 °C), temperature source Temporal, height 5' 6\" (1.676 m), weight 169 lb 4.8 oz (76.8 kg), not currently breastfeeding. General appearance - well appearing, not in pain or distress  Mental status - good mood, alert and oriented  Eyes - pupils equal and reactive, extraocular eye movements intact  Ears - bilateral TM's and external ear canals normal  Nose - normal and patent, no erythema, discharge or polyps  Mouth - mucous membranes moist, pharynx normal without lesions  Neck - supple, no significant adenopathy  Lymphatics - no palpable lymphadenopathy, no hepatosplenomegaly  Chest - clear to auscultation, no wheezes, rales or rhonchi, symmetric air entry  Heart - normal rate, regular rhythm, normal S1, S2, no murmurs, rubs, clicks or gallops  Abdomen - soft, nontender, nondistended, no masses or organomegaly  Neurological - alert, oriented, normal speech, no focal findings or movement disorder noted  Musculoskeletal - no joint tenderness, deformity or swelling  Extremities - peripheral pulses normal, patient has ulcerated wound in the dorsum of the left foot.   Skin - normal coloration and turgor, no rashes, no suspicious skin lesions noted     Review of Diagnostic data:   Lab Results   Component Value Date    WBC 8.6 08/10/2021    HGB 10.8 (L) 08/10/2021    HCT 34.2 (L) 08/10/2021    .6 (H) 08/10/2021     08/10/2021       Chemistry        Component Value Date/Time     08/10/2021 1050    K 4.8 08/10/2021 1050     08/10/2021 1050    CO2 20 08/10/2021 1050    BUN 11 08/10/2021 1050    CREATININE 0.84 08/10/2021 1050        Component Value Date/Time    CALCIUM 9.7 08/10/2021 1050    ALKPHOS 124 (H) 08/10/2021 1050    AST 13 08/10/2021 1050    ALT 10 08/10/2021 1050    BILITOT 0.16 (L) 08/10/2021 1050            IMPRESSION:   Macrocytic anemia  Pyoderma gangrenosum  Chronic alcohol abuse  Multiple comorbidities as listed    PLAN: I reviewed the labs available to me and discussed with the patient. For more than 60 minutes of face to face discussion, I explained to the patient the nature of this hematologic problem. I explained the significance of these abnormalities in layman language. Patient is having persistent macrocytosis for several years. Vitamin B12 and folate were normal.  Liver function is normal.  Possibly related to chronic alcohol abuse. However we will do further work-up to rule out other possibilities. If still in doubt we will do a bone marrow testing to rule out myelodysplasia. Patient was counseled about importance of alcohol cessation. Patient's questions were answered to the best of her satisfaction and she verbalized full understanding and agreement.

## 2021-08-31 NOTE — TELEPHONE ENCOUNTER
Patient was taken off [facility] wound care schedule today due to needing to see Dr Uriah Lamb her tomorrow morning.

## 2021-09-01 NOTE — PROGRESS NOTES
[address]   Progress Note and Procedure Note      [address]  RECORD NUMBER:  [id number]  AGE: 60 y.o. GENDER: female  : 1955  EPISODE DATE:  2021    Subjective:     Chief Complaint   Patient presents with    Wound Check     L foot         HISTORY of PRESENT ILLNESS HPI     Daniel Client is a 60 y.o. female who presents today for wound/ulcer evaluation. History of Wound Context: The patient had nonhealing wound to the dorsum of her left foot for aroud 2 years. She is complaining of pain to the left foot, had foot drop, no purulent drainage. She has been following at [facility] was started on Remicade, also follow with dermatology Dr. Deretha Primrose and rheumatology and has been on dapsone. She is complaining of back pain and spinal cord stimulator was recommended ,denied fever or chills, denied nausea or vomiting no other complaints     She has been following with on 2021 had a punch biopsy done was suggestive of LEUKOCYTOCLASTIC VASCULITIS , Pseudomonas aeruginosa grew on tissue culture that was sensitive to cefepime and Cipro. She was treated with Cipro courses previously with no improvement. Patient was hospitalized at [facility] 2021was discharged on IV cefepime that she finished. HIV screen was negative, QuantiFERON-TB gold test was negative, hepatitis panel negative, ANCA negative  She had multiple punch biopsies , punch biopsy on 2021 showed fragment of benign fibrous connective tissue, tissue culture grew Enterococcus faecalis on broth medium only that was sensitive to ampicillin and vancomycin with ZAIDA of 2. She was positive for AMADOR and anticentromere was evaluated by rheumatology was previously on prednisone and methotrexate then was treated with cyclosporine. She had multiple debridements and grafting in the past that was failed.   History of smoking  She was evaluated by vascular surgery, had angiogram done with no Heart Surgery Father         CABG    Coronary Art Dis Sister     Heart Surgery Sister         CABG       SOCIAL HISTORY    Social History     Tobacco Use    Smoking status: Former Smoker     Packs/day: 0.25     Years: 50.00     Pack years: 12.50     Types: Cigarettes     Start date:      Quit date: 2020     Years since quittin.5    Smokeless tobacco: Never Used   Vaping Use    Vaping Use: Never used   Substance Use Topics    Alcohol use: Yes     Comment: 2-3 shot liquor for weekends    Drug use: Never       ALLERGIES    No Known Allergies    MEDICATIONS    Current Outpatient Medications on File Prior to Encounter   Medication Sig Dispense Refill    inFLIXimab (REMICADE) 100 MG injection Infuse 5 mg/kg intravenously See Admin Instructions      omeprazole (PRILOSEC) 40 MG delayed release capsule Take 1 capsule by mouth daily 30 capsule 5    Metoprolol Tartrate 37.5 MG TABS take 1 tablet by mouth twice a day 60 tablet 5    dapsone 25 MG tablet Take 2 tablets by mouth daily 60 tablet 0    clobetasol (TEMOVATE) 0.05 % ointment Apply to ulcer at dressing changes. (Patient not taking: Reported on 2021) 60 g 2    lidocaine (XYLOCAINE) 2 % jelly Do not apply directly to wound. 30 mL 2    atorvastatin (LIPITOR) 10 MG tablet Take 1 tablet by mouth daily Take 10mg daily while on Cyclosporine.  30 tablet 2    meclizine (ANTIVERT) 25 MG CHEW Take 25 mg by mouth 3 times daily as needed      folic acid (FOLVITE) 1 MG tablet take 1 tablet by mouth once daily 30 tablet 5    clopidogrel (PLAVIX) 75 MG tablet Take 1 tablet by mouth daily 30 tablet 11    Multiple Vitamins-Minerals (THERAPEUTIC MULTIVITAMIN-MINERALS) tablet Take 1 tablet by mouth daily      oxyCODONE-acetaminophen (PERCOCET) 5-325 MG per tablet take 1 tablet by mouth four times a day if needed      traMADol (ULTRAM ER) 200 MG extended release tablet take 1 tablet by mouth once daily      aspirin EC 81 MG EC tablet Take 1 tablet by mouth 2 times daily 84 tablet 0    vitamin C (ASCORBIC ACID) 500 MG tablet Take 500 mg by mouth 2 times daily      diclofenac sodium (VOLTAREN) 1 % GEL Apply topically as needed Dr. Kaitlynn Kiser      NAPROXEN PO Take 250 mg by mouth daily as needed Indications: patient takes 1-2 times a day Stopping 10 days prior to surgery as instructed by surgeon      Cholecalciferol (VITAMIN D3) 5000 units TABS Take 1,000 Units by mouth daily       calcium carbonate (OSCAL) 500 MG TABS tablet Take 1,000 mg by mouth daily Per Dio Acosta rheumatology      alendronate (FOSAMAX) 70 MG tablet Take 70 mg by mouth every 7 days Indications: Mondays Dr. Sanchez Blood   Patient takes on Mondays      gabapentin (NEURONTIN) 300 MG capsule Take 300 mg by mouth 2 times daily. Per Dr. Kaitlynn Kiser       No current facility-administered medications on file prior to encounter. REVIEW OF SYSTEMS  Review of Systems    Pertinent items are noted in HPI. Other than above 12 system review was negative    Objective:      BP (!) 153/80   Pulse 76   Temp 97.6 °F (36.4 °C) (Tympanic)   Resp 18     Wt Readings from Last 3 Encounters:   08/17/21 170 lb (77.1 kg)   08/17/21 169 lb 4.8 oz (76.8 kg)   08/10/21 170 lb (77.1 kg)       PHYSICAL EXAM  Physical Exam  Constitutional:       General: She is not in acute distress. Appearance: Normal appearance. She is not ill-appearing. HENT:      Right Ear: External ear normal.      Left Ear: External ear normal.      Mouth/Throat:      Pharynx: No oropharyngeal exudate or posterior oropharyngeal erythema. Eyes:      General: No scleral icterus. Conjunctiva/sclera: Conjunctivae normal.   Cardiovascular:      Rate and Rhythm: Normal rate and regular rhythm. Heart sounds: No murmur heard. Pulmonary:      Effort: Pulmonary effort is normal. No respiratory distress. Breath sounds: Normal breath sounds. Abdominal:      General: There is no distension. Palpations: Abdomen is soft.       Tenderness: There is no abdominal tenderness. Musculoskeletal:      Cervical back: Neck supple. No rigidity. Comments: Right foot open wound with necrotic tissue, exposed tendon, foot drop, no purulent drainage, no surrounding erythema. Skin:     General: Skin is warm. Coloration: Skin is not jaundiced. Neurological:      Mental Status: She is alert and oriented to person, place, and time. Psychiatric:         Mood and Affect: Mood normal.         Behavior: Behavior normal.           Assessment:        Problem List Items Addressed This Visit                 Pain in left foot    Relevant Orders    Supply: Wound Cleanser    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 ([facility] [address] - Primary    Relevant Orders    Supply: Wound Cleanser                     Pre Debridement Measurements:    Wound 12/04/20 Foot Left;Dorsal wound #1 (Active)   Wound Image   09/01/21 0839   Wound Etiology Other 09/01/21 0839   Dressing Status Old drainage noted;New drainage noted 09/01/21 0839   Wound Cleansed Irrigated with saline 09/01/21 0839   Dressing/Treatment Other (comment) 06/01/21 0854   Wound Length (cm) 12 cm 09/01/21 0839   Wound Width (cm) 11 cm 09/01/21 0839   Wound Depth (cm) 0.4 cm 09/01/21 0839   Wound Surface Area (cm^2) 132 cm^2 09/01/21 0839   Change in Wound Size % (l*w) -328.57 09/01/21 0839   Wound Volume (cm^3) 52.8 cm^3 09/01/21 0839   Wound Healing % -329 09/01/21 0839   Post-Procedure Length (cm) 12 cm 09/01/21 0839   Post-Procedure Width (cm) 11 cm 09/01/21 0839   Post-Procedure Depth (cm) 0.4 cm 09/01/21 0839   Post-Procedure Surface Area (cm^2) 132 cm^2 09/01/21 0839   Post-Procedure Volume (cm^3) 52.8 cm^3 09/01/21 0839   Wound Assessment Exposed structure fascia; Slough;Pink/red 09/01/21 0839   Drainage Amount Large 09/01/21 0839   Drainage Description Yellow 09/01/21 0839   Odor None 09/01/21 0839   Jessica-wound Assessment Blanchable erythema 09/01/21 0839   Margins Defined edges 09/01/21 0839   Wound Thickness Description not for Pressure Injury Full thickness 09/01/21 0839   Number of days: 270                Plan:   The patient is on immunosuppressive treatment with history of Pseudomonas infection, high risk for spinal cord stimulator infection. Treatment Note please see attached Discharge Instructions    Written patient dismissal instructions given to patient and signed by patient or POA. Discharge Instructions         [address] [facility] [place] and [facility]  [facility]                                 Visit  Discharge Instructions / Physician Orders  [address], [address]      SUPPLIES ORDERED [facility] 7/16/21     Wound Location:  Left foot     Cleanse with:  Keep wrap dry and intact     Dressing Orders: Silvadine thin layer over top of wound then apply xeroform gauze cover with abd pad  and kerlix  Frequency: Daily     Additional Orders: Increase protein to diet (meat, cheese, eggs, fish, peanut butter, nuts and beans)  Multivitamin daily     MY CHART []??????????????????????????????     Smart Device  []??????????????????????????????      HYPERBARIC TREATMENT-                TREATMENT #     [facility] -September 13th  Your next appointment with the [address] [place] [address] with  9/21/21                                                                                                         ROOM TYPE   []?????????????????????????????? CHAIR     []?????????????????????????????? BED   []?????????????????????????????? EITHER        TIME []?????????????????????????????? 45 MIN     []?????????????????????????????? 60 MIN     (Please note your next appointment above and if you are unable to keep, kindly give a 24 hour notice.  Thank you.)     If you experience any of the following, please call the [address] [place] [address] during business hours:  [phone]     * Increase in Pain  * Temperature over 101  * Increase in drainage from your wound  * Drainage with a foul odor  * Bleeding  * Increase in swelling  * Need for compression bandage changes due to slippage, breakthrough drainage.     If you need medical attention outside of the business hours of the [address] [place] [address] Road please contact your PCP or go to the nearest emergency room.     The information contained in the After Visit Summary has been reviewed with me, the patient and/or responsible adult, by my health care provider(s). I had the opportunity to ask questions regarding this information. I have elected to receive;      []???????????????????????????? ?? After Visit Summary  [x]?????????????????????????????? Comprehensive Discharge Instruction        Patient signature______________________________________Date:_______        Electronically signed by Brooklynn Tanner MD on 9/1/2021 at 8:57 AM

## 2021-09-09 NOTE — TELEPHONE ENCOUNTER
[facility]ATH[facility] ARRIVES AMBULATORY FOR MD VISIT  DR William Mcnamara IN TO SEE PATIENT  ORDERS RECEIVED  RV 1 YEAR WITH CBC AT RV  LABS CDP 9/6/22  MD VISIT 9/6/22 @2PM  AVS PRINTED AND GIVEN TO PATIENT WITH INSTRUCTIONS  PATIENT DISCHARGED AMBULATORY

## 2021-09-12 NOTE — PROGRESS NOTES
_      Chief Complaint   Patient presents with    Follow-up    Discuss Labs     DIAGNOSIS:       Macrocytic anemia  Pyoderma gangrenosum  Chronic alcohol abuse  Multiple comorbidities as listed    CURRENT THERAPY:         Work up negative. Counseled about importance of alcohol cessation. BRIEF CASE HISTORY:      Ms. Jo Alvarez is a very pleasant 60 y.o. female with history of multiple co morbidities as listed. Patient is referred for further management of macrocytosis. The patient had multiple lab tests which consistently showing high MCV. Patient is not aware of any hematologic problem in the past.  She is having mild generalized weakness. No dizziness. No palpitation. She has shortness of breath on exertion. No fever or infections. Patient had normal liver function. Normal thyroid function. She has history of chronic alcohol abuse. She quit smoking about 2 years ago. Patient had problem with the left foot ulcer. Was diagnosed with pyoderma gangrenosum. She continues to have follow-up with wound clinic. INTERIM HISTORY:   Seen for follow up macrocytic anemia. Work up done. All negative. Clinically stable. No complaints. No weakness or fatigue. No dizziness. No new events. PAST MEDICAL HISTORY: has a past medical history of Arthritis, CAD (coronary artery disease), Chronic back pain, GERD (gastroesophageal reflux disease), Hyperlipidemia, Hypertension, Incomplete RBBB, Irregular heart beat, LAFB (left anterior fascicular block), Leg wound, left, Lumbar disc disease, Osteoporosis, PAC (premature atrial contraction), PONV (postoperative nausea and vomiting), Wears dentures, Wears eyeglasses, Wears partial dentures, and Wellness examination. PAST SURGICAL HISTORY: has a past surgical history that includes Knee arthroscopy (Left, 2006);  Abdominoplasty (1997); blepharoplasty (Bilateral, 1997); laminectomy (1994); lumbar laminectomy (2011); lumbar laminectomy (2014); Fixation Kyphoplasty (2013); Finger surgery (Right); Colonoscopy (2015); pr office/outpt visit,procedure only (Right, 9/11/2018); Lumbar spine surgery (2005); Cataract removal with implant (Bilateral, 2016); other surgical history (10/03/2019); Foot Debridement (Left, 10/11/2019); Skin graft (Left, 12/3/2019); Leg Surgery (Left, 1/2/2020); Breast enhancement surgery (Bilateral, 1999); Skin graft (Left, 2/25/2020); fracture surgery; Tonsillectomy; Skin graft (Left, 6/30/2020); Cosmetic surgery; Cardiac catheterization (2019); Total knee arthroplasty (Right, 10/13/2020); Total knee arthroplasty (Right, 10/13/2020); and back surgery. CURRENT MEDICATIONS:  has a current medication list which includes the following prescription(s): dapsone, infliximab, omeprazole, metoprolol tartrate, lidocaine, atorvastatin, meclizine, folic acid, clopidogrel, oxycodone-acetaminophen, tramadol, aspirin ec, vitamin c, diclofenac sodium, naproxen, vitamin d3, alendronate, gabapentin, clobetasol, therapeutic multivitamin-minerals, and calcium carbonate. ALLERGIES:  has No Known Allergies. FAMILY HISTORY: Negative for any hematological or oncological conditions. SOCIAL HISTORY:  reports that she quit smoking about 19 months ago. Her smoking use included cigarettes. She started smoking about 53 years ago. She has a 12.50 pack-year smoking history. She has never used smokeless tobacco. She reports current alcohol use. She reports that she does not use drugs. REVIEW OF SYSTEMS:     · General: Positive for weakness and fatigue. No unanticipated weight loss or decreased appetite. No fever or chills. · Eyes: No blurred vision, eye pain or double vision. · Ears: No hearing problems or drainage. No tinnitus. · Throat: No sore throat, problems with swallowing or dysphagia. · Respiratory: No cough, sputum or hemoptysis. No shortness of breath.  No pleuritic chest pain. · Cardiovascular: No chest pain, orthopnea or PND. No lower extremity edema. No palpitation. · Gastrointestinal: No problems with swallowing. No abdominal pain or bloating. No nausea or vomiting. No diarrhea or constipation. No GI bleeding. · Genitourinary: No dysuria, hematuria, frequency or urgency. · Musculoskeletal: No muscle aches or pains. No limitation of movement. No back pain. No gait disturbance, No joint complaints. · Dermatologic: No skin rashes or pruritus. No skin lesions or discolorations. · Psychiatric: No depression, anxiety, or stress or signs of schizophrenia. No change in mood or affect. · Hematologic: No history of bleeding tendency. No bruises or ecchymosis. No history of clotting problems. · Infectious disease: No fever, chills or frequent infections. · Endocrine: No polydipsia or polyuria. No temperature intolerance. · Neurologic: No headaches or dizziness. No weakness or numbness of the extremities. No changes in balance, coordination,  memory, mentation, behavior. · Allergic/Immunologic: No nasal congestion or hives. No repeated infections. PHYSICAL EXAM:  The patient is not in acute distress. Vital signs: Blood pressure 132/86, pulse 80, temperature 96.9 °F (36.1 °C), temperature source Temporal, resp. rate 16, weight 169 lb 9.6 oz (76.9 kg), not currently breastfeeding.      General appearance - well appearing, not in pain or distress  Mental status - good mood, alert and oriented  Eyes - pupils equal and reactive, extraocular eye movements intact  Ears - bilateral TM's and external ear canals normal  Nose - normal and patent, no erythema, discharge or polyps  Mouth - mucous membranes moist, pharynx normal without lesions  Neck - supple, no significant adenopathy  Lymphatics - no palpable lymphadenopathy, no hepatosplenomegaly  Chest - clear to auscultation, no wheezes, rales or rhonchi, symmetric air entry  Heart - normal rate, regular rhythm, normal S1, S2, no murmurs, rubs, clicks or gallops  Abdomen - soft, nontender, nondistended, no masses or organomegaly  Neurological - alert, oriented, normal speech, no focal findings or movement disorder noted  Musculoskeletal - no joint tenderness, deformity or swelling  Extremities - peripheral pulses normal, patient has ulcerated wound in the dorsum of the left foot. Skin - normal coloration and turgor, no rashes, no suspicious skin lesions noted     Review of Diagnostic data:   Lab Results   Component Value Date    WBC 10.6 08/18/2021    HGB 11.3 (L) 08/18/2021    HCT 36.0 (L) 08/18/2021    .9 08/18/2021     08/18/2021       Chemistry        Component Value Date/Time     08/10/2021 1050    K 4.8 08/10/2021 1050     08/10/2021 1050    CO2 20 08/10/2021 1050    BUN 11 08/10/2021 1050    CREATININE 0.84 08/10/2021 1050        Component Value Date/Time    CALCIUM 9.7 08/10/2021 1050    ALKPHOS 124 (H) 08/10/2021 1050    AST 13 08/10/2021 1050    ALT 10 08/10/2021 1050    BILITOT 0.16 (L) 08/10/2021 1050            IMPRESSION:   Macrocytic anemia  Pyoderma gangrenosum  Chronic alcohol abuse  Multiple comorbidities as listed    PLAN: I reviewed the labs as above and discussed with the patient. I explained to the patient the nature of this hematologic problem. I explained the significance of these abnormalities in layman language. Patient is having persistent macrocytosis for several years. Vitamin B12 and folate were normal.  Liver function is normal.  Full work up negative. Possibly related to chronic alcohol abuse. Recommend observation. Patient was counseled about importance of alcohol cessation. Will see her in one year with CBC. Patient's questions were answered to the best of her satisfaction and she verbalized full understanding and agreement.

## 2021-09-14 NOTE — PATIENT INSTRUCTIONS
- Continue care with [facility]  - Discuss the possibility of [facility] taking over dapsone  - labs today for dapsone  - Will prescribe enough dapsone to get to their appointment  - follow here as needed

## 2021-09-14 NOTE — PROGRESS NOTES
Dermatology Patient Note  [facility]  [address]  [address]  Dept: [phone]  Dept Fax: [phone]      VISITDATE: 9/14/2021   REFERRING PROVIDER: No ref. provider found      Sharon Cotton is a 60 y.o. female  who presents today in the office for:    Follow-up (1 month follow up on wound lt foot- dapsone 50 mg daily. Just prescribed doxy & prednisone. Still having pain.  States [facility] thinks looks better)      HISTORY OF PRESENT ILLNESS:  Patient presents for f/u pyoderma gangrenosum    Had second appt with [facility] yesterday  - the wound is improving and becoming shallower, more granulation tissue  - she is still having a lot of pain  - current regimen: infliximab (prescribed by rheum), dapsone 50 mg, prednisone 10 mg, doxycycline 100 mg BID, santyl and silvadene to wound  - saw heme for macrocytosis, thought to be due to alcohol, no abnormalities on lab and bone marrow biopsy differed      MEDICAL PROBLEMS:  Patient Active Problem List    Diagnosis Date Noted    Alcohol abuse 08/17/2021    Pyoderma gangrenosum 06/29/2021    Cellulitis of left foot 06/01/2021    Pain in limb 04/28/2021    Arrhythmia 04/28/2021    S/P drug eluting coronary stent placement 02/03/2021    PAD (peripheral artery disease) ([facility] 12/29/2020    Gastroesophageal reflux disease without esophagitis 10/15/2020    Macrocytic anemia 10/15/2020    Primary osteoarthritis of right knee     Status post total knee replacement using cement, right 10/13/2020    Wound, open, foot with complication, left, initial encounter 03/16/2020    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 ([address]     Hypertension 12/30/2019    Pain in left foot     Chronic ulcer of left foot with fat layer exposed ([address]     Mass of finger of right hand     Chronic bilateral low back pain without sciatica 01/03/2018    Cataract 05/06/2016    Neoplasm of uncertain behavior of skin 01/05/2015    Other plastic surgery for unacceptable cosmetic appearance 01/05/2015       CURRENT MEDICATIONS:   Current Outpatient Medications   Medication Sig Dispense Refill    dapsone 25 MG tablet take 2 tablets by mouth once daily 60 tablet 0    inFLIXimab (REMICADE) 100 MG injection Infuse 5 mg/kg intravenously See Admin Instructions      omeprazole (PRILOSEC) 40 MG delayed release capsule Take 1 capsule by mouth daily 30 capsule 5    Metoprolol Tartrate 37.5 MG TABS take 1 tablet by mouth twice a day 60 tablet 5    lidocaine (XYLOCAINE) 2 % jelly Do not apply directly to wound. 30 mL 2    atorvastatin (LIPITOR) 10 MG tablet Take 1 tablet by mouth daily Take 10mg daily while on Cyclosporine. 30 tablet 2    meclizine (ANTIVERT) 25 MG CHEW Take 25 mg by mouth 3 times daily as needed      folic acid (FOLVITE) 1 MG tablet take 1 tablet by mouth once daily 30 tablet 5    clopidogrel (PLAVIX) 75 MG tablet Take 1 tablet by mouth daily 30 tablet 11    oxyCODONE-acetaminophen (PERCOCET) 5-325 MG per tablet take 1 tablet by mouth four times a day if needed      traMADol (ULTRAM ER) 200 MG extended release tablet take 1 tablet by mouth once daily      aspirin EC 81 MG EC tablet Take 1 tablet by mouth 2 times daily 84 tablet 0    vitamin C (ASCORBIC ACID) 500 MG tablet Take 500 mg by mouth 2 times daily      diclofenac sodium (VOLTAREN) 1 % GEL Apply topically as needed Dr. Jose Francisco Deluca      NAPROXEN PO Take 250 mg by mouth daily as needed Indications: patient takes 1-2 times a day Stopping 10 days prior to surgery as instructed by surgeon      alendronate (FOSAMAX) 70 MG tablet Take 70 mg by mouth every 7 days Indications: Mondays Dr. Belle Smiley   Patient takes on Mondays      gabapentin (NEURONTIN) 300 MG capsule Take 300 mg by mouth 2 times daily.  Per Dr. Samantha Olivarez doxycycline hyclate (VIBRAMYCIN) 100 MG capsule       predniSONE (DELTASONE) 10 MG tablet       silver sulfADIAZINE (SILVADENE) 1 % cream apply to affected area twice a day      clobetasol (TEMOVATE) 0.05 % ointment Apply to ulcer at dressing changes. (Patient not taking: Reported on 2021) 60 g 2    Multiple Vitamins-Minerals (THERAPEUTIC MULTIVITAMIN-MINERALS) tablet Take 1 tablet by mouth daily (Patient not taking: Reported on 2021)      Cholecalciferol (VITAMIN D3) 5000 units TABS Take 1,000 Units by mouth daily  (Patient not taking: Reported on 2021)      calcium carbonate (OSCAL) 500 MG TABS tablet Take 1,000 mg by mouth daily Per Lexi Hernandez rheumatology (Patient not taking: Reported on 2021)       No current facility-administered medications for this visit. ALLERGIES:   No Known Allergies    SOCIAL HISTORY:  Social History     Tobacco Use    Smoking status: Former Smoker     Packs/day: 0.25     Years: 50.00     Pack years: 12.50     Types: Cigarettes     Start date:      Quit date: 2020     Years since quittin.6    Smokeless tobacco: Never Used   Substance Use Topics    Alcohol use: Yes     Comment: 2-3 shot liquor for weekends       Pertinent ROS:  Review of Systems  Skin: Denies any new changing, growing or bleeding lesions or rashes except as described in the HPI   Constitutional: Denies fevers, chills, and malaise. PHYSICAL EXAM:   /79   Pulse 82   Temp 97.1 °F (36.2 °C)   SpO2 94%     The patient is generally well appearing, well nourished, alert and conversational. Affect is normal.    Cutaneous Exam:  Physical Exam  Face and neck only was examined. Examination of wound was deferred today as it was recently examined by Shereen. I looked at a photograph on her phone from wound dressing change today and there appears to be more granulation tissue than prior. Facial covering was not removed during examination. Diagnoses/exam findings/medical history pertinent to this visit are listed below:    Assessment:   Diagnosis Orders   1. Pyoderma gangrenosum     2.  High risk medication use  CBC Auto Differential Comprehensive Metabolic Panel        Plan:  - chronic illness, responding to treatment but not yet at goal   - continue dapsone 50 mg daily - labs today and refills for 2 months, then will allow Shereen to take over this rx if they would like it continued  - continue regimen from [facility]  - follow Shereen's wound care orders, ask them if they would have her continue with podiatry    RTC prn    Future Appointments   Date Time Provider [facility]   9/21/2021  8:30 AM Jayden Metcalf DPM [facility]   1/7/2022  7:30 AM Maya Cho PA-C ORTHO [facility]val[facility]   9/15/2022  2:00 PM Heath Angel  [facility]         Patient Instructions   - Continue care with [facility]  - Discuss the possibility of [facility] taking over dapsone  - labs today for dapsone  - Will prescribe enough dapsone to get to their appointment  - follow here as needed       This note was created with the assistance of a speech-recognition program.  Although the intention is to generate a document that actually reflects the content of the visit, no guarantees can be provided that every mistake has been identified and corrected by editing.     Electronically signed by Chuy Kaminski MD on 8/10/21 at 10:07 AM [email or web address]

## 2021-09-20 NOTE — TELEPHONE ENCOUNTER
Nurse  called back. She states Alejandra Corbin has to appeal the decision & patient is aware. She states patient has a plan to pay out of pocket if needed. Reiterated the importance of patient having continuation of care while waiting for her next appointment. Nurse expressed understanding & will be having a discussion with Alejandra Corbin.

## 2021-09-20 NOTE — TELEPHONE ENCOUNTER
for Jim 'NADIA'  (adrienne) would like to know if dr Marshall Blake approves of this doctor ( Dr. Mabel Yusuf)  from the [facility] in [place]. This provider has treated pyoderma gangrenosum patients in the past. The earliest appointment she can get for the patient with this specific provider is December 2nd of 2021. However, she doesn't want to go to this provider unless Dr. Mansi Reilly gives the [facility].  Can you call Lianne Dial to discuss [phone]   number is [phone]

## 2021-09-20 NOTE — TELEPHONE ENCOUNTER
Spoke to patient, informed her that if the  calls her back, please have the  call us here.  Patient cannot have a lapse in treatment while waiting to get into [facility], she is going to get an appointment scheduled with [facility]

## 2021-09-20 NOTE — TELEPHONE ENCOUNTER
I am not familiar with this physician, but I am ok with her seeing someone at [facility] since it is an academic center with multiple physicians acting collaboratively. However, it is IMPERATIVE that she continue to see [facility] Batista at [facility]  until she is able to go to [address] [place] [address].   Alex Cortes MD

## 2021-09-21 NOTE — PROGRESS NOTES
[address]   Progress Note and Procedure Note      [address]  RECORD NUMBER:  [id number]  AGE: 60 y.o. GENDER: female  : 1955  EPISODE DATE:  2021    Subjective:     Chief Complaint   Patient presents with    Wound Check     left foot dorsal      Interval History: Patient was seen at [facility], she was started on Remicade. Patient has been compliant with at-home dressing changes as instructed by Shereen. Patient continues to follow up with derm and rheumatology. Will be following up with [facility] in Nov.       HISTORY of PRESENT ILLNESS HPI     Brianna Horn is a 60 y.o. female who presents today for wound/ulcer evaluation. Patient contineus to dress the owund with santyl and saline soaked dressing. Using gent ointment and clobetasol. Also applies gentamycin. States she has noticed some blistering at skin edges. Wound has become painful and getting bigger. History of Wound Context: History of pyoderma gangrenosum. Follows with dermatology. The patient had nonhealing wound to the dorsum of her left foot for 2 years. She is complaining of foot pain and greenish drainage with increased size over the past several weeks. She denied any fever or chills, denied nausea or vomiting, no diarrhea. She does have hands small joints pain. She has been following with dermatology Dr. Sridevi Ashton and rheumatology on 2021 had a punch biopsy done was suggestive of LEUKOCYTOCLASTIC VASCULITIS, Pseudomonas aeruginosa grew on tissue culture that was sensitive to cefepime and Cipro. She was treated with Cipro courses previously with no improvement. Patient was hospitalized at [facility] 2021 through 2021 was discharged on IV cefepime that she is tolerating.  HIV screen was negative, QuantiFERON-TB gold test was negative, hepatitis panel negative, ANCA negative, She had multiple punch biopsies most recent was done on previous punch biopsy on 2021 showed fragment of benign fibrous connective tissue, tissue culture grew Enterococcus faecalis on broth medium only that was sensitive to ampicillin and vancomycin with ZAIDA of 2. She was positive for AMADOR and anticentromere was evaluated by rheumatology was previously on prednisone and methotrexate that was changed to cyclosporine on her recent hospitalization. She had multiple debridements and grafting in the past that was failed. History of smoking  She was evaluated by vascular surgery, had angiogram done with no reported stenosis. She denied history of diabetes. Tissue culture in December and February 2021 grew Pseudomonas aeruginosa was treated with a course of Cipro. Tissue culture 2/19/2021 grew Pseudomonas aeruginosa that was sensitive to Cipro, previous tissue culture 1/26/2021 grew Pseudomonas aeruginosa that was resistant to Cipro. History of coronary artery disease status post stent  Wound/Ulcer Pain Timing/Severity: intermittent  Quality of pain: burning  Severity:5/10  Modifying Factors: None  Associated Signs/Symptoms: Drainage    Ulcer Identification:  Ulcer Type: undetermined     Contributing Factors: decreased tissue oxygenation  History of smoking, quit, history of coronary artery disease status post stent.       PAST MEDICAL HISTORY      Diagnosis Date    Arthritis     knees hips hands shoulders and back    CAD (coronary artery disease) 2019    blockage on cath 9/2019, to have stenting after surgery (arthroplasty) Dr. Radha Moreau    Chronic back pain     GERD (gastroesophageal reflux disease)     on rx    Hyperlipidemia     per Dr. Radha Moreau on rx    Hypertension     Mina Valdez manages    Incomplete RBBB     Irregular heart beat     LAFB (left anterior fascicular block)     Dr. Radha Moreau, cardiologist, seen 9/2019 prior to surgery on 10/8/2019    Leg wound, left     following with wound care, Dr. Flako Encarnacion    Lumbar disc disease     Osteoporosis     PAC (premature atrial contraction) 06/2018    PACs by Tanmay Chacko MD at [address]      as a child    TOTAL KNEE ARTHROPLASTY Right 10/13/2020    KNEE TOTAL ARTHROPLASTY    TOTAL KNEE ARTHROPLASTY Right 10/13/2020    KNEE TOTAL ARTHROPLASTY- MICROPORT, \ ADVANCED performed by Wyatt Haro DO at [place] [address] History   Problem Relation Age of Onset    Coronary Art Dis Mother     Arthritis Mother     Heart Surgery Mother         CABG    Coronary Art Dis Father     Heart Disease Father     Heart Surgery Father         CABG    Coronary Art Dis Sister     Heart Surgery Sister         CABG       SOCIAL HISTORY    Social History     Tobacco Use    Smoking status: Former Smoker     Packs/day: 0.25     Years: 50.00     Pack years: 12.50     Types: Cigarettes     Start date:      Quit date: 2020     Years since quittin.6    Smokeless tobacco: Never Used   Vaping Use    Vaping Use: Never used   Substance Use Topics    Alcohol use: Yes     Comment: 2-3 shot liquor for weekends    Drug use: Never       ALLERGIES    No Known Allergies    MEDICATIONS    Current Outpatient Medications on File Prior to Encounter   Medication Sig Dispense Refill    dapsone 25 MG tablet Take 2 tablets by mouth daily 60 tablet 1    doxycycline hyclate (VIBRAMYCIN) 100 MG capsule       predniSONE (DELTASONE) 10 MG tablet       silver sulfADIAZINE (SILVADENE) 1 % cream apply to affected area twice a day      dapsone 25 MG tablet take 2 tablets by mouth once daily 60 tablet 0    inFLIXimab (REMICADE) 100 MG injection Infuse 5 mg/kg intravenously See Admin Instructions      omeprazole (PRILOSEC) 40 MG delayed release capsule Take 1 capsule by mouth daily 30 capsule 5    Metoprolol Tartrate 37.5 MG TABS take 1 tablet by mouth twice a day 60 tablet 5    clobetasol (TEMOVATE) 0.05 % ointment Apply to ulcer at dressing changes.  (Patient not taking: Reported on 2021) 60 g 2    lidocaine (XYLOCAINE) 2 % jelly Do not apply directly to wound. 30 mL 2    atorvastatin (LIPITOR) 10 MG tablet Take 1 tablet by mouth daily Take 10mg daily while on Cyclosporine. 30 tablet 2    meclizine (ANTIVERT) 25 MG CHEW Take 25 mg by mouth 3 times daily as needed      folic acid (FOLVITE) 1 MG tablet take 1 tablet by mouth once daily 30 tablet 5    clopidogrel (PLAVIX) 75 MG tablet Take 1 tablet by mouth daily 30 tablet 11    Multiple Vitamins-Minerals (THERAPEUTIC MULTIVITAMIN-MINERALS) tablet Take 1 tablet by mouth daily (Patient not taking: Reported on 9/9/2021)      oxyCODONE-acetaminophen (PERCOCET) 5-325 MG per tablet take 1 tablet by mouth four times a day if needed      traMADol (ULTRAM ER) 200 MG extended release tablet take 1 tablet by mouth once daily      aspirin EC 81 MG EC tablet Take 1 tablet by mouth 2 times daily 84 tablet 0    vitamin C (ASCORBIC ACID) 500 MG tablet Take 500 mg by mouth 2 times daily      diclofenac sodium (VOLTAREN) 1 % GEL Apply topically as needed Dr. Yunior Campbell      NAPROXEN PO Take 250 mg by mouth daily as needed Indications: patient takes 1-2 times a day Stopping 10 days prior to surgery as instructed by surgeon      Cholecalciferol (VITAMIN D3) 5000 units TABS Take 1,000 Units by mouth daily  (Patient not taking: Reported on 9/14/2021)      calcium carbonate (OSCAL) 500 MG TABS tablet Take 1,000 mg by mouth daily Per Nargis Jay rheumatology (Patient not taking: Reported on 9/9/2021)      alendronate (FOSAMAX) 70 MG tablet Take 70 mg by mouth every 7 days Indications: Mondays Dr. Salinas Gutiérrez   Patient takes on Mondays      gabapentin (NEURONTIN) 300 MG capsule Take 300 mg by mouth 2 times daily. Per Dr. Yunior Campbell       No current facility-administered medications on file prior to encounter. REVIEW OF SYSTEMS  Review of Systems   Skin: Positive for color change and wound. Pertinent items are noted in HPI.   Other than above 12 system review was negative    Objective:      BP (!) 154/75   Pulse 64   Temp 97.9 °F (36.6 °C) (Tympanic) Resp 18   Ht 5' 6\" (1.676 m)   Wt 169 lb (76.7 kg)   BMI 27.28 kg/m²     Wt Readings from Last 3 Encounters:   09/21/21 169 lb (76.7 kg)   09/09/21 169 lb 9.6 oz (76.9 kg)   08/17/21 170 lb (77.1 kg)       PHYSICAL EXAM  Vascular: DP pulses are not palpable, Bilateral. PT pulses are not palpable, Bilateral. CFT <4 seconds to all digits, Bilateral.  No edema, Bilateral.  Hair growth is absent to the level of the digits, Bilateral.  Skin temp is warm to cool from the tibial tuberosity to the digits, Bilateral.      Neuro: Saph/sural/SP/DP/plantar sensation intact to light touch. Musculoskeletal: EHL/FHL/GS/TA gross motor intact. Gross deformity is absent. Dermatologic: Open wound present to dorsal left foot wound as documented in detail below. Wound base is fibro-necrotic extending to the level of muscle with an exposed extensor tendon and 3rd MT exposed. There is minimal erythema at skin edges. There is no purulent drainage. There is no fluctuance or crepitus. Interdigital maceration absent, Bilateral.     Wound 12/04/20 Foot Left;Dorsal wound #1 (Active)   Wound Image   09/21/21 0853   Wound Etiology Other 09/21/21 0853   Dressing Status New drainage noted; Old drainage noted 09/21/21 0853   Wound Cleansed Irrigated with saline 09/21/21 0853   Dressing/Treatment Other (comment) 06/01/21 0854   Wound Length (cm) 11 cm 09/21/21 0853   Wound Width (cm) 9.5 cm 09/21/21 0853   Wound Depth (cm) 0.3 cm 09/21/21 0853   Wound Surface Area (cm^2) 104.5 cm^2 09/21/21 0853   Change in Wound Size % (l*w) -239.29 09/21/21 0853   Wound Volume (cm^3) 31.35 cm^3 09/21/21 0853   Wound Healing % -154 09/21/21 0853   Post-Procedure Length (cm) 11 cm 09/21/21 0853   Post-Procedure Width (cm) 9.5 cm 09/21/21 0853   Post-Procedure Depth (cm) 0.3 cm 09/21/21 0853   Post-Procedure Surface Area (cm^2) 104.5 cm^2 09/21/21 0853   Post-Procedure Volume (cm^3) 31.35 cm^3 09/21/21 0853   Wound Assessment Exposed structure fascia 09/21/21 0853   Drainage Amount Large 09/21/21 0853   Drainage Description Yellow 09/21/21 0853   Odor None 09/21/21 0853   Jessica-wound Assessment Blanchable erythema 09/21/21 0853   Margins Defined edges 09/21/21 0853   Wound Thickness Description not for Pressure Injury Full thickness 09/21/21 0853   Number of days: 290            Assessment:      1. Pyoderma gangrenosum    2. 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)    3. Pain in left foot    4. Neoplasm of uncertain behavior of skin          Plan:   Treatment:     1. Pt was evaluated and examined. Patient was given personalized discharge instructions. Diagnosis was discussed with the pt and all of their questions were answered in detail. Proper foot hygiene and care was discussed with the pt. Patient to check feet daily and contact the office with any questions/problems/concerns. Other comorbidity noted and will be managed by PCP. 2. Dressings applied today and to perform daily: Santyl, Saline wet to dry, DSD, ACE  3. Follow up: 2 weeks. 4. Detailed home instructions and education material given to patient prior to discharge. 5. Extensive discussion had with patient regarding definitive treatment options including continued conservative therapy with wound care, corticosteroid injections, flap reconstruction of the left lower extremity, and below knee amputation of the left leg. All questions were answered to her satisfaction. Patient demonstrates and verbalizes understanding. She is amenable to treatment plan. 6. Maintain follow up appointments with dermatology, [facility]   7. No wound debridement today        Christina Reina DPM   Podiatric Medicine & Surgery   9/21/2021 at 9:32 AM    I performed a history and physical examination of the patient and discussed management with the resident. I reviewed the residents note and agree with the documented findings and plan of care.  Any areas of disagreement are noted on the chart. I was personally present for the key portions of any procedures. I have documented in the chart those procedures where I was not present during the key portions. I have reviewed the Podiatry Resident progress note. I agree with the chief complaint, past medical history, past surgical history, allergies, medications, social and family history as documented unless otherwise noted below. Documentation of the HPI, Physical Exam and Medical Decision Making performed by medical students or scribes is based on my personal performance of the HPI, PE and MDM. I have personally evaluated this patient and have completed at least one if not all key elements of the E/M (history, physical exam, and MDM). Additional findings are as noted.      Vinny Jerez DPM on 9/22/2021 at 2:36 AM  Board Certified, American Board of Podiatric Surgery  Fellow, [employer] [facility] [employer] and [facility]

## 2021-09-21 NOTE — PROGRESS NOTES
09/21/21 0853   Post-Procedure Width (cm) 9.5 cm 09/21/21 0853   Post-Procedure Depth (cm) 0.3 cm 09/21/21 0853   Post-Procedure Surface Area (cm^2) 104.5 cm^2 09/21/21 0853   Post-Procedure Volume (cm^3) 31.35 cm^3 09/21/21 0853   Wound Assessment Exposed structure fascia 09/21/21 0853   Drainage Amount Large 09/21/21 0853   Drainage Description Yellow 09/21/21 0853   Odor None 09/21/21 0853   Jessica-wound Assessment Blanchable erythema 09/21/21 0853   Margins Defined edges 09/21/21 0853   Wound Thickness Description not for Pressure Injury Full thickness 09/21/21 0853   Number of days: 290          Supplies Requested :      WOUND #: 1   PRIMARY DRESSING:  Other: santyl david thickness cover with normal saline moistened 4x4 then cover with abd pad and wrap with kerlix   Cover and Secure with: 4X4 gauze pad  ABD pad wrapw ith kerlix     FREQUENCY OF DRESSING CHANGES:  Daily         ADDITIONAL ITEMS:  [] Gloves Small  [x] Gloves Medium [] Gloves Large [] Gloves XLarge  [] Tape 1\" [x] Tape 2\" [] Tape 3\"  [] Medipore Tape  [x] Saline  [] Skin Prep   [] Adhesive Remover   [] Cotton Tip Applicators   [] Other:    Patient Wound(s) Debrided: [] Yes if yes please add date   [x] No    Debribement Type: NO recent debridement due to pyroderma dx  Patient currently being seen by Home Health: [] Yes   [x] No    Duration for needed supplies:  []15  [x]30  []60  []90 Days         Provider Information:      PROVIDER'S Ethel Prescott DPM  [id number]

## 2021-10-05 NOTE — PROGRESS NOTES
[address]   Progress Note and Procedure Note      [address] [facility] RECORD NUMBER:  [id number]  AGE: 60 y.o. GENDER: female  : 1955  EPISODE DATE:  10/5/2021    Subjective:     Chief Complaint   Patient presents with    Wound Check     left foot      Interval History: Patient was seen at [facility], she was started on Remicade. Has had 4 treatments. Ulcer started itching this morning and has a rash now. Patient has been compliant with at-home dressing changes Santyl only, pain is less this week. Patient continues to follow up with derm and rheumatology. Will be following up with Chris Godinez in Nov.       HISTORY of PRESENT ILLNESS HPI     Josias Bruce is a 60 y.o. female who presents today for wound/ulcer evaluation. Patient contineus to dress the owund with santyl and saline soaked dressing. Using gent ointment and clobetasol. Also applies gentamycin. States she has noticed some blistering at skin edges. Wound has become painful and getting bigger. History of Wound Context: History of pyoderma gangrenosum. Follows with dermatology. The patient had nonhealing wound to the dorsum of her left foot for 2 years. She is complaining of foot pain and greenish drainage with increased size over the past several weeks. She denied any fever or chills, denied nausea or vomiting, no diarrhea. She does have hands small joints pain. She has been following with dermatology Dr. Vianey Do and rheumatology on 2021 had a punch biopsy done was suggestive of LEUKOCYTOCLASTIC VASCULITIS, Pseudomonas aeruginosa grew on tissue culture that was sensitive to cefepime and Cipro. She was treated with Cipro courses previously with no improvement. Patient was hospitalized at [facility] 2021 through 2021 was discharged on IV cefepime that she is tolerating.  HIV screen was negative, QuantiFERON-TB gold test was negative, hepatitis panel negative, ANCA negative, She had multiple punch biopsies most recent was done on previous punch biopsy on 4/20/2021 showed fragment of benign fibrous connective tissue, tissue culture grew Enterococcus faecalis on broth medium only that was sensitive to ampicillin and vancomycin with ZAIDA of 2. She was positive for AMADOR and anticentromere was evaluated by rheumatology was previously on prednisone and methotrexate that was changed to cyclosporine on her recent hospitalization. She had multiple debridements and grafting in the past that was failed. History of smoking  She was evaluated by vascular surgery, had angiogram done with no reported stenosis. She denied history of diabetes. Tissue culture in December and February 2021 grew Pseudomonas aeruginosa was treated with a course of Cipro. Tissue culture 2/19/2021 grew Pseudomonas aeruginosa that was sensitive to Cipro, previous tissue culture 1/26/2021 grew Pseudomonas aeruginosa that was resistant to Cipro. History of coronary artery disease status post stent  Wound/Ulcer Pain Timing/Severity: intermittent  Quality of pain: burning  Severity:5/10  Modifying Factors: None  Associated Signs/Symptoms: Drainage    Ulcer Identification:  Ulcer Type: undetermined     Contributing Factors: decreased tissue oxygenation  History of smoking, quit, history of coronary artery disease status post stent.       PAST MEDICAL HISTORY      Diagnosis Date    Arthritis     knees hips hands shoulders and back    CAD (coronary artery disease) 2019    blockage on cath 9/2019, to have stenting after surgery (arthroplasty) Dr. Jayda Mcdaniels Chronic back pain     GERD (gastroesophageal reflux disease)     on rx    Hyperlipidemia     per Dr. Sherman Alex on rx    Hypertension     Kennedy Miguel manages    Incomplete RBBB     Irregular heart beat     LAFB (left anterior fascicular block)     Dr. Sherman Alex, cardiologist, seen 9/2019 prior to surgery on 10/8/2019    Leg wound, left     following with wound care,  [facility]    Lumbar disc disease     Osteoporosis     PAC (premature atrial contraction) 06/2018    PACs and PVCs on holter monitor,     PONV (postoperative nausea and vomiting)     requests scop patch    Wears dentures     upper partial    Wears eyeglasses     readers    Wears partial dentures     upper and lower    Wellness examination     MIKE Anguiano NP PCP, seen 2/10/2020       PAST SURGICAL HISTORY    Past Surgical History:   Procedure Laterality Date    ABDOMINOPLASTY  1997    BACK SURGERY      BLEPHAROPLASTY Bilateral 1997    BREAST ENHANCEMENT SURGERY Bilateral 1999    breast augmentation   Mike Skaggs  2019    Dr. Roel Silva, blockage but no stents yet    CATARACT REMOVAL WITH IMPLANT Bilateral 2016    COLONOSCOPY  2015    No Polyps    COSMETIC SURGERY      eyelid lift    FINGER SURGERY Right     thumb lesion excised    FIXATION KYPHOPLASTY  2013    Back    FOOT DEBRIDEMENT Left 10/11/2019    SURGICAL PREP WOUND BED LEFT FOOT WITH APPLICATION OF EPIFIX LEFT FOOT 3X4 performed by Petty Sharpe DPM at [address] [place] [address]      kyphoplasty for lumbar fx 2013 Dr. Aster Cha ARTHROSCOPY Left 2006   Duarte Buddle Left 1/2/2020    FOOT  DEBRIDEMENT WITH WOUND VAC PLACEMENT performed by Maurice Ochoa MD at [place] [id number]  2014   [place] [id number]  2005    diskectomy and spinal fusion    OTHER SURGICAL HISTORY  10/03/2019    Left leg angiogram    AL OFFICE/OUTPT VISIT,PROCEDURE ONLY Right 9/11/2018    EXCISION MASS THUMB, 3080 TABLE performed by Jama Stratton DO at [address] [place] [address] Left 12/3/2019    LEFT FOOT DEBRIDEMENT WITH SKIN GRAFT SPLIT THICKNESS; SKIN GRAFT TAKEN FROM RIGHT ANTERIOR THIGH performed by Maurice Ochoa MD at [address] [place] [address] Left 2/25/2020    DEBRIDEMENT, SPLIT THICKNESS SKIN GRAFT WITH WOUND VAC PLACEMENT FOOT performed by Leydi Horton % ointment Apply to ulcer at dressing changes. 60 g 2    lidocaine (XYLOCAINE) 2 % jelly Do not apply directly to wound. 30 mL 2    atorvastatin (LIPITOR) 10 MG tablet Take 1 tablet by mouth daily Take 10mg daily while on Cyclosporine. 30 tablet 2    meclizine (ANTIVERT) 25 MG CHEW Take 25 mg by mouth 3 times daily as needed      folic acid (FOLVITE) 1 MG tablet take 1 tablet by mouth once daily 30 tablet 5    clopidogrel (PLAVIX) 75 MG tablet Take 1 tablet by mouth daily 30 tablet 11    Multiple Vitamins-Minerals (THERAPEUTIC MULTIVITAMIN-MINERALS) tablet Take 1 tablet by mouth daily       oxyCODONE-acetaminophen (PERCOCET) 5-325 MG per tablet take 1 tablet by mouth four times a day if needed      traMADol (ULTRAM ER) 200 MG extended release tablet take 1 tablet by mouth once daily      aspirin EC 81 MG EC tablet Take 1 tablet by mouth 2 times daily 84 tablet 0    vitamin C (ASCORBIC ACID) 500 MG tablet Take 500 mg by mouth 2 times daily      diclofenac sodium (VOLTAREN) 1 % GEL Apply topically as needed Dr. Rashi Huizar      NAPROXEN PO Take 250 mg by mouth daily as needed Indications: patient takes 1-2 times a day Stopping 10 days prior to surgery as instructed by surgeon      Cholecalciferol (VITAMIN D3) 5000 units TABS Take 1,000 Units by mouth daily       calcium carbonate (OSCAL) 500 MG TABS tablet Take 1,000 mg by mouth daily Per Chavez Grimes rheumatology      alendronate (FOSAMAX) 70 MG tablet Take 70 mg by mouth every 7 days Indications: Mondays Dr. Alhaji Scherer   Patient takes on Mondays      gabapentin (NEURONTIN) 300 MG capsule Take 300 mg by mouth 2 times daily. Per Dr. Rashi Huizar       No current facility-administered medications on file prior to encounter. REVIEW OF SYSTEMS  Review of Systems   Skin: Positive for color change and wound. Pertinent items are noted in HPI.   Other than above 12 system review was negative    Objective:      BP (!) 157/82   Pulse 64   Temp 97.8 °F (36.6 °C) (Tympanic) Resp 18   Ht 5' 6\" (1.676 m)   Wt 169 lb (76.7 kg)   BMI 27.28 kg/m²     Wt Readings from Last 3 Encounters:   10/05/21 169 lb (76.7 kg)   09/21/21 169 lb (76.7 kg)   09/09/21 169 lb 9.6 oz (76.9 kg)       PHYSICAL EXAM  Vascular: DP pulses are not palpable, Bilateral. PT pulses are not palpable, Bilateral. CFT <4 seconds to all digits, Bilateral.  No edema, Bilateral.  Hair growth is absent to the level of the digits, Bilateral.  Skin temp is warm to cool from the tibial tuberosity to the digits, Bilateral.      Neuro: Saph/sural/SP/DP/plantar sensation intact to light touch. Musculoskeletal: EHL/FHL/GS/TA gross motor intact. Gross deformity is absent. Dermatologic: Open wound present to dorsal left foot wound as documented in detail below. Wound base is fibro-necrotic extending to the level of muscle with an exposed extensor tendon and 3rd MT exposed. There is minimal erythema at skin edges. There is no purulent drainage. There is no fluctuance or crepitus. Interdigital maceration absent, Bilateral.     Wound 12/04/20 Foot Left;Dorsal wound #1 (Active)   Wound Image   09/21/21 0853   Wound Etiology Other 10/05/21 0845   Dressing Status New drainage noted; Old drainage noted 10/05/21 0845   Wound Cleansed Soap and water 10/05/21 0845   Dressing/Treatment Other (comment) 06/01/21 0854   Wound Length (cm) 11.2 cm 10/05/21 0845   Wound Width (cm) 12.5 cm 10/05/21 0845   Wound Depth (cm) 0.4 cm 10/05/21 0845   Wound Surface Area (cm^2) 140 cm^2 10/05/21 0845   Change in Wound Size % (l*w) -354.55 10/05/21 0845   Wound Volume (cm^3) 56 cm^3 10/05/21 0845   Wound Healing % -355 10/05/21 0845   Post-Procedure Length (cm) 11 cm 09/21/21 0853   Post-Procedure Width (cm) 9.5 cm 09/21/21 0853   Post-Procedure Depth (cm) 0.3 cm 09/21/21 0853   Post-Procedure Surface Area (cm^2) 104.5 cm^2 09/21/21 0853   Post-Procedure Volume (cm^3) 31.35 cm^3 09/21/21 0853   Wound Assessment Exposed structure tendon;Slough 10/05/21 0845   Drainage Amount Large 10/05/21 0845   Drainage Description Yellow 10/05/21 0845   Odor None 10/05/21 0845   Jessica-wound Assessment Edematous; Hyperpigmented;Blanchable erythema 10/05/21 0845   Margins Defined edges 10/05/21 0845   Wound Thickness Description not for Pressure Injury Full thickness 09/21/21 0853   Number of days: 304            Assessment:      1. Neoplasm of uncertain behavior of skin    2. 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)    3. Pyoderma gangrenosum    4. Pain in left foot          Plan:   Treatment:     1. Pt was evaluated and examined. Patient was given personalized discharge instructions. Diagnosis was discussed with the pt and all of their questions were answered in detail. Proper foot hygiene and care was discussed with the pt. Patient to check feet daily and contact the office with any questions/problems/concerns. Other comorbidity noted and will be managed by PCP. 2. Dressings applied today and to perform daily: Santyl, Saline wet to dry, DSD, ACE  3. Follow up: 2 weeks. 4. Detailed home instructions and education material given to patient prior to discharge. 5. Extensive discussion had with patient regarding definitive treatment options including continued conservative therapy with wound care, corticosteroid injections, flap reconstruction of the left lower extremity, and below knee amputation of the left leg. All questions were answered to her satisfaction. Patient demonstrates and verbalizes understanding. She is amenable to treatment plan. 6. Maintain follow up appointments with dermatology, [facility], and [facility]   7. No wound debridement today  8. Rx Diflucan  9. Start clobetasol around ulceration daily with dressing change.          Jad Hutchins DPM on 10/5/2021 at 7:54 AM  Board Certified, American Board of Podiatric Surgery  Fellow, [employer] and [employer]

## 2021-10-19 NOTE — PROGRESS NOTES
[address]   Progress Note and Procedure Note      [address]  RECORD NUMBER:  [id number]  AGE: 60 y.o. GENDER: female  : 1955  EPISODE DATE:  10/19/2021    Subjective:     Chief Complaint   Patient presents with    Wound Check     L foot      Interval History: [facility] she was started on Remicade. Patient has been compliant with at-home dressing changes Santyl only, pain is more this week. Patient continues to follow up with derm and rheumatology. Will be following up with [facility] in Nov and  of [facility]. HISTORY of PRESENT ILLNESS HPI     Olimpia Keita is a 60 y.o. female who presents today for wound/ulcer evaluation. Patient contineus to dress the owund with santyl and saline soaked dressing. Using gent ointment and clobetasol. Also applies gentamycin. States she has noticed some blistering at skin edges. Wound has become painful and getting bigger. History of Wound Context: History of pyoderma gangrenosum. Follows with dermatology. The patient had nonhealing wound to the dorsum of her left foot for 2 years. She is complaining of foot pain and greenish drainage with increased size over the past several weeks. She denied any fever or chills, denied nausea or vomiting, no diarrhea. She does have hands small joints pain. She has been following with dermatology Dr. Gustabo Mills and rheumatology on 2021 had a punch biopsy done was suggestive of LEUKOCYTOCLASTIC VASCULITIS, Pseudomonas aeruginosa grew on tissue culture that was sensitive to cefepime and Cipro. She was treated with Cipro courses previously with no improvement. Patient was hospitalized at [facility] 2021 through 2021 was discharged on IV cefepime that she is tolerating.  HIV screen was negative, QuantiFERON-TB gold test was negative, hepatitis panel negative, ANCA negative, She had multiple punch biopsies most recent was done on previous punch biopsy on 4/20/2021 showed fragment of benign fibrous connective tissue, tissue culture grew Enterococcus faecalis on broth medium only that was sensitive to ampicillin and vancomycin with ZAIDA of 2. She was positive for AMADOR and anticentromere was evaluated by rheumatology was previously on prednisone and methotrexate that was changed to cyclosporine on her recent hospitalization. She had multiple debridements and grafting in the past that was failed. History of smoking  She was evaluated by vascular surgery, had angiogram done with no reported stenosis. She denied history of diabetes. Tissue culture in December and February 2021 grew Pseudomonas aeruginosa was treated with a course of Cipro. Tissue culture 2/19/2021 grew Pseudomonas aeruginosa that was sensitive to Cipro, previous tissue culture 1/26/2021 grew Pseudomonas aeruginosa that was resistant to Cipro. History of coronary artery disease status post stent  Wound/Ulcer Pain Timing/Severity: intermittent  Quality of pain: burning  Severity:5/10  Modifying Factors: None  Associated Signs/Symptoms: Drainage    Ulcer Identification:  Ulcer Type: undetermined     Contributing Factors: decreased tissue oxygenation  History of smoking, quit, history of coronary artery disease status post stent.       PAST MEDICAL HISTORY      Diagnosis Date    Arthritis     knees hips hands shoulders and back    CAD (coronary artery disease) 2019    blockage on cath 9/2019, to have stenting after surgery (arthroplasty) Dr. Susan Lyons Chronic back pain     GERD (gastroesophageal reflux disease)     on rx    Hyperlipidemia     per Dr. Zenia Wells on rx    Hypertension     Purnima Patria manages    Incomplete RBBB     Irregular heart beat     LAFB (left anterior fascicular block)     Dr. Zenia Wells, cardiologist, seen 9/2019 prior to surgery on 10/8/2019    Leg wound, left     following with wound care, Dr. Elver Zepeda Lumbar disc disease     Osteoporosis     PAC (premature atrial contraction) 06/2018    PACs and PVCs on holter monitor,     PONV (postoperative nausea and vomiting)     requests scop patch    Wears dentures     upper partial    Wears eyeglasses     readers    Wears partial dentures     upper and lower    Wellness examination     MIKE De Guzman NP PCP, seen 2/10/2020       PAST SURGICAL HISTORY    Past Surgical History:   Procedure Laterality Date    ABDOMINOPLASTY  1997    BACK SURGERY      BLEPHAROPLASTY Bilateral 1997    BREAST ENHANCEMENT SURGERY Bilateral 1999    breast augmentation   [facility]  2019    Dr. Perfecto Jaramillo, blockage but no stents yet    CATARACT REMOVAL WITH IMPLANT Bilateral 2016    COLONOSCOPY  2015    No Polyps    COSMETIC SURGERY      eyelid lift    FINGER SURGERY Right     thumb lesion excised    FIXATION KYPHOPLASTY  2013    Back    FOOT DEBRIDEMENT Left 10/11/2019    SURGICAL PREP WOUND BED LEFT FOOT WITH APPLICATION OF EPIFIX LEFT FOOT 3X4 performed by Marbin Miranda DPM at [place] [address]      kyphoplasty for lumbar fx 2013 Dr. Milli Morales ARTHROSCOPY Left 2006   Gabriel Filter Left 1/2/2020    FOOT  DEBRIDEMENT WITH WOUND VAC PLACEMENT performed by Mary Montenegro MD at [place] [address]  2005    diskectomy and spinal fusion    OTHER SURGICAL HISTORY  10/03/2019    Left leg angiogram    PA OFFICE/OUTPT VISIT,PROCEDURE ONLY Right 9/11/2018    EXCISION MASS THUMB, 3080 TABLE performed by Nathanael Meigs, DO at [facility] Left 12/3/2019    LEFT FOOT DEBRIDEMENT WITH SKIN GRAFT SPLIT THICKNESS; SKIN GRAFT TAKEN FROM RIGHT ANTERIOR THIGH performed by Mary Montenegro MD at [facility] Left 2/25/2020    DEBRIDEMENT, SPLIT THICKNESS SKIN GRAFT WITH WOUND VAC PLACEMENT FOOT performed by Renuka Parikh MD at [facility] Left 6/30/2020    DEBRIDEMENT, SKIN GRAFT SPLIT THICKNESS FOOT  ([facility], [address] [facility]) performed by Jayla Hobson MD at [address]       as a child    TOTAL KNEE ARTHROPLASTY Right 10/13/2020    KNEE TOTAL ARTHROPLASTY    TOTAL KNEE ARTHROPLASTY Right 10/13/2020    KNEE TOTAL ARTHROPLASTY- MICROPORT, \ ADVANCED performed by Flower Ceron DO at [facility] History   Problem Relation Age of Onset    Coronary Art Dis Mother     Arthritis Mother     Heart Surgery Mother         CABG    Coronary Art Dis Father     Heart Disease Father     Heart Surgery Father         CABG    Coronary Art Dis Sister     Heart Surgery Sister         CABG       SOCIAL HISTORY    Social History     Tobacco Use    Smoking status: Former Smoker     Packs/day: 0.25     Years: 50.00     Pack years: 12.50     Types: Cigarettes     Start date:      Quit date: 2020     Years since quittin.7    Smokeless tobacco: Never Used   Vaping Use    Vaping Use: Never used   Substance Use Topics    Alcohol use: Yes     Comment: 2-3 shot liquor for weekends    Drug use: Never       ALLERGIES    No Known Allergies    MEDICATIONS    Current Outpatient Medications on File Prior to Encounter   Medication Sig Dispense Refill    doxycycline hyclate (VIBRAMYCIN) 100 MG capsule       predniSONE (DELTASONE) 10 MG tablet       silver sulfADIAZINE (SILVADENE) 1 % cream apply to affected area twice a day      dapsone 25 MG tablet take 2 tablets by mouth once daily 60 tablet 0    inFLIXimab (REMICADE) 100 MG injection Infuse 5 mg/kg intravenously See Admin Instructions      omeprazole (PRILOSEC) 40 MG delayed release capsule Take 1 capsule by mouth daily 30 capsule 5    Metoprolol Tartrate 37.5 MG TABS take 1 tablet by mouth twice a day 60 tablet 5    clobetasol (TEMOVATE) 0.05 % ointment Apply to ulcer at dressing changes. 60 g 2    lidocaine (XYLOCAINE) 2 % jelly Do not apply directly to wound.  30 mL 2    169 lb (76.7 kg)   09/21/21 169 lb (76.7 kg)       PHYSICAL EXAM  Vascular: DP pulses are not palpable, Bilateral. PT pulses are not palpable, Bilateral. CFT <4 seconds to all digits, Bilateral.  No edema, Bilateral.  Hair growth is absent to the level of the digits, Bilateral.  Skin temp is warm to cool from the tibial tuberosity to the digits, Bilateral.      Neuro: Saph/sural/SP/DP/plantar sensation intact to light touch. Musculoskeletal: EHL/FHL/GS/TA gross motor intact. Gross deformity is absent. Dermatologic: Open wound present to dorsal left foot wound as documented in detail below. Wound base is fibro-necrotic extending to the level of muscle with an exposed extensor tendon and 3rd MT exposed. There is minimal erythema at skin edges. There is no purulent drainage. There is no fluctuance or crepitus. Interdigital maceration absent, Bilateral.     Wound 12/04/20 Foot Left;Dorsal wound #1 (Active)   Wound Image   10/19/21 0855   Wound Etiology Other 10/19/21 0855   Dressing Status New drainage noted; Old drainage noted 10/19/21 0855   Wound Cleansed Cleansed with saline 10/19/21 0855   Dressing/Treatment Other (comment) 06/01/21 0854   Wound Length (cm) 11.3 cm 10/19/21 0855   Wound Width (cm) 10.5 cm 10/19/21 0855   Wound Depth (cm) 0.4 cm 10/19/21 0855   Wound Surface Area (cm^2) 118.65 cm^2 10/19/21 0855   Change in Wound Size % (l*w) -285.23 10/19/21 0855   Wound Volume (cm^3) 47.46 cm^3 10/19/21 0855   Wound Healing % -285 10/19/21 0855   Post-Procedure Length (cm) 11.2 cm 10/05/21 0845   Post-Procedure Width (cm) 12.5 cm 10/05/21 0845   Post-Procedure Depth (cm) 0.4 cm 10/05/21 0845   Post-Procedure Surface Area (cm^2) 140 cm^2 10/05/21 0845   Post-Procedure Volume (cm^3) 56 cm^3 10/05/21 0845   Wound Assessment Exposed structure tendon;Slough;Pink/red 10/19/21 0855   Drainage Amount Large 10/19/21 0855   Drainage Description Yellow 10/19/21 0855   Odor None 10/19/21 0855   Jessica-wound Assessment Edematous; Blanchable erythema 10/19/21 0855   Margins Defined edges 10/19/21 0855   Wound Thickness Description not for Pressure Injury Full thickness 10/19/21 0855   Number of days: 318            Assessment:      1. 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)    2. Pain in left foot    3. Neoplasm of uncertain behavior of skin          Plan:   Treatment:     1. Pt was evaluated and examined. Patient was given personalized discharge instructions. Diagnosis was discussed with the pt and all of their questions were answered in detail. Proper foot hygiene and care was discussed with the pt. Patient to check feet daily and contact the office with any questions/problems/concerns. Other comorbidity noted and will be managed by PCP. 2. Dressings applied today and to perform daily: Santyl, Saline wet to dry, DSD, ACE  3. Follow up: 4 weeks. 4. Detailed home instructions and education material given to patient prior to discharge. 5. Extensive discussion had with patient regarding definitive treatment options including continued conservative therapy with wound care, corticosteroid injections, flap reconstruction of the left lower extremity, and below knee amputation of the left leg. All questions were answered to her satisfaction. Patient demonstrates and verbalizes understanding. She is amenable to treatment plan. 6. Maintain follow up appointments with dermatology,  of , and [facility]   7.  No wound debridement today          Ana Oconnor DPM on 10/19/2021 at 5:84 AM  Board Certified, American Board of Podiatric Surgery  Fellow, [employer] and [employer]

## 2021-10-19 NOTE — PROGRESS NOTES
[address]:     [employer] [id number] [facility], [address] [place], [place] [address]e Medical [place] p: [phone] f: [phone]     Ordering Center:     [address] [place] [address] Wound and [address] 150 [facility] [address]  651 [facility]      Patient Information:      Skip Saldana  [facility] 21    : 1955  AGE: 60 y.o. GENDER: female   EPISODE DATE: 10/19/2021    Insurance:      PRIMARY INSURANCE:  Plan: MEDICAL M[employer]UAL ADVANTAGE CHOICE HMO  Coverage: MEDICAL MUTUAL MEDICARE ADVANTAGE  Effective Date: 2015  Group Number: [unfilled]  Subscriber Number: [id number] - (Medicare Managed)    Payor/Plan Subscr  Sex Relation Sub. Ins. ID Effective Group Num   1. Marley ERVIN 1955 Female Self [id number] 1/1/15 [id number]                                    [id number]       Patient Wound Information:      Problem List Items Addressed This Visit     Neoplasm of uncertain behavior of skin    Relevant Orders    Supply: Wound Cleanser    Supply: Wound Dressings    Supply: Edema Control    Pain in left foot    Relevant Orders    Supply: Wound Cleanser    Supply: Wound Dressings    Supply: Edema Control    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 ([address] - Primary    Relevant Orders    Supply: Wound Cleanser    Supply: Wound Dressings    Supply: Edema Control          WOUNDS REQUIRING DRESSING SUPPLIES:     Wound 20 Foot Left;Dorsal wound #1 (Active)   Wound Image   10/19/21 0855   Wound Etiology Other 10/19/21 0855   Dressing Status New drainage noted; Old drainage noted 10/19/21 0855   Wound Cleansed Cleansed with saline 10/19/21 0855   Dressing/Treatment Other (comment) 21 0854   Wound Length (cm) 11.3 cm 10/19/21 0855   Wound Width (cm) 10.5 cm 10/19/21 0855   Wound Depth (cm) 0.4 cm 10/19/21 0855   Wound Surface Area (cm^2) 118.65 cm^2 10/19/21 0855   Change in Wound Size % (l*w) -285.23 10/19/21 0855   Wound Volume (cm^3) 47.46 cm^3 10/19/21 0855   Wound Healing % -285 10/19/21 0855   Post-Procedure Length (cm) 11.2 cm 10/05/21 0845   Post-Procedure Width (cm) 12.5 cm 10/05/21 0845   Post-Procedure Depth (cm) 0.4 cm 10/05/21 0845   Post-Procedure Surface Area (cm^2) 140 cm^2 10/05/21 0845   Post-Procedure Volume (cm^3) 56 cm^3 10/05/21 0845   Wound Assessment Exposed structure tendon;Slough;Pink/red 10/19/21 0855   Drainage Amount Large 10/19/21 0855   Drainage Description Yellow 10/19/21 0855   Odor None 10/19/21 0855   Jessica-wound Assessment Edematous; Blanchable erythema 10/19/21 0855   Margins Defined edges 10/19/21 0855   Wound Thickness Description not for Pressure Injury Full thickness 10/19/21 0855   Number of days: 319          Supplies Requested :      WOUND #: 1   PRIMARY DRESSING:  Other: Santyl ointment   Cover and Secure with: ABD pad Kerlix secure with paper tape     FREQUENCY OF DRESSING CHANGES:  Daily         ADDITIONAL ITEMS:  [] Gloves Small  [] Gloves Medium [] Gloves Large [] Gloves XLarge  [] Tape 1\" [x] Tape 2\" [] Tape 3\"  [] Medipore Tape  [] Saline  [] Skin Prep   [] Adhesive Remover   [] Cotton Tip Applicators   [] Other:    Patient Wound(s) Debrided: [] Yes if yes please add date   [x] No    Debribement Type:  Other No debridement today due to Pyroderma gangrenosum dx    Patient currently being seen by Home Health: [] Yes   [x] No    Duration for needed supplies:  []15  [x]30  []60  []90 Days    Electronically signed by Deshawn Herring RN on 10/19/2021 at 12:23 PM     Provider Information:      PROVIDER'S NAME: Amina Marte DPM  NSI-[id number]

## 2021-10-20 NOTE — PROGRESS NOTES
Patient is present complaining of left wrist pain x1 week  Patient states there is swelling also  States she does not recall injuring it  States she has trouble grasping things due to the pain  Patient states she has been using ice and aspercream, also been wrapping it

## 2021-10-20 NOTE — PROGRESS NOTES
[address] THACKER  [phone]    Johana Camara is a 60 y.o. female who presents today for her  medical conditions/complaints as noted below. Johana Camara is c/o of Wrist Pain  . HPI:     HPI   Patient is present complaining of left wrist pain x1 week  Patient states there is swelling also  States she does not recall injuring it  States she has trouble grasping things due to the pain  Patient states she has been using ice and aspercream, also been wrapping it    Nursing note reviewed and discussed with patient. Patient's medications, allergies, past medical, surgical, social and family histories were reviewed and updated asappropriate. Current Outpatient Medications   Medication Sig Dispense Refill    folic acid (FOLVITE) 1 MG tablet take 1 tablet by mouth once daily 30 tablet 5    doxycycline hyclate (VIBRAMYCIN) 100 MG capsule       predniSONE (DELTASONE) 10 MG tablet       silver sulfADIAZINE (SILVADENE) 1 % cream apply to affected area twice a day      dapsone 25 MG tablet take 2 tablets by mouth once daily 60 tablet 0    inFLIXimab (REMICADE) 100 MG injection Infuse 5 mg/kg intravenously See Admin Instructions      omeprazole (PRILOSEC) 40 MG delayed release capsule Take 1 capsule by mouth daily 30 capsule 5    Metoprolol Tartrate 37.5 MG TABS take 1 tablet by mouth twice a day 60 tablet 5    clobetasol (TEMOVATE) 0.05 % ointment Apply to ulcer at dressing changes. 60 g 2    lidocaine (XYLOCAINE) 2 % jelly Do not apply directly to wound. 30 mL 2    atorvastatin (LIPITOR) 10 MG tablet Take 1 tablet by mouth daily Take 10mg daily while on Cyclosporine.  30 tablet 2    meclizine (ANTIVERT) 25 MG CHEW Take 25 mg by mouth 3 times daily as needed      clopidogrel (PLAVIX) 75 MG tablet Take 1 tablet by mouth daily 30 tablet 11    oxyCODONE-acetaminophen (PERCOCET) 5-325 MG per tablet take 1 tablet by mouth four times a day if needed      traMADol (ULTRAM ER) 200 MG extended release tablet take 1 tablet by mouth once daily      aspirin EC 81 MG EC tablet Take 1 tablet by mouth 2 times daily 84 tablet 0    vitamin C (ASCORBIC ACID) 500 MG tablet Take 500 mg by mouth 2 times daily      diclofenac sodium (VOLTAREN) 1 % GEL Apply topically as needed Dr. Monse Duff      NAPROXEN PO Take 250 mg by mouth daily as needed Indications: patient takes 1-2 times a day Stopping 10 days prior to surgery as instructed by surgeon      alendronate (FOSAMAX) 70 MG tablet Take 70 mg by mouth every 7 days Indications: Mondays Dr. Hollie Lane   Patient takes on Mondays      gabapentin (NEURONTIN) 300 MG capsule Take 300 mg by mouth 2 times daily. Per Dr. Rigo Nguyen Multiple Vitamins-Minerals (THERAPEUTIC MULTIVITAMIN-MINERALS) tablet Take 1 tablet by mouth daily  (Patient not taking: Reported on 10/20/2021)      Cholecalciferol (VITAMIN D3) 5000 units TABS Take 1,000 Units by mouth daily  (Patient not taking: Reported on 10/20/2021)      calcium carbonate (OSCAL) 500 MG TABS tablet Take 1,000 mg by mouth daily Per [facility] rheumatology (Patient not taking: Reported on 10/20/2021)       No current facility-administered medications for this visit.      Past Medical History:   Diagnosis Date    Arthritis     knees hips hands shoulders and back    CAD (coronary artery disease) 2019    blockage on cath 9/2019, to have stenting after surgery (arthroplasty) Dr. Clay Munoz Chronic back pain     GERD (gastroesophageal reflux disease)     on rx    Hyperlipidemia     per Dr. Bertha Hernandez on rx    Hypertension     Brandon Huston manages    Incomplete RBBB     Irregular heart beat     LAFB (left anterior fascicular block)     Dr. Bertha Hernandez, cardiologist, seen 9/2019 prior to surgery on 10/8/2019    Leg wound, left     following with wound care, Dr. Nataliia Vigil Lumbar disc disease     Osteoporosis     PAC (premature atrial contraction) 06/2018    PACs and PVCs on holter monitor,  PONV (postoperative nausea and vomiting)     requests scop patch    Wears dentures     upper partial    Wears eyeglasses     readers    Wears partial dentures     upper and lower    Wellness examination     MIKE Jackson NP PCP, seen 2/10/2020      Past Surgical History:   Procedure Laterality Date    ABDOMINOPLASTY  1997    BACK SURGERY      BLEPHAROPLASTY Bilateral 1997    BREAST ENHANCEMENT SURGERY Bilateral 1999    breast augmentation   [address]  2019    Dr. Ronny Lyon, blockage but no stents yet    CATARACT REMOVAL WITH IMPLANT Bilateral 2016    COLONOSCOPY  2015    No Polyps    COSMETIC SURGERY      eyelid lift    FINGER SURGERY Right     thumb lesion excised    FIXATION KYPHOPLASTY  2013    Back    FOOT DEBRIDEMENT Left 10/11/2019    SURGICAL PREP WOUND BED LEFT FOOT WITH APPLICATION OF EPIFIX LEFT FOOT 3X4 performed by oTry Martinez DPM at [place] [address]      kyphoplasty for lumbar fx 2013 Dr. Rakesh Wallis ARTHROSCOPY Left 2006   Donnise Bitter Left 1/2/2020    FOOT  DEBRIDEMENT WITH WOUND VAC PLACEMENT performed by Ermias Felix MD at [place] [address]  2014   [facility]  2005    diskectomy and spinal fusion    OTHER SURGICAL HISTORY  10/03/2019    Left leg angiogram    ND OFFICE/OUTPT VISIT,PROCEDURE ONLY Right 9/11/2018    EXCISION MASS THUMB, 3080 TABLE performed by Aron De La Rosa DO at [facility] Left 12/3/2019    LEFT FOOT DEBRIDEMENT WITH SKIN GRAFT SPLIT THICKNESS; SKIN GRAFT TAKEN FROM RIGHT ANTERIOR THIGH performed by Ermias Felix MD at [facility] Left 2/25/2020    DEBRIDEMENT, SPLIT THICKNESS SKIN GRAFT WITH WOUND VAC PLACEMENT FOOT performed by Netta Sharp MD at [facility] Left 6/30/2020    DEBRIDEMENT, SKIN GRAFT SPLIT THICKNESS FOOT  ([place] [address], [address] [facility]) performed by BUTCH Cummins MD at [address] [facility]      as a child    TOTAL KNEE ARTHROPLASTY Right 10/13/2020    KNEE TOTAL ARTHROPLASTY    TOTAL KNEE ARTHROPLASTY Right 10/13/2020    KNEE TOTAL ARTHROPLASTY- MICROPORT, \ ADVANCED performed by Samantha Varela DO at [address] [facility] History   Problem Relation Age of Onset    Coronary Art Dis Mother     Arthritis Mother     Heart Surgery Mother         CABG    Coronary Art Dis Father     Heart Disease Father     Heart Surgery Father         CABG    Coronary Art Dis Sister     Heart Surgery Sister         CABG     Social History     Tobacco Use    Smoking status: Former Smoker     Packs/day: 0.25     Years: 50.00     Pack years: 12.50     Types: Cigarettes     Start date:      Quit date: 2020     Years since quittin.7    Smokeless tobacco: Never Used   Substance Use Topics    Alcohol use: Yes     Comment: 2-3 shot liquor for weekends      No Known Allergies    Subjective:      Review of Systems  Other pertinent ROS in HPI  Objective:     /70 (Site: Left Upper Arm, Position: Sitting, Cuff Size: Medium Adult)   Pulse 67   Wt 168 lb (76.2 kg)   SpO2 99%   BMI 27.12 kg/m²    Physical Exam  Constitutional:       Appearance: She is well-developed. HENT:      Right Ear: External ear normal.      Left Ear: External ear normal.      Nose: Nose normal.   Pulmonary:      Effort: Pulmonary effort is normal. No respiratory distress. Musculoskeletal:      Left wrist: Swelling and deformity (arthritic changes to fingers) present. Decreased range of motion (d/t pain). Cervical back: Full passive range of motion without pain and normal range of motion. Skin:     General: Skin is warm and dry. Neurological:      Mental Status: She is alert and oriented to person, place, and time. Assessment/PLAN   1. Left wrist pain    - XR WRIST LEFT (MIN 3 VIEWS); Future    2.  PAD (peripheral artery disease) ([address]  Stable follows with specialist      RTO

## 2021-11-22 NOTE — TELEPHONE ENCOUNTER
It looks like [facility] wants me to refill her dapsone. That's fine but I should see her in f/u and probably will need labs at her next appt. It would be ok for VV within 1 month, I won't make her remove her dressing. There are some photos in the chart.

## 2021-11-22 NOTE — TELEPHONE ENCOUNTER
Future Appointments   Date Time Provider Keshawn De Dios   11/23/2021  8:30 AM MARCELINA WaldropCZ WND DENIZ [place]   12/3/2021  9:20 AM Jeremie Waldrop MD  derm [facility]   1/7/2022  8:30 AM Maya Cho PA-C ORTHO SPECIA [facility]   9/15/2022  2:00 PM Genet Morrison MD [facility]

## 2021-11-23 NOTE — PROGRESS NOTES
[address]:     [facility] [facility] [id number] [facility], [address] [place], [place] [address]e Medical [place] p: [phone] f: [phone]     Ordering Center:   [address] [place] [address] and [address] [address]  651 [facility]    Patient Information:      Alba Gamboa  [facility]  [address]    : 1955  AGE: 60 y.o. GENDER: female   EPISODE DATE: 2021    Insurance:      PRIMARY INSURANCE:  Plan: MEDICAL MUTUAL ADVANTAGE CHOICE HMO  Coverage: MEDICAL MUTUAL MEDICARE ADVANTAGE  Effective Date: 2015  Group Number: [unfilled]  Subscriber Number: [id number] - (Medicare Managed)    Payor/Plan Subscr  Sex Relation Sub. Ins. ID Effective Group Num   1. Marley E 1955 Female Self [id number] 1/1/15 [id number]                                   [address]       Patient Wound Information:      Problem List Items Addressed This Visit     Neoplasm of uncertain behavior of skin    Relevant Orders    Supply: Wound Cleanser    Supply: Wound Dressings    Pain in left foot    Relevant Orders    Supply: Wound Cleanser    Supply: Wound Dressings    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 ([address] - Primary    Relevant Orders    Supply: Wound Cleanser    Supply: Wound Dressings          WOUNDS REQUIRING DRESSING SUPPLIES:     Wound 20 Foot Left;Dorsal wound #1 (Active)   Wound Image   21 08   Wound Etiology Other 21 08   Dressing Status New drainage noted;  Old drainage noted 21 0844   Wound Cleansed Cleansed with saline 21 08   Dressing/Treatment Other (comment) 21 0854   Wound Length (cm) 11.4 cm 21 08   Wound Width (cm) 10.6 cm 21 08   Wound Depth (cm) 0.6 cm 21 08   Wound Surface Area (cm^2) 120.84 cm^2 21 08   Change in Wound Size % (l*w) -292.34 21 08   Wound Volume (cm^3) 72.504 cm^3 11/23/21 0844   Wound Healing % -489 11/23/21 0844   Post-Procedure Length (cm) 11.4 cm 11/23/21 0844   Post-Procedure Width (cm) 10.6 cm 11/23/21 0844   Post-Procedure Depth (cm) 0.6 cm 11/23/21 0844   Post-Procedure Surface Area (cm^2) 120.84 cm^2 11/23/21 0844   Post-Procedure Volume (cm^3) 72.504 cm^3 11/23/21 0844   Wound Assessment Exposed structure tendon; Slough; Pink/red 11/23/21 0844   Drainage Amount Moderate 11/23/21 0844   Drainage Description Yellow 11/23/21 0844   Odor None 11/23/21 0844   Jessica-wound Assessment Edematous; Blanchable erythema 11/23/21 0844   Margins Defined edges 11/23/21 0844   Wound Thickness Description not for Pressure Injury Full thickness 11/23/21 0844   Number of days: 673          Supplies Requested :      WOUND #: 1   PRIMARY DRESSING:  Other: Xeroform vaseline gauze   Cover and Secure with: ABD pad wrap with Kerlix     FREQUENCY OF DRESSING CHANGES:  Daily         ADDITIONAL ITEMS:  [] Gloves Small  [x] Gloves Medium [] Gloves Large [] Gloves Libby Vann  [] Tape 1\" [x] Tape 2\" [] Tape 3\"  [] Medipore Tape  [x] Saline  [] Skin Prep   [] Adhesive Remover   [] Cotton Tip Applicators   [] Other:    Patient Wound(s) Debrided: [] Yes if yes please add date    [x] No  Not in clinic 11/15/21  Debribement Type:  Other Enzymatic debridement    Patient currently being seen by Home Health: [] Yes   [x] No    Duration for needed supplies:  []15  [x]30  []60  []90 Days    Electronically signed by Donnie Magdaleno RN on 11/23/2021 at 9:29 AM     Provider Information:      Ambrose Pat DPM  [id number]

## 2021-11-23 NOTE — PLAN OF CARE
ue  Santyl Nickel thick layer over top cover xeroform with abd pad  and kerlix  tricinalone around wound edges and ketoconazole to toes

## 2021-11-23 NOTE — PROGRESS NOTES
connective tissue, tissue culture grew Enterococcus faecalis on broth medium only that was sensitive to ampicillin and vancomycin with ZAIDA of 2. She was positive for AMADOR and anticentromere was evaluated by rheumatology was previously on prednisone and methotrexate that was changed to cyclosporine on her recent hospitalization. She had multiple debridements and grafting in the past that was failed. History of smoking  She was evaluated by vascular surgery, had angiogram done with no reported stenosis. She denied history of diabetes. Tissue culture in December and February 2021 grew Pseudomonas aeruginosa was treated with a course of Cipro. Tissue culture 2/19/2021 grew Pseudomonas aeruginosa that was sensitive to Cipro, previous tissue culture 1/26/2021 grew Pseudomonas aeruginosa that was resistant to Cipro. History of coronary artery disease status post stent  Wound/Ulcer Pain Timing/Severity: intermittent  Quality of pain: burning  Severity:5/10  Modifying Factors: None  Associated Signs/Symptoms: Drainage    Ulcer Identification:  Ulcer Type: undetermined     Contributing Factors: decreased tissue oxygenation  History of smoking, quit, history of coronary artery disease status post stent.       PAST MEDICAL HISTORY      Diagnosis Date    Arthritis     knees hips hands shoulders and back    CAD (coronary artery disease) 2019    blockage on cath 9/2019, to have stenting after surgery (arthroplasty) Dr. Antonio Mata    Chronic back pain     GERD (gastroesophageal reflux disease)     on rx    Hyperlipidemia     per Dr. Antonio Mtaa on rx    Hypertension     Loli Lyons manages    Incomplete RBBB     Irregular heart beat     LAFB (left anterior fascicular block)     Dr. Antonio Mata, cardiologist, seen 9/2019 prior to surgery on 10/8/2019    Leg wound, left     following with wound care, Dr. Yahaira Coles    Lumbar disc disease     Osteoporosis     PAC (premature atrial contraction) 06/2018    PACs and PVCs on holter monitor, PONV (postoperative nausea and vomiting)     requests scop patch    Wears dentures     upper partial    Wears eyeglasses     readers    Wears partial dentures     upper and lower    Wellness examination     MIKE Cline, STEPHANIE PCP, seen 2/10/2020       PAST SURGICAL HISTORY    Past Surgical History:   Procedure Laterality Date    ABDOMINOPLASTY  1997    BACK SURGERY      BLEPHAROPLASTY Bilateral 1997    BREAST ENHANCEMENT SURGERY Bilateral 1999    breast augmentation    CARDIAC CATHETERIZATION  2019    Dr. Zenia Wells, blockage but no stents yet    CATARACT REMOVAL WITH IMPLANT Bilateral 2016    COLONOSCOPY  2015    No Polyps    COSMETIC SURGERY      eyelid lift    FINGER SURGERY Right     thumb lesion excised    FIXATION KYPHOPLASTY  2013    Back    FOOT DEBRIDEMENT Left 10/11/2019    SURGICAL PREP WOUND BED LEFT FOOT WITH APPLICATION OF EPIFIX LEFT FOOT 3X4 performed by Alex Chavez DPM at [address] [facility]      kyphoplasty for lumbar fx 2013 Dr. Burt Goncalves ARTHROSCOPY Left 2006    Lg Salazar Left 1/2/2020    FOOT  DEBRIDEMENT WITH WOUND VAC PLACEMENT performed by Marisol Buckley MD at [address] [facility]  2014    LUMBAR SPINE SURGERY  2005    diskectomy and spinal fusion    OTHER SURGICAL HISTORY  10/03/2019    Left leg angiogram    LA OFFICE/OUTPT VISIT,PROCEDURE ONLY Right 9/11/2018    EXCISION MASS THUMB, 3080 TABLE performed by Marisol Marvin DO at [address] [place] [address] Left 12/3/2019    LEFT FOOT DEBRIDEMENT WITH SKIN GRAFT SPLIT THICKNESS; SKIN GRAFT TAKEN FROM RIGHT ANTERIOR THIGH performed by Marisol Buckley MD at [address] [place] [address] Left 2/25/2020    DEBRIDEMENT, SPLIT THICKNESS SKIN GRAFT WITH WOUND VAC PLACEMENT FOOT performed by Celia Quesada MD at [address] [place] [address] Left 6/30/2020    DEBRIDEMENT, SKIN GRAFT SPLIT THICKNESS FOOT  (Scot Lung) performed by Celai Quesada MD at [facility]    TONSILLECTOMY      as a child    TOTAL KNEE ARTHROPLASTY Right 10/13/2020    KNEE TOTAL ARTHROPLASTY    TOTAL KNEE ARTHROPLASTY Right 10/13/2020    KNEE TOTAL ARTHROPLASTY- MICROPORT, \ ADVANCED performed by Mare Meyer DO at [facility] HISTORY    Family History   Problem Relation Age of Onset    Coronary Art Dis Mother     Arthritis Mother     Heart Surgery Mother         CABG    Coronary Art Dis Father     Heart Disease Father     Heart Surgery Father         CABG    Coronary Art Dis Sister     Heart Surgery Sister         CABG       SOCIAL HISTORY    Social History     Tobacco Use    Smoking status: Former Smoker     Packs/day: 0.25     Years: 50.00     Pack years: 12.50     Types: Cigarettes     Start date:      Quit date: 2020     Years since quittin.8    Smokeless tobacco: Never Used   Vaping Use    Vaping Use: Never used   Substance Use Topics    Alcohol use: Yes     Comment: 2-3 shot liquor for weekends    Drug use: Never       ALLERGIES    No Known Allergies    MEDICATIONS    Current Outpatient Medications on File Prior to Encounter   Medication Sig Dispense Refill    dapsone 25 MG tablet take 2 tablets by mouth once daily 60 tablet 0    folic acid (FOLVITE) 1 MG tablet take 1 tablet by mouth once daily 30 tablet 5    doxycycline hyclate (VIBRAMYCIN) 100 MG capsule       predniSONE (DELTASONE) 10 MG tablet 7.5 mg daily 7.5mg for 3 weeks, taper to 5 mg for 3 weeks      inFLIXimab (REMICADE) 100 MG injection Infuse 5 mg/kg intravenously See Admin Instructions      omeprazole (PRILOSEC) 40 MG delayed release capsule Take 1 capsule by mouth daily 30 capsule 5    Metoprolol Tartrate 37.5 MG TABS take 1 tablet by mouth twice a day 60 tablet 5    lidocaine (XYLOCAINE) 2 % jelly Do not apply directly to wound. 30 mL 2    atorvastatin (LIPITOR) 10 MG tablet Take 1 tablet by mouth daily Take 10mg daily while on Cyclosporine.  30 tablet 2    meclizine (ANTIVERT) 25 MG CHEW Take 25 mg by mouth 3 times daily as needed      clopidogrel (PLAVIX) 75 MG tablet Take 1 tablet by mouth daily 30 tablet 11    Multiple Vitamins-Minerals (THERAPEUTIC MULTIVITAMIN-MINERALS) tablet Take 1 tablet by mouth daily  (Patient not taking: Reported on 10/20/2021)      oxyCODONE-acetaminophen (PERCOCET) 5-325 MG per tablet take 1 tablet by mouth four times a day if needed      traMADol (ULTRAM ER) 200 MG extended release tablet take 1 tablet by mouth once daily      aspirin EC 81 MG EC tablet Take 1 tablet by mouth 2 times daily 84 tablet 0    vitamin C (ASCORBIC ACID) 500 MG tablet Take 500 mg by mouth 2 times daily      diclofenac sodium (VOLTAREN) 1 % GEL Apply topically as needed Dr. Analilia Sinclair      NAPROXEN PO Take 250 mg by mouth daily as needed Indications: patient takes 1-2 times a day Stopping 10 days prior to surgery as instructed by surgeon      Cholecalciferol (VITAMIN D3) 5000 units TABS Take 1,000 Units by mouth daily  (Patient not taking: Reported on 10/20/2021)      calcium carbonate (OSCAL) 500 MG TABS tablet Take 1,000 mg by mouth daily Per Laith Kras rheumatology (Patient not taking: Reported on 10/20/2021)      alendronate (FOSAMAX) 70 MG tablet Take 70 mg by mouth every 7 days Indications: Mondays Dr. Abigail Fritz   Patient takes on Mondays      gabapentin (NEURONTIN) 300 MG capsule Take 300 mg by mouth 2 times daily. Per Dr. Analilia Sinclair       No current facility-administered medications on file prior to encounter. REVIEW OF SYSTEMS  Review of Systems   Skin: Positive for color change and wound. Pertinent items are noted in HPI.   Other than above 12 system review was negative    Objective:      BP (!) 166/84   Pulse 77   Temp 98.9 °F (37.2 °C) (Tympanic)   Resp 18   Wt 170 lb (77.1 kg)   BMI 27.44 kg/m²     Wt Readings from Last 3 Encounters:   11/23/21 170 lb (77.1 kg)   10/20/21 168 lb (76.2 kg)   10/19/21 169 lb (76.7 kg)       PHYSICAL EXAM  Vascular: DP pulses are not palpable, Bilateral. PT pulses are not palpable, Bilateral. CFT <4 seconds to all digits, Bilateral.  No edema, Bilateral.  Hair growth is absent to the level of the digits, Bilateral.  Skin temp is warm to cool from the tibial tuberosity to the digits, Bilateral.      Neuro: Saph/sural/SP/DP/plantar sensation intact to light touch. Musculoskeletal: EHL/FHL/GS/TA gross motor intact. Gross deformity is absent. Dermatologic: Open wound present to dorsal left foot wound as documented in detail below. Wound base is fibro-necrotic extending to the level of muscle with an exposed extensor tendon and 3rd MT exposed. There is minimal erythema at skin edges. There is no purulent drainage. There is no fluctuance or crepitus. Interdigital maceration absent, Bilateral.     Wound 12/04/20 Foot Left;Dorsal wound #1 (Active)   Wound Image   11/23/21 0844   Wound Etiology Other 11/23/21 0844   Dressing Status New drainage noted; Old drainage noted 11/23/21 0844   Wound Cleansed Cleansed with saline 11/23/21 0844   Dressing/Treatment Other (comment) 06/01/21 0854   Wound Length (cm) 11.4 cm 11/23/21 0844   Wound Width (cm) 10.6 cm 11/23/21 0844   Wound Depth (cm) 0.6 cm 11/23/21 0844   Wound Surface Area (cm^2) 120.84 cm^2 11/23/21 0844   Change in Wound Size % (l*w) -292.34 11/23/21 0844   Wound Volume (cm^3) 72.504 cm^3 11/23/21 0844   Wound Healing % -489 11/23/21 0844   Post-Procedure Length (cm) 11.4 cm 11/23/21 0844   Post-Procedure Width (cm) 10.6 cm 11/23/21 0844   Post-Procedure Depth (cm) 0.6 cm 11/23/21 0844   Post-Procedure Surface Area (cm^2) 120.84 cm^2 11/23/21 0844   Post-Procedure Volume (cm^3) 72.504 cm^3 11/23/21 0844   Wound Assessment Exposed structure tendon; Slough; Pink/red 11/23/21 0844   Drainage Amount Moderate 11/23/21 0844   Drainage Description Yellow 11/23/21 0844   Odor None 11/23/21 0844   Jessica-wound Assessment Edematous;  Blanchable erythema 11/23/21 0844   Margins Defined edges 11/23/21 0844   Wound Thickness agree with the chief complaint, past medical history, past surgical history, allergies, medications, social and family history as documented unless otherwise noted below. Documentation of the HPI, Physical Exam and Medical Decision Making performed by medical students or scribes is based on my personal performance of the HPI, PE and MDM. I have personally evaluated this patient and have completed at least one if not all key elements of the E/M (history, physical exam, and MDM). Additional findings are as noted.      Tasha Clinton DPM on 11/23/2021 at 70:10 PM  Board Certified, American Board of Podiatric Surgery  Fellow, [facility] [employer] and [id number]

## 2021-12-03 NOTE — PROGRESS NOTES
Patient presents for dapsone refills. Video did not work, so spoke on the phone. 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 Patient is very happy because she just had a nerve stimulator placed by pain management which is helping her pain immensely. Continue to see [facility]. On infliximab, doxycycline, prednisone (lowering dose, now at 5 mg). CC recommended continuing of dapsone and monitoring by me. Labs reviewed. Patient has chronic anemia with macrocytosis. Admits to drinking recently but will try to cut down now that she has the nerve stimulator. Other labs reasonable to continue dapsone. 3 month refill sent.  F/u in 3 months

## 2021-12-10 PROBLEM — U07.1 PNEUMONIA DUE TO COVID-19 VIRUS: Status: ACTIVE | Noted: 2021-01-01

## 2021-12-10 PROBLEM — J12.82 PNEUMONIA DUE TO COVID-19 VIRUS: Status: ACTIVE | Noted: 2021-01-01

## 2021-12-10 NOTE — ED NOTES
Pt O2 dropped to 81% with good wave form while on 6L per nasal cannula. Pt switched to non-re breather and placed on 10L, ok per Dr. Aleksander Vital. Pt O2 rebounded to 94%. Pt A&O x 4, on continuous monitor, does not appear in acute distress, RR even but labored, resting comfortably on stretcher with eyes open and call light in reach. Writer will continue to monitor pt closely.         Sarah Duffy, ANTOINE  39/17/44 2010

## 2021-12-10 NOTE — ED PROVIDER NOTES
[facility] ED  Emergency Department Encounter  EmergencyMedicine Resident     Pt Name:Estephania Osman  MRN: [id number]  [place] 1955  Date of evaluation: 12/10/21  PCP:  NUPUR Alvarado CNP    This patient was evaluated in the Emergency Department for symptoms described in the history of present illness. The patient was evaluated in the context of the global COVID-19 pandemic, which necessitated consideration that the patient might be at risk for infection with the SARS-CoV-2 virus that causes COVID-19. Institutional protocols and algorithms that pertain to the evaluation of patients at risk for COVID-19 are in a state of rapid change based on information released by regulatory bodies including the CDC and federal and state organizations. These policies and algorithms were followed during the patient's care in the ED. CHIEF COMPLAINT       Chief Complaint   Patient presents with    Emesis     N/V Xs 3 days    Fever     103 t two days ago    Shortness of Breath       HISTORY OF PRESENT ILLNESS  (Location/Symptom, Timing/Onset, Context/Setting, Quality, Duration, Modifying Factors, Severity.)      Verle Babinski is a 60 y.o. female who presents with complaint of 5 days of shortness of breath, nausea, vomiting, diarrhea as well as generalized fatigue. Past medical history significant for CAD, hypertension, A. fib, as well as osteoporosis. Patient states that she has close contact with family members with COVID-19. Taken at home Covid test on Monday that was negative however has similar symptoms to relatives. Patient is unvaccinated against COVID-19. States she has had a fever as well as generalized fatigue at home. Episodes of diarrhea as well as vomiting. Nonbloody. Patient does also have a chronic pyogenic infection of the left foot. Sees wound care for this.   Denies any chest pain at this time, dysuria, hematuria, one-sided leg swelling, abdominal pain, hemoptysis, cancer diagnoses, history of blood clots. PAST MEDICAL / SURGICAL / SOCIAL / FAMILY HISTORY      has a past medical history of Arthritis, CAD (coronary artery disease), Chronic back pain, GERD (gastroesophageal reflux disease), Hyperlipidemia, Hypertension, Incomplete RBBB, Irregular heart beat, LAFB (left anterior fascicular block), Leg wound, left, Lumbar disc disease, Osteoporosis, PAC (premature atrial contraction), PONV (postoperative nausea and vomiting), Wears dentures, Wears eyeglasses, Wears partial dentures, and Wellness examination. has a past surgical history that includes Knee arthroscopy (Left, ); Abdominoplasty (); blepharoplasty (Bilateral, ); laminectomy (); lumbar laminectomy (); lumbar laminectomy (); Fixation Kyphoplasty (); Finger surgery (Right); Colonoscopy (); pr office/outpt visit,procedure only (Right, 2018); Lumbar spine surgery (); Cataract removal with implant (Bilateral, ); other surgical history (10/03/2019); Foot Debridement (Left, 10/11/2019); Skin graft (Left, 12/3/2019); Leg Surgery (Left, 2020); Breast enhancement surgery (Bilateral, ); Skin graft (Left, 2020); fracture surgery; Tonsillectomy; Skin graft (Left, 2020); Cosmetic surgery; Cardiac catheterization (); Total knee arthroplasty (Right, 10/13/2020); Total knee arthroplasty (Right, 10/13/2020); and back surgery.       Social History     Socioeconomic History    Marital status:      Spouse name: Laura Farrell Number of children: 2    Years of education: Not on file    Highest education level: Not on file   Occupational History    Occupation: Retired RN   Tobacco Use    Smoking status: Former Smoker     Packs/day: 0.25     Years: 50.00     Pack years: 12.50     Types: Cigarettes     Start date:      Quit date: 2020     Years since quittin.8    Smokeless tobacco: Never Used   Vaping Use    Vaping Use: Never used   Substance and Sexual Activity    Alcohol use: Yes     Comment: 2-3 shot liquor for weekends    Drug use: Never    Sexual activity: Yes     Partners: Male   Other Topics Concern    Not on file   Social History Narrative    Not on file     Social Determinants of Health     Financial Resource Strain: Low Risk     Difficulty of Paying Living Expenses: Not hard at all   Food Insecurity: No Food Insecurity    Worried About [address] in the Last Year: Never true    [address] in the Last Year: Never true   Transportation Needs:     Lack of Transportation (Medical): Not on file    Lack of Transportation (Non-Medical): Not on file   Physical Activity:     Days of Exercise per Week: Not on file    Minutes of Exercise per Session: Not on file   Stress:     Feeling of Stress : Not on file   Social Connections:     Frequency of Communication with Friends and Family: Not on file    Frequency of Social Gatherings with Friends and Family: Not on file    Attends Rastafarian Services: Not on file    Active Member of [address] [place] [address] or Organizations: Not on file    Attends Club or Organization Meetings: Not on file    Marital Status: Not on file   Intimate Partner Violence:     Fear of Current or Ex-Partner: Not on file    Emotionally Abused: Not on file    Physically Abused: Not on file    Sexually Abused: Not on file   Housing Stability:     Unable to Pay for Housing in the Last Year: Not on file    Number of [place] in the Last Year: Not on file    Unstable Housing in the Last Year: Not on file       Family History   Problem Relation Age of Onset    Coronary Art Dis Mother     Arthritis Mother     Heart Surgery Mother         CABG    Coronary Art Dis Father     Heart Disease Father     Heart Surgery Father         CABG    Coronary Art Dis Sister     Heart Surgery Sister         CABG       Allergies:  Patient has no known allergies.     Home Medications:  Prior to Admission medications    Medication Sig Start Date End Date Taking? Authorizing Provider   dapsone 25 MG tablet Take 2 tablets by mouth daily 12/3/21 1/2/22  Wilton Cobian MD   folic acid (FOLVITE) 1 MG tablet take 1 tablet by mouth once daily 10/20/21   NUPUR Saldivar CNP   doxycycline hyclate (VIBRAMYCIN) 100 MG capsule  9/13/21   Historical Provider, MD   predniSONE (DELTASONE) 10 MG tablet 7.5 mg daily 7.5mg for 3 weeks, taper to 5 mg for 3 weeks 9/13/21   Historical Provider, MD   inFLIXimab (REMICADE) 100 MG injection Infuse 5 mg/kg intravenously See Admin Instructions    Historical Provider, MD   omeprazole (PRILOSEC) 40 MG delayed release capsule Take 1 capsule by mouth daily 8/6/21   NUPUR Saldivar CNP   Metoprolol Tartrate 37.5 MG TABS take 1 tablet by mouth twice a day 8/6/21   NUPUR Saldivar CNP   lidocaine (XYLOCAINE) 2 % jelly Do not apply directly to wound. 6/4/21   Barbara Harris DPM   atorvastatin (LIPITOR) 10 MG tablet Take 1 tablet by mouth daily Take 10mg daily while on Cyclosporine.  6/4/21   Barbara Harris DPM   meclizine (ANTIVERT) 25 MG CHEW Take 25 mg by mouth 3 times daily as needed    Historical Provider, MD   clopidogrel (PLAVIX) 75 MG tablet Take 1 tablet by mouth daily 2/5/21   Segun Whiting MD   Multiple Vitamins-Minerals (THERAPEUTIC MULTIVITAMIN-MINERALS) tablet Take 1 tablet by mouth daily   Patient not taking: Reported on 10/20/2021    Historical Provider, MD   oxyCODONE-acetaminophen (PERCOCET) 5-325 MG per tablet take 1 tablet by mouth four times a day if needed 10/26/20   Historical Provider, MD   traMADol [facility]) 200 MG extended release tablet take 1 tablet by mouth once daily 10/26/20   Historical Provider, MD   aspirin EC 81 MG EC tablet Take 1 tablet by mouth 2 times daily 10/14/20 10/20/21  Billy Aguayo MD   vitamin C (ASCORBIC ACID) 500 MG tablet Take 500 mg by mouth 2 times daily    Historical Provider, MD   diclofenac sodium (VOLTAREN) 1 % GEL Apply topically as needed Dr. Manisha Lopez Provider, MD   NAPROXEN PO Take 250 mg by mouth daily as needed Indications: patient takes 1-2 times a day Stopping 10 days prior to surgery as instructed by surgeon    Historical Provider, MD   Cholecalciferol (VITAMIN D3) 5000 units TABS Take 1,000 Units by mouth daily   Patient not taking: Reported on 10/20/2021    Historical Provider, MD   calcium carbonate (OSCAL) 500 MG TABS tablet Take 1,000 mg by mouth daily Per [facility] rheumatology  Patient not taking: Reported on 10/20/2021    Historical Provider, MD   alendronate (FOSAMAX) 70 MG tablet Take 70 mg by mouth every 7 days Indications: Mondays Dr. Eduardo Luke   Patient takes on Mondays 6/4/18   Historical Provider, MD   gabapentin (NEURONTIN) 300 MG capsule Take 300 mg by mouth 2 times daily. Per Dr. Homer Butler 2/27/15   Historical Provider, MD       REVIEW OF SYSTEMS    (2-9 systems for level 4, 10 or more for level 5)      Review of Systems   Constitutional: Positive for chills, fatigue and fever. HENT: Positive for congestion and rhinorrhea. Eyes: Negative for visual disturbance. Respiratory: Positive for cough and shortness of breath. Negative for wheezing. Cardiovascular: Negative for chest pain and leg swelling. Gastrointestinal: Positive for diarrhea, nausea and vomiting. Negative for abdominal pain. Genitourinary: Negative for dysuria and hematuria. Musculoskeletal: Negative for back pain, neck pain and neck stiffness. Skin: Negative for rash and wound. Neurological: Positive for headaches. Negative for weakness. PHYSICAL EXAM   (up to 7 for level 4, 8 or more for level 5)      INITIAL VITALS:   BP (!) 161/79   Pulse 93   Temp 100 °F (37.8 °C) (Oral)   Resp 26   Ht 5' 6\" (1.676 m)   Wt 170 lb (77.1 kg)   SpO2 94%   BMI 27.44 kg/m²     Physical Exam  Constitutional:       General: She is not in acute distress. Appearance: She is ill-appearing. She is not toxic-appearing or diaphoretic. Comments: Ill-appearing tachypneic 61-year-old female   HENT:      Head: Normocephalic and atraumatic. Nose: Congestion and rhinorrhea present. Eyes:      Extraocular Movements: Extraocular movements intact. Cardiovascular:      Rate and Rhythm: Regular rhythm. Tachycardia present. Heart sounds: No murmur heard. No friction rub. No gallop. Pulmonary:      Effort: No respiratory distress. Breath sounds: No stridor. No wheezing, rhonchi or rales. Comments: Tachypnea but speaking in full sentences, equal chest rise and fall  Chest:      Chest wall: No tenderness. Abdominal:      General: There is no distension. Palpations: There is no mass. Tenderness: There is no abdominal tenderness. There is no guarding or rebound. Hernia: No hernia is present. Musculoskeletal:         General: No swelling, tenderness, deformity or signs of injury. Right lower leg: No edema. Left lower leg: No edema. Comments: Left foot with chronic infection purulent discharge, no overlying warmth or erythema, no erythema extending up the leg   Skin:     General: Skin is warm and dry. Capillary Refill: Capillary refill takes less than 2 seconds. Coloration: Skin is not jaundiced or pale. Findings: No bruising, erythema, lesion or rash. Neurological:      Mental Status: She is oriented to person, place, and time.          DIFFERENTIAL  DIAGNOSIS     PLAN (LABS / IMAGING / EKG):  Orders Placed This Encounter   Procedures    Culture, Blood 1    Culture, Blood 2    Culture, Urine    COVID-19, Rapid    Culture, Blood 1    XR CHEST PORTABLE    XR CHEST (2 VW)    CBC Auto Differential    Comprehensive Metabolic Panel w/ Reflex to MG    Troponin    Lactate, Sepsis    D-Dimer, Quantitative    Urinalysis, reflex to microscopic    C-Reactive Protein    FERRITIN    FIBRINOGEN    Procalcitonin    Lactate Dehydrogenase    Magnesium    CBC Auto Differential    Comprehensive Metabolic Panel w/ Reflex to MG    C-Reactive Protein    Lactate Dehydrogenase    Ferritin    ADULT DIET; Regular    Vital signs per unit routine    PPE Instructions    Daily weights    Intake and output    Elevate Head of Bed     Strict Bedrest    Full Code    Inpatient consult to Hospitalist    Inpatient consult to Infectious Diseases    Consult to Infectious Disease    Consult to Pulmonology    Inpatient consult to Case Management    Droplet Plus Isolation    OT eval and treat    PT evaluation and treat    Initiate Oxygen Therapy Protocol    EKG 12 Lead    PATIENT STATUS (FROM ED OR OR/PROCEDURAL) Inpatient       MEDICATIONS ORDERED:  Orders Placed This Encounter   Medications    ondansetron (ZOFRAN) injection 4 mg    0.9 % sodium chloride bolus    dexamethasone (DECADRON) injection 6 mg    atorvastatin (LIPITOR) tablet 10 mg    clopidogrel (PLAVIX) tablet 75 mg    folic acid (FOLVITE) tablet 1 mg    dapsone tablet 50 mg     Order Specific Question:   Antimicrobial Indications     Answer: Other     Order Specific Question:   Other Abx Indication     Answer:   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    doxycycline hyclate (VIBRA-TABS) tablet 100 mg     Order Specific Question:   Antimicrobial Indications     Answer:    Other     Order Specific Question:   Other Abx Indication     Answer:   Chronic med, on it for pyoderma by her dermatologist    gabapentin (NEURONTIN) capsule 300 mg    lidocaine (XYLOCAINE) 2 % jelly    meclizine (ANTIVERT) tablet 25 mg    therapeutic multivitamin-minerals 1 tablet    pantoprazole (PROTONIX) tablet 40 mg    oxyCODONE-acetaminophen (PERCOCET) 5-325 MG per tablet 1 tablet    traMADol (ULTRAM) tablet 50 mg    sodium chloride flush 0.9 % injection 5-40 mL    sodium chloride flush 0.9 % injection 5-40 mL    0.9 % sodium chloride infusion    enoxaparin (LOVENOX) injection 30 mg    OR Linked Order Group    Melisa Corea Absolute Immature Granulocyte 0.11 0.00 - 0.30 k/uL    WBC Morphology NOT REPORTED     RBC Morphology MACROCYTOSIS PRESENT     Platelet Estimate NOT REPORTED    Comprehensive Metabolic Panel w/ Reflex to MG   Result Value Ref Range    Glucose 96 70 - 99 mg/dL    BUN 10 8 - 23 mg/dL    CREATININE 0.54 0.50 - 0.90 mg/dL    Bun/Cre Ratio NOT REPORTED 9 - 20    Calcium 9.6 8.6 - 10.4 mg/dL    Sodium 147 (H) 135 - 144 mmol/L    Potassium 3.0 (L) 3.7 - 5.3 mmol/L    Chloride 110 (H) 98 - 107 mmol/L    CO2 22 20 - 31 mmol/L    Anion Gap 15 9 - 17 mmol/L    Alkaline Phosphatase 132 (H) 35 - 104 U/L     (H) 5 - 33 U/L     (H) <32 U/L    Total Bilirubin 0.56 0.3 - 1.2 mg/dL    Total Protein 6.1 (L) 6.4 - 8.3 g/dL    Albumin 3.4 (L) 3.5 - 5.2 g/dL    Albumin/Globulin Ratio 1.3 1.0 - 2.5    GFR Non-African American >60 >60 mL/min    GFR African American >60 >60 mL/min    GFR Comment          GFR Staging NOT REPORTED    Troponin   Result Value Ref Range    Troponin, High Sensitivity 18 (H) 0 - 14 ng/L    Troponin T NOT REPORTED <0.03 ng/mL    Troponin Interp NOT REPORTED    Troponin   Result Value Ref Range    Troponin, High Sensitivity 17 (H) 0 - 14 ng/L    Troponin T NOT REPORTED <0.03 ng/mL    Troponin Interp NOT REPORTED    Lactate, Sepsis   Result Value Ref Range    Lactic Acid, Sepsis NOT REPORTED 0.5 - 1.9 mmol/L    Lactic Acid, Sepsis, Whole Blood 2.2 (H) 0.5 - 1.9 mmol/L   D-Dimer, Quantitative   Result Value Ref Range    D-Dimer, Quant 0.95 mg/L FEU   C-Reactive Protein   Result Value Ref Range    CRP 53.8 (H) 0.0 - 5.0 mg/L   FERRITIN   Result Value Ref Range    Ferritin 5,091 (H) 13 - 150 ug/L   FIBRINOGEN   Result Value Ref Range    Fibrinogen 603 (H) 140 - 420 mg/dL   Procalcitonin   Result Value Ref Range    Procalcitonin >100.00 (H) <0.09 ng/mL   Lactate Dehydrogenase   Result Value Ref Range    LD 1,260 (H) 135 - 214 U/L   Magnesium   Result Value Ref Range    Magnesium 1.9 1.6 - 2.6 mg/dL IMPRESSION: Laboratory findings consistent with COVID-19 pneumonia. RADIOLOGY:  XR CHEST PORTABLE    Result Date: 12/10/2021  EXAMINATION: ONE XRAY VIEW OF THE CHEST 12/10/2021 4:12 pm COMPARISON: 09/30/2020 HISTORY: ORDERING SYSTEM PROVIDED HISTORY: SOB TECHNOLOGIST PROVIDED HISTORY: SOB Reason for Exam: upr FINDINGS: Cardiac silhouette size at the upper limits of normal.  Vascular congestion, perihilar opacities and lower lung field opacities, right greater than left. No pneumothorax identified. No effusion appreciated. No acute osseous abnormality identified. Vascular congestion and bilateral airspace disease is most suggestive of developing edema. An underlying inflammatory process or infection should also be considered in the appropriate clinical setting. EKG  Normal sinus rhythm, left axis deviation, left ventricular hypertrophy, intervals within normal limits, due to inversions in lateral leads compared to prior EKG. Abnormal EKG. All EKG's are interpreted by the Emergency Department Physician who either signs or Co-signs this chart in the absence of a cardiologist.    EMERGENCY DEPARTMENT COURSE:  Sepsis Times and Checklist  Vital Signs: BP: (!) 161/79  Pulse: 93  Resp: 26  Temp: 100 °F (37.8 °C) SpO2: 94 %  SIRS (>2)   Temp > 38.0C or < 36C   HR > 90   RR > 20   WBC > 12 or < 4 or >10% bands  SIRS (>2) and confirmed or suspected source of infection = Sepsis  Is Sepsis due to likely bacterial infection?: No  If not, then sepsis is due to likely viral etiology. Severe Sepsis Identified:  Date: 12/10   Time: 1655  Sepsis Orders:  ·  CBC(required): Yes  ·  CMP: Yes  ·  PT/PTT: No  ·  Blood Cultures x2(required): Yes  ·  Urinalysis and Urine Culture: Yes  ·  Lactate(required):  Yes  ·  Antibiotics Given (within 3 hours of sepsis identification, after blood cultures):  Viral cause     (If unable to obtain IV access for IV antibiotics within 3 hours of identification of sepsis, IM antibiotics is acceptable.)  ·             If lactate >2.0 MUST repeat within 6 hours    Patient is a 67-year-old female presenting with chief complaint of shortness of breath, myalgias, arthralgias, nausea, vomiting, diarrhea. Patient is ill-appearing but nontoxic. Saturating at 74% on room air. Laboratory findings consistent with COVID-19 pneumonia. No indication of bacterial sepsis at this time. Patient has chronic healing wound on left foot with slight purulent discharge no overlying warmth or erythema. Patient on chronic antibiotic use for this. No indication for CT PE study this time. Patient admitted to Intermed service for further evaluation care. Infectious disease consulted. Patient amenable to admission. ED Course as of 12/10/21 1919   Fri Dec 10, 2021   128 67-year-old female presenting with chief complaint of fever, shortness of breath, emesis. Ill-appearing will obtain COVID-19 labs as patient is close contact. Oxygen saturation 75% on room air likely admission. Will do sepsis work-up as well [MS]   1616 Lactic Acid, Sepsis, Whole Blood(!): 2.2 [MS]   1654 SARS-CoV-2, Rapid(!): DETECTED [MS]   1706 Comprehensive Metabolic Panel w/ Reflex to MG(!):    Glucose 96   BUN 10   Creatinine 0.54   Bun/Cre Ratio NOT REPORTED   Calcium 9.6   Sodium 147(!)   Potassium PENDING   Chloride 110(!)   CO2 22   Anion Gap 15   Alk Phos 132(!)   (!)   AST PENDING   Bilirubin 0.56   Total Protein 6.1(!)   Albumin 3.4(!)   Albumin/Globulin Ratio 1.3   GFR Non- >60   GFR  >60   GFR Comment        GFR Staging NOT REPORTED [MS]   1706 C-Reactive Protein(!):    CRP 53.8(!) [MS]   1706 Procalcitonin(!):    Procalcitonin >100.00(!) [MS]   1710 Sepsis source likely due to viral illness. Although left lower limb does have purulent discharge this appears to be chronic. No erythema spreading up the leg. No overlying warmth.  [MS]   1728 LD(!): 1,260 [MS]   1739 Patient admitted to [facility]. Infectious disease consulted [MS]   1839 Troponin, High Sensitivity(!): 17 [MS]      ED Course User Index  [MS] Misty Loyola DO       PROCEDURES:  n/a    CONSULTS:  IP CONSULT TO HOSPITALIST  IP CONSULT TO INFECTIOUS DISEASES  IP CONSULT TO INFECTIOUS DISEASES  IP CONSULT TO PULMONOLOGY  IP CONSULT TO CASE MANAGEMENT    CRITICAL CARE:  n/a    FINAL IMPRESSION      1. COVID-19    2. Hypoxemia          DISPOSITION / PLAN     DISPOSITION Admitted 12/10/2021 05:52:59 PM      PATIENT REFERRED TO:  No follow-up provider specified.     DISCHARGE MEDICATIONS:  New Prescriptions    No medications on file       Annelise Hodge DO  Emergency Medicine Resident    (Please note that portions of thisnote were completed with a voice recognition program.  Efforts were made to edit the dictations but occasionally words are mis-transcribed.)        Misty Loyola DO  Resident  12/10/21 1920

## 2021-12-10 NOTE — ED PROVIDER NOTES
[facility]     Emergency Department     Faculty Attestation    I performed a history and physical examination of the patient and discussed management with the resident. I reviewed the residents note and agree with the documented findings including all diagnostic interpretations and plan of care. Any areas of disagreement are noted on the chart. I was personally present for the key portions of any procedures. I have documented in the chart those procedures where I was not present during the key portions. I have reviewed the emergency nurses triage note. I agree with the chief complaint, past medical history, past surgical history, allergies, medications, social and family history as documented unless otherwise noted below. Documentation of the HPI, Physical Exam and Medical Decision Making performed by [email or web address] is based on my personal performance of the HPI, PE and MDM. For Physician Assistant/ Nurse Practitioner cases/documentation I have personally evaluated this patient and have completed at least one if not all key elements of the E/M (history, physical exam, and MDM). Additional findings are as noted. This patient was evaluated in the Emergency Department for symptoms described in the history of present illness. He/she was evaluated in the context of the global COVID-19 pandemic, which necessitated consideration that the patient might be at risk for infection with the SARS-CoV-2 virus that causes COVID-19. Institutional protocols and algorithms that pertain to the evaluation of patients at risk for COVID-19 are in a state of rapid change based on information released by regulatory bodies including the CDC and federal and state organizations. These policies and algorithms were followed during the patient's care in the ED. Primary Care Physician: NUPUR Ivan CNP    History:  This is a 60 y.o. female who presents to the Emergency Department with complaint of shortness of breath, general illness. Fever cough body aches. Negative Covid test on Monday. Not vaccinated. Physical:     height is 5' 6\" (1.676 m) and weight is 170 lb (77.1 kg). Her oral temperature is 100 °F (37.8 °C). Her blood pressure is 128/70 and her pulse is 109. Her respiration is 24 and oxygen saturation is 94%.    60 y.o. female ill-appearing, cardiac exam borderline tachycardic, pulmonary tachypneic, mildly diminished in bases, abdomen soft nontender nondistended. Currently on 6 L of O2.   Left foot wrapped with some purulent drainage in the dressing however no significant erythema or warmth at the wound edges    Impression: Suspected Covid    Plan: Covid swab, septic work-up, admission    EKG Interpretation  EKG Interpretation    Interpreted by emergency department physician    Rhythm: normal sinus   Rate: normal  Axis: left  Ectopy: none  Conduction: LVH  ST Segments: no acute change  T Waves: inversion in  lateral leads  Q Waves: none    EKG  Impression: Abnormal EKG, left ventricular hypertrophy, new T wave inversions in lateral leads compared with EKG September 30, 2020    Marilia Lazo MD      Interpreted by me      Sofy Lockett MD, Reji Cummings  Attending Emergency Physician         Marilia Lazo MD  12/10/21 [id number]

## 2021-12-11 PROBLEM — J96.00 ACUTE RESPIRATORY FAILURE DUE TO COVID-19 (HCC): Status: ACTIVE | Noted: 2021-01-01

## 2021-12-11 PROBLEM — U07.1 ACUTE RESPIRATORY FAILURE DUE TO COVID-19 (HCC): Status: ACTIVE | Noted: 2021-01-01

## 2021-12-11 PROBLEM — R79.89 ELEVATED LFTS: Status: ACTIVE | Noted: 2021-01-01

## 2021-12-11 NOTE — PROGRESS NOTES
Patient up to commode and urinated and had BM  Assisted back to bed and her oxygen is very low on high flow Resp therapy is present Oxygen sat = 60% on hi flow- MD present and patient will be placed on bipap

## 2021-12-11 NOTE — PROGRESS NOTES
[facility] -   Infectious diseases evaluation  admission date 12/10/2021    reason for consultation:   covid pneumonia    Impression :   Current:  · covid pneumonia w hypoxemia and resp distress  · Increased inflamatory markers  · procal >100  · Left foot chronic infected wound - pyoderma gangrenosum( per pt)  · Past Hx pseudomonas infection  · QTC    Other:  · HTN, CAD, RBBB  Discussion / summary of stay / plan of care   ·   Recommendations   · Covid Rx :  · Decadron high dose 20x 5 then 10 x5  · CT of the chest suggestive of bilateral Covid pneumonia. Actemra 8 mg/kg X 1 given on 12-10-21  · FU CRP   · Foot wound cx and wound care  · She declined Dakins and acetic acid because of past painful experiences  · Order betadine soaks daily   · Start cefepime pend foot cx and adjust accordingly      Infection Control Recommendations   · [place] Precautions  · Contact Isolation   · Airborne isolation  · Droplet Isolation  · Isolate till 12/25 included  Antimicrobial Stewardship Recommendations   · Simplification of therapy  · Targeted therapy    Coordination ofOutpatient Care:   · Estimated Length of IV antimicrobials: TBd  · Patient will need Midline / picc Catheter Insertion:   · Patient will need SNF:  · Patient will need outpatient wound care: Yes    History of Present Illness:     INITIAL HISTORY:    Fransisco Shay is a 60y.o.-year-old female who presents with nausea, vomiting, diarrhea, shortness of breath, clear cough, generalized fatigue, fever present for about 5 days. Was exposed to Covid, had test at home on 12/6 that was negative. She is unvaccinated. Reports a left foot chronic infection, managed at the wound care for that. On 5/2021 the foot wound cx was Pseudomonas S cipro and cefepime.  On 6/2021 foot cx was neg    No dysuria  No other soft tissue infections  Does have incomplete RBBB and chronic back pain    In ER she was tachypneic tachycardic  Inflammatory markers elevated, white count is normal  Tested positive for Covid on 12/10 in the ER and chest x-ray suggestive of Covid pneumonia    Was started on NRB 10L in ER due to hypoxemia with improvement in 02 sat to 94. Left foot dorsum with large, open wound w purulence. Cx taken in ER  Mixed reynaldo on surface culture. Interval changes  12/11/2021   Patient Vitals for the past 8 hrs:   BP Temp Pulse Resp SpO2   12/11/21 1601    24 99 %   12/11/21 1553    24    12/11/21 1330     91 %   12/11/21 1300 (!) 151/75 98.5 °F (36.9 °C) 61 26        Afebrile  VS stable, HTN    On high flow 02   Flow rate 40  Fi02 70  RR 20  02 sat 90      Summary of relevant labs: 12/11/2021    Labs:  Lactic Acid, Sepsis, Whole Blood2.2 High      WBC 9.0     D-Dimer, Quant 0.95     Fibrinogen 603 High    Troponin, High [id number] High    Procalcitonin>100.00 High      CRP 53.8 High    LDH ,260 High      Micro:  SARS-CoV-2, RapidDETECTED Abnormal      Imaging:  CXR: Vascular congestion and bilateral airspace disease is most suggestive of developing edema. An underlying inflammatory process or infection should also be considered in the appropriate clinical setting. 11-23-21:        10-19-21:        I have personally reviewed the past medical history, past surgical history, medications, social history, and family history, and I haveupdated the database accordingly. Allergies:   Celecoxib, Diclofenac sodium, Ibuprofen, Meloxicam, Naproxen, and Tolmetin     Review of Systems:     Review of Systems   Constitutional: Positive for activity change, fatigue and fever. HENT: Positive for congestion. Eyes: Negative for photophobia. Respiratory: Positive for cough and shortness of breath. Cardiovascular: Negative for leg swelling. Gastrointestinal: Positive for diarrhea, nausea and vomiting. Endocrine: Negative for polyphagia. Genitourinary: Negative for dysuria.    Musculoskeletal: Positive for back pain. Skin: Positive for wound. Negative for color change. Allergic/Immunologic: Negative for immunocompromised state. Neurological: Positive for dizziness. Hematological: Negative for adenopathy. Psychiatric/Behavioral: Negative for agitation. Physical Examination :       Physical Exam  Constitutional:       General: She is in acute distress. Appearance: She is ill-appearing. HENT:      Head: Normocephalic and atraumatic. Nose: Nose normal.      Mouth/Throat:      Mouth: Mucous membranes are moist.   Eyes:      General: No scleral icterus. Conjunctiva/sclera: Conjunctivae normal.   Cardiovascular:      Rate and Rhythm: Normal rate and regular rhythm. Heart sounds: Normal heart sounds. No murmur heard. Pulmonary:      Effort: Respiratory distress present. Breath sounds: No stridor. Abdominal:      Palpations: There is no mass. Hernia: No hernia is present. Genitourinary:     Comments: Urine la  Musculoskeletal:         General: No swelling or deformity. Cervical back: Neck supple. No rigidity. Skin:     General: Skin is dry. Coloration: Skin is not jaundiced. Neurological:      General: No focal deficit present. Mental Status: She is alert and oriented to person, place, and time. Psychiatric:         Mood and Affect: Mood normal.         Thought Content:  Thought content normal.         Past Medical History:     Past Medical History:   Diagnosis Date    Arthritis     knees hips hands shoulders and back    CAD (coronary artery disease) 2019    blockage on cath 9/2019, to have stenting after surgery (arthroplasty) Dr. Yary Lee Chronic back pain     GERD (gastroesophageal reflux disease)     on rx    Hyperlipidemia     per Dr. Leeroy Closs on rx    Hypertension     Rubens Merrill manages    Incomplete RBBB     Irregular heart beat     LAFB (left anterior fascicular block)     Dr. Leeroy Closs, cardiologist, seen 9/2019 prior to surgery on 10/8/2019    Leg wound, left     following with wound care, Dr. Jose Maldonado Lumbar disc disease     Osteoporosis     PAC (premature atrial contraction) 06/2018    PACs and PVCs on holter monitor,     PONV (postoperative nausea and vomiting)     requests scop patch    Wears dentures     upper partial    Wears eyeglasses     readers    Wears partial dentures     upper and lower    Wellness examination     MIKE Lai, STEPHANIE PCP, seen 2/10/2020       Past Surgical  History:     Past Surgical History:   Procedure Laterality Date    ABDOMINOPLASTY  1997    BACK SURGERY      BLEPHAROPLASTY Bilateral 1997    BREAST ENHANCEMENT SURGERY Bilateral 1999    breast augmentation   Cherylynn Salvia  2019    Dr. Albert Martinez, blockage but no stents yet    CATARACT REMOVAL WITH IMPLANT Bilateral 2016    COLONOSCOPY  2015    No Polyps    COSMETIC SURGERY      eyelid lift    FINGER SURGERY Right     thumb lesion excised    FIXATION KYPHOPLASTY  2013    Back    FOOT DEBRIDEMENT Left 10/11/2019    SURGICAL PREP WOUND BED LEFT FOOT WITH APPLICATION OF EPIFIX LEFT FOOT 3X4 performed by Malik Luna DPM at [place] [address]      kyphoplasty for lumbar fx 2013 Dr. Gertrudis Joe ARTHROSCOPY Left 2006   Hershal Oddi Left 1/2/2020    FOOT  DEBRIDEMENT WITH WOUND VAC PLACEMENT performed by Nando Maxwell MD at [place] [address]  2014   [facility] [address]  2005    diskectomy and spinal fusion    OTHER SURGICAL HISTORY  10/03/2019    Left leg angiogram    NC OFFICE/OUTPT VISIT,PROCEDURE ONLY Right 9/11/2018    EXCISION MASS THUMB, 3080 TABLE performed by Truman Rivera DO at [facility] Left 12/3/2019    LEFT FOOT DEBRIDEMENT WITH SKIN GRAFT SPLIT THICKNESS; SKIN GRAFT TAKEN FROM RIGHT ANTERIOR THIGH performed by Nando Maxwell MD at [facility] Left 2/25/2020    DEBRIDEMENT, SPLIT THICKNESS SKIN GRAFT WITH WOUND VAC PLACEMENT FOOT performed by BUTCH Marie MD at [address] [facility] [address] Left 2020    DEBRIDEMENT, SKIN GRAFT SPLIT THICKNESS FOOT  ([facility] [address], [address] [facility]) performed by Renuka Parikh MD at [address] [facility]      as a child    TOTAL KNEE ARTHROPLASTY Right 10/13/2020    KNEE TOTAL ARTHROPLASTY    TOTAL KNEE ARTHROPLASTY Right 10/13/2020    KNEE TOTAL ARTHROPLASTY- MICROPORT, \ ADVANCED performed by Nathanael Meigs, DO at [facility] OR       Medications:      thiamine  100 mg IntraVENous Daily    vitamin C  500 mg Oral BID    Vitamin D  2,000 Units Oral Daily    zinc sulfate  50 mg Oral Daily    ipratropium-albuterol  1 ampule Inhalation 4x daily    [START ON 2021] methylPREDNISolone  40 mg IntraVENous Q12H    atorvastatin  10 mg Oral Daily    clopidogrel  75 mg Oral Daily    folic acid  1 mg Oral Daily    dapsone  50 mg Oral Daily    gabapentin  300 mg Oral BID    therapeutic multivitamin-minerals  1 tablet Oral Daily    pantoprazole  40 mg Oral QAM AC    traMADol  50 mg Oral 4x Daily    sodium chloride flush  5-40 mL IntraVENous 2 times per day    enoxaparin  30 mg SubCUTAneous BID    famotidine  20 mg Oral BID    metoprolol tartrate  37.5 mg Oral BID    cefepime  2,000 mg IntraVENous Q12H    linezolid  600 mg IntraVENous Q12H    povidone-iodine (BETADINE) in 0.9% Sodium Chloride irrigation   Topical Once    azithromycin  500 mg IntraVENous Q24H       Social History:     Social History     Socioeconomic History    Marital status:      Spouse name: Juanpablo Barragan Number of children: 2    Years of education: Not on file    Highest education level: Not on file   Occupational History    Occupation: Retired RN   Tobacco Use    Smoking status: Former Smoker     Packs/day: 0.25     Years: 50.00     Pack years: 12.50     Types: Cigarettes     Start date:      Quit date: 2020     Years since quittin.8    Smokeless tobacco: Never Used   Vaping Use    Vaping Use: Never used   Substance and Sexual Activity    Alcohol use: Yes     Comment: 2-3 shot liquor for weekends    Drug use: Never    Sexual activity: Yes     Partners: Male   Other Topics Concern    Not on file   Social History Narrative    Not on file     Social Determinants of Health     Financial Resource Strain: Low Risk     Difficulty of Paying Living Expenses: Not hard at all   Food Insecurity: No Food Insecurity    Worried About [address] [facility] in the Last Year: Never true    [facility] in the Last Year: Never true   Transportation Needs:     Lack of Transportation (Medical): Not on file    Lack of Transportation (Non-Medical):  Not on file   Physical Activity:     Days of Exercise per Week: Not on file    Minutes of Exercise per Session: Not on file   Stress:     Feeling of Stress : Not on file   Social Connections:     Frequency of Communication with Friends and Family: Not on file    Frequency of Social Gatherings with Friends and Family: Not on file    Attends Restorationist Services: Not on file    Active Member of [address] [place] [address] or Organizations: Not on file    Attends Club or Organization Meetings: Not on file    Marital Status: Not on file   Intimate Partner Violence:     Fear of Current or Ex-Partner: Not on file    Emotionally Abused: Not on file    Physically Abused: Not on file    Sexually Abused: Not on file   Housing Stability:     Unable to Pay for Housing in the Last Year: Not on file    Number of [place] in the Last Year: Not on file    Unstable Housing in the Last Year: Not on file       Family History:     Family History   Problem Relation Age of Onset    Coronary Art Dis Mother     Arthritis Mother     Heart Surgery Mother         CABG    Coronary Art Dis Father     Heart Disease Father     Heart Surgery Father         CABG    Coronary Art Dis Sister     Heart Surgery Sister         CABG      Medical Decision Making:   I have independently reviewed/ordered the following labs:    CBC with Differential:   Recent Labs     12/10/21  1609 12/11/21  0622   WBC 9.0 5.1   HGB 11.8* 10.8*   HCT 36.4 33.6*    See Reflexed IPF Result   LYMPHOPCT 19* 25   MONOPCT 3 3     BMP:  Recent Labs     12/10/21  1609 12/11/21  0622   * 144   K 3.0* 3.7   * 110*   CO2 22 18*   BUN 10 11   CREATININE 0.54 0.58   MG 1.9  --      Hepatic Function Panel:   Recent Labs     12/10/21  1609 12/11/21  0622   PROT 6.1* 5.5*   LABALBU 3.4* 2.9*   BILITOT 0.56 0.35   ALKPHOS 132* 126*   * 186*   * 360*     No results for input(s): RPR in the last 72 hours. No results for input(s): HIV in the last 72 hours. No results for input(s): BC in the last 72 hours. Lab Results   Component Value Date    CREATININE 0.58 12/11/2021    GLUCOSE 147 12/11/2021       Detailed results: Thank you for allowing us to participate in the care of this patient. Please call with questions. This note is created with the assistance of a speech recognition program.  While intending to generate adocument that actually reflects the content of the visit, the document can still have some errors including those of syntax and sound a like substitutions which may escape proof reading. It such instances, actual meaningcan be extrapolated by contextual diversion.     Mary Che MD  Office: [phone]  Perfect serve / office [phone]

## 2021-12-11 NOTE — ED NOTES
Unable to collect urine specimen due to patient continues to have stool mixed in with urine during elimination.       Zaheer Meraz RN  12/11/21 2731

## 2021-12-11 NOTE — ED NOTES
Metoprolol and betadine requested from main pharmacy at this time.       Dominique Monique RN  12/10/21 [id number]

## 2021-12-11 NOTE — CARE COORDINATION
Case Management Initial Discharge Plan  Osmany Moraes,             Met with:spouse via telephone to discuss discharge plans. Information verified: address, contacts, phone number, , insurance Yes  Insurance Provider: [address] [place] [address]    Emergency Contact/Next of Kin name & number: John Nevarez () [phone]  Who are involved in patient's support system? family    PCP: Lorna España, NUPUR - CNP  Date of last visit: 3 weeks      Discharge Planning    Living Arrangements:  Spouse/Significant Other     Home has 1.5 stories  2 stairs to climb to get into front door, 5 stairs to climb to reach second floor  Location of bedroom/bathroom in home 1st    Patient able to perform ADL's:Independent    Current Services (outpatient & in home)   DME equipment: boot for L foot  DME provider:     Is patient receiving oral anticoagulation therapy? No    If indicated:   Physician managing anticoagulation treatment:   Where does patient obtain lab work for ATC treatment? Potential Assistance Needed:  Durable Medical Equipment    Patient agreeable to home care: No  [place] of choice provided:  n/a    Prior SNF/Rehab Placement and Facility:   Agreeable to SNF/Rehab: No  [place] of choice provided: n/a     Evaluation: no    Expected Discharge date:       Patient expects to be discharged to: If home: is the family and/or caregiver wiling & able to provide support at home? yes  Who will be providing this support?     Follow Up Appointment: Best Day/ Time:      Transportation provider:   Transportation arrangements needed for discharge: No  will take home    Readmission Risk              Risk of Unplanned Readmission:  13             Does patient have a readmission risk score greater than 14?: No  If yes, follow-up appointment must be made within 7 days of discharge.      Goals of Care: breathe easier      Educated John Nevarez on transitional options, provided freedom of choice and are agreeable with plan      Discharge Plan: home independently          Electronically signed by Meredith Castaneda RN on 12/11/21 at 5:36 PM EST

## 2021-12-11 NOTE — ED NOTES
Np on call messaged via [employer] to inform ekg obtained as ordered with results      Zaheer Meraz RN  12/10/21 [id number]

## 2021-12-11 NOTE — CONSULTS
[facility] -   Infectious diseases evaluation  admission date 12/10/2021    reason for consultation:   covid pneumonia    Impression :   Current:  · covid pneumonia w hypoxemia and resp distress  · Increased inf markers  · procal >100  · Left foot chronic infected wound - pyoderma gangrenosum( per pt)  · Past pseudomonas infection  · QTC    Other:  · HTN, CAD, RBBB  Discussion / summary of stay / plan of care   ·   Recommendations   · covid :  · Decadron high dose 20x 5 then 10 x5  · CT of the chest suggestive of bilateral Covid pneumonia,  · Proceeding with Actemra 8 mg/kg X1  · anticoag  · FU CRP   · Foot wound cx and wound care  · She deferred dakins and acetic acid due to past painful experience  · Order betadine soaks daily to debulke the infection  · Start cefepime and zyvox pend foot cx and adjust accordingly      Isolate till 12/25 included    Infection Control Recommendations   · [place] Precautions  · Contact Isolation   · Airborne isolation  · Droplet Isolation    Antimicrobial Stewardship Recommendations   · Simplification of therapy  · Targeted therapy  ·     Coordination ofOutpatient Care:   · Estimated Length of IV antimicrobials:  · Patient will need Midline / picc Catheter Insertion:   · Patient will need SNF:  · Patient will need outpatient wound care:     History of Present Illness:   Initial history:  Tin Gill is a 60y.o.-year-old female who presents with nausea vomiting diarrhea shortness of breath clear cough generalized fatigue fever ongoing for about 5 days. Was exposed to Covid, had test at home on 12/6 that was negative. She is unvaccinated. Report of a left foot chronic infection, goes to wound care for that. On 5/2021 the foot wound cx was pseudomonas S cipro and cefepime.   6/2021 foot cx was neg    No dysuria  No other soft tissue infections  Does have incomplete RBBB and chronic back pain    In ER she was tachypneic tachycardic  Inflammatory markers elevated, white count is normal  Tested positive for Covid on 12/10 in the ER and chest x-ray suggestive of Covid pneumonia    started NRB 10L in ER due to hypoxemia - So2 now 94  Left foot dorsum open large wound w purulence, cx taken in ER      Interval changes  12/10/2021   Patient Vitals for the past 8 hrs:   BP Temp Temp src Pulse Resp SpO2 Height Weight   12/10/21 2001 (!) 183/94 99.5 °F (37.5 °C) Oral 89 16 96 %     12/10/21 1845 (!) 161/79   93 26 94 %     12/10/21 1830    90 21 94 %     12/10/21 1645    104 30 95 %     12/10/21 1630    96 18 (!) 87 %     12/10/21 1615    103 17 (!) 84 %     12/10/21 1518      94 %     12/10/21 1515      (!) 86 %     12/10/21 1503 128/70 100 °F (37.8 °C) Oral 109 24 (!) 75 % 5' 6\" (1.676 m) 170 lb (77.1 kg)       Summary of relevant labs:  Labs:  Lactic Acid, Sepsis, Whole Blood2.2 High    WBC9.0   D-Dimer, Quant0.95   [id number] High    Troponin, High Ydzhztahjfk10 High    Procalcitonin>100.00 High    CRP53.8 High    LD1,260 High      Micro:  SARS-CoV-2, RapidDETECTED Abnormal      Imaging:  CXR  Vascular congestion and bilateral airspace disease is most suggestive of   developing edema. An underlying inflammatory process or infection should also be considered in the appropriate clinical setting. I have personally reviewed the past medical history, past surgical history, medications, social history, and family history, and I haveupdated the database accordingly. Allergies:   Patient has no known allergies. Review of Systems:     Review of Systems   Constitutional: Positive for activity change, fatigue and fever. HENT: Positive for congestion. Eyes: Negative for photophobia. Respiratory: Positive for cough and shortness of breath. Cardiovascular: Negative for leg swelling. Gastrointestinal: Positive for diarrhea, nausea and vomiting. Endocrine: Negative for polyphagia. Genitourinary: Negative for dysuria. Musculoskeletal: Positive for back pain. Skin: Positive for wound. Negative for color change. Allergic/Immunologic: Negative for immunocompromised state. Neurological: Positive for dizziness. Hematological: Negative for adenopathy. Psychiatric/Behavioral: Negative for agitation. Physical Examination :       Physical Exam  Constitutional:       General: She is in acute distress. Appearance: She is ill-appearing. HENT:      Head: Normocephalic and atraumatic. Nose: Nose normal.      Mouth/Throat:      Mouth: Mucous membranes are moist.   Eyes:      General: No scleral icterus. Conjunctiva/sclera: Conjunctivae normal.   Cardiovascular:      Rate and Rhythm: Normal rate and regular rhythm. Heart sounds: Normal heart sounds. No murmur heard. Pulmonary:      Effort: Respiratory distress present. Breath sounds: No stridor. Abdominal:      Palpations: There is no mass. Hernia: No hernia is present. Genitourinary:     Comments: Urine la  Musculoskeletal:         General: No swelling or deformity. Cervical back: Neck supple. No rigidity. Skin:     General: Skin is dry. Coloration: Skin is not jaundiced. Neurological:      General: No focal deficit present. Mental Status: She is alert and oriented to person, place, and time. Psychiatric:         Mood and Affect: Mood normal.         Thought Content:  Thought content normal.         Past Medical History:     Past Medical History:   Diagnosis Date    Arthritis     knees hips hands shoulders and back    CAD (coronary artery disease) 2019    blockage on cath 9/2019, to have stenting after surgery (arthroplasty) Dr. Fanny Trejo Chronic back pain     GERD (gastroesophageal reflux disease)     on rx    Hyperlipidemia     per Dr. Perfecto Jaramillo on rx    Hypertension     [place] manages    Incomplete RBBB     Irregular heart beat     LAFB (left anterior fascicular block)     Dr. Dylan Colon, cardiologist, seen 9/2019 prior to surgery on 10/8/2019    Leg wound, left     following with wound care, Dr. Herman Kehr Lumbar disc disease     Osteoporosis     PAC (premature atrial contraction) 06/2018    PACs and PVCs on holter monitor,     PONV (postoperative nausea and vomiting)     requests scop patch    Wears dentures     upper partial    Wears eyeglasses     readers    Wears partial dentures     upper and lower    Wellness examination     MIKE Ferrara NP PCP, seen 2/10/2020       Past Surgical  History:     Past Surgical History:   Procedure Laterality Date    ABDOMINOPLASTY  1997    BACK SURGERY      BLEPHAROPLASTY Bilateral 1997    BREAST ENHANCEMENT SURGERY Bilateral 1999    [facility]    Dr. Dylan Colon, blockage but no stents yet    CATARACT REMOVAL WITH IMPLANT Bilateral 2016    COLONOSCOPY  2015    No Polyps    COSMETIC SURGERY      eyelid lift    FINGER SURGERY Right     thumb lesion excised    FIXATION KYPHOPLASTY  2013    Back    FOOT DEBRIDEMENT Left 10/11/2019    SURGICAL PREP WOUND BED LEFT FOOT WITH APPLICATION OF EPIFIX LEFT FOOT 3X4 performed by Nichole Gusman DPM at [place] [address]      kyphoplasty for lumbar fx 2013 Dr. Chen Goldman ARTHROSCOPY Left 2006   [place] Left 1/2/2020    FOOT  DEBRIDEMENT WITH WOUND VAC PLACEMENT performed by Ravindra Dyson MD at [place] [address]  2014   [facility]  2005    diskectomy and spinal fusion    OTHER SURGICAL HISTORY  10/03/2019    Left leg angiogram    LA OFFICE/OUTPT VISIT,PROCEDURE ONLY Right 9/11/2018    EXCISION MASS THUMB, 3080 TABLE performed by Dave Son DO at [facility] Left 12/3/2019    LEFT FOOT DEBRIDEMENT WITH SKIN GRAFT SPLIT THICKNESS; SKIN GRAFT TAKEN FROM RIGHT ANTERIOR THIGH performed by Negin Diaz Velma Son MD at [place] [address] Left 2020    DEBRIDEMENT, SPLIT THICKNESS SKIN GRAFT WITH WOUND VAC PLACEMENT FOOT performed by Sixto Lozano MD at [place] [address] Left 2020    DEBRIDEMENT, SKIN GRAFT SPLIT THICKNESS FOOT  (Lige Suring) performed by Sixto Lozano MD at [address] [place] [address]      as a child    TOTAL KNEE ARTHROPLASTY Right 10/13/2020    KNEE TOTAL ARTHROPLASTY    TOTAL KNEE ARTHROPLASTY Right 10/13/2020    KNEE TOTAL ARTHROPLASTY- MICROPORT, \ ADVANCED performed by Samantha Varela DO at [facility] OR       Medications:      atorvastatin  10 mg Oral Daily    clopidogrel  75 mg Oral Daily    folic acid  1 mg Oral Daily    dapsone  50 mg Oral Daily    doxycycline hyclate  100 mg Oral Daily    gabapentin  300 mg Oral BID    therapeutic multivitamin-minerals  1 tablet Oral Daily    [START ON 2021] pantoprazole  40 mg Oral QAM AC    traMADol  50 mg Oral 4x Daily    sodium chloride flush  5-40 mL IntraVENous 2 times per day    enoxaparin  30 mg SubCUTAneous BID    dexamethasone  10 mg Oral Daily    famotidine  20 mg Oral BID       Social History:     Social History     Socioeconomic History    Marital status:      Spouse name: Gildardo Butler Number of children: 2    Years of education: Not on file    Highest education level: Not on file   Occupational History    Occupation: Retired RN   Tobacco Use    Smoking status: Former Smoker     Packs/day: 0.25     Years: 50.00     Pack years: 12.50     Types: Cigarettes     Start date:      Quit date: 2020     Years since quittin.8    Smokeless tobacco: Never Used   Vaping Use    Vaping Use: Never used   Substance and Sexual Activity    Alcohol use: Yes     Comment: 2-3 shot liquor for weekends    Drug use: Never    Sexual activity: Yes     Partners: Male   Other Topics Concern    Not on file   Social History Narrative    Not on file     Social Determinants of Health Financial Resource Strain: Low Risk     Difficulty of Paying Living Expenses: Not hard at all   Food Insecurity: No Food Insecurity    Worried About Running Out of Food in the Last Year: Never true    Olivia of Food in the Last Year: Never true   Transportation Needs:     Lack of Transportation (Medical): Not on file    Lack of Transportation (Non-Medical):  Not on file   Physical Activity:     Days of Exercise per Week: Not on file    Minutes of Exercise per Session: Not on file   Stress:     Feeling of Stress : Not on file   Social Connections:     Frequency of Communication with Friends and Family: Not on file    Frequency of Social Gatherings with Friends and Family: Not on file    Attends Gnosticism Services: Not on file    Active Member of [address] [place] [address] [employer] or Organizations: Not on file    Attends Club or Organization Meetings: Not on file    Marital Status: Not on file   Intimate Partner Violence:     Fear of Current or Ex-Partner: Not on file    Emotionally Abused: Not on file    Physically Abused: Not on file    Sexually Abused: Not on file   Housing Stability:     Unable to Pay for Housing in the Last Year: Not on file    Number of [place] in the Last Year: Not on file    Unstable Housing in the Last Year: Not on file       Family History:     Family History   Problem Relation Age of Onset    Coronary Art Dis Mother     Arthritis Mother     Heart Surgery Mother         CABG    Coronary Art Dis Father     Heart Disease Father     Heart Surgery Father         CABG    Coronary Art Dis Sister     Heart Surgery Sister         CABG      Medical Decision Making:   I have independently reviewed/ordered the following labs:    CBC with Differential:   Recent Labs     12/10/21  1609   WBC 9.0   HGB 11.8*   HCT 36.4      LYMPHOPCT 19*   MONOPCT 3     BMP:  Recent Labs     12/10/21  1609   *   K 3.0*   *   CO2 22   BUN 10   CREATININE 0.54   MG 1.9     Hepatic Function Panel: Recent Labs     12/10/21  1609   PROT 6.1*   LABALBU 3.4*   BILITOT 0.56   ALKPHOS 132*   *   *     No results for input(s): RPR in the last 72 hours. No results for input(s): HIV in the last 72 hours. No results for input(s): BC in the last 72 hours. Lab Results   Component Value Date    CREATININE 0.54 12/10/2021    GLUCOSE 96 12/10/2021       Detailed results: Thank you for allowing us to participate in the care of this patient. Please call with questions. This note is created with the assistance of a speech recognition program.  While intending to generate adocument that actually reflects the content of the visit, the document can still have some errors including those of syntax and sound a like substitutions which may escape proof reading. It such instances, actual meaningcan be extrapolated by contextual diversion.     Mariah Celis MD  Office: [phone]  Perfect [employer] / office [phone]

## 2021-12-11 NOTE — PROGRESS NOTES
Received patient from ER per stretcher Patient is on NRB at 15 liters and Oxygen sat =87%   Resp therapy notified

## 2021-12-11 NOTE — ED NOTES
Kianna Howe messaged through perfect serve to inform bp 183/94 and that patient stated she takes lopressor at home. Also asked fro gabby if physcian wants me to administer lovenox 30mg injection at 2100 if patient has already received 75mg plavix.  Pending response      Jennifer Levy RN  12/10/21 2010

## 2021-12-11 NOTE — ED NOTES
Received report Carlos Manuel trejo. Assumed care of patient at this time.       Benji Irving RN  12/10/21 1921

## 2021-12-11 NOTE — CONSULTS
Pulmonary Critical Care   Consult Note    Patient - Kareem Day  Date of Admission -  12/10/2021  3:28 PM  Date of Evaluation -  2021  Room and Bed Number -  -   Hospital Day - 1    CHIEF COMPLAINT : ACUTE HYPOXIC RESPIRATORY FAILURE DUE TO COVID -19 PNEUMONIA   HPI:   Kareem Day  60 y.o. female     admitted for worsening shortness of breath since last Monday. Patient has cough fever nausea . She came to the ER. Tested positive for COVID-19 with elevated CRP and D-dimer. CT angiogram of the chest was done which shows bilateral pulmonary infiltrates due to COVID-19. She is presently on high flow oxygen 40 L 70% FiO2. She has BiPAP available but has not used it. ABG shows no CO2 retention and PaO2 of 178. This is on high flow 40 L 70% FiO2. She has 2 packs/day smoking history and stopped in January. She has expiratory rhonchi. Denies purulent sputum or hemoptysis. She has pyoderma gangrenosum    She is unvaccinated for COVID-19      History of alcohol abuse. Review of Systems   Constitutional: Positive for fatigue and fever. HENT: Negative. Respiratory: Positive for cough, shortness of breath and wheezing. Cardiovascular: Negative. Gastrointestinal: Positive for nausea. Negative for diarrhea and vomiting. Musculoskeletal: Negative. Neurological: Negative.          OBJECTIVE:     VITAL SIGNS:  BP (!) 151/75   Pulse 61   Temp 98.5 °F (36.9 °C)   Resp 24   Ht 5' 6\" (1.676 m)   Wt 170 lb (77.1 kg)   SpO2 99%   BMI 27.44 kg/m²   Tmax over 24 hours:  Temp (24hrs), Av.7 °F (37.1 °C), Min:98.2 °F (36.8 °C), Max:99.5 °F (37.5 °C)      Patient Vitals for the past 8 hrs:   BP Temp Pulse Resp SpO2   21 1601    24 99 %   21 1553    24    21 1330     91 %   21 1300 (!) 151/75 98.5 °F (36.9 °C) 61 26          Intake/Output Summary (Last 24 hours) at 2021 1644  Last data filed at 2021 0922  Gross per 24 hour   Intake 1010 ml Output    Net 1010 ml     Date 12/11/21 0000 - 12/11/21 2359   Shift 8207-3946 9874-1046 3048-2368 24 Hour Total   INTAKE   I.V.(mL/kg)  10(0.1)  10(0.1)   Shift Total(mL/kg)  10(0.1)  10(0.1)   OUTPUT   Shift Total(mL/kg)       Weight (kg) 77.1 77.1 77.1 77.1     Wt Readings from Last 3 Encounters:   12/10/21 170 lb (77.1 kg)   11/23/21 170 lb (77.1 kg)   10/20/21 168 lb (76.2 kg)     Body mass index is 27.44 kg/m². PHYSICAL EXAM:  Patient is awake alert sitting in the bed.   She is fidgety  Lungs lungs expiratory phase with expiratory rhonchi  CVS regular S1-S2  Abdomen nontender bowel sounds are present  Lower extremity no edema  Left lower extremity is bandaged    MEDICATIONS:  Scheduled Meds:   thiamine  100 mg IntraVENous Daily    vitamin C  500 mg Oral BID    Vitamin D  2,000 Units Oral Daily    zinc sulfate  50 mg Oral Daily    ipratropium-albuterol  1 ampule Inhalation 4x daily    atorvastatin  10 mg Oral Daily    clopidogrel  75 mg Oral Daily    folic acid  1 mg Oral Daily    dapsone  50 mg Oral Daily    gabapentin  300 mg Oral BID    therapeutic multivitamin-minerals  1 tablet Oral Daily    pantoprazole  40 mg Oral QAM AC    traMADol  50 mg Oral 4x Daily    sodium chloride flush  5-40 mL IntraVENous 2 times per day    enoxaparin  30 mg SubCUTAneous BID    dexamethasone  10 mg Oral Daily    famotidine  20 mg Oral BID    metoprolol tartrate  37.5 mg Oral BID    cefepime  2,000 mg IntraVENous Q12H    linezolid  600 mg IntraVENous Q12H    povidone-iodine (BETADINE) in 0.9% Sodium Chloride irrigation   Topical Once    azithromycin  500 mg IntraVENous Q24H     Continuous Infusions:   sodium chloride       PRN Meds:   LORazepam, 1 mg, Q4H PRN  lidocaine, , PRN  meclizine, 25 mg, TID PRN  oxyCODONE-acetaminophen, 1 tablet, Q4H PRN  sodium chloride flush, 5-40 mL, PRN  sodium chloride, 25 mL, PRN  ondansetron, 4 mg, Q8H PRN   Or  ondansetron, 4 mg, Q6H PRN  polyethylene glycol, 17 g, Daily PRN  acetaminophen, 650 mg, Q6H PRN   Or  acetaminophen, 650 mg, Q6H PRN  dextromethorphan-guaiFENesin, 5 mL, Q4H PRN        SUPPORT DEVICES: [] Ventilator [] BIPAP  [x]high flow nasal Cannula [] Room Air      ABGs:     Lab Results   Component Value Date    GPF6CNF NOT REPORTED 12/11/2021    FIO2 100.0 12/11/2021       DATA:  Complete Blood Count:   Recent Labs     12/10/21  1609 12/11/21  0622   WBC 9.0 5.1   RBC 3.40* 3.09*   HGB 11.8* 10.8*   HCT 36.4 33.6*   .1* 108.7*   MCH 34.7* 35.0*   MCHC 32.4 32.1   RDW 13.9 13.9    See Reflexed IPF Result   MPV 10.2 NOT REPORTED        Last 3 Blood Glucose:   Recent Labs     12/10/21  1609 12/11/21  0622   GLUCOSE 96 147*        PT/INR:    Lab Results   Component Value Date    PROTIME 10.6 01/21/2021    INR 1.0 01/21/2021     PTT:  No results found for: APTT, PTT    Comprehensive Metabolic Profile:   Recent Labs     12/10/21  1609 12/11/21  0622   * 144   K 3.0* 3.7   * 110*   CO2 22 18*   BUN 10 11   CREATININE 0.54 0.58   GLUCOSE 96 147*   CALCIUM 9.6 8.8   PROT 6.1* 5.5*   LABALBU 3.4* 2.9*   BILITOT 0.56 0.35   ALKPHOS 132* 126*   * 360*   * 186*      Magnesium:   Lab Results   Component Value Date    MG 1.9 12/10/2021    MG 2.0 07/27/2021    MG 1.8 07/13/2021     Phosphorus: No results found for: PHOS  Ionized Calcium: No results found for: CAION     Urinalysis:   Lab Results   Component Value Date    NITRU NEGATIVE 07/13/2021    COLORU YELLOW 07/13/2021    PHUR 7.0 07/13/2021    WBCUA 0 TO 2 07/13/2021    RBCUA 0 TO 2 07/13/2021    MUCUS NOT REPORTED 07/13/2021    TRICHOMONAS NOT REPORTED 07/13/2021    YEAST NOT REPORTED 07/13/2021    BACTERIA NOT REPORTED 07/13/2021    SPECGRAV 1.015 07/13/2021    LEUKOCYTESUR TRACE 07/13/2021    UROBILINOGEN Normal 07/13/2021    BILIRUBINUR NEGATIVE 07/13/2021    BILIRUBINUR small 06/03/2013    BLOODU neg 06/03/2013    GLUCOSEU NEGATIVE 07/13/2021    KETUA NEGATIVE 07/13/2021 AMORPHOUS NOT REPORTED 07/13/2021               CT CHEST WO CONTRAST    Result Date: 12/11/2021  1. Multifocal ground-glass and consolidative opacities bilaterally suspicious for pneumonia. An atypical or viral pneumonia is suspected over edema. 2. Hepatic steatosis. 3. Emphysematous changes. XR CHEST PORTABLE    Result Date: 12/11/2021  Persistent findings including right greater than left bibasilar opacification suggestive of multifocal pneumonia as well as generalized interstitial prominence which may represent vascular congestion/edema. XR CHEST PORTABLE    Result Date: 12/10/2021  Vascular congestion and bilateral airspace disease is most suggestive of developing edema. An underlying inflammatory process or infection should also be considered in the appropriate clinical setting. Past Medical History:   Diagnosis Date    Arthritis     knees hips hands shoulders and back    CAD (coronary artery disease) 2019    blockage on cath 9/2019, to have stenting after surgery (arthroplasty) Dr. Kortney Pacheco Chronic back pain     GERD (gastroesophageal reflux disease)     on rx    Hyperlipidemia     per Dr. Ronny Lyon on rx    Hypertension     Carey Free manages    Incomplete RBBB     Irregular heart beat     LAFB (left anterior fascicular block)     Dr. Ronny Lyon, cardiologist, seen 9/2019 prior to surgery on 10/8/2019    Leg wound, left     following with wound care, Dr. Inga Arnold Lumbar disc disease     Osteoporosis     PAC (premature atrial contraction) 06/2018    PACs and PVCs on holter monitor,     PONV (postoperative nausea and vomiting)     requests scop patch    Wears dentures     upper partial    Wears eyeglasses     readers    Wears partial dentures     upper and lower    Wellness examination     MIKE Jackson NP PCP, seen 2/10/2020        Social History     Socioeconomic History    Marital status:      Spouse name: Taj Long Number of children: 2    Years of education: Not on file    Highest education level: Not on file   Occupational History    Occupation: Retired RN   Tobacco Use    Smoking status: Former Smoker     Packs/day: 0.25     Years: 50.00     Pack years: 12.50     Types: Cigarettes     Start date:      Quit date: 2020     Years since quittin.8    Smokeless tobacco: Never Used   Vaping Use    Vaping Use: Never used   Substance and Sexual Activity    Alcohol use: Yes     Comment: 2-3 shot liquor for weekends    Drug use: Never    Sexual activity: Yes     Partners: Male   Other Topics Concern    Not on file   Social History Narrative    Not on file     Social Determinants of Health     Financial Resource Strain: Low Risk     Difficulty of Paying Living Expenses: Not hard at all   Food Insecurity: No Food Insecurity    Worried About [address] in the Last Year: Never true    [address]  FP Complete in the Last Year: Never true   Transportation Needs:     Lack of Transportation (Medical): Not on file    Lack of Transportation (Non-Medical):  Not on file   Physical Activity:     Days of Exercise per Week: Not on file    Minutes of Exercise per Session: Not on file   Stress:     Feeling of Stress : Not on file   Social Connections:     Frequency of Communication with Friends and Family: Not on file    Frequency of Social Gatherings with Friends and Family: Not on file    Attends Worship Services: Not on file    Active Member of [address] [place] [address] or Organizations: Not on file    Attends Club or Organization Meetings: Not on file    Marital Status: Not on file   Intimate Partner Violence:     Fear of Current or Ex-Partner: Not on file    Emotionally Abused: Not on file    Physically Abused: Not on file    Sexually Abused: Not on file   Housing Stability:     Unable to Pay for Housing in the Last Year: Not on file    Number of [place] in the Last Year: Not on file    Unstable Housing in the Last Year: Not on file       Immunization History Administered Date(s) Administered    Influenza, Quadv, IM, (6 mo and older Fluzone, Flulaval, Fluarix and 3 yrs and older Afluria) 01/03/2018, 12/30/2019    Influenza, Quadv, IM, PF (6 mo and older Fluzone, Flulaval, Fluarix, and 3 yrs and older Afluria) 10/30/2018, 10/14/2020    Pneumococcal Polysaccharide ([id number]) 01/12/2017         Family History   Problem Relation Age of Onset    Coronary Art Dis Mother     Arthritis Mother     Heart Surgery Mother         CABG    Coronary Art Dis Father     Heart Disease Father     Heart Surgery Father         CABG    Coronary Art Dis Sister     Heart Surgery Sister         CABG         Past Surgical History:   Procedure Laterality Date    ABDOMINOPLASTY  1997    BACK SURGERY      BLEPHAROPLASTY Bilateral 1997    BREAST ENHANCEMENT SURGERY Bilateral 1999    breast augmentation    CARDIAC CATHETERIZATION  2019    Dr. Harpreet Gill, blockage but no stents yet    CATARACT REMOVAL WITH IMPLANT Bilateral 2016    COLONOSCOPY  2015    No Polyps    COSMETIC SURGERY      eyelid lift    FINGER SURGERY Right     thumb lesion excised    FIXATION KYPHOPLASTY  2013    Back    FOOT DEBRIDEMENT Left 10/11/2019    SURGICAL PREP WOUND BED LEFT FOOT WITH APPLICATION OF EPIFIX LEFT FOOT 3X4 performed by Governor MARCELINA Bland at [address]      kyphoplasty for lumbar fx 2013 Dr. Gonzalez Never ARTHROSCOPY Left 2006   Regino Oropeza Left 1/2/2020    FOOT  [address] performed by Eve Sommers MD at [place] [address]  2014   [facility]  2005    diskectomy and spinal fusion    OTHER SURGICAL HISTORY  10/03/2019    Left leg angiogram    [facility] OFFICE[facility]OUTPT VISIT,PROCEDURE ONLY Right 9/11/2018    EXCISION MASS THUMB, 30[address] TABLE performed by Leopold Midget, DO at [facility] Left 12/3/2019    LEFT FOOT DEBRIDEMENT WITH SKIN GRAFT SPLIT THICKNESS; SKIN GRAFT TAKEN FROM RIGHT ANTERIOR THIGH performed by Terrance Henry MD at [place] [address] Left 2/25/2020    DEBRIDEMENT, SPLIT THICKNESS SKIN GRAFT WITH WOUND VAC PLACEMENT FOOT performed by Makenna Galloway MD at [place] [address] Left 6/30/2020    DEBRIDEMENT, SKIN GRAFT SPLIT THICKNESS FOOT  ([facility], [address] [facility]) performed by Makenna Galloway MD at [address] [facility]      as a child    TOTAL KNEE ARTHROPLASTY Right 10/13/2020    KNEE TOTAL ARTHROPLASTY    TOTAL KNEE ARTHROPLASTY Right 10/13/2020    KNEE TOTAL ARTHROPLASTY- MICROPORT, \ ADVANCED performed by Asiya Partida DO at [facility]:  Vent Information  Skin Assessment: Clean, dry, & intact  Equipment Changed: Mask  FiO2 : 60 %  SpO2: 99 %  SpO2/FiO2 ratio: 91  I Time/ I Time %: 0.8 s  Mask Type: Full face mask  Mask Size: (S) Small     PaO2/FiO2 RATIO:  Recent Labs     12/11/21  1601   POCPO2 178.7*      FiO2 : 60 %       LABS:  ABGs:   Recent Labs     12/11/21  1601   POCPH 7.446   POCPCO2 32.3*   POCPO2 178.7*   POCHCO3 22.2   [id number] 100*            ASSESSMENT:     Principal Problem:    Pneumonia due to COVID-19 virus  Active Problems:    Hypertension    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)    Gastroesophageal reflux disease without esophagitis    Pyoderma gangrenosum    Alcohol abuse    Acute respiratory failure due to COVID-19 (HCC)    Elevated LFTs    COVID-19  Resolved Problems:    * No resolved hospital problems. *              Acute hypoxic respiratory failure secondary to COVID 19   Bilateral multifocal pneumonia due to COVID 19 infection   Covid -19 pandemic emergency    COPD exacerbation    LOS: 1  PLAN:    D/w RT  D/w RN   Will do albuterol Atrovent MDI 4 times a day.   Airborne isolation and droplet precautions to be continued  Continue supportive care   Cont treatment with medications for COVID-Actemra on 12/11/2021  Agree with IV Solu-Medrol to help with COPD exacerbation and COVID-19  Will obtain xray chest and ABG as needed  Continue high flow oxygen keep saturations 90% and greater. Use BiPAP as needed            Treatment plan Discussed with nursing staff in detail , all questions answered . Total critical care time caring for this patient with life threatening, unstable organ failure, including direct patient contact, management of life support systems, review of data including imaging and labs, discussions with other team members and physicians at least 27   Min so far today, excluding procedures. Electronically signed by Chandu Ernandez MD on 12/11/2021 at 4:44 PM       This patient was evaluated in the context of the global SARS-CoV-2 (COVID-19) pandemic, which necessitated considerations that the patient either has COVID-19 infection or is at risk of infection with COVID-19. Institutional protocols and algorithms that pertain to the evaluation & management of patients with COVID-19 or those at risk for COVID-19 are in a state of rapid changes based on information released by regulatory bodies including the CDC and federal and state organizations. These policies and algorithms were followed during the patient's care. Please note that this chart was generated using voice recognition Dragon dictation software. Although every effort was made to ensure the accuracy of this automated transcription, some errors in transcription may have occurred.

## 2021-12-11 NOTE — H&P
evaluation. She endorses she has not been vaccinated for Covid due to autoimmune disorders. Patient was swabbed for Covid while in the ED and tested positive. CRP is elevated at 53.8, D-dimer 0.95, ferritin 5091, procalcitonin greater than 100, LD 1260. Chest x-ray revealed vascular congestion and bilateral airspace disease most suggestive of developing edema as well as an underlying inflammatory process or infection. Twelve-lead EKG was completed and revealed normal sinus rhythm with left ventricular hypertrophy. Infectious disease was consulted. Infectious disease recommending Betadine soaks daily to left foot due to chronic left foot infection/gangrenosum pyoderma and to start cefepime and Zyvox. To treat Covid, recommending high-dose Decadron 20 mg x 5 doses then 10 mg x 5 doses and Actemra. Patient is being admitted for further observation and management of  COVID-19 and left foot infection. Past Medical History:     Past Medical History:   Diagnosis Date    Arthritis     knees hips hands shoulders and back    CAD (coronary artery disease) 2019    blockage on cath 9/2019, to have stenting after surgery (arthroplasty) Dr. Amaris Arevalo Chronic back pain     GERD (gastroesophageal reflux disease)     on rx    Hyperlipidemia     per Dr. Aura Powers on rx    Hypertension     Burnis Lilbourn manages    Incomplete RBBB     Irregular heart beat     LAFB (left anterior fascicular block)     Dr. Aura Powers, cardiologist, seen 9/2019 prior to surgery on 10/8/2019    Leg wound, left     following with wound care, Dr. Ardon Degree Lumbar disc disease     Osteoporosis     PAC (premature atrial contraction) 06/2018    PACs and PVCs on holter monitor,     PONV (postoperative nausea and vomiting)     requests scop patch    Wears dentures     upper partial    Wears eyeglasses     readers    Wears partial dentures     upper and lower    Wellness examination     MIKE Gates NP PCP, seen 2/10/2020 Past Surgical History:     Past Surgical History:   Procedure Laterality Date    ABDOMINOPLASTY  1997    BACK SURGERY      BLEPHAROPLASTY Bilateral 1997    BREAST ENHANCEMENT SURGERY Bilateral 1999    breast augmentation    CARDIAC CATHETERIZATION  2019    Dr. Harpreet Gill, blockage but no stents yet    CATARACT REMOVAL WITH IMPLANT Bilateral 2016    COLONOSCOPY  2015    No Polyps    COSMETIC SURGERY      eyelid lift    FINGER SURGERY Right     thumb lesion excised    FIXATION KYPHOPLASTY  2013    Back    FOOT DEBRIDEMENT Left 10/11/2019    SURGICAL PREP WOUND BED LEFT FOOT WITH APPLICATION OF EPIFIX LEFT FOOT 3X4 performed by Governor Baldo DPM at [address] [facility]      kyphoplasty for lumbar fx 2013 Dr. Gonzalez Never ARTHROSCOPY Left 2006   [facility] Left 1/2/2020    FOOT  DEBRIDEMENT WITH WOUND VAC PLACEMENT performed by Eve Sommers MD at [place] [address]  2014   [address]  2005    diskectomy and spinal fusion    OTHER SURGICAL HISTORY  10/03/2019    Left leg angiogram    [place] OFFICE/OUTPT VISIT,PROCEDURE ONLY Right 9/11/2018    EXCISION MASS THUMB, 3080 TABLE performed by Leopold Midget, DO at [facility] Left 12/3/2019    LEFT FOOT DEBRIDEMENT WITH SKIN GRAFT SPLIT THICKNESS; SKIN GRAFT TAKEN FROM RIGHT ANTERIOR THIGH performed by Eve Sommers MD at [facility] Left 2/25/2020    DEBRIDEMENT, SPLIT THICKNESS SKIN GRAFT WITH WOUND VAC PLACEMENT FOOT performed by Leonard Wooten MD at [facility] Left 6/30/2020    DEBRIDEMENT, SKIN GRAFT SPLIT THICKNESS FOOT  ([facility] [facility]) performed by Leonard Wooten MD at [place] [address]      as a child    TOTAL KNEE ARTHROPLASTY Right 10/13/2020    KNEE TOTAL ARTHROPLASTY    TOTAL KNEE ARTHROPLASTY Right 10/13/2020    KNEE TOTAL ARTHROPLASTY- MICROPORT, \ ADVANCED performed by ACID) 500 MG tablet Take 500 mg by mouth 2 times daily    Historical Provider, MD   diclofenac sodium (VOLTAREN) 1 % GEL Apply topically as needed Dr. Martinez Vidal Provider, MD   NAPROXEN PO Take 250 mg by mouth daily as needed Indications: patient takes 1-2 times a day Stopping 10 days prior to surgery as instructed by surgeon    Historical Provider, MD   Cholecalciferol (VITAMIN D3) 5000 units TABS Take 1,000 Units by mouth daily   Patient not taking: Reported on 10/20/2021    Historical Provider, MD   calcium carbonate (OSCAL) 500 MG TABS tablet Take 1,000 mg by mouth daily Per Marcia Sheriff rheumatology  Patient not taking: Reported on 10/20/2021    Historical Provider, MD   alendronate (FOSAMAX) 70 MG tablet Take 70 mg by mouth every 7 days Indications: Mondays Dr. Lisandro Faulkner   Patient takes on Mondays 6/4/18   Historical Provider, MD   gabapentin (NEURONTIN) 300 MG capsule Take 300 mg by mouth 2 times daily. Per Dr. Elver Lima 2/27/15   Historical Provider, MD        Allergies:     Patient has no known allergies. Social History:     Tobacco:    reports that she quit smoking about 22 months ago. Her smoking use included cigarettes. She started smoking about 53 years ago. She has a 12.50 pack-year smoking history. She has never used smokeless tobacco.  Alcohol:      reports current alcohol use. Drug Use:  reports no history of drug use. Family History:     Family History   Problem Relation Age of Onset    Coronary Art Dis Mother     Arthritis Mother     Heart Surgery Mother         CABG    Coronary Art Dis Father     Heart Disease Father     Heart Surgery Father         CABG    Coronary Art Dis Sister     Heart Surgery Sister         CABG       Review of Systems:     Positive and Negative as described in HPI. Review of Systems   Constitutional: Positive for fever. Negative for activity change, appetite change and chills. HENT: Negative. Eyes: Negative.     Respiratory: Positive for cough, shortness of breath and wheezing. Cardiovascular: Negative. Gastrointestinal: Positive for diarrhea, nausea and vomiting. Negative for abdominal pain. Genitourinary: Negative. Musculoskeletal: Negative. Skin: Positive for wound. Negative for color change and rash. Chronic left foot wound   Neurological: Positive for headaches. Negative for weakness, light-headedness and numbness. Psychiatric/Behavioral: Negative. Physical Exam:   BP (!) 146/80   Pulse 71   Temp 98.2 °F (36.8 °C) (Oral)   Resp 23   Ht 5' 6\" (1.676 m)   Wt 170 lb (77.1 kg)   SpO2 92%   BMI 27.44 kg/m²   Temp (24hrs), Av.2 °F (37.3 °C), Min:98.2 °F (36.8 °C), Max:100 °F (37.8 °C)        Intake/Output Summary (Last 24 hours) at 2021 0512  Last data filed at 12/10/2021 1840  Gross per 24 hour   Intake 1000 ml   Output    Net 1000 ml       Physical Exam  Vitals and nursing note reviewed. Constitutional:       General: She is in acute distress. Appearance: She is ill-appearing. She is not toxic-appearing or diaphoretic. HENT:      Head: Normocephalic and atraumatic. Right Ear: External ear normal.      Left Ear: External ear normal.      Nose: Nose normal. No rhinorrhea. Mouth/Throat:      Mouth: Mucous membranes are moist.   Eyes:      General: No scleral icterus. Right eye: No discharge. Left eye: No discharge. Extraocular Movements: Extraocular movements intact. Conjunctiva/sclera: Conjunctivae normal.      Pupils: Pupils are equal, round, and reactive to light. Neck:      Comments: No JVD  Cardiovascular:      Rate and Rhythm: Normal rate and regular rhythm. Pulses: Normal pulses. Heart sounds: Normal heart sounds. No murmur heard. No friction rub. No gallop. Pulmonary:      Effort: Respiratory distress present. Breath sounds: Wheezing present. No rhonchi or rales. Comments: Mild respiratory distress. On nonrebreather mask.   Abdominal: General: Bowel sounds are normal. There is no distension. Palpations: Abdomen is soft. Tenderness: There is no abdominal tenderness. There is no guarding. Hernia: No hernia is present. Musculoskeletal:         General: Normal range of motion. Cervical back: Normal range of motion and neck supple. Right lower leg: No edema. Left lower leg: No edema. Skin:     General: Skin is warm. Coloration: Skin is not jaundiced or pale. Findings: Lesion present. No bruising, erythema or rash. Comments: Left foot wound. Left foot wrapped with dry dressing. No odor or drainage to dressing. Neurological:      General: No focal deficit present. Mental Status: She is alert and oriented to person, place, and time. Psychiatric:         Mood and Affect: Mood normal.         Behavior: Behavior normal.         Thought Content: Thought content normal.         Judgment: Judgment normal.         Investigations:      Laboratory Testing:  Recent Results (from the past 24 hour(s))   Culture, Blood 1    Collection Time: 12/10/21  3:55 PM    Specimen: Blood   Result Value Ref Range    Specimen Description . BLOOD     Special Requests L AC 10ML     Culture NO GROWTH 12 HOURS    CBC Auto Differential    Collection Time: 12/10/21  4:09 PM   Result Value Ref Range    WBC 9.0 3.5 - 11.3 k/uL    RBC 3.40 (L) 3.95 - 5.11 m/uL    Hemoglobin 11.8 (L) 11.9 - 15.1 g/dL    Hematocrit 36.4 36.3 - 47.1 %    .1 (H) 82.6 - 102.9 fL    MCH 34.7 (H) 25.2 - 33.5 pg    MCHC 32.4 28.4 - 34.8 g/dL    RDW 13.9 11.8 - 14.4 %    Platelets 307 583 - 779 k/uL    MPV 10.2 8.1 - 13.5 fL    NRBC Automated 1.2 (H) 0.0 per 100 WBC    Differential Type NOT REPORTED     Seg Neutrophils 77 (H) 36 - 65 %    Lymphocytes 19 (L) 24 - 43 %    Monocytes 3 3 - 12 %    Eosinophils % 0 (L) 1 - 4 %    Basophils 0 0 - 2 %    Immature Granulocytes 1 (H) 0 %    Segs Absolute 6.85 1.50 - 8.10 k/uL    Absolute Lymph # 1.74 1.10 - 3.70 k/uL    Absolute Mono # 0.30 0.10 - 1.20 k/uL    Absolute Eos # <0.03 0.00 - 0.44 k/uL    Basophils Absolute 0.04 0.00 - 0.20 k/uL    Absolute Immature Granulocyte 0.11 0.00 - 0.30 k/uL    WBC Morphology NOT REPORTED     RBC Morphology MACROCYTOSIS PRESENT     Platelet Estimate NOT REPORTED    Comprehensive Metabolic Panel w/ Reflex to MG    Collection Time: 12/10/21  4:09 PM   Result Value Ref Range    Glucose 96 70 - 99 mg/dL    BUN 10 8 - 23 mg/dL    CREATININE 0.54 0.50 - 0.90 mg/dL    Bun/Cre Ratio NOT REPORTED 9 - 20    Calcium 9.6 8.6 - 10.4 mg/dL    Sodium 147 (H) 135 - 144 mmol/L    Potassium 3.0 (L) 3.7 - 5.3 mmol/L    Chloride 110 (H) 98 - 107 mmol/L    CO2 22 20 - 31 mmol/L    Anion Gap 15 9 - 17 mmol/L    Alkaline Phosphatase 132 (H) 35 - 104 U/L     (H) 5 - 33 U/L     (H) <32 U/L    Total Bilirubin 0.56 0.3 - 1.2 mg/dL    Total Protein 6.1 (L) 6.4 - 8.3 g/dL    Albumin 3.4 (L) 3.5 - 5.2 g/dL    Albumin/Globulin Ratio 1.3 1.0 - 2.5    GFR Non-African American >60 >60 mL/min    GFR African American >60 >60 mL/min    GFR Comment          GFR Staging NOT REPORTED    Troponin    Collection Time: 12/10/21  4:09 PM   Result Value Ref Range    Troponin, High Sensitivity 18 (H) 0 - 14 ng/L    Troponin T NOT REPORTED <0.03 ng/mL    Troponin Interp NOT REPORTED    Lactate, Sepsis    Collection Time: 12/10/21  4:09 PM   Result Value Ref Range    Lactic Acid, Sepsis NOT REPORTED 0.5 - 1.9 mmol/L    Lactic Acid, Sepsis, Whole Blood 2.2 (H) 0.5 - 1.9 mmol/L   D-Dimer, Quantitative    Collection Time: 12/10/21  4:09 PM   Result Value Ref Range    D-Dimer, Quant 0.95 mg/L FEU   C-Reactive Protein    Collection Time: 12/10/21  4:09 PM   Result Value Ref Range    CRP 53.8 (H) 0.0 - 5.0 mg/L   FERRITIN    Collection Time: 12/10/21  4:09 PM   Result Value Ref Range    Ferritin 5,091 (H) 13 - 150 ug/L   FIBRINOGEN    Collection Time: 12/10/21  4:09 PM   Result Value Ref Range    Fibrinogen 603 (H) 140 - 420 mg/dL   Procalcitonin    Collection Time: 12/10/21  4:09 PM   Result Value Ref Range    Procalcitonin >100.00 (H) <0.09 ng/mL   Lactate Dehydrogenase    Collection Time: 12/10/21  4:09 PM   Result Value Ref Range    LD 1,260 (H) 135 - 214 U/L   Magnesium    Collection Time: 12/10/21  4:09 PM   Result Value Ref Range    Magnesium 1.9 1.6 - 2.6 mg/dL   COVID-19, Rapid    Collection Time: 12/10/21  4:29 PM    Specimen: Nasopharyngeal Swab   Result Value Ref Range    Specimen Description . NASOPHARYNGEAL SWAB     SARS-CoV-2, Rapid DETECTED (A) Not Detected   Culture, Blood 2    Collection Time: 12/10/21  4:32 PM    Specimen: Blood   Result Value Ref Range    Specimen Description . BLOOD     Special Requests R AC 10ML     Culture NO GROWTH 12 HOURS    Troponin    Collection Time: 12/10/21  5:32 PM   Result Value Ref Range    Troponin, High Sensitivity 17 (H) 0 - 14 ng/L    Troponin T NOT REPORTED <0.03 ng/mL    Troponin Interp NOT REPORTED    Lactate, Sepsis    Collection Time: 12/10/21  7:16 PM   Result Value Ref Range    Lactic Acid, Sepsis NOT REPORTED 0.5 - 1.9 mmol/L    Lactic Acid, Sepsis, Whole Blood 1.4 0.5 - 1.9 mmol/L   Lactate Dehydrogenase    Collection Time: 12/10/21  7:16 PM   Result Value Ref Range     (H) 135 - 214 U/L   Culture, Aerobic    Collection Time: 12/10/21  9:37 PM    Specimen: Foot   Result Value Ref Range    Specimen Description . FOOT     Special Requests NOT REPORTED     Direct Exam FEW NEUTROPHILS (A)     Direct Exam RARE GRAM POSITIVE COCCI IN PAIRS (A)     Direct Exam RARE GRAM NEGATIVE RODS (A)     Direct Exam RARE GRAM POSITIVE RODS (A)     Culture PENDING        Imaging/Diagnostics:  CT CHEST WO CONTRAST    Result Date: 12/10/2021  1. Multifocal ground-glass and consolidative opacities bilaterally suspicious for pneumonia. An atypical or viral pneumonia is suspected over edema. 2. Hepatic steatosis. 3. Emphysematous changes.      XR CHEST PORTABLE    Result Date: 12/10/2021  Vascular congestion and bilateral airspace disease is most suggestive of developing edema. An underlying inflammatory process or infection should also be considered in the appropriate clinical setting. Assessment :      Hospital Problems           Last Modified POA    * (Principal) Pneumonia due to COVID-19 virus 12/11/2021 Yes    Hypertension 12/11/2021 Yes    Gastroesophageal reflux disease without esophagitis 12/11/2021 Yes    Pyoderma gangrenosum 12/11/2021 Yes          Plan:     Patient status inpatient in the Progressive Unit/Step down    Pneumonia due to COVID-19: Labs reviewed. Infectious disease consulted. Appreciate input. Lannis Ellison as ordered. Maxipime as ordered. Supplemental O2 to keep SPO2 greater than 92%. Decadron as ordered. Lovenox twice daily. Hypertension: Monitor vital signs as ordered. Lopressor twice daily. GERD: Pepcid as ordered  Pyoderma gangrenosum: Appreciate ID recommendations. Consult wound ostomy care. Patient reports she has her left foot cared for through wound care clinic at [facility]. Consultations:   IP CONSULT TO HOSPITALIST  IP CONSULT TO INFECTIOUS DISEASES  IP CONSULT TO INFECTIOUS DISEASES  IP CONSULT TO PULMONOLOGY  IP CONSULT TO CASE MANAGEMENT  IP CONSULT TO PHARMACY  IP CONSULT TO PHARMACY  IP CONSULT TO PHARMACY     Patient is admitted as inpatient status because of co-morbidities listed above, severity of signs and symptoms as outlined, requirement for current medical therapies and most importantly because of direct risk to patient if care not provided in a hospital setting. Expected length of stay > 48 hours.     NUPUR Narvaez NP  12/11/2021  5:12 AM    Copy sent to [facility]

## 2021-12-12 NOTE — PLAN OF CARE
Problem: Airway Clearance - Ineffective  Goal: Achieve or maintain patent airway  Outcome: Ongoing     Problem: Gas Exchange - Impaired  Goal: Absence of hypoxia  Outcome: Ongoing     Problem: Gas Exchange - Impaired  Goal: Promote optimal lung function  Outcome: Ongoing     Problem: Breathing Pattern - Ineffective  Goal: Ability to achieve and maintain a regular respiratory rate  Outcome: Ongoing     Problem: Respiratory  Intervention: Respiratory assessment  Note:   PROVIDE ADEQUATE OXYGENATION WITH ACCEPTABLE SP02/ABG'S    [x]  IDENTIFY APPROPRIATE OXYGEN THERAPY  [x]   MONITOR SP02/ABG'S AS NEEDED   [x]   PATIENT EDUCATION AS NEEDED

## 2021-12-12 NOTE — PLAN OF CARE
Problem: Airway Clearance - Ineffective  Goal: Achieve or maintain patent airway  12/12/2021 1041 by Chaim Serrano RCP  Outcome: Ongoing     Problem: Gas Exchange - Impaired  Goal: Absence of hypoxia  12/12/2021 1041 by Chaim Serrano RCP  Outcome: Ongoing     Problem: Gas Exchange - Impaired  Goal: Promote optimal lung function  12/12/2021 1041 by Chaim Serrano RCP  Outcome: Ongoing     Problem: Breathing Pattern - Ineffective  Goal: Ability to achieve and maintain a regular respiratory rate  12/12/2021 1041 by Chaim Serrano RCP  Outcome: Ongoing     Problem: Skin Integrity:  Goal: Absence of new skin breakdown  Description: Absence of new skin breakdown  12/12/2021 1041 by Chaim Serrano RCP  Outcome: Ongoing     BRONCHOSPASM/BRONCHOCONSTRICTION     [x]         IMPROVE AERATION/BREATH SOUNDS  [x]   ADMINISTER BRONCHODILATOR THERAPY AS APPROPRIATE  [x]   ASSESS BREATH SOUNDS  []   IMPLEMENT AEROSOL/MDI PROTOCOL  [x]   PATIENT EDUCATION AS NEEDED     PROVIDE ADEQUATE OXYGENATION WITH ACCEPTABLE SP02/ABG'S    [x]  IDENTIFY APPROPRIATE OXYGEN THERAPY  [x]   MONITOR SP02/ABG'S AS NEEDED   [x]   PATIENT EDUCATION AS NEEDED      NON INVASIVE VENTILATION    [x]  PROVIDE OPTIMAL VENTILATION/ACCEPTABLE SP02  []  IMPLEMENT NON INVASIVE VENTILATION PROTOCOL  [x]  ASSESSMENT SKIN INTEGRITY  [x]  PATIENT EDUCATION AS NEEDED  [x]  BIPAP AS NEEDED

## 2021-12-12 NOTE — PROGRESS NOTES
[facility]  Office: [address], DO, Feliberto Scott, DO, Jenice Duane, DO, Waqas Mendoza Blood, DO, Tamika Cortez MD, Javier Cordero MD, Edmund Griffith MD, Kelly Byrnes MD, Nadine Flores MD, Konrad Mcdonough MD, Candi Reid MD, Rhonda Fatima, DO, Colleen Gay, DO, Harmony Santa MD,  Rowan Lee, DO, Rick Diaz MD, Ghazal Marte MD, Kaleigh Hernandez MD, Wilson Denny MD, Giulia Webb MD, Augusto Sandoval MD, Maggy Rivera MD, Yaneth Claudio [facility], [facility]inda, CNP, Ishan Charles CNP, Tanda Lesches, CNS, Domitila Estevez, CNP, Cheri Rosales, CNP, Essie Neal, CNP, Kiah Cooper, CNP, Delbert Aase, CNP, Zuleima Vega PA-C, Pallavi Cruz, [facility], Pete Covlin, CNP, Deepti Gray, CNP, Luis A Meneses, CNP, Hero Moe, CNP, Barbie Polanco, [facility], Queen Tessance, [facility], Luz Maria Sanchez, [address] [place] [address]    Progress Note    12/12/2021    1:34 PM    Name:   Johana Camara  MRN:     [id number]     Acct:      [de-identified]   Room:   2003/2003-01  IP Day:  2  Admit Date:  12/10/2021  3:28 PM    PCP:   NUPUR Vallecillo CNP  Code Status:  DNR-CCA    Subjective:     C/C:   Chief Complaint   Patient presents with    Emesis     N/V Xs 3 days    Fever     103 t two days ago    Shortness of Breath     Interval History Status: improved. Patient seen and examined this morning, nursing staff having difficulty keeping her in bed. Patient is 100% FiO2 on high flow nasal cannula. Patient does have a history of alcohol abuse, was given 1 mg of Ativan and has not helped her confusion. Patient was seen at bedside, admitted to have had her last drink full 7/20/2021 she thinks. Brief History:     From H&P  Johana Camara is a 60 y.o.  Non- / non  female who presents with Emesis (N/V Xs 3 days), Fever (103 t two days ago), and Shortness of Breath   and is admitted to the hospital for the management of Pneumonia due to COVID-19 virus.     Patient reports that she was exposed to Covid over Thanksgiving. She says she started having shortness of breath this past Monday. Also had complaints of cough, fever, nausea, vomiting, and diarrhea. She was unable to get into her PCPs office, so she came to the ED for evaluation. She endorses she has not been vaccinated for Covid due to autoimmune disorders.      Patient was swabbed for Covid while in the ED and tested positive. CRP is elevated at 53.8, D-dimer 0.95, ferritin 5091, procalcitonin greater than 100, LD 1260. Chest x-ray revealed vascular congestion and bilateral airspace disease most suggestive of developing edema as well as an underlying inflammatory process or infection. Twelve-lead EKG was completed and revealed normal sinus rhythm with left ventricular hypertrophy.      Infectious disease was consulted. Infectious disease recommending Betadine soaks daily to left foot due to chronic left foot infection/gangrenosum pyoderma and to start cefepime and Zyvox. To treat Covid, recommending high-dose Decadron 20 mg x 5 doses then 10 mg x 5 doses and Actemra.     Patient is being admitted for further observation and management of  COVID-19 and left foot infection. Review of Systems:     Constitutional:  negative for chills, fevers, sweats  Respiratory:  negative for cough, dyspnea on exertion, shortness of breath, wheezing  Cardiovascular:  negative for chest pain, chest pressure/discomfort, lower extremity edema, palpitations  Gastrointestinal:  negative for abdominal pain, constipation, diarrhea, nausea, vomiting  Neurological:  negative for dizziness, headache    Medications: Allergies:     Allergies   Allergen Reactions    Celecoxib Other (See Comments)     Mood changes    Diclofenac Sodium Other (See Comments)     Mood changes    Ibuprofen Other (See Comments)     Mood changes    Meloxicam Other (See Comments)     Mood changes    Naproxen Other (See Comments)     Mood changes    Tolmetin Other (See Comments)     Mood changes       Current Meds:   Scheduled Meds:    potassium bicarb-citric acid  40 mEq Oral BID    methylPREDNISolone  30 mg IntraVENous Q8H    sodium chloride flush  5-40 mL IntraVENous 2 times per day    vitamin C  500 mg Oral BID    Vitamin D  2,000 Units Oral Daily    zinc sulfate  50 mg Oral Daily    thiamine (VITAMIN B1) IVPB  100 mg IntraVENous Q24H    ipratropium  2 puff Inhalation 4x daily    albuterol sulfate HFA  2 puff Inhalation 4x daily    atorvastatin  10 mg Oral Daily    clopidogrel  75 mg Oral Daily    folic acid  1 mg Oral Daily    dapsone  50 mg Oral Daily    gabapentin  300 mg Oral BID    therapeutic multivitamin-minerals  1 tablet Oral Daily    pantoprazole  40 mg Oral QAM AC    traMADol  50 mg Oral 4x Daily    sodium chloride flush  5-40 mL IntraVENous 2 times per day    enoxaparin  30 mg SubCUTAneous BID    famotidine  20 mg Oral BID    metoprolol tartrate  37.5 mg Oral BID    cefepime  2,000 mg IntraVENous Q12H    povidone-iodine (BETADINE) in 0.9% Sodium Chloride irrigation   Topical Once     Continuous Infusions:    sodium chloride      sodium chloride       PRN Meds: sodium chloride flush, sodium chloride, LORazepam **OR** LORazepam **OR** LORazepam **OR** LORazepam **OR** LORazepam **OR** LORazepam **OR** LORazepam **OR** LORazepam, lidocaine, meclizine, oxyCODONE-acetaminophen, sodium chloride flush, sodium chloride, ondansetron **OR** ondansetron, polyethylene glycol, acetaminophen **OR** acetaminophen, dextromethorphan-guaiFENesin    Data:     Past Medical History:   has a past medical history of Arthritis, CAD (coronary artery disease), Chronic back pain, GERD (gastroesophageal reflux disease), Hyperlipidemia, Hypertension, Incomplete RBBB, Irregular heart beat, LAFB (left anterior fascicular block), Leg wound, left, Lumbar disc disease, Osteoporosis, PAC (premature atrial contraction), PONV (postoperative nausea and vomiting), Wears dentures, Wears eyeglasses, Wears partial dentures, and Wellness examination. Social History:   reports that she quit smoking about 22 months ago. Her smoking use included cigarettes. She started smoking about 53 years ago. She has a 12.50 pack-year smoking history. She has never used smokeless tobacco. She reports current alcohol use. She reports that she does not use drugs. Family History:   Family History   Problem Relation Age of Onset    Coronary Art Dis Mother     Arthritis Mother     Heart Surgery Mother         CABG    Coronary Art Dis Father     Heart Disease Father     Heart Surgery Father         CABG    Coronary Art Dis Sister     Heart Surgery Sister         CABG       Vitals:  /63   Pulse 69   Temp 98.6 °F (37 °C) (Oral)   Resp 23   Ht 5' 6\" (1.676 m)   Wt 170 lb (77.1 kg)   SpO2 (!) 89%   BMI 27.44 kg/m²   Temp (24hrs), Av.4 °F (36.9 °C), Min:98.1 °F (36.7 °C), Max:98.6 °F (37 °C)    No results for input(s): POCGLU in the last 72 hours. I/O (24Hr):   No intake or output data in the 24 hours ending 21 1334    Labs:  Hematology:  Recent Labs     12/10/21  1609 21  0622 21  0521   WBC 9.0 5.1 9.0   RBC 3.40* 3.09* 3.20*   HGB 11.8* 10.8* 11.0*   HCT 36.4 33.6* 34.6*   .1* 108.7* 108.1*   MCH 34.7* 35.0* 34.4*   MCHC 32.4 32.1 31.8   RDW 13.9 13.9 13.8    See Reflexed IPF Result 412   MPV 10.2 NOT REPORTED 10.2   CRP 53.8* 54.3* 15.3*   DDIMER 0.95  --   --      Chemistry:  Recent Labs     12/10/21  1609 12/10/21  1732 21  0622 21  0521   *  --  144 141   K 3.0*  --  3.7 3.0*   *  --  110* 108*   CO2 22  --  18* 18*   GLUCOSE 96  --  147* 119*   BUN 10  --  11 17   CREATININE 0.54  --  0.58 0.50   MG 1.9  --   --   --    ANIONGAP 15  --  16 15   LABGLOM >60  --  >60 >60   GFRAA >60  --  >60 >60   CALCIUM 9.6  --  8.8 8.8   TROPHS 18* 17*  --   --      Recent Labs 12/10/21  1609 12/10/21  1916 12/11/21  0622 12/12/21  0521   PROT 6.1*  --  5.5* 5.3*   LABALBU 3.4*  --  2.9* 2.8*   *  --  360* 132*   *  --  186* 120*   LDH 1,260* 957*  --   --    ALKPHOS 132*  --  126* 127*   BILITOT 0.56  --  0.35 0.34     ABG:  Lab Results   Component Value Date    POCPH 7.446 12/11/2021    POCPCO2 32.3 12/11/2021    POCPO2 178.7 12/11/2021    POCHCO3 22.2 12/11/2021    NBEA 1 12/11/2021    PBEA NOT REPORTED 12/11/2021    WVJ2BZV NOT REPORTED 12/11/2021    UJBR8IIM 100 12/11/2021    FIO2 100.0 12/11/2021     Lab Results   Component Value Date/Time    SPECIAL NOT REPORTED 12/10/2021 09:37 PM     Lab Results   Component Value Date/Time    CULTURE PROTEUS SPECIES LIGHT GROWTH (A) 12/10/2021 09:37 PM    CULTURE NORMAL SKIN GRISEL LIGHT GROWTH (A) 12/10/2021 09:37 PM       Radiology:  CT CHEST WO CONTRAST    Result Date: 12/12/2021  1. Multifocal ground-glass and consolidative opacities bilaterally suspicious for pneumonia. An atypical or viral pneumonia is suspected over edema. 2. Hepatic steatosis. 3. Emphysematous changes. XR CHEST PORTABLE    Result Date: 12/11/2021  Persistent findings including right greater than left bibasilar opacification suggestive of multifocal pneumonia as well as generalized interstitial prominence which may represent vascular congestion/edema. XR CHEST PORTABLE    Result Date: 12/10/2021  Vascular congestion and bilateral airspace disease is most suggestive of developing edema. An underlying inflammatory process or infection should also be considered in the appropriate clinical setting.        Physical Examination:        General appearance:  alert, cooperative and no distress  Mental Status:  confused  Lungs:  clear to auscultation bilaterally, normal effort  Heart:  regular rate and rhythm, no murmur  Abdomen:  soft, nontender, nondistended, normal bowel sounds, no masses, hepatomegaly, splenomegaly  Extremities:  no edema, redness, tenderness in the calves  Skin:  no gross lesions, rashes, induration    Assessment:        Hospital Problems           Last Modified POA    * (Principal) Pneumonia due to COVID-19 virus 12/11/2021 Yes    Hypertension 12/11/2021 Yes    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 ([address] 12/11/2021 Yes    Gastroesophageal reflux disease without esophagitis 12/11/2021 Yes    Pyoderma gangrenosum 12/11/2021 Yes    Alcohol abuse 12/11/2021 Yes    Acute respiratory failure due to COVID-19 [facility]) 12/11/2021 Yes    Elevated LFTs 12/11/2021 Yes    COVID-19 12/11/2021 Yes          Plan:        Acute Resp failure due to Covid 19 pneumonia-pulmonary medicine following, on high FiO2 requirements, Patient is DNR CCA  Covid 19 pneumonia- s/p Actemra, Decadron, oxygen therapy, droplet- plus isolation, ID consult, add Vit D, Vit C and Zinc, Zithromax  HTN- Bp stable  Pyoderma gangrenosum- Wound consult, Cefepime, Zyvox, ID following  Etoh abuse-start CIWA protocol, discontinue 1 mg of Ativan. Discussed with nursing staff. If patient worsens may need transfer to ICU for Precedex  Hx smoking/ COPD?- aerosols, consider switch to IV Solumedrol?   Gi/ DVT proph      Armond Rogers DO  12/12/2021  1:34 PM

## 2021-12-12 NOTE — PROGRESS NOTES
[facility] -   Infectious diseases evaluation. Progress Note    admission date 12/10/2021    reason for consultation:   covid pneumonia    Impression :   Current:  · covid pneumonia w hypoxemia and resp distress  · Increased inflamatory markers  · procal >100  · Left foot chronic infected wound - pyoderma gangrenosum( per pt)  · Past Hx pseudomonas infection  · QTC    Other:  · HTN, CAD, RBBB  Discussion / summary of stay / plan of care   ·   Recommendations   · Covid Rx :  · Decadron high dose 20x 5 then 10 x5  · CT of the chest suggestive of bilateral Covid pneumonia. Actemra 8 mg/kg X 1 given on 12-10-21  · FU CRP   · Foot wound cx and wound care  · She declined Dakins and acetic acid because of past painful experiences  · Ordered betadine soaks daily   · On cefepime for Pseudomonas in foot culture. Will discuss with Dr Emily Starkey on 12-13-21. Topical treamtent with Betadine solution may suffice. Infection Control Recommendations   · [place] Precautions  · Contact Isolation   · Airborne isolation  · Droplet Isolation  · Isolate till 12/25 included  Antimicrobial Stewardship Recommendations   · Simplification of therapy  · Targeted therapy    Coordination ofOutpatient Care:   · Estimated Length of IV antimicrobials: TBd  · Patient will need Midline / picc Catheter Insertion:   · Patient will need SNF:  · Patient will need outpatient wound care: Yes    History of Present Illness:     INITIAL HISTORY:    Mercer Alpers is a 60y.o.-year-old female who presents with nausea, vomiting, diarrhea, shortness of breath, clear cough, generalized fatigue, fever present for about 5 days. Was exposed to Covid, had test at home on 12/6 that was negative. She is unvaccinated. Reports a left foot chronic infection, managed at the wound care for that. On 5/2021 the foot wound cx was Pseudomonas S cipro and cefepime.  On 6/2021 foot cx was neg    No dysuria  No other soft tissue infections  Does have incomplete RBBB and chronic back pain    In ER she was tachypneic tachycardic  Inflammatory markers elevated, white count is normal  Tested positive for Covid on 12/10 in the ER and chest x-ray suggestive of Covid pneumonia    Was started on NRB 10L in ER due to hypoxemia with improvement in 02 sat to 94. Left foot dorsum with large, open wound w purulence. Cx taken in ER  Mixed reynaldo on surface culture. Interval changes  12/12/2021   Patient Vitals for the past 8 hrs:   BP Temp Temp src Pulse Resp SpO2 Height   12/12/21 1541     29 95 %    12/12/21 1300 108/63   69 23 (!) 89 %    12/12/21 1244       5' 6\" (1.676 m)   12/12/21 1200 (!) 161/75   69 26 96 %    12/12/21 1136     19 95 %    12/12/21 0931 108/63 98.6 °F (37 °C) Oral 74 21 95 %    12/12/21 0930    79      12/12/21 0929     21 95 %    12/12/21 0920    79 26 (!) 80 %      CURRENT EVALUATION : 12/12/2021    Afebrile  VS stable    CRP decreasing: 15.3  Hypokalemia today 3.0    Pseudomonas on foot wound culture 12/10/21  Will discuss with Dr Aj Mendoza on 12-13-21. Topical treamtent with Betadine solution may suffice. Dapsone continues for Pyoderma Gangrenosum    She remains on hi-flow NC  Flow rate 40  Fi02 70-->100  RR 20-->29  02 sat 90-->95      Summary of relevant labs: 12/12/2021    Labs:  Lactic Acid, Sepsis, Whole Blood2.2 High      WBC 9.0     D-Dimer, Quant 0.95     Fibrinogen 603 High    Troponin, High [id number] High    Procalcitonin>100.00 High      CRP 53.8 High    LDH ,260 High      Micro:  SARS-CoV-2, RapidDETECTED Abnormal      Imaging:  CXR: Vascular congestion and bilateral airspace disease is most suggestive of developing edema. An underlying inflammatory process or infection should also be considered in the appropriate clinical setting.        11-23-21:        10-19-21:        I have personally reviewed the past medical history, past surgical history, medications, social history, and family history, and I haveupdated the database accordingly. Allergies:   Celecoxib, Diclofenac sodium, Ibuprofen, Meloxicam, Naproxen, and Tolmetin     Review of Systems:     Review of Systems   Constitutional: Positive for activity change, fatigue and fever. HENT: Positive for congestion. Eyes: Negative for photophobia. Respiratory: Positive for cough and shortness of breath. Cardiovascular: Negative for leg swelling. Gastrointestinal: Positive for diarrhea, nausea and vomiting. Endocrine: Negative for polyphagia. Genitourinary: Negative for dysuria. Musculoskeletal: Positive for back pain. Skin: Positive for wound. Negative for color change. Allergic/Immunologic: Negative for immunocompromised state. Neurological: Positive for dizziness. Hematological: Negative for adenopathy. Psychiatric/Behavioral: Negative for agitation. Physical Examination :       Physical Exam  Constitutional:       General: She is in acute distress. Appearance: She is ill-appearing. HENT:      Head: Normocephalic and atraumatic. Nose: Nose normal.      Mouth/Throat:      Mouth: Mucous membranes are moist.   Eyes:      General: No scleral icterus. Conjunctiva/sclera: Conjunctivae normal.   Cardiovascular:      Rate and Rhythm: Normal rate and regular rhythm. Heart sounds: Normal heart sounds. No murmur heard. Pulmonary:      Effort: Respiratory distress present. Breath sounds: No stridor. Abdominal:      Palpations: There is no mass. Hernia: No hernia is present. Genitourinary:     Comments: Urine la  Musculoskeletal:         General: No swelling or deformity. Cervical back: Neck supple. No rigidity. Skin:     General: Skin is dry. Coloration: Skin is not jaundiced. Neurological:      General: No focal deficit present. Mental Status: She is alert and oriented to person, place, and time.    Psychiatric:         Mood and Affect: Mood normal.         Thought Content: Thought content normal.         Past Medical History:     Past Medical History:   Diagnosis Date    Arthritis     knees hips hands shoulders and back    CAD (coronary artery disease) 2019    blockage on cath 9/2019, to have stenting after surgery (arthroplasty) Dr. Stefan Cervantes Chronic back pain     GERD (gastroesophageal reflux disease)     on rx    Hyperlipidemia     per Dr. Kennedy January on rx    Hypertension     Claire Diaz manages    Incomplete RBBB     Irregular heart beat     LAFB (left anterior fascicular block)     Dr. Kennedy January, cardiologist, seen 9/2019 prior to surgery on 10/8/2019    Leg wound, left     following with wound care, Dr. Ash Zapata Lumbar disc disease     Osteoporosis     PAC (premature atrial contraction) 06/2018    PACs and PVCs on holter monitor,     PONV (postoperative nausea and vomiting)     requests scop patch    Wears dentures     upper partial    Wears eyeglasses     readers    Wears partial dentures     upper and lower    Wellness examination     A.  Flaquito Nava, NP PCP, seen 2/10/2020       Past Surgical  History:     Past Surgical History:   Procedure Laterality Date    ABDOMINOPLASTY  1997    BACK SURGERY      BLEPHAROPLASTY Bilateral 1997    BREAST ENHANCEMENT SURGERY Bilateral 1999    breast augmentation   [address]  2019    Dr. Kennedy January, blockage but no stents yet    CATARACT REMOVAL WITH IMPLANT Bilateral 2016    COLONOSCOPY  2015    No Polyps    COSMETIC SURGERY      eyelid lift    FINGER SURGERY Right     thumb lesion excised    FIXATION KYPHOPLASTY  2013    Back    FOOT DEBRIDEMENT Left 10/11/2019    SURGICAL PREP WOUND BED LEFT FOOT WITH APPLICATION OF EPIFIX LEFT FOOT 3X4 performed by Meeta Hill DPM at [address] [facility]      kyphoplasty for lumbar fx 2013 Dr. Deandra Segovia ARTHROSCOPY Left 2006   Renetta Chi Left 1/2/2020    FOOT  DEBRIDEMENT WITH WOUND VAC PLACEMENT performed by Katie Ribeiro MD at [place] [address]  2014   [address]  2005    diskectomy and spinal fusion    OTHER SURGICAL HISTORY  10/03/2019    Left leg angiogram    PA OFFICE/OUTPT VISIT,PROCEDURE ONLY Right 9/11/2018    EXCISION MASS THUMB, 3080 TABLE performed by Miki Ogden DO at [facility] Left 12/3/2019    LEFT FOOT DEBRIDEMENT WITH SKIN GRAFT SPLIT THICKNESS; SKIN GRAFT TAKEN FROM RIGHT ANTERIOR THIGH performed by Katie Ribeiro MD at [facility] Left 2/25/2020    DEBRIDEMENT, SPLIT THICKNESS SKIN GRAFT WITH WOUND VAC PLACEMENT FOOT performed by BUTCH High MD at [facility] Left 6/30/2020    DEBRIDEMENT, SKIN GRAFT SPLIT THICKNESS FOOT  ([place] [address] [address] [facility]) performed by Marcia Jacobson MD at [address] [place] [address]      as a child    TOTAL KNEE ARTHROPLASTY Right 10/13/2020    KNEE TOTAL ARTHROPLASTY    TOTAL KNEE ARTHROPLASTY Right 10/13/2020    KNEE TOTAL ARTHROPLASTY- MICROPORT, \ ADVANCED performed by Miki Ogden DO at [facility] OR       Medications:      potassium bicarb-citric acid  40 mEq Oral BID    methylPREDNISolone  30 mg IntraVENous Q8H    sodium chloride flush  5-40 mL IntraVENous 2 times per day    vitamin C  500 mg Oral BID    Vitamin D  2,000 Units Oral Daily    zinc sulfate  50 mg Oral Daily    thiamine (VITAMIN B1) IVPB  100 mg IntraVENous Q24H    ipratropium  2 puff Inhalation 4x daily    albuterol sulfate HFA  2 puff Inhalation 4x daily    atorvastatin  10 mg Oral Daily    clopidogrel  75 mg Oral Daily    folic acid  1 mg Oral Daily    dapsone  50 mg Oral Daily    gabapentin  300 mg Oral BID    therapeutic multivitamin-minerals  1 tablet Oral Daily    pantoprazole  40 mg Oral QAM AC    traMADol  50 mg Oral 4x Daily    sodium chloride flush  5-40 mL IntraVENous 2 times per day    enoxaparin  30 mg SubCUTAneous BID    famotidine  20 mg Oral BID    metoprolol tartrate  37.5 mg Oral BID    cefepime  2,000 mg IntraVENous Q12H    povidone-iodine (BETADINE) in 0.9% Sodium Chloride irrigation   Topical Once       Social History:     Social History     Socioeconomic History    Marital status:      Spouse name: Nery Stratton Number of children: 2    Years of education: Not on file    Highest education level: Not on file   Occupational History    Occupation: Retired RN   Tobacco Use    Smoking status: Former Smoker     Packs/day: 0.25     Years: 50.00     Pack years: 12.50     Types: Cigarettes     Start date:      Quit date: 2020     Years since quittin.8    Smokeless tobacco: Never Used   Vaping Use    Vaping Use: Never used   Substance and Sexual Activity    Alcohol use: Yes     Comment: 2-3 shot liquor for weekends    Drug use: Never    Sexual activity: Yes     Partners: Male   Other Topics Concern    Not on file   Social History Narrative    Not on file     Social Determinants of Health     Financial Resource Strain: Low Risk     Difficulty of Paying Living Expenses: Not hard at all   Food Insecurity: No Food Insecurity    Worried About [facility] in the Last Year: Never true    [facility] in the Last Year: Never true   Transportation Needs:     Lack of Transportation (Medical): Not on file    Lack of Transportation (Non-Medical):  Not on file   Physical Activity:     Days of Exercise per Week: Not on file    Minutes of Exercise per Session: Not on file   Stress:     Feeling of Stress : Not on file   Social Connections:     Frequency of Communication with Friends and Family: Not on file    Frequency of Social Gatherings with Friends and Family: Not on file    Attends Congregational Services: Not on file    Active Member of Clubs or Organizations: Not on file    Attends Club or Organization Meetings: Not on file    Marital Status: Not on file   Intimate Partner Violence:     Fear of Current or Ex-Partner: Not on file    Emotionally Abused: Not on file    Physically Abused: Not on file    Sexually Abused: Not on file   Housing Stability:     Unable to Pay for Housing in the Last Year: Not on file    Number of Places Lived in the Last Year: Not on file    Unstable Housing in the Last Year: Not on file       Family History:     Family History   Problem Relation Age of Onset    Coronary Art Dis Mother     Arthritis Mother     Heart Surgery Mother         CABG    Coronary Art Dis Father     Heart Disease Father     Heart Surgery Father         CABG    Coronary Art Dis Sister     Heart Surgery Sister         CABG      Medical Decision Making:   I have independently reviewed/ordered the following labs:    CBC with Differential:   Recent Labs     12/10/21  1609 12/10/21  1609 12/11/21  0622 12/12/21  0521   WBC 9.0   < > 5.1 9.0   HGB 11.8*   < > 10.8* 11.0*   HCT 36.4   < > 33.6* 34.6*      < > See Reflexed IPF Result 412   LYMPHOPCT 19*  --  25  --    MONOPCT 3  --  3  --     < > = values in this interval not displayed. BMP:  Recent Labs     12/10/21  1609 12/10/21  1609 12/11/21  0622 12/12/21  0521   *   < > 144 141   K 3.0*   < > 3.7 3.0*   *   < > 110* 108*   CO2 22   < > 18* 18*   BUN 10   < > 11 17   CREATININE 0.54   < > 0.58 0.50   MG 1.9  --   --   --     < > = values in this interval not displayed. Hepatic Function Panel:   Recent Labs     12/11/21  0622 12/12/21  0521   PROT 5.5* 5.3*   LABALBU 2.9* 2.8*   BILITOT 0.35 0.34   ALKPHOS 126* 127*   * 120*   * 132*     No results for input(s): RPR in the last 72 hours. No results for input(s): HIV in the last 72 hours. No results for input(s): BC in the last 72 hours. Lab Results   Component Value Date    CREATININE 0.50 12/12/2021    GLUCOSE 119 12/12/2021       Detailed results: Thank you for allowing us to participate in the care of this patient. Please call with questions. This note is created with the assistance of a speech recognition program.  While intending to generate adocument that actually reflects the content of the visit, the document can still have some errors including those of syntax and sound a like substitutions which may escape proof reading. It such instances, actual meaningcan be extrapolated by contextual diversion. NUPUR Olmstead - CNP     ATTESTATION:    I have discussed the case, including pertinent history and exam findings with the APRN. I have evaluated the  History, physical findings and pictures of the patient and the key elements of the encounter have been performed by me. I have reviewed the laboratory data, other diagnostic studies and discussed them with the APRN. I have updated the medical record where necessary. I agree with the assessment, plan and orders as documented by the APRN.     Ruy Hale MD.      Office: [phone]  Perfect serve / office [phone]

## 2021-12-12 NOTE — PROGRESS NOTES
Patient's  Barbie Maldonado) updated on pt's current condition and POC. All questions/concerns addressed.

## 2021-12-12 NOTE — PROGRESS NOTES
Pulmonary Critical Care   Consult Note    Patient - Demian Renee  Date of Admission -  12/10/2021  3:28 PM  Date of Evaluation -  2021  Room and Bed Number -  -   Hospital Day - 2    CHIEF COMPLAINT : ACUTE HYPOXIC RESPIRATORY FAILURE DUE TO COVID -19 PNEUMONIA   HPI:   Demian Renee  60 y.o. female     admitted for worsening shortness of breath since last Monday. Patient has cough fever nausea . She came to the ER. Tested positive for COVID-19 with elevated CRP and D-dimer. CT angiogram of the chest was done which shows bilateral pulmonary infiltrates due to COVID-19. She is presently on high flow oxygen 40 L 70% FiO2. She has BiPAP available but has not used it. ABG shows no CO2 retention and PaO2 of 178. This is on high flow 40 L 70% FiO2. She has 2 packs/day smoking history and stopped in January. She has expiratory rhonchi. Denies purulent sputum or hemoptysis. She has pyoderma gangrenosum    She is unvaccinated for COVID-19      History of alcohol abuse.     2021   Subjective   Patient is on 100% FiO2 via nasal cannula plus nonrebreather  She has been getting Ativan as per CIWA protocol   She is DNR [facility]  OBJECTIVE:     VITAL SIGNS:  /63   Pulse 74   Temp 98.6 °F (37 °C) (Oral)   Resp 19   Ht 5' 6\" (1.676 m)   Wt 170 lb (77.1 kg)   SpO2 95%   BMI 27.44 kg/m²   Tmax over 24 hours:  Temp (24hrs), Av.4 °F (36.9 °C), Min:98.1 °F (36.7 °C), Max:98.6 °F (37 °C)      Patient Vitals for the past 8 hrs:   BP Temp Temp src Pulse Resp SpO2 Height   21 1244       5' 6\" (1.676 m)   21 1136     19 95 %    21 0931 108/63 98.6 °F (37 °C) Oral 74 21 95 %    21 0930    79      21 0929     21 95 %    21 0920    79 26 (!) 80 %    21 0600     28         No intake or output data in the 24 hours ending 21 1252    Wt Readings from Last 3 Encounters:   12/10/21 170 lb (77.1 kg)   21 170 lb (77.1 kg)   10/20/21 168 lb (76.2 kg)     Body mass index is 27.44 kg/m².         PHYSICAL EXAM:  Sleeping  Lungs lungs expiratory phase with expiratory rhonchi  CVS regular S1-S2  Abdomen nontender bowel sounds are present  Lower extremity no edema  Left lower extremity is bandaged    MEDICATIONS:  Scheduled Meds:   potassium bicarb-citric acid  40 mEq Oral BID    methylPREDNISolone  30 mg IntraVENous Q8H    sodium chloride flush  5-40 mL IntraVENous 2 times per day    vitamin C  500 mg Oral BID    Vitamin D  2,000 Units Oral Daily    zinc sulfate  50 mg Oral Daily    thiamine (VITAMIN B1) IVPB  100 mg IntraVENous Q24H    ipratropium  2 puff Inhalation 4x daily    albuterol sulfate HFA  2 puff Inhalation 4x daily    atorvastatin  10 mg Oral Daily    clopidogrel  75 mg Oral Daily    folic acid  1 mg Oral Daily    dapsone  50 mg Oral Daily    gabapentin  300 mg Oral BID    therapeutic multivitamin-minerals  1 tablet Oral Daily    pantoprazole  40 mg Oral QAM AC    traMADol  50 mg Oral 4x Daily    sodium chloride flush  5-40 mL IntraVENous 2 times per day    enoxaparin  30 mg SubCUTAneous BID    famotidine  20 mg Oral BID    metoprolol tartrate  37.5 mg Oral BID    cefepime  2,000 mg IntraVENous Q12H    povidone-iodine (BETADINE) in 0.9% Sodium Chloride irrigation   Topical Once     Continuous Infusions:   sodium chloride      sodium chloride       PRN Meds:   sodium chloride flush, 5-40 mL, PRN  sodium chloride, 25 mL, PRN  LORazepam, 1 mg, Q1H PRN   Or  LORazepam, 1 mg, Q1H PRN   Or  LORazepam, 2 mg, Q1H PRN   Or  LORazepam, 2 mg, Q1H PRN   Or  LORazepam, 3 mg, Q1H PRN   Or  LORazepam, 3 mg, Q1H PRN   Or  LORazepam, 4 mg, Q1H PRN   Or  LORazepam, 4 mg, Q1H PRN  lidocaine, , PRN  meclizine, 25 mg, TID PRN  oxyCODONE-acetaminophen, 1 tablet, Q4H PRN  sodium chloride flush, 5-40 mL, PRN  sodium chloride, 25 mL, PRN  ondansetron, 4 mg, Q8H PRN   Or  ondansetron, 4 mg, Q6H PRN  polyethylene glycol, 17 g, Daily PRN  acetaminophen, 650 mg, Q6H PRN   Or  acetaminophen, 650 mg, Q6H PRN  dextromethorphan-guaiFENesin, 5 mL, Q4H PRN        SUPPORT DEVICES: [] Ventilator [] BIPAP  [x]high flow nasal Cannula [] Room Air      ABGs:     Lab Results   Component Value Date    [id number] NOT REPORTED 12/11/2021    FIO2 100.0 12/11/2021       DATA:  Complete Blood Count:   Recent Labs     12/10/21  1609 12/11/21  0622 12/12/21  0521   WBC 9.0 5.1 9.0   RBC 3.40* 3.09* 3.20*   HGB 11.8* 10.8* 11.0*   HCT 36.4 33.6* 34.6*   .1* 108.7* 108.1*   MCH 34.7* 35.0* 34.4*   MCHC 32.4 32.1 31.8   RDW 13.9 13.9 13.8    See Reflexed IPF Result 412   MPV 10.2 NOT REPORTED 10.2        Last 3 Blood Glucose:   Recent Labs     12/10/21  1609 12/11/21  0622 12/12/21  0521   GLUCOSE 96 147* 119*        PT/INR:    Lab Results   Component Value Date    PROTIME 10.6 01/21/2021    INR 1.0 01/21/2021     PTT:  No results found for: APTT, PTT    Comprehensive Metabolic Profile:   Recent Labs     12/10/21  1609 12/11/21  0622 12/12/21  0521   * 144 141   K 3.0* 3.7 3.0*   * 110* 108*   CO2 22 18* 18*   BUN 10 11 17   CREATININE 0.54 0.58 0.50   GLUCOSE 96 147* 119*   CALCIUM 9.6 8.8 8.8   PROT 6.1* 5.5* 5.3*   LABALBU 3.4* 2.9* 2.8*   BILITOT 0.56 0.35 0.34   ALKPHOS 132* 126* 127*   * 360* 132*   * 186* 120*      Magnesium:   Lab Results   Component Value Date    MG 1.9 12/10/2021    MG 2.0 07/27/2021    MG 1.8 07/13/2021     Phosphorus: No results found for: PHOS  Ionized Calcium: No results found for: CAION     Urinalysis:   Lab Results   Component Value Date    NITRU NEGATIVE 07/13/2021    COLORU YELLOW 07/13/2021    PHUR 7.0 07/13/2021    WBCUA 0 TO 2 07/13/2021    RBCUA 0 TO 2 07/13/2021    MUCUS NOT REPORTED 07/13/2021    TRICHOMONAS NOT REPORTED 07/13/2021    YEAST NOT REPORTED 07/13/2021    BACTERIA NOT REPORTED 07/13/2021    SPECGRAV 1.015 07/13/2021    LEUKOCYTESUR TRACE 07/13/2021    UROBILINOGEN Normal 07/13/2021    BILIRUBINUR NEGATIVE 07/13/2021    BILIRUBINUR small 06/03/2013    BLOODU neg 06/03/2013    GLUCOSEU NEGATIVE 07/13/2021    KETUA NEGATIVE 07/13/2021    AMORPHOUS NOT REPORTED 07/13/2021               CT CHEST WO CONTRAST    Result Date: 12/11/2021  1. Multifocal ground-glass and consolidative opacities bilaterally suspicious for pneumonia. An atypical or viral pneumonia is suspected over edema. 2. Hepatic steatosis. 3. Emphysematous changes. XR CHEST PORTABLE    Result Date: 12/11/2021  Persistent findings including right greater than left bibasilar opacification suggestive of multifocal pneumonia as well as generalized interstitial prominence which may represent vascular congestion/edema. XR CHEST PORTABLE    Result Date: 12/10/2021  Vascular congestion and bilateral airspace disease is most suggestive of developing edema. An underlying inflammatory process or infection should also be considered in the appropriate clinical setting. Past Medical History:   Diagnosis Date    Arthritis     knees hips hands shoulders and back    CAD (coronary artery disease) 2019    blockage on cath 9/2019, to have stenting after surgery (arthroplasty) Dr. Viv Pond Chronic back pain     GERD (gastroesophageal reflux disease)     on rx    Hyperlipidemia     per Dr. Antonio Mata on rx    Hypertension     Loli Pete manages    Incomplete RBBB     Irregular heart beat     LAFB (left anterior fascicular block)     Dr. Antonio Mata, cardiologist, seen 9/2019 prior to surgery on 10/8/2019    Leg wound, left     following with wound care, Dr. Kristen Cardona Lumbar disc disease     Osteoporosis     PAC (premature atrial contraction) 06/2018    PACs and PVCs on holter monitor,     PONV (postoperative nausea and vomiting)     requests scop patch    Wears dentures     upper partial    Wears eyeglasses     readers    Wears partial dentures     upper and lower    Wellness examination     MIKE Cross NP PCP, seen 2/10/2020        Social History     Socioeconomic History    Marital status:      Spouse name: Nevaeh Stewart Number of children: 2    Years of education: Not on file    Highest education level: Not on file   Occupational History    Occupation: Retired RN   Tobacco Use    Smoking status: Former Smoker     Packs/day: 0.25     Years: 50.00     Pack years: 12.50     Types: Cigarettes     Start date:      Quit date: 2020     Years since quittin.8    Smokeless tobacco: Never Used   Vaping Use    Vaping Use: Never used   Substance and Sexual Activity    Alcohol use: Yes     Comment: 2-3 shot liquor for weekends    Drug use: Never    Sexual activity: Yes     Partners: Male   Other Topics Concern    Not on file   Social History Narrative    Not on file     Social Determinants of Health     Financial Resource Strain: Low Risk     Difficulty of Paying Living Expenses: Not hard at all   Food Insecurity: No Food Insecurity    Worried About [address] in the Last Year: Never true    [address] in the Last Year: Never true   Transportation Needs:     Lack of Transportation (Medical): Not on file    Lack of Transportation (Non-Medical):  Not on file   Physical Activity:     Days of Exercise per Week: Not on file    Minutes of Exercise per Session: Not on file   Stress:     Feeling of Stress : Not on file   Social Connections:     Frequency of Communication with Friends and Family: Not on file    Frequency of Social Gatherings with Friends and Family: Not on file    Attends Judaism Services: Not on file    Active Member of Clubs or Organizations: Not on file    Attends Club or Organization Meetings: Not on file    Marital Status: Not on file   Intimate Partner Violence:     Fear of Current or Ex-Partner: Not on file    Emotionally Abused: Not on file    Physically Abused: Not on file    Sexually Abused: Not on file   Housing Stability:     Unable to Pay for Housing EXCISION MASS THUMB, 3080 TABLE performed by Andrews Wilson DO at [facility] Left 12/3/2019    LEFT FOOT DEBRIDEMENT WITH SKIN GRAFT SPLIT THICKNESS; SKIN GRAFT TAKEN FROM RIGHT ANTERIOR THIGH performed by Cj Sherman MD at [facility] Left 2/25/2020    DEBRIDEMENT, SPLIT THICKNESS SKIN GRAFT WITH WOUND VAC PLACEMENT FOOT performed by Obed Nice MD at [facility] Left 6/30/2020    DEBRIDEMENT, SKIN GRAFT SPLIT THICKNESS FOOT  ([place] [id number], [address] [facility]) performed by Obed Nice MD at [address] [facility]      as a child    TOTAL KNEE ARTHROPLASTY Right 10/13/2020    KNEE TOTAL ARTHROPLASTY    TOTAL KNEE ARTHROPLASTY Right 10/13/2020    KNEE TOTAL ARTHROPLASTY- MICROPORT, \ ADVANCED performed by Andrews Wilson DO at [address]:  [facility] Information  Skin Assessment: Clean, dry, & intact  Equipment ID: vapotherm  Equipment Changed: Mask  FiO2 : 100 %  SpO2: 95 %  SpO2/FiO2 ratio: 95  I Time/ I Time %: 0.8 s  Humidification Source: Heated wire  Humidification Temp: 35  Mask Type: Full face mask  Mask Size: Small     PaO2/FiO2 RATIO:  Recent Labs     12/11/21  1601   POCPO2 178.7*      FiO2 : 100 %       LABS:  ABGs:   Recent Labs     12/11/21  1601   POCPH 7.446   POCPCO2 32.3*   POCPO2 178.7*   POCHCO3 22.2   TRSO0XGB 100*            ASSESSMENT:     Principal Problem:    Pneumonia due to COVID-19 virus  Active Problems:    Hypertension    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)    Gastroesophageal reflux disease without esophagitis    Pyoderma gangrenosum    Alcohol abuse    Acute respiratory failure due to COVID-19 (HCC)    Elevated LFTs    COVID-19  Resolved Problems:    * No resolved hospital problems.  *              Acute hypoxic respiratory failure secondary to COVID 19   Bilateral multifocal pneumonia due to COVID 19 infection   Covid -19 pandemic emergency    COPD exacerbation    LOS: 2  PLAN:    D/w RT  D/w RN   Continue high flow oxygen to maintain saturation greater than 90%. If agitation worsens then will need Precedex in ICU. Airborne isolation and droplet precautions to be continued  Continue supportive care   Cont treatment with medications for COVID-Actemra on 12/11/2021  IV Solu-Medrol to help with COPD exacerbation and COVID-19  Will obtain xray chest and ABG as needed  Recommend palliative care consult              Treatment plan Discussed with nursing staff in detail , all questions answered . Total critical care time caring for this patient with life threatening, unstable organ failure, including direct patient contact, management of life support systems, review of data including imaging and labs, discussions with other team members and physicians at least 27   Min so far today, excluding procedures. Electronically signed by Anneliese Becker MD on 12/12/2021 at 12:52 PM       This patient was evaluated in the context of the global SARS-CoV-2 (COVID-19) pandemic, which necessitated considerations that the patient either has COVID-19 infection or is at risk of infection with COVID-19. Institutional protocols and algorithms that pertain to the evaluation & management of patients with COVID-19 or those at risk for COVID-19 are in a state of rapid changes based on information released by regulatory bodies including the CDC and federal and state organizations. These policies and algorithms were followed during the patient's care. Please note that this chart was generated using voice recognition Dragon dictation software. Although every effort was made to ensure the accuracy of this automated transcription, some errors in transcription may have occurred.

## 2021-12-12 NOTE — PLAN OF CARE
Nutrition Problem #1: Inadequate protein-energy intake  Intervention: Food and/or Nutrient Delivery: Continue Current Diet, Start Oral Nutrition Supplement  Nutritional Goals: Obtain >75% of nutrition needs via all sources of nutrition

## 2021-12-12 NOTE — PLAN OF CARE
Problem: Airway Clearance - Ineffective  Goal: Achieve or maintain patent airway  12/12/2021 1006 by Erinn Parkinson RN  Outcome: Ongoing  12/12/2021 0203 by Carmen Schmid RCP  Outcome: Ongoing     Problem: Gas Exchange - Impaired  Goal: Absence of hypoxia  12/12/2021 1006 by Erinn Parkinson RN  Outcome: Ongoing  12/12/2021 0203 by Carmen Schmid RCP  Outcome: Ongoing  Goal: Promote optimal lung function  12/12/2021 1006 by Erinn Parkinson RN  Outcome: Ongoing  12/12/2021 0203 by Carmen Schmid RCP  Outcome: Ongoing     Problem: Breathing Pattern - Ineffective  Goal: Ability to achieve and maintain a regular respiratory rate  12/12/2021 1006 by Erinn Parkinson RN  Outcome: Ongoing  12/12/2021 0203 by Carmen Schmid RCP  Outcome: Ongoing     Problem:  Body Temperature -  Risk of, Imbalanced  Goal: Ability to maintain a body temperature within defined limits  Outcome: Ongoing  Goal: Will regain or maintain usual level of consciousness  Outcome: Ongoing  Goal: Complications related to the disease process, condition or treatment will be avoided or minimized  Outcome: Ongoing     Problem: Isolation Precautions - Risk of Spread of Infection  Goal: Prevent transmission of infection  Outcome: Ongoing     Problem: Nutrition Deficits  Goal: Optimize nutritional status  Outcome: Ongoing     Problem: Risk for Fluid Volume Deficit  Goal: Maintain normal heart rhythm  Outcome: Ongoing  Goal: Maintain absence of muscle cramping  Outcome: Ongoing  Goal: Maintain normal serum potassium, sodium, calcium, phosphorus, and pH  Outcome: Ongoing     Problem: Loneliness or Risk for Loneliness  Goal: Demonstrate positive use of time alone when socialization is not possible  Outcome: Ongoing     Problem: Fatigue  Goal: Verbalize increase energy and improved vitality  Outcome: Ongoing     Problem: Patient Education: Go to Patient Education Activity  Goal: Patient/Family Education  Outcome: Ongoing     Problem: Falls - Risk of:  Goal: Will remain free from falls  Outcome: Ongoing  Goal: Absence of physical injury  Outcome: Ongoing     Problem: Skin Integrity:  Goal: Will show no infection signs and symptoms  Outcome: Ongoing  Goal: Absence of new skin breakdown  Outcome: Ongoing     Problem: Respiratory  Intervention: Respiratory assessment  12/12/2021 0204 by Colt Luciano RCP  Note:   PROVIDE ADEQUATE OXYGENATION WITH ACCEPTABLE SP02/ABG'S    []  IDENTIFY APPROPRIATE OXYGEN THERAPY  []   MONITOR SP02/ABG'S AS NEEDED   []   PATIENT EDUCATION AS NEEDED

## 2021-12-12 NOTE — PROGRESS NOTES
Comprehensive Nutrition Assessment    Type and Reason for Visit:  Positive Nutrition Screen, Wound (wound)    Nutrition Recommendations/Plan:   - Continue current diet, Regular  - Will send High kcal/High PRO ONS, TID  - Monitor/encourage PO intake    Nutrition Assessment:  61 yo F admitted for COVID-19 management and left foot wound. Per RN, pt w/ poor po/no appetite this admission. Pt unable to answer phone during visit. Will send Ensure TID. Malnutrition Assessment:  Malnutrition Status:  Insufficient data      Estimated Daily Nutrient Needs:  Energy (kcal):  1500 to 1600 kcal/day (1.1-1.2 factor); Weight Used for Energy Requirements:  Current     Protein (g):  80 to 90 g/day (1.4-1.6 g/kg); Weight Used for Protein Requirements:  Ideal        Fluid (ml/day):  1 mL/kcal or per MD; Method Used for Fluid Requirements:         Nutrition Related Findings:  Labs/meds reviewed. +2 LLE edema. Wounds:  Wound Consult Pending       Current Nutrition Therapies:    ADULT DIET; Regular; Safety Tray; Safety Tray (Disposables)  ADULT ORAL NUTRITION SUPPLEMENT; Breakfast, Lunch, Dinner; Standard High Calorie/High Protein Oral Supplement    Anthropometric Measures:  · Height: 5' 6\" (167.6 cm)  · Current Body Weight: 170 lb (77.1 kg)   · Ideal Body Weight: 130 lbs; % Ideal Body Weight 130.8 %   · BMI: 27.5  · BMI Categories: Overweight (BMI 25.0-29. 9)       Nutrition Diagnosis:   · Inadequate protein-energy intake related to  (Current medical condition) as evidenced by intake 0-25%    Nutrition Interventions:   Food and/or Nutrient Delivery:  Continue Current Diet, Start Oral Nutrition Supplement  Nutrition Education/Counseling:  No recommendation at this time   Coordination of Nutrition Care:  Continue to monitor while inpatient    Goals:  Obtain >75% of nutrition needs via all sources of nutrition       Nutrition Monitoring and Evaluation:   Behavioral-Environmental Outcomes:  None Identified   Food/Nutrient Intake Outcomes:  Food and Nutrient Intake, Supplement Intake  Physical Signs/Symptoms Outcomes:  Biochemical Data, Fluid Status or Edema, Skin, Weight     Discharge Planning:     Too soon to determine     Electronically signed by Chris Butterfield MS, RD, LD on 12/12/21 at 12:51 PM EST    Contact: [phone]

## 2021-12-13 NOTE — PROGRESS NOTES
Writer called patients  Denzel Husain ([phone]) to let him know the patient has been transferred to [address] (Room [id number]) but was unable to reach Denzel Husain

## 2021-12-13 NOTE — PROGRESS NOTES
[id number] [facility] Wound Ostomy  Nurse  Consult Note       NAME:  [address] RECORD NUMBER:  [id number]  AGE: 60 y.o. GENDER: female  : 1955  TODAY'S DATE:  2021    Subjective   Reason for [id number] Nurse Evaluation and Assessment: Chronic left dorsal foot wound. Chart reviewed. Patient is restless and had been sedated. Wound Identification:  Wound Type: pyoderma  Contributing Factors: decreased tissue oxygenation and immunosuppression    Wound present for last 2 years; attends [employer] with [facility] as well as [facility] with recent visits to both in November. Copied note 21 Dr. Naty Maurer, [facility]:  \"- Continue infliximab (s/p three infusions to date). QTB next due 8/10/22  - Continue dapsone 50mg daily. Prescribed and monitored by local dermatologist  - Decrease prednisone to 7.5 daily for two weeks, then 5 mg daily until follow up. - Continue doxycycline 100mg twice daily.     - Wound care: Dressing change once daily. Continue Santyl for chemical debridement focally on excessive gray tissue. Cover with Xeroform guaze, then normal gauze, then wrap areas with Kerlix, then ACE wraps to minimize edema. Discontinue silvadene.   - Initiate ketoconazole 2% cream once daily and triamcinolone 0.1% ointment once daily for two weeks on/two weeks off to erythematous, scaly areas between toes and around ulcer\"      PAST MEDICAL HISTORY        Diagnosis Date    Arthritis     knees hips hands shoulders and back    CAD (coronary artery disease) 2019    blockage on cath 2019, to have stenting after surgery (arthroplasty) Dr. Diane Gardiner Chronic back pain     GERD (gastroesophageal reflux disease)     on rx    Hyperlipidemia     per Dr. Bean on rx    Hypertension     Lonza Diamond manages    Incomplete RBBB     Irregular heart beat     LAFB (left anterior fascicular block)     Dr. Bean, cardiologist, seen 2019 prior to surgery on 10/8/2019    Leg wound, left     following with wound care, Dr. Williams Warner Lumbar disc disease     Osteoporosis     PAC (premature atrial contraction) 06/2018    PACs and PVCs on holter monitor,     PONV (postoperative nausea and vomiting)     requests scop patch    Wears dentures     upper partial    Wears eyeglasses     readers    Wears partial dentures     upper and lower    Wellness examination     MIKE Pugh NP PCP, seen 2/10/2020       PAST SURGICAL HISTORY    Past Surgical History:   Procedure Laterality Date    ABDOMINOPLASTY  1997    BACK SURGERY      BLEPHAROPLASTY Bilateral 1997    BREAST ENHANCEMENT SURGERY Bilateral 1999    breast augmentation    CARDIAC CATHETERIZATION  2019    Dr. Pablo Stauffer, blockage but no stents yet    CATARACT REMOVAL WITH IMPLANT Bilateral 2016    COLONOSCOPY  2015    No Polyps    COSMETIC SURGERY      eyelid lift    FINGER SURGERY Right     thumb lesion excised    FIXATION KYPHOPLASTY  2013    Back    FOOT DEBRIDEMENT Left 10/11/2019    SURGICAL PREP WOUND BED LEFT FOOT WITH APPLICATION OF EPIFIX LEFT FOOT 3X4 performed by Fannie Schultz DPM at [place] [address]      kyphoplasty for lumbar fx 2013 Dr. Kassandra San ARTHROSCOPY Left 2006   Amor David Left 1/2/2020    FOOT  DEBRIDEMENT WITH WOUND VAC PLACEMENT performed by Aracelis Ohara MD at [place] [address]  2005    diskectomy and spinal fusion    OTHER SURGICAL HISTORY  10/03/2019    Left leg angiogram    ND OFFICE/OUTPT VISIT,PROCEDURE ONLY Right 9/11/2018    EXCISION MASS THUMB, 3080 TABLE performed by Kiersten Lind DO at [facility] Left 12/3/2019    LEFT FOOT DEBRIDEMENT WITH SKIN GRAFT SPLIT THICKNESS; SKIN GRAFT TAKEN FROM RIGHT ANTERIOR THIGH performed by Aracelis Ohara MD at [facility] Left 2/25/2020    DEBRIDEMENT, SPLIT THICKNESS SKIN GRAFT WITH WOUND VAC PLACEMENT FOOT performed by Makenna Galloway MD at [address] [facility], [address] Left 2020    DEBRIDEMENT, SKIN GRAFT SPLIT THICKNESS FOOT  ([facility], [address] [facility]) performed by Makenna Galloway MD at [address] [facility]      as a child    TOTAL KNEE ARTHROPLASTY Right 10/13/2020    KNEE TOTAL ARTHROPLASTY    TOTAL KNEE ARTHROPLASTY Right 10/13/2020    KNEE TOTAL ARTHROPLASTY- MICROPORT, \ ADVANCED performed by Asiya Partida DO at [address] HISTORY    Family History   Problem Relation Age of Onset    Coronary Art Dis Mother     Arthritis Mother     Heart Surgery Mother         CABG    Coronary Art Dis Father     Heart Disease Father     Heart Surgery Father         CABG    Coronary Art Dis Sister     Heart Surgery Sister         CABG       SOCIAL HISTORY    Social History     Tobacco Use    Smoking status: Former Smoker     Packs/day: 0.25     Years: 50.00     Pack years: 12.50     Types: Cigarettes     Start date:      Quit date: 2020     Years since quittin.8    Smokeless tobacco: Never Used   Vaping Use    Vaping Use: Never used   Substance Use Topics    Alcohol use: Yes     Comment: 2-3 shot liquor for weekends    Drug use: Never       ALLERGIES    Allergies   Allergen Reactions    Celecoxib Other (See Comments)     Mood changes    Diclofenac Sodium Other (See Comments)     Mood changes    Ibuprofen Other (See Comments)     Mood changes    Meloxicam Other (See Comments)     Mood changes    Naproxen Other (See Comments)     Mood changes    Tolmetin Other (See Comments)     Mood changes       MEDICATIONS    No current facility-administered medications on file prior to encounter.      Current Outpatient Medications on File Prior to Encounter   Medication Sig Dispense Refill    dapsone 25 MG tablet Take 2 tablets by mouth daily 60 tablet 2    folic acid (FOLVITE) 1 MG tablet take 1 tablet by mouth once daily 30 tablet 5    doxycycline hyclate (VIBRAMYCIN) 100 MG capsule       predniSONE (DELTASONE) 10 MG tablet 7.5 mg daily 7.5mg for 3 weeks, taper to 5 mg for 3 weeks      omeprazole (PRILOSEC) 40 MG delayed release capsule Take 1 capsule by mouth daily 30 capsule 5    Metoprolol Tartrate 37.5 MG TABS take 1 tablet by mouth twice a day 60 tablet 5    lidocaine (XYLOCAINE) 2 % jelly Do not apply directly to wound. 30 mL 2    atorvastatin (LIPITOR) 10 MG tablet Take 1 tablet by mouth daily Take 10mg daily while on Cyclosporine. 30 tablet 2    meclizine (ANTIVERT) 25 MG CHEW Take 25 mg by mouth 3 times daily as needed      clopidogrel (PLAVIX) 75 MG tablet Take 1 tablet by mouth daily 30 tablet 11    Multiple Vitamins-Minerals (THERAPEUTIC MULTIVITAMIN-MINERALS) tablet Take 1 tablet by mouth daily       oxyCODONE-acetaminophen (PERCOCET) 5-325 MG per tablet take 1 tablet by mouth four times a day if needed      traMADol (ULTRAM ER) 200 MG extended release tablet take 1 tablet by mouth once daily      vitamin C (ASCORBIC ACID) 500 MG tablet Take 500 mg by mouth 2 times daily      diclofenac sodium (VOLTAREN) 1 % GEL Apply topically as needed Dr. Sergio Ceballos      NAPROXEN PO Take 250 mg by mouth daily as needed Indications: patient takes 1-2 times a day Stopping 10 days prior to surgery as instructed by surgeon      Cholecalciferol (VITAMIN D3) 5000 units TABS Take 1,000 Units by mouth daily       calcium carbonate (OSCAL) 500 MG TABS tablet Take 1,000 mg by mouth daily Per Mary Burr rheumatology      alendronate (FOSAMAX) 70 MG tablet Take 70 mg by mouth every 7 days Indications: Mondays Dr. Mattie Cartagena   Patient takes on Mondays      gabapentin (NEURONTIN) 300 MG capsule Take 300 mg by mouth 2 times daily.  Per Dr. Lyubov Ríos inFLIXimab (REMICADE) 100 MG injection Infuse 5 mg/kg intravenously See Admin Instructions      aspirin EC 81 MG EC tablet Take 1 tablet by mouth 2 times daily 84 tablet 0       Objective    BP (!) 167/81   Pulse 60   Temp 99.4 °F (37.4 °C) (Axillary)   Resp 26   Ht 5' 6\" (1.676 m)   Wt 151 lb 4.8 oz (68.6 kg)   SpO2 97%   BMI 24.42 kg/m²     LABS:  WBC:    Lab Results   Component Value Date    WBC 10.8 12/13/2021     H/H:    Lab Results   Component Value Date    HGB 10.1 12/13/2021    HCT 31.2 12/13/2021     PTT:  No results found for: APTT, PTT[APTT}  PT/INR:    Lab Results   Component Value Date    PROTIME 10.6 01/21/2021    INR 1.0 01/21/2021     HgBA1c:    Lab Results   Component Value Date    LABA1C 5.2 09/30/2020       Assessment   Xavi Risk Score: Xavi Scale Score: 18    Patient Active Problem List   Diagnosis Code    Neoplasm of uncertain behavior of skin D48.5    Other plastic surgery for unacceptable cosmetic appearance Z41.1    Chronic bilateral low back pain without sciatica M54.50, G89.29    Mass of finger of right hand R22.31    Chronic ulcer of left foot with fat layer exposed ([address] L97.522    Pain in left foot M79.672    Hypertension I10    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 ([facility]) L97.523    Wound, open, foot with complication, left, initial encounter S91.302A    Status post total knee replacement using cement, right Z96.651    Primary osteoarthritis of right knee M17.11    Gastroesophageal reflux disease without esophagitis K21.9    Macrocytic anemia D53.9    PAD (peripheral artery disease) ([facility]) I73.9    S/P drug eluting coronary stent placement Z95.5    Pain in limb M79.609    Cataract H26.9    Arrhythmia I49.9    Cellulitis of left foot L03. 116    Pyoderma gangrenosum L88    Alcohol abuse F10.10    Pneumonia due to COVID-19 virus U07.1, J12.82    Acute respiratory failure due to COVID-19 ([facility]) U07.1, J96.00    Elevated LFTs R79.89    COVID-19 U07.1       Measurements:     12/13/21 1501   Wound 12/04/20 Foot Left;Dorsal wound #1   Date First Assessed/Time First Assessed: 12/04/20 1014   Wound Approximate Age at First Assessment (Weeks): 80 weeks Location: Foot  Wound Location Orientation: Left;Dorsal  Wound Description (Comments): wound #1   Wound Image    Wound Etiology Other  (Pyoderma)   Dressing Status New dressing applied   Wound Cleansed Irrigated with saline   Dressing/Treatment Pharmaceutical agent (see MAR); Moist to moist; ABD   Dressing Change Due 12/14/21   Wound Length (cm) 10.8 cm   Wound Width (cm) 10 cm   Wound Depth (cm) 0.5 cm   Wound Surface Area (cm^2) 108 cm^2   Change in Wound Size % (l*w) -250.65   Wound Volume (cm^3) 54 cm^3   Wound Healing % -338   Wound Assessment Epithelialization; Fibrinous; Pink/red   Drainage Amount None   Jessica-wound Assessment Hyperpigmented; Dry/flaky   Wound Thickness Description not for Pressure Injury Full thickness     Wound bed is dry with areas of fibrous and epithelial tissue. No necrotic tissue is observed. Toes and periwound skin are dry and mildly erthythemic with no signs of fungal infection presently. Betadine soaked gauze was applied and secured with ABD and roll gauze. No other skin concerns per primary RN. Response to treatment: Restlessness. Plan   Plan of Care: Wound 12/04/20 Foot Left;Dorsal wound #1-Dressing/Treatment: Pharmaceutical agent (see MAR), Moist to moist, ABD     Daily dressing changes recommended using Betadine gauze or Xeroform, per preference of ID.   messaged to clarify orders. Continuation of ketoconazole 2% cream  and triamcinolone 0.1% at discretion of ID      Specialty Bed Required : Yes   [] Low Air Loss   [x] Pressure Redistribution  [] Fluid Immersion  [] Bariatric  [] Total Pressure Relief  [] Other:     Current Diet: ADULT DIET;  Regular; Safety Tray; Safety Tray (Disposables)  ADULT ORAL NUTRITION SUPPLEMENT; Breakfast, Lunch, Dinner; Standard High Calorie/High Protein Oral Supplement  D   SENTHIL HOLLOWAY RN BSN,  [employer]

## 2021-12-13 NOTE — PLAN OF CARE
Problem: Airway Clearance - Ineffective  Goal: Achieve or maintain patent airway  Outcome: Ongoing     Problem: Gas Exchange - Impaired  Goal: Absence of hypoxia  Outcome: Ongoing  Goal: Promote optimal lung function  Outcome: Ongoing     Problem: Breathing Pattern - Ineffective  Goal: Ability to achieve and maintain a regular respiratory rate  Outcome: Ongoing     Problem:  Body Temperature -  Risk of, Imbalanced  Goal: Ability to maintain a body temperature within defined limits  Outcome: Ongoing  Goal: Will regain or maintain usual level of consciousness  Outcome: Ongoing  Goal: Complications related to the disease process, condition or treatment will be avoided or minimized  Outcome: Ongoing     Problem: Isolation Precautions - Risk of Spread of Infection  Goal: Prevent transmission of infection  Outcome: Ongoing     Problem: Nutrition Deficits  Goal: Optimize nutritional status  Outcome: Ongoing     Problem: Risk for Fluid Volume Deficit  Goal: Maintain normal heart rhythm  Outcome: Ongoing  Goal: Maintain absence of muscle cramping  Outcome: Ongoing  Goal: Maintain normal serum potassium, sodium, calcium, phosphorus, and pH  Outcome: Ongoing     Problem: Loneliness or Risk for Loneliness  Goal: Demonstrate positive use of time alone when socialization is not possible  Outcome: Ongoing     Problem: Fatigue  Goal: Verbalize increase energy and improved vitality  Outcome: Ongoing     Problem: Patient Education: Go to Patient Education Activity  Goal: Patient/Family Education  Outcome: Ongoing     Problem: Falls - Risk of:  Goal: Will remain free from falls  Description: Will remain free from falls  Outcome: Ongoing  Goal: Absence of physical injury  Description: Absence of physical injury  Outcome: Ongoing     Problem: Skin Integrity:  Goal: Will show no infection signs and symptoms  Description: Will show no infection signs and symptoms  Outcome: Ongoing  Goal: Absence of new skin breakdown  Description: Absence of new skin breakdown  Outcome: Ongoing     Problem: Nutrition  Goal: Optimal nutrition therapy  Outcome: Ongoing

## 2021-12-13 NOTE — CARE COORDINATION
Consult received for consideration of rehab. Advised pt is being transferred to [facility].   Will follow along and discuss alcohol use/tx options with pt once she is able

## 2021-12-13 NOTE — PROGRESS NOTES
[facility]  Office: [address], DO, Nolberto Blanco, DO, Deyanira Johnson, DO, Paul Delarosa Blood, DO, Henrietta Taylor MD, Alexandra Rushing MD, Phyllis Gonzalez MD, Lexx Garcia MD, Boni Sheriff MD, Meredith Vasquez MD, Jad Bennett MD, Petty Swartz, DO, Riki Gonzalez, DO, Zahra Ortega MD,  Vik Monterroso DO, Rupinder Castillo MD, Carina Calderón MD, Carmela Oliveira MD, Jolly Dillard MD, Walt Robles MD, Ellie Reynoso MD, Leonard Vásquez MD, Dana Mcmahon, [facility], [facility], CNP, Jb Purdy, CNP, Tryell Campa, CNS, Joshua Tomlin, [facility], Ilda Rosales, CNP, Roland Corona, [facility], Alfredo Arzate, [facility], Dario Wilks, CNP, Rowan Oppenheim, PA-C, Willian Erickson, [facility], Andrew Morgan, CNP, Rhoda Osuna, CNP, Kisha Lea, CNP, Artie Finn, CNP, Ok Del Angel, CNP, Harika Herzog, CNP, Chio Damian, [address] [place] [address]    Progress Note    12/13/2021    10:35 AM    Name:   Mercer Alpers  MRN:     [id number]     Acct:      [de-identified]   Room:   [address] [place] [address] Day:  3  Admit Date:  12/10/2021  3:28 PM    PCP:   NUPUR Huston CNP  Code Status:  DNR-CCA    Subjective:     C/C:   Chief Complaint   Patient presents with    Emesis     N/V Xs 3 days    Fever     103 t two days ago    Shortness of Breath     Interval History Status: improved. Patient very agitated this morning, groaning but not verbally responding. Started crawling out of bed and desaturated clearly morning and was started on Precedex and transferred to [facility] ICU for withdrawal of alcohol. Brief History:     From H&P  Mercer Alpers is a 60 y.o. Non- / non  female who presents with Emesis (N/V Xs 3 days), Fever (103 t two days ago), and Shortness of Breath   and is admitted to the hospital for the management of Pneumonia due to COVID-19 virus.     Patient reports that she was exposed to Covid over Thanksgiving.   She says she started having shortness of breath this past Monday. Also had complaints of cough, fever, nausea, vomiting, and diarrhea. She was unable to get into her PCPs office, so she came to the ED for evaluation. She endorses she has not been vaccinated for Covid due to autoimmune disorders.      Patient was swabbed for Covid while in the ED and tested positive. CRP is elevated at 53.8, D-dimer 0.95, ferritin 5091, procalcitonin greater than 100, LD 1260. Chest x-ray revealed vascular congestion and bilateral airspace disease most suggestive of developing edema as well as an underlying inflammatory process or infection. Twelve-lead EKG was completed and revealed normal sinus rhythm with left ventricular hypertrophy.      Infectious disease was consulted. Infectious disease recommending Betadine soaks daily to left foot due to chronic left foot infection/gangrenosum pyoderma and to start cefepime and Zyvox. To treat Covid, recommending high-dose Decadron 20 mg x 5 doses then 10 mg x 5 doses and Actemra.     Patient is being admitted for further observation and management of  COVID-19 and left foot infection. Review of Systems:     Constitutional:  negative for chills, fevers, sweats  Respiratory:  negative for cough, dyspnea on exertion, shortness of breath, wheezing  Cardiovascular:  negative for chest pain, chest pressure/discomfort, lower extremity edema, palpitations  Gastrointestinal:  negative for abdominal pain, constipation, diarrhea, nausea, vomiting  Neurological:  negative for dizziness, headache    Medications: Allergies:     Allergies   Allergen Reactions    Celecoxib Other (See Comments)     Mood changes    Diclofenac Sodium Other (See Comments)     Mood changes    Ibuprofen Other (See Comments)     Mood changes    Meloxicam Other (See Comments)     Mood changes    Naproxen Other (See Comments)     Mood changes    Tolmetin Other (See Comments)     Mood changes       Current Meds:   Scheduled Meds:    potassium phosphate IVPB  30 mmol IntraVENous Once    potassium chloride  20 mEq IntraVENous Q2H    methylPREDNISolone  30 mg IntraVENous Q12H    sodium chloride flush  5-40 mL IntraVENous 2 times per day    vitamin C  500 mg Oral BID    Vitamin D  2,000 Units Oral Daily    zinc sulfate  50 mg Oral Daily    thiamine (VITAMIN B1) IVPB  100 mg IntraVENous Q24H    ipratropium  2 puff Inhalation 4x daily    albuterol sulfate HFA  2 puff Inhalation 4x daily    atorvastatin  10 mg Oral Daily    clopidogrel  75 mg Oral Daily    folic acid  1 mg Oral Daily    dapsone  50 mg Oral Daily    gabapentin  300 mg Oral BID    therapeutic multivitamin-minerals  1 tablet Oral Daily    pantoprazole  40 mg Oral QAM AC    traMADol  50 mg Oral 4x Daily    sodium chloride flush  5-40 mL IntraVENous 2 times per day    enoxaparin  30 mg SubCUTAneous BID    famotidine  20 mg Oral BID    metoprolol tartrate  37.5 mg Oral BID    cefepime  2,000 mg IntraVENous Q12H    povidone-iodine (BETADINE) in 0.9% Sodium Chloride irrigation   Topical Once     Continuous Infusions:    dextrose 100 mL/hr at 12/13/21 1006    dexmedetomidine 0.6 mcg/kg/hr (12/13/21 1014)    sodium chloride      sodium chloride       PRN Meds: sodium chloride flush, sodium chloride, LORazepam **OR** LORazepam **OR** LORazepam **OR** LORazepam **OR** LORazepam **OR** LORazepam **OR** LORazepam **OR** LORazepam, lidocaine, meclizine, oxyCODONE-acetaminophen, sodium chloride flush, sodium chloride, ondansetron **OR** ondansetron, polyethylene glycol, acetaminophen **OR** acetaminophen, dextromethorphan-guaiFENesin    Data:     Past Medical History:   has a past medical history of Arthritis, CAD (coronary artery disease), Chronic back pain, GERD (gastroesophageal reflux disease), Hyperlipidemia, Hypertension, Incomplete RBBB, Irregular heart beat, LAFB (left anterior fascicular block), Leg wound, left, Lumbar disc disease, Osteoporosis, PAC (premature atrial contraction), PONV (postoperative nausea and vomiting), Wears dentures, Wears eyeglasses, Wears partial dentures, and Wellness examination. Social History:   reports that she quit smoking about 22 months ago. Her smoking use included cigarettes. She started smoking about 53 years ago. She has a 12.50 pack-year smoking history. She has never used smokeless tobacco. She reports current alcohol use. She reports that she does not use drugs. Family History:   Family History   Problem Relation Age of Onset    Coronary Art Dis Mother     Arthritis Mother     Heart Surgery Mother         CABG    Coronary Art Dis Father     Heart Disease Father     Heart Surgery Father         CABG    Coronary Art Dis Sister     Heart Surgery Sister         CABG       Vitals:  BP (!) 143/80   Pulse 99   Temp 99.4 °F (37.4 °C) (Axillary)   Resp (!) 38   Ht 5' 6\" (1.676 m)   Wt 151 lb 4.8 oz (68.6 kg)   SpO2 97%   BMI 24.42 kg/m²   Temp (24hrs), Av.7 °F (37.1 °C), Min:97.3 °F (36.3 °C), Max:99.5 °F (37.5 °C)    No results for input(s): POCGLU in the last 72 hours. I/O (24Hr): Intake/Output Summary (Last 24 hours) at 2021 1035  Last data filed at 2021 1600  Gross per 24 hour   Intake 185 ml   Output    Net 185 ml       Labs:  Hematology:  Recent Labs     12/10/21  1609 12/10/21  1609 21  0622 21  0521 21  0434   WBC 9.0   < > 5.1 9.0 10.8   RBC 3.40*   < > 3.09* 3.20* 2.91*   HGB 11.8*   < > 10.8* 11.0* 10.1*   HCT 36.4   < > 33.6* 34.6* 31.2*   .1*   < > 108.7* 108.1* 107.2*   MCH 34.7*   < > 35.0* 34.4* 34.7*   MCHC 32.4   < > 32.1 31.8 32.4   RDW 13.9   < > 13.9 13.8 13.9      < > See Reflexed IPF Result 412 410   MPV 10.2   < > NOT REPORTED 10.2 10.4   CRP 53.8*   < > 54.3* 15.3* 5.9*   DDIMER 0.95  --   --   --   --     < > = values in this interval not displayed.      Chemistry:  Recent Labs     12/10/21  1609 12/10/21  1609 12/10/21  1732 21  4008 12/12/21  0521 12/13/21  0434   *   < >  --  144 141 146*   K 3.0*   < >  --  3.7 3.0* 3.0*   *   < >  --  110* 108* 115*   CO2 22   < >  --  18* 18* 17*   GLUCOSE 96   < >  --  147* 119* 130*   BUN 10   < >  --  11 17 17   CREATININE 0.54   < >  --  0.58 0.50 0.49*   MG 1.9  --   --   --   --  2.3   ANIONGAP 15   < >  --  16 15 14   LABGLOM >60   < >  --  >60 >60 >60   GFRAA >60   < >  --  >60 >60 >60   CALCIUM 9.6   < >  --  8.8 8.8 8.4*   PHOS  --   --   --   --   --  1.6*   TROPHS 18*  --  17*  --   --   --     < > = values in this interval not displayed. Recent Labs     12/10/21  1609 12/10/21  1609 12/10/21  1916 12/11/21  0622 12/12/21  0521 12/13/21  0434   PROT 6.1*  --   --  5.5* 5.3*  --    LABALBU 3.4*   < >  --  2.9* 2.8* 2.8*   *  --   --  360* 132*  --    *  --   --  186* 120*  --    LDH 1,260*  --  957*  --   --   --    ALKPHOS 132*  --   --  126* 127*  --    BILITOT 0.56  --   --  0.35 0.34  --     < > = values in this interval not displayed. ABG:  Lab Results   Component Value Date    POCPH 7.446 12/11/2021    POCPCO2 32.3 12/11/2021    POCPO2 178.7 12/11/2021    POCHCO3 22.2 12/11/2021    NBEA 1 12/11/2021    PBEA NOT REPORTED 12/11/2021    HVI5ALH NOT REPORTED 12/11/2021    CGSY6SPE 100 12/11/2021    FIO2 100.0 12/11/2021     Lab Results   Component Value Date/Time    SPECIAL LT HAND 8ML 12/13/2021 04:40 AM     Lab Results   Component Value Date/Time    CULTURE NO GROWTH <24 HRS 12/13/2021 04:40 AM       Radiology:  CT CHEST WO CONTRAST    Result Date: 12/12/2021  1. Multifocal ground-glass and consolidative opacities bilaterally suspicious for pneumonia. An atypical or viral pneumonia is suspected over edema. 2. Hepatic steatosis. 3. Emphysematous changes.      XR CHEST PORTABLE    Result Date: 12/11/2021  Persistent findings including right greater than left bibasilar opacification suggestive of multifocal pneumonia as well as generalized interstitial prominence which may represent vascular congestion/edema. XR CHEST PORTABLE    Result Date: 12/10/2021  Vascular congestion and bilateral airspace disease is most suggestive of developing edema. An underlying inflammatory process or infection should also be considered in the appropriate clinical setting.        Physical Examination:        General appearance:  alert, cooperative and no distress  Mental Status:  confused  Lungs:  clear to auscultation bilaterally, normal effort  Heart:  regular rate and rhythm, no murmur  Abdomen:  soft, nontender, nondistended, normal bowel sounds, no masses, hepatomegaly, splenomegaly  Extremities:  no edema, redness, tenderness in the calves  Skin:  no gross lesions, rashes, induration    Assessment:        Hospital Problems           Last Modified POA    * (Principal) Pneumonia due to COVID-19 virus 12/11/2021 Yes    Hypertension 12/11/2021 Yes    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 ([address] 12/11/2021 Yes    Gastroesophageal reflux disease without esophagitis 12/11/2021 Yes    Pyoderma gangrenosum 12/11/2021 Yes    Alcohol abuse 12/11/2021 Yes    Acute respiratory failure due to COVID-19 [facility]) 12/11/2021 Yes    Elevated LFTs 12/11/2021 Yes    COVID-19 12/11/2021 Yes          Plan:        Acute Resp failure due to Covid 19 pneumonia-pulmonary medicine following, on high FiO2 requirements, Patient is DNR CCA  Covid 19 pneumonia- s/p Actemra, Decadron, oxygen therapy, droplet- plus isolation, ID consult, add Vit D, Vit C and Zinc  HTN- Bp stable  Pyoderma gangrenosum- Wound consult  Growing Pseudomonas and Proteus, status post Actemra, infectious disease managing antibiotic  Etoh abuse-started Precedex  Hx smoking/ COPD?- solumedrol 30mg q8 hours  Gi/ DVT proph      Humaira Rich DO  12/13/2021  10:35 AM

## 2021-12-13 NOTE — PROGRESS NOTES
[facility] -   Infectious diseases evaluation  admission date 12/10/2021    reason for consultation:   covid pneumonia    Impression :   Current:  · covid pneumonia w hypoxemia and resp distress  · Increased inf markers  · procal >100  · Left foot chronic infected wound - pyoderma gangrenosum( per pt)  · Past pseudomonas infection  · QTC    Other:  · HTN, CAD, RBBB  Discussion / summary of stay / plan of care   ·   Recommendations   · covid :  · Decadron high dose 20x 5 then 10 x5  · CT of the chest suggestive of bilateral Covid pneumonia,  · Proceeding with Actemra 8 mg/kg X1  · anticoag  · FU CRP   · Foot wound cx and wound care  · Stop cefepime and Zosyn, start ceftriaxone for another 7 days in the setting of steroids and Actemra      Isolate till 12/25 included    Infection Control Recommendations   · [place] Precautions  · Contact Isolation   · Airborne isolation  · Droplet Isolation    Antimicrobial Stewardship Recommendations   · Simplification of therapy  · Targeted therapy  ·     Coordination ofOutpatient Care:   · Estimated Length of IV antimicrobials:  · Patient will need Midline / picc Catheter Insertion:   · Patient will need SNF:  · Patient will need outpatient wound care:     History of Present Illness:   Initial history:  Abdirizak Johnson is a 60y.o.-year-old female who presents with nausea vomiting diarrhea shortness of breath clear cough generalized fatigue fever ongoing for about 5 days. Was exposed to Covid, had test at home on 12/6 that was negative. She is unvaccinated. Report of a left foot chronic infection, goes to wound care for that. On 5/2021 the foot wound cx was pseudomonas S cipro and cefepime.   6/2021 foot cx was neg    No dysuria  No other soft tissue infections  Does have incomplete RBBB and chronic back pain    In ER she was tachypneic tachycardic  Inflammatory markers elevated, white count is normal  Tested positive for Covid on 12/10 in the ER and chest x-ray suggestive of Covid pneumonia    started NRB 10L in ER due to hypoxemia - So2 now 94  Left foot dorsum open large wound w purulence, cx taken in ER      Interval changes  12/13/2021   Patient Vitals for the past 8 hrs:   BP Temp Temp src Pulse Resp SpO2 Weight   12/13/21 1155 (!) 129/107   60 26     12/13/21 1138     26     12/13/21 0959 (!) 149/111 99.4 °F (37.4 °C) Axillary 103 25     12/13/21 0957       151 lb 4.8 oz (68.6 kg)   12/13/21 0947 (!) 169/85   113 23     12/13/21 0941 (!) 172/144   113 23     12/13/21 0931    112 27     12/13/21 0919     (!) 38     12/13/21 0816  99.5 °F (37.5 °C) Axillary       12/13/21 0808     (!) 34     12/13/21 0547    99 25 97 %    12/13/21 0514 (!) 143/80   78      12/13  DNR CCA, saturating 95%, she is on the BiPAP  Foot wound Proteus sensitive to all, within normal reynaldo  Keeping the dressing as recommended by members of [place], discussed with stoma nurse  Changed to ceftriaxone 1 g a day to address the Proteus in the setting of immunosuppression and Actemra      Summary of relevant labs:  Labs:  Lactic Acid, Sepsis, Whole Blood2.2 High    WBC9.0   D-Dimer, Quant0.95   [id number] High    Troponin, High [id number] High    Procalcitonin>100.00 High    CRP53.8 High    LD1,260 High      Micro:  SARS-CoV-2, RapidDETECTED Abnormal      Imaging:  CXR  Vascular congestion and bilateral airspace disease is most suggestive of   developing edema. An underlying inflammatory process or infection should also be considered in the appropriate clinical setting. I have personally reviewed the past medical history, past surgical history, medications, social history, and family history, and I haveupdated the database accordingly.       Allergies:   Celecoxib, Diclofenac sodium, Ibuprofen, Meloxicam, Naproxen, and Tolmetin     Review of Systems:     Review of Systems   Constitutional: Positive for activity change, fatigue and fever. HENT: Positive for congestion. Eyes: Negative for photophobia. Respiratory: Positive for cough and shortness of breath. Cardiovascular: Negative for leg swelling. Gastrointestinal: Positive for diarrhea, nausea and vomiting. Endocrine: Negative for polyphagia. Genitourinary: Negative for dysuria. Musculoskeletal: Positive for back pain. Skin: Positive for wound. Negative for color change. Allergic/Immunologic: Negative for immunocompromised state. Neurological: Positive for dizziness. Negative for seizures and numbness. Hematological: Negative for adenopathy. Psychiatric/Behavioral: Negative for agitation. Physical Examination :       Physical Exam  Constitutional:       General: She is in acute distress. Appearance: She is ill-appearing. HENT:      Head: Normocephalic and atraumatic. Nose: Nose normal.      Mouth/Throat:      Mouth: Mucous membranes are moist.   Eyes:      General: No scleral icterus. Conjunctiva/sclera: Conjunctivae normal.   Cardiovascular:      Rate and Rhythm: Normal rate and regular rhythm. Heart sounds: Normal heart sounds. No murmur heard. Pulmonary:      Effort: Respiratory distress present. Breath sounds: No stridor. Abdominal:      Palpations: There is no mass. Hernia: No hernia is present. Genitourinary:     Comments: Urine la  Musculoskeletal:         General: No swelling or deformity. Cervical back: Neck supple. No rigidity. Skin:     General: Skin is dry. Coloration: Skin is not jaundiced or pale. Findings: No erythema. Neurological:      General: No focal deficit present. Mental Status: She is alert and oriented to person, place, and time. Psychiatric:         Mood and Affect: Mood normal.         Thought Content:  Thought content normal.         Past Medical History:     Past Medical History:   Diagnosis Date    Arthritis     knees hips hands spinal fusion    OTHER SURGICAL HISTORY  10/03/2019    Left leg angiogram    [place] OFFICE/OUTPT VISIT,PROCEDURE ONLY Right 9/11/2018    EXCISION MASS THUMB, 3080 TABLE performed by Gail Murphy DO at [address] Left 12/3/2019    LEFT FOOT DEBRIDEMENT WITH SKIN GRAFT SPLIT THICKNESS; SKIN GRAFT TAKEN FROM RIGHT ANTERIOR THIGH performed by Tomasz Kulkarni MD at [address] Left 2/25/2020    DEBRIDEMENT, SPLIT THICKNESS SKIN GRAFT WITH WOUND VAC PLACEMENT FOOT performed by BUTCH Gregory MD at [address] Left 6/30/2020    DEBRIDEMENT, SKIN GRAFT SPLIT THICKNESS FOOT  ([facility], [address] [facility]) performed by Nataly Barton MD at [address] [facility]      as a child    TOTAL KNEE ARTHROPLASTY Right 10/13/2020    KNEE TOTAL ARTHROPLASTY    TOTAL KNEE ARTHROPLASTY Right 10/13/2020    KNEE TOTAL ARTHROPLASTY- MICROPORT, \ ADVANCED performed by Gail Murphy DO at [facility] OR       Medications:      potassium phosphate IVPB  30 mmol IntraVENous Once    methylPREDNISolone  30 mg IntraVENous Q8H    hydrALAZINE  10 mg IntraVENous Once    cefTRIAXone (ROCEPHIN) IV  1,000 mg IntraVENous Q24H    sodium chloride flush  5-40 mL IntraVENous 2 times per day    vitamin C  500 mg Oral BID    Vitamin D  2,000 Units Oral Daily    zinc sulfate  50 mg Oral Daily    thiamine (VITAMIN B1) IVPB  100 mg IntraVENous Q24H    ipratropium  2 puff Inhalation 4x daily    albuterol sulfate HFA  2 puff Inhalation 4x daily    atorvastatin  10 mg Oral Daily    clopidogrel  75 mg Oral Daily    folic acid  1 mg Oral Daily    dapsone  50 mg Oral Daily    gabapentin  300 mg Oral BID    therapeutic multivitamin-minerals  1 tablet Oral Daily    pantoprazole  40 mg Oral QAM AC    traMADol  50 mg Oral 4x Daily    sodium chloride flush  5-40 mL IntraVENous 2 times per day    enoxaparin  30 mg SubCUTAneous BID    famotidine  20 mg Oral BID    metoprolol tartrate  37.5 mg Oral BID    povidone-iodine (BETADINE) in 0.9% Sodium Chloride irrigation   Topical Once       Social History:     Social History     Socioeconomic History    Marital status:      Spouse name: Lori Wayne Number of children: 2    Years of education: Not on file    Highest education level: Not on file   Occupational History    Occupation: Retired RN   Tobacco Use    Smoking status: Former Smoker     Packs/day: 0.25     Years: 50.00     Pack years: 12.50     Types: Cigarettes     Start date:      Quit date: 2020     Years since quittin.8    Smokeless tobacco: Never Used   Vaping Use    Vaping Use: Never used   Substance and Sexual Activity    Alcohol use: Yes     Comment: 2-3 shot liquor for weekends    Drug use: Never    Sexual activity: Yes     Partners: Male   Other Topics Concern    Not on file   Social History Narrative    Not on file     Social Determinants of Health     Financial Resource Strain: Low Risk     Difficulty of Paying Living Expenses: Not hard at all   Food Insecurity: No Food Insecurity    Worried About [address] in the Last Year: Never true    [address]  in the Last Year: Never true   Transportation Needs:     Lack of Transportation (Medical): Not on file    Lack of Transportation (Non-Medical):  Not on file   Physical Activity:     Days of Exercise per Week: Not on file    Minutes of Exercise per Session: Not on file   Stress:     Feeling of Stress : Not on file   Social Connections:     Frequency of Communication with Friends and Family: Not on file    Frequency of Social Gatherings with Friends and Family: Not on file    Attends Yarsanism Services: Not on file    Active Member of Clubs or Organizations: Not on file    Attends Club or Organization Meetings: Not on file    Marital Status: Not on file   Intimate Partner Violence:     Fear of Current or Ex-Partner: Not on file    Emotionally Abused: Not on file    Physically Abused: Not on file    Sexually Abused: Not on file   Housing Stability:     Unable to Pay for Housing in the Last Year: Not on file    Number of Places Lived in the Last Year: Not on file    Unstable Housing in the Last Year: Not on file       Family History:     Family History   Problem Relation Age of Onset    Coronary Art Dis Mother     Arthritis Mother     Heart Surgery Mother         CABG    Coronary Art Dis Father     Heart Disease Father     Heart Surgery Father         CABG    Coronary Art Dis Sister     Heart Surgery Sister         CABG      Medical Decision Making:   I have independently reviewed/ordered the following labs:    CBC with Differential:   Recent Labs     12/10/21  1609 12/10/21  1609 12/11/21  0622 12/11/21 0622 12/12/21  0521 12/13/21  0434   WBC 9.0   < > 5.1   < > 9.0 10.8   HGB 11.8*   < > 10.8*   < > 11.0* 10.1*   HCT 36.4   < > 33.6*   < > 34.6* 31.2*      < > See Reflexed IPF Result   < > 412 410   LYMPHOPCT 19*  --  25  --   --   --    MONOPCT 3  --  3  --   --   --     < > = values in this interval not displayed. BMP:  Recent Labs     12/10/21  1609 12/11/21  0622 12/12/21 0521 12/13/21  0434   *   < > 141 146*   K 3.0*   < > 3.0* 3.0*   *   < > 108* 115*   CO2 22   < > 18* 17*   BUN 10   < > 17 17   CREATININE 0.54   < > 0.50 0.49*   MG 1.9  --   --  2.3    < > = values in this interval not displayed. Hepatic Function Panel:   Recent Labs     12/11/21  0622 12/11/21  0622 12/12/21  0521 12/13/21  0434   PROT 5.5*  --  5.3*  --    LABALBU 2.9*   < > 2.8* 2.8*   BILITOT 0.35  --  0.34  --    ALKPHOS 126*  --  127*  --    *  --  120*  --    *  --  132*  --     < > = values in this interval not displayed. No results for input(s): RPR in the last 72 hours. No results for input(s): HIV in the last 72 hours. No results for input(s): BC in the last 72 hours.   Lab Results   Component Value Date    CREATININE 0.49 12/13/2021    GLUCOSE 130 12/13/2021       Detailed results: Thank you for allowing us to participate in the care of this patient. Please call with questions. This note is created with the assistance of a speech recognition program.  While intending to generate adocument that actually reflects the content of the visit, the document can still have some errors including those of syntax and sound a like substitutions which may escape proof reading. It such instances, actual meaningcan be extrapolated by contextual diversion.     Dioemdes Johnson MD  Office: [phone]  Perfect serve / office [phone]

## 2021-12-14 NOTE — PLAN OF CARE
Problem: Breathing Pattern - Ineffective  Goal: Ability to achieve and maintain a regular respiratory rate  12/13/2021 2114 by Rhoda Sherman RN  Outcome: Ongoing     Problem: Body Temperature -  Risk of, Imbalanced  Goal: Ability to maintain a body temperature within defined limits  12/13/2021 2114 by Rhoda Sherman RN  Outcome: Ongoing     Problem:  Body Temperature -  Risk of, Imbalanced  Goal: Will regain or maintain usual level of consciousness  Outcome: Ongoing

## 2021-12-14 NOTE — PROGRESS NOTES
Physical Therapy        Physical Therapy Cancel Note      DATE: 2021    NAME: Sigrid Mcdonough  MRN: [id number]   : 1955      Patient not seen this date for Physical Therapy due to:    Patient is not appropriate for PT evaluation/treatment at this time d/t pt on bipap, respiration rate into the 40s. Will check back on 12/15.       Electronically signed by Carlito Uriostegui PT on 2021 at 4:17 PM

## 2021-12-14 NOTE — PROGRESS NOTES
[facility] Wound Ostomy  Nurse  Consult Note       NAME:  [address]  RECORD NUMBER:  [id number]  AGE: 60 y.o. GENDER: female  : 1955  TODAY'S DATE:  2021    Subjective   Reason for [id number] Nurse Evaluation and Assessment:  Chronic left dorsal foot wound. Wound Identification:  Wound Type: pyoderma  Contributing Factors: decreased tissue oxygenation and immunosuppression     Wound present for last 2 years; attends [phone] with [facility] as well as [facility] with recent visits to both in November. Contacted Dr. Aashish Elena   via secure message; OK to continue with xeroform gauze and Kenalog cream to periwound / toes. There is no necrotic tissue in wound bed and will hold off on use of Santyl.         Objective    BP (!) 146/122   Pulse 51   Temp 97.5 °F (36.4 °C)   Resp 28   Ht 5' 6\" (1.676 m)   Wt 158 lb 1.6 oz (71.7 kg)   SpO2 97%   BMI 25.52 kg/m²     LABS:  WBC:    Lab Results   Component Value Date    WBC 18.4 2021     H/H:    Lab Results   Component Value Date    HGB 12.0 2021    HCT 37.0 2021     PTT:  No results found for: APTT, PTT[APTT}  PT/INR:    Lab Results   Component Value Date    PROTIME 10.6 2021    INR 1.0 2021     HgBA1c:    Lab Results   Component Value Date    LABA1C 5.2 2020       Assessment   Xavi Risk Score: Xavi Scale Score: 15    Patient Active Problem List   Diagnosis Code    Neoplasm of uncertain behavior of skin D48.5    Other plastic surgery for unacceptable cosmetic appearance Z41.1    Chronic bilateral low back pain without sciatica M54.50, G89.29    Mass of finger of right hand R22.31    Chronic ulcer of left foot with fat layer exposed ([address] L97.522    Pain in left foot M79.672    Hypertension I10    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) L97.523    Wound, open, foot with complication, left, initial encounter S91.302A    Status post total knee replacement using cement, right Z96.651    Primary osteoarthritis of right knee M17.11    Gastroesophageal reflux disease without esophagitis K21.9    Macrocytic anemia D53.9    PAD (peripheral artery disease) ([facility]) I73.9    S/P drug eluting coronary stent placement Z95.5    Pain in limb M79.609    Cataract H26.9    Arrhythmia I49.9    Cellulitis of left foot L03. 116    Pyoderma gangrenosum L88    Alcohol abuse F10.10    Pneumonia due to COVID-19 virus U07.1, J12.82    Acute respiratory failure due to COVID-19 ([facility]) U07.1, J96.00    Elevated LFTs R79.89    COVID-19 U07.1       Measurements:     12/14/21 1600   Wound 12/04/20 Foot Left;Dorsal wound #1   Date First Assessed/Time First Assessed: 12/04/20 1014   Wound Approximate Age at First Assessment (Weeks): 80 weeks  Location: Foot  Wound Location Orientation: Left;Dorsal  Wound Description (Comments): wound #1   Wound Etiology Other   Dressing Status New dressing applied   Wound Cleansed Irrigated with saline   Dressing/Treatment Xeroform; ABD; Roll gauze   Dressing Change Due 12/15/21   Wound Assessment Fibrinous; Epithelialization   Drainage Amount None   Jessica-wound Assessment Dry/flaky; Intact  (Kenalog cream applied)   Wound Thickness Description not for Pressure Injury Full thickness        Plan   Plan of Care: Wound 12/04/20 Foot Left;Dorsal wound #1-Dressing/Treatment: (P) Xeroform, ABD, Roll gauze     Left foot:  Daily dressing change. Cover with Xeroform guaze, then ABD, then wrap areas with Kerlix. Apply ketoconazole 2% cream once daily to toes and periwound skin      Specialty Bed Required : Yes   [] Low Air Loss   [x] Pressure Redistribution  [] Fluid Immersion  [] Bariatric  [] Total Pressure Relief  [] Other:     Current Diet: ADULT TUBE FEEDING; Nasogastric; Diabetic; Continuous; 10; Yes; 20; Q 4 hours; 30; 30; Q 4 hours; Protein; Bolus 1 Proteinex protein modular per day.     Neisha Clements RN BSN, [employer]

## 2021-12-14 NOTE — PROGRESS NOTES
[facility]  Office: [address] [facility], DO, Thomas Espinoza, DO, Hina Charlton, DO, Vikasgrayson Duncan, DO, Han Mott MD, Brenda Acharya MD, Kaveh Haas MD, Reid Dawkins MD, Amarilis Celis MD, Kike De Souza MD, Nithin Bai MD, Nawaf Lopez, DO, Bao Vincent DO, Brigitte Kahn MD,  Vitaly Evans DO, Esha Multani MD, Rolando Jones MD, Drea Celeste MD, Obi Davis MD, Claudeen Hook, MD, Guzman Monae MD, Clyde Emmanuel MD, Andres Stratton [facility], [facility], [facility], Daysi Cardenas, CNP, Marylene Bucy, CNS, Franchesca Quintanilla, CNP, Keesha Cornelius, CNP, Haritha Degroot, CNP, Eran Hawk, CNP, Gilda Diamond, CNP, AME Barrera-C, Glo Beckwith, [facility], Timmy Ruiz, CNP, Joselin Hurtado, CNP, Rommel Zambrano, CNP, Laurence Euceda, CNP, Sam Osuna, CNP, Melonie Landeros, CNP, Roseanne Corona, [facility]    Progress Note    12/14/2021    5:22 PM    Name:   Antony Fischer  MRN:     [id number]     Acct:      [de-identified]   Room:   [address] [place] [address] Day:  4  Admit Date:  12/10/2021  3:28 PM    PCP:   NUPUR Sullivan CNP  Code Status:  DNR-CCA    Subjective:     C/C:   Chief Complaint   Patient presents with    Emesis     N/V Xs 3 days    Fever     103 t two days ago    Shortness of Breath     Interval History Status: not changed. Patient seen and examined at bedside. Requiring BiPAP almost continuously. Poor poor oral intake since being admitted continues to be on Precedex drip. Tachypneic with respiratory rate in the 40s chronically ill-appearing. Brief History:     80-year-old female past medical history of left foot infection, GERD, alcohol abuse, hypertension presents with cough shortness of breath has been persisting since around Thanksgiving time.   Patient has been followed by infectious disease as well as pulmonology, wound care, and palliative care     Review of Systems:     Unable to obtain due to altered mental status and lack of cooperation    Medications: Allergies:     Allergies   Allergen Reactions    Celecoxib Other (See Comments)     Mood changes    Diclofenac Sodium Other (See Comments)     Mood changes    Ibuprofen Other (See Comments)     Mood changes    Meloxicam Other (See Comments)     Mood changes    Naproxen Other (See Comments)     Mood changes    Tolmetin Other (See Comments)     Mood changes       Current Meds:   Scheduled Meds:    ketoconazole   Topical Daily    gabapentin  300 mg Oral TID    CENTRUM/CERTA-FILIPE with minerals oral  15 mL Oral Daily    methylPREDNISolone  30 mg IntraVENous Q8H    cefTRIAXone (ROCEPHIN) IV  1,000 mg IntraVENous Q24H    pantoprazole  40 mg IntraVENous Daily    sodium chloride flush  5-40 mL IntraVENous 2 times per day    vitamin C  500 mg Oral BID    Vitamin D  2,000 Units Oral Daily    zinc sulfate  50 mg Oral Daily    ipratropium  2 puff Inhalation 4x daily    albuterol sulfate HFA  2 puff Inhalation 4x daily    atorvastatin  10 mg Oral Daily    clopidogrel  75 mg Oral Daily    dapsone  50 mg Oral Daily    sodium chloride flush  5-40 mL IntraVENous 2 times per day    enoxaparin  30 mg SubCUTAneous BID    metoprolol tartrate  37.5 mg Oral BID    povidone-iodine (BETADINE) in 0.9% Sodium Chloride irrigation   Topical Once     Continuous Infusions:    dextrose 100 mL/hr at 12/14/21 2667    dexmedetomidine 0.7 mcg/kg/hr (12/14/21 0745)    sodium chloride      sodium chloride       PRN Meds: fentanNYL, traMADol, LORazepam, sodium chloride flush, sodium chloride, lidocaine, meclizine, oxyCODONE-acetaminophen, sodium chloride flush, sodium chloride, ondansetron **OR** ondansetron, polyethylene glycol, acetaminophen **OR** acetaminophen, dextromethorphan-guaiFENesin    Data:     Past Medical History:   has a past medical history of Arthritis, CAD (coronary artery disease), Chronic back pain, GERD (gastroesophageal reflux disease), Hyperlipidemia, Hypertension, Incomplete RBBB, Irregular heart beat, LAFB (left anterior fascicular block), Leg wound, left, Lumbar disc disease, Osteoporosis, PAC (premature atrial contraction), PONV (postoperative nausea and vomiting), Wears dentures, Wears eyeglasses, Wears partial dentures, and Wellness examination. Social History:   reports that she quit smoking about 22 months ago. Her smoking use included cigarettes. She started smoking about 53 years ago. She has a 12.50 pack-year smoking history. She has never used smokeless tobacco. She reports current alcohol use. She reports that she does not use drugs. Family History:   Family History   Problem Relation Age of Onset    Coronary Art Dis Mother     Arthritis Mother     Heart Surgery Mother         CABG    Coronary Art Dis Father     Heart Disease Father     Heart Surgery Father         CABG    Coronary Art Dis Sister     Heart Surgery Sister         CABG       Vitals:  BP (!) 173/77   Pulse (!) 49   Temp 97.5 °F (36.4 °C) (Oral)   Resp 26   Ht 5' 6\" (1.676 m)   Wt 158 lb 1.6 oz (71.7 kg)   SpO2 98%   BMI 25.52 kg/m²   Temp (24hrs), Av.3 °F (36.3 °C), Min:96 °F (35.6 °C), Max:98 °F (36.7 °C)    Recent Labs     21  1350 21  1701 21  0404   POCGLU 198* 158* 171*       I/O (24Hr):     Intake/Output Summary (Last 24 hours) at 2021 1722  Last data filed at 2021 0600  Gross per 24 hour   Intake 1727.24 ml   Output 400 ml   Net 1327.24 ml       Labs:  Hematology:  Recent Labs     21  0521 21  0434 21  0631   WBC 9.0 10.8 18.4*   RBC 3.20* 2.91* 3.42*   HGB 11.0* 10.1* 12.0   HCT 34.6* 31.2* 37.0   .1* 107.2* 108.2*   MCH 34.4* 34.7* 35.1*   MCHC 31.8 32.4 32.4   RDW 13.8 13.9 14.0    410 342   MPV 10.2 10.4 10.3   CRP 15.3* 5.9* 3.1     Chemistry:  Recent Labs     21  0521 21  0434 21  0631    146* 141   K 3.0* 3.0* 4.2   * 115* 112*   CO2 18* 17* 17*   GLUCOSE 119* 130* 141*   BUN 17 17 15   CREATININE 0.50 0.49* 0.38*   MG  --  2.3 2.4   ANIONGAP 15 14 12   LABGLOM >60 >60 >60   GFRAA >60 >60 >60   CALCIUM 8.8 8.4* 8.0*   PHOS  --  1.6* 2.2*     Recent Labs     12/12/21  0521 12/13/21  0434 12/13/21  1350 12/13/21  1701 12/14/21  0404 12/14/21  0631   PROT 5.3*  --   --   --   --   --    LABALBU 2.8* 2.8*  --   --   --  3.1*   *  --   --   --   --   --    *  --   --   --   --   --    ALKPHOS 127*  --   --   --   --   --    BILITOT 0.34  --   --   --   --   --    POCGLU  --   --  198* 158* 171*  --      ABG:  Lab Results   Component Value Date    POCPH 7.446 12/11/2021    POCPCO2 32.3 12/11/2021    POCPO2 178.7 12/11/2021    POCHCO3 22.2 12/11/2021    NBEA 1 12/11/2021    PBEA NOT REPORTED 12/11/2021    AAP5QSU NOT REPORTED 12/11/2021    UVJO5VKI 100 12/11/2021    FIO2 100.0 12/11/2021     Lab Results   Component Value Date/Time    SPECIAL LT HAND 8ML 12/13/2021 04:40 AM     Lab Results   Component Value Date/Time    CULTURE NO GROWTH 1 DAY 12/13/2021 04:40 AM       Radiology:  CT CHEST WO CONTRAST    Result Date: 12/12/2021  1. Multifocal ground-glass and consolidative opacities bilaterally suspicious for pneumonia. An atypical or viral pneumonia is suspected over edema. 2. Hepatic steatosis. 3. Emphysematous changes. XR CHEST PORTABLE    Result Date: 12/11/2021  Persistent findings including right greater than left bibasilar opacification suggestive of multifocal pneumonia as well as generalized interstitial prominence which may represent vascular congestion/edema. XR CHEST PORTABLE    Result Date: 12/10/2021  Vascular congestion and bilateral airspace disease is most suggestive of developing edema. An underlying inflammatory process or infection should also be considered in the appropriate clinical setting.        Physical Examination:        General appearance: Chronically ill-appearing, altered, intermittently following some commands  Mental Status: Alert to person not place nor time  Lungs: Decreased breath sounds bilaterally, tachypneic, prolonged expiratory phase  Heart:  regular rate and rhythm, no murmur  Abdomen:  soft, nontender, nondistended, hypoactive bowel sounds  Extremities:  no edema, redness, tenderness in the calves  Skin: Chronic lesion to foot    Assessment:        Hospital Problems           Last Modified POA    * (Principal) Pneumonia due to COVID-19 virus 12/11/2021 Yes    Hypertension 12/11/2021 Yes    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 ([address] 12/11/2021 Yes    Gastroesophageal reflux disease without esophagitis 12/11/2021 Yes    Pyoderma gangrenosum 12/11/2021 Yes    Alcohol abuse 12/11/2021 Yes    Acute respiratory failure due to COVID-19 ([address] 12/11/2021 Yes    Elevated LFTs 12/11/2021 Yes    COVID-19 12/11/2021 Yes          Plan:        Acute respiratory failure secondary to COVID-19. Appreciate pulmonology and infectious disease recommendations. Status post Actemra 12/11/2021. Currently on Rocephin  Left foot wound infection. Appreciate infectious disease recommendations. Rocephin, plan for linezolid  Poor oral intake plan for NG tube and tube feedings  History of alcohol abuse wean down Precedex  We will try 1 dose of morphine to see if it will help with tachypnea  Currently on Solu-Medrol for his COPD exacerbation  Patient currently DNR CCA guarded prognosis we will see how patient improves with enteral nutrition  PT/OT if tolerating  Appreciate palliative care recommendations.     Gladys Serna MD  12/14/2021  5:22 PM

## 2021-12-14 NOTE — PROGRESS NOTES
Patient did not tolerate several attempts of an NG placement. Patient was already combative, and strips off gown and equipment often. Patient after only a minute  without bipap, but on nasal cannula supplemental oxygen, desaturated to 55% SpO2 on the monitor. Patient RR in 46s. Not tolerating well, to attempt later.

## 2021-12-14 NOTE — PROGRESS NOTES
Comprehensive Nutrition Assessment    Type and Reason for Visit:  Consult, Reassess (TF ordering and management)    Nutrition Recommendations/Plan:   -Continue NPO Status   -Recommend tube feeding of Glucerna 1.5 (Diabetic) @ 30 mL/hr x 24 hrs + 1 protein modular per day ~> 1184 kcals, 85 gms protein, 547 mLs water (w/ current rate of D5% ~> 1592 kcals/d)   -Water flushes per MD discretion   -Will monitor EN, labs, weights and wound healing     Nutrition Assessment:  Pt remains in droplet plus precaution room. RD consulted to start tube feeding d/t resp distress. Pt is now currently NPO. Informed RN of tube feeding starting today. No significant wt loss note x 10 mo per EMR. D5% running @ 100 mL/hr. Will monitor. Malnutrition Assessment:  Malnutrition Status:  Insufficient data    Context:  Acute Illness     Findings of the 6 clinical characteristics of malnutrition:  Energy Intake:  Mild decrease in energy intake (Comment)  Weight Loss:  No significant weight loss (9% wt loss x 10 mo per EMR)     Body Fat Loss:  Unable to assess     Muscle Mass Loss:  Unable to assess    Fluid Accumulation:  1 - Mild Extremities   Strength:  Not Performed    Estimated Daily Nutrient Needs:  Energy (kcal):  1.2-1.3 ~> 0644-2959 kcals/d; Weight Used for Energy Requirements:  Current     Protein (g):  1.4-1.6 gm/kg ~> 83-94 gmsd; Weight Used for Protein Requirements:  Ideal        Fluid (ml/day):  25 ~>1800 mLs/d OR per MD discretion; Method Used for Fluid Requirements:  ml/Kg      Nutrition Related Findings:  hypoactive bowel sounds;  Phos 2.2, glucose 171; meds reviewed      Wounds:  Open Wounds       Current Nutrition Therapies:    No diet orders on file  Additional Calorie Sources:   D5% @ 100 mL/hr ~> 408 kcals    Anthropometric Measures:  · Height: 5' 6\" (167.6 cm)  · Current Body Weight: 158 lb (71.7 kg)   · Admission Body Weight: 151 lb (68.5 kg)   · Ideal Body Weight: 130 lbs; % Ideal Body Weight 121.5 %   · BMI: 25.5  · BMI Categories: Overweight (BMI 25.0-29. 9)       Nutrition Diagnosis:   · Inadequate protein-energy intake related to cardiac dysfunction (resp. distress) as evidenced by NPO or clear liquid status due to medical condition, nutrition support - enteral nutrition      Nutrition Interventions:   Food and/or Nutrient Delivery:  Continue NPO, Start Tube Feeding  Nutrition Education/Counseling:  Education not indicated   Coordination of Nutrition Care:  Continue to monitor while inpatient    Goals: Set - Progressing   Pt to meet % of est'd needs daily via EN       Nutrition Monitoring and Evaluation:   Food/Nutrient Intake Outcomes:  Enteral Nutrition Intake/Tolerance  Physical Signs/Symptoms Outcomes:  Biochemical Data, Nutrition Focused Physical Findings, Skin, Weight, GI Status, Fluid Status or Edema     Discharge Planning:     Too soon to determine     Electronically signed by Po Dash RD, LD on 12/14/21 at 11:34 AM EST    Contact: [phone]

## 2021-12-14 NOTE — PLAN OF CARE
BRONCHOSPASM/BRONCHOCONSTRICTION     [x]         IMPROVE AERATION/BREATH SOUNDS  [x]   ADMINISTER BRONCHODILATOR THERAPY AS APPROPRIATE  [x]   ASSESS BREATH SOUNDS  []   IMPLEMENT AEROSOL/MDI PROTOCOL  [x]   PATIENT EDUCATION AS NEEDED         NON INVASIVE VENTILATION  PROVIDE OPTIMAL VENTILATION/ACCEPTABLE SP02  IMPLEMENT NON INVASIVE VENTILATION PROTOCOL  ASSESSMENT SKIN INTEGRITY  PATIENT EDUCATION AS NEEDED  BIPAP AS NEEDED         PROVIDE ADEQUATE OXYGENATION WITH ACCEPTABLE SP02/ABG'S    [x]  IDENTIFY APPROPRIATE OXYGEN THERAPY  [x]   MONITOR SP02/ABG'S AS NEEDED   [x]   PATIENT EDUCATION AS NEEDED

## 2021-12-14 NOTE — CONSULTS
pyoderma gangrenosum, OA, smoker (quit Jan 2021) presented with fevers tmax 103, vomiting and SOB. She was found to have covid pneumonia. Her SOB worsened and nw she is on Bipap. Per documentation, Dr Davon Wilson spoke with the patient's . She had a previous [facility] and Dr. Davon Wilson confirmed with him that she does not want chest compressions or intubation. Code status remained DNRCCA.      Referred to Palliative Care by   [x] Physician   [] Nursing  [] Family Request   [] Other:       Active Hospital Problems    Diagnosis Date Noted    Acute respiratory failure due to COVID-19 ([facility] [address] [U07.1, J96.00] 12/11/2021    Elevated LFTs [R79.89] 12/11/2021    COVID-19 [U07.1]     Pneumonia due to COVID-19 virus [U07.1, J12.82] 12/10/2021    Alcohol abuse [F10.10] 08/17/2021    Pyoderma gangrenosum [L88] 06/29/2021    Gastroesophageal reflux disease without esophagitis [K21.9] 10/15/2020    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 ([facility] [address] [L97.523]     Hypertension [I10] 12/30/2019       PAST MEDICAL HISTORY      Diagnosis Date    Arthritis     knees hips hands shoulders and back    CAD (coronary artery disease) 2019    blockage on cath 9/2019, to have stenting after surgery (arthroplasty) Dr. Deepti Bean Chronic back pain     GERD (gastroesophageal reflux disease)     on rx    Hyperlipidemia     per Dr. Kristie Llanes on rx    Hypertension     Louis Caceres manages    Incomplete RBBB     Irregular heart beat     LAFB (left anterior fascicular block)     Dr. Kristie Llanes, cardiologist, seen 9/2019 prior to surgery on 10/8/2019    Leg wound, left     following with wound care, Dr. Alice Blue Lumbar disc disease     Osteoporosis     PAC (premature atrial contraction) 06/2018    PACs and PVCs on holter monitor,     PONV (postoperative nausea and vomiting)     requests scop patch    Wears dentures     upper partial    Wears eyeglasses     readers    Wears partial dentures     upper and lower   [employer] examination     MIKE Cline NP PCP, seen 2/10/2020       PAST SURGICAL HISTORY  Past Surgical History:   Procedure Laterality Date    ABDOMINOPLASTY  1997    BACK SURGERY      BLEPHAROPLASTY Bilateral 1997    BREAST ENHANCEMENT SURGERY Bilateral 1999    breast augmentation   Delmy Show  2019    Dr. Zenia Wells, blockage but no stents yet    CATARACT REMOVAL WITH IMPLANT Bilateral 2016    COLONOSCOPY  2015    No Polyps    COSMETIC SURGERY      eyelid lift    FINGER SURGERY Right     thumb lesion excised    FIXATION KYPHOPLASTY  2013    Back    FOOT DEBRIDEMENT Left 10/11/2019    SURGICAL PREP WOUND BED LEFT FOOT WITH APPLICATION OF EPIFIX LEFT FOOT 3X4 performed by Alex Chavez DPM at [place] [address]      kyphoplasty for lumbar fx 2013 Dr. Emily Steiner ARTHROSCOPY Left 2006   Malva Ing Left 1/2/2020    FOOT  DEBRIDEMENT WITH WOUND VAC PLACEMENT performed by Marisol Buckley MD at [place] [address]  2005    diskectomy and spinal fusion    OTHER SURGICAL HISTORY  10/03/2019    Left leg angiogram    OR OFFICE/OUTPT VISIT,PROCEDURE ONLY Right 9/11/2018    EXCISION MASS THUMB, 3080 TABLE performed by Marisol Marvin DO at [address] [place] [address] 165 Left 12/3/2019    LEFT FOOT DEBRIDEMENT WITH SKIN GRAFT SPLIT THICKNESS; SKIN GRAFT TAKEN FROM RIGHT ANTERIOR THIGH performed by Marisol Buckley MD at [address] [place] [address] 165 Left 2/25/2020    DEBRIDEMENT, SPLIT THICKNESS SKIN GRAFT WITH WOUND VAC PLACEMENT FOOT performed by Celia Quesada MD at [address] [place] [address] 165 Left 6/30/2020    DEBRIDEMENT, SKIN GRAFT SPLIT THICKNESS FOOT  ([facility] [id number] [facility]) performed by Celia Quesada MD at [address] [place] [address]      as a child    TOTAL KNEE ARTHROPLASTY Right 10/13/2020    KNEE TOTAL ARTHROPLASTY    TOTAL KNEE ARTHROPLASTY Right 10/13/2020    KNEE TOTAL ARTHROPLASTY- MICROPORT, \ ADVANCED performed by Agus Villafuerte DO at [address] [place] [address] History     Tobacco Use    Smoking status: Former Smoker     Packs/day: 0.25     Years: 50.00     Pack years: 12.50     Types: Cigarettes     Start date:      Quit date: 2020     Years since quittin.8    Smokeless tobacco: Never Used   Vaping Use    Vaping Use: Never used   Substance Use Topics    Alcohol use: Yes     Comment: 2-3 shot liquor for weekends    Drug use: Never       FAMILY HISTORY  Family History   Problem Relation Age of Onset    Coronary Art Dis Mother     Arthritis Mother     Heart Surgery Mother         CABG    Coronary Art Dis Father     Heart Disease Father     Heart Surgery Father         CABG    Coronary Art Dis Sister     Heart Surgery Sister         CABG       ALLERGIES  Allergies   Allergen Reactions    Celecoxib Other (See Comments)     Mood changes    Diclofenac Sodium Other (See Comments)     Mood changes    Ibuprofen Other (See Comments)     Mood changes    Meloxicam Other (See Comments)     Mood changes    Naproxen Other (See Comments)     Mood changes    Tolmetin Other (See Comments)     Mood changes       MEDICATIONS  Current Medications    ketoconazole   Topical Daily    methylPREDNISolone  30 mg IntraVENous Q8H    cefTRIAXone (ROCEPHIN) IV  1,000 mg IntraVENous Q24H    pantoprazole  40 mg IntraVENous Daily    sodium chloride flush  5-40 mL IntraVENous 2 times per day    vitamin C  500 mg Oral BID    Vitamin D  2,000 Units Oral Daily    zinc sulfate  50 mg Oral Daily    thiamine (VITAMIN B1) IVPB  100 mg IntraVENous Q24H    ipratropium  2 puff Inhalation 4x daily    albuterol sulfate HFA  2 puff Inhalation 4x daily    atorvastatin  10 mg Oral Daily    clopidogrel  75 mg Oral Daily    folic acid  1 mg Oral Daily    dapsone  50 mg Oral Daily    sodium chloride flush  5-40 mL IntraVENous 2 times per day    enoxaparin 30 mg SubCUTAneous BID    metoprolol tartrate  37.5 mg Oral BID    povidone-iodine (BETADINE) in 0.9% Sodium Chloride irrigation   Topical Once     traMADol, LORazepam, sodium chloride flush, sodium chloride, lidocaine, meclizine, oxyCODONE-acetaminophen, sodium chloride flush, sodium chloride, ondansetron **OR** ondansetron, polyethylene glycol, acetaminophen **OR** acetaminophen, dextromethorphan-guaiFENesin  Home Medications  No current facility-administered medications on file prior to encounter. Current Outpatient Medications on File Prior to Encounter   Medication Sig Dispense Refill    dapsone 25 MG tablet Take 2 tablets by mouth daily 60 tablet 2    folic acid (FOLVITE) 1 MG tablet take 1 tablet by mouth once daily 30 tablet 5    doxycycline hyclate (VIBRAMYCIN) 100 MG capsule       predniSONE (DELTASONE) 10 MG tablet 7.5 mg daily 7.5mg for 3 weeks, taper to 5 mg for 3 weeks      omeprazole (PRILOSEC) 40 MG delayed release capsule Take 1 capsule by mouth daily 30 capsule 5    Metoprolol Tartrate 37.5 MG TABS take 1 tablet by mouth twice a day 60 tablet 5    lidocaine (XYLOCAINE) 2 % jelly Do not apply directly to wound. 30 mL 2    atorvastatin (LIPITOR) 10 MG tablet Take 1 tablet by mouth daily Take 10mg daily while on Cyclosporine.  30 tablet 2    meclizine (ANTIVERT) 25 MG CHEW Take 25 mg by mouth 3 times daily as needed      clopidogrel (PLAVIX) 75 MG tablet Take 1 tablet by mouth daily 30 tablet 11    Multiple Vitamins-Minerals (THERAPEUTIC MULTIVITAMIN-MINERALS) tablet Take 1 tablet by mouth daily       oxyCODONE-acetaminophen (PERCOCET) 5-325 MG per tablet take 1 tablet by mouth four times a day if needed      traMADol (ULTRAM ER) 200 MG extended release tablet take 1 tablet by mouth once daily      vitamin C (ASCORBIC ACID) 500 MG tablet Take 500 mg by mouth 2 times daily      diclofenac sodium (VOLTAREN) 1 % GEL Apply topically as needed Dr. Mary Resendez      NAPROXEN PO Take 250 mg by mouth daily as needed Indications: patient takes 1-2 times a day Stopping 10 days prior to surgery as instructed by surgeon      Cholecalciferol (VITAMIN D3) 5000 units TABS Take 1,000 Units by mouth daily       calcium carbonate (OSCAL) 500 MG TABS tablet Take 1,000 mg by mouth daily Per Elle Infante rheumatology      alendronate (FOSAMAX) 70 MG tablet Take 70 mg by mouth every 7 days Indications: Mondays Dr. Natasha Coats   Patient takes on Mondays      gabapentin (NEURONTIN) 300 MG capsule Take 300 mg by mouth 2 times daily. Per Dr. Erika Pérez inFLIXimab (REMICADE) 100 MG injection Infuse 5 mg/kg intravenously See Admin Instructions      aspirin EC 81 MG EC tablet Take 1 tablet by mouth 2 times daily 84 tablet 0       Data         BP (!) 146/122   Pulse 53   Temp 97.5 °F (36.4 °C)   Resp (!) 33   Ht 5' 6\" (1.676 m)   Wt 158 lb 1.6 oz (71.7 kg)   SpO2 95%   BMI 25.52 kg/m²     Wt Readings from Last 3 Encounters:   12/14/21 158 lb 1.6 oz (71.7 kg)   11/23/21 170 lb (77.1 kg)   10/20/21 168 lb (76.2 kg)        Code Status: DNR-CCA     ADVANCED CARE PLANNING:  Patient has capacity for medical decisions: no  Health Care Power of : no  Living Will: no     Personal, Social, and Family History  Marital Status:   Living situation: with family:  spouse  Importance of manpreet/Mandaeism/spiritual beliefs: [] Very [x] Somewhat [] Not   Psychological Distress: mild  Does patient understand diagnosis/treatment? not asked  Does caregiver understand diagnosis/treatment? yes    Assessment        REVIEW OF SYSTEMS  Review of Systems : Could not obtain as the patient has altered mentation      PHYSICAL ASSESSMENT:  Due to the current efforts to prevent transmission of COVID-19 and also the need to preserve PPE for other caregivers, a face-to-face encounter with the patient was not performed. That being said, all relevant records and diagnostic tests were reviewed, including laboratory results and imaging.  Please reference any relevant documentation elsewhere. Care will be coordinated with the primary service. Palliative Performance Scale:  ___100% Full ambulation; normal activity/No disease; full self-care; normal intake; LOC full  ___90% full ambulation; normal activity/some disease; full self-care; normal intake; LOC full  ___80% ambulation full; normal activity with effort/some disease; full self-care; normal/reduced intake; LOC full  ___70% ambulation reduced; cannot do normal work/some disease; full self-care; normal/reduce intake; LOC full  ___60%  Ambulation reduced; Significant disease; Can't do hobbies/housework; intake normal or reduced; occasional assist; LOC full/confusion  ___50%  Mainly sit/lie; Extensive disease; Can't do any work; Considerable assist; intake normal or reduced; LOC full/confusion  _x_40%  Mainly in bed; Extensive disease; Mainly assist; intake normal or reduced; LOC full/confusion   ___30%  Bed Bound; Extensive disease; Total care; intake reduced; LOC full/confusion  ___20%  Bed Bound; Extensive disease; Total care; intake minimal; Drowsy/coma  ___10%  Bed Bound; Extensive disease; Total care; Mouth care only; Drowsy/coma  ___0       Death      Principle Problem/Diagnosis:  Principal Problem:    Pneumonia due to COVID-19 virus  Active Problems:    Hypertension    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)    Gastroesophageal reflux disease without esophagitis    Pyoderma gangrenosum    Alcohol abuse    Acute respiratory failure due to COVID-19 ([facility] [address]    Elevated LFTs    COVID-19    Please call with any palliative questions or concerns. Palliative Care Team is available via perfect serve or via phone. This note has been dictated by dragon, typing errors may be a possibility.   The total time I spent in seeing the patient, discussing goals of care, advanced directives, code status, greater than 50% time in counseling and other major issues was more than 60 minutes      Electronically signed by      Digna Quiroga MD  Hospice/Palliative Care Fellow  [address] [place] [address]  12/14/2021 9:47 AM  Palliative care office: [phone]        ATTENDING ATTESTATION:    I have discussed the care of Lizzie Mccord, including pertinent history and exam findings, with the resident/fellow/NP. I have seen and examined the patient and the key elements of all parts of the encounter have been performed by me. I agree with the assessment, plan and orders as documented by the fellow/resident/NP, after I modified the exam findings and the plan of treatments and the final version is my approved version of the assessment.         Electronically signed by Darlene Cantrell MD on 12/14/2021 at 3:33 PM

## 2021-12-14 NOTE — PROGRESS NOTES
Pulmonary Critical Care   Consult Note    Patient - Daysi Stain  Date of Admission -  12/10/2021  3:28 PM  Date of Evaluation -  2021  Room and Bed Number -  3020/3020-01   Hospital Day - 3    CHIEF COMPLAINT : ACUTE HYPOXIC RESPIRATORY FAILURE DUE TO COVID -19 PNEUMONIA   HPI:   Daysi Holley  60 y.o. female     admitted for worsening shortness of breath since last Monday. Patient has cough fever nausea . She came to the ER. Tested positive for COVID-19 with elevated CRP and D-dimer. CT angiogram of the chest was done which shows bilateral pulmonary infiltrates due to COVID-19. She is presently on high flow oxygen 40 L 70% FiO2. She has BiPAP available but has not used it. ABG shows no CO2 retention and PaO2 of 178. This is on high flow 40 L 70% FiO2. She has 2 packs/day smoking history and stopped in January. She has expiratory rhonchi. Denies purulent sputum or hemoptysis. She has pyoderma gangrenosum    She is unvaccinated for COVID-19      History of alcohol abuse.     2021   Subjective   events noted   OBJECTIVE:     VITAL SIGNS:  BP (!) 159/77   Pulse (!) 48   Temp 96 °F (35.6 °C) (Axillary)   Resp 20   Ht 5' 6\" (1.676 m)   Wt 151 lb 4.8 oz (68.6 kg)   SpO2 95%   BMI 24.42 kg/m²   Tmax over 24 hours:  Temp (24hrs), Av.5 °F (36.9 °C), Min:96 °F (35.6 °C), Max:99.5 °F (37.5 °C)      Patient Vitals for the past 8 hrs:   BP Temp Temp src Pulse Resp SpO2   21     20    21 (!) 159/77 96 °F (35.6 °C) Axillary (!) 48 23 95 %   21 1900 (!) 172/78   50 22 97 %   21 1830 (!) 138/107   64 28 96 %   21 1829 (!) 168/87   64     21 1800 (!) 168/87 97.2 °F (36.2 °C) Axillary (!) 49 25 98 %   21 1730 (!) 161/79   (!) 48 24 98 %   21 1700 (!) 164/76   50 24 98 %   21 1630 (!) 164/77   52 25 98 %   21 1600 (!) 167/75 99.4 °F (37.4 °C) Axillary 54 27 99 %   21 1530 (!) 143/120   68 25 93 % 12/13/21 1500 (!) 167/81   72 25 98 %   12/13/21 1436 (!) 181/72        12/13/21 1430 (!) 146/123   71 25 98 %   12/13/21 1400 138/87 99 °F (37.2 °C)  55 27 100 %   12/13/21 1330 134/72   57 22 100 %         Intake/Output Summary (Last 24 hours) at 12/13/2021 2103  Last data filed at 12/13/2021 1847  Gross per 24 hour   Intake 1425.37 ml   Output    Net 1425.37 ml     Date 12/13/21 0000 - 12/13/21 2359   Shift 4595-0406 3914-6201 6950-0189 24 Hour Total   INTAKE   I.V.(mL/kg)  518.4(7.6) 907(13.2) 1425. 4(20.8)   Shift Total(mL/kg)  518.4(7.6) 907(13.2) 1425. 4(20.8)   OUTPUT   Shift Total(mL/kg)       Weight (kg) 77.1 68.6 68.6 68.6     Wt Readings from Last 3 Encounters:   12/13/21 151 lb 4.8 oz (68.6 kg)   11/23/21 170 lb (77.1 kg)   10/20/21 168 lb (76.2 kg)     Body mass index is 24.42 kg/m².         PHYSICAL EXAM:  Unresponsive pinpoint pupils   Lungs lungs expiratory phase with expiratory rhonchi  CVS regular S1-S2  Abdomen nontender bowel sounds are present  Lower extremity no edema  Left lower extremity is bandaged    MEDICATIONS:  Scheduled Meds:   methylPREDNISolone  30 mg IntraVENous Q8H    cefTRIAXone (ROCEPHIN) IV  1,000 mg IntraVENous Q24H    potassium chloride  10 mEq IntraVENous Q2H    sodium chloride flush  5-40 mL IntraVENous 2 times per day    vitamin C  500 mg Oral BID    Vitamin D  2,000 Units Oral Daily    zinc sulfate  50 mg Oral Daily    thiamine (VITAMIN B1) IVPB  100 mg IntraVENous Q24H    ipratropium  2 puff Inhalation 4x daily    albuterol sulfate HFA  2 puff Inhalation 4x daily    atorvastatin  10 mg Oral Daily    clopidogrel  75 mg Oral Daily    folic acid  1 mg Oral Daily    dapsone  50 mg Oral Daily    gabapentin  300 mg Oral BID    therapeutic multivitamin-minerals  1 tablet Oral Daily    pantoprazole  40 mg Oral QAM AC    traMADol  50 mg Oral 4x Daily    sodium chloride flush  5-40 mL IntraVENous 2 times per day    enoxaparin  30 mg SubCUTAneous BID  famotidine  20 mg Oral BID    metoprolol tartrate  37.5 mg Oral BID    povidone-iodine (BETADINE) in 0.9% Sodium Chloride irrigation   Topical Once     Continuous Infusions:   dextrose 100 mL/hr at 12/13/21 1714    dexmedetomidine 0.3 mcg/kg/hr (12/13/21 2037)    sodium chloride      sodium chloride       PRN Meds:   sodium chloride flush, 5-40 mL, PRN  sodium chloride, 25 mL, PRN  LORazepam, 1 mg, Q1H PRN   Or  LORazepam, 1 mg, Q1H PRN   Or  LORazepam, 2 mg, Q1H PRN   Or  LORazepam, 2 mg, Q1H PRN   Or  LORazepam, 3 mg, Q1H PRN   Or  LORazepam, 3 mg, Q1H PRN   Or  LORazepam, 4 mg, Q1H PRN   Or  LORazepam, 4 mg, Q1H PRN  lidocaine, , PRN  meclizine, 25 mg, TID PRN  oxyCODONE-acetaminophen, 1 tablet, Q4H PRN  sodium chloride flush, 5-40 mL, PRN  sodium chloride, 25 mL, PRN  ondansetron, 4 mg, Q8H PRN   Or  ondansetron, 4 mg, Q6H PRN  polyethylene glycol, 17 g, Daily PRN  acetaminophen, 650 mg, Q6H PRN   Or  acetaminophen, 650 mg, Q6H PRN  dextromethorphan-guaiFENesin, 5 mL, Q4H PRN        SUPPORT DEVICES: [] Ventilator [x] BIPAP  []high flow nasal Cannula [] Room Air      ABGs:     Lab Results   Component Value Date    BFP6VJM NOT REPORTED 12/11/2021    FIO2 100.0 12/11/2021       DATA:  Complete Blood Count:   Recent Labs     12/11/21  0622 12/12/21  0521 12/13/21  0434   WBC 5.1 9.0 10.8   RBC 3.09* 3.20* 2.91*   HGB 10.8* 11.0* 10.1*   HCT 33.6* 34.6* 31.2*   .7* 108.1* 107.2*   MCH 35.0* 34.4* 34.7*   MCHC 32.1 31.8 32.4   RDW 13.9 13.8 13.9   PLT See Reflexed IPF Result 412 410   MPV NOT REPORTED 10.2 10.4        Last 3 Blood Glucose:   Recent Labs     12/11/21  0622 12/12/21  0521 12/13/21  0434   GLUCOSE 147* 119* 130*        PT/INR:    Lab Results   Component Value Date    PROTIME 10.6 01/21/2021    INR 1.0 01/21/2021     PTT:  No results found for: APTT, PTT    Comprehensive Metabolic Profile:   Recent Labs     12/11/21  0622 12/12/21  0521 12/13/21  0434    141 146*   K 3.7 3.0* 3.0* * 108* 115*   CO2 18* 18* 17*   BUN 11 17 17   CREATININE 0.58 0.50 0.49*   GLUCOSE 147* 119* 130*   CALCIUM 8.8 8.8 8.4*   PROT 5.5* 5.3*  --    LABALBU 2.9* 2.8* 2.8*   BILITOT 0.35 0.34  --    ALKPHOS 126* 127*  --    * 132*  --    * 120*  --       Magnesium:   Lab Results   Component Value Date    MG 2.3 12/13/2021    MG 1.9 12/10/2021    MG 2.0 07/27/2021     Phosphorus:   Lab Results   Component Value Date    PHOS 1.6 12/13/2021     Ionized Calcium: No results found for: CAION     Urinalysis:   Lab Results   Component Value Date    NITRU NEGATIVE 07/13/2021    COLORU YELLOW 07/13/2021    PHUR 7.0 07/13/2021    WBCUA 0 TO 2 07/13/2021    RBCUA 0 TO 2 07/13/2021    MUCUS NOT REPORTED 07/13/2021    TRICHOMONAS NOT REPORTED 07/13/2021    YEAST NOT REPORTED 07/13/2021    BACTERIA NOT REPORTED 07/13/2021    SPECGRAV 1.015 07/13/2021    LEUKOCYTESUR TRACE 07/13/2021    UROBILINOGEN Normal 07/13/2021    BILIRUBINUR NEGATIVE 07/13/2021    BILIRUBINUR small 06/03/2013    BLOODU neg 06/03/2013    GLUCOSEU NEGATIVE 07/13/2021    KETUA NEGATIVE 07/13/2021    AMORPHOUS NOT REPORTED 07/13/2021               CT CHEST WO CONTRAST    Result Date: 12/11/2021  1. Multifocal ground-glass and consolidative opacities bilaterally suspicious for pneumonia. An atypical or viral pneumonia is suspected over edema. 2. Hepatic steatosis. 3. Emphysematous changes. XR CHEST PORTABLE    Result Date: 12/11/2021  Persistent findings including right greater than left bibasilar opacification suggestive of multifocal pneumonia as well as generalized interstitial prominence which may represent vascular congestion/edema. XR CHEST PORTABLE    Result Date: 12/10/2021  Vascular congestion and bilateral airspace disease is most suggestive of developing edema. An underlying inflammatory process or infection should also be considered in the appropriate clinical setting.         Past Medical History:   Diagnosis Date    Arthritis     knees hips hands shoulders and back    CAD (coronary artery disease) 2019    blockage on cath 2019, to have stenting after surgery (arthroplasty) Dr. Kortney Pacheco Chronic back pain     GERD (gastroesophageal reflux disease)     on rx    Hyperlipidemia     per Dr. Ronny Lyon on rx    Hypertension     Carey Free manages    Incomplete RBBB     Irregular heart beat     LAFB (left anterior fascicular block)     Dr. Ronny Lyon, cardiologist, seen 2019 prior to surgery on 10/8/2019    Leg wound, left     following with wound care, Dr. Inga Arnold Lumbar disc disease     Osteoporosis     PAC (premature atrial contraction) 2018    PACs and PVCs on holter monitor,     PONV (postoperative nausea and vomiting)     requests scop patch    Wears dentures     upper partial    Wears eyeglasses     readers    Wears partial dentures     upper and lower    Wellness examination     MIKE Jackson NP PCP, seen 2/10/2020        Social History     Socioeconomic History    Marital status:      Spouse name: Taj Long Number of children: 2    Years of education: Not on file    Highest education level: Not on file   Occupational History    Occupation: Retired RN   Tobacco Use    Smoking status: Former Smoker     Packs/day: 0.25     Years: 50.00     Pack years: 12.50     Types: Cigarettes     Start date:      Quit date: 2020     Years since quittin.8    Smokeless tobacco: Never Used   Vaping Use    Vaping Use: Never used   Substance and Sexual Activity    Alcohol use: Yes     Comment: 2-3 shot liquor for weekends    Drug use: Never    Sexual activity: Yes     Partners: Male   Other Topics Concern    Not on file   Social History Narrative    Not on file     Social Determinants of Health     Financial Resource Strain: Low Risk     Difficulty of Paying Living Expenses: Not hard at all   Food Insecurity: No Food Insecurity    Worried About [address] in the Last Year: Never true  Ran Out of Food in the Last Year: Never true   Transportation Needs:     Lack of Transportation (Medical): Not on file    Lack of Transportation (Non-Medical):  Not on file   Physical Activity:     Days of Exercise per Week: Not on file    Minutes of Exercise per Session: Not on file   Stress:     Feeling of Stress : Not on file   Social Connections:     Frequency of Communication with Friends and Family: Not on file    Frequency of Social Gatherings with Friends and Family: Not on file    Attends [employer] Services: Not on file    Active Member of [employer] or Organizations: Not on file    Attends Club or Organization Meetings: Not on file    Marital Status: Not on file   Intimate Partner Violence:     Fear of Current or Ex-Partner: Not on file    Emotionally Abused: Not on file    Physically Abused: Not on file    Sexually Abused: Not on file   Housing Stability:     Unable to Pay for Housing in the Last Year: Not on file    Number of [place] in the Last Year: Not on file    Unstable Housing in the Last Year: Not on file       Immunization History   Administered Date(s) Administered    Influenza, Quadv, IM, (6 mo and older Fluzone, Flulaval, Fluarix and 3 yrs and older Afluria) 01/03/2018, 12/30/2019    Influenza, Melene Newell, IM, PF (6 mo and older Fluzone, Flulaval, Fluarix, and 3 yrs and older Afluria) 10/30/2018, 10/14/2020    Pneumococcal Polysaccharide ([id number]) 01/12/2017         Family History   Problem Relation Age of Onset    Coronary Art Dis Mother     Arthritis Mother     Heart Surgery Mother         CABG    Coronary Art Dis Father     Heart Disease Father     Heart Surgery Father         CABG    Coronary Art Dis Sister     Heart Surgery Sister         CABG         Past Surgical History:   Procedure Laterality Date    ABDOMINOPLASTY  1997    BACK SURGERY      BLEPHAROPLASTY Bilateral 1997    BREAST ENHANCEMENT SURGERY Bilateral 1999    breast augmentation    CARDIAC CATHETERIZATION  2019    Dr. Elisha Han, blockage but no stents yet    CATARACT REMOVAL WITH IMPLANT Bilateral 2016    COLONOSCOPY  2015    No Polyps    COSMETIC SURGERY      eyelid lift    FINGER SURGERY Right     thumb lesion excised    FIXATION KYPHOPLASTY  2013    Back    FOOT DEBRIDEMENT Left 10/11/2019    SURGICAL PREP WOUND BED LEFT FOOT WITH APPLICATION OF EPIFIX LEFT FOOT 3X4 performed by Gisela Santos DPM at [place] [address]      kyphoplasty for lumbar fx 2013 Dr. Radha Greene ARTHROSCOPY Left 2006   Alexey Simple Left 1/2/2020    FOOT  DEBRIDEMENT WITH WOUND VAC PLACEMENT performed by Carmel Donahue MD at [place] [address]  2014   [facility] [address]  2005    diskectomy and spinal fusion    OTHER SURGICAL HISTORY  10/03/2019    Left leg angiogram    MT OFFICE/OUTPT VISIT,PROCEDURE ONLY Right 9/11/2018    EXCISION MASS THUMB, 3080 TABLE performed by Sandhya Kim DO at [facility] Left 12/3/2019    LEFT FOOT DEBRIDEMENT WITH SKIN GRAFT SPLIT THICKNESS; SKIN GRAFT TAKEN FROM RIGHT ANTERIOR THIGH performed by Carmel Donahue MD at [facility] Left 2/25/2020    DEBRIDEMENT, SPLIT THICKNESS SKIN GRAFT WITH WOUND VAC PLACEMENT FOOT performed by Fartun Gauthier MD at [facility] Left 6/30/2020    DEBRIDEMENT, SKIN GRAFT SPLIT THICKNESS FOOT  ([facility] [address], [address] [facility]) performed by Fartun Gauthier MD at [address] [place] [address]      as a child    TOTAL KNEE ARTHROPLASTY Right 10/13/2020    KNEE TOTAL ARTHROPLASTY    TOTAL KNEE ARTHROPLASTY Right 10/13/2020    KNEE TOTAL ARTHROPLASTY- MICROPORT, \ ADVANCED performed by Sandhya Kim DO at [address] [place] [address]:  Vent Information  Skin Assessment: Clean, dry, & intact  Equipment ID: [id number]  Equipment Changed: Humidification  FiO2 : 65 %  SpO2: 95 %  SpO2/FiO2 ratio: 152.31  I Time/ I Time %: 0.8 s  Humidification Source: Heated wire  Humidification Temp: 35  Mask Type: Full face mask  Mask Size: Small     PaO2/FiO2 RATIO:  Recent Labs     12/11/21  1601   POCPO2 178.7*      FiO2 : 65 %       LABS:  ABGs:   Recent Labs     12/11/21  1601   POCPH 7.446   POCPCO2 32.3*   POCPO2 178.7*   POCHCO3 22.2   BHJH9PSH 100*            ASSESSMENT:     Principal Problem:    Pneumonia due to COVID-19 virus  Active Problems:    Hypertension    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)    Gastroesophageal reflux disease without esophagitis    Pyoderma gangrenosum    Alcohol abuse    Acute respiratory failure due to COVID-19 (HCC)    Elevated LFTs    COVID-19  Resolved Problems:    * No resolved hospital problems. *              Acute hypoxic respiratory failure secondary to COVID 19   Bilateral multifocal pneumonia due to COVID 19 infection   Covid -19 pandemic emergency    COPD exacerbation    LOS: 3  PLAN:    D/w RT  D/w RN   transferred to ICU due to agitation and desaturating  - requiring precedex gtt and BiPAP . Now on BiPAP and pinpoint pupils and not responsive . Will dc ciwa scale , dc gabapentin , change ultram to prn   Use ativan 1 mg iv q 4 prn   Wean precedex gtt as estela   Protonix iv   D/w  over phone in presence of Art  - explained progressive decline . He understood and did reiterate that patient did not want intubation or resuscitative measures - DNRCCA . Airborne isolation and droplet precautions to be continued  Continue supportive care   Cont treatment with medications for COVID-Actemra on 12/11/2021  IV Solu-Medrol to help with COPD exacerbation and COVID-19  Will obtain xray chest and ABG as needed  Recommend palliative care consult              Treatment plan Discussed with nursing staff in detail , all questions answered .      Total critical care time caring for this patient with life threatening, unstable organ failure, including direct patient contact, management of life support systems, review of data including imaging and labs, discussions with other team members and physicians at least 27   Min so far today, excluding procedures. Electronically signed by Aide Turcios MD on 12/13/2021 at 9:03 PM       This patient was evaluated in the context of the global SARS-CoV-2 (COVID-19) pandemic, which necessitated considerations that the patient either has COVID-19 infection or is at risk of infection with COVID-19. Institutional protocols and algorithms that pertain to the evaluation & management of patients with COVID-19 or those at risk for COVID-19 are in a state of rapid changes based on information released by regulatory bodies including the CDC and federal and state organizations. These policies and algorithms were followed during the patient's care. Please note that this chart was generated using voice recognition Dragon dictation software. Although every effort was made to ensure the accuracy of this automated transcription, some errors in transcription may have occurred.

## 2021-12-14 NOTE — PROGRESS NOTES
Occupational [employer]  Occupational Therapy Not Seen Note    DATE: 2021    NAME: Sada Abbasi  MRN: [id number]   : 1955      Patient not seen this date for Occupational Therapy due to:    Patient is not appropriate for active participation in OT evaluation/treatment at this time d/t respiratory status. Per RN respiratory rate in the 50s adn sedation being increased d/t withdrawals.      Next Scheduled Treatment: Check back 12/15     Electronically signed by Sandra Artis OT on 2021 at 8:32 AM

## 2021-12-14 NOTE — PLAN OF CARE
breakdown  Outcome: Ongoing     Problem: Nutrition  Goal: Optimal nutrition therapy  Outcome: Ongoing

## 2021-12-15 NOTE — PROGRESS NOTES
Pulmonary Critical Care   Consult Note    Patient - Jerson Palma  Date of Admission -  12/10/2021  3:28 PM  Date of Evaluation -  12/15/2021  Room and Bed Number -  3020/3020-01   Hospital Day - 5    CHIEF COMPLAINT : ACUTE HYPOXIC RESPIRATORY FAILURE DUE TO COVID -19 PNEUMONIA   HPI:   Jerson Palma  60 y.o. female     admitted for worsening shortness of breath since last Monday. Patient has cough fever nausea . She came to the ER. Tested positive for COVID-19 with elevated CRP and D-dimer. CT angiogram of the chest was done which shows bilateral pulmonary infiltrates due to COVID-19. She is presently on high flow oxygen 40 L 70% FiO2. She has BiPAP available but has not used it. ABG shows no CO2 retention and PaO2 of 178. This is on high flow 40 L 70% FiO2. She has 2 packs/day smoking history and stopped in January. She has expiratory rhonchi. Denies purulent sputum or hemoptysis. She has pyoderma gangrenosum    She is unvaccinated for COVID-19      History of alcohol abuse.     12/15/2021   Subjective   on bipap worsening oxygenation    On precedex   No DNRCC    Ros unable to perform due to Bipap   OBJECTIVE:     VITAL SIGNS:  BP (!) 163/70   Pulse 50   Temp 97.7 °F (36.5 °C) (Axillary)   Resp (!) 32   Ht 5' 6\" (1.676 m)   Wt 163 lb 1.6 oz (74 kg)   SpO2 95%   BMI 26.33 kg/m²   Tmax over 24 hours:  Temp (24hrs), Av.6 °F (36.4 °C), Min:97.1 °F (36.2 °C), Max:97.9 °F (36.6 °C)      Patient Vitals for the past 8 hrs:   BP Temp Temp src Pulse Resp Weight   12/15/21 0916     (!) 32    12/15/21 0600 (!) 163/70 97.7 °F (36.5 °C) Axillary 50 24    12/15/21 0525      163 lb 1.6 oz (74 kg)         Intake/Output Summary (Last 24 hours) at 12/15/2021 1256  Last data filed at 12/15/2021 0400  Gross per 24 hour   Intake 3167.75 ml   Output 1110 ml   Net 2057.75 ml     Date 12/15/21 0000 - 12/15/21 2359   Shift 0306-3583 1165-9665 9993-6360 24 Hour Total   INTAKE   I.V.(mL/kg) 9795.3(48) 8016.8(13)   NG/GT(mL/kg) 60(0.8)   60(0.8)   Shift Total(mL/kg) 2113. 8(42.8)   [id number]. 8(42.8)   OUTPUT   Urine(mL/kg/hr) 400(0.7)   400   Shift Total(mL/kg) 400(5.4)   400(5.4)   Weight (kg) 74 74 74 74     Wt Readings from Last 3 Encounters:   12/15/21 163 lb 1.6 oz (74 kg)   11/23/21 170 lb (77.1 kg)   10/20/21 168 lb (76.2 kg)     Body mass index is 26.33 kg/m².         PHYSICAL EXAM:   opens eyes   Lungs lungs expiratory phase post rales   [employer] regular S1-S2  Abdomen nontender bowel sounds are present  Lower extremity no edema  Left lower extremity is bandaged    MEDICATIONS:  Scheduled Meds:    Continuous Infusions:   dexmedetomidine 1 mcg/kg/hr (12/15/21 0933)     PRN Meds:   bisacodyl, 10 mg, Daily PRN  glycopyrrolate, 0.2 mg, Q4H PRN  acetaminophen, 650 mg, Q4H PRN  fentanNYL, 50 mcg, Q1H PRN  albuterol sulfate HFA, 2 puff, Q4H PRN  ipratropium, 2 puff, 4x Daily PRN  LORazepam, 1 mg, Q1H PRN  sodium chloride flush, 5-40 mL, PRN  lidocaine, , PRN  ondansetron, 4 mg, Q8H PRN   Or  ondansetron, 4 mg, Q6H PRN  dextromethorphan-guaiFENesin, 5 mL, Q4H PRN        SUPPORT DEVICES: [] Ventilator [x] BIPAP  []high flow nasal Cannula [] Room Air      ABGs:     Lab Results   Component Value Date    YSF6MWU NOT REPORTED 12/11/2021    FIO2 100.0 12/11/2021       DATA:  Complete Blood Count:   Recent Labs     12/13/21  0434 12/14/21  0631 12/15/21  0641   WBC 10.8 18.4* 23.4*   RBC 2.91* 3.42* 3.49*   HGB 10.1* 12.0 12.4   HCT 31.2* 37.0 36.7   .2* 108.2* 105.2*   MCH 34.7* 35.1* 35.5*   MCHC 32.4 32.4 33.8   RDW 13.9 14.0 13.9    342 269   MPV 10.4 10.3 10.8        Last 3 Blood Glucose:   Recent Labs     12/13/21 0434 12/14/21  0631 12/15/21  0940   GLUCOSE 130* 141* 398*        PT/INR:    Lab Results   Component Value Date    PROTIME 10.6 01/21/2021    INR 1.0 01/21/2021     PTT:  No results found for: APTT, PTT    Comprehensive Metabolic Profile:   Recent Labs     12/13/21 0434 12/14/21  0631 12/15/21  0940   * 141 129*   K 3.0* 4.2 3.6*   * 112* 101   CO2 17* 17* 16*   BUN 17 15 12   CREATININE 0.49* 0.38* 0.33*   GLUCOSE 130* 141* 398*   CALCIUM 8.4* 8.0* 6.6*   LABALBU 2.8* 3.1* 2.6*      Magnesium:   Lab Results   Component Value Date    MG 2.1 12/15/2021    MG 2.4 12/14/2021    MG 2.3 12/13/2021     Phosphorus:   Lab Results   Component Value Date    PHOS 1.5 12/15/2021    PHOS 2.2 12/14/2021    PHOS 1.6 12/13/2021     Ionized Calcium: No results found for: CAION     Urinalysis:   Lab Results   Component Value Date    NITRU NEGATIVE 07/13/2021    COLORU YELLOW 07/13/2021    PHUR 7.0 07/13/2021    WBCUA 0 TO 2 07/13/2021    RBCUA 0 TO 2 07/13/2021    MUCUS NOT REPORTED 07/13/2021    TRICHOMONAS NOT REPORTED 07/13/2021    YEAST NOT REPORTED 07/13/2021    BACTERIA NOT REPORTED 07/13/2021    SPECGRAV 1.015 07/13/2021    LEUKOCYTESUR TRACE 07/13/2021    UROBILINOGEN Normal 07/13/2021    BILIRUBINUR NEGATIVE 07/13/2021    BILIRUBINUR small 06/03/2013    BLOODU neg 06/03/2013    GLUCOSEU NEGATIVE 07/13/2021    KETUA NEGATIVE 07/13/2021    AMORPHOUS NOT REPORTED 07/13/2021               CT CHEST WO CONTRAST    Result Date: 12/11/2021  1. Multifocal ground-glass and consolidative opacities bilaterally suspicious for pneumonia. An atypical or viral pneumonia is suspected over edema. 2. Hepatic steatosis. 3. Emphysematous changes. XR CHEST PORTABLE    Result Date: 12/11/2021  Persistent findings including right greater than left bibasilar opacification suggestive of multifocal pneumonia as well as generalized interstitial prominence which may represent vascular congestion/edema. XR CHEST PORTABLE    Result Date: 12/10/2021  Vascular congestion and bilateral airspace disease is most suggestive of developing edema. An underlying inflammatory process or infection should also be considered in the appropriate clinical setting.         Past Medical History:   Diagnosis Date    Arthritis knees hips hands shoulders and back    CAD (coronary artery disease) 2019    blockage on cath 2019, to have stenting after surgery (arthroplasty) Dr. Dolores Estevez Chronic back pain     GERD (gastroesophageal reflux disease)     on rx    Hyperlipidemia     per Dr. Hung Rizo on rx    Hypertension     Claudene Evangelist manages    Incomplete RBBB     Irregular heart beat     LAFB (left anterior fascicular block)     Dr. Hung Rizo, cardiologist, seen 2019 prior to surgery on 10/8/2019    Leg wound, left     following with wound care, Dr. Helga Ramos Lumbar disc disease     Osteoporosis     PAC (premature atrial contraction) 2018    PACs and PVCs on holter monitor,     PONV (postoperative nausea and vomiting)     requests scop patch    Wears dentures     upper partial    Wears eyeglasses     readers    Wears partial dentures     upper and lower    Wellness examination     MIKE Karimi, STEPHANIE PCP, seen 2/10/2020        Social History     Socioeconomic History    Marital status:      Spouse name: Kathy Bean Number of children: 2    Years of education: Not on file    Highest education level: Not on file   Occupational History    Occupation: Retired RN   Tobacco Use    Smoking status: Former Smoker     Packs/day: 0.25     Years: 50.00     Pack years: 12.50     Types: Cigarettes     Start date:      Quit date: 2020     Years since quittin.8    Smokeless tobacco: Never Used   Vaping Use    Vaping Use: Never used   Substance and Sexual Activity    Alcohol use: Yes     Comment: 2-3 shot liquor for weekends    Drug use: Never    Sexual activity: Yes     Partners: Male   Other Topics Concern    Not on file   Social History Narrative    Not on file     Social Determinants of Health     Financial Resource Strain: Low Risk     Difficulty of Paying Living Expenses: Not hard at all   Food Insecurity: No Food Insecurity    Worried About [employer] [employer] in the Last Year: Never true   [employer] of Food in the Last Year: Never true   Transportation Needs:     Lack of Transportation (Medical): Not on file    Lack of Transportation (Non-Medical):  Not on file   Physical Activity:     Days of Exercise per Week: Not on file    Minutes of Exercise per Session: Not on file   Stress:     Feeling of Stress : Not on file   Social Connections:     Frequency of Communication with Friends and Family: Not on file    Frequency of Social Gatherings with Friends and Family: Not on file    Attends Zoroastrian Services: Not on file    Active Member of [address] [place] [address] [employer] or Organizations: Not on file    Attends Club or Organization Meetings: Not on file    Marital Status: Not on file   Intimate Partner Violence:     Fear of Current or Ex-Partner: Not on file    Emotionally Abused: Not on file    Physically Abused: Not on file    Sexually Abused: Not on file   Housing Stability:     Unable to Pay for Housing in the Last Year: Not on file    Number of [place] in the Last Year: Not on file    Unstable Housing in the Last Year: Not on file       Immunization History   Administered Date(s) Administered    Influenza, Quadv, IM, (6 mo and older Fluzone, Flulaval, Fluarix and 3 yrs and older Afluria) 01/03/2018, 12/30/2019    Influenza, Charles Forget, IM, PF (6 mo and older Fluzone, Flulaval, Fluarix, and 3 yrs and older Afluria) 10/30/2018, 10/14/2020    Pneumococcal Polysaccharide ([id number]) 01/12/2017         Family History   Problem Relation Age of Onset    Coronary Art Dis Mother     Arthritis Mother     Heart Surgery Mother         CABG    Coronary Art Dis Father     Heart Disease Father     Heart Surgery Father         CABG    Coronary Art Dis Sister     Heart Surgery Sister         CABG         Past Surgical History:   Procedure Laterality Date    ABDOMINOPLASTY  1997    BACK SURGERY      BLEPHAROPLASTY Bilateral 1997    BREAST ENHANCEMENT SURGERY Bilateral 1999    breast augmentation    CARDIAC CATHETERIZATION  2019    Dr. Shah Earing, blockage but no stents yet    CATARACT REMOVAL WITH IMPLANT Bilateral 2016    COLONOSCOPY  2015    No Polyps    COSMETIC SURGERY      eyelid lift    FINGER SURGERY Right     thumb lesion excised    FIXATION KYPHOPLASTY  2013    Back    FOOT DEBRIDEMENT Left 10/11/2019    SURGICAL PREP WOUND BED LEFT FOOT WITH APPLICATION OF EPIFIX LEFT FOOT 3X4 performed by Fannie Schultz DPM at [place] [address]      kyphoplasty for lumbar fx 2013 Dr. Kassandra San ARTHROSCOPY Left 2006   Amor David Left 1/2/2020    FOOT  DEBRIDEMENT WITH WOUND VAC PLACEMENT performed by Aracelis Ohara MD at [place] [address]  2014   [place] [address]  2005    diskectomy and spinal fusion    OTHER SURGICAL HISTORY  10/03/2019    Left leg angiogram    RI OFFICE/OUTPT VISIT,PROCEDURE ONLY Right 9/11/2018    EXCISION MASS THUMB, 3080 TABLE performed by Kiersten Lind DO at [facility] Left 12/3/2019    LEFT FOOT DEBRIDEMENT WITH SKIN GRAFT SPLIT THICKNESS; SKIN GRAFT TAKEN FROM RIGHT ANTERIOR THIGH performed by Aracelis Ohara MD at [facility] Left 2/25/2020    DEBRIDEMENT, SPLIT THICKNESS SKIN GRAFT WITH WOUND VAC PLACEMENT FOOT performed by Vasile Jerome MD at [facility] Left 6/30/2020    DEBRIDEMENT, SKIN GRAFT SPLIT THICKNESS FOOT  ([facility], [address] [facility]) performed by Vasile Jerome MD at [address] [place] [address]      as a child    TOTAL KNEE ARTHROPLASTY Right 10/13/2020    KNEE TOTAL ARTHROPLASTY    TOTAL KNEE ARTHROPLASTY Right 10/13/2020    KNEE TOTAL ARTHROPLASTY- MICROPORT, \ ADVANCED performed by Kiersten Lind DO at [address] [place] [address]:  [facility]  Skin Assessment: Clean, dry, & intact  Equipment ID: [id number]  Equipment Changed: Humidification  FiO2 : 90 %  SpO2: 95 %  SpO2/FiO2 ratio: 95  I Time/ I Time %: 0.75 s  Humidification Source: Heated wire  Humidification Temp: 35  Mask Type: Full face mask  Mask Size: Small     PaO2/FiO2 RATIO:  No results for input(s): POCPO2 in the last 72 hours. FiO2 : 90 %       LABS:  ABGs:   No results for input(s): POCPH, POCPCO2, POCPO2, POCHCO3, [id number] in the last 72 hours. ASSESSMENT:     Principal Problem:    Pneumonia due to COVID-19 virus  Active Problems:    Hypertension    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)    Gastroesophageal reflux disease without esophagitis    Pyoderma gangrenosum    Alcohol abuse    Acute respiratory failure due to COVID-19 (HCC)    Elevated LFTs    COVID-19  Resolved Problems:    * No resolved hospital problems. *              Acute hypoxic respiratory failure secondary to COVID 19   Bilateral multifocal pneumonia due to COVID 19 infection   Covid -19 pandemic emergency    COPD exacerbation    LOS: 5  PLAN:      D/w RN   dnrcc   Continue supportive care and comfort care . Will sign off    D/w Dr Jacob Mayfield Discussed with nursing staff in detail , all questions answered . Electronically signed by Clint Patel MD on 12/15/2021 at 12:56 PM       This patient was evaluated in the context of the global SARS-CoV-2 (COVID-19) pandemic, which necessitated considerations that the patient either has COVID-19 infection or is at risk of infection with COVID-19. Institutional protocols and algorithms that pertain to the evaluation & management of patients with COVID-19 or those at risk for COVID-19 are in a state of rapid changes based on information released by regulatory bodies including the CDC and federal and state organizations. These policies and algorithms were followed during the patient's care. Please note that this chart was generated using voice recognition Dragon dictation software.   Although every effort was made to ensure the accuracy of this automated transcription, some errors in transcription may have occurred.

## 2021-12-15 NOTE — PROGRESS NOTES
Informed by Chastity Tim from [address] [place] [address] they will respect family decision to move patient to comfort care. No planned donation to be made.

## 2021-12-15 NOTE — PROGRESS NOTES
Called patient's . Family wants to be with patient when patient is taken off bipap. Will be present in about an hour or so per family. Attending okay with plan. Patient resting in bed.

## 2021-12-15 NOTE — CARE COORDINATION
Transitional planning:  Met with Harinder Garcia, unit RN for report. Withdrawal of care and comfort care.

## 2021-12-15 NOTE — PROGRESS NOTES
Patient family was provided gowns, face shields and gloves to be with patient during end of life care. Bipap was removed. Patient's face was cleaned, transferred to nasal cannula oxygen. Patient was medicated due to obvious pain and agitation. Outside of room. Giving family privacy. Monitor set to comfort care mode. Watching vitals at central monitor.

## 2021-12-15 NOTE — PROGRESS NOTES
[facility]  Office: [address], DO, Cobrissadominga Francis, DO, Deyanira Johnson, DO, Paul Delarosa Blood, DO, Henrietta Taylor MD, Alexandra Rushing MD, Phyllis Gonzalez MD, Lexx Garcia MD, Boni Sheriff MD, Meredith Vasquez MD, Jad Bennett MD, Petty Swartz, DO, Riki Gonzalez, DO, Zahra Ortega MD,  Vik Monterroso DO, Rupinder Castillo MD, Carina Calderón MD, Carmela Oliveira MD, Jolly Dillard MD, Walt Robles MD, Ellie Reynoso MD, Leonard Vásquez MD, Dana Mcmahon, [facility], [facility], CNP, Jb Purdy, CNP, Tyrell Campa, CNS, Joshua Tomlin, [facility], Ilda Rosales, CNP, Roland Corona, [facility], Alfredo Arzate, [facility], Dario Wilks, CNP, Rowan Oppenheim, PA-C, Willian Erickson, [facility], Andrew Morgan, CNP, Rhoda Osuna, CNP, Kisha Lea, CNP, Artie Finn, CNP, Ok Del Angel, CNP, Harika Herzog, CNP, Chio Damian, [address] [place] [address]    Progress Note    12/15/2021    10:25 AM    Name:   Mercer Alpers  MRN:     [id number]     Acct:      [de-identified]   Room:   [address] [place] [address] Day:  5  Admit Date:  12/10/2021  3:28 PM    PCP:   NUPUR Huston CNP  Code Status:  DNR-CCA    Subjective:     C/C:   Chief Complaint   Patient presents with    Emesis     N/V Xs 3 days    Fever     103 t two days ago    Shortness of Breath     Interval History Status: not changed. Patient seen and examined at bedside. Remains on bipap. Patient still altered, not following ocmmands, depsite maximal bipap efforts remaining borderline hypoxic. Brief History:     81-year-old female past medical history of left foot infection, GERD, alcohol abuse, hypertension presents with cough shortness of breath has been persisting since around Thanksgiving time.   Patient has been followed by infectious disease as well as pulmonology, wound care, and palliative care     Review of Systems:     Unable to obtain due to altered mental status and lack of cooperation    Medications: Allergies:     Allergies   Allergen Reactions    Celecoxib Other (See Comments)     Mood changes    Diclofenac Sodium Other (See Comments)     Mood changes    Ibuprofen Other (See Comments)     Mood changes    Meloxicam Other (See Comments)     Mood changes    Naproxen Other (See Comments)     Mood changes    Tolmetin Other (See Comments)     Mood changes       Current Meds:   Scheduled Meds:    ketoconazole   Topical Daily    gabapentin  300 mg Oral TID    CENTRUM/CERTA-FILIPE with minerals oral  15 mL Oral Daily    methylPREDNISolone  30 mg IntraVENous Q8H    cefTRIAXone (ROCEPHIN) IV  1,000 mg IntraVENous Q24H    pantoprazole  40 mg IntraVENous Daily    sodium chloride flush  5-40 mL IntraVENous 2 times per day    vitamin C  500 mg Oral BID    Vitamin D  2,000 Units Oral Daily    zinc sulfate  50 mg Oral Daily    ipratropium  2 puff Inhalation 4x daily    albuterol sulfate HFA  2 puff Inhalation 4x daily    atorvastatin  10 mg Oral Daily    clopidogrel  75 mg Oral Daily    dapsone  50 mg Oral Daily    sodium chloride flush  5-40 mL IntraVENous 2 times per day    enoxaparin  30 mg SubCUTAneous BID    povidone-iodine (BETADINE) in 0.9% Sodium Chloride irrigation   Topical Once     Continuous Infusions:    dextrose 100 mL/hr at 12/15/21 0524    dexmedetomidine 1 mcg/kg/hr (12/15/21 0933)    sodium chloride      sodium chloride       PRN Meds: fentanNYL, traMADol, LORazepam, sodium chloride flush, sodium chloride, lidocaine, meclizine, oxyCODONE-acetaminophen, sodium chloride flush, sodium chloride, ondansetron **OR** ondansetron, polyethylene glycol, acetaminophen **OR** acetaminophen, dextromethorphan-guaiFENesin    Data:     Past Medical History:   has a past medical history of Arthritis, CAD (coronary artery disease), Chronic back pain, GERD (gastroesophageal reflux disease), Hyperlipidemia, Hypertension, Incomplete RBBB, Irregular heart beat, LAFB (left anterior fascicular block), Leg wound, left, Lumbar disc disease, Osteoporosis, PAC (premature atrial contraction), PONV (postoperative nausea and vomiting), Wears dentures, Wears eyeglasses, Wears partial dentures, and Wellness examination. Social History:   reports that she quit smoking about 22 months ago. Her smoking use included cigarettes. She started smoking about 53 years ago. She has a 12.50 pack-year smoking history. She has never used smokeless tobacco. She reports current alcohol use. She reports that she does not use drugs. Family History:   Family History   Problem Relation Age of Onset    Coronary Art Dis Mother     Arthritis Mother     Heart Surgery Mother         CABG    Coronary Art Dis Father     Heart Disease Father     Heart Surgery Father         CABG    Coronary Art Dis Sister     Heart Surgery Sister         CABG       Vitals:  BP (!) 163/70   Pulse 50   Temp 97.7 °F (36.5 °C) (Axillary)   Resp (!) 32   Ht 5' 6\" (1.676 m)   Wt 163 lb 1.6 oz (74 kg)   SpO2 95%   BMI 26.33 kg/m²   Temp (24hrs), Av.6 °F (36.4 °C), Min:97.1 °F (36.2 °C), Max:97.9 °F (36.6 °C)    Recent Labs     21  1350 21  1701 21  0404 21   POCGLU 198* 158* 171* 194*       I/O (24Hr):     Intake/Output Summary (Last 24 hours) at 12/15/2021 1025  Last data filed at 12/15/2021 0400  Gross per 24 hour   Intake 3167.75 ml   Output 1110 ml   Net 2057.75 ml       Labs:  Hematology:  Recent Labs     21  0434 21  0631 12/15/21  0641   WBC 10.8 18.4* 23.4*   RBC 2.91* 3.42* 3.49*   HGB 10.1* 12.0 12.4   HCT 31.2* 37.0 36.7   .2* 108.2* 105.2*   MCH 34.7* 35.1* 35.5*   MCHC 32.4 32.4 33.8   RDW 13.9 14.0 13.9    342 269   MPV 10.4 10.3 10.8   CRP 5.9* 3.1  --      Chemistry:  Recent Labs     21  0434 21  0631   * 141   K 3.0* 4.2   * 112*   CO2 17* 17*   GLUCOSE 130* 141*   BUN 17 15   CREATININE 0.49* 0.38*   MG 2.3 2.4   ANIONGAP 14 12   LABGLOM >60 >60   GFRAA >60 >60   CALCIUM 8.4* 8.0*   PHOS 1.6* 2.2*     Recent Labs     12/13/21  0434 12/13/21  1350 12/13/21  1701 12/14/21  0404 12/14/21  0631 12/14/21 2026   LABALBU 2.8*  --   --   --  3.1*  --    POCGLU  --  198* 158* 171*  --  194*     ABG:  Lab Results   Component Value Date    POCPH 7.446 12/11/2021    POCPCO2 32.3 12/11/2021    POCPO2 178.7 12/11/2021    POCHCO3 22.2 12/11/2021    NBEA 1 12/11/2021    PBEA NOT REPORTED 12/11/2021    EWI3LLX NOT REPORTED 12/11/2021    TWQV5HIN 100 12/11/2021    FIO2 100.0 12/11/2021     Lab Results   Component Value Date/Time    SPECIAL LT HAND 8ML 12/13/2021 04:40 AM     Lab Results   Component Value Date/Time    CULTURE NO GROWTH 2 DAYS 12/13/2021 04:40 AM       Radiology:  CT CHEST WO CONTRAST    Result Date: 12/12/2021  1. Multifocal ground-glass and consolidative opacities bilaterally suspicious for pneumonia. An atypical or viral pneumonia is suspected over edema. 2. Hepatic steatosis. 3. Emphysematous changes. XR CHEST PORTABLE    Result Date: 12/11/2021  Persistent findings including right greater than left bibasilar opacification suggestive of multifocal pneumonia as well as generalized interstitial prominence which may represent vascular congestion/edema. XR CHEST PORTABLE    Result Date: 12/10/2021  Vascular congestion and bilateral airspace disease is most suggestive of developing edema. An underlying inflammatory process or infection should also be considered in the appropriate clinical setting.        Physical Examination:        General appearance: Chronically ill-appearing, altered, not following any commands  Mental Status: not alert to person place nor time  Lungs: Decreased breath sounds bilaterally, tachypneic, prolonged expiratory phase  Heart:  regular rate and rhythm, no murmur  Abdomen:  soft, nontender, nondistended, hypoactive bowel sounds  Extremities:  no edema, redness, tenderness in the calves  Skin: Chronic lesion to foot    Assessment:        Hospital Problems           Last Modified POA    * (Principal) Pneumonia due to COVID-19 virus 12/11/2021 Yes    Hypertension 12/11/2021 Yes    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 ([address] 12/11/2021 Yes    Gastroesophageal reflux disease without esophagitis 12/11/2021 Yes    Pyoderma gangrenosum 12/11/2021 Yes    Alcohol abuse 12/11/2021 Yes    Acute respiratory failure due to COVID-19 ([address] 12/11/2021 Yes    Elevated LFTs 12/11/2021 Yes    COVID-19 12/11/2021 Yes          Plan:        Acute respiratory failure secondary to COVID-19. Appreciate pulmonology and infectious disease recommendations. Status post Actemra 12/11/2021. Currently on Rocephin  Left foot wound infection. Appreciate infectious disease recommendations. Rocephin, plan for linezolid  History of alcohol abuse wean down Precedex  On fentanyl for pain  Currently on Solu-Medrol for his COPD exacerbation  Discussed with  over the phone discussed poor prognosis and worsening hypoxia. Discussed again about what patient's wishes would wan to be. Discussed code status including full code, DNR-CCA and DNR-CC. Patient would not want to be intubated. Discussed further goals of care. Hisband tells me \"I want her to be comfortable. \" will respect patients's and family's wishes and make patient DNR-CC. Will consult hospice.   Stefanie Hare MD  12/15/2021  10:25 AM

## 2021-12-15 NOTE — FLOWSHEET NOTE
SPIRITUAL CARE DEPARTMENT - [address] [address] [address]  PROGRESS NOTE    Shift date: 12.14.2021  Shift day: Tuesday   Shift # 2    Room # 3020/3020-01   Name: Fransisco Shay            Age: 60 y.o. Gender: female          Buddhism: unknown   Place of Scientology: unknown    Referral: Routine Visit    Admit Date & Time: 12/10/2021  3:28 PM    PATIENT/EVENT DESCRIPTION:  Fransisco Shay is a 60 y.o. female       SPIRITUAL ASSESSMENT/INTERVENTION:  Son Candida Rollins appears to be calm and coping. Requested a Zoom visit with the patient.  provided space for family to express feelings, thoughts, and concerns. Determined family support to be available. Facilitated zoom call. SPIRITUAL CARE FOLLOW-UP PLAN:  Chaplains will remain available to offer spiritual and emotional support as needed. Electronically signed by Nikki Roberts on 12/15/2021 at [facility] 125  [phone]       12/14/21 2200   Encounter Summary   Services provided to: Family   Referral/Consult From: Nurse   Support System Family members; Children   Continue Visiting   ([phone])   Complexity of Encounter Moderate   Length of Encounter 30 minutes   Spiritual Assessment Completed Yes   Routine   Type Initial   Assessment Calm; Approachable; Coping   Intervention Active listening; Explored feelings, thoughts, concerns; Explored coping resources;  Discussed illness/injury and it's impact   Outcome Expressed gratitude; Expressed feelings/needs/concerns; Engaged in conversation     Electronically signed by Jolie Iqbal on 12/15/2021 at 12:01 AM

## 2021-12-15 NOTE — PROGRESS NOTES
[facility] -   Infectious diseases evaluation  admission date 12/10/2021    reason for consultation:   covid pneumonia    Impression :   Current:  · covid pneumonia w hypoxemia and resp distress  · Increased inf markers  · procal >100  · Left foot chronic infected wound - pyoderma gangrenosum( per pt)  · Past pseudomonas infection  · QTC    Other:  · HTN, CAD, RBBB  Discussion / summary of stay / plan of care   ·   Recommendations   · covid :  · Decadron high dose 20x 5 then 10 x5  · CT of the chest suggestive of bilateral Covid pneumonia,  · Proceeding with Actemra 8 mg/kg X1  · anticoag  · FU CRP   · Foot wound cx and wound care  · Stop cefepime and Zosyn, start ceftriaxone for another 7 days in the setting of steroids and Actemra      Isolate till 12/25 included    Infection Control Recommendations   · [place] Precautions  · Contact Isolation   · Airborne isolation  · Droplet Isolation    Antimicrobial Stewardship Recommendations   · Simplification of therapy  · Targeted therapy  ·     Coordination ofOutpatient Care:   · Estimated Length of IV antimicrobials:  · Patient will need Midline / picc Catheter Insertion:   · Patient will need SNF:  · Patient will need outpatient wound care:     History of Present Illness:   Initial history:  Rayna Allen is a 60y.o.-year-old female who presents with nausea vomiting diarrhea shortness of breath clear cough generalized fatigue fever ongoing for about 5 days. Was exposed to Covid, had test at home on 12/6 that was negative. She is unvaccinated. Report of a left foot chronic infection, goes to wound care for that. On 5/2021 the foot wound cx was pseudomonas S cipro and cefepime.   6/2021 foot cx was neg    No dysuria  No other soft tissue infections  Does have incomplete RBBB and chronic back pain    In ER she was tachypneic tachycardic  Inflammatory markers elevated, white count is normal  Tested positive for Covid on 12/10 in the ER and chest x-ray suggestive of Covid pneumonia    started NRB 10L in ER due to hypoxemia - So2 now 94  Left foot dorsum open large wound w purulence, cx taken in ER      Interval changes  12/15/2021   Patient Vitals for the past 8 hrs:   BP Pulse Resp SpO2   12/15/21 0000 (!) 108/96 62 (!) 33    12/14/21 2345  63 23    12/14/21 2337  (!) 48     12/14/21 2330  (!) 48 26    12/14/21 2315  (!) 48 30    12/14/21 2300 (!) 158/71 (!) 49 (!) 31    12/14/21 2245  51 (!) 32    12/14/21 2230  52 30    12/14/21 2215 (!) 146/73 53 28    12/14/21 2200  53 28    12/14/21 2130  65 (!) 33    12/14/21 2115  65 27    12/14/21 2100 (!) 106/92 66 28    12/14/21 2045  64 28    12/14/21 2000 135/74 (!) 48 27    12/14/21 1958   28    12/14/21 1800 (!) 172/82 50 (!) 31 95 %   12/14/21 1758  61 (!) 31 95 %   12/14/21 1700 (!) 171/83 (!) 46 27 97 %   12/13  DNR CCA, saturating 95%, she is on the BiPAP  Foot wound Proteus sensitive to all, within normal reynaldo  Keeping the dressing as recommended by members of [place], discussed with stoma nurse  Changed to ceftriaxone 1 g a day to address the Proteus in the setting of immunosuppression and Actemra    12/14:  High flow 95% saturation, agitated, NG tube placed, Ativan is planned to relaxer  In general does not look good      Summary of relevant labs:  Labs:  Lactic Acid, Sepsis, Whole Blood2.2 High    WBC9.0   D-Dimer, Quant0.95   [id number] High    Troponin, High Rkphnrdwehm10 High    Procalcitonin>100.00 High    CRP53.8 High    LD1,260 High      Micro:  SARS-CoV-2, RapidDETECTED Abnormal      Imaging:  CXR  Vascular congestion and bilateral airspace disease is most suggestive of   developing edema. An underlying inflammatory process or infection should also be considered in the appropriate clinical setting.        I have personally reviewed the past medical history, past surgical history, medications, social history, and family history, and I haveupdated the database accordingly. Allergies:   Celecoxib, Diclofenac sodium, Ibuprofen, Meloxicam, Naproxen, and Tolmetin     Review of Systems:     Review of Systems   Constitutional: Positive for activity change, fatigue and fever. HENT: Positive for congestion. Eyes: Negative for photophobia. Respiratory: Positive for cough and shortness of breath. Cardiovascular: Negative for leg swelling. Gastrointestinal: Positive for diarrhea, nausea and vomiting. Endocrine: Negative for polyphagia. Genitourinary: Negative for dysuria. Musculoskeletal: Positive for back pain. Skin: Positive for wound. Negative for color change. Allergic/Immunologic: Negative for immunocompromised state. Neurological: Positive for dizziness. Negative for seizures and numbness. Hematological: Negative for adenopathy. Psychiatric/Behavioral: Negative for agitation. Physical Examination :       Physical Exam  Constitutional:       General: She is in acute distress. Appearance: She is ill-appearing. HENT:      Head: Normocephalic and atraumatic. Nose: Nose normal.      Mouth/Throat:      Mouth: Mucous membranes are moist.   Eyes:      General: No scleral icterus. Conjunctiva/sclera: Conjunctivae normal.   Cardiovascular:      Rate and Rhythm: Normal rate and regular rhythm. Heart sounds: Normal heart sounds. No murmur heard. Pulmonary:      Effort: Respiratory distress present. Breath sounds: No stridor. Abdominal:      Palpations: There is no mass. Hernia: No hernia is present. Genitourinary:     Comments: Urine la  Musculoskeletal:         General: No swelling, tenderness, deformity or signs of injury. Cervical back: Neck supple. No rigidity. Skin:     General: Skin is dry. Coloration: Skin is not jaundiced or pale. Findings: No erythema. Neurological:      General: No focal deficit present.       Mental Status: She is alert and oriented to person, place, and time. Psychiatric:         Mood and Affect: Mood normal.         Thought Content: Thought content normal.         Past Medical History:     Past Medical History:   Diagnosis Date    Arthritis     knees hips hands shoulders and back    CAD (coronary artery disease) 2019    blockage on cath 9/2019, to have stenting after surgery (arthroplasty) Dr. Viv Pond Chronic back pain     GERD (gastroesophageal reflux disease)     on rx    Hyperlipidemia     per Dr. Antonio Mata on rx    Hypertension     Abril Mauricio manages    Incomplete RBBB     Irregular heart beat     LAFB (left anterior fascicular block)     Dr. Antonio Mata, cardiologist, seen 9/2019 prior to surgery on 10/8/2019    Leg wound, left     following with wound care, Dr. Kristen Cardona Lumbar disc disease     Osteoporosis     PAC (premature atrial contraction) 06/2018    PACs and PVCs on holter monitor,     PONV (postoperative nausea and vomiting)     requests scop patch    Wears dentures     upper partial    Wears eyeglasses     readers    Wears partial dentures     upper and lower    Wellness examination     MIKE Cross NP PCP, seen 2/10/2020       Past Surgical  History:     Past Surgical History:   Procedure Laterality Date    ABDOMINOPLASTY  1997    BACK SURGERY      BLEPHAROPLASTY Bilateral 1997    BREAST ENHANCEMENT SURGERY Bilateral 1999    breast augmentation   Liz Pratt  2019    Dr. Antonio Mata, blockage but no stents yet    CATARACT REMOVAL WITH IMPLANT Bilateral 2016    COLONOSCOPY  2015    No Polyps    COSMETIC SURGERY      eyelid lift    FINGER SURGERY Right     thumb lesion excised    FIXATION KYPHOPLASTY  2013    Back    FOOT DEBRIDEMENT Left 10/11/2019    SURGICAL PREP WOUND BED LEFT FOOT WITH APPLICATION OF EPIFIX LEFT FOOT 3X4 performed by Randall Weinstein DPM at [place] [address]      kyphoplasty for lumbar fx 2013 Dr. Rosalinda Romero ARTHROSCOPY Left 2006 [facility] LEG SURGERY Left 1/2/2020    FOOT  DEBRIDEMENT WITH WOUND VAC PLACEMENT performed by Dia Babin MD at [place] [address]  2014   [address]  2005    diskectomy and spinal fusion    OTHER SURGICAL HISTORY  10/03/2019    Left leg angiogram    CA OFFICE/OUTPT VISIT,PROCEDURE ONLY Right 9/11/2018    EXCISION MASS THUMB, 3080 TABLE performed by Leigh nAn Sampson DO at [facility] Left 12/3/2019    LEFT FOOT DEBRIDEMENT WITH SKIN GRAFT SPLIT THICKNESS; SKIN GRAFT TAKEN FROM RIGHT ANTERIOR THIGH performed by Dia Babin MD at [facility] Left 2/25/2020    DEBRIDEMENT, SPLIT THICKNESS SKIN GRAFT WITH WOUND VAC PLACEMENT FOOT performed by BUTCH Mayfield MD at [facility] Left 6/30/2020    DEBRIDEMENT, SKIN GRAFT SPLIT THICKNESS FOOT  ([place] [address], [address] [facility]) performed by Marlin Chavez MD at [facility]      as a child    TOTAL KNEE ARTHROPLASTY Right 10/13/2020    KNEE TOTAL ARTHROPLASTY    TOTAL KNEE ARTHROPLASTY Right 10/13/2020    KNEE TOTAL ARTHROPLASTY- MICROPORT, \ ADVANCED performed by Leigh Ann Sampson DO at [facility] OR       Medications:      ketoconazole   Topical Daily    gabapentin  300 mg Oral TID    CENTRUM/CERTA-FILIPE with minerals oral  15 mL Oral Daily    methylPREDNISolone  30 mg IntraVENous Q8H    cefTRIAXone (ROCEPHIN) IV  1,000 mg IntraVENous Q24H    pantoprazole  40 mg IntraVENous Daily    sodium chloride flush  5-40 mL IntraVENous 2 times per day    vitamin C  500 mg Oral BID    Vitamin D  2,000 Units Oral Daily    zinc sulfate  50 mg Oral Daily    ipratropium  2 puff Inhalation 4x daily    albuterol sulfate HFA  2 puff Inhalation 4x daily    atorvastatin  10 mg Oral Daily    clopidogrel  75 mg Oral Daily    dapsone  50 mg Oral Daily    sodium chloride flush  5-40 mL IntraVENous 2 times per day    enoxaparin  30 mg SubCUTAneous BID    metoprolol tartrate  37.5 mg Oral BID    povidone-iodine (BETADINE) in 0.9% Sodium Chloride irrigation   Topical Once       Social History:     Social History     Socioeconomic History    Marital status:      Spouse name: Aydee Miller Number of children: 2    Years of education: Not on file    Highest education level: Not on file   Occupational History    Occupation: Retired RN   Tobacco Use    Smoking status: Former Smoker     Packs/day: 0.25     Years: 50.00     Pack years: 12.50     Types: Cigarettes     Start date:      Quit date: 2020     Years since quittin.8    Smokeless tobacco: Never Used   Vaping Use    Vaping Use: Never used   Substance and Sexual Activity    Alcohol use: Yes     Comment: 2-3 shot liquor for weekends    Drug use: Never    Sexual activity: Yes     Partners: Male   Other Topics Concern    Not on file   Social History Narrative    Not on file     Social Determinants of Health     Financial Resource Strain: Low Risk     Difficulty of Paying Living Expenses: Not hard at all   Food Insecurity: No Food Insecurity    Worried About [address] in the Last Year: Never true    [facility] in the Last Year: Never true   Transportation Needs:     Lack of Transportation (Medical): Not on file    Lack of Transportation (Non-Medical):  Not on file   Physical Activity:     Days of Exercise per Week: Not on file    Minutes of Exercise per Session: Not on file   Stress:     Feeling of Stress : Not on file   Social Connections:     Frequency of Communication with Friends and Family: Not on file    Frequency of Social Gatherings with Friends and Family: Not on file    Attends Yazidism Services: Not on file    Active Member of Clubs or Organizations: Not on file    Attends Club or Organization Meetings: Not on file    Marital Status: Not on file   Intimate Partner Violence:     Fear of Current or Ex-Partner: Not on file    Emotionally Abused: Not on escape proof reading. It such instances, actual meaningcan be extrapolated by contextual diversion.     Mariah Celis MD  Office: [phone]  Perfect serve / office [phone]

## 2021-12-16 NOTE — FLOWSHEET NOTE
[facility] - [facility] [address]   Patient Death Note  DEATH   Shift date: 12/15/2021    Shift day: Wednesday  Shift #: 2                 Room # 3020/3020-01   Name: Monae Ramos            Age: 60 y.o. Gender: female          Religious: Jainism      Place of Gnosticist: 12/15/2021  Admit Date & Time: 12/10/2021  3:28 PM     Referral: RN   Actual date of death: 12/15/2021       SITUATION AT DEATH:  Pt. Has been in covid unit and has declined rapidly. Spouse and son arrived this afternoon to say goodbye to pt. As she was extubated. Spouse decided to leave and son [place] [employer] and niece Peters Pulling stayed until pt. Took her last breath. Family was open to prayer and  offered them emotional support and companionship.  assisted RN with completing release of body and called  home. IS THIS A 'S CASE? No    SPIRITUAL/EMOTIONAL INTERVENTION:  See above      Family Received Grief Packet? Yes       NAME AND PHONE NUMBER OF DOCTOR SIGNING DEATH CERTIFICATE:  Dr. Han Jeter [phone]       are now contacting the  home after a patient death.  spoke with Yury Rodriguez [place] and they will retrieve body ASAP. Copy of COMPLETED Release of Body Form Received? Yes    Patient's belongings: Son took bag of clothing home.  HOME:  Name: [facility]: [facility]  Phone Number: [phone]    NEXT OF KIN:  Name: Chester Du  Relationship: Spouse  Street Address: [address]: [facility]: [place]  Zip code: [address]   Phone Number: [phone]    [facility]? No    IF SO, WHAT?   None    Electronically signed by Poli Dawkins on 12/15/2021 at 11:24 PM.  [address]  [phone]     12/15/21 2317   Encounter Summary   Services provided to: Patient and family together   Referral/Consult From: Nurse   Support System Spouse   Continue Visiting   (12/15/2021)   Complexity of Encounter High   Length of Encounter 2 hours   Spiritual Assessment Completed Yes   Grief and Life Adjustment   Type Death   Assessment Tearful; Coping; Grieving   Intervention Active listening; Explored feelings, thoughts, concerns; Explored coping resources; Nurtured hope; Prayer; Sustaining presence/ Ministry of presence   Outcome Comfort; Expressed gratitude; Engaged in conversation; Expressed feelings/needs/concerns; Coping; Tearful

## 2021-12-16 NOTE — PROGRESS NOTES
2040 patient alarming asystole, three RNs in room. Time of death verified by 2 RNs, family at bedside. Notified NP. Pastoral care at bedside. Death packet to be completed.

## 2021-12-17 NOTE — DISCHARGE SUMMARY
[facility]  Office: [address], , Aida Skelton, DO, Bony Pate, DO, Skylar Duncan, DO, Rula Hsieh MD, Jamir Cho MD, Alberto Jensen MD, Tyrese Schaefer MD, Rod Modi MD, Elayne Mccray MD, Kim Pearce MD, Parul Garcia, DO, Elaina Vigil, DO, Roopa He MD,  Manfred Elliott, DO, Sarah Vallejo MD, Kita Jesus MD, Ana Pate MD, Leda Rincon MD, Leny Guerrier MD, Severiano Border, MD, Danish Vigil MD, Kamran Peterson [facility], [facility], [facility], Penny Moreno, [facility], Jacob Pelletier, CNS, Latha Stewart, CNP, Nicole Garcia, CNP, Ronny Barrow, CNP, Jose Rodriguez, CNP, Reyna Elias, CNP, Francesca Ibarra PA-C, Andrés Wills [facility], Angela Luu [facility], Kacey Bass, CNP, Chela Curry, CNP, Jackelyn Vigil, CNP, Mariah Avila, CNP, Jerrod Francisco, [facility], Karthik Weber, [facility]    Discharge Summary     Patient ID: Skip Saldana  :     MRN: [id number]     ACCOUNT:  [de-identified]   Patient's PCP: NUPUR Moore CNP  Admit Date: 12/10/2021   Discharge Date: 2021    Length of Stay: 6  Code Status:  Prior  Admitting Physician: No admitting provider for patient encounter. Discharge Physician: Roopa He MD     Active Discharge Diagnoses:     Hospital Problem Lists:  Principal Problem:    Pneumonia due to COVID-19 virus  Active Problems:    Hypertension    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)    Gastroesophageal reflux disease without esophagitis    Pyoderma gangrenosum    Alcohol abuse    Acute respiratory failure due to COVID-19 ([facility] [address]    Elevated LFTs    COVID-19  Resolved Problems:    * No resolved hospital problems.  *      Admission Condition:  serious     Discharged Condition:     Hospital Stay:     Hospital Course:  Skip Saldana is a 60 y.o. female who was admitted for the management of   Pneumonia due to COVID-19 virus , presented to ER with Emesis (N/V Xs 3 days), Fever (103 t two days ago), and Shortness of Breath    78-year-old female past medical history of left foot infection, GERD, alcohol abuse, hypertension presents with cough shortness of breath has been persisting since around Thanksgiving time. Patient has been followed by infectious disease as well as pulmonology, wound care, and palliative care. Patient had worsening dyspnea requiring BiPAP continuously and patient had made her decision of DNR CCA with no intubation was also confirmed by her . Patient continued to deteriorate and group decision between  and son was made to make patient comfortable and CODE STATUS was changed to DNR CC. Patient passed away at 2040 on 12/15/2021 due to acute on chronic respiratory failure secondary to COVID-19.         Significant therapeutic interventions: see above    Significant Diagnostic Studies:   Labs / Micro:  CBC:   Lab Results   Component Value Date    WBC 23.4 12/15/2021    RBC 3.49 12/15/2021    HGB 12.4 12/15/2021    HCT 36.7 12/15/2021    .2 12/15/2021    MCH 35.5 12/15/2021    MCHC 33.8 12/15/2021    RDW 13.9 12/15/2021     12/15/2021     BMP:    Lab Results   Component Value Date    GLUCOSE 398 12/15/2021     12/15/2021    K 3.6 12/15/2021     12/15/2021    CO2 16 12/15/2021    ANIONGAP 12 12/15/2021    BUN 12 12/15/2021    CREATININE 0.33 12/15/2021    BUNCRER NOT REPORTED 12/15/2021    CALCIUM 6.6 12/15/2021    LABGLOM >60 12/15/2021    GFRAA >60 12/15/2021    GFR      12/15/2021    GFR NOT REPORTED 12/15/2021        Radiology:  CT CHEST WO CONTRAST    Result Date: 12/12/2021  1. Multifocal ground-glass and consolidative opacities bilaterally suspicious for pneumonia. An atypical or viral pneumonia is suspected over edema. 2. Hepatic steatosis. 3. Emphysematous changes.      XR CHEST PORTABLE    Result Date: 12/11/2021  Persistent findings including right greater than left bibasilar opacification suggestive of multifocal pneumonia as well as generalized interstitial prominence which may represent vascular congestion/edema. XR CHEST PORTABLE    Result Date: 12/10/2021  Vascular congestion and bilateral airspace disease is most suggestive of developing edema. An underlying inflammatory process or infection should also be considered in the appropriate clinical setting. XR ABDOMEN FOR NG/OG/NE TUBE PLACEMENT    Result Date: 2021  Enteric tube tip is within the stomach with the side port either at or just distal to the GE junction. Consultations:    Consults:     Final Specialist Recommendations/Findings:   IP CONSULT TO HOSPITALIST  IP CONSULT TO INFECTIOUS DISEASES  IP CONSULT TO INFECTIOUS DISEASES  IP CONSULT TO PULMONOLOGY  IP CONSULT TO CASE MANAGEMENT  IP CONSULT TO SOCIAL WORK  IP CONSULT TO PALLIATIVE CARE  IP CONSULT TO DIETITIAN  IP CONSULT TO DIETITIAN  IP CONSULT TO HOSPICE      The patient was seen and examined on day of discharge and this discharge summary is in conjunction with any daily progress note from day of discharge. Discharge plan:     Disposition:   No discharge procedures on file. Time Spent on discharge is  15 mins in patient examination, evaluation, counseling as well as medication reconciliation, prescriptions for required medications, discharge plan and follow up. Electronically signed by   Roopa He MD  2021  12:23 PM      Thank you  NUPUR Moore - [facility] for the opportunity to be involved in this patient's care.

## 2021-12-18 LAB
CULTURE: NORMAL
CULTURE: NORMAL
Lab: NORMAL
Lab: NORMAL
SPECIMEN DESCRIPTION: NORMAL
SPECIMEN DESCRIPTION: NORMAL

## 2022-10-31 NOTE — TELEPHONE ENCOUNTER
Last visit: 6/14/21  Last Med refill: 12/21/20  Does patient have enough medication for 72 hours: No:     Next Visit Date:  Future Appointments   Date Time Provider Keshawn De Dios   8/10/2021  9:45 AM Quinn Montelongo MD [facility]   8/17/2021  8:30 AM Scott Box DPM [facility] [place]   8/17/2021 12:30 PM Minh Torres MD [facility]   1/7/2022  7:30 AM Chad Melissa,  [facility] Maintenance   Topic Date Due    Diabetic retinal exam  Never done    COVID-19 Vaccine (1) Never done    Breast cancer screen  04/17/2017    Cervical cancer screen  04/01/2018    Diabetic foot exam  11/11/2020    Shingles Vaccine (1 of 2) 02/26/2022 (Originally 12/30/2006)    DTaP/Tdap/Td vaccine (1 - Tdap) 06/14/2022 (Originally 12/30/1975)    Flu vaccine (1) 09/01/2021    A1C test (Diabetic or Prediabetic)  09/30/2021    Diabetic microalbuminuria test  02/26/2022    Annual Wellness Visit (AWV)  06/15/2022    Lipid screen  07/27/2022    Colon cancer screen colonoscopy  06/26/2025    Hepatitis C screen  Completed    HIV screen  Completed    Hepatitis A vaccine  Aged Out    Hib vaccine  Aged Out    Meningococcal (ACWY) vaccine  Aged Out    Pneumococcal 0-64 years Vaccine  Aged Out       Hemoglobin A1C (%)   Date Value   09/30/2020 5.2   09/25/2019 5.2   01/13/2017 4.9             ( goal A1C is < 7)   No results found for: LABMICR  LDL Cholesterol (mg/dL)   Date Value   07/27/2021 58   07/02/2021 66     LDL Calculated (mg/dL)   Date Value   01/13/2017 100   03/20/2015 104       (goal LDL is <100)   AST (U/L)   Date Value   07/27/2021 15     ALT (U/L)   Date Value   07/27/2021 13     BUN (mg/dL)   Date Value   07/27/2021 22     BP Readings from Last 3 Encounters:   08/03/21 (!) 175/84   07/27/21 (!) 170/80   07/13/21 (!) 176/92          (goal 120/80)    All Future Testing planned in [facility]  Lab Frequency Next Occurrence   MRI FOOT LEFT W CONTRAST Once October 31, 2022     Patient: Lavon Tsai  YOB: 1953  Date of Visit: 10/31/2022      To Whom it May Concern:    Lavon Tsai is under my professional care  Cher Gaffney was seen in my office on 10/31/2022  Cher Gaffney may return to work on 11/2/22  If you have any questions or concerns, please don't hesitate to call           Sincerely,          Eamon Alvarez DO        CC: No Recipients 01/14/2021   VL TCPO2 SHAILA MULTI LEVEL Once 01/12/2021   Surgical Pathology Once 04/28/2021   CBC Auto Differential Once 09/09/2021   Comprehensive Metabolic Panel Once 02/75/4758   CBC Auto Differential Once 08/17/2021   Comprehensive Metabolic Panel Once 14/90/2582               Patient Active Problem List:     Neoplasm of uncertain behavior of skin     Other plastic surgery for unacceptable cosmetic appearance     Chronic bilateral low back pain without sciatica     Mass of finger of right hand     Chronic ulcer of left foot with fat layer exposed ([address]     Pain in left foot     Hypertension     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)     Wound, open, foot with complication, left, initial encounter     Status post total knee replacement using cement, right     Primary osteoarthritis of right knee     Gastroesophageal reflux disease without esophagitis     Anemia     PAD (peripheral artery disease) ([facility])     S/P drug eluting coronary stent placement     Pain in limb     Cataract     Arrhythmia     Cellulitis of left foot     Pyoderma gangrenosum

## 2022-12-19 NOTE — PLAN OF CARE
Problem: Pain:  Goal: Pain level will decrease  Description: Pain level will decrease  6/14/2021 0835 by Kelsey Black RN  Outcome: Ongoing  6/14/2021 0835 by Kelsey Black RN  Outcome: Ongoing  Goal: Control of acute pain  Description: Control of acute pain  6/14/2021 0835 by Kelsey Black RN  Outcome: Ongoing  6/14/2021 0835 by Kelsey Black RN  Outcome: Ongoing  Goal: Control of chronic pain  Description: Control of chronic pain  6/14/2021 0835 by Kelsey Black RN  Outcome: Ongoing  6/14/2021 0835 by Kelsey Black RN  Outcome: Ongoing     Problem: Wound:  Goal: Will show signs of wound healing; wound closure and no evidence of infection  Description: Will show signs of wound healing; wound closure and no evidence of infection  6/14/2021 0835 by Kelsey Black RN  Outcome: Ongoing  6/14/2021 0835 by Kelsey Black RN  Outcome: Ongoing     Problem: Falls - Risk of:  Goal: Will remain free from falls  Description: Will remain free from falls  6/14/2021 0835 by Kelsey Black RN  Outcome: Ongoing  6/14/2021 0835 by Kelsey Black RN  Outcome: Ongoing Billing Type: Third-Party Bill Bill For Surgical Tray: no Expected Date Of Service: 12/19/2022

## 2023-01-25 NOTE — PLAN OF CARE
Problem: Pain:  Goal: Pain level will decrease  Description: Pain level will decrease  Outcome: Ongoing  Goal: Control of acute pain  Description: Control of acute pain  Outcome: Ongoing  Goal: Control of chronic pain  Description: Control of chronic pain  Outcome: Ongoing     Problem: Wound:  Goal: Will show signs of wound healing; wound closure and no evidence of infection  Description: Will show signs of wound healing; wound closure and no evidence of infection  Outcome: Ongoing no

## 2023-05-23 NOTE — TELEPHONE ENCOUNTER
Patient stated she needed to leave info for [facility]. The orthropedic doctor she would like to go to is Dr Andre Larios in [place]. Please call patient with any further questions. Reason?: Additional Information

## 2023-07-26 NOTE — TELEPHONE ENCOUNTER
Pt called, asked me to send you a message to let you know that her remicade was approved but she most likely will not start this until after sees the provider at [facility]. [facility] Pregnancy And Lactation Text: This medication is contraindicated in pregnancy and when breast feeding.

## 2024-05-31 NOTE — TELEPHONE ENCOUNTER
Last visit: 6/22/20  Last Med refill: 6/22/20  Does patient have enough medication for 72 hours: Yes    Next Visit Date:  Future Appointments   Date Time Provider Keshawn De Dios   12/26/2020  9:00 AM [facility] MRI RM 1 [facility] MRI [facility]   12/29/2020 10:00 AM Taiwo Samuels DPM [facility]   1/4/2021  7:30 AM Chad Javier  [address] Maintenance   Topic Date Due    Hepatitis C screen  1955    Diabetic retinal exam  12/30/1966    HIV screen  12/30/1971    Diabetic microalbuminuria test  12/30/1974    DTaP/Tdap/Td vaccine (1 - Tdap) 12/30/1975    Shingles Vaccine (1 of 2) 12/30/2006    Breast cancer screen  04/17/2017    Cervical cancer screen  04/01/2018    Diabetic foot exam  11/11/2020    Annual Wellness Visit (AWV)  11/23/2020    A1C test (Diabetic or Prediabetic)  09/30/2021    Lipid screen  11/11/2021    Colon cancer screen colonoscopy  06/26/2025    Flu vaccine  Completed    Pneumococcal 0-64 years Vaccine  Completed    Hepatitis A vaccine  Aged Out    Hib vaccine  Aged Out    Meningococcal (ACWY) vaccine  Aged Out       Hemoglobin A1C (%)   Date Value   09/30/2020 5.2   09/25/2019 5.2   01/13/2017 4.9             ( goal A1C is < 7)   No results found for: LABMICR  LDL Cholesterol (mg/dL)   Date Value   11/11/2020 45   01/25/2018 75     LDL Calculated (mg/dL)   Date Value   01/13/2017 100   03/20/2015 104       (goal LDL is <100)   AST (U/L)   Date Value   12/08/2020 14     ALT (U/L)   Date Value   12/08/2020 12     BUN (mg/dL)   Date Value   12/08/2020 11     BP Readings from Last 3 Encounters:   12/15/20 (!) 141/91   12/08/20 (!) 152/88   12/04/20 (!) 143/70          (goal 120/80)    All Future Testing planned in [facility]  Lab Frequency Next Occurrence   MRI FOOT LEFT W CONTRAST Once 01/14/2021   MRI FOOT LEFT W WO CONTRAST Once 12/17/2020               Patient Active Problem List:     Neoplasm of uncertain behavior of skin     Other No plastic surgery for unacceptable cosmetic appearance     Chronic bilateral low back pain without sciatica     Mass of finger of right hand     Chronic ulcer of left foot with fat layer exposed ([address]     Pain in left foot     Hypertension     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

## 2025-02-07 NOTE — PLAN OF CARE
Problem: Pain:  Goal: Pain level will decrease  Description: Pain level will decrease  8/3/2021 0948 by Heather Wheat RN  Outcome: Ongoing  8/3/2021 0948 by Heather Wheat RN  Outcome: Ongoing  Goal: Control of acute pain  Description: Control of acute pain  8/3/2021 0948 by Heather Wheat RN  Outcome: Ongoing  8/3/2021 0948 by Heather Wheat RN  Outcome: Ongoing  Goal: Control of chronic pain  Description: Control of chronic pain  8/3/2021 0948 by Heather Wheat RN  Outcome: Ongoing  8/3/2021 0948 by Heather Wheat RN  Outcome: Ongoing     Problem: Wound:  Goal: Will show signs of wound healing; wound closure and no evidence of infection  Description: Will show signs of wound healing; wound closure and no evidence of infection  8/3/2021 0948 by Heather Wheat RN  Outcome: Ongoing  8/3/2021 0948 by Heather Wheat RN  Outcome: Ongoing     Problem: Falls - Risk of:  Goal: Will remain free from falls  Description: Will remain free from falls  8/3/2021 0948 by Heather Wheat RN  Outcome: Ongoing  8/3/2021 0948 by Heather Wheat RN  Outcome: Ongoing normal appearance , without tenderness upon palpation , no deformities , trachea midline , Thyroid normal size , no masses , thyroid nontender

## (undated) DEVICE — SVMMC AMPUTATION PK

## (undated) DEVICE — Z DUPLICATE USE 2624755 KIT NEG PRSS DSG W/ PRSS INDIC PTCH STRP 45ML CANSTR CARR

## (undated) DEVICE — DRESSING GZ W3XL16IN CELOS ACETT OIL EMUL N ADH

## (undated) DEVICE — BLADE WECK ST

## (undated) DEVICE — APPLICATOR MEDICATED 26 CC SOLUTION HI LT ORNG CHLORAPREP

## (undated) DEVICE — GLOVE SURG SZ 65 THK91MIL LTX FREE SYN POLYISOPRENE

## (undated) DEVICE — GLOVE SURG SZ 75 L12IN FNGR THK87MIL WHT LTX FREE

## (undated) DEVICE — STOCKINETTE,IMPERVIOUS,12X48,STERILE: Brand: MEDLINE

## (undated) DEVICE — SOLUTION IV IRRIG POUR BRL 0.9% SODIUM CHL 2F7124

## (undated) DEVICE — PADDING,UNDERCAST,COTTON, 3X4YD STERILE: Brand: MEDLINE

## (undated) DEVICE — GLOVE SURG SZ 65 L12IN FNGR THK87MIL WHT LTX FREE

## (undated) DEVICE — ADHESIVE SKIN CLSR 0.7ML TOP DERMBND ADV

## (undated) DEVICE — CLEANER,CAUTERY TIP,2X2",STERILE: Brand: MEDLINE

## (undated) DEVICE — PACK PROCEDURE SURG SVMMC TOT KNEE

## (undated) DEVICE — CHLORAPREP 26ML ORANGE

## (undated) DEVICE — GLOVE ORANGE PI 7 1/2   MSG9075

## (undated) DEVICE — GLOVE SURG SZ 65 L12IN FNGR THK79MIL GRN LTX FREE

## (undated) DEVICE — Device

## (undated) DEVICE — BNDG,ELSTC,MATRIX,STRL,4"X5YD,LF,HOOK&LP: Brand: MEDLINE

## (undated) DEVICE — BLADE ES ELASTOMERIC COAT INSUL DURABLE BEND UPTO 90DEG

## (undated) DEVICE — DRAPE,REIN 53X77,STERILE: Brand: MEDLINE

## (undated) DEVICE — SYRINGE MED 10ML LUERLOCK TIP W/O SFTY DISP

## (undated) DEVICE — STANDARD HYPODERMIC NEEDLE,POLYPROPYLENE HUB: Brand: MONOJECT

## (undated) DEVICE — DECANTER BAG 9": Brand: MEDLINE INDUSTRIES, INC.

## (undated) DEVICE — TUBE IRRIG HNDPC HI FLO TP INTRPULS W/SUCTION TUBE

## (undated) DEVICE — DRAPE,EXTREMITY,89X128,STERILE: Brand: MEDLINE

## (undated) DEVICE — OCCLUSIVE GAUZE ROLL,3% BISMUTH TRIBROMOPHENATE IN PETROLATUM BLEND: Brand: XEROFORM

## (undated) DEVICE — DRAPE,U/ SHT,SPLIT,PLAS,STERIL: Brand: MEDLINE

## (undated) DEVICE — SOLUTION SCRB 4OZ 4% CHG H2O AIDED FOR PREOPERATIVE SKIN

## (undated) DEVICE — ELECTRODE ELECSURG NDL 2.8 INX7.2 CM COAT INSUL EDGE

## (undated) DEVICE — INTENDED FOR TISSUE SEPARATION, AND OTHER PROCEDURES THAT REQUIRE A SHARP SURGICAL BLADE TO PUNCTURE OR CUT.: Brand: BARD-PARKER ® CARBON RIB-BACK BLADES

## (undated) DEVICE — DECANTER FLD 9IN ST BG FOR ASEP TRNSF OF FLD

## (undated) DEVICE — 3M™ STERI-STRIP™ COMPOUND BENZOIN TINCTURE 40 BAGS/CARTON 4 CARTONS/CASE C1544: Brand: 3M™ STERI-STRIP™

## (undated) DEVICE — GLOVE SURG SZ 8 CRM LTX FREE POLYISOPRENE POLYMER BEAD ANTI

## (undated) DEVICE — SHEET DRAPE FULL 70X100

## (undated) DEVICE — ZIMMER SKIN GRAFT CARRIER 16 INCH  LENGTH: Brand: DERMACARRIERS

## (undated) DEVICE — GLOVE ORANGE PI 8   MSG9080

## (undated) DEVICE — SUTURE VCRL SZ 2-0 L27IN ABSRB UD L22MM X-1 1/2 CIR REV CUT J459H

## (undated) DEVICE — STERILE POLYISOPRENE POWDER-FREE SURGICAL GLOVES: Brand: PROTEXIS

## (undated) DEVICE — PAD,ABDOMINAL,5"X9",ST,LF,25/BX: Brand: MEDLINE INDUSTRIES, INC.

## (undated) DEVICE — DERMATOME BLADES: Brand: DERMATOME

## (undated) DEVICE — ZIMMER SKIN GRAFT CARRIER 8 INCH LENGTH: Brand: DERMACARRIERS

## (undated) DEVICE — CEMENT MIXING SYSTEM WITH FEMORAL BREAKWAY NOZZLE: Brand: REVOLUTION

## (undated) DEVICE — SUTURE VCRL SZ 0 L27IN ABSRB UD L36MM CT-1 1/2 CIR J260H

## (undated) DEVICE — GLOVE ORANGE PI 8 1/2   MSG9085

## (undated) DEVICE — HYPODERMIC SAFETY NEEDLE: Brand: MAGELLAN

## (undated) DEVICE — PACK SURG ABD SVMMC

## (undated) DEVICE — ZIMMER® STERILE DISPOSABLE TOURNIQUET CUFF WITH PROTECTIVE SLEEVE AND PLC, DUAL PORT, SINGLE BLADDER, 24 IN. (61 CM)

## (undated) DEVICE — SOLUTION IV 1000ML 0.9% SOD CHL PH 5 INJ USP VIAFLX PLAS

## (undated) DEVICE — GARMENT,MEDLINE,DVT,INT,CALF,MED, GEN2: Brand: MEDLINE

## (undated) DEVICE — TOTAL TRAY, 16FR 10ML SIL FOLEY, URN: Brand: MEDLINE

## (undated) DEVICE — GOWN,AURORA,NONREINFORCED,LARGE: Brand: MEDLINE

## (undated) DEVICE — DRESSING,GAUZE,XEROFORM,CURAD,5"X9",ST: Brand: CURAD

## (undated) DEVICE — Z DISCONTINUED BY MEDLINE USE 2711682 TRAY SKIN PREP DRY W/ PREM GLV

## (undated) DEVICE — SUTURE VCRL SZ 1 L36IN ABSRB UD L36MM CT-1 1/2 CIR J947H

## (undated) DEVICE — E-Z CLEAN, NON-STICK, PTFE COATED, ELECTROSURGICAL BLADE ELECTRODE, MODIFIED EXTENDED INSULATION, 2.5 INCH (6.35 CM): Brand: MEGADYNE

## (undated) DEVICE — ZIPPERED TOGA, 2X LARGE: Brand: FLYTE

## (undated) DEVICE — COVER,LIGHT HANDLE,FLX,2/PK: Brand: MEDLINE INDUSTRIES, INC.

## (undated) DEVICE — SUTURE STRATAFIX SPRL SZ 3-0 L5IN ABSRB UD FS L26MM 3/8 CIR SXMP2B411

## (undated) DEVICE — TOURNIQUET CUF BLD PRESSURE 4X18 IN 2 PRT SINGLE BLDR RED

## (undated) DEVICE — GLOVE SURG SZ 8 L12IN FNGR THK87MIL WHT LTX FREE

## (undated) DEVICE — BANDAGE,GAUZE,BULKEE II,4.5"X4.1YD,STRL: Brand: MEDLINE

## (undated) DEVICE — DRESSING ALG W8XL20IN THN ABSRB SELF ADH BORD MEPILEX

## (undated) DEVICE — GLOVE SURG SZ 75 L12IN FNGR THK79MIL GRN LTX FREE

## (undated) DEVICE — STRIP,CLOSURE,WOUND,MEDI-STRIP,1/2X4: Brand: MEDLINE

## (undated) DEVICE — GOWN,AURORA,NONRNF,XL,30/CS: Brand: MEDLINE

## (undated) DEVICE — CONTAINER,SPECIMEN,4OZ,OR STRL: Brand: MEDLINE

## (undated) DEVICE — STERILE POLYISOPRENE POWDER-FREE SURGICAL GLOVES WITH EMOLLIENT COATING: Brand: PROTEXIS

## (undated) DEVICE — 450 ML BOTTLE OF 0.05% CHLORHEXIDINE GLUCONATE IN 99.95% STERILE WATER FOR IRRIGATION, USP AND APPLICATOR.: Brand: IRRISEPT ANTIMICROBIAL WOUND LAVAGE

## (undated) DEVICE — CUSTOM PK 5Z17R1 IRR

## (undated) DEVICE — TUBING AMB

## (undated) DEVICE — GLOVE SURG SZ 6 THK91MIL LTX FREE SYN POLYISOPRENE ANTI

## (undated) DEVICE — SUTURE STRATAFIX SPRL SZ 2-0 L9IN ABSRB VLT MH L36MM 1/2 SXPD2B408

## (undated) DEVICE — SOLUTION IV 1000ML 5% D 0.9% SOD CHL PH 4 INJ USP VIAFLX

## (undated) DEVICE — SUTURE NONABSORBABLE MONOFILAMENT 3-0 PS-1 18 IN BLK ETHILON 1663H

## (undated) DEVICE — BANDAGE GZ W2XL75IN ST RAYON POLY CNFRM STRTCH LTWT

## (undated) DEVICE — BNDG,ELSTC,MATRIX,STRL,3"X5YD,LF,HOOK&LP: Brand: MEDLINE

## (undated) DEVICE — GLOVE SURG SZ 7 L12IN FNGR THK87MIL WHT LTX FREE

## (undated) DEVICE — STERILE PATIENT PROTECTIVE PAD FOR IMP® KNEE POSITIONERS & COHESIVE WRAP (10 / CASE): Brand: DE MAYO KNEE POSITIONER®

## (undated) DEVICE — GEL US 20GM NONIRRITATING OVERWRAPPED FILE PCH TRNSMIT

## (undated) DEVICE — DRAPE,UTILITY,XL,4/PK,STERILE: Brand: MEDLINE

## (undated) DEVICE — 2108 SERIES SAGITTAL BLADE FLARED, OFFSET  (31.0 X 0.64 X 63.0MM)

## (undated) DEVICE — TUBING PERF PMP L36IN OD0.25IN ID1/16IN DBL QUIK NVENT

## (undated) DEVICE — DRESSING FOAM W4XL10IN AG SIL ADH ANTIMIC POSTOP OPTIFOAM

## (undated) DEVICE — SUTURE STRATAFIX SPRL SZ 1 L14IN ABSRB VLT L48CM CTX 1/2 SXPD2B405

## (undated) DEVICE — ULTRASONIC SET TUBE SONIC 1 SONICVAC DISP

## (undated) DEVICE — SVMMC HND

## (undated) DEVICE — GOWN,SIRUS,POLYRNF,XLN/3XL,18/CS: Brand: MEDLINE

## (undated) DEVICE — 3M™ IOBAN™ 2 ANTIMICROBIAL INCISE DRAPE 6650EZ: Brand: IOBAN™ 2

## (undated) DEVICE — DRAPE NEG PRSS W30.5XL26CM POLYUR ADH VAC

## (undated) DEVICE — OSCILLATING TIP SAW CARTRIDGE: Brand: PRECISION FALCON

## (undated) DEVICE — PADDING UNDERCAST W4INXL4YD COT FBR LO LINTING WYTEX

## (undated) DEVICE — GLOVE EXAM M L95IN FNGR THK35MIL PALM THK24MIL OFF WHT